# Patient Record
Sex: MALE | Race: BLACK OR AFRICAN AMERICAN | Employment: OTHER | URBAN - METROPOLITAN AREA
[De-identification: names, ages, dates, MRNs, and addresses within clinical notes are randomized per-mention and may not be internally consistent; named-entity substitution may affect disease eponyms.]

---

## 2018-07-06 ENCOUNTER — HOSPITAL ENCOUNTER (OUTPATIENT)
Dept: GENERAL RADIOLOGY | Age: 83
Discharge: HOME | End: 2018-07-06
Payer: MEDICARE

## 2018-07-06 ENCOUNTER — HOSPITAL ENCOUNTER (OUTPATIENT)
Dept: PREADMISSION TESTING | Age: 83
Discharge: HOME | End: 2018-07-06
Payer: MEDICARE

## 2018-07-06 VITALS
HEART RATE: 62 BPM | SYSTOLIC BLOOD PRESSURE: 147 MMHG | TEMPERATURE: 97.9 F | HEIGHT: 67 IN | WEIGHT: 223 LBS | DIASTOLIC BLOOD PRESSURE: 89 MMHG | BODY MASS INDEX: 35 KG/M2

## 2018-07-06 LAB
ABO + RH BLD: NORMAL
ALBUMIN SERPL-MCNC: 3.3 G/DL (ref 3.5–5)
ALBUMIN/GLOB SERPL: 0.8 {RATIO} (ref 1.1–2.2)
ALP SERPL-CCNC: 82 U/L (ref 45–117)
ALT SERPL-CCNC: 34 U/L (ref 12–78)
ANION GAP SERPL CALC-SCNC: 11 MMOL/L (ref 5–15)
APTT PPP: 26.5 SEC (ref 22.1–32)
AST SERPL-CCNC: 28 U/L (ref 15–37)
BASOPHILS # BLD: 0 K/UL (ref 0–0.1)
BASOPHILS NFR BLD: 0 % (ref 0–1)
BILIRUB SERPL-MCNC: 0.5 MG/DL (ref 0.2–1)
BLOOD GROUP ANTIBODIES SERPL: NORMAL
BUN SERPL-MCNC: 22 MG/DL (ref 6–20)
BUN/CREAT SERPL: 13 (ref 12–20)
CALCIUM SERPL-MCNC: 8.9 MG/DL (ref 8.5–10.1)
CHLORIDE SERPL-SCNC: 104 MMOL/L (ref 97–108)
CO2 SERPL-SCNC: 25 MMOL/L (ref 21–32)
CREAT SERPL-MCNC: 1.63 MG/DL (ref 0.7–1.3)
DIFFERENTIAL METHOD BLD: NORMAL
EOSINOPHIL # BLD: 0 K/UL (ref 0–0.4)
EOSINOPHIL NFR BLD: 1 % (ref 0–7)
ERYTHROCYTE [DISTWIDTH] IN BLOOD BY AUTOMATED COUNT: 12.5 % (ref 11.5–14.5)
GLOBULIN SER CALC-MCNC: 4.1 G/DL (ref 2–4)
GLUCOSE SERPL-MCNC: 180 MG/DL (ref 65–100)
HCT VFR BLD AUTO: 41.1 % (ref 36.6–50.3)
HGB BLD-MCNC: 13.3 G/DL (ref 12.1–17)
IMM GRANULOCYTES # BLD: 0 K/UL (ref 0–0.04)
IMM GRANULOCYTES NFR BLD AUTO: 0 % (ref 0–0.5)
INR PPP: 1 (ref 0.9–1.1)
LYMPHOCYTES # BLD: 0.9 K/UL (ref 0.8–3.5)
LYMPHOCYTES NFR BLD: 18 % (ref 12–49)
MCH RBC QN AUTO: 30.2 PG (ref 26–34)
MCHC RBC AUTO-ENTMCNC: 32.4 G/DL (ref 30–36.5)
MCV RBC AUTO: 93.4 FL (ref 80–99)
MONOCYTES # BLD: 0.4 K/UL (ref 0–1)
MONOCYTES NFR BLD: 7 % (ref 5–13)
NEUTS SEG # BLD: 3.9 K/UL (ref 1.8–8)
NEUTS SEG NFR BLD: 74 % (ref 32–75)
NRBC # BLD: 0 K/UL (ref 0–0.01)
NRBC BLD-RTO: 0 PER 100 WBC
PLATELET # BLD AUTO: 205 K/UL (ref 150–400)
PMV BLD AUTO: 11.6 FL (ref 8.9–12.9)
POTASSIUM SERPL-SCNC: 4.2 MMOL/L (ref 3.5–5.1)
PROT SERPL-MCNC: 7.4 G/DL (ref 6.4–8.2)
PROTHROMBIN TIME: 10.4 SEC (ref 9–11.1)
RBC # BLD AUTO: 4.4 M/UL (ref 4.1–5.7)
SODIUM SERPL-SCNC: 140 MMOL/L (ref 136–145)
SPECIMEN EXP DATE BLD: NORMAL
THERAPEUTIC RANGE,PTTT: NORMAL SECS (ref 58–77)
WBC # BLD AUTO: 5.3 K/UL (ref 4.1–11.1)

## 2018-07-06 PROCEDURE — 85730 THROMBOPLASTIN TIME PARTIAL: CPT

## 2018-07-06 PROCEDURE — 93005 ELECTROCARDIOGRAM TRACING: CPT

## 2018-07-06 PROCEDURE — 80053 COMPREHEN METABOLIC PANEL: CPT

## 2018-07-06 PROCEDURE — 86900 BLOOD TYPING SEROLOGIC ABO: CPT

## 2018-07-06 PROCEDURE — 85025 COMPLETE CBC W/AUTO DIFF WBC: CPT

## 2018-07-06 PROCEDURE — 71046 X-RAY EXAM CHEST 2 VIEWS: CPT

## 2018-07-06 PROCEDURE — 85610 PROTHROMBIN TIME: CPT

## 2018-07-06 RX ORDER — ASPIRIN 81 MG/1
81 TABLET ORAL DAILY
COMMUNITY
End: 2021-12-27 | Stop reason: ALTCHOICE

## 2018-07-06 RX ORDER — METOPROLOL SUCCINATE 25 MG/1
25 TABLET, EXTENDED RELEASE ORAL DAILY
COMMUNITY
Start: 2018-07-19 | End: 2021-06-06

## 2018-07-06 RX ORDER — OXYCODONE AND ACETAMINOPHEN 5; 325 MG/1; MG/1
TABLET ORAL
COMMUNITY
End: 2018-09-27

## 2018-07-06 RX ORDER — HYDROCHLOROTHIAZIDE 25 MG/1
25 TABLET ORAL DAILY
COMMUNITY
Start: 2018-07-19 | End: 2021-06-06

## 2018-07-06 RX ORDER — PRAVASTATIN SODIUM 40 MG/1
80 TABLET ORAL DAILY
COMMUNITY
End: 2020-07-21 | Stop reason: DRUGHIGH

## 2018-07-06 RX ORDER — OMEPRAZOLE 40 MG/1
40 CAPSULE, DELAYED RELEASE ORAL DAILY
COMMUNITY
End: 2021-01-29

## 2018-07-06 RX ORDER — AMOXICILLIN 250 MG
1 CAPSULE ORAL
Status: ON HOLD | COMMUNITY
End: 2022-07-06 | Stop reason: SDUPTHER

## 2018-07-06 RX ORDER — GABAPENTIN 300 MG/1
300 CAPSULE ORAL
COMMUNITY
Start: 2018-07-19 | End: 2020-07-13

## 2018-07-06 RX ORDER — LANOLIN ALCOHOL/MO/W.PET/CERES
400 CREAM (GRAM) TOPICAL DAILY
COMMUNITY
End: 2020-09-02

## 2018-07-06 RX ORDER — LISINOPRIL 40 MG/1
40 TABLET ORAL DAILY
COMMUNITY
End: 2020-07-13

## 2018-07-06 RX ORDER — AMLODIPINE BESYLATE 10 MG/1
10 TABLET ORAL DAILY
COMMUNITY
Start: 2018-07-19 | End: 2020-07-21 | Stop reason: DRUGHIGH

## 2018-07-06 NOTE — PERIOP NOTES
PREOPERATIVE INSTRUCTIONS REVIEWED WITH PATIENT. PATIENT GIVEN TWO-- SIX PACKS OF CHG WIPES. INSTRUCTIONS REVIEWED ON USE OF CHG WIPES. PATIENT GIVEN SSI INFECTIONS SHEET. PATIENT WAS GIVEN THE OPPORTUNITY TO ASK QUESTIONS ON THE INFORMATION PROVIDED. PATIENT GIVEN INSTRUCTIONS/DIRECTIONS FOR CXR TO BE DONE AT [address] [place] [address]; ORDER ENTERED.

## 2018-07-08 LAB
ATRIAL RATE: 64 BPM
CALCULATED R AXIS, ECG10: -44 DEGREES
CALCULATED T AXIS, ECG11: 147 DEGREES
DIAGNOSIS, 93000: NORMAL
Q-T INTERVAL, ECG07: 486 MS
QRS DURATION, ECG06: 196 MS
QTC CALCULATION (BEZET), ECG08: 486 MS
VENTRICULAR RATE, ECG03: 60 BPM

## 2018-07-09 NOTE — PERIOP NOTES
ABN EKG SENT TO [facility] FOR REVIEW. DR Rosa Khan. REVIEWED EKG AND NOTES; REQUESTED CARDIAC CLEARANCE. NOTIFIED [place]--DR Titus Cobb.

## 2018-07-10 ENCOUNTER — ANESTHESIA EVENT (OUTPATIENT)
Dept: SURGERY | Age: 83
DRG: 254 | End: 2018-07-10
Payer: MEDICARE

## 2018-07-10 RX ORDER — LATANOPROST 50 UG/ML
1 SOLUTION/ DROPS OPHTHALMIC
Status: ON HOLD | COMMUNITY
End: 2022-07-06 | Stop reason: SDUPTHER

## 2018-07-10 RX ORDER — BRIMONIDINE TARTRATE 2 MG/ML
1 SOLUTION/ DROPS OPHTHALMIC EVERY 8 HOURS
COMMUNITY
End: 2021-12-27 | Stop reason: ALTCHOICE

## 2018-07-11 NOTE — PERIOP NOTES
SPOKE TO BREANNE IN DR TRIANA'S OFFICE RE: CARDIAC CLEARANCE. SHE STATED DR TRIANA SPOKE WITH [facility] AND CARDIAC CLEARANCE IS NOT LONGER NECESSARY AS LONG AS CARDIAC NOTES WERE SENT TO [facility]. BREANNE STATED SHE FAXED CARDIAC NOTES TO [facility] AS REQUESTED.

## 2018-07-12 ENCOUNTER — HOSPITAL ENCOUNTER (INPATIENT)
Age: 83
LOS: 6 days | Discharge: HOME HEALTH | DRG: 254 | End: 2018-07-18
Payer: MEDICARE

## 2018-07-12 ENCOUNTER — ANESTHESIA (OUTPATIENT)
Dept: SURGERY | Age: 83
DRG: 254 | End: 2018-07-12
Payer: MEDICARE

## 2018-07-12 DIAGNOSIS — I70.235 ATHEROSCLEROSIS OF NATIVE ARTERY OF RIGHT LOWER EXTREMITY WITH ULCERATION OF OTHER PART OF FOOT (HCC): Primary | ICD-10-CM

## 2018-07-12 PROBLEM — I70.209 ATHEROSCLEROTIC PVD WITH ULCERATION (HCC): Status: ACTIVE | Noted: 2018-07-12

## 2018-07-12 PROBLEM — L98.499 ATHEROSCLEROTIC PVD WITH ULCERATION (HCC): Status: ACTIVE | Noted: 2018-07-12

## 2018-07-12 LAB
GLUCOSE BLD STRIP.AUTO-MCNC: 148 MG/DL (ref 65–100)
GLUCOSE BLD STRIP.AUTO-MCNC: 150 MG/DL (ref 65–100)
GLUCOSE BLD STRIP.AUTO-MCNC: 235 MG/DL (ref 65–100)
GLUCOSE BLD STRIP.AUTO-MCNC: 290 MG/DL (ref 65–100)
SERVICE CMNT-IMP: ABNORMAL

## 2018-07-12 PROCEDURE — 77030020256 HC SOL INJ NACL 0.9%  500ML

## 2018-07-12 PROCEDURE — 76210000063 HC OR PH I REC FIRST 0.5 HR

## 2018-07-12 PROCEDURE — 77030026438 HC STYL ET INTUB CARD -A: Performed by: ANESTHESIOLOGY

## 2018-07-12 PROCEDURE — 77030005401 HC CATH RAD ARRO -A

## 2018-07-12 PROCEDURE — 74011250637 HC RX REV CODE- 250/637

## 2018-07-12 PROCEDURE — 77030008684 HC TU ET CUF COVD -B: Performed by: ANESTHESIOLOGY

## 2018-07-12 PROCEDURE — 74011000258 HC RX REV CODE- 258

## 2018-07-12 PROCEDURE — 77030002987 HC SUT PROL J&J -B

## 2018-07-12 PROCEDURE — 77030011640 HC PAD GRND REM COVD -A

## 2018-07-12 PROCEDURE — 77030002986 HC SUT PROL J&J -A

## 2018-07-12 PROCEDURE — 76060000036 HC ANESTHESIA 2.5 TO 3 HR

## 2018-07-12 PROCEDURE — 77030018846 HC SOL IRR STRL H20 ICUM -A

## 2018-07-12 PROCEDURE — 77030031139 HC SUT VCRL2 J&J -A

## 2018-07-12 PROCEDURE — 041M09P BYPASS RIGHT POPLITEAL ARTERY TO FOOT ARTERY WITH AUTOLOGOUS VENOUS TISSUE, OPEN APPROACH: ICD-10-PCS

## 2018-07-12 PROCEDURE — 77030005402 HC CATH RAD ART LN KT TELE -B

## 2018-07-12 PROCEDURE — 77030008467 HC STPLR SKN COVD -B

## 2018-07-12 PROCEDURE — 76010000171 HC OR TIME 2 TO 2.5 HR INTENSV-TIER 1

## 2018-07-12 PROCEDURE — 77030005401 HC CATH RAD ARRO -A: Performed by: ANESTHESIOLOGY

## 2018-07-12 PROCEDURE — 74011636637 HC RX REV CODE- 636/637

## 2018-07-12 PROCEDURE — P9045 ALBUMIN (HUMAN), 5%, 250 ML: HCPCS

## 2018-07-12 PROCEDURE — 77030020782 HC GWN BAIR PAWS FLX 3M -B

## 2018-07-12 PROCEDURE — 74011250636 HC RX REV CODE- 250/636

## 2018-07-12 PROCEDURE — 77030019908 HC STETH ESOPH SIMS -A: Performed by: ANESTHESIOLOGY

## 2018-07-12 PROCEDURE — 03HY32Z INSERTION OF MONITORING DEVICE INTO UPPER ARTERY, PERCUTANEOUS APPROACH: ICD-10-PCS | Performed by: ANESTHESIOLOGY

## 2018-07-12 PROCEDURE — 82962 GLUCOSE BLOOD TEST: CPT

## 2018-07-12 PROCEDURE — 74011000250 HC RX REV CODE- 250

## 2018-07-12 PROCEDURE — 77030005518 HC CATH URETH FOL 2W BARD -B

## 2018-07-12 PROCEDURE — 74011000258 HC RX REV CODE- 258: Performed by: ANESTHESIOLOGY

## 2018-07-12 PROCEDURE — 65660000000 HC RM CCU STEPDOWN

## 2018-07-12 RX ORDER — ONDANSETRON 2 MG/ML
4 INJECTION INTRAMUSCULAR; INTRAVENOUS AS NEEDED
Status: DISCONTINUED | OUTPATIENT
Start: 2018-07-12 | End: 2018-07-12 | Stop reason: HOSPADM

## 2018-07-12 RX ORDER — INSULIN LISPRO 100 [IU]/ML
INJECTION, SOLUTION INTRAVENOUS; SUBCUTANEOUS
Status: DISCONTINUED | OUTPATIENT
Start: 2018-07-12 | End: 2018-07-18 | Stop reason: HOSPADM

## 2018-07-12 RX ORDER — SODIUM CHLORIDE 9 MG/ML
75 INJECTION, SOLUTION INTRAVENOUS CONTINUOUS
Status: DISCONTINUED | OUTPATIENT
Start: 2018-07-12 | End: 2018-07-13

## 2018-07-12 RX ORDER — ACETAMINOPHEN 325 MG/1
650 TABLET ORAL
Status: DISCONTINUED | OUTPATIENT
Start: 2018-07-12 | End: 2018-07-18 | Stop reason: HOSPADM

## 2018-07-12 RX ORDER — HYDROCHLOROTHIAZIDE 25 MG/1
12.5 TABLET ORAL DAILY
Status: DISCONTINUED | OUTPATIENT
Start: 2018-07-13 | End: 2018-07-18 | Stop reason: HOSPADM

## 2018-07-12 RX ORDER — FENTANYL CITRATE 50 UG/ML
INJECTION, SOLUTION INTRAMUSCULAR; INTRAVENOUS AS NEEDED
Status: DISCONTINUED | OUTPATIENT
Start: 2018-07-12 | End: 2018-07-12 | Stop reason: HOSPADM

## 2018-07-12 RX ORDER — LISINOPRIL 20 MG/1
40 TABLET ORAL DAILY
Status: DISCONTINUED | OUTPATIENT
Start: 2018-07-13 | End: 2018-07-18 | Stop reason: HOSPADM

## 2018-07-12 RX ORDER — AMLODIPINE BESYLATE 5 MG/1
10 TABLET ORAL
Status: DISCONTINUED | OUTPATIENT
Start: 2018-07-12 | End: 2018-07-18 | Stop reason: HOSPADM

## 2018-07-12 RX ORDER — ROPIVACAINE HYDROCHLORIDE 5 MG/ML
30 INJECTION, SOLUTION EPIDURAL; INFILTRATION; PERINEURAL AS NEEDED
Status: DISCONTINUED | OUTPATIENT
Start: 2018-07-12 | End: 2018-07-12 | Stop reason: HOSPADM

## 2018-07-12 RX ORDER — SODIUM CHLORIDE 0.9 % (FLUSH) 0.9 %
5-10 SYRINGE (ML) INJECTION AS NEEDED
Status: DISCONTINUED | OUTPATIENT
Start: 2018-07-12 | End: 2018-07-12 | Stop reason: HOSPADM

## 2018-07-12 RX ORDER — SODIUM CHLORIDE 0.9 % (FLUSH) 0.9 %
5-10 SYRINGE (ML) INJECTION EVERY 8 HOURS
Status: DISCONTINUED | OUTPATIENT
Start: 2018-07-12 | End: 2018-07-12 | Stop reason: HOSPADM

## 2018-07-12 RX ORDER — PROPOFOL 10 MG/ML
INJECTION, EMULSION INTRAVENOUS AS NEEDED
Status: DISCONTINUED | OUTPATIENT
Start: 2018-07-12 | End: 2018-07-12 | Stop reason: HOSPADM

## 2018-07-12 RX ORDER — DEXTROSE MONOHYDRATE AND SODIUM CHLORIDE 5; .45 G/100ML; G/100ML
75 INJECTION, SOLUTION INTRAVENOUS CONTINUOUS
Status: DISCONTINUED | OUTPATIENT
Start: 2018-07-12 | End: 2018-07-12

## 2018-07-12 RX ORDER — MORPHINE SULFATE 10 MG/ML
5 INJECTION, SOLUTION INTRAMUSCULAR; INTRAVENOUS
Status: DISCONTINUED | OUTPATIENT
Start: 2018-07-12 | End: 2018-07-18 | Stop reason: HOSPADM

## 2018-07-12 RX ORDER — PHENYLEPHRINE HCL IN 0.9% NACL 0.4MG/10ML
SYRINGE (ML) INTRAVENOUS AS NEEDED
Status: DISCONTINUED | OUTPATIENT
Start: 2018-07-12 | End: 2018-07-12 | Stop reason: HOSPADM

## 2018-07-12 RX ORDER — ONDANSETRON 2 MG/ML
4 INJECTION INTRAMUSCULAR; INTRAVENOUS
Status: DISCONTINUED | OUTPATIENT
Start: 2018-07-12 | End: 2018-07-18 | Stop reason: HOSPADM

## 2018-07-12 RX ORDER — GLYCOPYRROLATE 0.2 MG/ML
INJECTION INTRAMUSCULAR; INTRAVENOUS AS NEEDED
Status: DISCONTINUED | OUTPATIENT
Start: 2018-07-12 | End: 2018-07-12 | Stop reason: HOSPADM

## 2018-07-12 RX ORDER — GUAIFENESIN 100 MG/5ML
81 LIQUID (ML) ORAL DAILY
Status: DISCONTINUED | OUTPATIENT
Start: 2018-07-13 | End: 2018-07-18 | Stop reason: HOSPADM

## 2018-07-12 RX ORDER — ALBUMIN HUMAN 50 G/1000ML
SOLUTION INTRAVENOUS AS NEEDED
Status: DISCONTINUED | OUTPATIENT
Start: 2018-07-12 | End: 2018-07-12 | Stop reason: HOSPADM

## 2018-07-12 RX ORDER — LANOLIN ALCOHOL/MO/W.PET/CERES
400 CREAM (GRAM) TOPICAL DAILY
Status: DISCONTINUED | OUTPATIENT
Start: 2018-07-13 | End: 2018-07-18 | Stop reason: HOSPADM

## 2018-07-12 RX ORDER — LATANOPROST 50 UG/ML
1 SOLUTION/ DROPS OPHTHALMIC
Status: DISCONTINUED | OUTPATIENT
Start: 2018-07-12 | End: 2018-07-18 | Stop reason: HOSPADM

## 2018-07-12 RX ORDER — SODIUM CHLORIDE 0.9 % (FLUSH) 0.9 %
5-10 SYRINGE (ML) INJECTION AS NEEDED
Status: DISCONTINUED | OUTPATIENT
Start: 2018-07-12 | End: 2018-07-18 | Stop reason: HOSPADM

## 2018-07-12 RX ORDER — HEPARIN SODIUM 1000 [USP'U]/ML
INJECTION, SOLUTION INTRAVENOUS; SUBCUTANEOUS AS NEEDED
Status: DISCONTINUED | OUTPATIENT
Start: 2018-07-12 | End: 2018-07-12 | Stop reason: HOSPADM

## 2018-07-12 RX ORDER — DIPHENHYDRAMINE HYDROCHLORIDE 50 MG/ML
12.5 INJECTION, SOLUTION INTRAMUSCULAR; INTRAVENOUS AS NEEDED
Status: DISCONTINUED | OUTPATIENT
Start: 2018-07-12 | End: 2018-07-12 | Stop reason: HOSPADM

## 2018-07-12 RX ORDER — SODIUM CHLORIDE, SODIUM LACTATE, POTASSIUM CHLORIDE, CALCIUM CHLORIDE 600; 310; 30; 20 MG/100ML; MG/100ML; MG/100ML; MG/100ML
25 INJECTION, SOLUTION INTRAVENOUS CONTINUOUS
Status: DISCONTINUED | OUTPATIENT
Start: 2018-07-12 | End: 2018-07-12 | Stop reason: HOSPADM

## 2018-07-12 RX ORDER — FENTANYL CITRATE 50 UG/ML
25 INJECTION, SOLUTION INTRAMUSCULAR; INTRAVENOUS
Status: DISCONTINUED | OUTPATIENT
Start: 2018-07-12 | End: 2018-07-12 | Stop reason: HOSPADM

## 2018-07-12 RX ORDER — MORPHINE SULFATE 10 MG/ML
2 INJECTION, SOLUTION INTRAMUSCULAR; INTRAVENOUS
Status: DISCONTINUED | OUTPATIENT
Start: 2018-07-12 | End: 2018-07-12 | Stop reason: HOSPADM

## 2018-07-12 RX ORDER — PRAVASTATIN SODIUM 40 MG/1
80 TABLET ORAL
Status: DISCONTINUED | OUTPATIENT
Start: 2018-07-12 | End: 2018-07-18 | Stop reason: HOSPADM

## 2018-07-12 RX ORDER — METOPROLOL SUCCINATE 25 MG/1
25 TABLET, EXTENDED RELEASE ORAL DAILY
Status: DISCONTINUED | OUTPATIENT
Start: 2018-07-13 | End: 2018-07-18 | Stop reason: HOSPADM

## 2018-07-12 RX ORDER — OXYCODONE AND ACETAMINOPHEN 5; 325 MG/1; MG/1
2 TABLET ORAL
Status: DISCONTINUED | OUTPATIENT
Start: 2018-07-12 | End: 2018-07-18 | Stop reason: HOSPADM

## 2018-07-12 RX ORDER — MIDAZOLAM HYDROCHLORIDE 1 MG/ML
1 INJECTION, SOLUTION INTRAMUSCULAR; INTRAVENOUS AS NEEDED
Status: DISCONTINUED | OUTPATIENT
Start: 2018-07-12 | End: 2018-07-12 | Stop reason: HOSPADM

## 2018-07-12 RX ORDER — DEXTROSE 50 % IN WATER (D50W) INTRAVENOUS SYRINGE
12.5-25 AS NEEDED
Status: DISCONTINUED | OUTPATIENT
Start: 2018-07-12 | End: 2018-07-18 | Stop reason: HOSPADM

## 2018-07-12 RX ORDER — MIDAZOLAM HYDROCHLORIDE 1 MG/ML
0.5 INJECTION, SOLUTION INTRAMUSCULAR; INTRAVENOUS
Status: DISCONTINUED | OUTPATIENT
Start: 2018-07-12 | End: 2018-07-12 | Stop reason: HOSPADM

## 2018-07-12 RX ORDER — AMOXICILLIN 250 MG
2 CAPSULE ORAL 2 TIMES DAILY
Status: DISCONTINUED | OUTPATIENT
Start: 2018-07-12 | End: 2018-07-18 | Stop reason: HOSPADM

## 2018-07-12 RX ORDER — PANTOPRAZOLE SODIUM 40 MG/1
40 TABLET, DELAYED RELEASE ORAL DAILY
Status: DISCONTINUED | OUTPATIENT
Start: 2018-07-13 | End: 2018-07-18 | Stop reason: HOSPADM

## 2018-07-12 RX ORDER — ONDANSETRON 2 MG/ML
INJECTION INTRAMUSCULAR; INTRAVENOUS AS NEEDED
Status: DISCONTINUED | OUTPATIENT
Start: 2018-07-12 | End: 2018-07-12 | Stop reason: HOSPADM

## 2018-07-12 RX ORDER — DEXAMETHASONE SODIUM PHOSPHATE 4 MG/ML
INJECTION, SOLUTION INTRA-ARTICULAR; INTRALESIONAL; INTRAMUSCULAR; INTRAVENOUS; SOFT TISSUE AS NEEDED
Status: DISCONTINUED | OUTPATIENT
Start: 2018-07-12 | End: 2018-07-12 | Stop reason: HOSPADM

## 2018-07-12 RX ORDER — FENTANYL CITRATE 50 UG/ML
50 INJECTION, SOLUTION INTRAMUSCULAR; INTRAVENOUS AS NEEDED
Status: DISCONTINUED | OUTPATIENT
Start: 2018-07-12 | End: 2018-07-12 | Stop reason: HOSPADM

## 2018-07-12 RX ORDER — LIDOCAINE HYDROCHLORIDE 20 MG/ML
INJECTION, SOLUTION EPIDURAL; INFILTRATION; INTRACAUDAL; PERINEURAL AS NEEDED
Status: DISCONTINUED | OUTPATIENT
Start: 2018-07-12 | End: 2018-07-12 | Stop reason: HOSPADM

## 2018-07-12 RX ORDER — MAGNESIUM SULFATE 100 %
4 CRYSTALS MISCELLANEOUS AS NEEDED
Status: DISCONTINUED | OUTPATIENT
Start: 2018-07-12 | End: 2018-07-18 | Stop reason: HOSPADM

## 2018-07-12 RX ORDER — ROCURONIUM BROMIDE 10 MG/ML
INJECTION, SOLUTION INTRAVENOUS AS NEEDED
Status: DISCONTINUED | OUTPATIENT
Start: 2018-07-12 | End: 2018-07-12 | Stop reason: HOSPADM

## 2018-07-12 RX ORDER — CEFAZOLIN SODIUM/WATER 2 G/20 ML
2 SYRINGE (ML) INTRAVENOUS
Status: COMPLETED | OUTPATIENT
Start: 2018-07-12 | End: 2018-07-12

## 2018-07-12 RX ORDER — ENOXAPARIN SODIUM 100 MG/ML
40 INJECTION SUBCUTANEOUS EVERY 24 HOURS
Status: DISCONTINUED | OUTPATIENT
Start: 2018-07-12 | End: 2018-07-18 | Stop reason: HOSPADM

## 2018-07-12 RX ORDER — SODIUM CHLORIDE, SODIUM LACTATE, POTASSIUM CHLORIDE, CALCIUM CHLORIDE 600; 310; 30; 20 MG/100ML; MG/100ML; MG/100ML; MG/100ML
100 INJECTION, SOLUTION INTRAVENOUS CONTINUOUS
Status: DISCONTINUED | OUTPATIENT
Start: 2018-07-12 | End: 2018-07-12 | Stop reason: HOSPADM

## 2018-07-12 RX ORDER — SODIUM CHLORIDE 0.9 % (FLUSH) 0.9 %
5-10 SYRINGE (ML) INJECTION EVERY 8 HOURS
Status: DISCONTINUED | OUTPATIENT
Start: 2018-07-12 | End: 2018-07-18 | Stop reason: HOSPADM

## 2018-07-12 RX ORDER — OXYCODONE HYDROCHLORIDE 5 MG/1
5 TABLET ORAL AS NEEDED
Status: DISCONTINUED | OUTPATIENT
Start: 2018-07-12 | End: 2018-07-12 | Stop reason: HOSPADM

## 2018-07-12 RX ORDER — LIDOCAINE HYDROCHLORIDE 10 MG/ML
0.1 INJECTION, SOLUTION EPIDURAL; INFILTRATION; INTRACAUDAL; PERINEURAL AS NEEDED
Status: DISCONTINUED | OUTPATIENT
Start: 2018-07-12 | End: 2018-07-12 | Stop reason: HOSPADM

## 2018-07-12 RX ORDER — NEOSTIGMINE METHYLSULFATE 1 MG/ML
INJECTION INTRAVENOUS AS NEEDED
Status: DISCONTINUED | OUTPATIENT
Start: 2018-07-12 | End: 2018-07-12 | Stop reason: HOSPADM

## 2018-07-12 RX ORDER — BRIMONIDINE TARTRATE 2 MG/ML
1 SOLUTION/ DROPS OPHTHALMIC 3 TIMES DAILY
Status: DISCONTINUED | OUTPATIENT
Start: 2018-07-12 | End: 2018-07-18 | Stop reason: HOSPADM

## 2018-07-12 RX ORDER — SODIUM CHLORIDE 9 MG/ML
25 INJECTION, SOLUTION INTRAVENOUS CONTINUOUS
Status: DISCONTINUED | OUTPATIENT
Start: 2018-07-12 | End: 2018-07-12 | Stop reason: HOSPADM

## 2018-07-12 RX ADMIN — Medication 10 ML: at 22:41

## 2018-07-12 RX ADMIN — ROCURONIUM BROMIDE 40 MG: 10 INJECTION, SOLUTION INTRAVENOUS at 09:10

## 2018-07-12 RX ADMIN — DEXAMETHASONE SODIUM PHOSPHATE 4 MG: 4 INJECTION, SOLUTION INTRA-ARTICULAR; INTRALESIONAL; INTRAMUSCULAR; INTRAVENOUS; SOFT TISSUE at 09:41

## 2018-07-12 RX ADMIN — HEPARIN SODIUM 5000 UNITS: 1000 INJECTION, SOLUTION INTRAVENOUS; SUBCUTANEOUS at 10:13

## 2018-07-12 RX ADMIN — Medication 120 MCG: at 09:10

## 2018-07-12 RX ADMIN — DEXTROSE MONOHYDRATE AND SODIUM CHLORIDE 75 ML/HR: 5; .45 INJECTION, SOLUTION INTRAVENOUS at 12:11

## 2018-07-12 RX ADMIN — LACTULOSE 20 G: 20 SOLUTION ORAL at 17:10

## 2018-07-12 RX ADMIN — AMLODIPINE BESYLATE 10 MG: 5 TABLET ORAL at 22:41

## 2018-07-12 RX ADMIN — NEOSTIGMINE METHYLSULFATE 2 MG: 1 INJECTION INTRAVENOUS at 10:58

## 2018-07-12 RX ADMIN — ONDANSETRON 4 MG: 2 INJECTION INTRAMUSCULAR; INTRAVENOUS at 10:50

## 2018-07-12 RX ADMIN — Medication 2 G: at 09:19

## 2018-07-12 RX ADMIN — BRIMONIDINE TARTRATE 1 DROP: 2 SOLUTION OPHTHALMIC at 17:06

## 2018-07-12 RX ADMIN — LIDOCAINE HYDROCHLORIDE 80 MG: 20 INJECTION, SOLUTION EPIDURAL; INFILTRATION; INTRACAUDAL; PERINEURAL at 09:10

## 2018-07-12 RX ADMIN — GLYCOPYRROLATE 0.2 MG: 0.2 INJECTION INTRAMUSCULAR; INTRAVENOUS at 10:58

## 2018-07-12 RX ADMIN — FENTANYL CITRATE 50 MCG: 50 INJECTION, SOLUTION INTRAMUSCULAR; INTRAVENOUS at 09:10

## 2018-07-12 RX ADMIN — LATANOPROST 1 DROP: 50 SOLUTION OPHTHALMIC at 22:44

## 2018-07-12 RX ADMIN — PRAVASTATIN SODIUM 80 MG: 40 TABLET ORAL at 22:41

## 2018-07-12 RX ADMIN — ALBUMIN HUMAN 250 ML: 50 SOLUTION INTRAVENOUS at 09:38

## 2018-07-12 RX ADMIN — SODIUM CHLORIDE: 900 INJECTION, SOLUTION INTRAVENOUS at 09:04

## 2018-07-12 RX ADMIN — PROPOFOL 20 MG: 10 INJECTION, EMULSION INTRAVENOUS at 10:11

## 2018-07-12 RX ADMIN — SENNOSIDES AND DOCUSATE SODIUM 2 TABLET: 8.6; 5 TABLET ORAL at 17:10

## 2018-07-12 RX ADMIN — PROPOFOL 80 MG: 10 INJECTION, EMULSION INTRAVENOUS at 09:10

## 2018-07-12 RX ADMIN — ENOXAPARIN SODIUM 40 MG: 100 INJECTION SUBCUTANEOUS at 17:10

## 2018-07-12 RX ADMIN — BRIMONIDINE TARTRATE 1 DROP: 2 SOLUTION OPHTHALMIC at 22:44

## 2018-07-12 RX ADMIN — FENTANYL CITRATE 50 MCG: 50 INJECTION, SOLUTION INTRAMUSCULAR; INTRAVENOUS at 09:28

## 2018-07-12 RX ADMIN — INSULIN LISPRO 2 UNITS: 100 INJECTION, SOLUTION INTRAVENOUS; SUBCUTANEOUS at 22:40

## 2018-07-12 NOTE — PROGRESS NOTES
TRANSFER - IN REPORT:    Verbal report received from [facility] on Purvi Daub  being received from [facility] for routine post - op      Report consisted of patients Situation, Background, Assessment and   Recommendations(SBAR). Information from the following report(s) SBAR was reviewed with the receiving nurse. Opportunity for questions and clarification was provided. Assessment completed upon patients arrival to unit and care assumed.

## 2018-07-12 NOTE — PROGRESS NOTES
Primary Nurse Jose Ray RN and Herminia Perez RN performed a dual skin assessment on this patient Impairment noted- see wound doc flow sheet  Al score is 18    Right leg surgical incisin, right great toe black non-blanchable areas.

## 2018-07-12 NOTE — IP AVS SNAPSHOT
[address] [facility] [address] 
[phone] Patient: Emmanuel Figueroa MRN: [id number] :1935 About your hospitalization You were admitted on:  2018 You last received care in the:  [place] [address] You were discharged on:  2018 Why you were hospitalized Your primary diagnosis was:  Not on File Your diagnoses also included:  Atherosclerotic Pvd With Ulceration (Hcc) Follow-up Information Follow up With Details Comments Contact Info Trina Calabrese MD   [address] [place] [address] [address] 
[phone] [facility] In 1 day Home Health Physical Therapy. [address] 
1st Floor [place] [id number] 
[phone] Discharge Orders None A check sammie indicates which time of day the medication should be taken. My Medications CHANGE how you take these medications Instructions Each Dose to Equal  
 Morning Noon Evening Bedtime * oxyCODONE-acetaminophen 5-325 mg per tablet Commonly known as:  PERCOCET What changed:  Another medication with the same name was added. Make sure you understand how and when to take each. Your last dose was: Your next dose is: Take  by mouth every four (4) hours as needed for Pain. * oxyCODONE-acetaminophen 5-325 mg per tablet Commonly known as:  PERCOCET What changed: You were already taking a medication with the same name, and this prescription was added. Make sure you understand how and when to take each. Your last dose was: Your next dose is: Take 1 Tab by mouth every four (4) hours as needed. Max Daily Amount: 6 Tabs. 1 Tab * Notice: This list has 2 medication(s) that are the same as other medications prescribed for you.  Read the directions carefully, and ask your doctor or other care provider to review them with you. CONTINUE taking these medications Instructions Each Dose to Equal  
 Morning Noon Evening Bedtime  
 amLODIPine 10 mg tablet Commonly known as:  Dorcas Holbrook Your last dose was: Your next dose is: Take  by mouth nightly. aspirin 81 mg chewable tablet Your last dose was: Your next dose is: Take 81 mg by mouth daily. 81 mg  
    
   
   
   
  
 brimonidine 0.2 % ophthalmic solution Commonly known as:  Tyrell Perry Your last dose was: Your next dose is:    
   
   
 Administer 1 Drop to both eyes three (3) times daily. 1 Drop  
    
   
   
   
  
 gabapentin 300 mg capsule Commonly known as:  NEURONTIN Your last dose was: Your next dose is: Take 300 mg by mouth daily as needed. 300 mg  
    
   
   
   
  
 hydroCHLOROthiazide 12.5 mg capsule Commonly known as:  Galmackenzie Pierce Your last dose was: Your next dose is: Take 25 mg by mouth daily. 25 mg  
    
   
   
   
  
 LACTULOSE PO Your last dose was: Your next dose is: Take 30 mL by mouth two (2) times a day. 30 mL  
    
   
   
   
  
 latanoprost 0.005 % ophthalmic solution Commonly known as:  Juana Brito Your last dose was: Your next dose is:    
   
   
 Administer 1 Drop to both eyes nightly. 1 Drop  
    
   
   
   
  
 lisinopril 40 mg tablet Commonly known as:  Karen Necedah Your last dose was: Your next dose is: Take 40 mg by mouth daily. 40 mg  
    
   
   
   
  
 magnesium oxide 400 mg tablet Commonly known as:  MAG-OX Your last dose was: Your next dose is: Take 400 mg by mouth daily. 400 mg  
    
   
   
   
  
 metoprolol succinate 25 mg XL tablet Commonly known as:  TOPROL-XL Your last dose was: Your next dose is: Take  by mouth daily. omeprazole 40 mg capsule Commonly known as:  PRILOSEC Your last dose was: Your next dose is: Take 40 mg by mouth daily. 40 mg  
    
   
   
   
  
 pravastatin 40 mg tablet Commonly known as:  PRAVACHOL Your last dose was: Your next dose is: Take 80 mg by mouth nightly. 80 mg STOOL SOFTENER-LAXATIVE 8.6-50 mg per tablet Generic drug:  senna-docusate Your last dose was: Your next dose is: Take 2 Tabs by mouth two (2) times a day. 2 Tab  
    
   
   
   
  
 vit B Cmplx 3-FA-Vit C-Biotin 1- mg-mg-mcg tablet Commonly known as:  NEPHRO ALVAREZ RX Your last dose was: Your next dose is: Take 1 Tab by mouth daily. 1 Tab Where to Get Your Medications Information on where to get these meds will be given to you by the nurse or doctor. ! Ask your nurse or doctor about these medications  
  oxyCODONE-acetaminophen 5-325 mg per tablet Opioid Education Prescription Opioids: What You Need to Know: 
 
 
After general anesthesia or intravenous sedation, for 24 hours or while taking prescription Narcotics: · Limit your activities · Do not drive and operate hazardous machinery · Do not make important personal or business decisions · Do  not drink alcoholic beverages · If you have not urinated within 8 hours after discharge, please contact your surgeon on call. Report the following to your surgeon: · Excessive pain, swelling, redness or odor of or around the surgical area · Temperature over 100.5 · Nausea and vomiting lasting longer than 4 hours or if unable to take medications · Any signs of decreased circulation or nerve impairment to extremity: change in color, persistent  numbness, tingling, coldness or increase pain · Any questions What to do at Home: *  Please give a list of your current medications to your Primary Care Provider. *  Please update this list whenever your medications are discontinued, doses are 
    changed, or new medications (including over-the-counter products) are added. *  Please carry medication information at all times in case of emergency situations. These are general instructions for a healthy lifestyle: No smoking/ No tobacco products/ Avoid exposure to second hand smoke Surgeon General's Warning:  Quitting smoking now greatly reduces serious risk to your health. Obesity, smoking, and sedentary lifestyle greatly increases your risk for illness A healthy diet, regular physical exercise & weight monitoring are important for maintaining a healthy lifestyle You may be retaining fluid if you have a history of heart failure or if you experience any of the following symptoms:  Weight gain of 3 pounds or more overnight or 5 pounds in a week, increased swelling in our hands or feet or shortness of breath while lying flat in bed. Please call your doctor as soon as you notice any of these symptoms; do not wait until your next office visit. Recognize signs and symptoms of STROKE: 
 
F-face looks uneven A-arms unable to move or move unevenly S-speech slurred or non-existent T-time-call 911 as soon as signs and symptoms begin-DO NOT go Back to bed or wait to see if you get better-TIME IS BRAIN. Warning Signs of HEART ATTACK Call 911 if you have these symptoms: 
? Chest discomfort.  Most heart attacks involve discomfort in the center of the chest that lasts more than a few minutes, or that goes away and comes back. It can feel like uncomfortable pressure, squeezing, fullness, or pain. ? Discomfort in other areas of the upper body. Symptoms can include pain or discomfort in one or both arms, the back, neck, jaw, or stomach. ? Shortness of breath with or without chest discomfort. ? Other signs may include breaking out in a cold sweat, nausea, or lightheadedness. Don't wait more than five minutes to call [address]! Fast action can save your life. Calling 911 is almost always the fastest way to get lifesaving treatment. Emergency Medical Services staff can begin treatment when they arrive  up to an hour sooner than if someone gets to the hospital by car. The discharge information has been reviewed with the patient. The patient verbalized understanding. Discharge medications reviewed with the patient and appropriate educational materials and side effects teaching were provided. Channel M Activation Thank you for requesting access to Channel M. Please follow the instructions below to securely access and download your online medical record. Channel M allows you to send messages to your doctor, view your test results, renew your prescriptions, schedule appointments, and more. How Do I Sign Up? 1. In your internet browser, go to [email or web address] 
2. Click on the First Time User? Click Here link in the Sign In box. You will be redirect to the New Member Sign Up page. 3. Enter your Channel M Access Code exactly as it appears below. You will not need to use this code after youve completed the sign-up process. If you do not sign up before the expiration date, you must request a new code. Channel M Access Code: [email or web address] Expires: 10/9/2018 11:37 AM (This is the date your Channel M access code will ) 4.  Enter the last four digits of your Social Security Number (xxxx) and Date of Birth (mm/dd/yyyy) as indicated and click Submit. You will be taken to the next sign-up page. 5. Create a [employer] ID. This will be your [employer] login ID and cannot be changed, so think of one that is secure and easy to remember. 6. Create a [employer] password. You can change your password at any time. 7. Enter your Password Reset Question and Answer. This can be used at a later time if you forget your password. 8. Enter your e-mail address. You will receive e-mail notification when new information is available in [address] 9. Click Sign Up. You can now view and download portions of your medical record. 10. Click the Download Summary menu link to download a portable copy of your medical information. Additional Information If you have questions, please visit the Frequently Asked Questions section of the [employer] website at [email or web address] Remember, [employer] is NOT to be used for urgent needs. For medical emergencies, dial 911. 
___________________________________________________________________________________________________________________________________ Patient Discharge Instructions Annia Santillan / [id number] : 1935 Admitted 2018 Discharged: 2018 Take Home Medications · It is important that you take the medication exactly as they are prescribed. · Keep your medication in the bottles provided by the pharmacist and keep a list of the medication names, dosages, and times to be taken in your wallet. · Do not take other medications without consulting your doctor. What to do at [facility] Recommended diet: Cardiac Diet, Recommended activity: Activity as tolerated, Additional Instructions: elevate foot for swelling, OK to shower Follow-up with Dr Jitendra Byrd in 2 weeks, call [phone] for appt Information obtained by : 
I understand that if any problems occur once I am at home I am to contact my physician. I understand and acknowledge receipt of the instructions indicated above. Physician's or R.N.'s Signature                                                                  Date/Time Patient or Representative Signature                                                          Date/Time Introducing [facility]! [employer] introduces [employer] patient portal. Now you can access parts of your medical record, email your doctor's office, and request medication refills online. 1. In your internet browser, go to [email or web address] 2. Click on the First Time User? Click Here link in the Sign In box. You will see the New Member Sign Up page. 3. Enter your [employer] Access Code exactly as it appears below. You will not need to use this code after youve completed the sign-up process. If you do not sign up before the expiration date, you must request a new code. · [employer] Access Code: [email or web address] Expires: 10/9/2018 11:37 AM 
 
4. Enter the last four digits of your Social Security Number (xxxx) and Date of Birth (mm/dd/yyyy) as indicated and click Submit. You will be taken to the next sign-up page. 5. Create a [employer] ID. This will be your [employer] login ID and cannot be changed, so think of one that is secure and easy to remember. 6. Create a [employer] password. You can change your password at any time. 7. Enter your Password Reset Question and Answer. This can be used at a later time if you forget your password. 8. Enter your e-mail address. You will receive e-mail notification when new information is available in [address] 9. Click Sign Up. You can now view and download portions of your medical record. 10. Click the Download Summary menu link to download a portable copy of your medical information. If you have questions, please visit the Frequently Asked Questions section of the [employer] website. Remember, lucierna is NOT to be used for urgent needs. For medical emergencies, dial 911. Now available from your iPhone and Android! Introducing Meño Vides As a [employer] patient, I wanted to make you aware of our electronic visit tool called Meño Vides. [employer] 24/7 allows you to connect within minutes with a medical provider 24 hours a day, seven days a week via a mobile device or tablet or logging into a secure website from your computer. You can access Meño Vides from anywhere in the [place]. A virtual visit might be right for you when you have a simple condition and feel like you just dont want to get out of bed, or cant get away from work for an appointment, when your regular [employer] provider is not available (evenings, weekends or holidays), or when youre out of town and need minor care. Electronic visits cost only $49 and if the [employer] 24/7 provider determines a prescription is needed to treat your condition, one can be electronically transmitted to a nearby pharmacy*. Please take a moment to enroll today if you have not already done so. The enrollment process is free and takes just a few minutes. To enroll, please download the [employer] 24/7 milady to your tablet or phone, or visit [email or web address] to enroll on your computer. And, as an [address] [place] [address] patient with a [employer] account, the results of your visits will be scanned into your electronic medical record and your primary care provider will be able to view the scanned results. We urge you to continue to see your regular [employer] provider for your ongoing medical care.   And while your primary care provider may not be the one available when you seek a Meño Vides virtual visit, the peace of mind you get from getting a real diagnosis real time can be priceless. For more information on Meño Vides, view our Frequently Asked Questions (FAQs) at [email or web address]. Sincerely, 
 
Beryl Mccarthy MD 
Chief Medical Officer [employer] *:  certain medications cannot be prescribed via Meño Vides Providers Seen During Your Hospitalization Provider Specialty Primary office phone Nam Hawley MD Vascular Surgery [phone] Your Primary Care Physician (PCP) Primary Care Physician Office Phone Office Fax Giancarlopadma Christine [phone] You are allergic to the following No active allergies Recent Documentation Weight BMI Smoking Status 109.4 kg 37.77 kg/m2 Never Smoker Emergency Contacts Name Discharge Info Relation Home Work Mobile MarciMario DISCHARGE CAREGIVER [3] Child [2] [phone] Patient Belongings The following personal items are in your possession at time of discharge: 
  Dental Appliances: None  Visual Aid: None             Clothing:  (clothes bag to pacu) Please provide this summary of care documentation to your next provider. Signatures-by signing, you are acknowledging that this After Visit Summary has been reviewed with you and you have received a copy. Patient Signature:  ____________________________________________________________ Date:  ____________________________________________________________  
  
Saqib Whyte Provider Signature:  ____________________________________________________________ Date:  ____________________________________________________________

## 2018-07-12 NOTE — PERIOP NOTES
Patient: Raymond Petty MRN: [id number]  SSN: [id number]   YOB: 1935  Age: [de-identified] y.o. Sex: male     Patient is status post Procedure(s):  RIGHT POPLITEAL-DORSALIS PEDIS W/VEIN BYPASS.     Surgeon(s) and Role:     * Pamela Kiran MD - Primary    Local/Dose/Irrigation:  SEE EMAR                  Peripheral IV 07/12/18 Right External jugular (Active)      Arterial Line 07/12/18 Left Radial artery (Active)          Airway - Endotracheal Tube 07/12/18 Oral (Active)                   Dressing/Packing:  Wound Leg Right-DRESSING TYPE: 4 x 4;Other (Comment) (Medipore tape) (07/12/18 1058)  Splint/Cast:  ]    Other:

## 2018-07-12 NOTE — PROGRESS NOTES
Problem: LE Bypass: Day of Surgery/Post-Op (Initiate SCIP Measures for Post-Op Care)  Goal: Nutrition/Diet  Outcome: Progressing Towards Goal  Cardiac diet    Goal: Medications  Outcome: Progressing Towards Goal  IVF, ancef, pain medication prn    Goal: Treatments/Interventions/Procedures  Outcome: Progressing Towards Goal  Lovenox, gibbons catheter, tele monitor, AC &HS

## 2018-07-12 NOTE — BRIEF OP NOTE
BRIEF OPERATIVE NOTE    Date of Procedure: 7/12/2018   Preoperative Diagnosis: ARTHROSCLEROSIS WITH RESTING PAIN RIGHT LEG  Postoperative Diagnosis: ARTHROSCLEROSIS WITH RESTING PAIN RIGHT LEG    Procedure(s):  RIGHT POPLITEAL-DORSALIS PEDIS W/VEIN BYPASS  Surgeon(s) and Role:     * Rachel Marcano MD - Primary         Surgical Assistant: Rashawn Sánchez    Surgical Staff:  Circ-1: Evelin Wei RN  Circ-Relief: Susi Aleman RN; [place]  Circ-Intern: Delphine Saez  Registered Nurse Assistant: Linda Whitehead RN  Scrub Tech-1: Algis Field  Surg Asst-1: [facility]  Event Time In   Incision Start 9372   Incision Close      Anesthesia: General   Estimated Blood Loss: 50 ml  Specimens: * No specimens in log *   Findings: good vein   Complications: none  Implants: * No implants in log *

## 2018-07-12 NOTE — PERIOP NOTES
TRANSFER - OUT REPORT:    Verbal report given to Adrienne ERNST(name) on Cal Davila  being transferred to 444(unit) for routine post - op       Report consisted of patients Situation, Background, Assessment and   Recommendations(SBAR). Time Pre op antibiotic CQUVW:4334  Anesthesia Stop time: 5808  Dodson Present on Transfer to floor:yes  Order for Dodson on Chart:yes  Discharge Prescriptions with Chart:no    Information from the following report(s) SBAR, OR Summary, Procedure Summary, Intake/Output, MAR, Recent Results, Med Rec Status and Cardiac Rhythm NSR was reviewed with the receiving nurse. Opportunity for questions and clarification was provided. Is the patient on 02? YES       L/Min 2       Other nc    Is the patient on a monitor? YES    Is the nurse transporting with the patient? YES    Surgical Waiting Area notified of patient's transfer from PACU? yes      The following personal items collected during your admission accompanied patient upon transfer:   Dental Appliance: Dental Appliances: None  Vision: Visual Aid: None  Hearing Aid:    Jewelry:    Clothing: Clothing:  (clothes bag to pacu)  Other Valuables:    Valuables sent to safe:

## 2018-07-12 NOTE — IP AVS SNAPSHOT
[address] [place] [address] 
[phone] Patient: Sue Xavier MRN: [id number] :1935 A check sammie indicates which time of day the medication should be taken. My Medications CHANGE how you take these medications Instructions Each Dose to Equal  
 Morning Noon Evening Bedtime * oxyCODONE-acetaminophen 5-325 mg per tablet Commonly known as:  PERCOCET What changed:  Another medication with the same name was added. Make sure you understand how and when to take each. Your last dose was: Your next dose is: Take  by mouth every four (4) hours as needed for Pain. * oxyCODONE-acetaminophen 5-325 mg per tablet Commonly known as:  PERCOCET What changed: You were already taking a medication with the same name, and this prescription was added. Make sure you understand how and when to take each. Your last dose was: Your next dose is: Take 1 Tab by mouth every four (4) hours as needed. Max Daily Amount: 6 Tabs. 1 Tab * Notice: This list has 2 medication(s) that are the same as other medications prescribed for you. Read the directions carefully, and ask your doctor or other care provider to review them with you. CONTINUE taking these medications Instructions Each Dose to Equal  
 Morning Noon Evening Bedtime  
 amLODIPine 10 mg tablet Commonly known as:  Aureliano Blank Your last dose was: Your next dose is: Take  by mouth nightly. aspirin 81 mg chewable tablet Your last dose was: Your next dose is: Take 81 mg by mouth daily. 81 mg  
    
   
   
   
  
 brimonidine 0.2 % ophthalmic solution Commonly known as:  Jamaica Paige Your last dose was: Your next dose is: Administer 1 Drop to both eyes three (3) times daily. 1 Drop  
    
   
   
   
  
 gabapentin 300 mg capsule Commonly known as:  NEURONTIN Your last dose was: Your next dose is: Take 300 mg by mouth daily as needed. 300 mg  
    
   
   
   
  
 hydroCHLOROthiazide 12.5 mg capsule Commonly known as:  Quintella Antes Your last dose was: Your next dose is: Take 25 mg by mouth daily. 25 mg  
    
   
   
   
  
 LACTULOSE PO Your last dose was: Your next dose is: Take 30 mL by mouth two (2) times a day. 30 mL  
    
   
   
   
  
 latanoprost 0.005 % ophthalmic solution Commonly known as:  Selvin Quails Your last dose was: Your next dose is:    
   
   
 Administer 1 Drop to both eyes nightly. 1 Drop  
    
   
   
   
  
 lisinopril 40 mg tablet Commonly known as:  Mony Bella Your last dose was: Your next dose is: Take 40 mg by mouth daily. 40 mg  
    
   
   
   
  
 magnesium oxide 400 mg tablet Commonly known as:  MAG-OX Your last dose was: Your next dose is: Take 400 mg by mouth daily. 400 mg  
    
   
   
   
  
 metoprolol succinate 25 mg XL tablet Commonly known as:  TOPROL-XL Your last dose was: Your next dose is: Take  by mouth daily. omeprazole 40 mg capsule Commonly known as:  PRILOSEC Your last dose was: Your next dose is: Take 40 mg by mouth daily. 40 mg  
    
   
   
   
  
 pravastatin 40 mg tablet Commonly known as:  PRAVACHOL Your last dose was: Your next dose is: Take 80 mg by mouth nightly. 80 mg STOOL SOFTENER-LAXATIVE 8.6-50 mg per tablet Generic drug:  senna-docusate Your last dose was: Your next dose is: Take 2 Tabs by mouth two (2) times a day. 2 Tab  
    
   
   
   
  
 vit B Cmplx 3-FA-Vit C-Biotin 1- mg-mg-mcg tablet Commonly known as:  NEPHRO ALVAREZ RX Your last dose was: Your next dose is: Take 1 Tab by mouth daily. 1 Tab Where to Get Your Medications Information on where to get these meds will be given to you by the nurse or doctor. ! Ask your nurse or doctor about these medications  
  oxyCODONE-acetaminophen 5-325 mg per tablet

## 2018-07-12 NOTE — OP NOTES
[address] [place] [address] REPORT    Name:Tavia ALVA  MR#: [id number]  : 1935  ACCOUNT #: [de-identified]   DATE OF SERVICE: 2018    PREOPERATIVE DIAGNOSES:  Peripheral vascular disease with ulceration right great toe and ischemic rest pain. POSTOPERATIVE DIAGNOSES:  Peripheral vascular disease with ulceration right great toe and ischemic rest pain. PROCEDURE PERFORMED:  Right below knee popliteal to dorsalis pedis bypass using nonreversed greater saphenous vein. SURGEON:  Omar Zamarripa MD    ANESTHESIA:  General.    ESTIMATED BLOOD LOSS:  50 mL    SPECIMENS REMOVED:  None. COMPLICATIONS:  None. IMPLANTS:  None. ASSISTANT:  Tony    INDICATIONS:  The patient is an 79-year-old male with significant peripheral vascular disease. He has a painful poorly healing ulceration on the right great toe. Angiography showed an occluded segment of the superficial femoral artery. This was stented. He also has significant tibial occlusive disease. He will undergo bypass from his popliteal artery to the anterior tibial artery at the level of the ankle. DESCRIPTION OF PROCEDURE:  The patient's right leg was prepped and draped. Three interrupted longitudinal incisions were made in the medial thigh beginning at the inguinal crease to identify and harvest the greater saphenous vein. Side branches were ligated with silk ties. The vein was of good size and quality. A longitudinal incision was made in the proximal medial lower leg. The popliteal artery was identified, dissected free and had a good pulse at this level. The dorsalis pedis artery was then identified at the level of the junction of the ankle and foot. A counter incision was made in the distal medial lower leg for tunneling of the graft. The patient was systemically heparinized. The vein graft was sewn end-to-side to the popliteal artery using running 5-0 Prolene.   The vein was oriented in a nonreversed fashion. The valves within the vein graft were then cut using a valvulotome establishing good flow through the graft. The graft was tunneled subfascially from the proximal medial lower leg to the counterincision in the distal medial lower leg. The graft was then tunneled subcutaneously from the medial leg to the incision on the anterior ankle and foot. The graft was then sewn end-to-side to the dorsalis pedis artery using running 6-0 Prolene. At the completion of the anastomosis, there was a good pulse in the graft and good flow by Doppler. Hemostasis was confirmed and the incisions were closed with Vicryl subcutaneous suture and skin staples. Dressings were applied and the patient was returned to the recovery room in stable condition.       MD DONNA Alonzo /   D: 07/12/2018 11:25     T: 07/12/2018 14:17  JOB #: [id number]

## 2018-07-12 NOTE — ANESTHESIA PREPROCEDURE EVALUATION
Anesthetic History   No history of anesthetic complications            Review of Systems / Medical History  Patient summary reviewed, nursing notes reviewed and pertinent labs reviewed    Pulmonary    COPD: mild               Neuro/Psych       CVA  Psychiatric history     Cardiovascular    Hypertension  Valvular problems/murmurs: aortic stenosis      Dysrhythmias : atrial fibrillation  Pacemaker and PAD    Exercise tolerance: <4 METS  Comments: EF 65%   GI/Hepatic/Renal     GERD: well controlled    Renal disease: CRI  PUD     Endo/Other    Diabetes: type 2         Other Findings            Physical Exam    Airway  Mallampati: II  TM Distance: 4 - 6 cm  Neck ROM: normal range of motion   Mouth opening: Normal     Cardiovascular          Murmur, Aortic area     Dental    Dentition: Edentulous     Pulmonary  Breath sounds clear to auscultation               Abdominal  GI exam deferred       Other Findings            Anesthetic Plan    ASA: 4  Anesthesia type: general    Monitoring Plan: Arterial line      Induction: Intravenous  Anesthetic plan and risks discussed with: Patient and Son / Daughter

## 2018-07-12 NOTE — ANESTHESIA PROCEDURE NOTES
Arterial Line Placement    Start time: 7/12/2018 8:22 AM  End time: 7/12/2018 8:28 AM  Performed by: Martine Delarosa  Authorized by: Hyun RIZZO     Pre-Procedure  Indications:  Arterial pressure monitoring  Preanesthetic Checklist: patient identified, risks and benefits discussed, site marked, patient being monitored, timeout performed and patient being monitored      Procedure:   Prep:  Chlorhexidine  Seldinger Technique?: Yes    Orientation:  Left  Location:  Radial artery  Catheter size:  20 G  Number of attempts:  1  Cont Cardiac Output Sensor: No      Assessment:   Post-procedure:  Line secured and sterile dressing applied  Patient Tolerance:  Patient tolerated the procedure well with no immediate complications

## 2018-07-13 LAB
ANION GAP SERPL CALC-SCNC: 11 MMOL/L (ref 5–15)
BASOPHILS # BLD: 0 K/UL (ref 0–0.1)
BASOPHILS NFR BLD: 0 % (ref 0–1)
BUN SERPL-MCNC: 24 MG/DL (ref 6–20)
BUN/CREAT SERPL: 15 (ref 12–20)
CALCIUM SERPL-MCNC: 8.1 MG/DL (ref 8.5–10.1)
CHLORIDE SERPL-SCNC: 103 MMOL/L (ref 97–108)
CO2 SERPL-SCNC: 23 MMOL/L (ref 21–32)
CREAT SERPL-MCNC: 1.6 MG/DL (ref 0.7–1.3)
DIFFERENTIAL METHOD BLD: ABNORMAL
EOSINOPHIL # BLD: 0 K/UL (ref 0–0.4)
EOSINOPHIL NFR BLD: 0 % (ref 0–7)
ERYTHROCYTE [DISTWIDTH] IN BLOOD BY AUTOMATED COUNT: 12.3 % (ref 11.5–14.5)
GLUCOSE BLD STRIP.AUTO-MCNC: 123 MG/DL (ref 65–100)
GLUCOSE BLD STRIP.AUTO-MCNC: 134 MG/DL (ref 65–100)
GLUCOSE BLD STRIP.AUTO-MCNC: 168 MG/DL (ref 65–100)
GLUCOSE BLD STRIP.AUTO-MCNC: 221 MG/DL (ref 65–100)
GLUCOSE SERPL-MCNC: 208 MG/DL (ref 65–100)
HCT VFR BLD AUTO: 33 % (ref 36.6–50.3)
HGB BLD-MCNC: 10.6 G/DL (ref 12.1–17)
IMM GRANULOCYTES # BLD: 0 K/UL (ref 0–0.04)
IMM GRANULOCYTES NFR BLD AUTO: 0 % (ref 0–0.5)
LYMPHOCYTES # BLD: 1.1 K/UL (ref 0.8–3.5)
LYMPHOCYTES NFR BLD: 15 % (ref 12–49)
MCH RBC QN AUTO: 30.1 PG (ref 26–34)
MCHC RBC AUTO-ENTMCNC: 32.1 G/DL (ref 30–36.5)
MCV RBC AUTO: 93.8 FL (ref 80–99)
MONOCYTES # BLD: 0.6 K/UL (ref 0–1)
MONOCYTES NFR BLD: 9 % (ref 5–13)
NEUTS SEG # BLD: 5.5 K/UL (ref 1.8–8)
NEUTS SEG NFR BLD: 75 % (ref 32–75)
NRBC # BLD: 0 K/UL (ref 0–0.01)
NRBC BLD-RTO: 0 PER 100 WBC
PLATELET # BLD AUTO: 208 K/UL (ref 150–400)
PMV BLD AUTO: 11 FL (ref 8.9–12.9)
POTASSIUM SERPL-SCNC: 4.1 MMOL/L (ref 3.5–5.1)
RBC # BLD AUTO: 3.52 M/UL (ref 4.1–5.7)
SERVICE CMNT-IMP: ABNORMAL
SODIUM SERPL-SCNC: 137 MMOL/L (ref 136–145)
WBC # BLD AUTO: 7.3 K/UL (ref 4.1–11.1)

## 2018-07-13 PROCEDURE — 65270000032 HC RM SEMIPRIVATE

## 2018-07-13 PROCEDURE — 97161 PT EVAL LOW COMPLEX 20 MIN: CPT

## 2018-07-13 PROCEDURE — 80048 BASIC METABOLIC PNL TOTAL CA: CPT

## 2018-07-13 PROCEDURE — 36415 COLL VENOUS BLD VENIPUNCTURE: CPT

## 2018-07-13 PROCEDURE — 74011250636 HC RX REV CODE- 250/636

## 2018-07-13 PROCEDURE — 82962 GLUCOSE BLOOD TEST: CPT

## 2018-07-13 PROCEDURE — 74011636637 HC RX REV CODE- 636/637

## 2018-07-13 PROCEDURE — G8978 MOBILITY CURRENT STATUS: HCPCS

## 2018-07-13 PROCEDURE — 85025 COMPLETE CBC W/AUTO DIFF WBC: CPT

## 2018-07-13 PROCEDURE — 74011250637 HC RX REV CODE- 250/637

## 2018-07-13 PROCEDURE — 97530 THERAPEUTIC ACTIVITIES: CPT

## 2018-07-13 PROCEDURE — G8979 MOBILITY GOAL STATUS: HCPCS

## 2018-07-13 RX ORDER — FACIAL-BODY WIPES
10 EACH TOPICAL DAILY PRN
Status: DISCONTINUED | OUTPATIENT
Start: 2018-07-13 | End: 2018-07-18 | Stop reason: HOSPADM

## 2018-07-13 RX ADMIN — SENNOSIDES AND DOCUSATE SODIUM 2 TABLET: 8.6; 5 TABLET ORAL at 17:05

## 2018-07-13 RX ADMIN — LACTULOSE 20 G: 20 SOLUTION ORAL at 08:56

## 2018-07-13 RX ADMIN — ASPIRIN 81 MG 81 MG: 81 TABLET ORAL at 08:54

## 2018-07-13 RX ADMIN — BRIMONIDINE TARTRATE 1 DROP: 2 SOLUTION OPHTHALMIC at 17:06

## 2018-07-13 RX ADMIN — ONDANSETRON 4 MG: 2 INJECTION, SOLUTION INTRAMUSCULAR; INTRAVENOUS at 11:55

## 2018-07-13 RX ADMIN — INSULIN LISPRO 3 UNITS: 100 INJECTION, SOLUTION INTRAVENOUS; SUBCUTANEOUS at 07:05

## 2018-07-13 RX ADMIN — SENNOSIDES AND DOCUSATE SODIUM 2 TABLET: 8.6; 5 TABLET ORAL at 08:55

## 2018-07-13 RX ADMIN — INSULIN LISPRO 2 UNITS: 100 INJECTION, SOLUTION INTRAVENOUS; SUBCUTANEOUS at 11:55

## 2018-07-13 RX ADMIN — Medication 10 ML: at 21:16

## 2018-07-13 RX ADMIN — Medication 5 ML: at 12:02

## 2018-07-13 RX ADMIN — LISINOPRIL 40 MG: 20 TABLET ORAL at 08:54

## 2018-07-13 RX ADMIN — PANTOPRAZOLE SODIUM 40 MG: 40 TABLET, DELAYED RELEASE ORAL at 08:56

## 2018-07-13 RX ADMIN — BRIMONIDINE TARTRATE 1 DROP: 2 SOLUTION OPHTHALMIC at 08:58

## 2018-07-13 RX ADMIN — AMLODIPINE BESYLATE 10 MG: 5 TABLET ORAL at 21:16

## 2018-07-13 RX ADMIN — PRAVASTATIN SODIUM 80 MG: 40 TABLET ORAL at 21:16

## 2018-07-13 RX ADMIN — LATANOPROST 1 DROP: 50 SOLUTION OPHTHALMIC at 21:22

## 2018-07-13 RX ADMIN — OXYCODONE AND ACETAMINOPHEN 1 TABLET: 5; 325 TABLET ORAL at 09:05

## 2018-07-13 RX ADMIN — HYDROCHLOROTHIAZIDE 12.5 MG: 25 TABLET ORAL at 08:55

## 2018-07-13 RX ADMIN — LACTULOSE 20 G: 20 SOLUTION ORAL at 17:05

## 2018-07-13 RX ADMIN — METOPROLOL SUCCINATE 25 MG: 25 TABLET, EXTENDED RELEASE ORAL at 08:55

## 2018-07-13 RX ADMIN — ENOXAPARIN SODIUM 40 MG: 100 INJECTION SUBCUTANEOUS at 17:05

## 2018-07-13 RX ADMIN — BRIMONIDINE TARTRATE 1 DROP: 2 SOLUTION OPHTHALMIC at 21:22

## 2018-07-13 RX ADMIN — Medication 10 ML: at 06:59

## 2018-07-13 RX ADMIN — Medication 400 MG: at 08:55

## 2018-07-13 NOTE — PROGRESS NOTES
Vascular:    Doing well POD #1 after right leg bypass    Foot pain is improved    Leg dressed, palpable graft pulse    Labs OK, he has CKD 3 based on GFR    To floor, start PT, YARA Dodson
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
## 2018-07-13 NOTE — DIABETES MGMT
Progress Note     Chart reviewed for elevated blood glucose ( > 180 mg/dL x 2 in the past 24 hours). Recommendations/ Comments: If appropriate, please consider the followin. Adding Lantus 11 units (0.1 units/kg/day) to aid in managing glucose while inpatient  2. Adding a Hemoglobin A1C with next lab draw in order to assess long term glucose management    Morning glucose: 221 mg/dL (per am POCT Glucose). Required 7 units of correction in the last 24 hours. Inpatient medications for glucose management:  1. Correction Scale: Lispro (Humalog) Normal Sensitivity scale to cover for glucose > 139 mg/dL before meals and for glucose >199 at bedtime     POC Glucose last 24hrs:   Lab Results   Component Value Date/Time     (H) 2018 03:59 AM    GLUCPOC 168 (H) 2018 11:51 AM    GLUCPOC 221 (H) 2018 07:03 AM    GLUCPOC 235 (H) 2018 10:15 PM        Estimated Creatinine Clearance: 43.4 mL/min (based on Cr of 1.6). Diet order:   Active Orders   Diet    DIET DIABETIC CONSISTENT CARB Regular        PO intake: Patient Vitals for the past 72 hrs:   % Diet Eaten   18 1203 75 %       History of Diabetes:   Lucho Gonzales is a [de-identified] y.o. male with no past medical history significant for DM per  Edy Clark MD's H&P dated 2018. Prior to admission medications for glucose management: per past medical records  -none for DM    A1C:   No results found for: HBA1C, HGBE8, EYD8DSLI    Reference range*:  Increased risk for diabetes: 5.7 - 6.4%  Diabetes: >6.4%  Glycemic control for adults with diabetes: <7.0 %    *LUC HOPKINS (2014). Diagnosis and classification of diabetes mellitus. Diabetes care, 37, S81. Thank you. Taisha Medellin. Gerry MPH, RN, BSN, [address]   [phone]    -A target glucose range of 140-180 mg/dL (7.8-10.0 mmol/L) is recommended for the majority of critically ill patients and noncritically ill patients.      -More stringent goals, such as 110-140 mg/dL (6.1-7.8 mmol/L), may be appropriate for selected patients, if this can be achieved without significant hypoglycemia. *    *American Diabetes Association. 14. Diabetes care in the hospital:Standards of Medical Care in [id number]. Diabetes Care 2018;41(Suppl.  1):R087-Z749

## 2018-07-13 NOTE — PROGRESS NOTES
1230: Pt resting comfortably. 1200: Pt had an episode of nausea with small amount of emesis after eating lunch. PRN zofran administered. Will continue to monitor. 0920: Pt noted with scabbed, painful area on R great toe. Per MD note, pt has R great toe ulceration. Wound care consult placed.

## 2018-07-13 NOTE — CDMP QUERY
Hello Dr. Charmayne Brazier,     Please clarify if this patient is (was) being treated/managed for:     => Chronic kidney disease, stage 3 in the setting of GFR of 51 requiring monitoring  => Other explanation of clinical findings  => Clinically Undetermined (no explanation for clinical findings)    The medical record reflects the following clinical findings, treatment, and risk factors. Risk Factors:  Htn, DMT2  Clinical Indicators:  GFR 51  Treatment: monitoring    Please clarify and document your clinical opinion in the progress notes and discharge summary including the definitive and/or presumptive diagnosis, (suspected or probable), related to the above clinical findings. Please include clinical findings supporting your diagnosis.     Thank you,  Christian Jacobs  [facility]  [phone]

## 2018-07-13 NOTE — PROGRESS NOTES
Problem: Falls - Risk of  Goal: *Absence of Falls  Document Ela Fall Risk and appropriate interventions in the flowsheet.    Outcome: Progressing Towards Goal  Fall Risk Interventions:       Mentation Interventions: Adequate sleep, hydration, pain control, Door open when patient unattended, Evaluate medications/consider consulting pharmacy, Eyeglasses and hearing aids, Gait belt with transfers/ambulation, HELP ([address]) if available, Increase mobility, More frequent rounding, Reorient patient, Room close to nurse's station, Self-releasing belt, Update white board    Medication Interventions: Patient to call before getting OOB, Evaluate medications/consider consulting pharmacy, Teach patient to arise slowly    Elimination Interventions: Call light in reach, Patient to call for help with toileting needs, Toileting schedule/hourly rounds

## 2018-07-13 NOTE — PROGRESS NOTES
TRANSFER - IN REPORT:    Verbal report received from [place], RN(name) on Emmanuel Figueroa  being received from [facility](unit) for routine progression of care      Report consisted of patients Situation, Background, Assessment and   Recommendations(SBAR). Information from the following report(s) SBAR, Kardex and MAR was reviewed with the receiving nurse. Opportunity for questions and clarification was provided. Assessment completed upon patients arrival to unit and care assumed.

## 2018-07-13 NOTE — WOUND CARE
WOCN Note:     New consult placed by RN for right great toe. Chart shows:  Admitted for PVD with popliteal to dorsalis pedis bypass    Assessment:   Patient is communicative but ask repetitive questions and repeats what I say. Moves freely in bed but reports pain in surgical leg. Bilateral heel skin intact and without erythema. Heels offloaded on pillow. 1. POA, right great toe with dry necrotic tissue on lateral side. No fluctuance or exudate. Left open to air. Right leg surgical dressing dry and intact. No current wound care needs - followed by Dr. Narendra Hewitt. Will sign off.      ALE CastilloN, RN, [facility]  Certified Wound, Ostomy, Continence Nurse  office [phone]  pager [phone] or call  to page

## 2018-07-13 NOTE — ANESTHESIA POSTPROCEDURE EVALUATION
Post-Anesthesia Evaluation and Assessment    Patient: Sue Xavier MRN: [id number]  SSN: [id number]    YOB: 1935  Age: [de-identified] y.o. Sex: male       Cardiovascular Function/Vital Signs  Visit Vitals    /87 (BP 1 Location: Left arm, BP Patient Position: At rest)    Pulse 60    Temp 37.2 °C (98.9 °F)    Resp 17    Wt 109.4 kg (241 lb 2.9 oz)    SpO2 100%    BMI 37.77 kg/m2       Patient is status post general anesthesia for Procedure(s):  RIGHT POPLITEAL-DORSALIS PEDIS W/VEIN BYPASS. Nausea/Vomiting: None    Postoperative hydration reviewed and adequate. Pain:  Pain Scale 1: Numeric (0 - 10) (07/13/18 0352)  Pain Intensity 1: 0 (07/13/18 0352)   Managed    Neurological Status:   Neuro (WDL): Exceptions to WDL (07/12/18 1248)  Neuro  Neurologic State: Alert; Appropriate for age (07/12/18 2030)  Orientation Level: Oriented X4 (07/12/18 2030)  Cognition: Follows commands (07/12/18 2030)  Speech: Clear (07/12/18 2030)  LUE Motor Response: Purposeful (07/12/18 2030)  LLE Motor Response: Purposeful;Weak (07/12/18 2030)  RUE Motor Response: Purposeful (07/12/18 2030)  RLE Motor Response: Purposeful;Weak (07/12/18 2030)   At baseline    Mental Status and Level of Consciousness: Arousable    Pulmonary Status:   O2 Device: Room air (07/13/18 0352)   Adequate oxygenation and airway patent    Complications related to anesthesia: None    Post-anesthesia assessment completed.  No concerns    Signed By: Priscilla Altamirano MD     July 13, 2018

## 2018-07-14 LAB
GLUCOSE BLD STRIP.AUTO-MCNC: 136 MG/DL (ref 65–100)
GLUCOSE BLD STRIP.AUTO-MCNC: 139 MG/DL (ref 65–100)
GLUCOSE BLD STRIP.AUTO-MCNC: 149 MG/DL (ref 65–100)
GLUCOSE BLD STRIP.AUTO-MCNC: 230 MG/DL (ref 65–100)
SERVICE CMNT-IMP: ABNORMAL

## 2018-07-14 PROCEDURE — 74011636637 HC RX REV CODE- 636/637

## 2018-07-14 PROCEDURE — 82962 GLUCOSE BLOOD TEST: CPT

## 2018-07-14 PROCEDURE — 74011250636 HC RX REV CODE- 250/636

## 2018-07-14 PROCEDURE — 65270000032 HC RM SEMIPRIVATE

## 2018-07-14 PROCEDURE — 74011250637 HC RX REV CODE- 250/637

## 2018-07-14 RX ADMIN — METOPROLOL SUCCINATE 25 MG: 25 TABLET, EXTENDED RELEASE ORAL at 08:39

## 2018-07-14 RX ADMIN — BISACODYL 10 MG: 10 SUPPOSITORY RECTAL at 08:39

## 2018-07-14 RX ADMIN — LACTULOSE 20 G: 20 SOLUTION ORAL at 08:40

## 2018-07-14 RX ADMIN — LACTULOSE 20 G: 20 SOLUTION ORAL at 18:00

## 2018-07-14 RX ADMIN — HYDROCHLOROTHIAZIDE 12.5 MG: 25 TABLET ORAL at 08:34

## 2018-07-14 RX ADMIN — LATANOPROST 1 DROP: 50 SOLUTION OPHTHALMIC at 22:31

## 2018-07-14 RX ADMIN — OXYCODONE AND ACETAMINOPHEN 1 TABLET: 5; 325 TABLET ORAL at 18:08

## 2018-07-14 RX ADMIN — ENOXAPARIN SODIUM 40 MG: 100 INJECTION SUBCUTANEOUS at 18:09

## 2018-07-14 RX ADMIN — SENNOSIDES AND DOCUSATE SODIUM 2 TABLET: 8.6; 5 TABLET ORAL at 08:39

## 2018-07-14 RX ADMIN — SENNOSIDES AND DOCUSATE SODIUM 2 TABLET: 8.6; 5 TABLET ORAL at 18:07

## 2018-07-14 RX ADMIN — BRIMONIDINE TARTRATE 1 DROP: 2 SOLUTION OPHTHALMIC at 08:40

## 2018-07-14 RX ADMIN — ASPIRIN 81 MG 81 MG: 81 TABLET ORAL at 08:39

## 2018-07-14 RX ADMIN — Medication 400 MG: at 08:39

## 2018-07-14 RX ADMIN — AMLODIPINE BESYLATE 10 MG: 5 TABLET ORAL at 22:31

## 2018-07-14 RX ADMIN — LISINOPRIL 40 MG: 20 TABLET ORAL at 08:38

## 2018-07-14 RX ADMIN — PANTOPRAZOLE SODIUM 40 MG: 40 TABLET, DELAYED RELEASE ORAL at 08:38

## 2018-07-14 RX ADMIN — Medication 5 ML: at 06:00

## 2018-07-14 RX ADMIN — INSULIN LISPRO 2 UNITS: 100 INJECTION, SOLUTION INTRAVENOUS; SUBCUTANEOUS at 07:30

## 2018-07-14 RX ADMIN — BRIMONIDINE TARTRATE 1 DROP: 2 SOLUTION OPHTHALMIC at 15:14

## 2018-07-14 RX ADMIN — Medication 10 ML: at 15:15

## 2018-07-14 RX ADMIN — INSULIN LISPRO 3 UNITS: 100 INJECTION, SOLUTION INTRAVENOUS; SUBCUTANEOUS at 12:07

## 2018-07-14 RX ADMIN — PRAVASTATIN SODIUM 80 MG: 40 TABLET ORAL at 22:31

## 2018-07-14 RX ADMIN — BRIMONIDINE TARTRATE 1 DROP: 2 SOLUTION OPHTHALMIC at 22:32

## 2018-07-14 RX ADMIN — Medication 10 ML: at 22:32

## 2018-07-14 NOTE — PROGRESS NOTES
Vascular:    Doing well POD #2 post right leg bypass    Continue current care - home when ambulatory

## 2018-07-14 NOTE — PROGRESS NOTES
Bedside shift change report given to [facility] MARIN (oncoming nurse) by Latosha Webb (offgoing nurse). Report included the following information SBAR.

## 2018-07-15 LAB
GLUCOSE BLD STRIP.AUTO-MCNC: 133 MG/DL (ref 65–100)
GLUCOSE BLD STRIP.AUTO-MCNC: 134 MG/DL (ref 65–100)
GLUCOSE BLD STRIP.AUTO-MCNC: 156 MG/DL (ref 65–100)
GLUCOSE BLD STRIP.AUTO-MCNC: 186 MG/DL (ref 65–100)
SERVICE CMNT-IMP: ABNORMAL

## 2018-07-15 PROCEDURE — 82962 GLUCOSE BLOOD TEST: CPT

## 2018-07-15 PROCEDURE — 74011250636 HC RX REV CODE- 250/636

## 2018-07-15 PROCEDURE — 65270000032 HC RM SEMIPRIVATE

## 2018-07-15 PROCEDURE — 74011250637 HC RX REV CODE- 250/637

## 2018-07-15 PROCEDURE — 74011636637 HC RX REV CODE- 636/637

## 2018-07-15 RX ADMIN — METOPROLOL SUCCINATE 25 MG: 25 TABLET, EXTENDED RELEASE ORAL at 09:21

## 2018-07-15 RX ADMIN — BRIMONIDINE TARTRATE 1 DROP: 2 SOLUTION OPHTHALMIC at 16:29

## 2018-07-15 RX ADMIN — Medication 400 MG: at 09:22

## 2018-07-15 RX ADMIN — SENNOSIDES AND DOCUSATE SODIUM 2 TABLET: 8.6; 5 TABLET ORAL at 19:00

## 2018-07-15 RX ADMIN — LATANOPROST 1 DROP: 50 SOLUTION OPHTHALMIC at 21:35

## 2018-07-15 RX ADMIN — LACTULOSE 20 G: 20 SOLUTION ORAL at 09:21

## 2018-07-15 RX ADMIN — Medication 10 ML: at 13:52

## 2018-07-15 RX ADMIN — LISINOPRIL 40 MG: 20 TABLET ORAL at 09:21

## 2018-07-15 RX ADMIN — PANTOPRAZOLE SODIUM 40 MG: 40 TABLET, DELAYED RELEASE ORAL at 09:22

## 2018-07-15 RX ADMIN — PRAVASTATIN SODIUM 80 MG: 40 TABLET ORAL at 21:34

## 2018-07-15 RX ADMIN — LACTULOSE 20 G: 20 SOLUTION ORAL at 19:06

## 2018-07-15 RX ADMIN — Medication 10 ML: at 21:34

## 2018-07-15 RX ADMIN — OXYCODONE AND ACETAMINOPHEN 2 TABLET: 5; 325 TABLET ORAL at 21:34

## 2018-07-15 RX ADMIN — ENOXAPARIN SODIUM 40 MG: 100 INJECTION SUBCUTANEOUS at 19:03

## 2018-07-15 RX ADMIN — INSULIN LISPRO 2 UNITS: 100 INJECTION, SOLUTION INTRAVENOUS; SUBCUTANEOUS at 07:07

## 2018-07-15 RX ADMIN — BRIMONIDINE TARTRATE 1 DROP: 2 SOLUTION OPHTHALMIC at 21:35

## 2018-07-15 RX ADMIN — AMLODIPINE BESYLATE 10 MG: 5 TABLET ORAL at 21:34

## 2018-07-15 RX ADMIN — BRIMONIDINE TARTRATE 1 DROP: 2 SOLUTION OPHTHALMIC at 09:24

## 2018-07-15 RX ADMIN — SENNOSIDES AND DOCUSATE SODIUM 2 TABLET: 8.6; 5 TABLET ORAL at 09:22

## 2018-07-15 RX ADMIN — ASPIRIN 81 MG 81 MG: 81 TABLET ORAL at 09:22

## 2018-07-15 RX ADMIN — INSULIN LISPRO 2 UNITS: 100 INJECTION, SOLUTION INTRAVENOUS; SUBCUTANEOUS at 12:54

## 2018-07-15 RX ADMIN — Medication 5 ML: at 06:00

## 2018-07-15 RX ADMIN — HYDROCHLOROTHIAZIDE 12.5 MG: 25 TABLET ORAL at 09:22

## 2018-07-15 NOTE — PROGRESS NOTES
Vascular:    No new problems, incisions OK and graft open, toe wound dry and stable    Not getting around enough to DC.  Will see hoe he does tomorrow with PT to determine DC plans

## 2018-07-15 NOTE — PROGRESS NOTES
Bedside shift change report given to Waqar Zhang (oncoming nurse) by [facility] (offgoing nurse). Report included the following information SBAR.

## 2018-07-15 NOTE — PROGRESS NOTES
Bedside shift change report given to [facility] MARIN (oncoming nurse) by Marco Andrade (offgoing nurse). Report included the following information SBAR.     0710- Patient needs encouragement to ambulate, expressed fear of placing wt on s/p surgerical right foot. Informed of benefits to increase mobility and emphasized Dr. Micheal Cruz also encourages ambulation.  Patient agreed to ambulate after breakfast.

## 2018-07-16 LAB
GLUCOSE BLD STRIP.AUTO-MCNC: 148 MG/DL (ref 65–100)
GLUCOSE BLD STRIP.AUTO-MCNC: 149 MG/DL (ref 65–100)
GLUCOSE BLD STRIP.AUTO-MCNC: 157 MG/DL (ref 65–100)
GLUCOSE BLD STRIP.AUTO-MCNC: 162 MG/DL (ref 65–100)
SERVICE CMNT-IMP: ABNORMAL

## 2018-07-16 PROCEDURE — 97530 THERAPEUTIC ACTIVITIES: CPT

## 2018-07-16 PROCEDURE — 74011250636 HC RX REV CODE- 250/636

## 2018-07-16 PROCEDURE — 97116 GAIT TRAINING THERAPY: CPT

## 2018-07-16 PROCEDURE — 74011250637 HC RX REV CODE- 250/637

## 2018-07-16 PROCEDURE — 74011636637 HC RX REV CODE- 636/637

## 2018-07-16 PROCEDURE — 65270000032 HC RM SEMIPRIVATE

## 2018-07-16 PROCEDURE — 82962 GLUCOSE BLOOD TEST: CPT

## 2018-07-16 RX ADMIN — BRIMONIDINE TARTRATE 1 DROP: 2 SOLUTION OPHTHALMIC at 16:11

## 2018-07-16 RX ADMIN — Medication 5 ML: at 06:00

## 2018-07-16 RX ADMIN — LACTULOSE 20 G: 20 SOLUTION ORAL at 09:32

## 2018-07-16 RX ADMIN — SENNOSIDES AND DOCUSATE SODIUM 2 TABLET: 8.6; 5 TABLET ORAL at 17:42

## 2018-07-16 RX ADMIN — PRAVASTATIN SODIUM 80 MG: 40 TABLET ORAL at 21:00

## 2018-07-16 RX ADMIN — SENNOSIDES AND DOCUSATE SODIUM 2 TABLET: 8.6; 5 TABLET ORAL at 09:32

## 2018-07-16 RX ADMIN — Medication 400 MG: at 09:32

## 2018-07-16 RX ADMIN — METOPROLOL SUCCINATE 25 MG: 25 TABLET, EXTENDED RELEASE ORAL at 09:32

## 2018-07-16 RX ADMIN — Medication 10 ML: at 14:52

## 2018-07-16 RX ADMIN — HYDROCHLOROTHIAZIDE 12.5 MG: 25 TABLET ORAL at 09:32

## 2018-07-16 RX ADMIN — PANTOPRAZOLE SODIUM 40 MG: 40 TABLET, DELAYED RELEASE ORAL at 09:32

## 2018-07-16 RX ADMIN — Medication 10 ML: at 21:00

## 2018-07-16 RX ADMIN — INSULIN LISPRO 2 UNITS: 100 INJECTION, SOLUTION INTRAVENOUS; SUBCUTANEOUS at 12:11

## 2018-07-16 RX ADMIN — BRIMONIDINE TARTRATE 1 DROP: 2 SOLUTION OPHTHALMIC at 21:00

## 2018-07-16 RX ADMIN — ASPIRIN 81 MG 81 MG: 81 TABLET ORAL at 09:32

## 2018-07-16 RX ADMIN — INSULIN LISPRO 2 UNITS: 100 INJECTION, SOLUTION INTRAVENOUS; SUBCUTANEOUS at 16:59

## 2018-07-16 RX ADMIN — BRIMONIDINE TARTRATE 1 DROP: 2 SOLUTION OPHTHALMIC at 09:42

## 2018-07-16 RX ADMIN — LACTULOSE 20 G: 20 SOLUTION ORAL at 17:42

## 2018-07-16 RX ADMIN — LISINOPRIL 40 MG: 20 TABLET ORAL at 09:40

## 2018-07-16 RX ADMIN — LATANOPROST 1 DROP: 50 SOLUTION OPHTHALMIC at 21:00

## 2018-07-16 RX ADMIN — INSULIN LISPRO 2 UNITS: 100 INJECTION, SOLUTION INTRAVENOUS; SUBCUTANEOUS at 07:18

## 2018-07-16 RX ADMIN — ENOXAPARIN SODIUM 40 MG: 100 INJECTION SUBCUTANEOUS at 17:43

## 2018-07-16 NOTE — PROGRESS NOTES
Problem: Mobility Impaired (Adult and Pediatric)  Goal: *Acute Goals and Plan of Care (Insert Text)  Physical Therapy Goals  Initiated 7/13/2018  1. Patient will move from supine to sit and sit to supine  in bed with modified independence within 7 day(s). 2.  Patient will transfer from bed to chair and chair to bed with modified independence using the least restrictive device within 7 day(s). 3.  Patient will perform sit to stand with modified independence within 7 day(s). 4.  Patient will ambulate with supervision/set-up for 150 feet with the least restrictive device within 7 day(s). 5.  Patient will ascend/descend 4 stairs with use of handrail(s) with supervision/set-up within 7 day(s). physical Therapy TREATMENT  Patient: Luis Alberto Enamorado (14 y.o. male)  Date: 7/16/2018  Diagnosis: ARTERIAL SCLEROSIS WITH RESTING PAIN  Atherosclerotic PVD with ulceration (HCC) <principal problem not specified>  Procedure(s) (LRB):  RIGHT POPLITEAL-DORSALIS PEDIS W/VEIN BYPASS (Right) 4 Days Post-Op  Precautions: Fall, WBAT  Chart, physical therapy assessment, plan of care and goals were reviewed. ASSESSMENT:  Pt received sitting upright in chair, no voiced complaints, and agreeable to therapy. Performed sit to stand with RW min-mod assist, VC for proper hand and foot placement to ensure safety. Pt initially demonstrated impaired standing balance due to decreased WB on RLE and required cueing to decrease forward flexion. Pt ambulated approximately 36' with RW and CGA to include a bathroom stop as per pt request. Pt required occasional assistance for safe management of AD. When performing sit to stand from toilet, pt required less assistance and cueing than previously. Pt returned to sitting upright in chair and positioned to comfort, all needs within reach. Following today's session, pt reports that he feels rehab would be most appropriate option for discharge given his inability to ambulate longer distances. Progression toward goals:  []    Improving appropriately and progressing toward goals  [x]    Improving slowly and progressing toward goals  []    Not making progress toward goals and plan of care will be adjusted     PLAN:  Patient continues to benefit from skilled intervention to address the above impairments. Continue treatment per established plan of care. Discharge Recommendations:  rehab  Further Equipment Recommendations for Discharge:  Pt owns cane and RW     SUBJECTIVE:   Patient stated Pierce Quill out for my right foot.     OBJECTIVE DATA SUMMARY:   Critical Behavior:  Neurologic State: Alert  Orientation Level: Oriented X4  Cognition: Appropriate for age attention/concentration  Safety/Judgement: Fall prevention, Awareness of environment  Functional Mobility Training:  Bed Mobility:     Supine to Sit:  (pt recieved sitting in chair)  Sit to Supine:  (pt returned to chair following ambulation)           Transfers:  Sit to Stand: Minimum assistance (Min-mod assistance)  Stand to Sit: Contact guard assistance                             Balance:  Sitting: Intact  Standing: Impaired; With support  Ambulation/Gait Training:  Distance (ft): 40 Feet (ft)  Assistive Device: Gait belt;Walker, rolling  Ambulation - Level of Assistance: Contact guard assistance        Gait Abnormalities: Antalgic;Decreased step clearance           Stance: Right decreased  Speed/Valeria: Pace decreased (<100 feet/min)  Step Length: Left shortened;Right shortened                             Pain:   (unable to quantify)                 Activity Tolerance:   Fair tolerance to activity (limited by pain)  Please refer to the flowsheet for vital signs taken during this treatment.   After treatment:   [x]    Patient left in no apparent distress sitting up in chair  []    Patient left in no apparent distress in bed  [x]    Call bell left within reach  [x]    Nursing notified  []    Caregiver present  []    Bed alarm activated    COMMUNICATION/COLLABORATION:   The patients plan of care was discussed with: Registered Nurse    BEN Cerda  Start: 11:17  End: 11:40       Regarding student involvement in patient care:  A student participated in this treatment session. Per CMS Medicare statements and APTA guidelines I certify that the following was true:  1. I was present and directly observed the entire session. 2. I made all skilled judgments and clinical decisions regarding care. 3. I am the practitioner responsible for assessment, treatment, and documentation.

## 2018-07-16 NOTE — PROGRESS NOTES
Reason for Admission: Atherosclerotic PVD with ulceration                   RRAT Score:   10                  Plan for utilizing home health: This  will need a home health PT/OT order from the MD if agreeable. Likelihood of Readmission:  Low                          Transition of Care Plan:   Home with home health if MD is agreeable. Reviewed medical chart; met with the patient at the bedside. Patient is being seen for atherosclerotic PVD with ulceration. Patient lives with his wife near [address]. He owns a rolling walker. Patient has a PCP - Dr. Kyle Gurrola and gets his prescriptions at [facility] in [address] or via mail order. Patient does not drive and his wife only drives short distances. Their daughter, Tristian Dumont, is a nurse at [address] [place] [address] and lives in [facility]. She will drive the patient home at discharge. Patient states that he was already open to home health prior to admission. This  will need a home health PT/OT order from the MD if agreeable. Care Management will continue to follow his disposition. SALLIE Maldonado       Care Management Interventions  PCP Verified by CM:  Yes  Palliative Care Criteria Met (RRAT>21 & CHF Dx)?: No  Mode of Transport at Discharge:  (Patient's daughter will drive him home at discharge.  )  Transition of Care Consult (CM Consult): [address]: No  Reason Outside [facility]: Out of service area  [address] [place]: No  Discharge Durable Medical Equipment: No (Patient already owns a walker.  )  Physical Therapy Consult: Yes  Occupational Therapy Consult: Yes  Current Support Network: Lives with Spouse  Confirm Follow Up Transport:  (Patient's daughter will drive him home at discharge.  )  Plan discussed with Pt/Family/Caregiver: Yes  Freedom of Choice Offered: Yes  Discharge Location  Discharge Placement: Home with home health

## 2018-07-16 NOTE — PROGRESS NOTES
PT worked with the patient and feel that he is appropriate for inpatient rehabilitation. Spoke with the patient at the bedside along with the PT, but he deferred to his daughter, Tristian TAYLOR#[phone] for decision making in regards to discharge planning. Elicia Bowen expressed frustration stating \"well no one has tried to get him up and walking since he has been there! \"  This  noted that PT worked with him on Friday and today. Patient's daughter would like a referral sent to [address] (formerly [facility]). If they cannot accept the patient into care, she would like a referral sent to [facility] ([facility]). Referral to [facility] placed via [employer] and called to speak with their liaison, JARRELL Cruz#[phone], who plans to review this referral today. Care Management will continue to follow his disposition.    SALLIE Maldonado

## 2018-07-16 NOTE — PROGRESS NOTES
Vascular:    No complaints, has not moved around much yet    Leg OK, palpable graft pulse and warm foot    Disposition plan pending PT eval today

## 2018-07-16 NOTE — PROGRESS NOTES
Spiritual Care Partner Volunteer visited patient in [address] on 7/16/2018.   Documented by:  Chaplain Hector MDiv, MS, [address] ([id number])

## 2018-07-16 NOTE — PROGRESS NOTES
Bedside shift change report given to [facility] MARIN (oncoming nurse) by Esequiel Valerio (offgoing nurse). Report included the following information SBAR.

## 2018-07-17 LAB
GLUCOSE BLD STRIP.AUTO-MCNC: 142 MG/DL (ref 65–100)
GLUCOSE BLD STRIP.AUTO-MCNC: 172 MG/DL (ref 65–100)
GLUCOSE BLD STRIP.AUTO-MCNC: 172 MG/DL (ref 65–100)
GLUCOSE BLD STRIP.AUTO-MCNC: 177 MG/DL (ref 65–100)
SERVICE CMNT-IMP: ABNORMAL

## 2018-07-17 PROCEDURE — 74011250637 HC RX REV CODE- 250/637

## 2018-07-17 PROCEDURE — 82962 GLUCOSE BLOOD TEST: CPT

## 2018-07-17 PROCEDURE — 74011250636 HC RX REV CODE- 250/636

## 2018-07-17 PROCEDURE — 97116 GAIT TRAINING THERAPY: CPT

## 2018-07-17 PROCEDURE — 97110 THERAPEUTIC EXERCISES: CPT

## 2018-07-17 PROCEDURE — 97165 OT EVAL LOW COMPLEX 30 MIN: CPT

## 2018-07-17 PROCEDURE — 74011636637 HC RX REV CODE- 636/637

## 2018-07-17 PROCEDURE — 97535 SELF CARE MNGMENT TRAINING: CPT

## 2018-07-17 PROCEDURE — 65270000032 HC RM SEMIPRIVATE

## 2018-07-17 RX ORDER — OXYCODONE AND ACETAMINOPHEN 5; 325 MG/1; MG/1
1 TABLET ORAL
Qty: 25 TAB | Refills: 0 | Status: SHIPPED | OUTPATIENT
Start: 2018-07-17 | End: 2018-09-27

## 2018-07-17 RX ADMIN — INSULIN LISPRO 2 UNITS: 100 INJECTION, SOLUTION INTRAVENOUS; SUBCUTANEOUS at 18:34

## 2018-07-17 RX ADMIN — SENNOSIDES AND DOCUSATE SODIUM 2 TABLET: 8.6; 5 TABLET ORAL at 08:49

## 2018-07-17 RX ADMIN — ENOXAPARIN SODIUM 40 MG: 100 INJECTION SUBCUTANEOUS at 18:35

## 2018-07-17 RX ADMIN — OXYCODONE AND ACETAMINOPHEN 2 TABLET: 5; 325 TABLET ORAL at 06:37

## 2018-07-17 RX ADMIN — INSULIN LISPRO 2 UNITS: 100 INJECTION, SOLUTION INTRAVENOUS; SUBCUTANEOUS at 12:44

## 2018-07-17 RX ADMIN — BRIMONIDINE TARTRATE 1 DROP: 2 SOLUTION OPHTHALMIC at 08:52

## 2018-07-17 RX ADMIN — LACTULOSE 20 G: 20 SOLUTION ORAL at 08:50

## 2018-07-17 RX ADMIN — LATANOPROST 1 DROP: 50 SOLUTION OPHTHALMIC at 23:14

## 2018-07-17 RX ADMIN — METOPROLOL SUCCINATE 25 MG: 25 TABLET, EXTENDED RELEASE ORAL at 08:49

## 2018-07-17 RX ADMIN — BRIMONIDINE TARTRATE 1 DROP: 2 SOLUTION OPHTHALMIC at 16:25

## 2018-07-17 RX ADMIN — PANTOPRAZOLE SODIUM 40 MG: 40 TABLET, DELAYED RELEASE ORAL at 08:49

## 2018-07-17 RX ADMIN — Medication 400 MG: at 08:50

## 2018-07-17 RX ADMIN — PRAVASTATIN SODIUM 80 MG: 40 TABLET ORAL at 23:10

## 2018-07-17 RX ADMIN — ASPIRIN 81 MG 81 MG: 81 TABLET ORAL at 08:49

## 2018-07-17 RX ADMIN — BRIMONIDINE TARTRATE 1 DROP: 2 SOLUTION OPHTHALMIC at 23:14

## 2018-07-17 RX ADMIN — LACTULOSE 20 G: 20 SOLUTION ORAL at 18:34

## 2018-07-17 RX ADMIN — HYDROCHLOROTHIAZIDE 12.5 MG: 25 TABLET ORAL at 08:50

## 2018-07-17 RX ADMIN — SENNOSIDES AND DOCUSATE SODIUM 2 TABLET: 8.6; 5 TABLET ORAL at 18:34

## 2018-07-17 RX ADMIN — INSULIN LISPRO 2 UNITS: 100 INJECTION, SOLUTION INTRAVENOUS; SUBCUTANEOUS at 07:13

## 2018-07-17 RX ADMIN — LISINOPRIL 40 MG: 20 TABLET ORAL at 08:49

## 2018-07-17 RX ADMIN — AMLODIPINE BESYLATE 10 MG: 5 TABLET ORAL at 23:09

## 2018-07-17 RX ADMIN — Medication 10 ML: at 06:37
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
## 2018-07-17 NOTE — ROUTINE PROCESS
Bedside shift change report given to [address] (oncoming nurse) by Tasha Davies (offgoing nurse). Report included the following information SBAR, Kardex, Intake/Output, MAR and Recent Results.

## 2018-07-17 NOTE — PROGRESS NOTES
Problem: Mobility Impaired (Adult and Pediatric)  Goal: *Acute Goals and Plan of Care (Insert Text)  Physical Therapy Goals  Initiated 7/13/2018  1. Patient will move from supine to sit and sit to supine  in bed with modified independence within 7 day(s). 2.  Patient will transfer from bed to chair and chair to bed with modified independence using the least restrictive device within 7 day(s). 3.  Patient will perform sit to stand with modified independence within 7 day(s). 4.  Patient will ambulate with supervision/set-up for 150 feet with the least restrictive device within 7 day(s). 5.  Patient will ascend/descend 4 stairs with use of handrail(s) with supervision/set-up within 7 day(s). physical Therapy TREATMENT  Patient: Cal Davila (22 y.o. male)  Date: 7/17/2018  Diagnosis: ARTERIAL SCLEROSIS WITH RESTING PAIN  Atherosclerotic PVD with ulceration (HCC) <principal problem not specified>  Procedure(s) (LRB):  RIGHT POPLITEAL-DORSALIS PEDIS W/VEIN BYPASS (Right) 5 Days Post-Op  Precautions: Fall, WBAT  Chart, physical therapy assessment, plan of care and goals were reviewed. ASSESSMENT:  Pt cleared by RN and received supine in bed with RLE elevated. R foot swollen and sock carefully donned to avoid compression on incision site. Pt instructed in bed exercises to reduce swelling and maintain ROM in knee/hip/ankle. Pt demo'd fair recall of exercises. Overall, Min A for bed mobility and sit<>stand transfer. Ambulated into hallway with RW with CGA. Pt displaying antalgic gait, decreased step clearance and limited ankle ROW with gait. Required extensive time to complete short gait trial. Pt utilized multiple rest breaks during 18 minutes of gait training. Left up in chair with R foot elevated on stool with pillows to combat continued swelling. Pt is not yet safe for independent mobility and would greatly benefit from IP rehab.    Progression toward goals:  [x]    Improving appropriately and progressing toward goals  []    Improving slowly and progressing toward goals  []    Not making progress toward goals and plan of care will be adjusted     PLAN:  Patient continues to benefit from skilled intervention to address the above impairments. Continue treatment per established plan of care. Discharge Recommendations:  Inpatient Rehab  Further Equipment Recommendations for Discharge:  None anticiapted     SUBJECTIVE:   Patient stated Bebo Ware just put me back to bed because they said my leg was swollen.     OBJECTIVE DATA SUMMARY:   Critical Behavior:  Neurologic State: Alert  Orientation Level: Oriented X4  Cognition: Appropriate decision making  Safety/Judgement: Fall prevention, Awareness of environment  Functional Mobility Training:  Bed Mobility:     Supine to Sit: Minimum assistance  Sit to Supine: Minimum assistance           Transfers:  Sit to Stand: Minimum assistance;Assist x1  Stand to Sit: Contact guard assistance        Bed to Chair: Minimum assistance;Assist x1                    Balance:  Sitting: Intact  Standing: Intact; With support  Ambulation/Gait Training:  Distance (ft): 50 Feet (ft) (50 ft x 3)  Assistive Device: Gait belt;Walker, rolling  Ambulation - Level of Assistance: Contact guard assistance        Gait Abnormalities: Antalgic;Decreased step clearance (minimal knee/ankle ROM)        Base of Support: Widened;Shift to left  Stance: Right decreased  Speed/Valeria: Pace decreased (<100 feet/min); Slow  Step Length: Right shortened;Left shortened  Swing Pattern: Right asymmetrical                 Stairs:              Neuro Re-Education:    Therapeutic Exercises:    Ankle pumps, quad sets, glute sets, heel slide, hip flexion x 20 reps  Pain:  Pain Scale 1: Numeric (0 - 10)  Pain Intensity 1: 7  Pain Location 1: Leg  Pain Orientation 1: Right  Pain Description 1: Aching  Pain Intervention(s) 1: Medication (see MAR)  Activity Tolerance:   Good  Please refer to the flowsheet for vital signs taken during this treatment.   After treatment:   [x]    Patient left in no apparent distress sitting up in chair  []    Patient left in no apparent distress in bed  [x]    Call bell left within reach  [x]    Nursing notified  []    Caregiver present  []    Bed alarm activated    COMMUNICATION/COLLABORATION:   The patients plan of care was discussed with: Registered Nurse    Miladis Walker, PT   Time Calculation: 35 mins

## 2018-07-17 NOTE — PROGRESS NOTES
Vascular:    He continues to do well post op.  Incisions OK, bypass patent, foot pain relieved and foot well perfused    He does not feel ready to be independent at home - plan DC to rehab

## 2018-07-17 NOTE — PROGRESS NOTES
Reviewed medical chart; called and left a voicemail for [employer], Nicole Ty, S#[phone]. Later heard back and learned that [facility] can accept the patient into care medically pending an insurance authorization. Once insurance authorization is obtained, this  will make arrangements for transfer to [facility]. Spoke with the patient's daughter, Charlotte Perez. She would like to drive the patient to the facility if her work schedule permits. Care Management will continue to follow his disposition.    SALLIE Sargent

## 2018-07-17 NOTE — DISCHARGE INSTRUCTIONS
DISCHARGE SUMMARY from Nurse    PATIENT INSTRUCTIONS:    After general anesthesia or intravenous sedation, for 24 hours or while taking prescription Narcotics:  · Limit your activities  · Do not drive and operate hazardous machinery  · Do not make important personal or business decisions  · Do  not drink alcoholic beverages  · If you have not urinated within 8 hours after discharge, please contact your surgeon on call. Report the following to your surgeon:  · Excessive pain, swelling, redness or odor of or around the surgical area  · Temperature over 100.5  · Nausea and vomiting lasting longer than 4 hours or if unable to take medications  · Any signs of decreased circulation or nerve impairment to extremity: change in color, persistent  numbness, tingling, coldness or increase pain  · Any questions    What to do at Home:    *  Please give a list of your current medications to your Primary Care Provider. *  Please update this list whenever your medications are discontinued, doses are      changed, or new medications (including over-the-counter products) are added. *  Please carry medication information at all times in case of emergency situations. These are general instructions for a healthy lifestyle:    No smoking/ No tobacco products/ Avoid exposure to second hand smoke  Surgeon General's Warning:  Quitting smoking now greatly reduces serious risk to your health. Obesity, smoking, and sedentary lifestyle greatly increases your risk for illness    A healthy diet, regular physical exercise & weight monitoring are important for maintaining a healthy lifestyle    You may be retaining fluid if you have a history of heart failure or if you experience any of the following symptoms:  Weight gain of 3 pounds or more overnight or 5 pounds in a week, increased swelling in our hands or feet or shortness of breath while lying flat in bed.   Please call your doctor as soon as you notice any of these symptoms; do not wait until your next office visit. Recognize signs and symptoms of STROKE:    F-face looks uneven    A-arms unable to move or move unevenly    S-speech slurred or non-existent    T-time-call 911 as soon as signs and symptoms begin-DO NOT go       Back to bed or wait to see if you get better-TIME IS BRAIN. Warning Signs of HEART ATTACK     Call 911 if you have these symptoms:   Chest discomfort. Most heart attacks involve discomfort in the center of the chest that lasts more than a few minutes, or that goes away and comes back. It can feel like uncomfortable pressure, squeezing, fullness, or pain.  Discomfort in other areas of the upper body. Symptoms can include pain or discomfort in one or both arms, the back, neck, jaw, or stomach.  Shortness of breath with or without chest discomfort.  Other signs may include breaking out in a cold sweat, nausea, or lightheadedness. Don't wait more than five minutes to call 911 - MINUTES MATTER! Fast action can save your life. Calling 911 is almost always the fastest way to get lifesaving treatment. Emergency Medical Services staff can begin treatment when they arrive -- up to an hour sooner than if someone gets to the hospital by car. The discharge information has been reviewed with the patient. The patient verbalized understanding. Discharge medications reviewed with the patient and appropriate educational materials and side effects teaching were provided. Tripl Activation    Thank you for requesting access to [employer]. Please follow the instructions below to securely access and download your online medical record. Tripl allows you to send messages to your doctor, view your test results, renew your prescriptions, schedule appointments, and more. How Do I Sign Up? 1. In your internet browser, go to [email or web address]  2. Click on the First Time User? Click Here link in the Sign In box.  You will be redirect to the New Member Sign Up page.  3. Enter your [employer] Access Code exactly as it appears below. You will not need to use this code after youve completed the sign-up process. If you do not sign up before the expiration date, you must request a new code. [employer] Access Code: [email or web address]  Expires: 10/9/2018 11:37 AM (This is the date your [employer] access code will )    4. Enter the last four digits of your Social Security Number (xxxx) and Date of Birth (mm/dd/yyyy) as indicated and click Submit. You will be taken to the next sign-up page. 5. Create a UAV Navigationt ID. This will be your [employer] login ID and cannot be changed, so think of one that is secure and easy to remember. 6. Create a [employer] password. You can change your password at any time. 7. Enter your Password Reset Question and Answer. This can be used at a later time if you forget your password. 8. Enter your e-mail address. You will receive e-mail notification when new information is available in [address] 9. Click Sign Up. You can now view and download portions of your medical record. 10. Click the Download Summary menu link to download a portable copy of your medical information. Additional Information    If you have questions, please visit the Frequently Asked Questions section of the [employer] website at [email or web address] Remember, [employer] is NOT to be used for urgent needs. For medical emergencies, dial 911.  ___________________________________________________________________________________________________________________________________  Patient Discharge Instructions    Ana Luisa Thomas / [id number] : 1935    Admitted 2018 Discharged: 2018     Take Home Medications            · It is important that you take the medication exactly as they are prescribed. · Keep your medication in the bottles provided by the pharmacist and keep a list of the medication names, dosages, and times to be taken in your wallet.    · Do not take other medications without consulting your doctor. What to do at Home    Recommended diet: Cardiac Diet,     Recommended activity: Activity as tolerated,     Additional Instructions: elevate foot for swelling, OK to shower    Follow-up with Dr Ralf Marley in 2 weeks, call [phone] for appt        Information obtained by :  I understand that if any problems occur once I am at home I am to contact my physician. I understand and acknowledge receipt of the instructions indicated above.                                                                                                                                            Physician's or R.N.'s Signature                                                                  Date/Time                                                                                                                                              Patient or Representative Signature                                                          Date/Time

## 2018-07-17 NOTE — PROGRESS NOTES
Spiritual Care Partner Volunteer visited patient in [address] [facility] on 7/17/2018.   Documented by:  Chaplain Fonseca MDiv, MS, [address] ([id number])

## 2018-07-18 ENCOUNTER — HOME HEALTH ADMISSION (OUTPATIENT)
Dept: HOME HEALTH SERVICES | Facility: HOME HEALTH | Age: 83
End: 2018-07-18

## 2018-07-18 ENCOUNTER — HOME HEALTH ADMISSION (OUTPATIENT)
Dept: HOME HEALTH SERVICES | Facility: HOME HEALTH | Age: 83
End: 2018-07-18
Payer: MEDICARE

## 2018-07-18 VITALS
BODY MASS INDEX: 37.77 KG/M2 | HEART RATE: 60 BPM | DIASTOLIC BLOOD PRESSURE: 73 MMHG | TEMPERATURE: 97.6 F | WEIGHT: 241.18 LBS | OXYGEN SATURATION: 99 % | SYSTOLIC BLOOD PRESSURE: 120 MMHG | RESPIRATION RATE: 18 BRPM

## 2018-07-18 LAB
GLUCOSE BLD STRIP.AUTO-MCNC: 156 MG/DL (ref 65–100)
GLUCOSE BLD STRIP.AUTO-MCNC: 179 MG/DL (ref 65–100)
SERVICE CMNT-IMP: ABNORMAL
SERVICE CMNT-IMP: ABNORMAL

## 2018-07-18 PROCEDURE — 97110 THERAPEUTIC EXERCISES: CPT

## 2018-07-18 PROCEDURE — 74011250637 HC RX REV CODE- 250/637

## 2018-07-18 PROCEDURE — 97116 GAIT TRAINING THERAPY: CPT

## 2018-07-18 PROCEDURE — 82962 GLUCOSE BLOOD TEST: CPT

## 2018-07-18 PROCEDURE — 97535 SELF CARE MNGMENT TRAINING: CPT

## 2018-07-18 PROCEDURE — 74011636637 HC RX REV CODE- 636/637

## 2018-07-18 RX ADMIN — SENNOSIDES AND DOCUSATE SODIUM 2 TABLET: 8.6; 5 TABLET ORAL at 07:01

## 2018-07-18 RX ADMIN — PANTOPRAZOLE SODIUM 40 MG: 40 TABLET, DELAYED RELEASE ORAL at 09:03

## 2018-07-18 RX ADMIN — HYDROCHLOROTHIAZIDE 12.5 MG: 25 TABLET ORAL at 09:03

## 2018-07-18 RX ADMIN — ASPIRIN 81 MG 81 MG: 81 TABLET ORAL at 09:03

## 2018-07-18 RX ADMIN — LISINOPRIL 40 MG: 20 TABLET ORAL at 09:03

## 2018-07-18 RX ADMIN — LACTULOSE 20 G: 20 SOLUTION ORAL at 07:01

## 2018-07-18 RX ADMIN — OXYCODONE AND ACETAMINOPHEN 2 TABLET: 5; 325 TABLET ORAL at 11:42

## 2018-07-18 RX ADMIN — INSULIN LISPRO 2 UNITS: 100 INJECTION, SOLUTION INTRAVENOUS; SUBCUTANEOUS at 14:23

## 2018-07-18 RX ADMIN — BRIMONIDINE TARTRATE 1 DROP: 2 SOLUTION OPHTHALMIC at 09:06

## 2018-07-18 RX ADMIN — METOPROLOL SUCCINATE 25 MG: 25 TABLET, EXTENDED RELEASE ORAL at 09:03

## 2018-07-18 RX ADMIN — INSULIN LISPRO 2 UNITS: 100 INJECTION, SOLUTION INTRAVENOUS; SUBCUTANEOUS at 07:01

## 2018-07-18 RX ADMIN — Medication 400 MG: at 09:03

## 2018-07-18 NOTE — PROGRESS NOTES
Problem: Mobility Impaired (Adult and Pediatric)  Goal: *Acute Goals and Plan of Care (Insert Text)  Physical Therapy Goals  Initiated 7/13/2018  1. Patient will move from supine to sit and sit to supine  in bed with modified independence within 7 day(s). 2.  Patient will transfer from bed to chair and chair to bed with modified independence using the least restrictive device within 7 day(s). 3.  Patient will perform sit to stand with modified independence within 7 day(s). 4.  Patient will ambulate with supervision/set-up for 150 feet with the least restrictive device within 7 day(s). 5.  Patient will ascend/descend 4 stairs with use of handrail(s) with supervision/set-up within 7 day(s). physical Therapy TREATMENT  Patient: Kyara Fraser (80 y.o. male)  Date: 7/18/2018  Diagnosis: ARTERIAL SCLEROSIS WITH RESTING PAIN  Atherosclerotic PVD with ulceration (HCC) <principal problem not specified>  Procedure(s) (LRB):  RIGHT POPLITEAL-DORSALIS PEDIS W/VEIN BYPASS (Right) 6 Days Post-Op  Precautions: Fall, WBAT  Chart, physical therapy assessment, plan of care and goals were reviewed. ASSESSMENT:  Pt received in chair, finishing breakfast, no voiced complaints and agreeable to therapy (following administration of eye drops). Pt reports that pain is 6/10 when bearing weight on RLE. Sit to stand, CGA, VC to maintain upright posture once standing. Ambulated approximately 100' with RW, CGA, cueing for management of RW around obstacles. Pt continues to demonstrate antalgic gait and decreased step clearance which he contributes to limited WB due to pain. Upon return to room, pt performed standing therex VC for sequencing. Pt demonstrated limited tolerance for FREIDA exercises due to pain with WB and decreased endurance following ambulation. Pt could benefit from inpatient rehab due to decrease strength, activity tolerance and balance limiting functional mobility and independence.  Pt assist with care for wife and is unable in his current condition. Pt escorted to bathroom, as per request, and advised to call for nursing staff to ensure safe return to bed/chair. Progression toward goals:  []    Improving appropriately and progressing toward goals  [x]    Improving slowly and progressing toward goals  []    Not making progress toward goals and plan of care will be adjusted     PLAN:  Patient continues to benefit from skilled intervention to address the above impairments. Continue treatment per established plan of care. Discharge Recommendations:  Inpatient Rehab  Further Equipment Recommendations for Discharge:  Pt owns RW     SUBJECTIVE:   Patient stated it feels sore when I put weight on it.     OBJECTIVE DATA SUMMARY:   Critical Behavior:  Neurologic State: Alert, Appropriate for age  Orientation Level: Oriented X4  Cognition: Appropriate decision making, Appropriate for age attention/concentration, Appropriate safety awareness  Safety/Judgement: Fall prevention, Awareness of environment  Functional Mobility Training:  Bed Mobility:                 Transfers:  Sit to Stand: Contact guard assistance; Adaptive equipment; Additional time  Stand to Sit: Contact guard assistance                          Balance:  Sitting: Intact  Standing: Intact; With support  Ambulation/Gait Training:  Distance (ft): 100 Feet (ft)  Assistive Device: Gait belt;Walker, rolling  Ambulation - Level of Assistance: Contact guard assistance        Gait Abnormalities: Antalgic;Decreased step clearance        Base of Support: Shift to left; Widened  Stance: Right decreased  Speed/Valeria: Pace decreased (<100 feet/min)  Step Length: Left shortened                                 Therapeutic Exercises:   Standing with RW (x10 ea): marching FREIDA, hamstring curls (R only), hip abduction (R only)  Seated (x10): ankle pumps FREIDA    Pain:  Pain Scale 1: Numeric (0 - 10)  Pain Intensity 1: 6              Activity Tolerance:   Limited by pain and endurance  Please refer to the flowsheet for vital signs taken during this treatment. After treatment:   [x]    Patient left in no apparent distress sitting on bathroom toilet  []    Patient left in no apparent distress in bed  [x]    Call bell within reach  [x]    Nursing notified  []    Caregiver present  []    Bed alarm activated    COMMUNICATION/COLLABORATION:   The patients plan of care was discussed with: Registered Nurse    BEN Luciano  Start: 09:50  End: 10:16     Regarding student involvement in patient care:  A student participated in this treatment session. Per CMS Medicare statements and APTA guidelines I certify that the following was true:  1. I was present and directly observed the entire session. 2. I made all skilled judgments and clinical decisions regarding care. 3. I am the practitioner responsible for assessment, treatment, and documentation.

## 2018-07-18 NOTE — PROGRESS NOTES
7/18/18, 1:24PM - [facility] responded that authorization has been denied by [facility] [employer]. P2P can be done within 5 business days by calling [phone] ext [phone], however, the patient's daughter Lien Medrano opted to have the patient return home with her with home health services. Shweta Coronado lives at [address] [place] [address], [address] [facility]. Referral placed via [facility] to [address] they were not able to accept the patient into care for OT until July 29th. Referral was then placed to [facility]; spoke with Lawanda Rubinstein to confirm that they can start PT/OT within 48 hours. Patient's daughter plans to pick him up by 2:30PM today. Care Management will continue to follow his disposition. SALLIE Roy     7/18/18, 2:54PM - Patient's daughter later called and insisted that occupational therapy be cancelled, stating \"he does NOT need occupational therapy. I want him to go with [address] for just physical therapy. He does NOT need helping with bathing or dressing. \"  Explained the recommendations from the MD and OT that the patient does need that extra assistance for standing balance and endurance. Encouraged her to allow her father to try one session of OT at home. Patient's daughter became angry and adamantly declined home OT stating \"I'm a nurse and he does NOT need that. \"  Called and spoke with Pipo Ignacio at [address] who will proceed with [address] [place] [address] [facility] PT services. Called and spoke with Lawanda Rubinstein at [address] to cancel services with their agency. Care Management will continue to follow his disposition.    SALLIE Roy

## 2018-07-18 NOTE — PROGRESS NOTES
NUTRITION- DIETETIC tECHnICIAN    Pt seen for:       [x]                  Rescreen  []                  Food preferences/tolerances  []                  Food Allergies  []                  PO intake check  []                  Supplements  []                  Diet order clarification  []                  Education  []                  Other     Rescreen:    [x]                  Not at Nutrition Risk, rescreen per screening protocol  []                  At Nutrition Risk- RD referral         SUBJECTIVE/OBJECTIVE:     Information obtained from:  patient      Diet:  Regular Consistent Carbohydrate 1800 Kcal + hs snack    Intake: Very good    Patient Vitals for the past 100 hrs:   % Diet Eaten   07/17/18 1834 100 %   07/17/18 1000 100 %   07/16/18 1742 100 %   07/16/18 1211 50 %   07/16/18 1000 50 %   07/15/18 1903 50 %   07/15/18 1254 30 %   07/15/18 0830 25 %   07/15/18 0804 50 %   07/14/18 1600 100 %   07/14/18 1354 80 %   07/14/18 1036 90 %       Weight Changes: Wt Readings from Last 5 Encounters:   07/13/18 109.4 kg (241 lb 2.9 oz)   07/06/18 101.2 kg (223 lb)       Problems Identified:      [x]                  Edentulous but tolerates regular diet/texture without difficulty. Usually eats well.     []                  Specified food preferences   []                  Dislikes supplements              []                  Allergies:   []                  Difficulty chewing      []                  Dentition    []                  Nausea/Vomiting   []                  Constipation   []                  Diarrhea    PLAN:     [x]                   Continue current diet and encourage intake  []                   Obtained/adjusted food preferences/tolerances and/or snacks options   []                   Dislikes supplements will try a substitution  []                   Modify diet for food allergies  []                   Adjust texture due to difficulty chewing   []                   Educated patient  [] RD Referral  [x]                   Rescreen per screening protocol          Manasa Back, GIULIANO

## 2018-07-18 NOTE — PROGRESS NOTES
Problem: Self Care Deficits Care Plan (Adult)  Goal: *Therapy Goal (Edit Goal, Insert Text)  Occupational Therapy Goals  Initiated 7/17/2018  1. Patient will perform lower body dressing with supervision/set-up within 7 day(s) using AE prn.  2.  Patient will perform bathing with supervision/set-up within 7 day(s). 3.  Patient will perform grooming with modified independence within 7 day(s), standing at sink. 4.  Patient will perform toilet transfers with modified independence within 7 day(s). 5.  Patient will perform all aspects of toileting with modified independence within 7 day(s). 6.  Patient will utilize energy conservation techniques during functional activities with verbal cues within 7 day(s). Occupational Therapy TREATMENT  Patient: Titus Calzada (2 y.o. male)  Date: 7/18/2018  Diagnosis: ARTERIAL SCLEROSIS WITH RESTING PAIN  Atherosclerotic PVD with ulceration (HCC) <principal problem not specified>  Procedure(s) (LRB):  RIGHT POPLITEAL-DORSALIS PEDIS W/VEIN BYPASS (Right) 6 Days Post-Op  Precautions: Fall, WBAT  Chart, occupational therapy assessment, plan of care, and goals were reviewed. ASSESSMENT:  Patient is slowly progressing towards OT goals but remains limited by decreased standing activity tolerance, R LE pain and edema, impaired balance and decline in functional independence. Pt required min A for bed mobility, CGA with additional time and cues for hand placement for sit to stand. Min A for toilet transfers with max verbal cues for proper RW use in bathroom and cues for use of L grab bar. Pt tolerated standing ~10 minutes at sink to shave but with increased time, pt's LEs began to shake. Pt c/o fatigue and need to sit to finish remainder of ADL task. Pt is motivated to regain his prior level of function and is an appropriate IP rehab candidate.      Progression toward goals:  []       Improving appropriately and progressing toward goals  [x]       Improving slowly and progressing toward goals  []       Not making progress toward goals and plan of care will be adjusted     PLAN:  Patient continues to benefit from skilled intervention to address the above impairments. Continue treatment per established plan of care. Discharge Recommendations:  Inpatient Rehab  Further Equipment Recommendations for Discharge:  TBD     SUBJECTIVE:   Patient stated I need to sit.     OBJECTIVE DATA SUMMARY:   Cognitive/Behavioral Status:  Neurologic State: Alert; Appropriate for age  Orientation Level: Oriented X4                Functional Mobility and Transfers for ADLs:  Bed Mobility:  Sit to Supine: Minimum assistance;Assist x1;Additional time    Transfers:  Sit to Stand: Contact guard assistance; Additional time; Adaptive equipment;Assist x1 (cues for brown dplacement)  Functional Transfers  Toilet Transfer : Minimum assistance;Assist x1;Adaptive equipment; Additional time (L grab bar)  Bed to Chair: Contact guard assistance;Assist x1;Additional time; Adaptive equipment    Balance:  Sitting: Intact  Standing: Intact; With support (RW)    ADL Intervention:       Grooming  Shaving: Minimum assistance (standing ~10 min before fatigue, seated rest break)                   Lower Body Dressing Assistance  Socks: Maximum assistance    Toileting  Bowel Hygiene: Independent (seated on toilet)           Pain:  Pain Scale 1: Numeric (0 - 10)  Pain Intensity 1: 0              Activity Tolerance:   vss  Please refer to the flowsheet for vital signs taken during this treatment.   After treatment:   [x] Patient left in no apparent distress sitting up in chair  [] Patient left in no apparent distress in bed  [x] Call bell left within reach  [x] Nursing notified  [] Caregiver present  [] Bed alarm activated    COMMUNICATION/COLLABORATION:   The patients plan of care was discussed with: Physical Therapy Assistant and Registered Nurse    Lien Coronado OT  Time Calculation: 28 mins

## 2018-07-18 NOTE — PROGRESS NOTES
Bedside shift change report given to SUJATA Walker (oncoming nurse) by Eleonora Garibay (offgoing nurse). Report included the following information SBAR, Kardex, Intake/Output and MAR.

## 2018-07-19 ENCOUNTER — HOME CARE VISIT (OUTPATIENT)
Dept: SCHEDULING | Facility: HOME HEALTH | Age: 83
End: 2018-07-19
Payer: MEDICARE

## 2018-07-19 PROCEDURE — 400013 HH SOC

## 2018-07-19 PROCEDURE — G0151 HHCP-SERV OF PT,EA 15 MIN: HCPCS

## 2018-07-19 PROCEDURE — 3331090001 HH PPS REVENUE CREDIT

## 2018-07-19 PROCEDURE — 3331090002 HH PPS REVENUE DEBIT

## 2018-07-20 PROCEDURE — 3331090001 HH PPS REVENUE CREDIT

## 2018-07-20 PROCEDURE — 3331090002 HH PPS REVENUE DEBIT

## 2018-07-20 NOTE — DISCHARGE SUMMARY
[facility]. SUMMARY    Name:Adria ALVA  MR#: [id number]  : 1935  ACCOUNT #: [de-identified]   ADMIT DATE: 2018  DISCHARGE DATE: 2018    FINAL DIAGNOSIS:  Peripheral vascular disease, with ulceration and ischemic rest pain, right foot. PROCEDURE:  Right popliteal, dorsalis pedis bypass. HISTORY OF PRESENT ILLNESS:  The patient is an 29-year-old male with ischemic rest pain in the right foot and a nonhealing right great toe ulcer. He underwent outpatient arteriography and stenting of a superficial femoral artery occlusion. He has persistent tibial occlusive disease, and will undergo popliteal, dorsalis pedis bypass for revascularization. HOSPITAL COURSE:  The patient was admitted and taken to the operating room, where he underwent the above-described procedure. His postoperative course was uncomplicated. He was slow to ambulate due to pain from his leg incisions. He had no significant postoperative problems. His bypass functioned well with return of improved blood flow to the right foot and relief of his pain. He was ultimately discharged home on 2018. At that time, he remained clinically stable with a functioning bypass and well healing incisions. He is discharged to his daughter's home. He is discharged on a cardiac diet, activity as tolerated, his usual medications with the addition of Norco for pain and followup in my office in 2 weeks. DISPOSITION:  Home.       MD DONNA Hebert/BREANNA  D: 2018 13:43     T: 2018 16:51  JOB #: [id number]

## 2018-07-21 VITALS
OXYGEN SATURATION: 97 % | TEMPERATURE: 98.8 F | HEART RATE: 61 BPM | DIASTOLIC BLOOD PRESSURE: 62 MMHG | RESPIRATION RATE: 18 BRPM | SYSTOLIC BLOOD PRESSURE: 90 MMHG

## 2018-07-21 PROCEDURE — 3331090002 HH PPS REVENUE DEBIT

## 2018-07-21 PROCEDURE — 3331090001 HH PPS REVENUE CREDIT

## 2018-07-22 PROCEDURE — 3331090001 HH PPS REVENUE CREDIT

## 2018-07-22 PROCEDURE — 3331090002 HH PPS REVENUE DEBIT

## 2018-07-23 ENCOUNTER — HOME CARE VISIT (OUTPATIENT)
Dept: HOME HEALTH SERVICES | Facility: HOME HEALTH | Age: 83
End: 2018-07-23
Payer: MEDICARE

## 2018-07-23 PROCEDURE — 3331090001 HH PPS REVENUE CREDIT

## 2018-07-23 PROCEDURE — 3331090002 HH PPS REVENUE DEBIT

## 2018-07-24 ENCOUNTER — HOME CARE VISIT (OUTPATIENT)
Dept: HOME HEALTH SERVICES | Facility: HOME HEALTH | Age: 83
End: 2018-07-24
Payer: MEDICARE

## 2018-07-24 ENCOUNTER — HOME CARE VISIT (OUTPATIENT)
Dept: SCHEDULING | Facility: HOME HEALTH | Age: 83
End: 2018-07-24
Payer: MEDICARE

## 2018-07-24 VITALS
SYSTOLIC BLOOD PRESSURE: 90 MMHG | RESPIRATION RATE: 18 BRPM | OXYGEN SATURATION: 99 % | HEART RATE: 61 BPM | TEMPERATURE: 98.3 F | DIASTOLIC BLOOD PRESSURE: 62 MMHG

## 2018-07-24 PROCEDURE — 3331090002 HH PPS REVENUE DEBIT

## 2018-07-24 PROCEDURE — G0151 HHCP-SERV OF PT,EA 15 MIN: HCPCS

## 2018-07-24 PROCEDURE — 3331090001 HH PPS REVENUE CREDIT

## 2018-07-25 ENCOUNTER — HOME CARE VISIT (OUTPATIENT)
Dept: HOME HEALTH SERVICES | Facility: HOME HEALTH | Age: 83
End: 2018-07-25
Payer: MEDICARE

## 2018-07-25 PROCEDURE — 3331090002 HH PPS REVENUE DEBIT

## 2018-07-25 PROCEDURE — 3331090001 HH PPS REVENUE CREDIT

## 2018-07-26 ENCOUNTER — HOME CARE VISIT (OUTPATIENT)
Dept: HOME HEALTH SERVICES | Facility: HOME HEALTH | Age: 83
End: 2018-07-26
Payer: MEDICARE

## 2018-07-26 PROCEDURE — 3331090001 HH PPS REVENUE CREDIT

## 2018-07-26 PROCEDURE — 3331090002 HH PPS REVENUE DEBIT

## 2018-07-27 ENCOUNTER — HOME CARE VISIT (OUTPATIENT)
Dept: SCHEDULING | Facility: HOME HEALTH | Age: 83
End: 2018-07-27
Payer: MEDICARE

## 2018-07-27 PROCEDURE — 3331090002 HH PPS REVENUE DEBIT

## 2018-07-27 PROCEDURE — G0151 HHCP-SERV OF PT,EA 15 MIN: HCPCS

## 2018-07-27 PROCEDURE — 3331090001 HH PPS REVENUE CREDIT

## 2018-07-28 VITALS
RESPIRATION RATE: 18 BRPM | DIASTOLIC BLOOD PRESSURE: 80 MMHG | HEART RATE: 61 BPM | TEMPERATURE: 98.7 F | OXYGEN SATURATION: 99 % | SYSTOLIC BLOOD PRESSURE: 122 MMHG

## 2018-07-28 PROCEDURE — 3331090002 HH PPS REVENUE DEBIT

## 2018-07-28 PROCEDURE — 3331090001 HH PPS REVENUE CREDIT

## 2018-07-29 PROCEDURE — 3331090001 HH PPS REVENUE CREDIT

## 2018-07-29 PROCEDURE — 3331090002 HH PPS REVENUE DEBIT

## 2018-07-30 PROCEDURE — 3331090001 HH PPS REVENUE CREDIT

## 2018-07-30 PROCEDURE — 3331090002 HH PPS REVENUE DEBIT

## 2018-07-31 ENCOUNTER — HOME CARE VISIT (OUTPATIENT)
Dept: SCHEDULING | Facility: HOME HEALTH | Age: 83
End: 2018-07-31
Payer: MEDICARE

## 2018-07-31 PROCEDURE — 3331090003 HH PPS REVENUE ADJ

## 2018-07-31 PROCEDURE — 3331090002 HH PPS REVENUE DEBIT

## 2018-07-31 PROCEDURE — 3331090001 HH PPS REVENUE CREDIT

## 2018-09-26 ENCOUNTER — ANESTHESIA EVENT (OUTPATIENT)
Dept: SURGERY | Age: 83
End: 2018-09-26
Payer: MEDICARE

## 2018-09-27 ENCOUNTER — HOSPITAL ENCOUNTER (OUTPATIENT)
Age: 83
Setting detail: OUTPATIENT SURGERY
Discharge: HOME | End: 2018-09-27
Payer: MEDICARE

## 2018-09-27 ENCOUNTER — ANESTHESIA (OUTPATIENT)
Dept: SURGERY | Age: 83
End: 2018-09-27
Payer: MEDICARE

## 2018-09-27 VITALS
SYSTOLIC BLOOD PRESSURE: 144 MMHG | WEIGHT: 220 LBS | OXYGEN SATURATION: 100 % | RESPIRATION RATE: 14 BRPM | DIASTOLIC BLOOD PRESSURE: 76 MMHG | TEMPERATURE: 97.4 F | HEIGHT: 67 IN | BODY MASS INDEX: 34.53 KG/M2 | HEART RATE: 60 BPM

## 2018-09-27 DIAGNOSIS — I70.435: Primary | ICD-10-CM

## 2018-09-27 LAB
GLUCOSE BLD STRIP.AUTO-MCNC: 117 MG/DL (ref 65–100)
GLUCOSE BLD STRIP.AUTO-MCNC: 127 MG/DL (ref 65–100)
SERVICE CMNT-IMP: ABNORMAL
SERVICE CMNT-IMP: ABNORMAL

## 2018-09-27 PROCEDURE — 74011250636 HC RX REV CODE- 250/636: Performed by: ANESTHESIOLOGY

## 2018-09-27 PROCEDURE — 76210000000 HC OR PH I REC 2 TO 2.5 HR

## 2018-09-27 PROCEDURE — 74011250636 HC RX REV CODE- 250/636

## 2018-09-27 PROCEDURE — 82962 GLUCOSE BLOOD TEST: CPT

## 2018-09-27 PROCEDURE — 77030011640 HC PAD GRND REM COVD -A

## 2018-09-27 PROCEDURE — 88305 TISSUE EXAM BY PATHOLOGIST: CPT

## 2018-09-27 PROCEDURE — 77030013079 HC BLNKT BAIR HGGR 3M -A: Performed by: ANESTHESIOLOGY

## 2018-09-27 PROCEDURE — 76210000021 HC REC RM PH II 0.5 TO 1 HR

## 2018-09-27 PROCEDURE — 77030002916 HC SUT ETHLN J&J -A

## 2018-09-27 PROCEDURE — 76060000032 HC ANESTHESIA 0.5 TO 1 HR

## 2018-09-27 PROCEDURE — 74011000250 HC RX REV CODE- 250

## 2018-09-27 PROCEDURE — 77030018836 HC SOL IRR NACL ICUM -A

## 2018-09-27 PROCEDURE — 76010000138 HC OR TIME 0.5 TO 1 HR

## 2018-09-27 PROCEDURE — 74011250637 HC RX REV CODE- 250/637: Performed by: ANESTHESIOLOGY

## 2018-09-27 PROCEDURE — 88311 DECALCIFY TISSUE: CPT

## 2018-09-27 PROCEDURE — 77030020782 HC GWN BAIR PAWS FLX 3M -B

## 2018-09-27 RX ORDER — SODIUM CHLORIDE, SODIUM LACTATE, POTASSIUM CHLORIDE, CALCIUM CHLORIDE 600; 310; 30; 20 MG/100ML; MG/100ML; MG/100ML; MG/100ML
INJECTION, SOLUTION INTRAVENOUS
Status: DISCONTINUED | OUTPATIENT
Start: 2018-09-27 | End: 2018-09-27 | Stop reason: HOSPADM

## 2018-09-27 RX ORDER — SODIUM CHLORIDE, SODIUM LACTATE, POTASSIUM CHLORIDE, CALCIUM CHLORIDE 600; 310; 30; 20 MG/100ML; MG/100ML; MG/100ML; MG/100ML
125 INJECTION, SOLUTION INTRAVENOUS CONTINUOUS
Status: DISCONTINUED | OUTPATIENT
Start: 2018-09-27 | End: 2018-09-27 | Stop reason: HOSPADM

## 2018-09-27 RX ORDER — FENTANYL CITRATE 50 UG/ML
50 INJECTION, SOLUTION INTRAMUSCULAR; INTRAVENOUS AS NEEDED
Status: DISCONTINUED | OUTPATIENT
Start: 2018-09-27 | End: 2018-09-27 | Stop reason: HOSPADM

## 2018-09-27 RX ORDER — LIDOCAINE HYDROCHLORIDE 20 MG/ML
INJECTION, SOLUTION EPIDURAL; INFILTRATION; INTRACAUDAL; PERINEURAL AS NEEDED
Status: DISCONTINUED | OUTPATIENT
Start: 2018-09-27 | End: 2018-09-27 | Stop reason: HOSPADM

## 2018-09-27 RX ORDER — OXYCODONE AND ACETAMINOPHEN 5; 325 MG/1; MG/1
1 TABLET ORAL
Qty: 35 TAB | Refills: 0 | Status: SHIPPED | OUTPATIENT
Start: 2018-09-27 | End: 2020-07-13

## 2018-09-27 RX ORDER — DIPHENHYDRAMINE HYDROCHLORIDE 50 MG/ML
12.5 INJECTION, SOLUTION INTRAMUSCULAR; INTRAVENOUS AS NEEDED
Status: DISCONTINUED | OUTPATIENT
Start: 2018-09-27 | End: 2018-09-27 | Stop reason: HOSPADM

## 2018-09-27 RX ORDER — FENTANYL CITRATE 50 UG/ML
INJECTION, SOLUTION INTRAMUSCULAR; INTRAVENOUS AS NEEDED
Status: DISCONTINUED | OUTPATIENT
Start: 2018-09-27 | End: 2018-09-27 | Stop reason: HOSPADM

## 2018-09-27 RX ORDER — CEFAZOLIN SODIUM/WATER 2 G/20 ML
2 SYRINGE (ML) INTRAVENOUS
Status: COMPLETED | OUTPATIENT
Start: 2018-09-27 | End: 2018-09-27

## 2018-09-27 RX ORDER — MIDAZOLAM HYDROCHLORIDE 1 MG/ML
1 INJECTION, SOLUTION INTRAMUSCULAR; INTRAVENOUS AS NEEDED
Status: DISCONTINUED | OUTPATIENT
Start: 2018-09-27 | End: 2018-09-27 | Stop reason: HOSPADM

## 2018-09-27 RX ORDER — SODIUM CHLORIDE 9 MG/ML
25 INJECTION, SOLUTION INTRAVENOUS CONTINUOUS
Status: DISCONTINUED | OUTPATIENT
Start: 2018-09-27 | End: 2018-09-27 | Stop reason: HOSPADM

## 2018-09-27 RX ORDER — SODIUM CHLORIDE 0.9 % (FLUSH) 0.9 %
5-10 SYRINGE (ML) INJECTION AS NEEDED
Status: DISCONTINUED | OUTPATIENT
Start: 2018-09-27 | End: 2018-09-27 | Stop reason: HOSPADM

## 2018-09-27 RX ORDER — AMOXICILLIN AND CLAVULANATE POTASSIUM 875; 125 MG/1; MG/1
1 TABLET, FILM COATED ORAL 2 TIMES DAILY
Qty: 14 TAB | Refills: 0 | Status: SHIPPED | OUTPATIENT
Start: 2018-09-27 | End: 2020-07-13 | Stop reason: ALTCHOICE

## 2018-09-27 RX ORDER — DEXMEDETOMIDINE HYDROCHLORIDE 4 UG/ML
INJECTION, SOLUTION INTRAVENOUS AS NEEDED
Status: DISCONTINUED | OUTPATIENT
Start: 2018-09-27 | End: 2018-09-27 | Stop reason: HOSPADM

## 2018-09-27 RX ORDER — PROPOFOL 10 MG/ML
INJECTION, EMULSION INTRAVENOUS
Status: DISCONTINUED | OUTPATIENT
Start: 2018-09-27 | End: 2018-09-27 | Stop reason: HOSPADM

## 2018-09-27 RX ORDER — ONDANSETRON 2 MG/ML
INJECTION INTRAMUSCULAR; INTRAVENOUS AS NEEDED
Status: DISCONTINUED | OUTPATIENT
Start: 2018-09-27 | End: 2018-09-27 | Stop reason: HOSPADM

## 2018-09-27 RX ORDER — SODIUM CHLORIDE 0.9 % (FLUSH) 0.9 %
5-10 SYRINGE (ML) INJECTION EVERY 8 HOURS
Status: DISCONTINUED | OUTPATIENT
Start: 2018-09-27 | End: 2018-09-27 | Stop reason: HOSPADM

## 2018-09-27 RX ORDER — OXYCODONE AND ACETAMINOPHEN 5; 325 MG/1; MG/1
1 TABLET ORAL AS NEEDED
Status: DISCONTINUED | OUTPATIENT
Start: 2018-09-27 | End: 2018-09-27 | Stop reason: HOSPADM

## 2018-09-27 RX ORDER — MORPHINE SULFATE 10 MG/ML
2 INJECTION, SOLUTION INTRAMUSCULAR; INTRAVENOUS
Status: DISCONTINUED | OUTPATIENT
Start: 2018-09-27 | End: 2018-09-27 | Stop reason: HOSPADM

## 2018-09-27 RX ORDER — LIDOCAINE HYDROCHLORIDE 10 MG/ML
0.1 INJECTION, SOLUTION EPIDURAL; INFILTRATION; INTRACAUDAL; PERINEURAL AS NEEDED
Status: DISCONTINUED | OUTPATIENT
Start: 2018-09-27 | End: 2018-09-27 | Stop reason: HOSPADM

## 2018-09-27 RX ORDER — PROPOFOL 10 MG/ML
INJECTION, EMULSION INTRAVENOUS AS NEEDED
Status: DISCONTINUED | OUTPATIENT
Start: 2018-09-27 | End: 2018-09-27 | Stop reason: HOSPADM

## 2018-09-27 RX ORDER — FENTANYL CITRATE 50 UG/ML
25 INJECTION, SOLUTION INTRAMUSCULAR; INTRAVENOUS
Status: COMPLETED | OUTPATIENT
Start: 2018-09-27 | End: 2018-09-27

## 2018-09-27 RX ORDER — ONDANSETRON 2 MG/ML
4 INJECTION INTRAMUSCULAR; INTRAVENOUS AS NEEDED
Status: DISCONTINUED | OUTPATIENT
Start: 2018-09-27 | End: 2018-09-27 | Stop reason: HOSPADM

## 2018-09-27 RX ORDER — LIDOCAINE HYDROCHLORIDE 10 MG/ML
INJECTION INFILTRATION; PERINEURAL AS NEEDED
Status: DISCONTINUED | OUTPATIENT
Start: 2018-09-27 | End: 2018-09-27 | Stop reason: HOSPADM

## 2018-09-27 RX ORDER — MIDAZOLAM HYDROCHLORIDE 1 MG/ML
0.5 INJECTION, SOLUTION INTRAMUSCULAR; INTRAVENOUS
Status: DISCONTINUED | OUTPATIENT
Start: 2018-09-27 | End: 2018-09-27 | Stop reason: HOSPADM

## 2018-09-27 RX ADMIN — ONDANSETRON HYDROCHLORIDE 4 MG: 2 INJECTION, SOLUTION INTRAVENOUS at 12:00

## 2018-09-27 RX ADMIN — Medication 2 G: at 11:31

## 2018-09-27 RX ADMIN — PROPOFOL 100 MCG/KG/MIN: 10 INJECTION, EMULSION INTRAVENOUS at 11:27

## 2018-09-27 RX ADMIN — FENTANYL CITRATE 25 MCG: 50 INJECTION, SOLUTION INTRAMUSCULAR; INTRAVENOUS at 11:30

## 2018-09-27 RX ADMIN — FENTANYL CITRATE 25 MCG: 50 INJECTION, SOLUTION INTRAMUSCULAR; INTRAVENOUS at 13:25

## 2018-09-27 RX ADMIN — DEXMEDETOMIDINE HYDROCHLORIDE 4 MCG: 4 INJECTION, SOLUTION INTRAVENOUS at 11:29

## 2018-09-27 RX ADMIN — FENTANYL CITRATE 25 MCG: 50 INJECTION, SOLUTION INTRAMUSCULAR; INTRAVENOUS at 13:35

## 2018-09-27 RX ADMIN — SODIUM CHLORIDE, SODIUM LACTATE, POTASSIUM CHLORIDE, AND CALCIUM CHLORIDE 125 ML/HR: 600; 310; 30; 20 INJECTION, SOLUTION INTRAVENOUS at 10:43

## 2018-09-27 RX ADMIN — DEXMEDETOMIDINE HYDROCHLORIDE 4 MCG: 4 INJECTION, SOLUTION INTRAVENOUS at 11:27

## 2018-09-27 RX ADMIN — FENTANYL CITRATE 25 MCG: 50 INJECTION, SOLUTION INTRAMUSCULAR; INTRAVENOUS at 13:50

## 2018-09-27 RX ADMIN — LIDOCAINE HYDROCHLORIDE 40 MG: 20 INJECTION, SOLUTION EPIDURAL; INFILTRATION; INTRACAUDAL; PERINEURAL at 11:27

## 2018-09-27 RX ADMIN — FENTANYL CITRATE 50 MCG: 50 INJECTION, SOLUTION INTRAMUSCULAR; INTRAVENOUS at 11:27

## 2018-09-27 RX ADMIN — FENTANYL CITRATE 25 MCG: 50 INJECTION, SOLUTION INTRAMUSCULAR; INTRAVENOUS at 13:20

## 2018-09-27 RX ADMIN — PROPOFOL 50 MG: 10 INJECTION, EMULSION INTRAVENOUS at 11:27

## 2018-09-27 RX ADMIN — SODIUM CHLORIDE, SODIUM LACTATE, POTASSIUM CHLORIDE, CALCIUM CHLORIDE: 600; 310; 30; 20 INJECTION, SOLUTION INTRAVENOUS at 11:23

## 2018-09-27 RX ADMIN — FENTANYL CITRATE 25 MCG: 50 INJECTION, SOLUTION INTRAMUSCULAR; INTRAVENOUS at 11:38

## 2018-09-27 RX ADMIN — ONDANSETRON 4 MG: 2 INJECTION INTRAMUSCULAR; INTRAVENOUS at 11:42

## 2018-09-27 RX ADMIN — OXYCODONE AND ACETAMINOPHEN 1 TABLET: 5; 325 TABLET ORAL at 13:55

## 2018-09-27 NOTE — ANESTHESIA POSTPROCEDURE EVALUATION
Post-Anesthesia Evaluation and Assessment Patient: Smith Graf MRN: [id number]  SSN: [id number] YOB: 1935  Age: [de-identified] y.o. Sex: male Cardiovascular Function/Vital Signs Visit Vitals  /66 (BP 1 Location: Left arm, BP Patient Position: At rest)  Pulse 60  Temp 36.3 °C (97.4 °F)  Resp 21  
 Ht 5' 7\" (1.702 m)  Wt 99.8 kg (220 lb)  SpO2 100%  BMI 34.46 kg/m2 Patient is status post MAC anesthesia for Procedure(s): 
PARTIAL RIGHT GREAT TOE AMPUTATION. Nausea/Vomiting: None Postoperative hydration reviewed and adequate. Pain: 
Pain Scale 1: FLACC (09/27/18 1200) Pain Intensity 1: 0 (09/27/18 1200) Managed Neurological Status:  
Neuro (WDL): Within Defined Limits (09/27/18 1200) At baseline Mental Status and Level of Consciousness: Arousable Pulmonary Status:  
O2 Device: Nasal cannula;CO2 nasal cannula (09/27/18 1200) Adequate oxygenation and airway patent Complications related to anesthesia: None Post-anesthesia assessment completed. No concerns Signed By: Colette Ramirez MD   
 September 27, 2018

## 2018-09-27 NOTE — IP AVS SNAPSHOT
[address] [facility] [address] 
[phone] Patient: Yana Waggoner MRN: [id number] :1935 About your hospitalization You were admitted on:  2018 You last received care in the:  [facility] PACU You were discharged on:  2018 Why you were hospitalized Your primary diagnosis was:  Not on File Follow-up Information Follow up With Details Comments Contact Info Darinel Reyes MD   [address] [place] [id number] [phone] Virgil Jones MD Follow up in 3 week(s)  [place]  [address] 
[phone] Discharge Orders None A check sammie indicates which time of day the medication should be taken. My Medications START taking these medications Instructions Each Dose to Equal  
 Morning Noon Evening Bedtime  
 amoxicillin-clavulanate 875-125 mg per tablet Commonly known as:  AUGMENTIN Your last dose was: Your next dose is: Take 1 Tab by mouth two (2) times a day. 1 Tab CHANGE how you take these medications Instructions Each Dose to Equal  
 Morning Noon Evening Bedtime  
 oxyCODONE-acetaminophen 5-325 mg per tablet Commonly known as:  PERCOCET What changed:   
- how much to take - Another medication with the same name was removed. Continue taking this medication, and follow the directions you see here. Your last dose was: Your next dose is: Take 1 Tab by mouth every four (4) hours as needed for Pain. Max Daily Amount: 6 Tabs. 1 Tab CONTINUE taking these medications Instructions Each Dose to Equal  
 Morning Noon Evening Bedtime  
 amLODIPine 10 mg tablet Commonly known as:  Quintin Stevens Your last dose was: Your next dose is: Take 10 mg by mouth nightly.   
 10 mg  
    
   
   
   
  
 aspirin 81 mg chewable tablet Your last dose was: Your next dose is: Take 81 mg by mouth daily. 81 mg  
    
   
   
   
  
 brimonidine 0.2 % ophthalmic solution Commonly known as:  Sis Baez Your last dose was: Your next dose is:    
   
   
 Administer 1 Drop to both eyes three (3) times daily. 1 Drop  
    
   
   
   
  
 gabapentin 300 mg capsule Commonly known as:  NEURONTIN Your last dose was: Your next dose is: Take 300 mg by mouth daily as needed (pain). 300 mg  
    
   
   
   
  
 hydroCHLOROthiazide 12.5 mg capsule Commonly known as:  Marshal Shape Your last dose was: Your next dose is: Take 12.5 mg by mouth daily. 12.5 mg  
    
   
   
   
  
 lactulose 10 gram/15 mL solution Commonly known as:  Orlando Barrio Your last dose was: Your next dose is: Take 30 g by mouth two (2) times a day. 30 g  
    
   
   
   
  
 latanoprost 0.005 % ophthalmic solution Commonly known as:  Richardine Solum Your last dose was: Your next dose is:    
   
   
 Administer 1 Drop to both eyes nightly. 1 Drop  
    
   
   
   
  
 lisinopril 40 mg tablet Commonly known as:  Floresita Green Your last dose was: Your next dose is: Take 40 mg by mouth daily. 40 mg  
    
   
   
   
  
 magnesium oxide 400 mg (241.3 mg magnesium) tablet Commonly known as:  MAG-OX Your last dose was: Your next dose is: Take 400 mg by mouth daily. 400 mg  
    
   
   
   
  
 metoprolol succinate 25 mg XL tablet Commonly known as:  TOPROL-XL Your last dose was: Your next dose is: Take 25 mg by mouth daily. 25 mg  
    
   
   
   
  
 omeprazole 40 mg capsule Commonly known as:  PRILOSEC Your last dose was: Your next dose is: Take 40 mg by mouth daily.   
 40 mg  
    
   
 pravastatin 40 mg tablet Commonly known as:  PRAVACHOL Your last dose was: Your next dose is: Take 80 mg by mouth nightly. 80 mg STOOL SOFTENER-LAXATIVE 8.6-50 mg per tablet Generic drug:  senna-docusate Your last dose was: Your next dose is: Take 2 Tabs by mouth two (2) times a day. 2 Tab Where to Get Your Medications Information on where to get these meds will be given to you by the nurse or doctor. ! Ask your nurse or doctor about these medications  
  amoxicillin-clavulanate 875-125 mg per tablet  
 oxyCODONE-acetaminophen 5-325 mg per tablet Opioid Education Prescription Opioids: What You Need to Know: 
 
Prescription opioids can be used to help relieve moderate-to-severe pain and are often prescribed following a surgery or injury, or for certain health conditions. These medications can be an important part of treatment but also come with serious risks. Opioids are strong pain medicines. Examples include hydrocodone, oxycodone, fentanyl, and morphine. Heroin is an example of an illegal opioid. It is important to work with your health care provider to make sure you are getting the safest, most effective care. WHAT ARE THE RISKS AND SIDE EFFECTS OF OPIOID USE? Prescription opioids carry serious risks of addiction and overdose, especially with prolonged use. An opioid overdose, often marked by slow breathing, can cause sudden death. The use of prescription opioids can have a number of side effects as well, even when taken as directed. · Tolerance-meaning you might need to take more of a medication for the same pain relief · Physical dependence-meaning you have symptoms of withdrawal when the medication is stopped. Withdrawal symptoms can include nausea, sweating, chills, diarrhea, stomach cramps, and muscle aches.   Withdrawal can last up to several weeks, depending on which drug you took and how long you took it. · Increased sensitivity to pain · Constipation · Nausea, vomiting, and dry mouth · Sleepiness and dizziness · Confusion · Depression · Low levels of testosterone that can result in lower sex drive, energy, and strength · Itching and sweating RISKS ARE GREATER WITH:      
· History of drug misuse, substance use disorder, or overdose · Mental health conditions (such as depression or anxiety) · Sleep apnea · Older age (72 years or older) · Pregnancy Avoid alcohol while taking prescription opioids. Also, unless specifically advised by your health care provider, medications to avoid include: · Benzodiazepines (such as Xanax or Valium) · Muscle relaxants (such as Soma or Flexeril) · Hypnotics (such as Ambien or Lunesta) · Other prescription opioids KNOW YOUR OPTIONS Talk to your health care provider about ways to manage your pain that don't involve prescription opioids. Some of these options may actually work better and have fewer risks and side effects. Consult your physician before adding or stopping any medications, treatments, or physical activity. Options may include: 
· Pain relievers such as acetaminophen, ibuprofen, and naproxen · Some medications that are also used for depression or seizures · Physical therapy and exercise · Counseling to help patients learn how to cope better with triggers of pain and stress. · Application of heat or cold compress · Massage therapy · Relaxation techniques Be Informed Make sure you know the name of your medication, how much and how often to take it, and its potential risks & side effects. IF YOU ARE PRESCRIBED OPIOIDS FOR PAIN: 
· Never take opioids in greater amounts or more often than prescribed. Remember the goal is not to be pain-free but to manage your pain at a tolerable level. · Follow up with your primary care provider to: · Work together to create a plan on how to manage your pain. · Talk about ways to help manage your pain that don't involve prescription opioids. · Talk about any and all concerns and side effects. · Help prevent misuse and abuse. · Never sell or share prescription opioids · Help prevent misuse and abuse. · Store prescription opioids in a secure place and out of reach of others (this may include visitors, children, friends, and family). · Safely dispose of unused/unwanted prescription opioids: Find your community drug take-back program or your pharmacy mail-back program, or flush them down the toilet, following guidance from the Food and Drug Administration (www.fda.gov/Drugs/ResourcesForYou). · Visit www.cdc.gov/drugoverdose to learn about the risks of opioid abuse and overdose. · If you believe you may be struggling with addiction, tell your health care provider and ask for guidance or call [employer] at 2-113-498-KGRA. Discharge Instructions Patient Discharge Instructions Elroy Clark / [id number] : 1935 Admitted 2018 Discharged: 2018 Take Home Medications · It is important that you take the medication exactly as they are prescribed. · Keep your medication in the bottles provided by the pharmacist and keep a list of the medication names, dosages, and times to be taken in your wallet. · Do not take other medications without consulting your doctor. 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  For most people, pain improves within a week after surgery. You may have stitches or sutures. The doctor will probably take these out about 10 days after the surgery. You may need to wear a cast or a special type of shoe for about 2 to 4 weeks. You may think you have feeling or pain where your toe had been. This is called phantom pain. It is common, and it may come and go for a year or longer. If you have this kind of pain, your doctor may prescribe medicine to treat it. This care sheet gives you a general idea about how long it will take for you to recover. But each person recovers at a different pace. Follow the steps below to get better as quickly as possible. How can you care for yourself at home? Activity 
  · Rest when you feel tired. Getting enough sleep will help you recover.  
  · You may notice some changes in your balance when you walk. Your balance will improve over time.  
  · Try to walk each day if you are able. Start by walking a little more than you did the day before. Bit by bit, increase the amount you walk. Walking boosts blood flow and helps prevent blood clots.  
  · Prop up your foot and leg on a pillow when you ice it or anytime you sit or lie down during the next 3 days. Try to keep it above the level of your heart. This will help reduce swelling.  
  · Ask your doctor when you can drive again.  
  · You may shower, unless your doctor tells you not to. Keep the bandage dry. If the bandage has been removed, you can wash the area with warm water and soap. Pat the area dry.  
  · You will probably need to take about 4 weeks off from work or your normal routine. How much time you need to take off depends on the type of work you do and your overall health. Diet 
  · You can eat your normal diet.  If your stomach is upset, try bland, low-fat foods like plain rice, broiled chicken, toast, and yogurt.  
  · You may notice that your bowel movements are not regular right after your surgery. This is common. Try to avoid constipation and straining with bowel movements. You may want to take a fiber supplement every day. If you have not had a bowel movement after a couple of days, ask your doctor about taking a mild laxative. Medicines 
  · Your doctor will tell you if and when you can restart your medicines. He or she will also give you instructions about taking any new medicines.  
  · If you take blood thinners, such as warfarin (Coumadin), clopidogrel (Plavix), or aspirin, be sure to talk to your doctor. He or she will tell you if and when to start taking those medicines again. Make sure that you understand exactly what your doctor wants you to do.  
  · Take pain medicines exactly as directed. ¨ If the doctor gave you a prescription medicine for pain, take it as prescribed. ¨ If you are not taking a prescription pain medicine, ask your doctor if you can take an over-the-counter medicine.  
  · If your doctor prescribed antibiotics, take them as directed. Do not stop taking them just because you feel better. You need to take the full course of antibiotics.  
  · If you think your pain medicine is making you sick to your stomach: 
¨ Take your medicine after meals (unless your doctor has told you not to). ¨ Ask your doctor for a different pain medicine. Incision care 
  · Your doctor will probably remove the bandages after several days. Or your doctor may have you remove your bandages at home. Do not touch the surgery area. Keep it dry.  
  · Do not soak your foot until your doctor says it is okay. Follow-up care is a key part of your treatment and safety. Be sure to make and go to all appointments, and call your doctor if you are having problems. It's also a good idea to know your test results and keep a list of the medicines you take. When should you call for help? Call 911 anytime you think you may need emergency care. For example, call if:   · You passed out (lost consciousness).  
  · You have sudden chest pain and shortness of breath, or you cough up blood.  
  · You have severe trouble breathing.  
 Call your doctor now or seek immediate medical care if: 
  · Your foot is cool or pale or changes color.  
  · You have numbness, tingling, or less feeling in your foot or your toes.  
  · You have pain that does not get better after you take pain medicine.  
  · You have loose stitches, or your incision comes open.  
  · Bright red blood has soaked through the bandage over your incision.  
  · You have signs of infection, such as: 
¨ Increased pain, swelling, warmth, or redness. ¨ Red streaks leading from the incision. ¨ Pus draining from the incision. ¨ A fever.  
 Watch closely for any changes in your health, and be sure to contact your doctor if: 
  · You do not have a bowel movement after taking a laxative. Where can you learn more? Go to http://francisca-davis.info/. Enter S589 in the search box to learn more about \"Toe Amputation: What to Expect at Home. \" Current as of: November 29, 2017 Content Version: 11.7 © 3560-6745 [employer]. Care instructions adapted under license by [employer] (which disclaims liability or warranty for this information). If you have questions about a medical condition or this instruction, always ask your healthcare professional. [address] any warranty or liability for your use of this information. ______________________________________________________________________ Anesthesia Discharge Instructions After general anesthesia or intervenous sedation, for 24 hours or while taking prescription Narcotics: · Limit your activities · Do not drive or operate hazardous machinery · If you have not urinated within 8 hours after discharge, please contact your surgeon on call. · Do not make important personal or business decisions · Do not drink alcoholic beverages Report the following to your surgeon: 
· Excessive pain, swelling, redness or odor of or around the surgical area · Temperature over 100.5 degrees · Nausea and vomiting lasting longer than 4 hours or if unable to take medication · Any signs of decreased circulation or nerve impairment to extremity:  Change in color, persistent numbness, tingling, coldness or increased pain. · Any questions Information obtained by : 
I understand that if any problems occur once I am at home I am to contact my physician. I understand and acknowledge receipt of the instructions indicated above. Physician's or R.N.'s Signature                                                                  Date/Time Patient or Representative Signature                                                          Date/Time Introducing [facility]! Lissa Meyers introduces Yhat patient portal. Now you can access parts of your medical record, email your doctor's office, and request medication refills online. 1. In your internet browser, go to [email or web address] 2. Click on the First Time User? Click Here link in the Sign In box. You will see the New Member Sign Up page. 3. Enter your Yhat Access Code exactly as it appears below. You will not need to use this code after youve completed the sign-up process. If you do not sign up before the expiration date, you must request a new code. · Yhat Access Code: [email or web address] Expires: 10/9/2018 11:37 AM 
 
4.  Enter the last four digits of your Social Security Number (xxxx) and Date of Birth (mm/dd/yyyy) as indicated and click Submit. You will be taken to the next sign-up page. 5. Create a [employer] ID. This will be your [employer] login ID and cannot be changed, so think of one that is secure and easy to remember. 6. Create a [employer] password. You can change your password at any time. 7. Enter your Password Reset Question and Answer. This can be used at a later time if you forget your password. 8. Enter your e-mail address. You will receive e-mail notification when new information is available in [address] 9. Click Sign Up. You can now view and download portions of your medical record. 10. Click the Download Summary menu link to download a portable copy of your medical information. If you have questions, please visit the Frequently Asked Questions section of the [employer] website. Remember, [employer] is NOT to be used for urgent needs. For medical emergencies, dial 911. Now available from your iPhone and Android! Introducing Meño Vides As a [employer] patient, I wanted to make you aware of our electronic visit tool called Meño Vides. [employer] 24/7 allows you to connect within minutes with a medical provider 24 hours a day, seven days a week via a mobile device or tablet or logging into a secure website from your computer. You can access Meño Vides from anywhere in the [place]. A virtual visit might be right for you when you have a simple condition and feel like you just dont want to get out of bed, or cant get away from work for an appointment, when your regular [employer] provider is not available (evenings, weekends or holidays), or when youre out of town and need minor care. Electronic visits cost only $49 and if the [employer] 24/7 provider determines a prescription is needed to treat your condition, one can be electronically transmitted to a nearby pharmacy*. Please take a moment to enroll today if you have not already done so. The enrollment process is free and takes just a few minutes. To enroll, please download the [employer] 24/7 milady to your tablet or phone, or visit [email or web address] to enroll on your computer. And, as an [address] [place] [address] patient with a [employer] account, the results of your visits will be scanned into your electronic medical record and your primary care provider will be able to view the scanned results. We urge you to continue to see your regular [employer] provider for your ongoing medical care. And while your primary care provider may not be the one available when you seek a [email or web address] virtual visit, the peace of mind you get from getting a real diagnosis real time can be priceless. For more information on [email or web address], view our Frequently Asked Questions (FAQs) at [email or web address]. Sincerely, 
 
Mervat Cr MD 
Chief Medical Officer [address] [place] [address] *:  certain medications cannot be prescribed via [email or web address] Providers Seen During Your Hospitalization Provider Specialty Primary office phone Elier Holcomb MD Vascular Surgery [phone] Your Primary Care Physician (PCP) Primary Care Physician Office Phone Office Fax Arnaldo Fuenz [phone] You are allergic to the following No active allergies Recent Documentation Height Weight BMI Smoking Status 1.702 m 99.8 kg 34.46 kg/m2 Never Smoker Emergency Contacts Name Discharge Info Relation Home Work Mobile Mario Covarrubias DISCHARGE CAREGIVER [3] Child [2] [phone] Patient Belongings The following personal items are in your possession at time of discharge: 
  Dental Appliances: None Discharge Instructions Attachments/References MEFS - OXYCODONE/ACETAMINOPHEN (PERCOCET, ROXICET) - (BY MOUTH) (ENGLISH) MEFS - AMOXICILLIN/CLAVULANATE POTASSIUM (AUGMENTIN, AUGMENTIN ES-600, AUGMENTIN XR) - (BY MOUTH) (ENGLISH) Patient Handouts Oxycodone/Acetaminophen (Percocet, Roxicet) - (By mouth) Why this medicine is used:  
Treats pain. This medicine contains a narcotic pain reliever. Contact a nurse or doctor right away if you have: 
· Extreme weakness, shallow breathing, slow heartbeat · Sweating or cold, clammy skin · Skin blisters, rash, or peeling Common side effects: 
· Constipation · Nausea, vomiting · Tiredness © 2017 [address] Information is for End User's use only and may not be sold, redistributed or otherwise used for commercial purposes. Amoxicillin/Clavulanate Potassium (Augmentin, Augmentin ES-600, Augmentin XR) - (By mouth) Why this medicine is used:  
Treats infections. This medicine is a penicillin antibiotic. Contact a nurse or doctor right away if you have: · Blistering, peeling, red skin rash · Dark urine or pale stools, nausea, vomiting, loss of appetite, stomach pain, yellow eyes or skin · Severe diarrhea, especially if bloody or ongoing Common side effects: 
· Diarrhea, nausea, vomiting · Diaper rash · Tooth discoloration (in children) © 2017 [address] Information is for End User's use only and may not be sold, redistributed or otherwise used for commercial purposes. Please provide this summary of care documentation to your next provider. Signatures-by signing, you are acknowledging that this After Visit Summary has been reviewed with you and you have received a copy. Patient Signature:  ____________________________________________________________ Date:  ____________________________________________________________  
  
Verenice Marika  Provider Signature: ____________________________________________________________ Date:  ____________________________________________________________

## 2018-09-27 NOTE — ANESTHESIA PREPROCEDURE EVALUATION
Anesthetic History No history of anesthetic complications Review of Systems / Medical History Patient summary reviewed, nursing notes reviewed and pertinent labs reviewed Pulmonary COPD: mild Neuro/Psych CVA Cardiovascular Hypertension Dysrhythmias : atrial fibrillation Pacemaker and PAD Exercise tolerance: <4 METS 
  
GI/Hepatic/Renal 
  
GERD Renal disease: CRI Endo/Other Diabetes Other Findings Physical Exam 
 
Airway Mallampati: II 
TM Distance: > 6 cm Neck ROM: normal range of motion Mouth opening: Normal 
 
 Cardiovascular Murmur: Grade 2, Mitral area Dental 
 
Dentition: Edentulous Pulmonary Breath sounds clear to auscultation Abdominal 
GI exam deferred Other Findings Anesthetic Plan ASA: 3 Anesthesia type: MAC Induction: Intravenous Anesthetic plan and risks discussed with: Patient

## 2018-09-27 NOTE — DISCHARGE INSTRUCTIONS
Patient Discharge Instructions    Liberty Marvin / [id number] : 1935    Admitted 2018 Discharged: 2018     Take Home Medications     · It is important that you take the medication exactly as they are prescribed. · Keep your medication in the bottles provided by the pharmacist and keep a list of the medication names, dosages, and times to be taken in your wallet. · Do not take other medications without consulting your doctor. What to do at Home    Recommended diet: Diabetic Diet,     Recommended activity: Activity as tolerated, stay off right foot as much as possible    Additional Instructions: wound care to continue with home health    Follow-up with Dr Alexus Whalen in 3 weeks, call [phone] for appt     Toe Amputation: What to Expect at [address] had amputation surgery to remove one or more of your toes. For most people, pain improves within a week after surgery. You may have stitches or sutures. The doctor will probably take these out about 10 days after the surgery. You may need to wear a cast or a special type of shoe for about 2 to 4 weeks. You may think you have feeling or pain where your toe had been. This is called phantom pain. It is common, and it may come and go for a year or longer. If you have this kind of pain, your doctor may prescribe medicine to treat it. This care sheet gives you a general idea about how long it will take for you to recover. But each person recovers at a different pace. Follow the steps below to get better as quickly as possible. How can you care for yourself at home? Activity    · Rest when you feel tired. Getting enough sleep will help you recover.     · You may notice some changes in your balance when you walk. Your balance will improve over time.     · Try to walk each day if you are able. Start by walking a little more than you did the day before. Bit by bit, increase the amount you walk.  Walking boosts blood flow and helps prevent blood clots.     · Prop up your foot and leg on a pillow when you ice it or anytime you sit or lie down during the next 3 days. Try to keep it above the level of your heart. This will help reduce swelling.     · Ask your doctor when you can drive again.     · You may shower, unless your doctor tells you not to. Keep the bandage dry. If the bandage has been removed, you can wash the area with warm water and soap. Pat the area dry.     · You will probably need to take about 4 weeks off from work or your normal routine. How much time you need to take off depends on the type of work you do and your overall health. Diet    · You can eat your normal diet. If your stomach is upset, try bland, low-fat foods like plain rice, broiled chicken, toast, and yogurt.     · You may notice that your bowel movements are not regular right after your surgery. This is common. Try to avoid constipation and straining with bowel movements. You may want to take a fiber supplement every day. If you have not had a bowel movement after a couple of days, ask your doctor about taking a mild laxative. Medicines    · Your doctor will tell you if and when you can restart your medicines. He or she will also give you instructions about taking any new medicines.     · If you take blood thinners, such as warfarin (Coumadin), clopidogrel (Plavix), or aspirin, be sure to talk to your doctor. He or she will tell you if and when to start taking those medicines again. Make sure that you understand exactly what your doctor wants you to do.     · Take pain medicines exactly as directed. ¨ If the doctor gave you a prescription medicine for pain, take it as prescribed. ¨ If you are not taking a prescription pain medicine, ask your doctor if you can take an over-the-counter medicine.     · If your doctor prescribed antibiotics, take them as directed. Do not stop taking them just because you feel better. You need to take the full course of antibiotics.      · If you think your pain medicine is making you sick to your stomach:  ¨ Take your medicine after meals (unless your doctor has told you not to). ¨ Ask your doctor for a different pain medicine. Incision care    · Your doctor will probably remove the bandages after several days. Or your doctor may have you remove your bandages at home. Do not touch the surgery area. Keep it dry.     · Do not soak your foot until your doctor says it is okay. Follow-up care is a key part of your treatment and safety. Be sure to make and go to all appointments, and call your doctor if you are having problems. It's also a good idea to know your test results and keep a list of the medicines you take. When should you call for help? Call 911 anytime you think you may need emergency care. For example, call if:    · You passed out (lost consciousness).     · You have sudden chest pain and shortness of breath, or you cough up blood.     · You have severe trouble breathing.    Call your doctor now or seek immediate medical care if:    · Your foot is cool or pale or changes color.     · You have numbness, tingling, or less feeling in your foot or your toes.     · You have pain that does not get better after you take pain medicine.     · You have loose stitches, or your incision comes open.     · Bright red blood has soaked through the bandage over your incision.     · You have signs of infection, such as:  ¨ Increased pain, swelling, warmth, or redness. ¨ Red streaks leading from the incision. ¨ Pus draining from the incision. ¨ A fever.    Watch closely for any changes in your health, and be sure to contact your doctor if:    · You do not have a bowel movement after taking a laxative. Where can you learn more? Go to http://francisca-davis.info/. Enter V379 in the search box to learn more about \"Toe Amputation: What to Expect at Home. \"  Current as of: November 29, 2017  Content Version: 11.7  © 8441-7708 [employer], [employer]. Care instructions adapted under license by [employer] [facility] (which disclaims liability or warranty for this information). If you have questions about a medical condition or this instruction, always ask your healthcare professional. Sahara Holder any warranty or liability for your use of this information. ______________________________________________________________________    Anesthesia Discharge Instructions    After general anesthesia or intervenous sedation, for 24 hours or while taking prescription Narcotics:  · Limit your activities  · Do not drive or operate hazardous machinery  · If you have not urinated within 8 hours after discharge, please contact your surgeon on call. · Do not make important personal or business decisions  · Do not drink alcoholic beverages    Report the following to your surgeon:  · Excessive pain, swelling, redness or odor of or around the surgical area  · Temperature over 100.5 degrees  · Nausea and vomiting lasting longer than 4 hours or if unable to take medication  · Any signs of decreased circulation or nerve impairment to extremity:  Change in color, persistent numbness, tingling, coldness or increased pain. · Any questions    Information obtained by :  I understand that if any problems occur once I am at home I am to contact my physician. I understand and acknowledge receipt of the instructions indicated above.                                                                                                                                            Physician's or R.N.'s Signature                                                                  Date/Time                                                                                                                                              Patient or Representative Signature                                                          Date/Time

## 2018-09-27 NOTE — BRIEF OP NOTE
BRIEF OPERATIVE NOTE    Date of Procedure: 9/27/2018   Preoperative Diagnosis: ATHEROSCLEROSIS WITH ULCERATION, Right GREAT TOE  Postoperative Diagnosis: same    Procedure(s):  PARTIAL RIGHT GREAT TOE AMPUTATION  Surgeon(s) and Role:     * Radha Silva MD - Primary         Surgical Assistant: none    Surgical Staff:  Circ-1: Mateo Gonzalez RN  Circ-Relief: Selvin Manriquez RN  Scrub RN-1: Pancho Packer RN  Event Time In   Incision Start 1135   Incision Close 1153     Anesthesia: MAC   Estimated Blood Loss: minimal  Specimens:   ID Type Source Tests Collected by Time Destination   1 : partial right great toe amputation Fresh Tissue  Radha Silva MD 9/27/2018 1126 Pathology      Findings: none   Complications: none  Implants: * No implants in log *

## 2018-09-27 NOTE — OP NOTES
[address] [place] [address] REPORT    Name:Ellis ALVA  MR#: [id number]  : 1935  ACCOUNT #: [de-identified]   DATE OF SERVICE: 2018    PREOPERATIVE DIAGNOSIS:  Open right distal great toe wound with exposed bone. POSTOPERATIVE DIAGNOSIS:  Open right distal great toe wound with exposed bone. PROCEDURE:  Distal amputation, right great toe. SURGEON:  Tammy Elmore MD    ANESTHESIA:  Local with sedation. ESTIMATED BLOOD LOSS:  Minimal.    SPECIMENS REMOVED:  Bone and soft tissue from right great toe. COMPLICATIONS:  None. IMPLANTS:  None. ASSISTANTS:  None. INDICATIONS:  The patient is an 79-year-old diabetic male who originally presented several months ago with a nonhealing black eschar at the tip of his right great toe. He subsequently underwent revascularization with a popliteal to dorsalis pedis bypass. His foot is well revascularized. The wound has progressed with separation of the eschar with exposure of bone involving the distal toe. He will undergo removal of the distal toe. PROCEDURE:  The patient's right foot was prepped and draped. Lidocaine 1% was used for local anesthesia. The nonviable tissue was removed exposing the distal phalanx. The distal phalanx was resected. The soft tissues were debrided and trimmed to allow soft tissue closure with interrupted 3-0 nylon sutures. Dressings were applied and the patient was returned to the recovery room in stable condition.       Emilia Damon MD       GLL / SN  D: 2018 12:01     T: 2018 12:22  JOB #: [id number]

## 2018-09-27 NOTE — ROUTINE PROCESS
Skin assessment in OR  unchanged from Holding area assessment. Patient: Erna Guerin MRN: [id number]  SSN: [id number]   YOB: 1935  Age: [de-identified] y.o. Sex: male     Patient is status post Procedure(s):  PARTIAL RIGHT GREAT TOE AMPUTATION. Surgeon(s) and Role:     * Monica Taylor MD - Primary                    Peripheral IV 09/27/18 Right Hand (Active)   Site Assessment Clean, dry, & intact 9/27/2018 10:40 AM   Phlebitis Assessment 0 9/27/2018 10:40 AM   Infiltration Assessment 0 9/27/2018 10:40 AM   Dressing Status Occlusive 9/27/2018 10:40 AM   Dressing Type Transparent 9/27/2018 10:40 AM   Hub Color/Line Status Blue;Flushed 9/27/2018 10:40 AM                           Dressing/Packing:  Wound Toe Right-DRESSING TYPE: 4 x 4;Elastic bandage;Gauze wrap (jie); Non-adherent (09/27/18 1150)  Splint/Cast:  ]    Other:

## 2019-08-02 LAB — HBA1C MFR BLD HPLC: 6.2 %

## 2019-08-11 ENCOUNTER — OP HISTORICAL/CONVERTED ENCOUNTER (OUTPATIENT)
Dept: OTHER | Age: 84
End: 2019-08-11

## 2020-07-13 VITALS
HEART RATE: 62 BPM | BODY MASS INDEX: 30.35 KG/M2 | RESPIRATION RATE: 20 BRPM | HEIGHT: 70 IN | WEIGHT: 212 LBS | OXYGEN SATURATION: 98 % | DIASTOLIC BLOOD PRESSURE: 76 MMHG | TEMPERATURE: 97.8 F | SYSTOLIC BLOOD PRESSURE: 143 MMHG

## 2020-07-13 PROBLEM — E87.6 HYPOKALEMIA: Status: ACTIVE | Noted: 2020-07-13

## 2020-07-13 PROBLEM — E11.9 TYPE 2 DIABETES MELLITUS WITHOUT COMPLICATIONS (HCC): Status: ACTIVE | Noted: 2020-07-13

## 2020-07-13 PROBLEM — E78.5 DYSLIPIDEMIA: Status: ACTIVE | Noted: 2017-04-26

## 2020-07-13 PROBLEM — R35.1 NOCTURIA: Status: ACTIVE | Noted: 2017-04-26

## 2020-07-13 PROBLEM — K62.5 RECTAL HEMORRHAGE: Status: ACTIVE | Noted: 2017-04-26

## 2020-07-13 PROBLEM — I87.8 VENOUS STASIS: Status: ACTIVE | Noted: 2017-04-26

## 2020-07-13 PROBLEM — D64.9 ANEMIA: Status: ACTIVE | Noted: 2017-04-26

## 2020-07-13 PROBLEM — E83.42 HYPOMAGNESEMIA: Status: ACTIVE | Noted: 2020-07-13

## 2020-07-13 PROBLEM — N40.0 BENIGN PROSTATIC HYPERPLASIA: Status: ACTIVE | Noted: 2017-04-26

## 2020-07-13 RX ORDER — LANCETS
EACH MISCELLANEOUS
COMMUNITY
End: 2020-10-05

## 2020-07-13 RX ORDER — OLOPATADINE HYDROCHLORIDE 7 MG/ML
SOLUTION OPHTHALMIC
COMMUNITY
End: 2020-07-21

## 2020-07-13 RX ORDER — GLIPIZIDE 5 MG/1
5 TABLET ORAL DAILY
COMMUNITY
End: 2020-09-02

## 2020-07-13 RX ORDER — DOCUSATE SODIUM 100 MG/1
100 CAPSULE, LIQUID FILLED ORAL DAILY
COMMUNITY
End: 2021-08-24 | Stop reason: ALTCHOICE

## 2020-07-13 RX ORDER — BLOOD-GLUCOSE METER
EACH MISCELLANEOUS
COMMUNITY
End: 2021-04-09 | Stop reason: ALTCHOICE

## 2020-07-13 RX ORDER — ISOPROPYL ALCOHOL 0.75 G/1
SWAB TOPICAL
COMMUNITY
End: 2020-10-05

## 2020-07-13 RX ORDER — ACETAMINOPHEN 325 MG/1
650 TABLET ORAL
COMMUNITY

## 2020-07-13 RX ORDER — MUPIROCIN 20 MG/G
OINTMENT TOPICAL 3 TIMES DAILY
COMMUNITY
End: 2020-07-21

## 2020-07-13 RX ORDER — BLOOD SUGAR DIAGNOSTIC
STRIP MISCELLANEOUS
COMMUNITY
End: 2021-07-09

## 2020-07-13 RX ORDER — OLOPATADINE HYDROCHLORIDE 1 MG/ML
2 SOLUTION/ DROPS OPHTHALMIC 2 TIMES DAILY
COMMUNITY
End: 2020-07-21 | Stop reason: CLARIF

## 2020-07-21 ENCOUNTER — OFFICE VISIT (OUTPATIENT)
Dept: PRIMARY CARE CLINIC | Age: 85
End: 2020-07-21
Payer: MEDICARE

## 2020-07-21 VITALS
SYSTOLIC BLOOD PRESSURE: 146 MMHG | HEART RATE: 62 BPM | BODY MASS INDEX: 31.07 KG/M2 | WEIGHT: 217 LBS | DIASTOLIC BLOOD PRESSURE: 73 MMHG | RESPIRATION RATE: 18 BRPM | HEIGHT: 70 IN | TEMPERATURE: 97.6 F | OXYGEN SATURATION: 97 %

## 2020-07-21 DIAGNOSIS — E11.9 TYPE 2 DIABETES MELLITUS WITHOUT COMPLICATION, WITHOUT LONG-TERM CURRENT USE OF INSULIN (HCC): Primary | ICD-10-CM

## 2020-07-21 DIAGNOSIS — I10 ESSENTIAL HYPERTENSION: ICD-10-CM

## 2020-07-21 DIAGNOSIS — N18.30 STAGE 3 CHRONIC KIDNEY DISEASE (HCC): ICD-10-CM

## 2020-07-21 DIAGNOSIS — E83.42 HYPOMAGNESEMIA: ICD-10-CM

## 2020-07-21 DIAGNOSIS — H40.9 GLAUCOMA OF BOTH EYES, UNSPECIFIED GLAUCOMA TYPE: ICD-10-CM

## 2020-07-21 DIAGNOSIS — I73.9 PERIPHERAL VASCULAR DISEASE (HCC): ICD-10-CM

## 2020-07-21 DIAGNOSIS — Z86.73 HISTORY OF CVA (CEREBROVASCULAR ACCIDENT): ICD-10-CM

## 2020-07-21 DIAGNOSIS — I48.20 CHRONIC ATRIAL FIBRILLATION (HCC): ICD-10-CM

## 2020-07-21 DIAGNOSIS — Z95.0 PACEMAKER: ICD-10-CM

## 2020-07-21 DIAGNOSIS — E78.2 MIXED HYPERLIPIDEMIA: ICD-10-CM

## 2020-07-21 DIAGNOSIS — H25.9 SENILE CATARACT, UNSPECIFIED AGE-RELATED CATARACT TYPE, UNSPECIFIED LATERALITY: ICD-10-CM

## 2020-07-21 DIAGNOSIS — Z01.818 PREOP EXAMINATION: ICD-10-CM

## 2020-07-21 DIAGNOSIS — K59.09 CHRONIC CONSTIPATION: ICD-10-CM

## 2020-07-21 DIAGNOSIS — N40.0 BENIGN PROSTATIC HYPERPLASIA WITHOUT LOWER URINARY TRACT SYMPTOMS: ICD-10-CM

## 2020-07-21 DIAGNOSIS — K21.9 GASTROESOPHAGEAL REFLUX DISEASE, ESOPHAGITIS PRESENCE NOT SPECIFIED: ICD-10-CM

## 2020-07-21 PROBLEM — I48.91 ATRIAL FIBRILLATION (HCC): Status: ACTIVE | Noted: 2020-07-21

## 2020-07-21 PROBLEM — H26.9 CATARACT: Status: ACTIVE | Noted: 2020-07-21

## 2020-07-21 PROCEDURE — 99214 OFFICE O/P EST MOD 30 MIN: CPT | Performed by: NURSE PRACTITIONER

## 2020-07-21 RX ORDER — PRAVASTATIN SODIUM 80 MG/1
80 TABLET ORAL
COMMUNITY
End: 2020-07-21 | Stop reason: SDUPTHER

## 2020-07-21 RX ORDER — PRAVASTATIN SODIUM 80 MG/1
80 TABLET ORAL
Qty: 90 TAB | Refills: 1 | Status: SHIPPED | OUTPATIENT
Start: 2020-07-21 | End: 2021-01-28

## 2020-07-21 RX ORDER — AMLODIPINE BESYLATE 5 MG/1
5 TABLET ORAL DAILY
COMMUNITY
End: 2020-09-02 | Stop reason: DRUGHIGH

## 2020-07-21 RX ORDER — DORZOLAMIDE HYDROCHLORIDE AND TIMOLOL MALEATE 20; 5 MG/ML; MG/ML
1 SOLUTION/ DROPS OPHTHALMIC 2 TIMES DAILY
COMMUNITY
Start: 2020-07-09 | End: 2021-12-27 | Stop reason: ALTCHOICE
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
## 2020-07-22 LAB
ALBUMIN SERPL-MCNC: 4.2 G/DL (ref 3.6–4.6)
ALBUMIN/CREAT UR: 120 MG/G CREAT (ref 0–29)
ALBUMIN/GLOB SERPL: 1.4 {RATIO} (ref 1.2–2.2)
ALP SERPL-CCNC: 84 IU/L (ref 39–117)
ALT SERPL-CCNC: 35 IU/L (ref 0–44)
APPEARANCE UR: CLEAR
AST SERPL-CCNC: 39 IU/L (ref 0–40)
BACTERIA #/AREA URNS HPF: ABNORMAL /[HPF]
BASOPHILS # BLD AUTO: 0 X10E3/UL (ref 0–0.2)
BASOPHILS NFR BLD AUTO: 0 %
BILIRUB SERPL-MCNC: 0.3 MG/DL (ref 0–1.2)
BILIRUB UR QL STRIP: NEGATIVE
BUN SERPL-MCNC: 23 MG/DL (ref 8–27)
BUN/CREAT SERPL: 14 (ref 10–24)
CALCIUM SERPL-MCNC: 9.6 MG/DL (ref 8.6–10.2)
CASTS URNS MICRO: ABNORMAL
CASTS URNS QL MICRO: PRESENT /LPF
CHLORIDE SERPL-SCNC: 101 MMOL/L (ref 96–106)
CO2 SERPL-SCNC: 22 MMOL/L (ref 20–29)
COLOR UR: YELLOW
CREAT SERPL-MCNC: 1.6 MG/DL (ref 0.76–1.27)
CREAT UR-MCNC: 220 MG/DL
EOSINOPHIL # BLD AUTO: 0.1 X10E3/UL (ref 0–0.4)
EOSINOPHIL NFR BLD AUTO: 2 %
EPI CELLS #/AREA URNS HPF: ABNORMAL /HPF (ref 0–10)
ERYTHROCYTE [DISTWIDTH] IN BLOOD BY AUTOMATED COUNT: 12.7 % (ref 11.6–15.4)
EST. AVERAGE GLUCOSE BLD GHB EST-MCNC: 146 MG/DL
GLOBULIN SER CALC-MCNC: 3 G/DL (ref 1.5–4.5)
GLUCOSE SERPL-MCNC: 133 MG/DL (ref 65–99)
GLUCOSE UR QL: NEGATIVE
HBA1C MFR BLD: 6.7 % (ref 4.8–5.6)
HCT VFR BLD AUTO: 41.3 % (ref 37.5–51)
HGB BLD-MCNC: 13.4 G/DL (ref 13–17.7)
HGB UR QL STRIP: NEGATIVE
IMM GRANULOCYTES # BLD AUTO: 0 X10E3/UL (ref 0–0.1)
IMM GRANULOCYTES NFR BLD AUTO: 0 %
KETONES UR QL STRIP: NEGATIVE
LEUKOCYTE ESTERASE UR QL STRIP: NEGATIVE
LYMPHOCYTES # BLD AUTO: 1.4 X10E3/UL (ref 0.7–3.1)
LYMPHOCYTES NFR BLD AUTO: 26 %
MAGNESIUM SERPL-MCNC: 2 MG/DL (ref 1.6–2.3)
MCH RBC QN AUTO: 29.1 PG (ref 26.6–33)
MCHC RBC AUTO-ENTMCNC: 32.4 G/DL (ref 31.5–35.7)
MCV RBC AUTO: 90 FL (ref 79–97)
MICRO URNS: ABNORMAL
MICROALBUMIN UR-MCNC: 263.5 UG/ML
MONOCYTES # BLD AUTO: 0.5 X10E3/UL (ref 0.1–0.9)
MONOCYTES NFR BLD AUTO: 10 %
MUCOUS THREADS URNS QL MICRO: PRESENT
NEUTROPHILS # BLD AUTO: 3.3 X10E3/UL (ref 1.4–7)
NEUTROPHILS NFR BLD AUTO: 62 %
NITRITE UR QL STRIP: NEGATIVE
PH UR STRIP: 5 [PH] (ref 5–7.5)
PLATELET # BLD AUTO: 254 X10E3/UL (ref 150–450)
POTASSIUM SERPL-SCNC: 4.2 MMOL/L (ref 3.5–5.2)
PROT SERPL-MCNC: 7.2 G/DL (ref 6–8.5)
PROT UR QL STRIP: ABNORMAL
PSA SERPL-MCNC: 3.8 NG/ML (ref 0–4)
RBC # BLD AUTO: 4.6 X10E6/UL (ref 4.14–5.8)
RBC #/AREA URNS HPF: ABNORMAL /HPF (ref 0–2)
SODIUM SERPL-SCNC: 141 MMOL/L (ref 134–144)
SP GR UR: 1.02 (ref 1–1.03)
UROBILINOGEN UR STRIP-MCNC: 1 MG/DL (ref 0.2–1)
WBC # BLD AUTO: 5.3 X10E3/UL (ref 3.4–10.8)
WBC #/AREA URNS HPF: ABNORMAL /HPF (ref 0–5)

## 2020-07-23 DIAGNOSIS — R97.20 ELEVATED PSA: Primary | ICD-10-CM

## 2020-07-23 NOTE — PROGRESS NOTES
Please let patient know that blood counts are normal.  Kidney functions are abnormal but stable when compared to prior and will continue to monitor  Urine does show protein but otherwise normal.  A1c is 6.7 and shows good control of diabetes  Magnesium is normal  PSA is 3.8 and is elevated. Do not have a comparison available. Recommend that he see a urologist to have this evaluated.   Going to place referral.   Follow up in 3 months

## 2020-07-24 NOTE — PROGRESS NOTES
Please call the patient regarding his abnormal result.   Called pt and let him knw oneed to mail himt he referral to make an appt he did not really understand what I was trying to tell him

## 2020-07-28 ENCOUNTER — DOCUMENTATION ONLY (OUTPATIENT)
Dept: PRIMARY CARE CLINIC | Age: 85
End: 2020-07-28

## 2020-07-30 ENCOUNTER — TELEPHONE (OUTPATIENT)
Dept: PRIMARY CARE CLINIC | Age: 85
End: 2020-07-30

## 2020-08-20 PROBLEM — Z01.818 PREOP EXAMINATION: Status: RESOLVED | Noted: 2020-07-21 | Resolved: 2020-08-20

## 2020-09-02 ENCOUNTER — TELEPHONE (OUTPATIENT)
Dept: PRIMARY CARE CLINIC | Age: 85
End: 2020-09-02

## 2020-09-02 RX ORDER — LANOLIN ALCOHOL/MO/W.PET/CERES
CREAM (GRAM) TOPICAL
Qty: 90 TAB | Refills: 0 | Status: SHIPPED | OUTPATIENT
Start: 2020-09-02 | End: 2020-11-02

## 2020-09-02 RX ORDER — GLIPIZIDE 5 MG/1
TABLET ORAL
Qty: 90 TAB | Refills: 0 | Status: SHIPPED | OUTPATIENT
Start: 2020-09-02 | End: 2021-01-29

## 2020-09-02 RX ORDER — AMLODIPINE BESYLATE 10 MG/1
TABLET ORAL
Qty: 90 TAB | Refills: 0 | Status: SHIPPED | OUTPATIENT
Start: 2020-09-02 | End: 2021-01-29

## 2020-09-02 RX ORDER — LACTULOSE 10 G/15ML
SOLUTION ORAL; RECTAL
Qty: 5400 ML | Refills: 0 | Status: SHIPPED | OUTPATIENT
Start: 2020-09-02 | End: 2021-01-28

## 2020-10-05 RX ORDER — ISOPROPYL ALCOHOL 0.75 G/1
SWAB TOPICAL
Qty: 180 PAD | Refills: 1 | Status: SHIPPED | OUTPATIENT
Start: 2020-10-05

## 2020-10-05 RX ORDER — LANCETS
EACH MISCELLANEOUS
Qty: 200 EACH | Refills: 1 | Status: SHIPPED | OUTPATIENT
Start: 2020-10-05 | End: 2021-08-16

## 2020-10-26 ENCOUNTER — OFFICE VISIT (OUTPATIENT)
Dept: PRIMARY CARE CLINIC | Age: 85
End: 2020-10-26
Payer: MEDICARE

## 2020-10-26 DIAGNOSIS — Z23 ENCOUNTER FOR IMMUNIZATION: Primary | ICD-10-CM

## 2020-10-26 PROCEDURE — G0008 ADMIN INFLUENZA VIRUS VAC: HCPCS | Performed by: FAMILY MEDICINE

## 2020-10-26 PROCEDURE — 90756 CCIIV4 VACC ABX FREE IM: CPT | Performed by: FAMILY MEDICINE

## 2020-11-02 RX ORDER — LANOLIN ALCOHOL/MO/W.PET/CERES
CREAM (GRAM) TOPICAL
Qty: 90 TAB | Refills: 0 | Status: SHIPPED | OUTPATIENT
Start: 2020-11-02 | End: 2021-01-29

## 2020-11-10 ENCOUNTER — OFFICE VISIT (OUTPATIENT)
Dept: PRIMARY CARE CLINIC | Age: 85
End: 2020-11-10
Payer: MEDICARE

## 2020-11-10 VITALS
HEART RATE: 64 BPM | HEIGHT: 70 IN | OXYGEN SATURATION: 96 % | DIASTOLIC BLOOD PRESSURE: 81 MMHG | WEIGHT: 218 LBS | RESPIRATION RATE: 20 BRPM | SYSTOLIC BLOOD PRESSURE: 151 MMHG | BODY MASS INDEX: 31.21 KG/M2 | TEMPERATURE: 98.1 F

## 2020-11-10 DIAGNOSIS — I48.0 PAROXYSMAL ATRIAL FIBRILLATION (HCC): Primary | ICD-10-CM

## 2020-11-10 DIAGNOSIS — Z23 ENCOUNTER FOR IMMUNIZATION: ICD-10-CM

## 2020-11-10 DIAGNOSIS — E78.2 MIXED HYPERLIPIDEMIA: ICD-10-CM

## 2020-11-10 DIAGNOSIS — I10 ESSENTIAL HYPERTENSION: ICD-10-CM

## 2020-11-10 DIAGNOSIS — N18.31 STAGE 3A CHRONIC KIDNEY DISEASE (HCC): ICD-10-CM

## 2020-11-10 DIAGNOSIS — E11.9 TYPE 2 DIABETES MELLITUS WITHOUT COMPLICATION, WITHOUT LONG-TERM CURRENT USE OF INSULIN (HCC): ICD-10-CM

## 2020-11-10 DIAGNOSIS — K59.09 CHRONIC CONSTIPATION: ICD-10-CM

## 2020-11-10 DIAGNOSIS — I73.9 PERIPHERAL VASCULAR DISEASE (HCC): ICD-10-CM

## 2020-11-10 DIAGNOSIS — L03.032 CELLULITIS OF TOE OF LEFT FOOT: ICD-10-CM

## 2020-11-10 DIAGNOSIS — K21.9 GASTROESOPHAGEAL REFLUX DISEASE WITHOUT ESOPHAGITIS: ICD-10-CM

## 2020-11-10 PROCEDURE — G0009 ADMIN PNEUMOCOCCAL VACCINE: HCPCS | Performed by: FAMILY MEDICINE

## 2020-11-10 PROCEDURE — 99214 OFFICE O/P EST MOD 30 MIN: CPT | Performed by: FAMILY MEDICINE

## 2020-11-10 PROCEDURE — 90732 PPSV23 VACC 2 YRS+ SUBQ/IM: CPT | Performed by: FAMILY MEDICINE

## 2020-11-10 RX ORDER — OFLOXACIN 3 MG/ML
SOLUTION/ DROPS OPHTHALMIC
COMMUNITY
Start: 2020-08-06 | End: 2020-12-30

## 2020-11-10 RX ORDER — DUREZOL 0.5 MG/ML
1 EMULSION OPHTHALMIC 2 TIMES DAILY
COMMUNITY
Start: 2020-08-06 | End: 2022-03-16

## 2020-11-10 RX ORDER — BLOOD GLUCOSE CONTROL HIGH,LOW
EACH MISCELLANEOUS
COMMUNITY
Start: 2020-08-04 | End: 2021-07-09

## 2020-11-10 NOTE — PROGRESS NOTES
Ron Fisher is a 82 y.o. male who presents today for the following:  Chief Complaint   Patient presents with    Wound Infection     States has sore with little pus in it left great toe for about 2 weeks.  Immunization/Injection     Wants Pneumonia injection. ,     ICD-10-CM ICD-9-CM    1. Paroxysmal atrial fibrillation (HCC)  I48.0 427.31    2. Essential hypertension  I10 401.9 CBC WITH AUTOMATED DIFF      METABOLIC PANEL, COMPREHENSIVE      LIPID PANEL   3. Peripheral vascular disease (HCC)  I73.9 443.9    4. Gastroesophageal reflux disease without esophagitis  K21.9 530.81    5. Chronic constipation  K59.09 564.00    6. Stage 3a chronic kidney disease  N18.31 585.3    7. Type 2 diabetes mellitus without complication, without long-term current use of insulin (HCC)  E11.9 250.00 HEMOGLOBIN A1C WITH EAG      MICROALBUMIN, UR, RAND W/ MICROALB/CREAT RATIO      URINALYSIS W/ RFLX MICROSCOPIC   8. Mixed hyperlipidemia  E78.2 272.2    9. Cellulitis of toe of left foot  L03.032 681.10    10. Encounter for immunization  Z23 V03.89 PNEUMOCOCCAL POLYSACCHARIDE VACCINE, 23-VALENT, ADULT OR IMMUNOSUPPRESSED PT DOSE,    . This patient comes in for follow up of his hypertension, PVD, hyperlipidemia, CKD, DM, and constipation. He has some pus coming from his left big toe. He does see a vascular surgeon in [facility] who is checking him on a regular basis. He does have decreased circulation and has a history of having his right big toe removed because of gangrene. No Known Allergies    Current Outpatient Medications   Medication Sig    magnesium oxide (MAG-OX) 400 mg tablet TAKE 1 TABLET EVERY DAY    lancets (Accu-Chek Softclix Lancets) misc TEST BLOOD SUGAR TWICE A DAY    BD Single Use Swabs Regular padm TEST BLOOD SUGAR TWICE DAILY    amLODIPine (NORVASC) 10 mg tablet TAKE 1 TABLET EVERY DAY (Patient taking differently: Take 10 mg by mouth daily.  Pt. States he is taking 1/2 (5mg.) every day.)    glipiZIDE (GLUCOTROL) 5 mg tablet TAKE 1 TABLET EVERY DAY *PATIENT NEEDS APPOINTMENT BEFORE ANY FURTHER REFILLS*    lactulose (CHRONULAC) 10 gram/15 mL solution TAKE 30ML TWICE A DAY AS NEEDED     dorzolamide-timoloL (COSOPT) 22.3-6.8 mg/mL ophthalmic solution Administer 1 Drop to both eyes two (2) times a day.  pravastatin (PRAVACHOL) 80 mg tablet Take 1 Tab by mouth nightly. Indications: excessive fat in the blood    Accu-Chek Ratna Plus Meter misc USE TO CHECK BLOOD SUGAR TWICE A DAY    Accu-Chek Ratna Plus test strp strip USE TO CHECK BLOOD SUGAR TWICE A DAY    acetaminophen (TYLENOL) 325 mg tablet Take 650 mg by mouth every six (6) hours as needed for Pain.  docusate sodium (DOK) 100 mg capsule Take 100 mg by mouth daily.  latanoprost (XALATAN) 0.005 % ophthalmic solution Administer 1 Drop to both eyes nightly.  hydroCHLOROthiazide (HYDRODIURIL) 25 mg tablet Take 25 mg by mouth daily.  omeprazole (PRILOSEC) 40 mg capsule Take 40 mg by mouth daily.  senna-docusate (STOOL SOFTENER-LAXATIVE) 8.6-50 mg per tablet Take 1 Tab by mouth two (2) times a day.  metoprolol succinate (TOPROL-XL) 25 mg XL tablet Take 25 mg by mouth daily.  aspirin delayed-release 81 mg tablet Take 81 mg by mouth daily.  Accu-Chek Ratna Control Soln soln     DurezoL 0.05 % ophthalmic emulsion INSTILL 1 DROP INTO LEFT EYE THREE TIMES DAILY STARTING THE DAY AFTER SURGERY    ofloxacin (FLOXIN) 0.3 % ophthalmic solution INSTILL 1 DROP INTO LEFT EYE THREE TIMES DAILY STARTING THE DAY AFTER SURGERY    brimonidine (ALPHAGAN) 0.2 % ophthalmic solution Administer 1 Drop to both eyes every eight (8) hours. No current facility-administered medications for this visit.         Past Medical History:   Diagnosis Date    Anemia 4/26/2017    Anxiety     Arrhythmia     A FIB    Atherosclerosis of artery of extremity with rest pain (HCC)     [facility] 7/21/2020    Benign prostatic hyperplasia 4/26/2017    Cancer ([facility]     GASTRIC    Chronic kidney disease     Chronic obstructive pulmonary disease (HCC)     Depression     Diabetes (HCC)     BORDERLINE, NO MEDS.  Diverticulosis of colon     Dyslipidemia 4/26/2017    GERD (gastroesophageal reflux disease)     Glaucoma     History of CVA (cerebrovascular accident) 7/21/2020    Hypercholesteremia     Hypertension     Hypomagnesemia 7/13/2020    Nocturia 4/26/2017    PUD (peptic ulcer disease)     GI BLEEDING    [facility]VD [facility]peripheral vascular disease) [facility]     Rectal hemorrhage 4/26/2017    Strabismus     Stroke ([facility] 2000 APPROX.     SOME VISUAL DEFICIT    Type 2 diabetes mellitus without complications ([facility] 2/89/9817    Venous stasis 4/26/2017       Past Surgical History:   Procedure Laterality Date    HX AMPUTATION TOE Right     HX GI  1998    PARTIAL GASTRECTOMY ( DR ALVIN ALBA)    HX HEART CATHETERIZATION      HX ORTHOPAEDIC Left 1980    KNEE CARTILAGE    HX OTHER SURGICAL      LEFT HAND SKIN GRAFT (BURN) DONOR RIGHT THIGH    HX PACEMAKER  [id number]    DR Damon Dobson; WATCHMAN PROCEDURE       Family History   Problem Relation Age of Onset    Stroke Mother     Stroke Father     Cancer Brother         PROSTATE    Anesth Problems Neg Hx        Social History     Socioeconomic History    Marital status:      Spouse name: Not on file    Number of children: Not on file    Years of education: Not on file    Highest education level: Not on file   Occupational History    Not on file   Social Needs    Financial resource strain: Not on file    Food insecurity     Worry: Not on file     Inability: Not on file    Transportation needs     Medical: Not on file     Non-medical: Not on file   Tobacco Use    Smoking status: Former Smoker    Smokeless tobacco: Never Used   Substance and Sexual Activity    Alcohol use: No    Drug use: No    Sexual activity: Not on file   Lifestyle    Physical activity     Days per week: Not on file     Minutes per session: Not on file    Stress: Not on file   Relationships    Social connections     Talks on phone: Not on file     Gets together: Not on file     Attends Jain service: Not on file     Active member of club or organization: Not on file     Attends meetings of clubs or organizations: Not on file     Relationship status: Not on file    Intimate partner violence     Fear of current or ex partner: Not on file     Emotionally abused: Not on file     Physically abused: Not on file     Forced sexual activity: Not on file   Other Topics Concern    Not on file   Social History Narrative    Not on file         Review of Systems   Constitutional: Negative. Respiratory: Negative. Cardiovascular: Negative. Sees cardiologist   Gastrointestinal: Negative. Genitourinary: Negative. Musculoskeletal: Negative. Neurological: Positive for tingling. Endo/Heme/Allergies:        He does check his sugars. 120 +/-   Psychiatric/Behavioral: Negative. All other systems reviewed and are negative. Visit Vitals  BP (!) 151/81 (BP 1 Location: Right arm, BP Patient Position: Sitting)   Pulse 64   Temp 98.1 °F (36.7 °C) (Skin)   Resp 20   Ht 5' 10\" (1.778 m)   Wt 218 lb (98.9 kg)   SpO2 96%   BMI 31.28 kg/m²       Physical Exam  Vitals signs and nursing note reviewed. Constitutional:       Appearance: Normal appearance. He is normal weight. HENT:      Head: Normocephalic and atraumatic. Neck:      Musculoskeletal: Normal range of motion and neck supple. Cardiovascular:      Rate and Rhythm: Normal rate and regular rhythm. Pulses: Normal pulses. Heart sounds: Normal heart sounds. Pulmonary:      Effort: Pulmonary effort is normal.      Breath sounds: Normal breath sounds. Abdominal:      General: Abdomen is flat. Bowel sounds are normal.      Palpations: Abdomen is soft. Musculoskeletal: Normal range of motion.          General: Swelling (Left big toe) and tenderness (Dorsal aspect of left big toe) present. Feet:    Skin:     General: Skin is warm and dry. Neurological:      General: No focal deficit present. Mental Status: He is alert. Psychiatric:         Mood and Affect: Mood normal.         Behavior: Behavior normal.         Thought Content: Thought content normal.         Judgment: Judgment normal.            ICD-10-CM ICD-9-CM    1. Paroxysmal atrial fibrillation (HCC)  I48.0 427.31    2. Essential hypertension  I10 401.9 CBC WITH AUTOMATED DIFF      METABOLIC PANEL, COMPREHENSIVE      LIPID PANEL   3. Peripheral vascular disease (HCC)  I73.9 443.9    4. Gastroesophageal reflux disease without esophagitis  K21.9 530.81    5. Chronic constipation  K59.09 564.00    6. Stage 3a chronic kidney disease  N18.31 585.3    7. Type 2 diabetes mellitus without complication, without long-term current use of insulin ([facility])  E11.9 250.00 HEMOGLOBIN A1C WITH EAG      MICROALBUMIN, UR, RAND W/ MICROALB/CREAT RATIO      URINALYSIS W/ RFLX MICROSCOPIC   8. Mixed hyperlipidemia  E78.2 272.2    9. Cellulitis of toe of left foot  L03.032 681.10    10. Encounter for immunization  Z23 V03.89 PNEUMOCOCCAL POLYSACCHARIDE VACCINE, 23-VALENT, ADULT OR IMMUNOSUPPRESSED PT DOSE,        1. Paroxysmal atrial fibrillation (HCC)  This has been stable and currently he seems to be in sinus rhythm. He does follow with a cardiologist.    2. Essential hypertension  Blood pressure slightly up today but otherwise is controlled. He is taking his medication without difficulty  - CBC WITH AUTOMATED DIFF  - METABOLIC PANEL, COMPREHENSIVE  - LIPID PANEL    3. Peripheral vascular disease [facility])  He sees a vascular surgeon and [place] and his daughter will make an appointment for him to address the left foot and to make sure he is not developing worsening problems. 4. Gastroesophageal reflux disease without esophagitis      5. Chronic constipation  This has resolved on medication    6.  Stage 3a chronic kidney disease  We will check labs and see how he is doing with this    7. Type 2 diabetes mellitus without complication, without long-term current use of insulin (HCC)  We will check labs to see how he is doing. He does check his sugars and apparently is doing okay  - HEMOGLOBIN A1C WITH EAG  - MICROALBUMIN, UR, RAND W/ MICROALB/CREAT RATIO  - URINALYSIS W/ RFLX MICROSCOPIC    8. Mixed hyperlipidemia      9. Cellulitis of toe of left foot  No evidence of gangrene at this time. Hopefully this is just a ingrown toenail infection. We will start him on cephalexin and they will continue to soak in Epson salt. His daughter will take him to his vascular surgeon in [address] for a checkup.     10. Encounter for immunization    - PNEUMOCOCCAL POLYSACCHARIDE VACCINE, 23-VALENT, ADULT OR IMMUNOSUPPRESSED PT DOSE,

## 2020-11-11 LAB
ALBUMIN SERPL-MCNC: 4.1 G/DL (ref 3.6–4.6)
ALBUMIN/CREAT UR: 127 MG/G CREAT (ref 0–29)
ALBUMIN/GLOB SERPL: 1.2 {RATIO} (ref 1.2–2.2)
ALP SERPL-CCNC: 86 IU/L (ref 39–117)
ALT SERPL-CCNC: 27 IU/L (ref 0–44)
APPEARANCE UR: CLEAR
AST SERPL-CCNC: 31 IU/L (ref 0–40)
BACTERIA #/AREA URNS HPF: NORMAL /[HPF]
BASOPHILS # BLD AUTO: 0 X10E3/UL (ref 0–0.2)
BASOPHILS NFR BLD AUTO: 0 %
BILIRUB SERPL-MCNC: 0.3 MG/DL (ref 0–1.2)
BILIRUB UR QL STRIP: NEGATIVE
BUN SERPL-MCNC: 19 MG/DL (ref 8–27)
BUN/CREAT SERPL: 13 (ref 10–24)
CALCIUM SERPL-MCNC: 9.2 MG/DL (ref 8.6–10.2)
CASTS URNS QL MICRO: NORMAL /LPF
CHLORIDE SERPL-SCNC: 101 MMOL/L (ref 96–106)
CHOLEST SERPL-MCNC: 124 MG/DL (ref 100–199)
CO2 SERPL-SCNC: 23 MMOL/L (ref 20–29)
COLOR UR: YELLOW
CREAT SERPL-MCNC: 1.52 MG/DL (ref 0.76–1.27)
CREAT UR-MCNC: 193.1 MG/DL
EOSINOPHIL # BLD AUTO: 0.1 X10E3/UL (ref 0–0.4)
EOSINOPHIL NFR BLD AUTO: 2 %
EPI CELLS #/AREA URNS HPF: NORMAL /HPF (ref 0–10)
ERYTHROCYTE [DISTWIDTH] IN BLOOD BY AUTOMATED COUNT: 12.7 % (ref 11.6–15.4)
EST. AVERAGE GLUCOSE BLD GHB EST-MCNC: 134 MG/DL
GLOBULIN SER CALC-MCNC: 3.3 G/DL (ref 1.5–4.5)
GLUCOSE SERPL-MCNC: 84 MG/DL (ref 65–99)
GLUCOSE UR QL: NEGATIVE
HBA1C MFR BLD: 6.3 % (ref 4.8–5.6)
HCT VFR BLD AUTO: 42.1 % (ref 37.5–51)
HDLC SERPL-MCNC: 34 MG/DL
HGB BLD-MCNC: 13.4 G/DL (ref 13–17.7)
HGB UR QL STRIP: NEGATIVE
IMM GRANULOCYTES # BLD AUTO: 0 X10E3/UL (ref 0–0.1)
IMM GRANULOCYTES NFR BLD AUTO: 0 %
KETONES UR QL STRIP: NEGATIVE
LDLC SERPL CALC-MCNC: 68 MG/DL (ref 0–99)
LEUKOCYTE ESTERASE UR QL STRIP: NEGATIVE
LYMPHOCYTES # BLD AUTO: 1.5 X10E3/UL (ref 0.7–3.1)
LYMPHOCYTES NFR BLD AUTO: 29 %
MCH RBC QN AUTO: 29.6 PG (ref 26.6–33)
MCHC RBC AUTO-ENTMCNC: 31.8 G/DL (ref 31.5–35.7)
MCV RBC AUTO: 93 FL (ref 79–97)
MICRO URNS: ABNORMAL
MICROALBUMIN UR-MCNC: 245.7 UG/ML
MONOCYTES # BLD AUTO: 0.6 X10E3/UL (ref 0.1–0.9)
MONOCYTES NFR BLD AUTO: 11 %
MUCOUS THREADS URNS QL MICRO: PRESENT
NEUTROPHILS # BLD AUTO: 2.9 X10E3/UL (ref 1.4–7)
NEUTROPHILS NFR BLD AUTO: 58 %
NITRITE UR QL STRIP: NEGATIVE
PH UR STRIP: 5.5 [PH] (ref 5–7.5)
PLATELET # BLD AUTO: 251 X10E3/UL (ref 150–450)
POTASSIUM SERPL-SCNC: 4.2 MMOL/L (ref 3.5–5.2)
PROT SERPL-MCNC: 7.4 G/DL (ref 6–8.5)
PROT UR QL STRIP: ABNORMAL
RBC # BLD AUTO: 4.53 X10E6/UL (ref 4.14–5.8)
RBC #/AREA URNS HPF: NORMAL /HPF (ref 0–2)
SODIUM SERPL-SCNC: 139 MMOL/L (ref 134–144)
SP GR UR: 1.02 (ref 1–1.03)
TRIGL SERPL-MCNC: 119 MG/DL (ref 0–149)
UROBILINOGEN UR STRIP-MCNC: 1 MG/DL (ref 0.2–1)
VLDLC SERPL CALC-MCNC: 22 MG/DL (ref 5–40)
WBC # BLD AUTO: 5.1 X10E3/UL (ref 3.4–10.8)
WBC #/AREA URNS HPF: NORMAL /HPF (ref 0–5)

## 2020-11-11 RX ORDER — CEPHALEXIN 500 MG/1
500 CAPSULE ORAL 4 TIMES DAILY
Qty: 40 CAP | Refills: 0 | Status: SHIPPED | OUTPATIENT
Start: 2020-11-11 | End: 2020-11-21

## 2020-11-12 ENCOUNTER — TELEPHONE (OUTPATIENT)
Dept: PRIMARY CARE CLINIC | Age: 85
End: 2020-11-12

## 2020-11-12 NOTE — TELEPHONE ENCOUNTER
Attempted to contact patient's daughter re: lab results, lvm for Donta Mittal to please return call.

## 2020-11-16 ENCOUNTER — TELEPHONE (OUTPATIENT)
Dept: PRIMARY CARE CLINIC | Age: 85
End: 2020-11-16

## 2020-11-16 NOTE — TELEPHONE ENCOUNTER
----- Message from Eunice Connelly MD sent at 11/11/2020  4:48 PM EST -----  Inform the patient that his lab results were normal except for the following:  Inform  his daughter that his labs looked okay. His sugar is controlled and his cholesterol numbers look okay. His kidney function is slightly decreased from the labs we have from last year. Keeping his blood pressure under good control and his sugar under good control would help decrease the chance that the kidney function may change. I would recommend a fasting office visit in 3 months to follow-up on the kidneys. We can send a copy of this lab to his vascular doctor in [place].

## 2020-11-19 ENCOUNTER — TELEPHONE (OUTPATIENT)
Dept: PRIMARY CARE CLINIC | Age: 85
End: 2020-11-19

## 2020-11-19 NOTE — TELEPHONE ENCOUNTER
----- Message from Rosana Bernheim, MD sent at 11/11/2020  4:48 PM EST -----  Inform the patient that his lab results were normal except for the following:  Inform  his daughter that his labs looked okay. His sugar is controlled and his cholesterol numbers look okay. His kidney function is slightly decreased from the labs we have from last year. Keeping his blood pressure under good control and his sugar under good control would help decrease the chance that the kidney function may change. I would recommend a fasting office visit in 3 months to follow-up on the kidneys. We can send a copy of this lab to his vascular doctor in [place].

## 2020-12-08 NOTE — PERIOP NOTES
PATIENT'S Luann Sifuentes, CONTACTED ME TO SET UP COVID TEST APPOINTMENT FOR 12/31/20. UNSURE OF HOLIDAY HOURS FOR COVID TESTING AND INFORMED HER WE WOULD CALL HER BACK WITH THAT INFORMATION. HER NUMBER IS: [phone].

## 2020-12-17 ENCOUNTER — TRANSCRIBE ORDER (OUTPATIENT)
Dept: REGISTRATION | Age: 85
End: 2020-12-17

## 2020-12-17 DIAGNOSIS — Z01.812 PRE-PROCEDURE LAB EXAM: Primary | ICD-10-CM

## 2020-12-30 ENCOUNTER — HOSPITAL ENCOUNTER (OUTPATIENT)
Dept: PREADMISSION TESTING | Age: 85
Discharge: HOME | End: 2020-12-30
Payer: MEDICARE

## 2020-12-30 ENCOUNTER — HOSPITAL ENCOUNTER (OUTPATIENT)
Dept: GENERAL RADIOLOGY | Age: 85
Discharge: HOME | End: 2020-12-30
Payer: MEDICARE

## 2020-12-30 VITALS
WEIGHT: 220 LBS | SYSTOLIC BLOOD PRESSURE: 159 MMHG | TEMPERATURE: 97.8 F | HEART RATE: 69 BPM | OXYGEN SATURATION: 96 % | DIASTOLIC BLOOD PRESSURE: 80 MMHG | HEIGHT: 68 IN | BODY MASS INDEX: 33.34 KG/M2

## 2020-12-30 DIAGNOSIS — Z01.812 PRE-PROCEDURE LAB EXAM: ICD-10-CM

## 2020-12-30 LAB
ALBUMIN SERPL-MCNC: 2.6 G/DL (ref 3.5–5)
ALBUMIN/GLOB SERPL: 0.5 {RATIO} (ref 1.1–2.2)
ALP SERPL-CCNC: 72 U/L (ref 45–117)
ALT SERPL-CCNC: 12 U/L (ref 12–78)
ANION GAP SERPL CALC-SCNC: 7 MMOL/L (ref 5–15)
APTT PPP: 30.3 SEC (ref 22.1–31)
AST SERPL-CCNC: 22 U/L (ref 15–37)
ATRIAL RATE: 60 BPM
BASOPHILS # BLD: 0 K/UL (ref 0–0.1)
BASOPHILS NFR BLD: 0 % (ref 0–1)
BILIRUB SERPL-MCNC: 0.5 MG/DL (ref 0.2–1)
BUN SERPL-MCNC: 30 MG/DL (ref 6–20)
BUN/CREAT SERPL: 19 (ref 12–20)
CALCIUM SERPL-MCNC: 9.3 MG/DL (ref 8.5–10.1)
CALCULATED R AXIS, ECG10: 64 DEGREES
CALCULATED T AXIS, ECG11: -75 DEGREES
CHLORIDE SERPL-SCNC: 103 MMOL/L (ref 97–108)
CO2 SERPL-SCNC: 29 MMOL/L (ref 21–32)
CREAT SERPL-MCNC: 1.58 MG/DL (ref 0.7–1.3)
DIAGNOSIS, 93000: NORMAL
DIFFERENTIAL METHOD BLD: ABNORMAL
EOSINOPHIL # BLD: 0 K/UL (ref 0–0.4)
EOSINOPHIL NFR BLD: 0 % (ref 0–7)
ERYTHROCYTE [DISTWIDTH] IN BLOOD BY AUTOMATED COUNT: 13.2 % (ref 11.5–14.5)
GLOBULIN SER CALC-MCNC: 5.4 G/DL (ref 2–4)
GLUCOSE SERPL-MCNC: 155 MG/DL (ref 65–100)
HCT VFR BLD AUTO: 39.8 % (ref 36.6–50.3)
HGB BLD-MCNC: 12.3 G/DL (ref 12.1–17)
IMM GRANULOCYTES # BLD AUTO: 0.1 K/UL (ref 0–0.04)
IMM GRANULOCYTES NFR BLD AUTO: 1 % (ref 0–0.5)
INR PPP: 1.1 (ref 0.9–1.1)
LYMPHOCYTES # BLD: 0.6 K/UL (ref 0.8–3.5)
LYMPHOCYTES NFR BLD: 8 % (ref 12–49)
MCH RBC QN AUTO: 28.4 PG (ref 26–34)
MCHC RBC AUTO-ENTMCNC: 30.9 G/DL (ref 30–36.5)
MCV RBC AUTO: 91.9 FL (ref 80–99)
MONOCYTES # BLD: 0.5 K/UL (ref 0–1)
MONOCYTES NFR BLD: 7 % (ref 5–13)
NEUTS SEG # BLD: 6.5 K/UL (ref 1.8–8)
NEUTS SEG NFR BLD: 84 % (ref 32–75)
NRBC # BLD: 0 K/UL (ref 0–0.01)
NRBC BLD-RTO: 0 PER 100 WBC
PLATELET # BLD AUTO: 357 K/UL (ref 150–400)
PMV BLD AUTO: 10.2 FL (ref 8.9–12.9)
POTASSIUM SERPL-SCNC: 3.7 MMOL/L (ref 3.5–5.1)
PROT SERPL-MCNC: 8 G/DL (ref 6.4–8.2)
PROTHROMBIN TIME: 11.8 SEC (ref 9–11.1)
Q-T INTERVAL, ECG07: 498 MS
QRS DURATION, ECG06: 130 MS
QTC CALCULATION (BEZET), ECG08: 509 MS
RBC # BLD AUTO: 4.33 M/UL (ref 4.1–5.7)
RBC MORPH BLD: ABNORMAL
SODIUM SERPL-SCNC: 139 MMOL/L (ref 136–145)
THERAPEUTIC RANGE,PTTT: NORMAL SECS (ref 58–77)
VENTRICULAR RATE, ECG03: 63 BPM
WBC # BLD AUTO: 7.7 K/UL (ref 4.1–11.1)

## 2020-12-30 PROCEDURE — 85730 THROMBOPLASTIN TIME PARTIAL: CPT

## 2020-12-30 PROCEDURE — 36415 COLL VENOUS BLD VENIPUNCTURE: CPT

## 2020-12-30 PROCEDURE — 71046 X-RAY EXAM CHEST 2 VIEWS: CPT

## 2020-12-30 PROCEDURE — 87635 SARS-COV-2 COVID-19 AMP PRB: CPT

## 2020-12-30 PROCEDURE — 80053 COMPREHEN METABOLIC PANEL: CPT

## 2020-12-30 PROCEDURE — 93005 ELECTROCARDIOGRAM TRACING: CPT

## 2020-12-30 PROCEDURE — 85025 COMPLETE CBC W/AUTO DIFF WBC: CPT

## 2020-12-30 PROCEDURE — 85610 PROTHROMBIN TIME: CPT

## 2020-12-30 NOTE — PERIOP NOTES
PAT instructions reviewed with patient and family; and given the opportunity to ask questions. Patient given surgical site information FAQs handout and reviewed. Patient given two bottles CHG soap and instruction sheet, instructions for use reviewed with patient. Patient made aware of COVID 19 testing to be done 96  hours prior to surgery. Patient instructed to self quarantine between testing and arrival time day of surgery. Patient instructed re: check-in procedure for day of surgery.

## 2021-01-01 LAB — SARS-COV-2, COV2NT: NOT DETECTED

## 2021-01-02 ENCOUNTER — ANESTHESIA EVENT (OUTPATIENT)
Dept: SURGERY | Age: 86
DRG: 240 | End: 2021-01-02
Payer: MEDICARE

## 2021-01-04 ENCOUNTER — ANESTHESIA (OUTPATIENT)
Dept: SURGERY | Age: 86
DRG: 240 | End: 2021-01-04
Payer: MEDICARE

## 2021-01-04 ENCOUNTER — HOSPITAL ENCOUNTER (INPATIENT)
Age: 86
LOS: 9 days | Discharge: NURSING, REHAB OR LONG-TERM CARE | DRG: 240 | End: 2021-01-13
Payer: MEDICARE

## 2021-01-04 DIAGNOSIS — I70.245 ATHEROSCLEROSIS OF NATIVE ARTERY OF LEFT LOWER EXTREMITY WITH ULCERATION OF OTHER PART OF FOOT (HCC): Primary | ICD-10-CM

## 2021-01-04 PROBLEM — I70.209 ATHEROSCLEROTIC PVD WITH ULCERATION (HCC): Status: ACTIVE | Noted: 2021-01-04

## 2021-01-04 PROBLEM — L98.499 ATHEROSCLEROTIC PVD WITH ULCERATION (HCC): Status: ACTIVE | Noted: 2021-01-04

## 2021-01-04 LAB
GLUCOSE BLD STRIP.AUTO-MCNC: 103 MG/DL (ref 65–100)
GLUCOSE BLD STRIP.AUTO-MCNC: 144 MG/DL (ref 65–100)
GLUCOSE BLD STRIP.AUTO-MCNC: 85 MG/DL (ref 65–100)
GLUCOSE BLD STRIP.AUTO-MCNC: 88 MG/DL (ref 65–100)
SERVICE CMNT-IMP: ABNORMAL
SERVICE CMNT-IMP: ABNORMAL
SERVICE CMNT-IMP: NORMAL
SERVICE CMNT-IMP: NORMAL

## 2021-01-04 PROCEDURE — 77030040922 HC BLNKT HYPOTHRM STRY -A

## 2021-01-04 PROCEDURE — 74011250636 HC RX REV CODE- 250/636: Performed by: NURSE ANESTHETIST, CERTIFIED REGISTERED

## 2021-01-04 PROCEDURE — 74011000250 HC RX REV CODE- 250: Performed by: NURSE ANESTHETIST, CERTIFIED REGISTERED

## 2021-01-04 PROCEDURE — 74011250636 HC RX REV CODE- 250/636

## 2021-01-04 PROCEDURE — 74011250637 HC RX REV CODE- 250/637: Performed by: SURGERY

## 2021-01-04 PROCEDURE — 76060000032 HC ANESTHESIA 0.5 TO 1 HR

## 2021-01-04 PROCEDURE — 51798 US URINE CAPACITY MEASURE: CPT

## 2021-01-04 PROCEDURE — 0Y6N0Z9 DETACHMENT AT LEFT FOOT, PARTIAL 1ST RAY, OPEN APPROACH: ICD-10-PCS

## 2021-01-04 PROCEDURE — 74011000250 HC RX REV CODE- 250

## 2021-01-04 PROCEDURE — 77030018836 HC SOL IRR NACL ICUM -A

## 2021-01-04 PROCEDURE — 77030042556 HC PNCL CAUT -B

## 2021-01-04 PROCEDURE — 87077 CULTURE AEROBIC IDENTIFY: CPT

## 2021-01-04 PROCEDURE — 74011250637 HC RX REV CODE- 250/637

## 2021-01-04 PROCEDURE — 65270000032 HC RM SEMIPRIVATE

## 2021-01-04 PROCEDURE — 82962 GLUCOSE BLOOD TEST: CPT

## 2021-01-04 PROCEDURE — 87075 CULTR BACTERIA EXCEPT BLOOD: CPT

## 2021-01-04 PROCEDURE — 77030010509 HC AIRWY LMA MSK TELE -A: Performed by: ANESTHESIOLOGY

## 2021-01-04 PROCEDURE — 2709999900 HC NON-CHARGEABLE SUPPLY

## 2021-01-04 PROCEDURE — 88307 TISSUE EXAM BY PATHOLOGIST: CPT

## 2021-01-04 PROCEDURE — 76210000016 HC OR PH I REC 1 TO 1.5 HR

## 2021-01-04 PROCEDURE — 74011250636 HC RX REV CODE- 250/636: Performed by: ANESTHESIOLOGY

## 2021-01-04 PROCEDURE — 0Y6N0ZC DETACHMENT AT LEFT FOOT, PARTIAL 3RD RAY, OPEN APPROACH: ICD-10-PCS

## 2021-01-04 PROCEDURE — 76010000138 HC OR TIME 0.5 TO 1 HR

## 2021-01-04 PROCEDURE — 87186 SC STD MICRODIL/AGAR DIL: CPT

## 2021-01-04 PROCEDURE — 74011250637 HC RX REV CODE- 250/637: Performed by: ANESTHESIOLOGY

## 2021-01-04 PROCEDURE — 0Y6N0ZB DETACHMENT AT LEFT FOOT, PARTIAL 2ND RAY, OPEN APPROACH: ICD-10-PCS

## 2021-01-04 PROCEDURE — 87205 SMEAR GRAM STAIN: CPT

## 2021-01-04 PROCEDURE — 74011000258 HC RX REV CODE- 258

## 2021-01-04 PROCEDURE — 88311 DECALCIFY TISSUE: CPT

## 2021-01-04 RX ORDER — METOPROLOL SUCCINATE 25 MG/1
25 TABLET, EXTENDED RELEASE ORAL DAILY
Status: DISCONTINUED | OUTPATIENT
Start: 2021-01-05 | End: 2021-01-13 | Stop reason: HOSPADM

## 2021-01-04 RX ORDER — SODIUM CHLORIDE 0.9 % (FLUSH) 0.9 %
5-40 SYRINGE (ML) INJECTION EVERY 8 HOURS
Status: DISCONTINUED | OUTPATIENT
Start: 2021-01-04 | End: 2021-01-13 | Stop reason: HOSPADM

## 2021-01-04 RX ORDER — AMOXICILLIN 250 MG
1 CAPSULE ORAL 2 TIMES DAILY
Status: DISCONTINUED | OUTPATIENT
Start: 2021-01-04 | End: 2021-01-13 | Stop reason: HOSPADM

## 2021-01-04 RX ORDER — SODIUM CHLORIDE, SODIUM LACTATE, POTASSIUM CHLORIDE, CALCIUM CHLORIDE 600; 310; 30; 20 MG/100ML; MG/100ML; MG/100ML; MG/100ML
50 INJECTION, SOLUTION INTRAVENOUS CONTINUOUS
Status: DISCONTINUED | OUTPATIENT
Start: 2021-01-04 | End: 2021-01-04 | Stop reason: HOSPADM

## 2021-01-04 RX ORDER — HYDROCHLOROTHIAZIDE 25 MG/1
25 TABLET ORAL
Status: COMPLETED | OUTPATIENT
Start: 2021-01-04 | End: 2021-01-04

## 2021-01-04 RX ORDER — FENTANYL CITRATE 50 UG/ML
50 INJECTION, SOLUTION INTRAMUSCULAR; INTRAVENOUS AS NEEDED
Status: DISCONTINUED | OUTPATIENT
Start: 2021-01-04 | End: 2021-01-04 | Stop reason: HOSPADM

## 2021-01-04 RX ORDER — VANCOMYCIN 2 GRAM/500 ML IN 0.9 % SODIUM CHLORIDE INTRAVENOUS
2000 ONCE
Status: COMPLETED | OUTPATIENT
Start: 2021-01-04 | End: 2021-01-04

## 2021-01-04 RX ORDER — PROPOFOL 10 MG/ML
INJECTION, EMULSION INTRAVENOUS AS NEEDED
Status: DISCONTINUED | OUTPATIENT
Start: 2021-01-04 | End: 2021-01-04 | Stop reason: HOSPADM

## 2021-01-04 RX ORDER — HYDROCHLOROTHIAZIDE 25 MG/1
25 TABLET ORAL DAILY
Status: DISCONTINUED | OUTPATIENT
Start: 2021-01-05 | End: 2021-01-13 | Stop reason: HOSPADM

## 2021-01-04 RX ORDER — AMLODIPINE BESYLATE 5 MG/1
5 TABLET ORAL
Status: COMPLETED | OUTPATIENT
Start: 2021-01-04 | End: 2021-01-04

## 2021-01-04 RX ORDER — KETAMINE HYDROCHLORIDE 10 MG/ML
INJECTION, SOLUTION INTRAMUSCULAR; INTRAVENOUS AS NEEDED
Status: DISCONTINUED | OUTPATIENT
Start: 2021-01-04 | End: 2021-01-04 | Stop reason: HOSPADM

## 2021-01-04 RX ORDER — PANTOPRAZOLE SODIUM 40 MG/1
40 TABLET, DELAYED RELEASE ORAL
Status: DISCONTINUED | OUTPATIENT
Start: 2021-01-05 | End: 2021-01-13 | Stop reason: HOSPADM

## 2021-01-04 RX ORDER — TIMOLOL MALEATE 5 MG/ML
1 SOLUTION/ DROPS OPHTHALMIC 2 TIMES DAILY
Status: DISCONTINUED | OUTPATIENT
Start: 2021-01-04 | End: 2021-01-13 | Stop reason: HOSPADM

## 2021-01-04 RX ORDER — DOCUSATE SODIUM 100 MG/1
100 CAPSULE, LIQUID FILLED ORAL DAILY
Status: DISCONTINUED | OUTPATIENT
Start: 2021-01-05 | End: 2021-01-13 | Stop reason: HOSPADM

## 2021-01-04 RX ORDER — HYDROMORPHONE HYDROCHLORIDE 1 MG/ML
0.2 INJECTION, SOLUTION INTRAMUSCULAR; INTRAVENOUS; SUBCUTANEOUS
Status: DISCONTINUED | OUTPATIENT
Start: 2021-01-04 | End: 2021-01-04 | Stop reason: HOSPADM

## 2021-01-04 RX ORDER — PRAVASTATIN SODIUM 40 MG/1
80 TABLET ORAL
Status: DISCONTINUED | OUTPATIENT
Start: 2021-01-04 | End: 2021-01-13 | Stop reason: HOSPADM

## 2021-01-04 RX ORDER — SODIUM CHLORIDE 9 MG/ML
75 INJECTION, SOLUTION INTRAVENOUS CONTINUOUS
Status: DISPENSED | OUTPATIENT
Start: 2021-01-04 | End: 2021-01-05

## 2021-01-04 RX ORDER — ONDANSETRON 2 MG/ML
4 INJECTION INTRAMUSCULAR; INTRAVENOUS
Status: DISCONTINUED | OUTPATIENT
Start: 2021-01-04 | End: 2021-01-13 | Stop reason: HOSPADM

## 2021-01-04 RX ORDER — FENTANYL CITRATE 50 UG/ML
25 INJECTION, SOLUTION INTRAMUSCULAR; INTRAVENOUS
Status: COMPLETED | OUTPATIENT
Start: 2021-01-04 | End: 2021-01-04

## 2021-01-04 RX ORDER — LIDOCAINE HYDROCHLORIDE 20 MG/ML
INJECTION, SOLUTION EPIDURAL; INFILTRATION; INTRACAUDAL; PERINEURAL AS NEEDED
Status: DISCONTINUED | OUTPATIENT
Start: 2021-01-04 | End: 2021-01-04 | Stop reason: HOSPADM

## 2021-01-04 RX ORDER — MORPHINE SULFATE 10 MG/ML
5 INJECTION, SOLUTION INTRAMUSCULAR; INTRAVENOUS
Status: DISCONTINUED | OUTPATIENT
Start: 2021-01-04 | End: 2021-01-13 | Stop reason: HOSPADM

## 2021-01-04 RX ORDER — SODIUM CHLORIDE 0.9 % (FLUSH) 0.9 %
5-40 SYRINGE (ML) INJECTION EVERY 8 HOURS
Status: DISCONTINUED | OUTPATIENT
Start: 2021-01-04 | End: 2021-01-04 | Stop reason: HOSPADM

## 2021-01-04 RX ORDER — ACETAMINOPHEN 325 MG/1
650 TABLET ORAL ONCE
Status: DISCONTINUED | OUTPATIENT
Start: 2021-01-04 | End: 2021-01-04 | Stop reason: HOSPADM

## 2021-01-04 RX ORDER — HYDROCODONE BITARTRATE AND ACETAMINOPHEN 5; 325 MG/1; MG/1
1 TABLET ORAL
COMMUNITY
End: 2021-04-09 | Stop reason: ALTCHOICE

## 2021-01-04 RX ORDER — AMLODIPINE BESYLATE 5 MG/1
5 TABLET ORAL DAILY
Status: DISCONTINUED | OUTPATIENT
Start: 2021-01-05 | End: 2021-01-13 | Stop reason: HOSPADM

## 2021-01-04 RX ORDER — DORZOLAMIDE HCL 20 MG/ML
1 SOLUTION/ DROPS OPHTHALMIC 2 TIMES DAILY
Status: DISCONTINUED | OUTPATIENT
Start: 2021-01-04 | End: 2021-01-13 | Stop reason: HOSPADM

## 2021-01-04 RX ORDER — DEXTROSE 50 % IN WATER (D50W) INTRAVENOUS SYRINGE
12.5-25 AS NEEDED
Status: DISCONTINUED | OUTPATIENT
Start: 2021-01-04 | End: 2021-01-13 | Stop reason: HOSPADM

## 2021-01-04 RX ORDER — SODIUM CHLORIDE 0.9 % (FLUSH) 0.9 %
5-40 SYRINGE (ML) INJECTION AS NEEDED
Status: DISCONTINUED | OUTPATIENT
Start: 2021-01-04 | End: 2021-01-04 | Stop reason: HOSPADM

## 2021-01-04 RX ORDER — ACETAMINOPHEN 325 MG/1
650 TABLET ORAL
Status: DISCONTINUED | OUTPATIENT
Start: 2021-01-04 | End: 2021-01-13 | Stop reason: HOSPADM

## 2021-01-04 RX ORDER — MORPHINE SULFATE 2 MG/ML
INJECTION, SOLUTION INTRAMUSCULAR; INTRAVENOUS AS NEEDED
Status: DISCONTINUED | OUTPATIENT
Start: 2021-01-04 | End: 2021-01-04 | Stop reason: HOSPADM

## 2021-01-04 RX ORDER — PHENYLEPHRINE HCL IN 0.9% NACL 0.4MG/10ML
SYRINGE (ML) INTRAVENOUS AS NEEDED
Status: DISCONTINUED | OUTPATIENT
Start: 2021-01-04 | End: 2021-01-04 | Stop reason: HOSPADM

## 2021-01-04 RX ORDER — BRIMONIDINE TARTRATE 2 MG/ML
1 SOLUTION/ DROPS OPHTHALMIC EVERY 8 HOURS
Status: DISCONTINUED | OUTPATIENT
Start: 2021-01-04 | End: 2021-01-13 | Stop reason: HOSPADM

## 2021-01-04 RX ORDER — HYDROCODONE BITARTRATE AND ACETAMINOPHEN 7.5; 325 MG/1; MG/1
1 TABLET ORAL
Status: DISCONTINUED | OUTPATIENT
Start: 2021-01-04 | End: 2021-01-13 | Stop reason: HOSPADM

## 2021-01-04 RX ORDER — FENTANYL CITRATE 50 UG/ML
INJECTION, SOLUTION INTRAMUSCULAR; INTRAVENOUS AS NEEDED
Status: DISCONTINUED | OUTPATIENT
Start: 2021-01-04 | End: 2021-01-04 | Stop reason: HOSPADM

## 2021-01-04 RX ORDER — SODIUM CHLORIDE 0.9 % (FLUSH) 0.9 %
5-40 SYRINGE (ML) INJECTION AS NEEDED
Status: DISCONTINUED | OUTPATIENT
Start: 2021-01-04 | End: 2021-01-13 | Stop reason: HOSPADM

## 2021-01-04 RX ORDER — VANCOMYCIN HYDROCHLORIDE
1250 EVERY 24 HOURS
Status: DISCONTINUED | OUTPATIENT
Start: 2021-01-05 | End: 2021-01-06

## 2021-01-04 RX ORDER — HYDROCHLOROTHIAZIDE 25 MG/1
25 TABLET ORAL DAILY
Status: DISCONTINUED | OUTPATIENT
Start: 2021-01-05 | End: 2021-01-04

## 2021-01-04 RX ORDER — SODIUM CHLORIDE, SODIUM LACTATE, POTASSIUM CHLORIDE, CALCIUM CHLORIDE 600; 310; 30; 20 MG/100ML; MG/100ML; MG/100ML; MG/100ML
INJECTION, SOLUTION INTRAVENOUS
Status: DISCONTINUED | OUTPATIENT
Start: 2021-01-04 | End: 2021-01-04 | Stop reason: HOSPADM

## 2021-01-04 RX ORDER — GLIPIZIDE 5 MG/1
5 TABLET ORAL
Status: DISCONTINUED | OUTPATIENT
Start: 2021-01-05 | End: 2021-01-13 | Stop reason: HOSPADM

## 2021-01-04 RX ORDER — ASPIRIN 81 MG/1
81 TABLET ORAL DAILY
Status: DISCONTINUED | OUTPATIENT
Start: 2021-01-05 | End: 2021-01-13 | Stop reason: HOSPADM

## 2021-01-04 RX ORDER — INSULIN LISPRO 100 [IU]/ML
INJECTION, SOLUTION INTRAVENOUS; SUBCUTANEOUS
Status: DISCONTINUED | OUTPATIENT
Start: 2021-01-04 | End: 2021-01-13 | Stop reason: HOSPADM

## 2021-01-04 RX ORDER — LIDOCAINE HYDROCHLORIDE 10 MG/ML
0.1 INJECTION, SOLUTION EPIDURAL; INFILTRATION; INTRACAUDAL; PERINEURAL AS NEEDED
Status: DISCONTINUED | OUTPATIENT
Start: 2021-01-04 | End: 2021-01-04 | Stop reason: HOSPADM

## 2021-01-04 RX ORDER — MAGNESIUM SULFATE 100 %
4 CRYSTALS MISCELLANEOUS AS NEEDED
Status: DISCONTINUED | OUTPATIENT
Start: 2021-01-04 | End: 2021-01-13 | Stop reason: HOSPADM

## 2021-01-04 RX ORDER — AMLODIPINE BESYLATE 5 MG/1
10 TABLET ORAL DAILY
Status: DISCONTINUED | OUTPATIENT
Start: 2021-01-05 | End: 2021-01-04

## 2021-01-04 RX ORDER — MIDAZOLAM HYDROCHLORIDE 1 MG/ML
1 INJECTION, SOLUTION INTRAMUSCULAR; INTRAVENOUS AS NEEDED
Status: DISCONTINUED | OUTPATIENT
Start: 2021-01-04 | End: 2021-01-04 | Stop reason: HOSPADM

## 2021-01-04 RX ORDER — LATANOPROST 50 UG/ML
1 SOLUTION/ DROPS OPHTHALMIC
Status: DISCONTINUED | OUTPATIENT
Start: 2021-01-04 | End: 2021-01-13 | Stop reason: HOSPADM

## 2021-01-04 RX ORDER — ONDANSETRON 2 MG/ML
4 INJECTION INTRAMUSCULAR; INTRAVENOUS AS NEEDED
Status: DISCONTINUED | OUTPATIENT
Start: 2021-01-04 | End: 2021-01-04 | Stop reason: HOSPADM

## 2021-01-04 RX ADMIN — PRAVASTATIN SODIUM 80 MG: 40 TABLET ORAL at 23:10

## 2021-01-04 RX ADMIN — BRIMONIDINE TARTRATE 1 DROP: 2 SOLUTION OPHTHALMIC at 16:53

## 2021-01-04 RX ADMIN — KETAMINE HYDROCHLORIDE 30 MG: 10 INJECTION, SOLUTION INTRAMUSCULAR; INTRAVENOUS at 07:55

## 2021-01-04 RX ADMIN — FENTANYL CITRATE 25 MCG: 50 INJECTION, SOLUTION INTRAMUSCULAR; INTRAVENOUS at 14:09

## 2021-01-04 RX ADMIN — SENNOSIDES AND DOCUSATE SODIUM 1 TABLET: 8.6; 5 TABLET ORAL at 18:18

## 2021-01-04 RX ADMIN — MORPHINE SULFATE 2 MG: 2 INJECTION, SOLUTION INTRAMUSCULAR; INTRAVENOUS at 07:55

## 2021-01-04 RX ADMIN — Medication 200 MCG: at 08:08

## 2021-01-04 RX ADMIN — LIDOCAINE HYDROCHLORIDE 100 MG: 20 INJECTION, SOLUTION EPIDURAL; INFILTRATION; INTRACAUDAL; PERINEURAL at 07:43

## 2021-01-04 RX ADMIN — SODIUM CHLORIDE, POTASSIUM CHLORIDE, SODIUM LACTATE AND CALCIUM CHLORIDE: 600; 310; 30; 20 INJECTION, SOLUTION INTRAVENOUS at 07:41

## 2021-01-04 RX ADMIN — Medication 10 ML: at 16:55

## 2021-01-04 RX ADMIN — LATANOPROST 1 DROP: 50 SOLUTION OPHTHALMIC at 23:10

## 2021-01-04 RX ADMIN — WATER 2 G: 1 INJECTION INTRAMUSCULAR; INTRAVENOUS; SUBCUTANEOUS at 07:45

## 2021-01-04 RX ADMIN — FENTANYL CITRATE 50 MCG: 50 INJECTION, SOLUTION INTRAMUSCULAR; INTRAVENOUS at 07:55

## 2021-01-04 RX ADMIN — Medication 10 ML: at 23:13

## 2021-01-04 RX ADMIN — PROPOFOL 100 MG: 10 INJECTION, EMULSION INTRAVENOUS at 07:43

## 2021-01-04 RX ADMIN — DORZOLAMIDE HYDROCHLORIDE 1 DROP: 20 SOLUTION/ DROPS OPHTHALMIC at 18:19

## 2021-01-04 RX ADMIN — FENTANYL CITRATE 50 MCG: 50 INJECTION, SOLUTION INTRAMUSCULAR; INTRAVENOUS at 07:47

## 2021-01-04 RX ADMIN — PIPERACILLIN AND TAZOBACTAM 3.38 G: 3; .375 INJECTION, POWDER, LYOPHILIZED, FOR SOLUTION INTRAVENOUS at 10:02

## 2021-01-04 RX ADMIN — VANCOMYCIN HYDROCHLORIDE 2000 MG: 10 INJECTION, POWDER, LYOPHILIZED, FOR SOLUTION INTRAVENOUS at 13:37

## 2021-01-04 RX ADMIN — HYDROCHLOROTHIAZIDE 25 MG: 25 TABLET ORAL at 20:38

## 2021-01-04 RX ADMIN — FENTANYL CITRATE 25 MCG: 50 INJECTION, SOLUTION INTRAMUSCULAR; INTRAVENOUS at 12:25

## 2021-01-04 RX ADMIN — BRIMONIDINE TARTRATE 1 DROP: 2 SOLUTION OPHTHALMIC at 23:10

## 2021-01-04 RX ADMIN — TIMOLOL MALEATE 1 DROP: 5 SOLUTION/ DROPS OPHTHALMIC at 18:19

## 2021-01-04 RX ADMIN — FENTANYL CITRATE 25 MCG: 50 INJECTION, SOLUTION INTRAMUSCULAR; INTRAVENOUS at 11:01

## 2021-01-04 RX ADMIN — AMLODIPINE BESYLATE 5 MG: 5 TABLET ORAL at 20:39

## 2021-01-04 RX ADMIN — PROPOFOL 100 MG: 10 INJECTION, EMULSION INTRAVENOUS at 07:44

## 2021-01-04 RX ADMIN — FENTANYL CITRATE 25 MCG: 50 INJECTION, SOLUTION INTRAMUSCULAR; INTRAVENOUS at 13:38

## 2021-01-04 RX ADMIN — PIPERACILLIN AND TAZOBACTAM 3.38 G: 3; .375 INJECTION, POWDER, LYOPHILIZED, FOR SOLUTION INTRAVENOUS at 18:19

## 2021-01-04 RX ADMIN — SODIUM CHLORIDE 75 ML/HR: 9 INJECTION, SOLUTION INTRAVENOUS at 13:21

## 2021-01-04 NOTE — PROGRESS NOTES
Patient's blood pressure elevated, tried to page Dr. Marion Josue office, was on hold for >10 min. Patient takes medications at home for blood pressure but they are not scheduled until tomorrow. I will try to page again soon. 1905-Paged for physician on call. 1915-Dr. Chiquita Valentine returned the call and ordered a one time dose of 5mg amlodipine now and 25mg hctz now. Will give patient meds and continue to monitor bp.

## 2021-01-04 NOTE — ANESTHESIA POSTPROCEDURE EVALUATION
Post-Anesthesia Evaluation and Assessment    Patient: Jesu Tatum MRN: [id number]  SSN: [id number]    YOB: 1935  Age: 82 y.o. Sex: male      I have evaluated the patient and they are stable and ready for discharge from the PACU. Cardiovascular Function/Vital Signs  Visit Vitals  BP (!) 141/76   Pulse 60   Temp 36.7 °C (98.1 °F)   Resp 20   Wt 99.8 kg (220 lb)   SpO2 95%   BMI 33.45 kg/m²       Patient is status post MAC anesthesia for Procedure(s):  LEFT FOOT. Nausea/Vomiting: None    Postoperative hydration reviewed and adequate. Pain:  Pain Scale 1: Numeric (0 - 10) (01/04/21 0843)  Pain Intensity 1: 0 (01/04/21 0843)   Managed    Neurological Status:   Neuro (WDL): Within Defined Limits (01/04/21 0828)  Neuro  Neurologic State: Drowsy (01/04/21 0899)   At baseline    Mental Status, Level of Consciousness: Alert and  oriented to person, place, and time    Pulmonary Status:   O2 Device: Nasal cannula (01/04/21 0830)   Adequate oxygenation and airway patent    Complications related to anesthesia: None    Post-anesthesia assessment completed.  No concerns    Signed By: Ramesh Perdomo MD     January 4, 2021

## 2021-01-04 NOTE — OP NOTES
[address] [facility]  OPERATIVE REPORT    Name:  Fanta Orellana  MR#:  [id number]  :  1935  ACCOUNT #:  [de-identified]  DATE OF SERVICE:  2021    PREOPERATIVE DIAGNOSIS:  Peripheral vascular disease with gangrene, left great toe. POSTOPERATIVE DIAGNOSIS:  Peripheral vascular disease with gangrene, left great toe. PROCEDURE PERFORMED:  Open amputation of left first, second and third toes. SURGEON:  Geovanni Rodriguez MD    ASSISTANT:  None. ANESTHESIA:  General.    COMPLICATIONS:  None. SPECIMENS REMOVED:  Culture from toe wound, first, second and third toes. IMPLANTS:  None. ESTIMATED BLOOD LOSS:  Minimal.    INDICATIONS:  The patient is an 79-year-old diabetic male with peripheral vascular disease. He has had a previous right popliteal dorsalis bypass and great toe amputation. He presented with a wound beneath the left great toenail 4-5 weeks ago. He had no palpable left foot pulses. An arteriogram was done that showed patent femoral, popliteal and peroneal arteries. Toe amputation was recommended. His arterial anatomy was felt to be reasonable for a toe amputation. For various reasons, the toe amputation was delayed. When he represented today, he was found to have wet gangrene involving the entire great toe and extending into the forefoot. He will undergo foot debridement and toe amputation. PROCEDURE:  The patient's left foot was prepped and draped. A circumferential incision was made at the base of the great toe and included the base of the second toe. The first two toes were amputated through the metatarsophalangeal joints. There was significant infected, necrotic tissue primarily on the plantar aspect of the great toe extending over to the base of the second toe. It was clear after further debridement of the soft tissues that the process extended beyond the second toe over to the third toe.   Further incision was made to include the base of the third toe and more skin on the plantar aspect of the foot. The third toe was amputated through the metatarsophalangeal joint. The first, second and third metatarsal heads were resected. The soft tissues were further debrided. There was poor bleeding from the soft tissues. The nonviable tissue was all removed. Cultures were taken. The wound was packed open with saline gauze. The patient was returned to the recovery room in stable condition.       Stefany Coombs MD      [id number]  D:  01/04/2021 8:41  T:  01/04/2021 9:04  JOB #:  [id number]

## 2021-01-04 NOTE — ANESTHESIA PREPROCEDURE EVALUATION
Anesthetic History   No history of anesthetic complications            Review of Systems / Medical History  Patient summary reviewed, nursing notes reviewed and pertinent labs reviewed    Pulmonary    COPD: mild               Neuro/Psych       CVA       Cardiovascular    Hypertension        Dysrhythmias : atrial fibrillation  Pacemaker and PAD    Exercise tolerance: <4 METS     GI/Hepatic/Renal     GERD    Renal disease: CRI       Endo/Other    Diabetes         Other Findings              Physical Exam    Airway  Mallampati: II  TM Distance: 4 - 6 cm  Neck ROM: normal range of motion   Mouth opening: Normal     Cardiovascular          Murmur: Grade 2, Mitral area     Dental    Dentition: Edentulous     Pulmonary  Breath sounds clear to auscultation               Abdominal  GI exam deferred       Other Findings            Anesthetic Plan    ASA: 3  Anesthesia type: MAC          Induction: Intravenous  Anesthetic plan and risks discussed with: Patient

## 2021-01-04 NOTE — PROGRESS NOTES
Vascular:    Patient has extensive left foot soft tissue infection. Nonviable tissue removed with today's procedure. Bleeding at site marginal for healing. Plan admission for IV antibiotics and left leg bypass.

## 2021-01-04 NOTE — BRIEF OP NOTE
Brief Postoperative Note    Patient: Jimenez Jewell  YOB: 1935  MRN: [id number]    Date of Procedure: 1/4/2021     Pre-Op Diagnosis: ATHEROSCLEROSIS OF LEFT LEG, INFECTION OF TOE OF LEFT LEG    Post-Op Diagnosis: Same as preoperative diagnosis.       Procedure(s):  Open amputation left first, second and third toes    Surgeon(s):  Say Chou MD    Surgical Assistant: None    Anesthesia: MAC     Estimated Blood Loss (mL): Minimal    Complications: None    Specimens:   ID Type Source Tests Collected by Time Destination   1 : LEFT FOOT, GREAT, 2ND AND THIRD TOE Fresh Foot, left  Say Chou MD 1/4/2021 1823 Pathology   1 : left foot wound Wound  CULTURE, AEROBIC AND ANAEROBIC Say Chou MD 1/4/2021 3724 Microbiology        Implants: * No implants in log *    Drains: * No LDAs found *    Findings: marginal blood supply to foot, extensive soft tissue infection    Electronically Signed by Iven Brunner, MD on 1/4/2021 at 8:26 AM

## 2021-01-04 NOTE — PROGRESS NOTES
Pharmacist Note - Vancomycin Dosing    Consult provided for this 82 y.o. male for indication of diabetic foot infection. Antibiotic regimen(s): vancomycin, Zosyn  Patient on vancomycin PTA? NO     No results for input(s): WBC, CREA, BUN in the last 72 hours. Frequency of BMP: once  Height: 172.7 cm  Weight: 99.8 kg  Est CrCl: ~41 ml/min; unsing BMP from 20  Temp (24hrs), Av.1 °F (36.7 °C), Min:98 °F (36.7 °C), Max:98.1 °F (36.7 °C)    Cultures:  / wound - pending  /4 anaerobic - pending    Goal trough = 10 - 15 mcg/mL    Therapy will be initiated with a loading dose of 2000 mg IV x 1 to be followed by a maintenance dose of 1250 mg IV every 24 hours. Pharmacy to follow patient daily and order levels / make dose adjustments as appropriate.

## 2021-01-04 NOTE — ROUTINE PROCESS
Patient: Cruz Bundy MRN: [id number]  SSN: [id number] YOB: 1935  Age: 82 y.o. Sex: male Patient is status post Procedure(s): LEFT FOOT. Surgeon(s) and Role: 
   Amie Barnhart MD - Primary Local/Dose/Irrigation:  
 
             
Peripheral IV 01/04/21 Right External jugular (Active) Dressing/Packing:  Incision 01/04/21 Foot Left-Dressing/Treatment: ABD pad; Ace wrap (01/04/21 5135) Splint/Cast:  ] Other:

## 2021-01-05 ENCOUNTER — ANESTHESIA (OUTPATIENT)
Dept: SURGERY | Age: 86
DRG: 240 | End: 2021-01-05
Payer: MEDICARE

## 2021-01-05 ENCOUNTER — APPOINTMENT (OUTPATIENT)
Dept: GENERAL RADIOLOGY | Age: 86
DRG: 240 | End: 2021-01-05
Payer: MEDICARE

## 2021-01-05 ENCOUNTER — ANESTHESIA EVENT (OUTPATIENT)
Dept: SURGERY | Age: 86
DRG: 240 | End: 2021-01-05
Payer: MEDICARE

## 2021-01-05 LAB
ANION GAP SERPL CALC-SCNC: 8 MMOL/L (ref 5–15)
BUN SERPL-MCNC: 28 MG/DL (ref 6–20)
BUN/CREAT SERPL: 22 (ref 12–20)
CALCIUM SERPL-MCNC: 9.2 MG/DL (ref 8.5–10.1)
CHLORIDE SERPL-SCNC: 105 MMOL/L (ref 97–108)
CO2 SERPL-SCNC: 25 MMOL/L (ref 21–32)
CREAT SERPL-MCNC: 1.28 MG/DL (ref 0.7–1.3)
GLUCOSE BLD STRIP.AUTO-MCNC: 105 MG/DL (ref 65–100)
GLUCOSE BLD STRIP.AUTO-MCNC: 111 MG/DL (ref 65–100)
GLUCOSE BLD STRIP.AUTO-MCNC: 133 MG/DL (ref 65–100)
GLUCOSE BLD STRIP.AUTO-MCNC: 140 MG/DL (ref 65–100)
GLUCOSE SERPL-MCNC: 102 MG/DL (ref 65–100)
POTASSIUM SERPL-SCNC: 3.4 MMOL/L (ref 3.5–5.1)
SERVICE CMNT-IMP: ABNORMAL
SODIUM SERPL-SCNC: 138 MMOL/L (ref 136–145)

## 2021-01-05 PROCEDURE — 77030040922 HC BLNKT HYPOTHRM STRY -A

## 2021-01-05 PROCEDURE — 74011250636 HC RX REV CODE- 250/636: Performed by: NURSE ANESTHETIST, CERTIFIED REGISTERED

## 2021-01-05 PROCEDURE — 77030042556 HC PNCL CAUT -B

## 2021-01-05 PROCEDURE — 76010000171 HC OR TIME 2 TO 2.5 HR INTENSV-TIER 1

## 2021-01-05 PROCEDURE — 77030008684 HC TU ET CUF COVD -B: Performed by: NURSE ANESTHETIST, CERTIFIED REGISTERED

## 2021-01-05 PROCEDURE — 74011000636 HC RX REV CODE- 636

## 2021-01-05 PROCEDURE — 77030031139 HC SUT VCRL2 J&J -A

## 2021-01-05 PROCEDURE — 041N09P BYPASS LEFT POPLITEAL ARTERY TO FOOT ARTERY WITH AUTOLOGOUS VENOUS TISSUE, OPEN APPROACH: ICD-10-PCS

## 2021-01-05 PROCEDURE — 74011250637 HC RX REV CODE- 250/637

## 2021-01-05 PROCEDURE — 77030002987 HC SUT PROL J&J -B

## 2021-01-05 PROCEDURE — 76210000006 HC OR PH I REC 0.5 TO 1 HR

## 2021-01-05 PROCEDURE — 77030003704 HC NDL VASC ACC ARMD -A

## 2021-01-05 PROCEDURE — 77030040830 HC CATH URETH FOL MDII -A

## 2021-01-05 PROCEDURE — 36415 COLL VENOUS BLD VENIPUNCTURE: CPT

## 2021-01-05 PROCEDURE — 77030002986 HC SUT PROL J&J -A

## 2021-01-05 PROCEDURE — 74011000258 HC RX REV CODE- 258

## 2021-01-05 PROCEDURE — 74011636637 HC RX REV CODE- 636/637

## 2021-01-05 PROCEDURE — 74011000250 HC RX REV CODE- 250: Performed by: NURSE ANESTHETIST, CERTIFIED REGISTERED

## 2021-01-05 PROCEDURE — 2709999900 HC NON-CHARGEABLE SUPPLY

## 2021-01-05 PROCEDURE — 77030002916 HC SUT ETHLN J&J -A

## 2021-01-05 PROCEDURE — 65270000032 HC RM SEMIPRIVATE

## 2021-01-05 PROCEDURE — 82962 GLUCOSE BLOOD TEST: CPT

## 2021-01-05 PROCEDURE — 74011250636 HC RX REV CODE- 250/636

## 2021-01-05 PROCEDURE — 77030013079 HC BLNKT BAIR HGGR 3M -A: Performed by: ANESTHESIOLOGY

## 2021-01-05 PROCEDURE — 80048 BASIC METABOLIC PNL TOTAL CA: CPT

## 2021-01-05 PROCEDURE — 77030026438 HC STYL ET INTUB CARD -A: Performed by: NURSE ANESTHETIST, CERTIFIED REGISTERED

## 2021-01-05 PROCEDURE — 77030008462 HC STPLR SKN PROX J&J -A

## 2021-01-05 PROCEDURE — 76060000035 HC ANESTHESIA 2 TO 2.5 HR

## 2021-01-05 RX ORDER — MIDAZOLAM HYDROCHLORIDE 1 MG/ML
INJECTION, SOLUTION INTRAMUSCULAR; INTRAVENOUS AS NEEDED
Status: DISCONTINUED | OUTPATIENT
Start: 2021-01-05 | End: 2021-01-05 | Stop reason: HOSPADM

## 2021-01-05 RX ORDER — SODIUM CHLORIDE, SODIUM LACTATE, POTASSIUM CHLORIDE, CALCIUM CHLORIDE 600; 310; 30; 20 MG/100ML; MG/100ML; MG/100ML; MG/100ML
125 INJECTION, SOLUTION INTRAVENOUS CONTINUOUS
Status: DISCONTINUED | OUTPATIENT
Start: 2021-01-05 | End: 2021-01-05 | Stop reason: HOSPADM

## 2021-01-05 RX ORDER — SUCCINYLCHOLINE CHLORIDE 20 MG/ML
INJECTION INTRAMUSCULAR; INTRAVENOUS AS NEEDED
Status: DISCONTINUED | OUTPATIENT
Start: 2021-01-05 | End: 2021-01-05 | Stop reason: HOSPADM

## 2021-01-05 RX ORDER — ACETAMINOPHEN 325 MG/1
650 TABLET ORAL ONCE
Status: DISCONTINUED | OUTPATIENT
Start: 2021-01-05 | End: 2021-01-05 | Stop reason: HOSPADM

## 2021-01-05 RX ORDER — DEXAMETHASONE SODIUM PHOSPHATE 4 MG/ML
INJECTION, SOLUTION INTRA-ARTICULAR; INTRALESIONAL; INTRAMUSCULAR; INTRAVENOUS; SOFT TISSUE AS NEEDED
Status: DISCONTINUED | OUTPATIENT
Start: 2021-01-05 | End: 2021-01-05 | Stop reason: HOSPADM

## 2021-01-05 RX ORDER — KETAMINE HYDROCHLORIDE 10 MG/ML
INJECTION, SOLUTION INTRAMUSCULAR; INTRAVENOUS AS NEEDED
Status: DISCONTINUED | OUTPATIENT
Start: 2021-01-05 | End: 2021-01-05 | Stop reason: HOSPADM

## 2021-01-05 RX ORDER — SODIUM CHLORIDE 0.9 % (FLUSH) 0.9 %
5-40 SYRINGE (ML) INJECTION AS NEEDED
Status: DISCONTINUED | OUTPATIENT
Start: 2021-01-05 | End: 2021-01-05 | Stop reason: HOSPADM

## 2021-01-05 RX ORDER — CEFAZOLIN SODIUM 1 G/3ML
INJECTION, POWDER, FOR SOLUTION INTRAMUSCULAR; INTRAVENOUS AS NEEDED
Status: DISCONTINUED | OUTPATIENT
Start: 2021-01-05 | End: 2021-01-05 | Stop reason: HOSPADM

## 2021-01-05 RX ORDER — ONDANSETRON 2 MG/ML
INJECTION INTRAMUSCULAR; INTRAVENOUS AS NEEDED
Status: DISCONTINUED | OUTPATIENT
Start: 2021-01-05 | End: 2021-01-05 | Stop reason: HOSPADM

## 2021-01-05 RX ORDER — PROPOFOL 10 MG/ML
INJECTION, EMULSION INTRAVENOUS AS NEEDED
Status: DISCONTINUED | OUTPATIENT
Start: 2021-01-05 | End: 2021-01-05 | Stop reason: HOSPADM

## 2021-01-05 RX ORDER — FENTANYL CITRATE 50 UG/ML
INJECTION, SOLUTION INTRAMUSCULAR; INTRAVENOUS AS NEEDED
Status: DISCONTINUED | OUTPATIENT
Start: 2021-01-05 | End: 2021-01-05 | Stop reason: HOSPADM

## 2021-01-05 RX ORDER — FENTANYL CITRATE 50 UG/ML
50 INJECTION, SOLUTION INTRAMUSCULAR; INTRAVENOUS AS NEEDED
Status: DISCONTINUED | OUTPATIENT
Start: 2021-01-05 | End: 2021-01-05 | Stop reason: HOSPADM

## 2021-01-05 RX ORDER — ONDANSETRON 2 MG/ML
4 INJECTION INTRAMUSCULAR; INTRAVENOUS AS NEEDED
Status: DISCONTINUED | OUTPATIENT
Start: 2021-01-05 | End: 2021-01-05 | Stop reason: HOSPADM

## 2021-01-05 RX ORDER — SODIUM CHLORIDE 0.9 % (FLUSH) 0.9 %
5-40 SYRINGE (ML) INJECTION EVERY 8 HOURS
Status: DISCONTINUED | OUTPATIENT
Start: 2021-01-05 | End: 2021-01-05 | Stop reason: HOSPADM

## 2021-01-05 RX ORDER — OXYCODONE HYDROCHLORIDE 5 MG/1
5 TABLET ORAL AS NEEDED
Status: DISCONTINUED | OUTPATIENT
Start: 2021-01-05 | End: 2021-01-05 | Stop reason: HOSPADM

## 2021-01-05 RX ORDER — MIDAZOLAM HYDROCHLORIDE 1 MG/ML
1 INJECTION, SOLUTION INTRAMUSCULAR; INTRAVENOUS AS NEEDED
Status: DISCONTINUED | OUTPATIENT
Start: 2021-01-05 | End: 2021-01-05 | Stop reason: HOSPADM

## 2021-01-05 RX ORDER — HEPARIN SODIUM 1000 [USP'U]/ML
INJECTION, SOLUTION INTRAVENOUS; SUBCUTANEOUS AS NEEDED
Status: DISCONTINUED | OUTPATIENT
Start: 2021-01-05 | End: 2021-01-05 | Stop reason: HOSPADM

## 2021-01-05 RX ORDER — ROCURONIUM BROMIDE 10 MG/ML
INJECTION, SOLUTION INTRAVENOUS AS NEEDED
Status: DISCONTINUED | OUTPATIENT
Start: 2021-01-05 | End: 2021-01-05 | Stop reason: HOSPADM

## 2021-01-05 RX ORDER — HYDROMORPHONE HYDROCHLORIDE 1 MG/ML
0.2 INJECTION, SOLUTION INTRAMUSCULAR; INTRAVENOUS; SUBCUTANEOUS
Status: DISCONTINUED | OUTPATIENT
Start: 2021-01-05 | End: 2021-01-05 | Stop reason: HOSPADM

## 2021-01-05 RX ORDER — SODIUM CHLORIDE, SODIUM LACTATE, POTASSIUM CHLORIDE, CALCIUM CHLORIDE 600; 310; 30; 20 MG/100ML; MG/100ML; MG/100ML; MG/100ML
25 INJECTION, SOLUTION INTRAVENOUS CONTINUOUS
Status: DISCONTINUED | OUTPATIENT
Start: 2021-01-05 | End: 2021-01-05 | Stop reason: HOSPADM

## 2021-01-05 RX ORDER — MIDAZOLAM HYDROCHLORIDE 1 MG/ML
0.5 INJECTION, SOLUTION INTRAMUSCULAR; INTRAVENOUS
Status: DISCONTINUED | OUTPATIENT
Start: 2021-01-05 | End: 2021-01-05 | Stop reason: HOSPADM

## 2021-01-05 RX ORDER — DIPHENHYDRAMINE HYDROCHLORIDE 50 MG/ML
12.5 INJECTION, SOLUTION INTRAMUSCULAR; INTRAVENOUS AS NEEDED
Status: DISCONTINUED | OUTPATIENT
Start: 2021-01-05 | End: 2021-01-05 | Stop reason: HOSPADM

## 2021-01-05 RX ORDER — MORPHINE SULFATE 10 MG/ML
2 INJECTION, SOLUTION INTRAMUSCULAR; INTRAVENOUS
Status: DISCONTINUED | OUTPATIENT
Start: 2021-01-05 | End: 2021-01-05 | Stop reason: HOSPADM

## 2021-01-05 RX ORDER — FENTANYL CITRATE 50 UG/ML
25 INJECTION, SOLUTION INTRAMUSCULAR; INTRAVENOUS
Status: DISCONTINUED | OUTPATIENT
Start: 2021-01-05 | End: 2021-01-05 | Stop reason: HOSPADM

## 2021-01-05 RX ORDER — LIDOCAINE HYDROCHLORIDE 10 MG/ML
0.1 INJECTION, SOLUTION EPIDURAL; INFILTRATION; INTRACAUDAL; PERINEURAL AS NEEDED
Status: DISCONTINUED | OUTPATIENT
Start: 2021-01-05 | End: 2021-01-05 | Stop reason: HOSPADM

## 2021-01-05 RX ORDER — PHENYLEPHRINE HCL IN 0.9% NACL 0.4MG/10ML
SYRINGE (ML) INTRAVENOUS AS NEEDED
Status: DISCONTINUED | OUTPATIENT
Start: 2021-01-05 | End: 2021-01-05 | Stop reason: HOSPADM

## 2021-01-05 RX ORDER — SODIUM CHLORIDE 9 MG/ML
25 INJECTION, SOLUTION INTRAVENOUS CONTINUOUS
Status: DISCONTINUED | OUTPATIENT
Start: 2021-01-05 | End: 2021-01-05 | Stop reason: HOSPADM

## 2021-01-05 RX ORDER — HYDROMORPHONE HYDROCHLORIDE 2 MG/ML
INJECTION, SOLUTION INTRAMUSCULAR; INTRAVENOUS; SUBCUTANEOUS AS NEEDED
Status: DISCONTINUED | OUTPATIENT
Start: 2021-01-05 | End: 2021-01-05 | Stop reason: HOSPADM

## 2021-01-05 RX ORDER — LIDOCAINE HYDROCHLORIDE 20 MG/ML
INJECTION, SOLUTION EPIDURAL; INFILTRATION; INTRACAUDAL; PERINEURAL AS NEEDED
Status: DISCONTINUED | OUTPATIENT
Start: 2021-01-05 | End: 2021-01-05 | Stop reason: HOSPADM

## 2021-01-05 RX ORDER — SODIUM CHLORIDE, SODIUM LACTATE, POTASSIUM CHLORIDE, CALCIUM CHLORIDE 600; 310; 30; 20 MG/100ML; MG/100ML; MG/100ML; MG/100ML
INJECTION, SOLUTION INTRAVENOUS
Status: DISCONTINUED | OUTPATIENT
Start: 2021-01-05 | End: 2021-01-05 | Stop reason: HOSPADM

## 2021-01-05 RX ADMIN — VANCOMYCIN HYDROCHLORIDE 1250 MG: 10 INJECTION, POWDER, LYOPHILIZED, FOR SOLUTION INTRAVENOUS at 16:16

## 2021-01-05 RX ADMIN — DEXAMETHASONE SODIUM PHOSPHATE 4 MG: 4 INJECTION, SOLUTION INTRAMUSCULAR; INTRAVENOUS at 10:46

## 2021-01-05 RX ADMIN — PIPERACILLIN AND TAZOBACTAM 3.38 G: 3; .375 INJECTION, POWDER, LYOPHILIZED, FOR SOLUTION INTRAVENOUS at 02:06

## 2021-01-05 RX ADMIN — PROPOFOL 100 MG: 10 INJECTION, EMULSION INTRAVENOUS at 10:34

## 2021-01-05 RX ADMIN — MIDAZOLAM 1 MG: 1 INJECTION INTRAMUSCULAR; INTRAVENOUS at 10:25

## 2021-01-05 RX ADMIN — LIDOCAINE HYDROCHLORIDE 60 MG: 20 INJECTION, SOLUTION EPIDURAL; INFILTRATION; INTRACAUDAL; PERINEURAL at 10:34

## 2021-01-05 RX ADMIN — LATANOPROST 1 DROP: 50 SOLUTION OPHTHALMIC at 22:31

## 2021-01-05 RX ADMIN — FENTANYL CITRATE 50 MCG: 50 INJECTION, SOLUTION INTRAMUSCULAR; INTRAVENOUS at 11:05

## 2021-01-05 RX ADMIN — Medication 10 ML: at 22:32

## 2021-01-05 RX ADMIN — METOPROLOL SUCCINATE 25 MG: 25 TABLET, EXTENDED RELEASE ORAL at 15:45

## 2021-01-05 RX ADMIN — PRAVASTATIN SODIUM 80 MG: 40 TABLET ORAL at 22:31

## 2021-01-05 RX ADMIN — ROCURONIUM BROMIDE 5 MG: 10 SOLUTION INTRAVENOUS at 10:34

## 2021-01-05 RX ADMIN — INSULIN LISPRO 2 UNITS: 100 INJECTION, SOLUTION INTRAVENOUS; SUBCUTANEOUS at 18:39

## 2021-01-05 RX ADMIN — AMLODIPINE BESYLATE 5 MG: 5 TABLET ORAL at 15:45

## 2021-01-05 RX ADMIN — HYDROMORPHONE HYDROCHLORIDE 0.5 MG: 2 INJECTION, SOLUTION INTRAMUSCULAR; INTRAVENOUS; SUBCUTANEOUS at 12:14

## 2021-01-05 RX ADMIN — FENTANYL CITRATE 50 MCG: 50 INJECTION, SOLUTION INTRAMUSCULAR; INTRAVENOUS at 10:48

## 2021-01-05 RX ADMIN — HYDROMORPHONE HYDROCHLORIDE 0.5 MG: 2 INJECTION, SOLUTION INTRAMUSCULAR; INTRAVENOUS; SUBCUTANEOUS at 12:29

## 2021-01-05 RX ADMIN — Medication 10 ML: at 16:16

## 2021-01-05 RX ADMIN — PIPERACILLIN AND TAZOBACTAM 3.38 G: 3; .375 INJECTION, POWDER, LYOPHILIZED, FOR SOLUTION INTRAVENOUS at 18:36

## 2021-01-05 RX ADMIN — ONDANSETRON HYDROCHLORIDE 4 MG: 2 INJECTION, SOLUTION INTRAMUSCULAR; INTRAVENOUS at 10:46

## 2021-01-05 RX ADMIN — Medication 80 MCG: at 10:34

## 2021-01-05 RX ADMIN — SODIUM CHLORIDE, POTASSIUM CHLORIDE, SODIUM LACTATE AND CALCIUM CHLORIDE: 600; 310; 30; 20 INJECTION, SOLUTION INTRAVENOUS at 10:17

## 2021-01-05 RX ADMIN — FENTANYL CITRATE 50 MCG: 50 INJECTION, SOLUTION INTRAMUSCULAR; INTRAVENOUS at 10:34

## 2021-01-05 RX ADMIN — KETAMINE HYDROCHLORIDE 20 MG: 10 INJECTION, SOLUTION INTRAMUSCULAR; INTRAVENOUS at 10:50

## 2021-01-05 RX ADMIN — PHENYLEPHRINE HYDROCHLORIDE 40 MCG/MIN: 10 INJECTION INTRAVENOUS at 10:44

## 2021-01-05 RX ADMIN — SUCCINYLCHOLINE CHLORIDE 100 MG: 20 INJECTION, SOLUTION INTRAMUSCULAR; INTRAVENOUS at 10:34

## 2021-01-05 RX ADMIN — HYDROCHLOROTHIAZIDE 25 MG: 25 TABLET ORAL at 15:45

## 2021-01-05 RX ADMIN — TIMOLOL MALEATE 1 DROP: 5 SOLUTION/ DROPS OPHTHALMIC at 18:38

## 2021-01-05 RX ADMIN — DORZOLAMIDE HYDROCHLORIDE 1 DROP: 20 SOLUTION/ DROPS OPHTHALMIC at 18:38

## 2021-01-05 RX ADMIN — HEPARIN SODIUM 5000 UNITS: 1000 INJECTION INTRAVENOUS; SUBCUTANEOUS at 11:32

## 2021-01-05 RX ADMIN — HYDROMORPHONE HYDROCHLORIDE 0.5 MG: 2 INJECTION, SOLUTION INTRAMUSCULAR; INTRAVENOUS; SUBCUTANEOUS at 12:18

## 2021-01-05 RX ADMIN — ROCURONIUM BROMIDE 20 MG: 10 SOLUTION INTRAVENOUS at 10:45

## 2021-01-05 RX ADMIN — MIDAZOLAM 1 MG: 1 INJECTION INTRAMUSCULAR; INTRAVENOUS at 10:32

## 2021-01-05 RX ADMIN — BRIMONIDINE TARTRATE 1 DROP: 2 SOLUTION OPHTHALMIC at 22:30

## 2021-01-05 RX ADMIN — CEFAZOLIN 2 G: 330 INJECTION, POWDER, FOR SOLUTION INTRAMUSCULAR; INTRAVENOUS at 10:46

## 2021-01-05 RX ADMIN — SENNOSIDES AND DOCUSATE SODIUM 1 TABLET: 8.6; 5 TABLET ORAL at 18:36

## 2021-01-05 NOTE — ROUTINE PROCESS
Patient: Rosendo Malik MRN: [id number]  SSN: [id number] YOB: 1935  Age: 82 y.o. Sex: male Patient is status post Procedure(s): LEFT POPLITEAL TO DORSALIS PEDIS BYPASS WITH VEIN GRAFT, CLOSURE OF LEFT FOOT WOUND. Surgeon(s) and Role: 
   Bulmaro Templeton MD - Primary Local/Dose/Irrigation:   
 
             
Peripheral IV 01/04/21 Right External jugular (Active) Site Assessment Clean, dry, & intact 01/04/21 2310 Phlebitis Assessment 0 01/04/21 2310 Infiltration Assessment 0 01/04/21 2310 Dressing Status Clean, dry, & intact 01/04/21 2310 Dressing Type Tape;Transparent 01/04/21 2310 Hub Color/Line Status Infusing 01/04/21 2310 Airway - Endotracheal Tube 01/05/21 Oral (Active) Dressing/Packing:  Incision 01/05/21 Leg Left-Dressing/Treatment: Gauze dressing/dressing sponge;Tape/Soft cloth adhesive tape (01/05/21 1100) Incision 01/05/21 Foot Left-Dressing/Treatment: ABD pad; Ace wrap;Gauze dressing/dressing sponge;Roll gauze (01/05/21 1100) Incision 01/04/21 Foot Left-Dressing/Treatment: (abd pad, Ace wrap gz dressing/dressing sponge, roll gz.) (01/04/21 2310) Splint/Cast:  ] Other:

## 2021-01-05 NOTE — BRIEF OP NOTE
Brief Postoperative Note    Patient: Sandi Albarran  YOB: 1935  MRN: [id number]    Date of Procedure: 1/5/2021     Pre-Op Diagnosis: PVD WITH GANRENE    Post-Op Diagnosis: Same as preoperative diagnosis.       Procedure(s):  LEFT POPLITEAL TO DORSALIS PEDIS BYPASS WITH VEIN GRAFT, CLOSURE OF LEFT FOOT WOUND    Surgeon(s):  Eleanor Sharp MD    Surgical Assistant: Surg Asst-1: Abimael Piña    Anesthesia: General     Estimated Blood Loss (mL): less than 943     Complications: None    Specimens: * No specimens in log *     Implants: * No implants in log *    Drains: * No LDAs found *    Findings: none    Electronically Signed by Markie Simpson MD on 1/5/2021 at 12:39 PM

## 2021-01-05 NOTE — ANESTHESIA POSTPROCEDURE EVALUATION
Post-Anesthesia Evaluation and Assessment    Patient: Leol Giles MRN: [id number]  SSN: [id number]    YOB: 1935  Age: 82 y.o. Sex: male      I have evaluated the patient and they are stable and ready for discharge from the PACU. Cardiovascular Function/Vital Signs  Visit Vitals  BP (!) 148/71 (BP 1 Location: Right arm, BP Patient Position: At rest)   Pulse 60   Temp 36.6 °C (97.8 °F)   Resp 14   Ht 5' 8\" (1.727 m)   Wt 99.8 kg (220 lb)   SpO2 93%   BMI 33.45 kg/m²       Patient is status post General anesthesia for Procedure(s):  LEFT POPLITEAL TO DORSALIS PEDIS BYPASS WITH VEIN GRAFT, CLOSURE OF LEFT FOOT WOUND. Nausea/Vomiting: None    Postoperative hydration reviewed and adequate. Pain:  Pain Scale 1: Numeric (0 - 10) (01/05/21 1330)  Pain Intensity 1: 0 (01/05/21 1330)   Managed    Neurological Status:   Neuro (WDL): Within Defined Limits (01/05/21 1330)  Neuro  Neurologic State: Drowsy (01/05/21 1330)  LUE Motor Response: Weak (01/05/21 1330)  LLE Motor Response: Weak (01/05/21 1330)  RUE Motor Response: Weak (01/05/21 1330)  RLE Motor Response: Weak (01/05/21 1330)   At baseline    Mental Status, Level of Consciousness: Alert and  oriented to person, place, and time    Pulmonary Status:   O2 Device: Room air (01/05/21 1330)   Adequate oxygenation and airway patent    Complications related to anesthesia: None    Post-anesthesia assessment completed. No concerns    Signed By: Yazmin Enrique MD     January 5, 2021              Procedure(s):  LEFT POPLITEAL TO DORSALIS PEDIS BYPASS WITH VEIN GRAFT, CLOSURE OF LEFT FOOT WOUND.    general    <[email or web address]>    INITIAL Post-op Vital signs:   Vitals Value Taken Time   /69 01/05/21 1330   Temp 36.6 °C (97.9 °F) 01/05/21 1330   Pulse 61 01/05/21 1342   Resp 14 01/05/21 1342   SpO2 96 % 01/05/21 1342   Vitals shown include unvalidated device data.

## 2021-01-05 NOTE — PERIOP NOTES
TRANSFER - OUT REPORT:    Verbal report given to [employer] RN(name) on Radha Tuttle  being transferred to [facility](unit) for routine post - op       Report consisted of patients Situation, Background, Assessment and   Recommendations(SBAR). Information from the following report(s) SBAR, Kardex, OR Summary, Procedure Summary, Intake/Output, MAR and Cardiac Rhythm Paced was reviewed with the receiving nurse. Lines:   Peripheral IV 01/04/21 Right External jugular (Active)   Site Assessment Clean, dry, & intact 01/05/21 1250   Phlebitis Assessment 0 01/05/21 1250   Infiltration Assessment 0 01/05/21 1250   Dressing Status Clean, dry, & intact 01/05/21 1250   Dressing Type Transparent;Tape 01/05/21 1250   Hub Color/Line Status Green; Infusing 01/05/21 1250   Action Taken Open ports on tubing capped 01/05/21 1250   Alcohol Cap Used Yes 01/05/21 1250        Opportunity for questions and clarification was provided.       Patient transported with:   [employer]

## 2021-01-05 NOTE — ROUTINE PROCESS
TRANSFER - IN REPORT: 
 
Verbal report received from Alessandra on Nadege Whiteside  being received from [facility](unit) for ordered procedure Report consisted of patients Situation, Background, Assessment and  
Recommendations(SBAR). Information from the following report(s) SBAR was reviewed with the receiving nurse. Opportunity for questions and clarification was provided. Assessment completed upon patients arrival to unit and care assumed.

## 2021-01-05 NOTE — PROGRESS NOTES
8:47 AM: Report received from Susana Seip, RN and made aware patient is to go to surgery. Stated consent was signed, and gave report to OR nurse. OR transport arrived at this time to take patient down to surgery.

## 2021-01-05 NOTE — OP NOTES
[address] [facility]  OPERATIVE REPORT    Name:  Alondra Mclaughlin  MR#:  [id number]  :  1935  ACCOUNT #:  [de-identified]  DATE OF SERVICE:  2021    PREOPERATIVE DIAGNOSIS:  Peripheral vascular disease with gangrene, left great toe. POSTOPERATIVE DIAGNOSIS:  Peripheral vascular disease with gangrene, left great toe. PROCEDURE PERFORMED:  1. Left popliteal to dorsalis pedis bypass using nonreversed saphenous vein. 2.  Closure of left foot wound. SURGEON:  Gregg Brennan MD    ASSISTANT:  [place]    ANESTHESIA:  General.    COMPLICATIONS:  None. SPECIMENS REMOVED:  None. IMPLANTS:  None. ESTIMATED BLOOD LOSS:  100 mL. INDICATIONS:  The patient is an 82-year-old diabetic male with peripheral vascular disease. He has had a previous right popliteal dorsalis pedis bypass. He underwent open amputation of the first 3 toes of the left foot for infected gangrenous toe. His previous angiogram showed patent femoral and popliteal arteries with patent peroneal arteries filling the foot. Based on the toe amputation yesterday, it was not felt that this represented adequate blood flow to the foot for healing. His dorsalis pedis artery reconstitutes at the ankle level. The bypass was planned to try and help heal his foot wound. PROCEDURE:  The patient's left leg and foot were prepped and draped. A longitudinal incision was made at the left anterior ankle onto the foot. The distal anterior tibial dorsalis pedis artery was dissected free. A 21 butterfly needle was inserted and an arteriogram was done to confirm patency of the vessel at this level. A longitudinal incision was then made in the proximal medial lower leg. The saphenous vein was identified and dissected free. The vein was of good size and quality. The vein was further dissected free and side branches ligated with silk ties.   The vein was further dissected through 2 additional longitudinal interrupted incisions in the mid and distal medial lower leg. The vein was dissected down to the ankle where it was ligated and divided. The proximal medial lower leg incision was deepened into the popliteal space and the popliteal artery was dissected free. The patient was heparinized. The vein graft was sewn end-to-side to the popliteal artery using running 5-0 Prolene. The vein was oriented in a nonreversed fashion. The valves within the vein graft were then cut using a valvulotome establishing good flow through the bypass. The graft was then tunneled subfascially in the medial lower leg to the previously made incisions and then subcutaneously from the medial ankle incision to the anterior ankle-foot incision. The vein graft was then sewn end-to-side to the dorsalis pedis artery using running 6-0 Prolene. At the completion of the anastomosis, there was good flow with a palpable pulse and good flow by Doppler. The incisions were closed with Vicryl subcutaneous and fascial sutures and skin staples. Attention was then turned to the foot wound that had been made yesterday with amputation of the first 3 toes. The wound was clean. It was irrigated and closed with interrupted 0 Prolene sutures. Dressings were applied and the patient was returned to the recovery room in stable condition.       Vinicio Arzola MD      [id number]  D:  01/05/2021 12:46  T:  01/05/2021 13:30  JOB #:  [id number]

## 2021-01-05 NOTE — PROGRESS NOTES
TRANSFER - IN REPORT:    Verbal report received from Karen Irizarry RN (name) on Sandi Albarran  being received from [employer]) for routine post - op          Report consisted of patients Situation, Background, Assessment and   Recommendations(SBAR). Information from the following report(s) SBAR, Kardex, OR Summary, Procedure Summary, Intake/Output, MAR and Recent Results was reviewed with the receiving nurse. Opportunity for questions and clarification was provided. Assessment completed upon patients arrival to unit and care assumed.

## 2021-01-05 NOTE — PROGRESS NOTES
3:00 PM: Patient back on floor at this time from surgery. Resting quietly in bed. Awaiting arrival on [facility] from pharmacy at this time.

## 2021-01-05 NOTE — ANESTHESIA PREPROCEDURE EVALUATION
Relevant Problems   CARDIOVASCULAR   (+) Atrial fibrillation (HCC)   (+) Hypertension   (+) Pacemaker      GASTROINTESTINAL   (+) GERD (gastroesophageal reflux disease)      RENAL FAILURE   (+) Stage 3 chronic kidney disease      ENDOCRINE   (+) Type 2 diabetes mellitus without complications (HCC)       Anesthetic History   No history of anesthetic complications            Review of Systems / Medical History  Patient summary reviewed, nursing notes reviewed and pertinent labs reviewed    Pulmonary  Within defined limits  COPD               Neuro/Psych   Within defined limits    CVA       Cardiovascular  Within defined limits  Hypertension        Dysrhythmias            GI/Hepatic/Renal  Within defined limits   GERD      PUD     Endo/Other  Within defined limits  Diabetes    Arthritis     Other Findings              Physical Exam    Airway  Mallampati: II  TM Distance: > 6 cm  Neck ROM: normal range of motion   Mouth opening: Normal     Cardiovascular  Regular rate and rhythm,  S1 and S2 normal,  no murmur, click, rub, or gallop             Dental  No notable dental hx       Pulmonary  Breath sounds clear to auscultation               Abdominal  GI exam deferred       Other Findings            Anesthetic Plan    ASA: 3  Anesthesia type: general          Induction: Intravenous  Anesthetic plan and risks discussed with: Patient

## 2021-01-06 LAB
ANION GAP SERPL CALC-SCNC: 7 MMOL/L (ref 5–15)
BACTERIA SPEC CULT: NORMAL
BASOPHILS # BLD: 0 K/UL (ref 0–0.1)
BASOPHILS NFR BLD: 0 % (ref 0–1)
BUN SERPL-MCNC: 31 MG/DL (ref 6–20)
BUN/CREAT SERPL: 22 (ref 12–20)
CALCIUM SERPL-MCNC: 8.6 MG/DL (ref 8.5–10.1)
CHLORIDE SERPL-SCNC: 104 MMOL/L (ref 97–108)
CO2 SERPL-SCNC: 26 MMOL/L (ref 21–32)
CREAT SERPL-MCNC: 1.41 MG/DL (ref 0.7–1.3)
DIFFERENTIAL METHOD BLD: ABNORMAL
EOSINOPHIL # BLD: 0 K/UL (ref 0–0.4)
EOSINOPHIL NFR BLD: 0 % (ref 0–7)
ERYTHROCYTE [DISTWIDTH] IN BLOOD BY AUTOMATED COUNT: 12.9 % (ref 11.5–14.5)
GLUCOSE BLD STRIP.AUTO-MCNC: 103 MG/DL (ref 65–100)
GLUCOSE BLD STRIP.AUTO-MCNC: 124 MG/DL (ref 65–100)
GLUCOSE BLD STRIP.AUTO-MCNC: 90 MG/DL (ref 65–100)
GLUCOSE BLD STRIP.AUTO-MCNC: 99 MG/DL (ref 65–100)
GLUCOSE SERPL-MCNC: 116 MG/DL (ref 65–100)
HCT VFR BLD AUTO: 34 % (ref 36.6–50.3)
HGB BLD-MCNC: 10.6 G/DL (ref 12.1–17)
IMM GRANULOCYTES # BLD AUTO: 0 K/UL (ref 0–0.04)
IMM GRANULOCYTES NFR BLD AUTO: 1 % (ref 0–0.5)
LYMPHOCYTES # BLD: 1 K/UL (ref 0.8–3.5)
LYMPHOCYTES NFR BLD: 13 % (ref 12–49)
MCH RBC QN AUTO: 28.9 PG (ref 26–34)
MCHC RBC AUTO-ENTMCNC: 31.2 G/DL (ref 30–36.5)
MCV RBC AUTO: 92.6 FL (ref 80–99)
MONOCYTES # BLD: 0.5 K/UL (ref 0–1)
MONOCYTES NFR BLD: 7 % (ref 5–13)
NEUTS SEG # BLD: 6 K/UL (ref 1.8–8)
NEUTS SEG NFR BLD: 79 % (ref 32–75)
NRBC # BLD: 0 K/UL (ref 0–0.01)
NRBC BLD-RTO: 0 PER 100 WBC
PLATELET # BLD AUTO: 345 K/UL (ref 150–400)
PMV BLD AUTO: 10.4 FL (ref 8.9–12.9)
POTASSIUM SERPL-SCNC: 4 MMOL/L (ref 3.5–5.1)
RBC # BLD AUTO: 3.67 M/UL (ref 4.1–5.7)
SERVICE CMNT-IMP: ABNORMAL
SERVICE CMNT-IMP: ABNORMAL
SERVICE CMNT-IMP: NORMAL
SODIUM SERPL-SCNC: 137 MMOL/L (ref 136–145)
WBC # BLD AUTO: 7.5 K/UL (ref 4.1–11.1)

## 2021-01-06 PROCEDURE — 97161 PT EVAL LOW COMPLEX 20 MIN: CPT | Performed by: PHYSICAL THERAPIST

## 2021-01-06 PROCEDURE — 80048 BASIC METABOLIC PNL TOTAL CA: CPT

## 2021-01-06 PROCEDURE — 85025 COMPLETE CBC W/AUTO DIFF WBC: CPT

## 2021-01-06 PROCEDURE — 74011250636 HC RX REV CODE- 250/636

## 2021-01-06 PROCEDURE — 65270000032 HC RM SEMIPRIVATE

## 2021-01-06 PROCEDURE — 97530 THERAPEUTIC ACTIVITIES: CPT | Performed by: PHYSICAL THERAPIST

## 2021-01-06 PROCEDURE — 82962 GLUCOSE BLOOD TEST: CPT

## 2021-01-06 PROCEDURE — 74011250637 HC RX REV CODE- 250/637

## 2021-01-06 PROCEDURE — 74011000258 HC RX REV CODE- 258

## 2021-01-06 PROCEDURE — 36415 COLL VENOUS BLD VENIPUNCTURE: CPT

## 2021-01-06 RX ORDER — ENOXAPARIN SODIUM 100 MG/ML
40 INJECTION SUBCUTANEOUS EVERY 24 HOURS
Status: DISCONTINUED | OUTPATIENT
Start: 2021-01-06 | End: 2021-01-13 | Stop reason: HOSPADM

## 2021-01-06 RX ADMIN — TIMOLOL MALEATE 1 DROP: 5 SOLUTION/ DROPS OPHTHALMIC at 08:47

## 2021-01-06 RX ADMIN — TIMOLOL MALEATE 1 DROP: 5 SOLUTION/ DROPS OPHTHALMIC at 19:57

## 2021-01-06 RX ADMIN — HYDROCHLOROTHIAZIDE 25 MG: 25 TABLET ORAL at 08:39

## 2021-01-06 RX ADMIN — PIPERACILLIN AND TAZOBACTAM 3.38 G: 3; .375 INJECTION, POWDER, LYOPHILIZED, FOR SOLUTION INTRAVENOUS at 10:44

## 2021-01-06 RX ADMIN — BRIMONIDINE TARTRATE 1 DROP: 2 SOLUTION OPHTHALMIC at 15:11

## 2021-01-06 RX ADMIN — SENNOSIDES AND DOCUSATE SODIUM 1 TABLET: 8.6; 5 TABLET ORAL at 17:24

## 2021-01-06 RX ADMIN — DOCUSATE SODIUM 100 MG: 100 CAPSULE, LIQUID FILLED ORAL at 08:39

## 2021-01-06 RX ADMIN — PIPERACILLIN AND TAZOBACTAM 3.38 G: 3; .375 INJECTION, POWDER, LYOPHILIZED, FOR SOLUTION INTRAVENOUS at 02:57

## 2021-01-06 RX ADMIN — ENOXAPARIN SODIUM 40 MG: 40 INJECTION SUBCUTANEOUS at 10:44

## 2021-01-06 RX ADMIN — BRIMONIDINE TARTRATE 1 DROP: 2 SOLUTION OPHTHALMIC at 07:33

## 2021-01-06 RX ADMIN — DORZOLAMIDE HYDROCHLORIDE 1 DROP: 20 SOLUTION/ DROPS OPHTHALMIC at 08:46

## 2021-01-06 RX ADMIN — ACETAMINOPHEN 650 MG: 325 TABLET ORAL at 19:51

## 2021-01-06 RX ADMIN — Medication 10 ML: at 06:00

## 2021-01-06 RX ADMIN — DORZOLAMIDE HYDROCHLORIDE 1 DROP: 20 SOLUTION/ DROPS OPHTHALMIC at 19:57

## 2021-01-06 RX ADMIN — PANTOPRAZOLE SODIUM 40 MG: 40 TABLET, DELAYED RELEASE ORAL at 07:34

## 2021-01-06 RX ADMIN — LATANOPROST 1 DROP: 50 SOLUTION OPHTHALMIC at 22:25

## 2021-01-06 RX ADMIN — ACETAMINOPHEN 650 MG: 325 TABLET ORAL at 23:37

## 2021-01-06 RX ADMIN — Medication 81 MG: at 08:39

## 2021-01-06 RX ADMIN — SENNOSIDES AND DOCUSATE SODIUM 1 TABLET: 8.6; 5 TABLET ORAL at 08:39

## 2021-01-06 RX ADMIN — METOPROLOL SUCCINATE 25 MG: 25 TABLET, EXTENDED RELEASE ORAL at 08:39

## 2021-01-06 RX ADMIN — PRAVASTATIN SODIUM 80 MG: 40 TABLET ORAL at 22:25

## 2021-01-06 RX ADMIN — PIPERACILLIN AND TAZOBACTAM 3.38 G: 3; .375 INJECTION, POWDER, LYOPHILIZED, FOR SOLUTION INTRAVENOUS at 17:24

## 2021-01-06 RX ADMIN — BRIMONIDINE TARTRATE 1 DROP: 2 SOLUTION OPHTHALMIC at 22:26

## 2021-01-06 RX ADMIN — AMLODIPINE BESYLATE 5 MG: 5 TABLET ORAL at 08:39

## 2021-01-06 RX ADMIN — GLIPIZIDE 5 MG: 5 TABLET ORAL at 07:34

## 2021-01-06 RX ADMIN — VANCOMYCIN HYDROCHLORIDE 1000 MG: 1 INJECTION, POWDER, LYOPHILIZED, FOR SOLUTION INTRAVENOUS at 17:23

## 2021-01-06 RX ADMIN — ACETAMINOPHEN 650 MG: 325 TABLET ORAL at 13:33

## 2021-01-06 NOTE — PROGRESS NOTES
Bedside and Verbal shift change report given to Aleksandar Aparicio (oncoming nurse) by [employer], RN (offgoing nurse). Report included the following information SBAR, Kardex, OR Summary, Intake/Output, MAR and Recent Results.

## 2021-01-06 NOTE — PROGRESS NOTES
Vascular:    Seen earlier today    Stable post left leg bypass    Leg dressed, palpable graft pulse    ContinueIV antibiotics, PT consult

## 2021-01-06 NOTE — PROGRESS NOTES
Day #3 of Vancomycin  Indication:  Lt DM foot infection  - s/p Left popliteal to dorsalis pedis bypass, closure of left foot wound ()    Current regimen:  1250 mg IV Q 24hrs   Abx regimen:  Vancomycin and Zosyn  ID Following ?: NO  Concomitant nephrotoxic drugs (requires more frequent monitoring): Contrast agents ([email or web address])  Frequency of BMP?: Daily    Recent Labs     21  0338 21  0220   WBC 7.5  --    CREA 1.41* 1.28   BUN 31* 28*   Est CrCl: 45.5 ml/min; UO: 0.4 ml/kg/hr  Temp (24hrs), Av °F (36.7 °C), Min:97.5 °F (36.4 °C), Max:99.1 °F (37.3 °C)    Cultures:    Wound, Lt foot - light GNR - pending   Wound, Lt foot (anaerobic) - NGTD - pending    Goal trough = 10 - 15 mcg/mL    Recent trough history (date/time/level/dose/action taken):  None thus far    Plan: SCr bumped overnight; will empirically adjust dose. Change to 1000 mg IV Q 24hrs . Pharmacy will continue to monitor this patient daily for changes in clinical status and renal function.     Kvng Liu, PharmD  Clinical Pharmacist, Orthopedics and Med/Surg ()  [id number] ()
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
## 2021-01-07 LAB
ANION GAP SERPL CALC-SCNC: 7 MMOL/L (ref 5–15)
BUN SERPL-MCNC: 28 MG/DL (ref 6–20)
BUN/CREAT SERPL: 20 (ref 12–20)
CALCIUM SERPL-MCNC: 9 MG/DL (ref 8.5–10.1)
CHLORIDE SERPL-SCNC: 104 MMOL/L (ref 97–108)
CO2 SERPL-SCNC: 27 MMOL/L (ref 21–32)
COMMENT, HOLDF: NORMAL
CREAT SERPL-MCNC: 1.37 MG/DL (ref 0.7–1.3)
GLUCOSE BLD STRIP.AUTO-MCNC: 102 MG/DL (ref 65–100)
GLUCOSE BLD STRIP.AUTO-MCNC: 73 MG/DL (ref 65–100)
GLUCOSE BLD STRIP.AUTO-MCNC: 80 MG/DL (ref 65–100)
GLUCOSE BLD STRIP.AUTO-MCNC: 89 MG/DL (ref 65–100)
GLUCOSE SERPL-MCNC: 92 MG/DL (ref 65–100)
POTASSIUM SERPL-SCNC: 3.2 MMOL/L (ref 3.5–5.1)
SAMPLES BEING HELD,HOLD: NORMAL
SERVICE CMNT-IMP: ABNORMAL
SERVICE CMNT-IMP: NORMAL
SODIUM SERPL-SCNC: 138 MMOL/L (ref 136–145)

## 2021-01-07 PROCEDURE — 74011250637 HC RX REV CODE- 250/637

## 2021-01-07 PROCEDURE — 82962 GLUCOSE BLOOD TEST: CPT

## 2021-01-07 PROCEDURE — 80048 BASIC METABOLIC PNL TOTAL CA: CPT

## 2021-01-07 PROCEDURE — 74011250636 HC RX REV CODE- 250/636

## 2021-01-07 PROCEDURE — 36415 COLL VENOUS BLD VENIPUNCTURE: CPT

## 2021-01-07 PROCEDURE — 65270000032 HC RM SEMIPRIVATE

## 2021-01-07 PROCEDURE — 74011000258 HC RX REV CODE- 258

## 2021-01-07 RX ADMIN — TIMOLOL MALEATE 1 DROP: 5 SOLUTION/ DROPS OPHTHALMIC at 19:06

## 2021-01-07 RX ADMIN — Medication 81 MG: at 09:32

## 2021-01-07 RX ADMIN — HYDROCHLOROTHIAZIDE 25 MG: 25 TABLET ORAL at 09:32

## 2021-01-07 RX ADMIN — SENNOSIDES AND DOCUSATE SODIUM 1 TABLET: 8.6; 5 TABLET ORAL at 09:32

## 2021-01-07 RX ADMIN — DORZOLAMIDE HYDROCHLORIDE 1 DROP: 20 SOLUTION/ DROPS OPHTHALMIC at 15:27

## 2021-01-07 RX ADMIN — DORZOLAMIDE HYDROCHLORIDE 1 DROP: 20 SOLUTION/ DROPS OPHTHALMIC at 19:06

## 2021-01-07 RX ADMIN — PRAVASTATIN SODIUM 80 MG: 40 TABLET ORAL at 21:54

## 2021-01-07 RX ADMIN — AMLODIPINE BESYLATE 5 MG: 5 TABLET ORAL at 09:31

## 2021-01-07 RX ADMIN — BRIMONIDINE TARTRATE 1 DROP: 2 SOLUTION OPHTHALMIC at 19:04

## 2021-01-07 RX ADMIN — Medication 10 ML: at 21:54

## 2021-01-07 RX ADMIN — LATANOPROST 1 DROP: 50 SOLUTION OPHTHALMIC at 21:55

## 2021-01-07 RX ADMIN — PIPERACILLIN AND TAZOBACTAM 3.38 G: 3; .375 INJECTION, POWDER, LYOPHILIZED, FOR SOLUTION INTRAVENOUS at 02:39

## 2021-01-07 RX ADMIN — GLIPIZIDE 5 MG: 5 TABLET ORAL at 07:34

## 2021-01-07 RX ADMIN — ACETAMINOPHEN 650 MG: 325 TABLET ORAL at 15:30

## 2021-01-07 RX ADMIN — PIPERACILLIN AND TAZOBACTAM 3.38 G: 3; .375 INJECTION, POWDER, LYOPHILIZED, FOR SOLUTION INTRAVENOUS at 09:35

## 2021-01-07 RX ADMIN — ACETAMINOPHEN 650 MG: 325 TABLET ORAL at 09:32

## 2021-01-07 RX ADMIN — ENOXAPARIN SODIUM 40 MG: 40 INJECTION SUBCUTANEOUS at 09:35

## 2021-01-07 RX ADMIN — BRIMONIDINE TARTRATE 1 DROP: 2 SOLUTION OPHTHALMIC at 21:55

## 2021-01-07 RX ADMIN — BRIMONIDINE TARTRATE 1 DROP: 2 SOLUTION OPHTHALMIC at 07:34

## 2021-01-07 RX ADMIN — SENNOSIDES AND DOCUSATE SODIUM 1 TABLET: 8.6; 5 TABLET ORAL at 20:31

## 2021-01-07 RX ADMIN — TIMOLOL MALEATE 1 DROP: 5 SOLUTION/ DROPS OPHTHALMIC at 15:27

## 2021-01-07 RX ADMIN — PIPERACILLIN AND TAZOBACTAM 3.38 G: 3; .375 INJECTION, POWDER, LYOPHILIZED, FOR SOLUTION INTRAVENOUS at 23:21

## 2021-01-07 RX ADMIN — PANTOPRAZOLE SODIUM 40 MG: 40 TABLET, DELAYED RELEASE ORAL at 07:34

## 2021-01-07 RX ADMIN — METOPROLOL SUCCINATE 25 MG: 25 TABLET, EXTENDED RELEASE ORAL at 09:32

## 2021-01-07 NOTE — PROGRESS NOTES
Bedside shift change report given to Shar Powell RN (oncoming nurse) by SUJATA Michaels (offgoing nurse). Report included the following information SBAR, Kardex, Intake/Output, MAR and Recent Results.

## 2021-01-07 NOTE — PROGRESS NOTES
Vascular:    Somewhat confused but aware of situation and place, some pain but not severe    Leg incisions OK, palpable graft pulse, foot incision OK    Cultures growing gram negatives , will stop vanco and continue Zosyn    Do not plan IV antibiotics post DC, home or SNF in a few days

## 2021-01-07 NOTE — PROGRESS NOTES
Bedside shift change report given to [address] [place] [address] (oncoming nurse) by Mio Gordon (offgoing nurse). Report included the following information SBAR, Kardex, Intake/Output, MAR and Recent Results.

## 2021-01-07 NOTE — PROGRESS NOTES
Chart reviewed. Attempted to see patient for OT evaluation. Patient declined OT due to 9/10 pain in LLE. Reported patient complaint to nursing.  Will follow up for evaluation as joanna Cano OTR/JONEL

## 2021-01-07 NOTE — PROGRESS NOTES
Transition of Care Plan  RUR 14%     Disposition   TBD-   Transportation TBD  BLS   Medical follow up  PCP and specialist     Contact  Daughter Yrn Curz  [phone]/ cell [phone]    Reason for Admission: Atherosclerosis  Of left leg, infection of the toe on the left leg                   RUR Score:       14%              Plan for utilizing home health:     TBD   Snf being recommended     PCP: First and Last name:  Genna Hammans    [facility] Practice[facility]   Are you a current patient: Yes/No: yes   Approximate date of last visit: 11/2020   Can you participate in a virtual visit with your PCP: no                    Current Advanced Directive/Advance Care Plan: No AMD                         Transition of Care Plan:       TBD  Pending medical and therapy progress. Therapy recommending Snf to progress to more independent level of function     CM attempted to meet with patient 2x today, but he was sleeping both times.      Cm called daughter Yrn Cruz 2x and left messages asking her to call CM regarding discharge planning

## 2021-01-08 LAB
BACTERIA SPEC CULT: ABNORMAL
GLUCOSE BLD STRIP.AUTO-MCNC: 104 MG/DL (ref 65–100)
GLUCOSE BLD STRIP.AUTO-MCNC: 128 MG/DL (ref 65–100)
GLUCOSE BLD STRIP.AUTO-MCNC: 135 MG/DL (ref 65–100)
GLUCOSE BLD STRIP.AUTO-MCNC: 136 MG/DL (ref 65–100)
GRAM STN SPEC: ABNORMAL
GRAM STN SPEC: ABNORMAL
SERVICE CMNT-IMP: ABNORMAL

## 2021-01-08 PROCEDURE — 74011250637 HC RX REV CODE- 250/637

## 2021-01-08 PROCEDURE — 82962 GLUCOSE BLOOD TEST: CPT

## 2021-01-08 PROCEDURE — 65270000032 HC RM SEMIPRIVATE

## 2021-01-08 PROCEDURE — 97535 SELF CARE MNGMENT TRAINING: CPT

## 2021-01-08 PROCEDURE — 74011000258 HC RX REV CODE- 258

## 2021-01-08 PROCEDURE — 74011250636 HC RX REV CODE- 250/636

## 2021-01-08 PROCEDURE — 97166 OT EVAL MOD COMPLEX 45 MIN: CPT

## 2021-01-08 PROCEDURE — 97530 THERAPEUTIC ACTIVITIES: CPT

## 2021-01-08 RX ADMIN — PIPERACILLIN AND TAZOBACTAM 3.38 G: 3; .375 INJECTION, POWDER, LYOPHILIZED, FOR SOLUTION INTRAVENOUS at 22:23

## 2021-01-08 RX ADMIN — ACETAMINOPHEN 650 MG: 325 TABLET ORAL at 17:17

## 2021-01-08 RX ADMIN — SENNOSIDES AND DOCUSATE SODIUM 1 TABLET: 8.6; 5 TABLET ORAL at 17:17

## 2021-01-08 RX ADMIN — PANTOPRAZOLE SODIUM 40 MG: 40 TABLET, DELAYED RELEASE ORAL at 06:59

## 2021-01-08 RX ADMIN — GLIPIZIDE 5 MG: 5 TABLET ORAL at 06:59

## 2021-01-08 RX ADMIN — Medication 81 MG: at 09:54

## 2021-01-08 RX ADMIN — METOPROLOL SUCCINATE 25 MG: 25 TABLET, EXTENDED RELEASE ORAL at 09:54

## 2021-01-08 RX ADMIN — SENNOSIDES AND DOCUSATE SODIUM 1 TABLET: 8.6; 5 TABLET ORAL at 09:54

## 2021-01-08 RX ADMIN — BRIMONIDINE TARTRATE 1 DROP: 2 SOLUTION OPHTHALMIC at 22:23

## 2021-01-08 RX ADMIN — LATANOPROST 1 DROP: 50 SOLUTION OPHTHALMIC at 22:23

## 2021-01-08 RX ADMIN — AMLODIPINE BESYLATE 5 MG: 5 TABLET ORAL at 09:54

## 2021-01-08 RX ADMIN — DORZOLAMIDE HYDROCHLORIDE 1 DROP: 20 SOLUTION/ DROPS OPHTHALMIC at 10:00

## 2021-01-08 RX ADMIN — TIMOLOL MALEATE 1 DROP: 5 SOLUTION/ DROPS OPHTHALMIC at 10:00

## 2021-01-08 RX ADMIN — PIPERACILLIN AND TAZOBACTAM 3.38 G: 3; .375 INJECTION, POWDER, LYOPHILIZED, FOR SOLUTION INTRAVENOUS at 14:51

## 2021-01-08 RX ADMIN — Medication 10 ML: at 07:01

## 2021-01-08 RX ADMIN — HYDROCHLOROTHIAZIDE 25 MG: 25 TABLET ORAL at 09:54

## 2021-01-08 RX ADMIN — DOCUSATE SODIUM 100 MG: 100 CAPSULE, LIQUID FILLED ORAL at 09:54

## 2021-01-08 RX ADMIN — ENOXAPARIN SODIUM 40 MG: 40 INJECTION SUBCUTANEOUS at 09:54

## 2021-01-08 RX ADMIN — BRIMONIDINE TARTRATE 1 DROP: 2 SOLUTION OPHTHALMIC at 14:53

## 2021-01-08 RX ADMIN — BRIMONIDINE TARTRATE 1 DROP: 2 SOLUTION OPHTHALMIC at 07:42

## 2021-01-08 RX ADMIN — PIPERACILLIN AND TAZOBACTAM 3.38 G: 3; .375 INJECTION, POWDER, LYOPHILIZED, FOR SOLUTION INTRAVENOUS at 07:00

## 2021-01-08 RX ADMIN — ACETAMINOPHEN 650 MG: 325 TABLET ORAL at 12:02

## 2021-01-08 RX ADMIN — PRAVASTATIN SODIUM 80 MG: 40 TABLET ORAL at 22:23

## 2021-01-08 NOTE — ADT AUTH CERT NOTES
Recommendation for IP by Lilly Perez RN 
 
  
Review Status Review Entered In Primary 2021 14:54  
  
Criteria Review I agree with INPATIENT admission status. Name: Bhavik Tate : 1935 MRN: [id number] [facility]# [id number] Insurance: [employer] Date of admission: 2021 Clinical summary Open amputation left first, second and third toes Vitals Stable Labs and Imaging Positive wound culture Inpatient status appropriate for this high-risk patient with poor postop recovery, persistent pain, altered mental state, requiring continued IV antibiotics, further evaluation, close monitoring, and ongoing hospital-based care. The case meets [facility] Inpatient care criteria (Ambulatory surgery exception criteria) The hospitalization status decision depends on the attending physician's judgment. Patient's chart was reviewed at 2:51 PM 2021 Ann-Marie Alatorre MD, MAURA, [facility], [facility] Physician Advisor [facility]  
   
  
Foot: Transmetatarsal Amputation - Care Day 5 (2021) by Lilly Perez RN 
 
  
Review Status Review Entered Completed 2021 14:21  
  
Criteria Review Care Day: 5 Care Date: 2021 Level of Care: Inpatient Floor Guideline Day 4 Level Of Care (X) Floor to discharge Clinical Status   
(X) * Hemodynamic stability 2021 14:20:31 EST by Jeremy Stevenson   
  T 98.5  P 64  /74  R 16  97% RA   
( ) * Adequate supported ambulation   
(X) * Wound intact 2021 14:20:01 EST by Jeremy Stevenson   
  No new issues. Leg incisions OK, graft open   
(X) * No evidence of postoperative or surgical site infection 2021 14:20:01 EST by Jeremy Stevenson   
  No new issues. Leg incisions OK, graft open   
(X) * Pain absent or managed 2021 14:20:01 EST by Jeremy Stevenson   
  po tylenol, prn norco not used ( ) * Discharge plans and education understood 2021 14:21:12 EST by Jeremy Stevenson   cm note 
 This crm received a call from [address] [facility] in [place].  Per [address] in [address] Patient's insurance is out of network. Patient could incur a hefty co-pay. Kenzie Watkins is following up with patient's daughter today. Emily Rock   
1/8/2021 14:20:01 EST by Andie Lafleur   
  Vascular: 
  
Likely to SNF first of week Activity   
(X) * Ambulatory with crutches or walker 1/8/2021 14:20:01 EST by Margareth Silva Pt performed sit<>stand with RW with min-mod A x 2 and verbal cues for proper hand placement. Pt tolerated short gait from bed to chair with RW with min-mod A x 2. Pt will continue to benefit from SNF placement upon discharge to continue therapy effo Routes   
(X) * Oral hydration, medications, and diet 1/8/2021 14:20:01 EST by Andie Lafleur   
  diabetic diet, po meds * Milestone Additional Notes Vascular:  
   
No new issues. Leg incisions OK, graft open  
   
Continue PT, IV antibiotics, DC planning  
   
Likely to SNF first of week Zosyn 3.375 g IV q8h  
  
Foot: Transmetatarsal Amputation - Care Day 4 (1/7/2021) by Lior Cline RN 
 
  
Review Status Review Entered Completed 1/8/2021 14:16  
  
Criteria Review Care Day: 4 Care Date: 1/7/2021 Level of Care: Inpatient Floor Guideline Day 4 Level Of Care (X) Floor to discharge 1/8/2021 14:16:21 EST by Andie Lafleur   
  surgical   
Clinical Status   
(X) * Hemodynamic stability 1/8/2021 14:16:21 EST by Andie Lafleur   
  T 97.9  P 60  /71  R 16 100% RA   
( ) * Adequate supported ambulation   
(X) * Wound intact 1/8/2021 14:16:21 EST by Andie Lafleur   
  some pain but not severe 
  
Leg incisions OK, palpable graft pulse, foot incision OK   
(X) * No evidence of postoperative or surgical site infection   
(X) * Pain absent or managed 1/8/2021 14:16:21 EST by Andie Lafleur   
  some pain but not severe ( ) * Discharge plans and education understood Activity   
(X) * Ambulatory with crutches or walker 1/8/2021 14:16:21 EST by Ayush Segundo   
  PT/OT up ad floyd Routes   
(X) * Oral hydration, medications, and diet 1/8/2021 14:16:21 EST by Ayush Segundo   
  Diabetic diet Interventions (X) Wound management 1/8/2021 14:16:21 EST by Ayush Segundo   
  IV abx, post op shoe * Milestone Additional Notes DATE OF SERVICE:  01/05/2021  
   
PREOPERATIVE DIAGNOSIS:  Peripheral vascular disease with gangrene, left great toe.  
   
POSTOPERATIVE DIAGNOSIS:  Peripheral vascular disease with gangrene, left great toe.  
   
PROCEDURE PERFORMED: 1.  Left popliteal to dorsalis pedis bypass using nonreversed saphenous vein. 2.  Closure of left foot wound.  
---------------------------------------  
  
DATE OF SERVICE:  01/04/2021  
   
PREOPERATIVE DIAGNOSIS:  Peripheral vascular disease with gangrene, left great toe.  
   
POSTOPERATIVE DIAGNOSIS:  Peripheral vascular disease with gangrene, left great toe.  
   
PROCEDURE PERFORMED:  Open amputation of left first, second and third toes LEFT FOOT WOUND   
GRAM STAIN    Final OCCASIONAL WBCS SEEN   
GRAM STAIN    Final OCCASIONAL GRAM NEGATIVE RODS Culture result: Abnormal    Final LIGHT Morganella morganii ssp morganii Culture result: Abnormal    Final LIGHT PROVIDENCIA RETTGERI Culture result: Abnormal    Final FEW ENTEROCOCCUS FAECALIS Culture result: Abnormal    Final SCANT ENTEROCOCCUS AVIUM Culture result: Abnormal    Final SCANT STAPHYLOCOCCUS SPECIES, COAGULASE NEGATIVE   
  
  
1/7 Vascular:  
   
Somewhat confused but aware of situation and place, some pain but not severe  
   
Leg incisions OK, palpable graft pulse, foot incision OK  
   
Cultures growing gram negatives , will stop vanco and continue Zosyn  
   
Do not plan IV antibiotics post DC, home or SNF in a few days 1/6 Vascular:  
   
Seen earlier today  
   
Stable post left leg bypass  
   
 Leg dressed, palpable graft pulse  
   
ContinueIV antibiotics, PT consult Zosyn 3.375 g IV 1/4, then q8h continues Vancomycin IV 2000 mg 1/4, 1250 mg 1/5, 1000 mg 1/6, discontinued

## 2021-01-08 NOTE — PROGRESS NOTES
FRANCOIS;   This crm [facility] a [facility] [address] [facility] [facility] [address] [facility] in [place]. Per Kristan in Adm. Patient's insurance is out of network. Patient could incur a hefty co-pay. She is following up with patient's daughter today. Tentative discharge is scheduled for early next week.

## 2021-01-08 NOTE — PROGRESS NOTES
Vascular:    No new issues.  Leg incisions OK, graft open    Continue PT, IV antibiotics, DC planning    Likely to SNF first of week
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
## 2021-01-08 NOTE — PROGRESS NOTES
Problem: Mobility Impaired (Adult and Pediatric)  Goal: *Acute Goals and Plan of Care (Insert Text)  Description: FUNCTIONAL STATUS PRIOR TO ADMISSION: Patient is a poor historian and no family present. Patient states he was living with wife and he would \"hop\" to and from the bathroom with assist of son in law and wife and needed help to stand up. Unsure how accurate this is. HOME SUPPORT PRIOR TO ADMISSION: Per patient he lived with wife and wife is also sick and unable to assist him much physically. Sounds like there is other family in the area. Physical Therapy Goals  Initiated 1/6/2021  1. Patient will move from supine to sit and sit to supine  in bed with modified independence within 7 day(s). 2.  Patient will transfer from bed to chair and chair to bed with minimal assistance the least restrictive device within 7 day(s). 3.  Patient will perform sit to stand with minimal assistance within 7 day(s). 4.  Patient will ambulate with minimal assistance for 10 feet with the least restrictive device within 7 day(s). Outcome: Progressing Towards Goal   PHYSICAL THERAPY TREATMENT  Patient: Little De La Cruz (47 y.o. male)  Date: 1/8/2021  Diagnosis: Atherosclerotic PVD with ulceration ([facility] [I70.209, L98.499] <principal problem not specified>  Procedure(s) (LRB):  LEFT POPLITEAL TO DORSALIS PEDIS BYPASS WITH VEIN GRAFT, CLOSURE OF LEFT FOOT WOUND (Left) 3 Days Post-Op  Precautions: Fall, WBAT  Chart, physical therapy assessment, plan of care and goals were reviewed. ASSESSMENT  Patient continues with skilled PT services and is progressing towards goals. Pt received supine in bed and agreeable to therapy with encouragement. Pt tolerated session fairly well, but continues to be limited by generalized weakness, decreased tolerance to activity, increased LLE pain, impaired balance and gait. Pt required additional time and effort for all mobility tasks.  Pt required total assist to don post-op shoe. Pt performed sit<>stand with RW with min-mod A x 2 and verbal cues for proper hand placement. Pt tolerated short gait from bed to chair with RW with min-mod A x 2. Pt will continue to benefit from SNF placement upon discharge to continue therapy efforts and reach highest level of independence. .     Current Level of Function Impacting Discharge (mobility/balance): min-mod A x 2 sit<>stand with RW, transfers bed to chair with min-mod A x 2    Other factors to consider for discharge: 2 person assist         PLAN :  Patient continues to benefit from skilled intervention to address the above impairments. Continue treatment per established plan of care. to address goals. Recommendation for discharge: (in order for the patient to meet his/her long term goals)  Therapy up to 5 days/week in SNF setting    This discharge recommendation:  Has been made in collaboration with the attending provider and/or case management    IF patient discharges home will need the following DME: pt would need 2 person assistance for all transfers       SUBJECTIVE:   Patient stated It's too early to get up and eat .     OBJECTIVE DATA SUMMARY:   Critical Behavior:  Neurologic State: Alert  Orientation Level: Oriented X4  Cognition: Follows commands, Impulsive, Memory loss, Poor safety awareness     Functional Mobility Training:  Bed Mobility:     Supine to Sit: Minimum assistance; Additional time     Scooting: Stand-by assistance        Transfers:  Sit to Stand: Minimum assistance; Moderate assistance;Assist x2  Stand to Sit: Minimum assistance; Moderate assistance;Assist x2                             Balance:  Sitting: Intact  Standing: Impaired; With support  Standing - Static: Constant support; Fair  Standing - Dynamic : Constant support; Fair  Ambulation/Gait Training:  Distance (ft): 2 Feet (ft)  Assistive Device: Gait belt;Walker, rolling  Ambulation - Level of Assistance:  Moderate assistance;Assist x2        Gait Abnormalities: Antalgic;Decreased step clearance; Step to gait        Base of Support: Widened  Stance: Left decreased  Speed/Valeria: Pace decreased (<100 feet/min)  Step Length: Right shortened;Left shortened             Therapeutic Exercises: Ankle pumps, LAQ, seated marching with verbal and visual cues  Pain Rating:  Pt c/o minimal LLE pain when held in dependent position    Activity Tolerance:   Good    After treatment patient left in no apparent distress:   Sitting in chair, Call bell within reach, Bed / chair alarm activated, and Side rails x 3    COMMUNICATION/COLLABORATION:   The patients plan of care was discussed with: Occupational therapist and Registered nurse.      Thomas Florentino, PT, DPT   Time Calculation: 23 mins

## 2021-01-08 NOTE — PROGRESS NOTES
Transition of Care Plan  RUR 14%    COVID 19 Negative 1/1/21  May require a new test-    Disposition   SNF  [facility] and [address] [place] [address] E / fax [phone]  [address]   Admissions  Authorization will be needed--[employer]  Facility will secure authorization     Transportation   [facility] phone [phone]    Contact:  Daughter  Fernanda Gil [phone]/ cell [phone]/ wk [phone]      CM received message from patient's daughter informing CM that she would like a referral sent to [facility] and [facility] in [facility] for    Patient [phone] / fax [phone]. CM talked with Cheri at the facility and medicals were faxed including COVID 19 Negative test 12/20/20. She will let CM know if another test is required. OT evaluation will be faxed when completed    Cheri said the facility will secure authorization. . She said patient may have co-pay as the facility is out of network, but [employer] may authorize if no other facility is available in the area. She will call CM to let her know after [address] responds. Daughter informed CM that patient and wife live in one level home in [place]. Patient's wife is in [place] with daughter at this time. The plan is for the patient and wife to return to their home in [place]. Daughter, told CM that  will be working 12 hours today 1/8/21, and can be reached at work  [phone] if CM needs her. CM will follow and assist with transition of care plan.

## 2021-01-08 NOTE — PROGRESS NOTES
Problem: Pressure Injury - Risk of  Goal: *Prevention of pressure injury  Description: Document Al Scale and appropriate interventions in the flowsheet.   Outcome: Progressing Towards Goal  Note: Pressure Injury Interventions:  Sensory Interventions: Assess changes in LOC         Activity Interventions: Assess need for specialty bed    Mobility Interventions: PT/OT evaluation    Nutrition Interventions: Document food/fluid/supplement intake    Friction and Shear Interventions: HOB 30 degrees or less, Minimize layers

## 2021-01-08 NOTE — PROGRESS NOTES
Bedside shift change report given to Kristen Curry (oncoming nurse) by Rachel Monterroso (offgoing nurse). Report included the following information SBAR, Kardex, Intake/Output, MAR and Recent Results.

## 2021-01-08 NOTE — PROGRESS NOTES
FRANCOIS;  This  attempted to call patient's daughter Axel Hawthorne at  Her job at [employer] without success. I left messages on daughter's cell phone to call unit and ask for . I was able to reach daughter at 3:30pm and she was going to reach out to [place] at [facility]. Per Kristan there would be an out of pocket expense and their accounting office was unsure if there would be other charges that would be incurred during patient's stay. I gave daughter the phone for this facility and she stated she would follow up. Currently daughter is working a 12 hour shift at [employer] in [place]. Daughter is now interested in reaching out to [facility] in [place], [place] and I have copied the OT evaluation which can be sent to [facility]. I will ask that the  working this unit over the weekend follow up and send information via fax or allscripts to the above facility per request of daughter. She is also aware that the tentative discharge plan is for her dad to be discharged next week.

## 2021-01-09 LAB
GLUCOSE BLD STRIP.AUTO-MCNC: 105 MG/DL (ref 65–100)
GLUCOSE BLD STRIP.AUTO-MCNC: 112 MG/DL (ref 65–100)
GLUCOSE BLD STRIP.AUTO-MCNC: 120 MG/DL (ref 65–100)
GLUCOSE BLD STRIP.AUTO-MCNC: 98 MG/DL (ref 65–100)
SERVICE CMNT-IMP: ABNORMAL
SERVICE CMNT-IMP: NORMAL

## 2021-01-09 PROCEDURE — 82962 GLUCOSE BLOOD TEST: CPT

## 2021-01-09 PROCEDURE — 74011250636 HC RX REV CODE- 250/636

## 2021-01-09 PROCEDURE — 65270000032 HC RM SEMIPRIVATE

## 2021-01-09 PROCEDURE — 74011250637 HC RX REV CODE- 250/637

## 2021-01-09 PROCEDURE — 74011000258 HC RX REV CODE- 258

## 2021-01-09 RX ADMIN — GLIPIZIDE 5 MG: 5 TABLET ORAL at 06:59

## 2021-01-09 RX ADMIN — SENNOSIDES AND DOCUSATE SODIUM 1 TABLET: 8.6; 5 TABLET ORAL at 18:48

## 2021-01-09 RX ADMIN — ACETAMINOPHEN 650 MG: 325 TABLET ORAL at 04:38

## 2021-01-09 RX ADMIN — PIPERACILLIN AND TAZOBACTAM 3.38 G: 3; .375 INJECTION, POWDER, LYOPHILIZED, FOR SOLUTION INTRAVENOUS at 06:57

## 2021-01-09 RX ADMIN — ACETAMINOPHEN 650 MG: 325 TABLET ORAL at 18:55

## 2021-01-09 RX ADMIN — DORZOLAMIDE HYDROCHLORIDE 1 DROP: 20 SOLUTION/ DROPS OPHTHALMIC at 18:48

## 2021-01-09 RX ADMIN — BRIMONIDINE TARTRATE 1 DROP: 2 SOLUTION OPHTHALMIC at 15:25

## 2021-01-09 RX ADMIN — SENNOSIDES AND DOCUSATE SODIUM 1 TABLET: 8.6; 5 TABLET ORAL at 10:14

## 2021-01-09 RX ADMIN — ENOXAPARIN SODIUM 40 MG: 40 INJECTION SUBCUTANEOUS at 10:14

## 2021-01-09 RX ADMIN — METOPROLOL SUCCINATE 25 MG: 25 TABLET, EXTENDED RELEASE ORAL at 10:12

## 2021-01-09 RX ADMIN — PIPERACILLIN AND TAZOBACTAM 3.38 G: 3; .375 INJECTION, POWDER, LYOPHILIZED, FOR SOLUTION INTRAVENOUS at 15:25

## 2021-01-09 RX ADMIN — PIPERACILLIN AND TAZOBACTAM 3.38 G: 3; .375 INJECTION, POWDER, LYOPHILIZED, FOR SOLUTION INTRAVENOUS at 22:20

## 2021-01-09 RX ADMIN — DORZOLAMIDE HYDROCHLORIDE 1 DROP: 20 SOLUTION/ DROPS OPHTHALMIC at 10:21

## 2021-01-09 RX ADMIN — AMLODIPINE BESYLATE 5 MG: 5 TABLET ORAL at 10:13

## 2021-01-09 RX ADMIN — BRIMONIDINE TARTRATE 1 DROP: 2 SOLUTION OPHTHALMIC at 21:15

## 2021-01-09 RX ADMIN — BRIMONIDINE TARTRATE 1 DROP: 2 SOLUTION OPHTHALMIC at 06:55

## 2021-01-09 RX ADMIN — TIMOLOL MALEATE 1 DROP: 5 SOLUTION/ DROPS OPHTHALMIC at 10:21

## 2021-01-09 RX ADMIN — LATANOPROST 1 DROP: 50 SOLUTION OPHTHALMIC at 21:15

## 2021-01-09 RX ADMIN — PANTOPRAZOLE SODIUM 40 MG: 40 TABLET, DELAYED RELEASE ORAL at 06:58

## 2021-01-09 RX ADMIN — TIMOLOL MALEATE 1 DROP: 5 SOLUTION/ DROPS OPHTHALMIC at 18:49

## 2021-01-09 RX ADMIN — PRAVASTATIN SODIUM 80 MG: 40 TABLET ORAL at 21:15

## 2021-01-09 RX ADMIN — DOCUSATE SODIUM 100 MG: 100 CAPSULE, LIQUID FILLED ORAL at 10:12

## 2021-01-09 RX ADMIN — HYDROCHLOROTHIAZIDE 25 MG: 25 TABLET ORAL at 10:13

## 2021-01-09 RX ADMIN — Medication 81 MG: at 10:13

## 2021-01-09 RX ADMIN — ACETAMINOPHEN 650 MG: 325 TABLET ORAL at 10:14

## 2021-01-09 NOTE — PROGRESS NOTES
Bedside shift change report given to Lesley Mcnamara RN (oncoming nurse) by Leslie Kohli RN (offgoing nurse). Report included the following information SBAR, Kardex, Intake/Output, MAR and Recent Results.

## 2021-01-09 NOTE — PROGRESS NOTES
Vascular Surgery  --concerned about his blood sugars (although have been ok)  Patient Vitals for the past 12 hrs:   Temp Pulse Resp BP SpO2   01/09/21 0733 97.4 °F (36.3 °C) 60 16 (!) 155/79 99 %     Strong graft pulse; foot dressed    Plan:  --Pt/OT/d/c planning

## 2021-01-10 LAB
GLUCOSE BLD STRIP.AUTO-MCNC: 105 MG/DL (ref 65–100)
GLUCOSE BLD STRIP.AUTO-MCNC: 110 MG/DL (ref 65–100)
GLUCOSE BLD STRIP.AUTO-MCNC: 128 MG/DL (ref 65–100)
GLUCOSE BLD STRIP.AUTO-MCNC: 96 MG/DL (ref 65–100)
SERVICE CMNT-IMP: ABNORMAL
SERVICE CMNT-IMP: NORMAL

## 2021-01-10 PROCEDURE — 74011250636 HC RX REV CODE- 250/636

## 2021-01-10 PROCEDURE — 65270000032 HC RM SEMIPRIVATE

## 2021-01-10 PROCEDURE — 82962 GLUCOSE BLOOD TEST: CPT

## 2021-01-10 PROCEDURE — 74011250637 HC RX REV CODE- 250/637

## 2021-01-10 PROCEDURE — 74011000258 HC RX REV CODE- 258

## 2021-01-10 RX ADMIN — BRIMONIDINE TARTRATE 1 DROP: 2 SOLUTION OPHTHALMIC at 16:33

## 2021-01-10 RX ADMIN — PRAVASTATIN SODIUM 80 MG: 40 TABLET ORAL at 22:10

## 2021-01-10 RX ADMIN — ACETAMINOPHEN 650 MG: 325 TABLET ORAL at 09:36

## 2021-01-10 RX ADMIN — PIPERACILLIN AND TAZOBACTAM 3.38 G: 3; .375 INJECTION, POWDER, LYOPHILIZED, FOR SOLUTION INTRAVENOUS at 16:30

## 2021-01-10 RX ADMIN — SENNOSIDES AND DOCUSATE SODIUM 1 TABLET: 8.6; 5 TABLET ORAL at 09:35

## 2021-01-10 RX ADMIN — BRIMONIDINE TARTRATE 1 DROP: 2 SOLUTION OPHTHALMIC at 08:33

## 2021-01-10 RX ADMIN — PIPERACILLIN AND TAZOBACTAM 3.38 G: 3; .375 INJECTION, POWDER, LYOPHILIZED, FOR SOLUTION INTRAVENOUS at 22:10

## 2021-01-10 RX ADMIN — DORZOLAMIDE HYDROCHLORIDE 1 DROP: 20 SOLUTION/ DROPS OPHTHALMIC at 09:37

## 2021-01-10 RX ADMIN — ENOXAPARIN SODIUM 40 MG: 40 INJECTION SUBCUTANEOUS at 09:36

## 2021-01-10 RX ADMIN — METOPROLOL SUCCINATE 25 MG: 25 TABLET, EXTENDED RELEASE ORAL at 09:35

## 2021-01-10 RX ADMIN — PANTOPRAZOLE SODIUM 40 MG: 40 TABLET, DELAYED RELEASE ORAL at 08:33

## 2021-01-10 RX ADMIN — Medication 81 MG: at 09:35

## 2021-01-10 RX ADMIN — DORZOLAMIDE HYDROCHLORIDE 1 DROP: 20 SOLUTION/ DROPS OPHTHALMIC at 17:50

## 2021-01-10 RX ADMIN — ACETAMINOPHEN 650 MG: 325 TABLET ORAL at 22:10

## 2021-01-10 RX ADMIN — LATANOPROST 1 DROP: 50 SOLUTION OPHTHALMIC at 22:13

## 2021-01-10 RX ADMIN — DOCUSATE SODIUM 100 MG: 100 CAPSULE, LIQUID FILLED ORAL at 09:35

## 2021-01-10 RX ADMIN — AMLODIPINE BESYLATE 5 MG: 5 TABLET ORAL at 09:35

## 2021-01-10 RX ADMIN — TIMOLOL MALEATE 1 DROP: 5 SOLUTION/ DROPS OPHTHALMIC at 17:50

## 2021-01-10 RX ADMIN — HYDROCHLOROTHIAZIDE 25 MG: 25 TABLET ORAL at 09:35

## 2021-01-10 RX ADMIN — ACETAMINOPHEN 650 MG: 325 TABLET ORAL at 16:30

## 2021-01-10 RX ADMIN — PIPERACILLIN AND TAZOBACTAM 3.38 G: 3; .375 INJECTION, POWDER, LYOPHILIZED, FOR SOLUTION INTRAVENOUS at 06:16

## 2021-01-10 RX ADMIN — BRIMONIDINE TARTRATE 1 DROP: 2 SOLUTION OPHTHALMIC at 22:13

## 2021-01-10 RX ADMIN — TIMOLOL MALEATE 1 DROP: 5 SOLUTION/ DROPS OPHTHALMIC at 09:37

## 2021-01-10 RX ADMIN — GLIPIZIDE 5 MG: 5 TABLET ORAL at 08:33

## 2021-01-10 NOTE — PROGRESS NOTES
Transitions of Care plan: SNF placement    -RUR: 11%  -CM noted previous care manager indicating daughter would like patient to go to [facility]. No referral was sent. CM sent a referral to them via [employer].  -Daughter contacted Ricky Lazo, [phone], w: [phone]) and she confirmed she would like that facility as her first choice and her second choice is ? [facility].     Yaz HERNÁNDEZ, ACM-SW

## 2021-01-10 NOTE — ROUTINE PROCESS
Bedside shift change report given to Love Varela RN (oncoming nurse) by Hailey Simon RN (offgoing nurse). Report included the following information SBAR, Kardex, Intake/Output, MAR and Recent Results.

## 2021-01-10 NOTE — PROGRESS NOTES
Patient up to bedside commode for bowel movement and sat in chair for dinner. Pain controlled on tylenol today. Bedside shift change report given to [address] [place] [address] (oncoming nurse) by Lupe Gaming RN (offgoing nurse). Report included the following information SBAR, Kardex, MAR and Recent Results.

## 2021-01-10 NOTE — PROGRESS NOTES
Vascular Surgery  --no complaints other than incisional pain  --foot dressed  --strong graft pulse  --probable d/c early this week

## 2021-01-11 LAB
GLUCOSE BLD STRIP.AUTO-MCNC: 105 MG/DL (ref 65–100)
GLUCOSE BLD STRIP.AUTO-MCNC: 135 MG/DL (ref 65–100)
GLUCOSE BLD STRIP.AUTO-MCNC: 137 MG/DL (ref 65–100)
GLUCOSE BLD STRIP.AUTO-MCNC: 289 MG/DL (ref 65–100)
GLUCOSE BLD STRIP.AUTO-MCNC: 90 MG/DL (ref 65–100)
SERVICE CMNT-IMP: ABNORMAL
SERVICE CMNT-IMP: NORMAL

## 2021-01-11 PROCEDURE — 74011250637 HC RX REV CODE- 250/637

## 2021-01-11 PROCEDURE — 74011000258 HC RX REV CODE- 258

## 2021-01-11 PROCEDURE — 82962 GLUCOSE BLOOD TEST: CPT

## 2021-01-11 PROCEDURE — 74011250636 HC RX REV CODE- 250/636

## 2021-01-11 PROCEDURE — 87635 SARS-COV-2 COVID-19 AMP PRB: CPT

## 2021-01-11 PROCEDURE — 65270000032 HC RM SEMIPRIVATE

## 2021-01-11 PROCEDURE — 97535 SELF CARE MNGMENT TRAINING: CPT

## 2021-01-11 RX ADMIN — Medication 81 MG: at 09:00

## 2021-01-11 RX ADMIN — BRIMONIDINE TARTRATE 1 DROP: 2 SOLUTION OPHTHALMIC at 06:39

## 2021-01-11 RX ADMIN — BRIMONIDINE TARTRATE 1 DROP: 2 SOLUTION OPHTHALMIC at 13:58

## 2021-01-11 RX ADMIN — TIMOLOL MALEATE 1 DROP: 5 SOLUTION/ DROPS OPHTHALMIC at 09:06

## 2021-01-11 RX ADMIN — DORZOLAMIDE HYDROCHLORIDE 1 DROP: 20 SOLUTION/ DROPS OPHTHALMIC at 09:06

## 2021-01-11 RX ADMIN — ACETAMINOPHEN 650 MG: 325 TABLET ORAL at 13:58

## 2021-01-11 RX ADMIN — AMLODIPINE BESYLATE 5 MG: 5 TABLET ORAL at 09:00

## 2021-01-11 RX ADMIN — PIPERACILLIN AND TAZOBACTAM 3.38 G: 3; .375 INJECTION, POWDER, LYOPHILIZED, FOR SOLUTION INTRAVENOUS at 14:03

## 2021-01-11 RX ADMIN — GLIPIZIDE 5 MG: 5 TABLET ORAL at 06:39

## 2021-01-11 RX ADMIN — PANTOPRAZOLE SODIUM 40 MG: 40 TABLET, DELAYED RELEASE ORAL at 06:39

## 2021-01-11 RX ADMIN — LATANOPROST 1 DROP: 50 SOLUTION OPHTHALMIC at 22:35

## 2021-01-11 RX ADMIN — PIPERACILLIN AND TAZOBACTAM 3.38 G: 3; .375 INJECTION, POWDER, LYOPHILIZED, FOR SOLUTION INTRAVENOUS at 06:39

## 2021-01-11 RX ADMIN — ENOXAPARIN SODIUM 40 MG: 40 INJECTION SUBCUTANEOUS at 09:01

## 2021-01-11 RX ADMIN — BRIMONIDINE TARTRATE 1 DROP: 2 SOLUTION OPHTHALMIC at 22:35

## 2021-01-11 RX ADMIN — PRAVASTATIN SODIUM 80 MG: 40 TABLET ORAL at 22:31

## 2021-01-11 RX ADMIN — METOPROLOL SUCCINATE 25 MG: 25 TABLET, EXTENDED RELEASE ORAL at 09:00

## 2021-01-11 RX ADMIN — TIMOLOL MALEATE 1 DROP: 5 SOLUTION/ DROPS OPHTHALMIC at 17:51

## 2021-01-11 RX ADMIN — SENNOSIDES AND DOCUSATE SODIUM 1 TABLET: 8.6; 5 TABLET ORAL at 09:00

## 2021-01-11 RX ADMIN — DORZOLAMIDE HYDROCHLORIDE 1 DROP: 20 SOLUTION/ DROPS OPHTHALMIC at 17:51

## 2021-01-11 RX ADMIN — HYDROCHLOROTHIAZIDE 25 MG: 25 TABLET ORAL at 09:00

## 2021-01-11 RX ADMIN — PIPERACILLIN AND TAZOBACTAM 3.38 G: 3; .375 INJECTION, POWDER, LYOPHILIZED, FOR SOLUTION INTRAVENOUS at 22:33

## 2021-01-11 RX ADMIN — DOCUSATE SODIUM 100 MG: 100 CAPSULE, LIQUID FILLED ORAL at 09:00

## 2021-01-11 RX ADMIN — ACETAMINOPHEN 650 MG: 325 TABLET ORAL at 19:22

## 2021-01-11 RX ADMIN — ACETAMINOPHEN 650 MG: 325 TABLET ORAL at 09:00

## 2021-01-11 NOTE — PROGRESS NOTES
Problem: Self Care Deficits Care Plan (Adult)  Goal: *Therapy Goal (Edit Goal, Insert Text)  Description:   FUNCTIONAL STATUS PRIOR TO ADMISSION: Patient was modified independent using assistive devices    HOME SUPPORT: The patient lived with spouse. Occupational Therapy Goals  Initiated 1/8/2021  1. Patient will perform grooming with supervision/set-up in stand within 7 day(s). 2.  Patient will perform bathing with supervision/set-up within 7 day(s). 3.  Patient will perform lower body dressing with supervision/set-up within 7 day(s). 4.  Patient will perform toilet transfers with supervision/set-up within 7 day(s). 5.  Patient will perform all aspects of toileting with supervision/set-up within 7 day(s). 6.  Patient will participate in upper extremity therapeutic exercise/activities with supervision/set-up for 10 minutes within 7 day(s). 7.  Patient will utilize energy conservation techniques during functional activities with verbal cues within 7 day(s). Outcome: Progressing Towards Goal   OCCUPATIONAL THERAPY TREATMENT  Patient: Amanda Jackson (81 y.o. male)  Date: 1/11/2021  Diagnosis: Atherosclerotic PVD with ulceration ([facility] [I70.209, L98.499] <principal problem not specified>  Procedure(s) (LRB):  LEFT POPLITEAL TO DORSALIS PEDIS BYPASS WITH VEIN GRAFT, CLOSURE OF LEFT FOOT WOUND (Left) 6 Days Post-Op  Precautions: Fall, WBAT  Chart, occupational therapy assessment, plan of care, and goals were reviewed. ASSESSMENT  Patient continues with skilled OT services and is progressing towards goals. Patient completed sit to stand transfers for ADL tasks, toilet transfer with Min A. Additional time required for mobility. Standing tolerance improving- duration > 2 min during toileting tasks today. CGA in stand for fall prevention. Pain is much better controlled, anticipate continued gains especially with LE ADL tasks.  Continued therapy services recommended at this time to maximize patient ADL/mobility performance prior to return home      Current Level of Function Impacting Discharge (ADLs): overall Min (UB ADL, support in stand) to Mod A (LB ADL)    Other factors to consider for discharge: Mod I was PLOF          PLAN :  Patient continues to benefit from skilled intervention to address the above impairments. Continue treatment per established plan of care. to address goals. Recommend with staff: OOB activity, ADL participation    Recommend next OT session: continue POC     Recommendation for discharge: (in order for the patient to meet his/her long term goals)  Therapy up to 5 days/week in SNF setting    This discharge recommendation:  Has not yet been discussed the attending provider and/or case management    IF patient discharges home will need the following DME: walker: rolling       SUBJECTIVE:   Patient stated I think I could use the bathroom.     OBJECTIVE DATA SUMMARY:   Cognitive/Behavioral Status:  Neurologic State: Alert  Orientation Level: Oriented X4  Cognition: Follows commands             Functional Mobility and Transfers for ADLs:  Bed Mobility:       Transfers:  Sit to Stand: Minimum assistance  Functional Transfers  Toilet Transfer : Minimum assistance;Assist x1       Balance:   Impaired- support in stand with walker required    ADL Intervention:       Toileting  Toileting Assistance: Minimum assistance  Bowel Hygiene: Contact guard assistance  Clothing Management: Minimum assistance  Adaptive Equipment: Walker       Pain:  5/10 LLE    Activity Tolerance:   requires rest breaks    After treatment patient left in no apparent distress:   Sitting in chair, Call bell within reach, and Bed / chair alarm activated    COMMUNICATION/COLLABORATION:   The patients plan of care was discussed with: Registered nurse.      Analy Albarran OT  Time Calculation: 20 mins

## 2021-01-11 NOTE — PROGRESS NOTES
Transitions of Care plan: SNF placement    -RUR: 11%  -Call received from patient's daughter, stating she had recevied a call from a physician and felt like her father was being pushed out of the hospital. DANG informed her that we were not pushing him out, but he is medically ready once placement has been secured. DANG informed her I had not received a response from Kalen at [facility], as the referral was sent on a Sunday when the office is not open. -Message left for admissions at [facility] ([phone]) to return my call. 12:50pm: No call back yet,  called again. They do not have access to allscripts so they asked that CM fax the referral. However, they will not have a bed until the end of the week and that could not be confirmed as a definite. CM called the second choice for placement ([facility], [phone]) and they asked that I fax them the referral to: [phone].    3:00pm: Call received from [place] [phone] stating they are not in network with [facility] but their sister facility in [facility] is in network. They will forward the paperwork to the [facility] facility. Daughter ([phone]) called and informed of the above. She asked that I send a referral to [facility] as well.      Meryle Ores Helton BSW, ACM-SW

## 2021-01-11 NOTE — PROGRESS NOTES
Vascular:    No new issues. Incisions OK, graft open. Plan DC on PO antibiotics when it can be arranged - ?  Tuesday

## 2021-01-11 NOTE — PROGRESS NOTES
Patient's family requesting orders for specific DME equipment, note placed on hard chart with requests. Patient up in chair for dinner and pain controlled on tylenol. Bedside shift change report given to [address] [place] [address] Salvador (oncoming nurse) by Mikayla Cole RN (offgoing nurse). Report included the following information SBAR, Kardex, MAR and Recent Results.

## 2021-01-11 NOTE — PROGRESS NOTES
Bedside shift change report given to Shireen Lafleur RN (oncoming nurse) by SUJATA Valentin (offgoing nurse). Report included the following information SBAR, Kardex, Intake/Output, MAR and Recent Results.

## 2021-01-11 NOTE — PROGRESS NOTES
RN mentioned family note concerning durable medical equipment to Dr. Charly Diaz during his rounds. Dr. Charly Diaz stated that note should be passed on to [address] [place] [address] W[facility] for discharge planning.

## 2021-01-12 LAB
GLUCOSE BLD STRIP.AUTO-MCNC: 101 MG/DL (ref 65–100)
GLUCOSE BLD STRIP.AUTO-MCNC: 104 MG/DL (ref 65–100)
GLUCOSE BLD STRIP.AUTO-MCNC: 113 MG/DL (ref 65–100)
GLUCOSE BLD STRIP.AUTO-MCNC: 90 MG/DL (ref 65–100)
SERVICE CMNT-IMP: ABNORMAL
SERVICE CMNT-IMP: NORMAL

## 2021-01-12 PROCEDURE — 97116 GAIT TRAINING THERAPY: CPT

## 2021-01-12 PROCEDURE — 74011250636 HC RX REV CODE- 250/636

## 2021-01-12 PROCEDURE — 74011000258 HC RX REV CODE- 258

## 2021-01-12 PROCEDURE — 65270000032 HC RM SEMIPRIVATE

## 2021-01-12 PROCEDURE — 97530 THERAPEUTIC ACTIVITIES: CPT

## 2021-01-12 PROCEDURE — 82962 GLUCOSE BLOOD TEST: CPT

## 2021-01-12 PROCEDURE — 74011250637 HC RX REV CODE- 250/637

## 2021-01-12 RX ORDER — HYDROCODONE BITARTRATE AND ACETAMINOPHEN 7.5; 325 MG/1; MG/1
1 TABLET ORAL
Qty: 25 TAB | Refills: 0 | Status: SHIPPED | OUTPATIENT
Start: 2021-01-12 | End: 2021-01-19

## 2021-01-12 RX ORDER — LEVOFLOXACIN 250 MG/1
500 TABLET ORAL DAILY
Qty: 14 TAB | Refills: 0 | Status: SHIPPED | OUTPATIENT
Start: 2021-01-12 | End: 2021-01-19

## 2021-01-12 RX ADMIN — LATANOPROST 1 DROP: 50 SOLUTION OPHTHALMIC at 22:52

## 2021-01-12 RX ADMIN — BRIMONIDINE TARTRATE 1 DROP: 2 SOLUTION OPHTHALMIC at 22:52

## 2021-01-12 RX ADMIN — BRIMONIDINE TARTRATE 1 DROP: 2 SOLUTION OPHTHALMIC at 07:52

## 2021-01-12 RX ADMIN — PANTOPRAZOLE SODIUM 40 MG: 40 TABLET, DELAYED RELEASE ORAL at 07:49

## 2021-01-12 RX ADMIN — TIMOLOL MALEATE 1 DROP: 5 SOLUTION/ DROPS OPHTHALMIC at 18:38

## 2021-01-12 RX ADMIN — DOCUSATE SODIUM 100 MG: 100 CAPSULE, LIQUID FILLED ORAL at 09:48

## 2021-01-12 RX ADMIN — ENOXAPARIN SODIUM 40 MG: 40 INJECTION SUBCUTANEOUS at 09:51

## 2021-01-12 RX ADMIN — GLIPIZIDE 5 MG: 5 TABLET ORAL at 07:49

## 2021-01-12 RX ADMIN — PIPERACILLIN AND TAZOBACTAM 3.38 G: 3; .375 INJECTION, POWDER, LYOPHILIZED, FOR SOLUTION INTRAVENOUS at 22:57

## 2021-01-12 RX ADMIN — Medication 81 MG: at 09:49

## 2021-01-12 RX ADMIN — METOPROLOL SUCCINATE 25 MG: 25 TABLET, EXTENDED RELEASE ORAL at 09:48

## 2021-01-12 RX ADMIN — SENNOSIDES AND DOCUSATE SODIUM 1 TABLET: 8.6; 5 TABLET ORAL at 09:49

## 2021-01-12 RX ADMIN — HYDROCODONE BITARTRATE AND ACETAMINOPHEN 1 TABLET: 7.5; 325 TABLET ORAL at 09:46

## 2021-01-12 RX ADMIN — HYDROCODONE BITARTRATE AND ACETAMINOPHEN 1 TABLET: 7.5; 325 TABLET ORAL at 18:38

## 2021-01-12 RX ADMIN — TIMOLOL MALEATE 1 DROP: 5 SOLUTION/ DROPS OPHTHALMIC at 09:57

## 2021-01-12 RX ADMIN — DORZOLAMIDE HYDROCHLORIDE 1 DROP: 20 SOLUTION/ DROPS OPHTHALMIC at 13:01

## 2021-01-12 RX ADMIN — HYDROCHLOROTHIAZIDE 25 MG: 25 TABLET ORAL at 09:49

## 2021-01-12 RX ADMIN — PIPERACILLIN AND TAZOBACTAM 3.38 G: 3; .375 INJECTION, POWDER, LYOPHILIZED, FOR SOLUTION INTRAVENOUS at 07:49

## 2021-01-12 RX ADMIN — PRAVASTATIN SODIUM 80 MG: 40 TABLET ORAL at 22:47

## 2021-01-12 RX ADMIN — AMLODIPINE BESYLATE 5 MG: 5 TABLET ORAL at 09:49

## 2021-01-12 RX ADMIN — PIPERACILLIN AND TAZOBACTAM 3.38 G: 3; .375 INJECTION, POWDER, LYOPHILIZED, FOR SOLUTION INTRAVENOUS at 14:15

## 2021-01-12 RX ADMIN — BRIMONIDINE TARTRATE 1 DROP: 2 SOLUTION OPHTHALMIC at 14:17

## 2021-01-12 RX ADMIN — DORZOLAMIDE HYDROCHLORIDE 1 DROP: 20 SOLUTION/ DROPS OPHTHALMIC at 18:38

## 2021-01-12 RX ADMIN — Medication 5 ML: at 06:00

## 2021-01-12 RX ADMIN — Medication 10 ML: at 22:00

## 2021-01-12 NOTE — PROGRESS NOTES
Problem: Mobility Impaired (Adult and Pediatric)  Goal: *Acute Goals and Plan of Care (Insert Text)  Description: FUNCTIONAL STATUS PRIOR TO ADMISSION: Patient is a poor historian and no family present. Patient states he was living with wife and he would \"hop\" to and from the bathroom with assist of son in law and wife and needed help to stand up. Unsure how accurate this is. HOME SUPPORT PRIOR TO ADMISSION: Per patient he lived with wife and wife is also sick and unable to assist him much physically. Sounds like there is other family in the area. Physical Therapy Goals  Initiated 1/6/2021  1. Patient will move from supine to sit and sit to supine  in bed with modified independence within 7 day(s). 2.  Patient will transfer from bed to chair and chair to bed with minimal assistance the least restrictive device within 7 day(s). 3.  Patient will perform sit to stand with minimal assistance within 7 day(s). 4.  Patient will ambulate with minimal assistance for 10 feet with the least restrictive device within 7 day(s). Outcome: Progressing Towards Goal   PHYSICAL THERAPY TREATMENT  Patient: Zane Vyas (65 y.o. male)  Date: 1/12/2021  Diagnosis: Atherosclerotic PVD with ulceration ([facility] [I70.209, L98.499] <principal problem not specified>  Procedure(s) (LRB):  LEFT POPLITEAL TO DORSALIS PEDIS BYPASS WITH VEIN GRAFT, CLOSURE OF LEFT FOOT WOUND (Left) 7 Days Post-Op  Precautions: Fall, WBAT  Chart, physical therapy assessment, plan of care and goals were reviewed. ASSESSMENT  Patient continues with skilled PT services and is progressing towards goals. Patient progressing to all goals - continues with left foot pain (pt unable to rate pain), decreased activity tolerance, and decline in functional mobility. Pt needs inpatient rehab to increase strength and endurance to decrease fall risk and safety for returning home to live with wife.       Current Level of Function Impacting Discharge (mobility/balance): CGA to min assist for transfers and amb with RW - limited to short distances    Other factors to consider for discharge: Lives with elderly wife - who is unable to physically assist patient         PLAN :  Patient continues to benefit from skilled intervention to address the above impairments. Continue treatment per established plan of care. to address goals. Recommendation for discharge: (in order for the patient to meet his/her long term goals)  Therapy up to 5 days/week in SNF setting    This discharge recommendation:  Has been made in collaboration with the attending provider and/or case management    IF patient discharges home will need the following DME: patient owns DME required for discharge       SUBJECTIVE:   Patient stated my left foot hurts - so I'm not putting much weight on it.     OBJECTIVE DATA SUMMARY:   Critical Behavior:  Neurologic State: Alert  Orientation Level: Oriented X4  Cognition: Follows commands     Functional Mobility Training:  Bed Mobility:     Supine to Sit: Minimum assistance; Additional time     Scooting: Stand-by assistance        Transfers:  Sit to Stand: Stand-by assistance(from elevated bed height)  Stand to Sit: Minimum assistance(for safety and verbal cues to reach back to chair)                             Balance:  Sitting: Intact  Standing: Impaired; With support  Standing - Static: Constant support;Good  Standing - Dynamic : Constant support; Fair  Ambulation/Gait Training:  Distance (ft): 40 Feet (ft)  Assistive Device: Walker, rolling;Gait belt  Ambulation - Level of Assistance: Contact guard assistance;Assist x1;Additional time        Gait Abnormalities: Decreased step clearance; Step to gait        Base of Support: Center of gravity altered;Shift to right  Stance: Left decreased  Speed/Valeria: Pace decreased (<100 feet/min)  Step Length: Right shortened;Left shortened        Pain Rating:  Pt reported pain at rest which increased with standing - pt unable to rate using a 10 point scale. Activity Tolerance:   Fair and requires rest breaks    After treatment patient left in no apparent distress:   Sitting in chair, Call bell within reach, Bed / chair alarm activated, and left foot elevated    COMMUNICATION/COLLABORATION:   The patients plan of care was discussed with: Occupational therapist, Registered nurse, and Case management.      Wayne Forrester, PT   Time Calculation: 25 mins

## 2021-01-12 NOTE — PROGRESS NOTES
Pt sat in the chair, and ambulated to the bed side commode with 1x assistance. He had 3 bowel movements today and wanted Docusate to be held.

## 2021-01-12 NOTE — PROGRESS NOTES
Transitions of Care plan: SNF placement    -RUR: 11%  -CM noted discharge orders for today. CM continuing to work on placement for him.  -[facility] not accept [place], [address] denied. -CM contacted daughter Caden Roman ([phone]) to update; informed her that [facility] in [facility] ([phone]) can accept patient. CM provided daughter with the contact information for this facility and she will be calling me back with a decision.   -Daughter inquired about qualifications for [employer] and having a caregiver for patient at home. CM informed her we could have the patient screened here to see if he even qualifies, but even if he is eligible, it is not something that would be arranged upon discharge. Will wait for return call. 11:00am: Daughter Caden Roman called back and stated she was ok with patient going to [address] [place] [address] in [facility]. CM faxed updated PT and OT notes to them   f: [phone]    2:00pm: Call received from [facility] with Terra Mcbride stating patient had been approved for placement at [facility] in [facility]. Authorization # [id number], with a start date of 1/13/2021 and next review date is 1/15/2021.     2:40pm: CM called [facility] in [facility] ([phone]) to notify of authorization starting tomorrow. Daughter called and notified as well.  She will be picking up patient tomorrow morning between 10-11 am.     LUC HERNÁNDEZ, ACM-SW

## 2021-01-12 NOTE — DISCHARGE INSTRUCTIONS
Patient Discharge Instructions    Michele Cordero / [id number] : 1935    Admitted 2021 Discharged: 2021     Take Home Medications     . · It is important that you take the medication exactly as they are prescribed. · Keep your medication in the bottles provided by the pharmacist and keep a list of the medication names, dosages, and times to be taken in your wallet. · Do not take other medications without consulting your doctor. What to do at Home    Recommended diet: Diabetic Diet,     Recommended activity: Activity as tolerated, stay off let foot as much as posible    Additional Instructions: Dr Frederic Capone with Dr Villafana Doctor in 1 week, call [phone] for appt        Information obtained by :  I understand that if any problems occur once I am at home I am to contact my physician. I understand and acknowledge receipt of the instructions indicated above.                                                                                                                                            Physician's or R.N.'s Signature                                                                  Date/Time                                                                                                                                              Patient or Representative Signature                                                          Date/Time

## 2021-01-12 NOTE — PROGRESS NOTES
Bedside shift change report given to Jamir Romero (oncoming nurse) by Rich Ocampo (offgoing nurse). Report included the following information SBAR, Kardex and MAR.

## 2021-01-13 VITALS
HEART RATE: 60 BPM | RESPIRATION RATE: 16 BRPM | DIASTOLIC BLOOD PRESSURE: 75 MMHG | SYSTOLIC BLOOD PRESSURE: 131 MMHG | TEMPERATURE: 97.7 F | HEIGHT: 68 IN | WEIGHT: 220 LBS | BODY MASS INDEX: 33.34 KG/M2 | OXYGEN SATURATION: 100 %

## 2021-01-13 LAB
COVID-19 RAPID TEST, COVR: NOT DETECTED
GLUCOSE BLD STRIP.AUTO-MCNC: 107 MG/DL (ref 65–100)
GLUCOSE BLD STRIP.AUTO-MCNC: 96 MG/DL (ref 65–100)
SERVICE CMNT-IMP: ABNORMAL
SERVICE CMNT-IMP: NORMAL
SOURCE, COVRS: NORMAL
SPECIMEN SOURCE, FCOV2M: NORMAL
SPECIMEN TYPE, XMCV1T: NORMAL

## 2021-01-13 PROCEDURE — 82962 GLUCOSE BLOOD TEST: CPT

## 2021-01-13 PROCEDURE — 74011250637 HC RX REV CODE- 250/637

## 2021-01-13 PROCEDURE — 74011250636 HC RX REV CODE- 250/636

## 2021-01-13 PROCEDURE — 74011000258 HC RX REV CODE- 258

## 2021-01-13 RX ADMIN — HYDROCHLOROTHIAZIDE 25 MG: 25 TABLET ORAL at 08:13

## 2021-01-13 RX ADMIN — ACETAMINOPHEN 650 MG: 325 TABLET ORAL at 12:15

## 2021-01-13 RX ADMIN — PIPERACILLIN AND TAZOBACTAM 3.38 G: 3; .375 INJECTION, POWDER, LYOPHILIZED, FOR SOLUTION INTRAVENOUS at 07:00

## 2021-01-13 RX ADMIN — Medication 81 MG: at 08:13

## 2021-01-13 RX ADMIN — DORZOLAMIDE HYDROCHLORIDE 1 DROP: 20 SOLUTION/ DROPS OPHTHALMIC at 08:19

## 2021-01-13 RX ADMIN — SENNOSIDES AND DOCUSATE SODIUM 1 TABLET: 8.6; 5 TABLET ORAL at 08:12

## 2021-01-13 RX ADMIN — AMLODIPINE BESYLATE 5 MG: 5 TABLET ORAL at 08:13

## 2021-01-13 RX ADMIN — ENOXAPARIN SODIUM 40 MG: 40 INJECTION SUBCUTANEOUS at 08:24

## 2021-01-13 RX ADMIN — PANTOPRAZOLE SODIUM 40 MG: 40 TABLET, DELAYED RELEASE ORAL at 07:18

## 2021-01-13 RX ADMIN — TIMOLOL MALEATE 1 DROP: 5 SOLUTION/ DROPS OPHTHALMIC at 08:20

## 2021-01-13 RX ADMIN — Medication 10 ML: at 07:00

## 2021-01-13 RX ADMIN — ACETAMINOPHEN 650 MG: 325 TABLET ORAL at 03:48

## 2021-01-13 RX ADMIN — METOPROLOL SUCCINATE 25 MG: 25 TABLET, EXTENDED RELEASE ORAL at 08:12

## 2021-01-13 RX ADMIN — GLIPIZIDE 5 MG: 5 TABLET ORAL at 07:18

## 2021-01-13 RX ADMIN — DOCUSATE SODIUM 100 MG: 100 CAPSULE, LIQUID FILLED ORAL at 08:12

## 2021-01-13 RX ADMIN — BRIMONIDINE TARTRATE 1 DROP: 2 SOLUTION OPHTHALMIC at 07:01

## 2021-01-13 RX ADMIN — ACETAMINOPHEN 650 MG: 325 TABLET ORAL at 08:16

## 2021-01-13 NOTE — PROGRESS NOTES
Bedside shift change report given to Rachelle Cano (oncoming nurse) by Rachel Gottlieb (offgoing nurse). Report included the following information SBAR, Kardex, Intake/Output, MAR and Recent Results.

## 2021-01-13 NOTE — PROGRESS NOTES
Transitions of Care plan: SNF placement today    -RUR: 12%  -Patient is ready for discharge today, will be going to [facility]. -CM spoke with daughter, Aixa, and she will provide transportation there today around 11 am.  -Report information is on the folder, discharge instructions and covid test have been faxed. Transition of Care Plan to SNF/Rehab    SNF/Rehab Transition:  Patient has been accepted to [facility] and meets criteria for admission. Patient will transported by car and expected to leave at 11 am    Communication to Patient/Family:  Met with patient and daughter and they are agreeable to the transition plan. Communication to SNF/Rehab:  Bedside RN, Juan Antonio Villa, has been notified to update the transition plan to the facility and call report (phone number [phone]). Discharge information has been updated on the AVS.     Discharge instructions to be fax'd to facility at [facility] # [phone]). Nursing Please include all hard scripts for controlled substances, med rec and dc summary, and AVS in packet. Reviewed and confirmed with facility, [facility], can manage the patient care needs for the following:     SNF/Rehab Transition:  Patient to follow-up with Home Health:  [address], other ,none)  PCP/Specialist:        Ladarius with (X) only those applicable:    Medication:  [x]  Medications will be available at the facility  []  IV Antibiotics   []  Controlled Substance - hard copy to be sent with patient   [x]  Weekly Labs   Documents:  [] Hard RX  [x] MAR  [x] Kardex  [x] AVS  []Transfer Summary  [x]Discharge   Equipment:  []  CPAP/BiPAP  []  Wound Vacuum  []  Dodson or Urinary Device  []  PICC/Central Line  []  Nebulizer  []  Ventilator   Treatment:  []Isolation (for MRSA, VRE, etc.)  []Surgical Drain Management  []Tracheostomy Care  []Dressing Changes  []Dialysis with transportation and chair time   []PEG Care  []Oxygen  []Daily Weights for Heart Failure Dietary:  []Any diet limitations  []Tube Feedings   []Total Parenteral Management (TPN)   Eligible for Medicaid Long Term Services and Supports  Yes:  [] Eligible for medical assistance or will become eligible within 180 days and UAI completed. [] Provider/Patient and/or support system has requested screening. [] UAI copy provided to patient or responsible party,  [] UAI unavailable at discharge will send once processed to SNF provider. [] UAI unavailable at discharged mailed to patient  No:   [] Private pay and is not financially eligible for Medicaid within the next 180 days. [] Reside out-of-state.   [] A residents of a state owned/operated facility that is licensed  by [address] [facility] or [facility]  [] Enrollment in [facility] hospice services  [] [address] [place] [address] [facility]  [] Patient /Family declines to have screening completed or provide financial information for screening     Financial Resources:  Medicaid    [] Initiated and application pending   [] Full coverage     Advanced Care Plan:  []Surrogate Decision Maker of Care  []POA  [x]Communicated Code Status (DDNR\", \"Full\")    Other

## 2021-01-13 NOTE — PROGRESS NOTES
Vascular:    No new issues - DC to rehab arranged for today. Covid test still pending. Left foot incision OK.

## 2021-01-13 NOTE — PROGRESS NOTES
Problem: Pressure Injury - Risk of  Goal: *Prevention of pressure injury  Description: Document Al Scale and appropriate interventions in the flowsheet. Outcome: Progressing Towards Goal  Note: Pressure Injury Interventions:  Sensory Interventions: Assess need for specialty bed, Keep linens dry and wrinkle-free, Minimize linen layers    Moisture Interventions: Absorbent underpads, Apply protective barrier, creams and emollients, Assess need for specialty bed, Minimize layers    Activity Interventions: Assess need for specialty bed, PT/OT evaluation, Pressure redistribution bed/mattress(bed type)    Mobility Interventions: Assess need for specialty bed, HOB 30 degrees or less, Pressure redistribution bed/mattress (bed type), PT/OT evaluation    Nutrition Interventions: Document food/fluid/supplement intake, Offer support with meals,snacks and hydration    Friction and Shear Interventions: Apply protective barrier, creams and emollients, HOB 30 degrees or less, Lift sheet                Problem: Patient Education: Go to Patient Education Activity  Goal: Patient/Family Education  Outcome: Progressing Towards Goal     Problem: Falls - Risk of  Goal: *Absence of Falls  Description: Document Ela Fall Risk and appropriate interventions in the flowsheet.   Outcome: Progressing Towards Goal  Note: Fall Risk Interventions:  Mobility Interventions: Bed/chair exit alarm, PT Consult for mobility concerns, Utilize walker, cane, or other assistive device    Mentation Interventions: Adequate sleep, hydration, pain control, Bed/chair exit alarm, Door open when patient unattended, Room close to nurse's station    Medication Interventions: Bed/chair exit alarm, Patient to call before getting OOB, Teach patient to arise slowly    Elimination Interventions: Bed/chair exit alarm, Call light in reach, Patient to call for help with toileting needs, Stay With Me (per policy), Toilet paper/wipes in reach, Toileting schedule/hourly rounds              Problem: Patient Education: Go to Patient Education Activity  Goal: Patient/Family Education  Outcome: Progressing Towards Goal     Problem: Patient Education: Go to Patient Education Activity  Goal: Patient/Family Education  Outcome: Progressing Towards Goal     Problem: Patient Education: Go to Patient Education Activity  Goal: Patient/Family Education  Outcome: Progressing Towards Goal

## 2021-01-13 NOTE — PROGRESS NOTES
[facility] MAR, Kardex and discharge instructions sheet in packet.

## 2021-01-13 NOTE — PROGRESS NOTES
Bedside and Verbal shift change report given to Shea Vega RN (oncoming nurse) by Monet Mehta RN/SUJATA Neely (offgoing nurse). Report included the following information SBAR, Kardex, Intake/Output, MAR, Accordion, Recent Results and Med Rec Status.

## 2021-01-15 NOTE — DISCHARGE SUMMARY
Andrew Canchola [address] SUMMARY    Name:  Barbara Parker  MR#:  [id number]  :  1935  ACCOUNT #:  [de-identified]  ADMIT DATE:  2021  DISCHARGE DATE:  2021      FINAL DIAGNOSES:  1. Peripheral vascular disease with gangrene, left great toe. 2.  Diabetes. PROCEDURES:  1. Amputation first, second, and third toes. 2.  Left popliteal dorsalis pedis bypass. HISTORY:  The patient is an 82-year-old male with diabetes who had ischemic changes in the left great toe with associated pain. He was scheduled to undergo outpatient great toe amputation. However, at the time of the procedure, which had been delayed several weeks, he was noted to have a significant infection involving the great toe which had progressed from previous evaluations and involved the second toe. He was admitted following open amputation of the first, second, and third toes. He was admitted for IV antibiotics and wound care as well as revascularization. HOSPITAL COURSE:  Following admission, the patient was taken to the operating room second time on the following day and underwent popliteal dorsalis pedis bypass and closure of his foot wound. He was maintained on IV antibiotics postoperatively. His bypass functioned well. He was gradually able to return to an ambulatory status with physical therapy. It was not felt suitable to return home immediately where he had limited help. He was ultimately discharged to a skilled nursing facility on 2021. He will continue p.o. antibiotics and dressing changes at the facility. At the time of discharge, he is clinically stable with well-healing leg incisions and a patent bypass. His toe amputation incisions are healing well. He is discharged on his usual medications with the addition of Levaquin 500 mg daily for 7 days and hydrocodone for pain.   He is discharged on a diabetic diet, activity as tolerated, and follow up in my office in 1 week.    DISPOSITION:  Skilled nursing facility.         Jena Baumann MD      [id number]  D:  01/14/2021 13:38  T:  01/14/2021 22:53  JOB #:  [id number]

## 2021-01-28 RX ORDER — LACTULOSE 10 G/15ML
SOLUTION ORAL; RECTAL
Qty: 5400 ML | Refills: 0 | Status: SHIPPED | OUTPATIENT
Start: 2021-01-28 | End: 2021-08-24 | Stop reason: ALTCHOICE

## 2021-01-28 RX ORDER — PRAVASTATIN SODIUM 80 MG/1
TABLET ORAL
Qty: 90 TAB | Refills: 1 | Status: SHIPPED | OUTPATIENT
Start: 2021-01-28 | End: 2021-08-04

## 2021-01-29 RX ORDER — LANOLIN ALCOHOL/MO/W.PET/CERES
CREAM (GRAM) TOPICAL
Qty: 90 TAB | Refills: 0 | Status: SHIPPED | OUTPATIENT
Start: 2021-01-29 | End: 2021-07-09

## 2021-01-29 RX ORDER — OMEPRAZOLE 40 MG/1
CAPSULE, DELAYED RELEASE ORAL
Qty: 90 CAP | Refills: 0 | Status: SHIPPED | OUTPATIENT
Start: 2021-01-29 | End: 2021-07-09

## 2021-01-29 RX ORDER — AMLODIPINE BESYLATE 10 MG/1
TABLET ORAL
Qty: 90 TAB | Refills: 0 | Status: SHIPPED | OUTPATIENT
Start: 2021-01-29 | End: 2021-07-09

## 2021-01-29 RX ORDER — GLIPIZIDE 5 MG/1
TABLET ORAL
Qty: 90 TAB | Refills: 0 | Status: SHIPPED | OUTPATIENT
Start: 2021-01-29 | End: 2021-06-06

## 2021-03-01 ENCOUNTER — TELEPHONE (OUTPATIENT)
Dept: PRIMARY CARE CLINIC | Age: 86
End: 2021-03-01

## 2021-03-01 DIAGNOSIS — I10 ESSENTIAL HYPERTENSION: Primary | ICD-10-CM

## 2021-03-01 DIAGNOSIS — E78.2 MIXED HYPERLIPIDEMIA: ICD-10-CM

## 2021-03-01 NOTE — TELEPHONE ENCOUNTER
Cindy Carmen would like for nurse to give her a call. Needs clarification on meds. She may be reached at [phone].

## 2021-03-03 NOTE — TELEPHONE ENCOUNTER
Attempted to contact Chelle with [facility], [facility] for Chelle to please return call.

## 2021-03-05 NOTE — TELEPHONE ENCOUNTER
Spoke with Chelle ([facility]). She stated she checked patient's meds and could not find these 2. He was last seen in the office 11/10/2020 with labs. . Was inpt  With labs 01/2021. No F/U milady't scheduled.

## 2021-03-06 NOTE — TELEPHONE ENCOUNTER
Looks like he was sent to a skilled nursing facility after his hospitalization. Not sure why they would have discontinued these medications unless they put him on something else. Needs OV to sort this out. He would need to bring in all the medications he is taking and we will need to do labs to check his cholesterol.  See if we can find out which rehab center he was in and get the discharge summary

## 2021-03-08 NOTE — TELEPHONE ENCOUNTER
Informed [facility]) that patient needed a F/U fasting OV in the very near future and he is to bring all of the medications that he is currently taking with him to visit. I also informed her that we will try to get the D/C summary from the facility that he was in after his hospital stay. She stated she would call his daughter and have her set up appointment and tell her about lab work and medications. She would also find out which facility he was in, she thinks he ws in one in [place].

## 2021-03-12 RX ORDER — AMLODIPINE BESYLATE 10 MG/1
TABLET ORAL
Qty: 90 TAB | Refills: 0 | Status: CANCELLED | OUTPATIENT
Start: 2021-03-12

## 2021-03-12 RX ORDER — PRAVASTATIN SODIUM 80 MG/1
TABLET ORAL
Qty: 90 TAB | Refills: 0 | Status: CANCELLED | OUTPATIENT
Start: 2021-03-12

## 2021-04-09 ENCOUNTER — HOSPITAL ENCOUNTER (EMERGENCY)
Age: 86
Discharge: STILL ADMITTED OR ADMINISTRATIVE | End: 2021-04-09

## 2021-04-09 ENCOUNTER — OFFICE VISIT (OUTPATIENT)
Dept: PRIMARY CARE CLINIC | Age: 86
End: 2021-04-09
Payer: MEDICARE

## 2021-04-09 ENCOUNTER — APPOINTMENT (OUTPATIENT)
Dept: GENERAL RADIOLOGY | Age: 86
End: 2021-04-09
Attending: NURSE PRACTITIONER
Payer: MEDICARE

## 2021-04-09 VITALS
RESPIRATION RATE: 20 BRPM | OXYGEN SATURATION: 98 % | DIASTOLIC BLOOD PRESSURE: 61 MMHG | SYSTOLIC BLOOD PRESSURE: 120 MMHG | TEMPERATURE: 97.3 F | HEART RATE: 67 BPM | BODY MASS INDEX: 33.45 KG/M2 | WEIGHT: 220 LBS

## 2021-04-09 DIAGNOSIS — M54.6 CHRONIC MIDLINE THORACIC BACK PAIN: ICD-10-CM

## 2021-04-09 DIAGNOSIS — G89.29 CHRONIC MIDLINE THORACIC BACK PAIN: ICD-10-CM
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
## 2021-04-09 DIAGNOSIS — G89.29 CHRONIC MIDLINE THORACIC BACK PAIN: Primary | ICD-10-CM

## 2021-04-09 DIAGNOSIS — M54.6 CHRONIC MIDLINE THORACIC BACK PAIN: Primary | ICD-10-CM

## 2021-04-09 PROCEDURE — G8752 SYS BP LESS 140: HCPCS | Performed by: NURSE PRACTITIONER

## 2021-04-09 PROCEDURE — G8432 DEP SCR NOT DOC, RNG: HCPCS | Performed by: NURSE PRACTITIONER

## 2021-04-09 PROCEDURE — G8536 NO DOC ELDER MAL SCRN: HCPCS | Performed by: NURSE PRACTITIONER

## 2021-04-09 PROCEDURE — G8427 DOCREV CUR MEDS BY ELIG CLIN: HCPCS | Performed by: NURSE PRACTITIONER

## 2021-04-09 PROCEDURE — G8754 DIAS BP LESS 90: HCPCS | Performed by: NURSE PRACTITIONER

## 2021-04-09 PROCEDURE — 72072 X-RAY EXAM THORAC SPINE 3VWS: CPT

## 2021-04-09 PROCEDURE — G8417 CALC BMI ABV UP PARAM F/U: HCPCS | Performed by: NURSE PRACTITIONER

## 2021-04-09 PROCEDURE — 99213 OFFICE O/P EST LOW 20 MIN: CPT | Performed by: NURSE PRACTITIONER

## 2021-04-09 PROCEDURE — 1101F PT FALLS ASSESS-DOCD LE1/YR: CPT | Performed by: NURSE PRACTITIONER

## 2021-04-09 RX ORDER — PREDNISONE 20 MG/1
20 TABLET ORAL
Qty: 5 TAB | Refills: 0 | Status: SHIPPED | OUTPATIENT
Start: 2021-04-09 | End: 2021-04-09 | Stop reason: SDUPTHER

## 2021-04-09 RX ORDER — PREDNISONE 20 MG/1
20 TABLET ORAL
Qty: 5 TAB | Refills: 0 | Status: SHIPPED | OUTPATIENT
Start: 2021-04-09 | End: 2021-07-08

## 2021-04-09 RX ORDER — CYCLOBENZAPRINE HCL 5 MG
5 TABLET ORAL
Qty: 15 TAB | Refills: 0 | Status: SHIPPED | OUTPATIENT
Start: 2021-04-09 | End: 2021-07-08

## 2021-04-09 RX ORDER — CYCLOBENZAPRINE HCL 5 MG
5 TABLET ORAL
Qty: 15 TAB | Refills: 0 | Status: SHIPPED | OUTPATIENT
Start: 2021-04-09 | End: 2021-04-09 | Stop reason: SDUPTHER

## 2021-04-09 NOTE — PROGRESS NOTES
Please let patient know that x-ray of thoracic spine shows arthritis but no fracture or compression deformity.

## 2021-04-09 NOTE — PATIENT INSTRUCTIONS
Back Stretches: Exercises Introduction Here are some examples of exercises for stretching your back. Start each exercise slowly. Ease off the exercise if you start to have pain. Your doctor or physical therapist will tell you when you can start these exercises and which ones will work best for you. How to do the exercises Overhead stretch 1. Stand comfortably with your feet shoulder-width apart. 2. Looking straight ahead, raise both arms over your head and reach toward the ceiling. Do not allow your head to tilt back. 3. Hold for 15 to 30 seconds, then lower your arms to your sides. 4. Repeat 2 to 4 times. Side stretch 1. Stand comfortably with your feet shoulder-width apart. 2. Raise one arm over your head, and then lean to the other side. 3. Slide your hand down your leg as you let the weight of your arm gently stretch your side muscles. Hold for 15 to 30 seconds. 4. Repeat 2 to 4 times on each side. Press-up 1. Lie on your stomach, supporting your body with your forearms. 2. Press your elbows down into the floor to raise your upper back. As you do this, relax your stomach muscles and allow your back to arch without using your back muscles. As your press up, do not let your hips or pelvis come off the floor. 3. Hold for 15 to 30 seconds, then relax. 4. Repeat 2 to 4 times. Relax and rest  
1. Lie on your back with a rolled towel under your neck and a pillow under your knees. Extend your arms comfortably to your sides. 2. Relax and breathe normally. 3. Remain in this position for about 10 minutes. 4. If you can, do this 2 or 3 times each day. Follow-up care is a key part of your treatment and safety. Be sure to make and go to all appointments, and call your doctor if you are having problems. It's also a good idea to know your test results and keep a list of the medicines you take. Where can you learn more? Go to http://www.gray.com/ Enter C560 in the search box to learn more about \"Back Stretches: Exercises. \" Current as of: November 16, 2020               Content Version: 12.8 © 2006-2021 [employer]. Care instructions adapted under license by [employer] (which disclaims liability or warranty for this information). If you have questions about a medical condition or this instruction, always ask your healthcare professional. [address] any warranty or liability for your use of this information. Back Stretches: Exercises Introduction Here are some examples of exercises for stretching your back. Start each exercise slowly. Ease off the exercise if you start to have pain. Your doctor or physical therapist will tell you when you can start these exercises and which ones will work best for you. How to do the exercises Overhead stretch 5. Stand comfortably with your feet shoulder-width apart. 6. Looking straight ahead, raise both arms over your head and reach toward the ceiling. Do not allow your head to tilt back. 7. Hold for 15 to 30 seconds, then lower your arms to your sides. 8. Repeat 2 to 4 times. Side stretch 5. Stand comfortably with your feet shoulder-width apart. 6. Raise one arm over your head, and then lean to the other side. 7. Slide your hand down your leg as you let the weight of your arm gently stretch your side muscles. Hold for 15 to 30 seconds. 8. Repeat 2 to 4 times on each side. Press-up 5. Lie on your stomach, supporting your body with your forearms. 6. Press your elbows down into the floor to raise your upper back. As you do this, relax your stomach muscles and allow your back to arch without using your back muscles. As your press up, do not let your hips or pelvis come off the floor. 7. Hold for 15 to 30 seconds, then relax. 8. Repeat 2 to 4 times. Relax and rest  
5. Lie on your back with a rolled towel under your neck and a pillow under your knees. Extend your arms comfortably to your sides. 6. Relax and breathe normally. 7. Remain in this position for about 10 minutes. 8. If you can, do this 2 or 3 times each day. Follow-up care is a key part of your treatment and safety. Be sure to make and go to all appointments, and call your doctor if you are having problems. It's also a good idea to know your test results and keep a list of the medicines you take. Where can you learn more? Go to http://www.gray.com/ Enter F407 in the search box to learn more about \"Back Stretches: Exercises. \" Current as of: November 16, 2020               Content Version: 12.8 © 2006-2021 [employer]. Care instructions adapted under license by [employer] (which disclaims liability or warranty for this information). If you have questions about a medical condition or this instruction, always ask your healthcare professional. [address] any warranty or liability for your use of this information.
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
## 2021-04-09 NOTE — PROGRESS NOTES
1. Have you been to the ER, urgent care clinic since your last visit? Hospitalized since your last visit? No    2. Have you seen or consulted any other health care providers outside of the [address] [place] [address] since your last visit? Include any pap smears or colon screening.  No

## 2021-04-12 ENCOUNTER — TELEPHONE (OUTPATIENT)
Dept: PRIMARY CARE CLINIC | Age: 86
End: 2021-04-12

## 2021-04-12 NOTE — TELEPHONE ENCOUNTER
Pt called back and was given the results         ----- Message from Greg Nolasco LPN sent at 9/42/4639  9:22 AM EDT -----  Called pt  daughter no answer had to LVM to call the office      ----- Message -----  From: Josh Palomo NP  Sent: 4/9/2021   4:24 PM EDT  To: Greg Nolasco LPN    Please let patient know that x-ray of thoracic spine shows arthritis but no fracture or compression deformity.

## 2021-06-06 RX ORDER — GLIPIZIDE 5 MG/1
TABLET ORAL
Qty: 90 TABLET | Refills: 0 | Status: SHIPPED | OUTPATIENT
Start: 2021-06-06 | End: 2021-08-06

## 2021-06-06 RX ORDER — METOPROLOL SUCCINATE 25 MG/1
TABLET, EXTENDED RELEASE ORAL
Qty: 90 TABLET | Refills: 0 | Status: SHIPPED | OUTPATIENT
Start: 2021-06-06 | End: 2021-08-06

## 2021-06-06 RX ORDER — HYDROCHLOROTHIAZIDE 25 MG/1
TABLET ORAL
Qty: 90 TABLET | Refills: 0 | Status: SHIPPED | OUTPATIENT
Start: 2021-06-06 | End: 2021-08-06

## 2021-06-09 ENCOUNTER — TELEPHONE (OUTPATIENT)
Dept: PHARMACY | Age: 86
End: 2021-06-09

## 2021-06-09 NOTE — LETTER
[address] [place] [id number] [facility] Rogelio Estrada [address] Leta Mckeon [address] [facility] [address] [place] [address]  
 
 
 
 
06/11/21 Dear Leta Mckeon, We tried to reach you recently regarding your glipizide 5mg daily and pravastatin 80mg nightly, but were unable to reach you on the telephone. If you are no longer taking or taking differently, please call us at the number below so that we can discuss this and update your medication profile. It appears that these medications have not been refilled at proper times. We are worried you might be missing doses or not taking it as directed. It is important that you take your medications regularly and try not to miss a single dose. It appears your are also due for an appointment - please contact your primary care provider office ([phone]) to schedule. Some ways to help you remember to take and refill your medications are to: · Use a pill box, set an alarm, and/or keep your medication near something that you do every day · Ask your pharmacy if they participate in [facility]", a program where you can  all of your medications on the same day · Ask your pharmacy if you can be set up with automatic refill, where they will automatically refill your prescription when it is due and let you know it's ready to  Sincerely, Audelia Miller, PharmD, [facility] [address] Direct: [phone] Department, toll free: [phone], option 7

## 2021-06-09 NOTE — TELEPHONE ENCOUNTER
CLINICAL PHARMACY: ADHERENCE REVIEW  Identified care gap per [facility] and Statin adherence    Last Visit: 11/10/20 (4/9/21 for acute)    Patient not found in Outcomes MTM    Ana Rosa Garza [address]    Per Insurance Records through 6/6/21 (YTD [facility] Anneliese = 69%; Potential Fail Date: 7/4/21):   Glipizide 5mg last filled on 1/29/21 for 90 day supply. Next refill due: 4/28/21    Per chart, glipizide 5mg daily reordered 6/6/21 for #90, 0 refills and note: \"(NEED APPOINTMENT BEFORE ANY FURTHER REFILLS)\"    Lab Results   Component Value Date/Time    Hemoglobin A1c 6.3 (H) 11/10/2020 03:23 PM    Hemoglobin A1c 6.7 (H) 07/21/2020 12:36 PM    Hemoglobin A1c, External 6.2 08/02/2019 12:00 AM     STATIN ADHERENCE    Per Insurance Records through 6/6/21 (YTD [facility] = 69%; Potential Fail Date: 7/4/21):   Pravastatin 80mg last filled on 1/29/21 for 90 day supply. Next refill due: 4/28/21    Per chart, pravastatin 80mg nightly last rx'd 1/28/21 for #90, 1 refill    Lab Results   Component Value Date/Time    Cholesterol, total 124 11/10/2020 03:23 PM    HDL Cholesterol 34 (L) 11/10/2020 03:23 PM    LDL, calculated 68 11/10/2020 03:23 PM    VLDL, calculated 22 11/10/2020 03:23 PM    Triglyceride 119 11/10/2020 03:23 PM     ALT (SGPT)   Date Value Ref Range Status   12/30/2020 12 12 - 78 U/L Final     AST (SGOT)   Date Value Ref Range Status   12/30/2020 22 15 - 37 U/L Final     The ASCVD Risk score (Baldev Ramires., et al., 2013) failed to calculate for the following reasons: The 2013 ASCVD risk score is only valid for ages 36 to 78     PLAN  The following are interventions that have been identified:  - Patient overdue refilling pravastatin and glipizide (refills were due @4/28) and active on home medication list   · Pravastatin appears to have refill remaining?   · Glipizide was reordered 6/6  - Needs to schedule PCP appt    Attempting to reach patient to review.  Left message asking for return call.    No future appointments.     Pieter Lorenzana, PharmD, [address]  Direct: [phone]  Department, toll free: [phone], option 7

## 2021-06-11 NOTE — TELEPHONE ENCOUNTER
Attempting again to reach patient.  Left another message and will send letter.     =========================================================   For Pharmacy Admin Tracking Only     Gap Closed?: No   Time Spent (min): 5

## 2021-06-16 NOTE — TELEPHONE ENCOUNTER
Daughter, Meaghan Calzada, returning call. States patient is still taking pravastatin and glipizide daily - that they had ordered them and then found more bottles/supply he'd had on hand. Reviewed that he is due for appt - daughter states they will schedule, but that they have some \"other things\" happening currently too.

## 2021-07-08 DIAGNOSIS — G89.29 CHRONIC MIDLINE THORACIC BACK PAIN: ICD-10-CM

## 2021-07-08 DIAGNOSIS — M54.6 CHRONIC MIDLINE THORACIC BACK PAIN: ICD-10-CM

## 2021-07-08 RX ORDER — PREDNISONE 20 MG/1
TABLET ORAL
Qty: 5 TABLET | Refills: 0 | Status: SHIPPED | OUTPATIENT
Start: 2021-07-08 | End: 2021-08-09 | Stop reason: ALTCHOICE

## 2021-07-08 RX ORDER — CYCLOBENZAPRINE HCL 5 MG
TABLET ORAL
Qty: 15 TABLET | Refills: 0 | Status: SHIPPED | OUTPATIENT
Start: 2021-07-08 | End: 2021-08-09 | Stop reason: SDUPTHER

## 2021-08-04 RX ORDER — PRAVASTATIN SODIUM 80 MG/1
TABLET ORAL
Qty: 90 TABLET | Refills: 1 | Status: ON HOLD | OUTPATIENT
Start: 2021-08-04 | End: 2022-03-30 | Stop reason: CLARIF

## 2021-08-06 RX ORDER — METOPROLOL SUCCINATE 25 MG/1
TABLET, EXTENDED RELEASE ORAL
Qty: 90 TABLET | Refills: 0 | Status: SHIPPED | OUTPATIENT
Start: 2021-08-06 | End: 2021-10-09

## 2021-08-06 RX ORDER — HYDROCHLOROTHIAZIDE 25 MG/1
TABLET ORAL
Qty: 90 TABLET | Refills: 0 | Status: SHIPPED | OUTPATIENT
Start: 2021-08-06 | End: 2021-10-09

## 2021-08-06 RX ORDER — GLIPIZIDE 5 MG/1
TABLET ORAL
Qty: 90 TABLET | Refills: 0 | Status: SHIPPED | OUTPATIENT
Start: 2021-08-06 | End: 2021-10-09

## 2021-08-09 ENCOUNTER — OFFICE VISIT (OUTPATIENT)
Dept: PRIMARY CARE CLINIC | Age: 86
End: 2021-08-09
Payer: MEDICARE

## 2021-08-09 ENCOUNTER — HOSPITAL ENCOUNTER (OUTPATIENT)
Dept: GENERAL RADIOLOGY | Age: 86
Discharge: HOME | End: 2021-08-09
Payer: MEDICARE

## 2021-08-09 VITALS
SYSTOLIC BLOOD PRESSURE: 106 MMHG | HEART RATE: 61 BPM | OXYGEN SATURATION: 98 % | TEMPERATURE: 98.5 F | DIASTOLIC BLOOD PRESSURE: 54 MMHG | BODY MASS INDEX: 33.45 KG/M2 | RESPIRATION RATE: 18 BRPM | WEIGHT: 220 LBS

## 2021-08-09 DIAGNOSIS — S49.92XA INJURY OF LEFT SHOULDER, INITIAL ENCOUNTER: ICD-10-CM

## 2021-08-09 DIAGNOSIS — S19.9XXA INJURY OF NECK, INITIAL ENCOUNTER: ICD-10-CM
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
## 2021-08-09 DIAGNOSIS — S19.9XXA INJURY OF NECK, INITIAL ENCOUNTER: Primary | ICD-10-CM

## 2021-08-09 PROCEDURE — G8432 DEP SCR NOT DOC, RNG: HCPCS | Performed by: NURSE PRACTITIONER

## 2021-08-09 PROCEDURE — G8427 DOCREV CUR MEDS BY ELIG CLIN: HCPCS | Performed by: NURSE PRACTITIONER

## 2021-08-09 PROCEDURE — 73030 X-RAY EXAM OF SHOULDER: CPT

## 2021-08-09 PROCEDURE — G8417 CALC BMI ABV UP PARAM F/U: HCPCS | Performed by: NURSE PRACTITIONER

## 2021-08-09 PROCEDURE — 72050 X-RAY EXAM NECK SPINE 4/5VWS: CPT

## 2021-08-09 PROCEDURE — G8536 NO DOC ELDER MAL SCRN: HCPCS | Performed by: NURSE PRACTITIONER

## 2021-08-09 PROCEDURE — 1101F PT FALLS ASSESS-DOCD LE1/YR: CPT | Performed by: NURSE PRACTITIONER

## 2021-08-09 PROCEDURE — 99214 OFFICE O/P EST MOD 30 MIN: CPT | Performed by: NURSE PRACTITIONER

## 2021-08-09 PROCEDURE — G8754 DIAS BP LESS 90: HCPCS | Performed by: NURSE PRACTITIONER

## 2021-08-09 PROCEDURE — G8752 SYS BP LESS 140: HCPCS | Performed by: NURSE PRACTITIONER

## 2021-08-09 RX ORDER — CYCLOBENZAPRINE HCL 5 MG
TABLET ORAL
Qty: 15 TABLET | Refills: 0 | Status: SHIPPED | OUTPATIENT
Start: 2021-08-09 | End: 2021-08-16

## 2021-08-09 NOTE — PROGRESS NOTES
Chief Complaint   Patient presents with    Fall    Neck Pain    Back Pain      pt stated he fell about 2 to 3 weeks ago

## 2021-08-09 NOTE — PROGRESS NOTES
Lin Hodges is a 82 y.o. 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  Diverticulosis of colon     Dyslipidemia 2017    GERD (gastroesophageal reflux disease)     Glaucoma     History of CVA (cerebrovascular accident) 2020    Hypercholesteremia     Hypertension     Hypomagnesemia 2020    Nocturia 2017    PUD (peptic ulcer disease)     GI BLEEDING    PVD (peripheral vascular disease) ([address]     Rectal hemorrhage 2017    Strabismus     Stroke ([address]  APPROX.     SOME VISUAL DEFICIT    Type 2 diabetes mellitus without complications ([address]     Venous stasis 2017       Past Surgical History:   Procedure Laterality Date    HX AMPUTATION TOE Right     HX GI      PARTIAL GASTRECTOMY ( DR ALVIN ALBA)    HX HEART CATHETERIZATION      HX ORTHOPAEDIC Left     KNEE CARTILAGE    HX ORTHOPAEDIC Right     AMPUTATION RIGHT GREAT TOE    HX OTHER SURGICAL      LEFT HAND SKIN GRAFT (BURN) DONOR RIGHT THIGH    HX PACEMAKER  [phone]    DR Clay Roth; WATCHMAN PROCEDURE        Family History   Problem Relation Age of Onset    Stroke Mother     Stroke Father     Cancer Brother         PROSTATE    Anesth Problems Neg Hx         Social History     Tobacco Use    Smoking status: Former Smoker     Packs/day: 1.00     Years: 60.00     Pack years: 60.00     Quit date: 2000     Years since quittin.6    Smokeless tobacco: Never Used Substance Use Topics    Alcohol use: No    Drug use: No        HPI  Patient here today with complaint of left neck and shoulder pain. States that he rolled off bed and hit shoulder against an end table about 1 week ago. Did not hit head or have any LOC. Reports no numbness or weakness in left arm. Not taking medication for this. Is able to lift arm but hurts to turn head towards the left. Current Outpatient Medications on File Prior to Visit   Medication Sig    hydroCHLOROthiazide (HYDRODIURIL) 25 mg tablet TAKE 1 TABLET EVERY DAY    metoprolol succinate (TOPROL-XL) 25 mg XL tablet TAKE 1 TABLET EVERY DAY    glipiZIDE (GLUCOTROL) 5 mg tablet TAKE 1 TABLET EVERY DAY (NEED APPOINTMENT BEFORE ANY FURTHER REFILLS)    pravastatin (PRAVACHOL) 80 mg tablet TAKE 1 TABLET NIGHTLY FOR EXCESSIVE FAT IN THE BLOOD    Accu-Chek Ratna Control Soln soln USE AS DIRECTED.  magnesium oxide (MAG-OX) 400 mg tablet TAKE 1 TABLET EVERY DAY    omeprazole (PRILOSEC) 40 mg capsule TAKE 1 CAPSULE EVERY DAY    amLODIPine (NORVASC) 10 mg tablet TAKE 1 TABLET EVERY DAY    lactulose (CHRONULAC) 10 gram/15 mL solution TAKE 30MLS TWICE A DAY AS NEEDED     DurezoL 0.05 % ophthalmic emulsion INSTILL 1 DROP INTO LEFT EYE THREE TIMES DAILY STARTING THE DAY AFTER SURGERY    BD Single Use Swabs Regular padm TEST BLOOD SUGAR TWICE DAILY    dorzolamide-timoloL (COSOPT) 22.3-6.8 mg/mL ophthalmic solution Administer 1 Drop to both eyes two (2) times a day.  acetaminophen (TYLENOL) 325 mg tablet Take 650 mg by mouth every six (6) hours as needed for Pain.  docusate sodium (DOK) 100 mg capsule Take 100 mg by mouth daily.  brimonidine (ALPHAGAN) 0.2 % ophthalmic solution Administer 1 Drop to both eyes every eight (8) hours.  latanoprost (XALATAN) 0.005 % ophthalmic solution Administer 1 Drop to both eyes nightly.  senna-docusate (STOOL SOFTENER-LAXATIVE) 8.6-50 mg per tablet Take 1 Tab by mouth two (2) times a day.     aspirin delayed-release 81 mg tablet Take 81 mg by mouth daily. No current facility-administered medications on file prior to visit. Medications Ordered Today   Medications    DISCONTD: cyclobenzaprine (FLEXERIL) 5 mg tablet     Sig: TAKE 1 TABLET EVERY NIGHT     Dispense:  15 Tablet     Refill:  0        Review of Systems   Constitutional: Negative. HENT: Negative. Eyes: Negative. Respiratory: Negative. Cardiovascular: Negative. Gastrointestinal: Negative. Genitourinary: Negative. Musculoskeletal: Positive for falls, joint pain, myalgias and neck pain. Neurological: Negative for dizziness, tingling, focal weakness, seizures, loss of consciousness and headaches. Visit Vitals  BP (!) 106/54 (BP 1 Location: Left upper arm, BP Patient Position: Sitting, BP Cuff Size: Adult)   Pulse 61   Temp 98.5 °F (36.9 °C) (Oral)   Resp 18   Wt 220 lb (99.8 kg)   SpO2 98%   BMI 33.45 kg/m²       Physical Exam  Vitals and nursing note reviewed. Constitutional:       Appearance: Normal appearance. HENT:      Head: Normocephalic and atraumatic. Eyes:      Pupils: Pupils are equal, round, and reactive to light. Cardiovascular:      Rate and Rhythm: Normal rate. Pulses: Normal pulses. Pulmonary:      Effort: Pulmonary effort is normal.      Breath sounds: Normal breath sounds. Abdominal:      General: Bowel sounds are normal.      Palpations: Abdomen is soft. Tenderness: There is no abdominal tenderness. Musculoskeletal:      Right shoulder: Normal.      Left shoulder: Tenderness present. No swelling, deformity or crepitus. Normal range of motion. Normal pulse. Cervical back: Pain with movement, spinous process tenderness and muscular tenderness (left) present. Neurological:      Mental Status: He is alert. Mental status is at baseline. 1. Injury of neck, initial encounter    - XR SPINE CERV 4 OR 5 V; Future    2.  Injury of left shoulder, initial encounter    - XR SHOULDER LT AP/LAT MIN 2 V; Future    3. Fall, initial encounter      Will check x-ray of c-spine and left shoulder  Limited in medications due to other co-morbidities but advised to use tylenol extra strength prn  Also will prescribe low dose of muscle relaxant to use prn (advised may cause drowsiness and do not drive/operate machinery when taking). Encourage heat prn  Discuss further recommendations pending results from x-rays      Patient verbalizes understanding of plan of care as discussed above    Follow-up and Dispositions    · Return in about 2 weeks (around 8/23/2021) for or sooner for worsening symptoms.

## 2021-08-12 ENCOUNTER — TELEPHONE (OUTPATIENT)
Dept: PRIMARY CARE CLINIC | Age: 86
End: 2021-08-12

## 2021-08-12 DIAGNOSIS — I10 ESSENTIAL HYPERTENSION: Primary | ICD-10-CM

## 2021-08-12 DIAGNOSIS — N18.31 STAGE 3A CHRONIC KIDNEY DISEASE (HCC): ICD-10-CM

## 2021-08-12 DIAGNOSIS — D50.9 IRON DEFICIENCY ANEMIA, UNSPECIFIED IRON DEFICIENCY ANEMIA TYPE: ICD-10-CM
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
## 2021-08-16 RX ORDER — BLOOD SUGAR DIAGNOSTIC
STRIP MISCELLANEOUS
Qty: 200 STRIP | Refills: 0 | Status: SHIPPED | OUTPATIENT
Start: 2021-08-16 | End: 2021-11-19

## 2021-08-16 RX ORDER — LANCETS
EACH MISCELLANEOUS
Qty: 200 EACH | Refills: 1 | Status: SHIPPED | OUTPATIENT
Start: 2021-08-16

## 2021-08-16 RX ORDER — CYCLOBENZAPRINE HCL 5 MG
TABLET ORAL
Qty: 15 TABLET | Refills: 0 | Status: SHIPPED | OUTPATIENT
Start: 2021-08-16 | End: 2021-10-21 | Stop reason: SDUPTHER

## 2021-08-16 NOTE — PROGRESS NOTES
Please let patient know that x-ray of c-spine shows significant c-spine degenerative changes but no obvious fracture. Given the fall, will order CT of neck as recommended given persistent pain. If any questions, please let me know. He has follow up appointment on 8/24/21 but may return sooner if any worsening symptoms. Hospital will be contacting regarding CT which I would like him to do prior to next appointment.

## 2021-08-16 NOTE — PROGRESS NOTES
Please let patient know that xray of left shoulder shows mild degerative change of joint but no acute findings. If not improving with medication and conservative treatment, would like to refer to orthopedic to evaluate. Let me know what he says about referral and if any questions.

## 2021-08-24 ENCOUNTER — OFFICE VISIT (OUTPATIENT)
Dept: PRIMARY CARE CLINIC | Age: 86
End: 2021-08-24
Payer: MEDICARE

## 2021-08-24 VITALS
OXYGEN SATURATION: 95 % | HEART RATE: 68 BPM | SYSTOLIC BLOOD PRESSURE: 119 MMHG | BODY MASS INDEX: 31.32 KG/M2 | DIASTOLIC BLOOD PRESSURE: 68 MMHG | WEIGHT: 206 LBS | RESPIRATION RATE: 18 BRPM | TEMPERATURE: 98 F

## 2021-08-24 DIAGNOSIS — E83.42 HYPOMAGNESEMIA: ICD-10-CM

## 2021-08-24 DIAGNOSIS — H40.9 GLAUCOMA OF BOTH EYES, UNSPECIFIED GLAUCOMA TYPE: ICD-10-CM

## 2021-08-24 DIAGNOSIS — Z71.89 ACP (ADVANCE CARE PLANNING): ICD-10-CM

## 2021-08-24 DIAGNOSIS — R41.3 MEMORY CHANGES: ICD-10-CM

## 2021-08-24 DIAGNOSIS — I48.0 PAROXYSMAL ATRIAL FIBRILLATION (HCC): ICD-10-CM

## 2021-08-24 DIAGNOSIS — Z00.00 MEDICARE ANNUAL WELLNESS VISIT, SUBSEQUENT: ICD-10-CM

## 2021-08-24 DIAGNOSIS — M19.90 ARTHRITIS: ICD-10-CM

## 2021-08-24 DIAGNOSIS — E11.9 TYPE 2 DIABETES MELLITUS WITHOUT COMPLICATION, WITHOUT LONG-TERM CURRENT USE OF INSULIN (HCC): Primary | ICD-10-CM

## 2021-08-24 DIAGNOSIS — I73.9 PERIPHERAL VASCULAR DISEASE (HCC): ICD-10-CM

## 2021-08-24 DIAGNOSIS — D64.9 ANEMIA, UNSPECIFIED TYPE: ICD-10-CM

## 2021-08-24 DIAGNOSIS — K59.09 CHRONIC CONSTIPATION: ICD-10-CM

## 2021-08-24 DIAGNOSIS — N18.31 STAGE 3A CHRONIC KIDNEY DISEASE (HCC): ICD-10-CM

## 2021-08-24 DIAGNOSIS — Z91.81 AT HIGH RISK FOR FALLS: ICD-10-CM

## 2021-08-24 DIAGNOSIS — N40.0 BENIGN PROSTATIC HYPERPLASIA WITHOUT LOWER URINARY TRACT SYMPTOMS: ICD-10-CM

## 2021-08-24 DIAGNOSIS — K21.9 GASTROESOPHAGEAL REFLUX DISEASE WITHOUT ESOPHAGITIS: ICD-10-CM

## 2021-08-24 DIAGNOSIS — E78.2 MIXED HYPERLIPIDEMIA: ICD-10-CM

## 2021-08-24 DIAGNOSIS — Z86.73 HISTORY OF CVA (CEREBROVASCULAR ACCIDENT): ICD-10-CM

## 2021-08-24 LAB — HBA1C MFR BLD HPLC: 5.8 %

## 2021-08-24 PROCEDURE — 83036 HEMOGLOBIN GLYCOSYLATED A1C: CPT | Performed by: NURSE PRACTITIONER

## 2021-08-24 PROCEDURE — 99214 OFFICE O/P EST MOD 30 MIN: CPT | Performed by: NURSE PRACTITIONER

## 2021-08-24 PROCEDURE — 1101F PT FALLS ASSESS-DOCD LE1/YR: CPT | Performed by: NURSE PRACTITIONER

## 2021-08-24 PROCEDURE — G0439 PPPS, SUBSEQ VISIT: HCPCS | Performed by: NURSE PRACTITIONER

## 2021-08-24 PROCEDURE — G8754 DIAS BP LESS 90: HCPCS | Performed by: NURSE PRACTITIONER

## 2021-08-24 PROCEDURE — G8427 DOCREV CUR MEDS BY ELIG CLIN: HCPCS | Performed by: NURSE PRACTITIONER

## 2021-08-24 PROCEDURE — G8536 NO DOC ELDER MAL SCRN: HCPCS | Performed by: NURSE PRACTITIONER

## 2021-08-24 PROCEDURE — G8432 DEP SCR NOT DOC, RNG: HCPCS | Performed by: NURSE PRACTITIONER

## 2021-08-24 PROCEDURE — G8417 CALC BMI ABV UP PARAM F/U: HCPCS | Performed by: NURSE PRACTITIONER

## 2021-08-24 PROCEDURE — G8752 SYS BP LESS 140: HCPCS | Performed by: NURSE PRACTITIONER

## 2021-08-24 NOTE — PROGRESS NOTES
Sharla Hermosillo is a 82 y.o. male who presents to the office today for the following:    Chief Complaint   Patient presents with    Annual Wellness Visit    Diabetes    Hypertension       Past Medical History:   Diagnosis Date    Anemia 2017    Anxiety     Arrhythmia     A FIB    Arthritis     Atherosclerosis of artery of extremity with rest pain ([address]     Atrial fibrillation ([address] 2020    Benign prostatic hyperplasia 2017    Cancer ([address] 2010    GASTRIC    Chronic kidney disease     Chronic obstructive pulmonary disease ([address]     Depression     Diabetes ([address]     BORDERLINE, NO MEDS.  Diverticulosis of colon     Dyslipidemia 2017    GERD (gastroesophageal reflux disease)     Glaucoma     History of CVA (cerebrovascular accident) 2020    Hypercholesteremia     Hypertension     Hypomagnesemia 2020    Nocturia 2017    PUD (peptic ulcer disease)     GI BLEEDING    PVD (peripheral vascular disease) ([address]     Rectal hemorrhage 2017    Strabismus     Stroke ([address]  APPROX.     SOME VISUAL DEFICIT    Type 2 diabetes mellitus without complications ([address]     Venous stasis 2017       Past Surgical History:   Procedure Laterality Date    HX AMPUTATION TOE Right     HX GI      PARTIAL GASTRECTOMY ( DR ALVIN ALBA)    HX HEART CATHETERIZATION      HX ORTHOPAEDIC Left     KNEE CARTILAGE    HX ORTHOPAEDIC Right     AMPUTATION RIGHT GREAT TOE    HX OTHER SURGICAL      LEFT HAND SKIN GRAFT (BURN) DONOR RIGHT THIGH    HX PACEMAKER  [phone]    DR Manjinder Smith; WATCHMAN PROCEDURE        Family History   Problem Relation Age of Onset    Stroke Mother     Stroke Father     Cancer Brother         PROSTATE    Anesth Problems Neg Hx         Social History     Tobacco Use    Smoking status: Former Smoker     Packs/day: 1.00     Years: 60.00     Pack years: 60.00     Quit date: 2000     Years since quittin.6    Smokeless tobacco: Never Used   Substance Use Topics    Alcohol use: No    Drug use: No        HPI  Patient here today for follow up with [facility] of hypertension, hyperlipdimia, paroxysmal afib w/ watchman implant, GI bleed, BPH, cva, arthritis, anemia, ckd stage 3, type 2 diabetes, glaucoma, GERD, hypomagnesium, constipation and PVD. Reports he follows with cardiology, opthalmology and vascular. Does report joint pains but otherwise has been doing \"ok. \" Needs medications refilled today. Current Outpatient Medications on File Prior to Visit   Medication Sig    lancets (Accu-Chek Softclix Lancets) misc TEST BLOOD SUGAR TWICE A DAY    Accu-Chek Ratna Plus test strp strip TEST BLOOD SUGAR TWICE A DAY    cyclobenzaprine (FLEXERIL) 5 mg tablet TAKE 1 TABLET EVERY NIGHT    hydroCHLOROthiazide (HYDRODIURIL) 25 mg tablet TAKE 1 TABLET EVERY DAY    metoprolol succinate (TOPROL-XL) 25 mg XL tablet TAKE 1 TABLET EVERY DAY    glipiZIDE (GLUCOTROL) 5 mg tablet TAKE 1 TABLET EVERY DAY (NEED APPOINTMENT BEFORE ANY FURTHER REFILLS)    pravastatin (PRAVACHOL) 80 mg tablet TAKE 1 TABLET NIGHTLY FOR EXCESSIVE FAT IN THE BLOOD    Accu-Chek Ratna Control Soln soln USE AS DIRECTED.  magnesium oxide (MAG-OX) 400 mg tablet TAKE 1 TABLET EVERY DAY    omeprazole (PRILOSEC) 40 mg capsule TAKE 1 CAPSULE EVERY DAY    amLODIPine (NORVASC) 10 mg tablet TAKE 1 TABLET EVERY DAY    DurezoL 0.05 % ophthalmic emulsion INSTILL 1 DROP INTO LEFT EYE THREE TIMES DAILY STARTING THE DAY AFTER SURGERY    BD Single Use Swabs Regular padm TEST BLOOD SUGAR TWICE DAILY    dorzolamide-timoloL (COSOPT) 22.3-6.8 mg/mL ophthalmic solution Administer 1 Drop to both eyes two (2) times a day.  acetaminophen (TYLENOL) 325 mg tablet Take 650 mg by mouth every six (6) hours as needed for Pain.  brimonidine (ALPHAGAN) 0.2 % ophthalmic solution Administer 1 Drop to both eyes every eight (8) hours.     latanoprost (XALATAN) 0.005 % ophthalmic solution Administer 1 Drop to both eyes nightly.  senna-docusate (STOOL SOFTENER-LAXATIVE) 8.6-50 mg per tablet Take 1 Tab by mouth two (2) times a day.  aspirin delayed-release 81 mg tablet Take 81 mg by mouth daily. No current facility-administered medications on file prior to visit. No orders of the defined types were placed in this encounter. Review of Systems   Constitutional: Negative. HENT: Negative for congestion, ear discharge, ear pain, nosebleeds, sinus pain, sore throat and tinnitus. Eyes: Positive for blurred vision. Respiratory: Negative. Negative for stridor. Cardiovascular: Positive for leg swelling. Negative for chest pain, palpitations, orthopnea and claudication. Gastrointestinal: Negative. Genitourinary: Negative. Musculoskeletal: Positive for falls, joint pain and myalgias. Skin: Negative. Neurological: Negative. Psychiatric/Behavioral: Positive for memory loss. Negative for depression, hallucinations, substance abuse and suicidal ideas. The patient does not have insomnia. Visit Vitals  /68 (BP 1 Location: Left upper arm, BP Patient Position: Sitting, BP Cuff Size: Large adult)   Pulse 68   Temp 98 °F (36.7 °C) (Temporal)   Resp 18   Wt 206 lb (93.4 kg)   SpO2 95%   BMI 31.32 kg/m²       Physical Exam  Vitals and nursing note reviewed. Eyes:      Pupils: Pupils are equal, round, and reactive to light. Neck:      Vascular: No carotid bruit. Cardiovascular:      Rate and Rhythm: Normal rate. Pulses: Normal pulses. Heart sounds: Murmur heard. Pulmonary:      Effort: Pulmonary effort is normal.      Breath sounds: Normal breath sounds. Abdominal:      General: Bowel sounds are normal.      Palpations: Abdomen is soft. Tenderness: There is no abdominal tenderness. There is no guarding or rebound. Hernia: No hernia is present. Musculoskeletal:         General: Normal range of motion.    Lymphadenopathy: Cervical: No cervical adenopathy. Skin:     General: Skin is warm and dry. Neurological:      Gait: Gait abnormal.   Psychiatric:         Mood and Affect: Mood normal.         Behavior: Behavior normal.            1. Type 2 diabetes mellitus without complication, without long-term current use of insulin ([facility])  Lab Results   Component Value Date/Time    Hemoglobin A1c 6.3 (H) 11/10/2020 03:23 PM    Hemoglobin A1c (POC) 5.8 08/24/2021 09:21 AM    Hemoglobin A1c, External 6.2 08/02/2019 12:00 AM   This has been well controlled  Continue glipizide 5mg daily  Check fasting sugars daily and notify provider if > 200 or <70  Encourage following diabetic diet and staying active  Continue yearly eye and foot exams  Checking labs today  - AMB POC HEMOGLOBIN A1C    2. Benign prostatic hyperplasia without lower urinary tract symptoms  Stable and will check PSA today  - PSA W/ REFLX FREE PSA    3. Paroxysmal atrial fibrillation (HCC)  Stable on bb therapy  Hx of watchman  Implant as unable to take blood thinners   Follows with cardiology     4. Gastroesophageal reflux disease without esophagitis  Stable on omeprazole as directed    5. Chronic constipation  stable    6. Stage 3a chronic kidney disease (HCC)  Stable and checking kidney functions with labs    7. Mixed hyperlipidemia  Stable and on pravastatin 80mg daily  Checking lipid panel with labs    8. History of CVA (cerebrovascular accident)  Stable     9. Glaucoma of both eyes, unspecified glaucoma type  Stable and continues eye drops as directed  Following with ophthalmology     10. Peripheral vascular disease ([facility]  Stable and follows with vascular     11. Anemia, unspecified type  Stable and will check blood counts with labs    12. Arthritis  Essentially stable and uses tylenol prn     13. Hypomagnesium  Continues magnesium supplement  Check mag with labs    14.  Memory changes  Essentially stable  Family is helping with appointments, transportation and medications    Patient verbalizes understanding of plan of care as discussed above    Follow-up and Dispositions    · Return in about 3 months (around 11/24/2021) for or sooner for worsening symptoms. This is the Subsequent Medicare Annual Wellness Exam, performed 12 months or more after the Initial AWV or the last Subsequent AWV    I have reviewed the patient's medical history in detail and updated the computerized patient record. Assessment/Plan   Education and counseling provided:  Are appropriate based on today's review and evaluation  End-of-Life planning (with patient's consent)  Pneumococcal Vaccine  Influenza Vaccine  Hepatitis B Vaccine  Prostate cancer screening tests (PSA, covered annually)  Colorectal cancer screening tests  Cardiovascular screening blood test  Screening for glaucoma  Medical nutrition therapy for individuals with diabetes or renal disease    1. Type 2 diabetes mellitus without complication, without long-term current use of insulin (HCC)  -     AMB POC HEMOGLOBIN A1C  2. Benign prostatic hyperplasia without lower urinary tract symptoms  -     PSA W/ REFLX FREE PSA  3. Paroxysmal atrial fibrillation (HCC)  4. Gastroesophageal reflux disease without esophagitis  5. Chronic constipation  6. Stage 3a chronic kidney disease ([address]  7. Mixed hyperlipidemia  8. History of CVA (cerebrovascular accident)  9. Glaucoma of both eyes, unspecified glaucoma type  10. Peripheral vascular disease ([address]  11. Anemia, unspecified type  12. Arthritis  13. Hypomagnesemia  14. Memory changes  15. Medicare annual wellness visit, subsequent  16. ACP (advance care planning)  17.  At high risk for falls       Depression Risk Factor Screening     3 most recent PHQ Screens 8/24/2021   Little interest or pleasure in doing things Not at all   Feeling down, depressed, irritable, or hopeless Not at all   Total Score PHQ 2 0       Alcohol Risk Screen    Do you average more than 1 drink per night or more than 7 drinks a week: No    In the past three months have you have had more than 4 drinks containing alcohol on one occasion: No        Functional Ability and Level of Safety    Hearing: Hearing is good. Activities of Daily Living: The home contains: handrails  Patient needs help with:  transportation and managing medications      Ambulation: with difficulty, uses a cane     Fall Risk:  Fall Risk Assessment, last 12 mths 8/24/2021   Able to walk? Yes   Fall in past 12 months? 1   Do you feel unsteady? 1   Are you worried about falling 1   Is TUG test greater than 12 seconds? 1   Is the gait abnormal? 1   Number of falls in past 12 months 1   Fall with injury? 1      Abuse Screen:  Patient is not abused       Cognitive Screening    Has your family/caregiver stated any concerns about your memory: yes - .      Cognitive Screening: Abnormal - Clock Drawing Test    Health Maintenance Due     Health Maintenance Due   Topic Date Due    Foot Exam Q1  Never done    Eye Exam Retinal or Dilated  Never done    DTaP/Tdap/Td series (1 - Tdap) Never done    Shingrix Vaccine Age 50> (1 of 2) Never done       Patient Care Team   Patient Care Team:  Vibha Canchola MD as PCP - General (Family Medicine)  Vibha Canchola MD as PCP [facility] Provider    History     Patient Active Problem List   Diagnosis Code    Benign prostatic hyperplasia N40.0    Type 2 diabetes mellitus without complications ([address] H83.1    Hypertension I10    Mixed hyperlipidemia E78.2    Peripheral vascular disease ([address] I73.9    Glaucoma H40.9    GERD (gastroesophageal reflux disease) K21.9    Chronic constipation K59.09    Pacemaker Z95.0    Cataract H26.9    Hypomagnesemia E83.42    History of CVA (cerebrovascular accident) Z80.78    Stage 3 chronic kidney disease ([address] N18.30    Atherosclerotic PVD with ulceration ([address] I70.209, L98.499    At high risk for falls Z91.81     Past Medical History:   Diagnosis Date    Anemia 4/26/2017    Anxiety     Arrhythmia     A FIB    Arthritis     Atherosclerosis of artery of extremity with rest pain (HCC)     Atrial fibrillation ([facility] 7/21/2020    Benign prostatic hyperplasia 4/26/2017    Cancer ([facility] 2010    GASTRIC    Chronic kidney disease     Chronic obstructive pulmonary disease (HCC)     Depression     Diabetes ([facility]     BORDERLINE, NO MEDS.  Diverticulosis of colon     Dyslipidemia 4/26/2017    GERD (gastroesophageal reflux disease)     Glaucoma     History of CVA (cerebrovascular accident) 7/21/2020    Hypercholesteremia     Hypertension     Hypomagnesemia 7/13/2020    Nocturia 4/26/2017    PUD (peptic ulcer disease)     GI BLEEDING    PVD (peripheral vascular disease) ([facility]     Rectal hemorrhage 4/26/2017    Strabismus     Stroke ([facility] 2000 APPROX.     SOME VISUAL DEFICIT    Type 2 diabetes mellitus without complications ([facility] 1/97/6735    Venous stasis 4/26/2017      Past Surgical History:   Procedure Laterality Date    HX AMPUTATION TOE Right     HX GI  1998    PARTIAL GASTRECTOMY ( DR ALVIN ALBA)    HX HEART CATHETERIZATION      HX ORTHOPAEDIC Left 1980    KNEE CARTILAGE    HX ORTHOPAEDIC Right 2019    AMPUTATION RIGHT GREAT TOE    HX OTHER SURGICAL      LEFT HAND SKIN GRAFT (BURN) DONOR RIGHT THIGH    HX PACEMAKER  [id number]    DR Milan Torres; WATCHMAN PROCEDURE     Current Outpatient Medications   Medication Sig Dispense Refill    lancets (Accu-Chek Softclix Lancets) misc TEST BLOOD SUGAR TWICE A  Each 1    Accu-Chek Ratna Plus test strp strip TEST BLOOD SUGAR TWICE A  Strip 0    cyclobenzaprine (FLEXERIL) 5 mg tablet TAKE 1 TABLET EVERY NIGHT 15 Tablet 0    hydroCHLOROthiazide (HYDRODIURIL) 25 mg tablet TAKE 1 TABLET EVERY DAY 90 Tablet 0    metoprolol succinate (TOPROL-XL) 25 mg XL tablet TAKE 1 TABLET EVERY DAY 90 Tablet 0    glipiZIDE (GLUCOTROL) 5 mg tablet TAKE 1 TABLET EVERY DAY (NEED APPOINTMENT BEFORE ANY FURTHER REFILLS) 90 Tablet 0    pravastatin (PRAVACHOL) 80 mg tablet TAKE 1 TABLET NIGHTLY FOR EXCESSIVE FAT IN THE BLOOD 90 Tablet 1    Accu-Chek Ratna Control Soln soln USE AS DIRECTED. 3 Bottle 0    magnesium oxide (MAG-OX) 400 mg tablet TAKE 1 TABLET EVERY DAY 90 Tablet 0    omeprazole (PRILOSEC) 40 mg capsule TAKE 1 CAPSULE EVERY DAY 90 Capsule 0    amLODIPine (NORVASC) 10 mg tablet TAKE 1 TABLET EVERY DAY 90 Tablet 0    DurezoL 0.05 % ophthalmic emulsion INSTILL 1 DROP INTO LEFT EYE THREE TIMES DAILY STARTING THE DAY AFTER SURGERY      BD Single Use Swabs Regular padm TEST BLOOD SUGAR TWICE DAILY 180 Pad 1    dorzolamide-timoloL (COSOPT) 22.3-6.8 mg/mL ophthalmic solution Administer 1 Drop to both eyes two (2) times a day.  acetaminophen (TYLENOL) 325 mg tablet Take 650 mg by mouth every six (6) hours as needed for Pain.  brimonidine (ALPHAGAN) 0.2 % ophthalmic solution Administer 1 Drop to both eyes every eight (8) hours.  latanoprost (XALATAN) 0.005 % ophthalmic solution Administer 1 Drop to both eyes nightly.  senna-docusate (STOOL SOFTENER-LAXATIVE) 8.6-50 mg per tablet Take 1 Tab by mouth two (2) times a day.  aspirin delayed-release 81 mg tablet Take 81 mg by mouth daily.        No Known Allergies    Family History   Problem Relation Age of Onset    Stroke Mother     Stroke Father     Cancer Brother         PROSTATE    Anesth Problems Neg Hx      Social History     Tobacco Use    Smoking status: Former Smoker     Packs/day: 1.00     Years: 60.00     Pack years: 60.00     Quit date: 2000     Years since quittin.6    Smokeless tobacco: Never Used   Substance Use Topics    Alcohol use: No         Suyapa Whitman NP

## 2021-08-25 LAB
ALBUMIN SERPL-MCNC: 4.1 G/DL (ref 3.6–4.6)
ALBUMIN/GLOB SERPL: 1.2 {RATIO} (ref 1.2–2.2)
ALP SERPL-CCNC: 77 IU/L (ref 48–121)
ALT SERPL-CCNC: 11 IU/L (ref 0–44)
APPEARANCE UR: CLEAR
AST SERPL-CCNC: 19 IU/L (ref 0–40)
BACTERIA #/AREA URNS HPF: NORMAL /[HPF]
BASOPHILS # BLD AUTO: 0 X10E3/UL (ref 0–0.2)
BASOPHILS NFR BLD AUTO: 0 %
BILIRUB SERPL-MCNC: 0.3 MG/DL (ref 0–1.2)
BILIRUB UR QL STRIP: NEGATIVE
BUN SERPL-MCNC: 24 MG/DL (ref 8–27)
BUN/CREAT SERPL: 13 (ref 10–24)
CALCIUM SERPL-MCNC: 9.8 MG/DL (ref 8.6–10.2)
CASTS URNS QL MICRO: NORMAL /LPF
CHLORIDE SERPL-SCNC: 106 MMOL/L (ref 96–106)
CHOLEST SERPL-MCNC: 129 MG/DL (ref 100–199)
CO2 SERPL-SCNC: 23 MMOL/L (ref 20–29)
COLOR UR: YELLOW
CREAT SERPL-MCNC: 1.9 MG/DL (ref 0.76–1.27)
EOSINOPHIL # BLD AUTO: 0.2 X10E3/UL (ref 0–0.4)
EOSINOPHIL NFR BLD AUTO: 4 %
EPI CELLS #/AREA URNS HPF: NORMAL /HPF (ref 0–10)
ERYTHROCYTE [DISTWIDTH] IN BLOOD BY AUTOMATED COUNT: 17 % (ref 11.6–15.4)
GLOBULIN SER CALC-MCNC: 3.5 G/DL (ref 1.5–4.5)
GLUCOSE SERPL-MCNC: 97 MG/DL (ref 65–99)
GLUCOSE UR QL: NEGATIVE
HCT VFR BLD AUTO: 30.6 % (ref 37.5–51)
HDLC SERPL-MCNC: 36 MG/DL
HGB BLD-MCNC: 8.3 G/DL (ref 13–17.7)
HGB UR QL STRIP: NEGATIVE
IMM GRANULOCYTES # BLD AUTO: 0 X10E3/UL (ref 0–0.1)
IMM GRANULOCYTES NFR BLD AUTO: 0 %
KETONES UR QL STRIP: NEGATIVE
LDLC SERPL CALC-MCNC: 74 MG/DL (ref 0–99)
LEUKOCYTE ESTERASE UR QL STRIP: NEGATIVE
LYMPHOCYTES # BLD AUTO: 1.4 X10E3/UL (ref 0.7–3.1)
LYMPHOCYTES NFR BLD AUTO: 26 %
MCH RBC QN AUTO: 20.4 PG (ref 26.6–33)
MCHC RBC AUTO-ENTMCNC: 27.1 G/DL (ref 31.5–35.7)
MCV RBC AUTO: 75 FL (ref 79–97)
MICRO URNS: ABNORMAL
MONOCYTES # BLD AUTO: 0.5 X10E3/UL (ref 0.1–0.9)
MONOCYTES NFR BLD AUTO: 10 %
NEUTROPHILS # BLD AUTO: 3.3 X10E3/UL (ref 1.4–7)
NEUTROPHILS NFR BLD AUTO: 60 %
NITRITE UR QL STRIP: NEGATIVE
PH UR STRIP: 7.5 [PH] (ref 5–7.5)
PLATELET # BLD AUTO: 339 X10E3/UL (ref 150–450)
POTASSIUM SERPL-SCNC: 4.8 MMOL/L (ref 3.5–5.2)
PROT SERPL-MCNC: 7.6 G/DL (ref 6–8.5)
PROT UR QL STRIP: ABNORMAL
PSA FREE MFR SERPL: 32.4 %
PSA FREE SERPL-MCNC: 1.33 NG/ML
PSA SERPL-MCNC: 4.1 NG/ML (ref 0–4)
RBC # BLD AUTO: 4.07 X10E6/UL (ref 4.14–5.8)
RBC #/AREA URNS HPF: NORMAL /HPF (ref 0–2)
REFLEX CRITERIA: ABNORMAL
SODIUM SERPL-SCNC: 141 MMOL/L (ref 134–144)
SP GR UR: 1.02 (ref 1–1.03)
TRIGL SERPL-MCNC: 102 MG/DL (ref 0–149)
TSH SERPL DL<=0.005 MIU/L-ACNC: 1.66 UIU/ML (ref 0.45–4.5)
UROBILINOGEN UR STRIP-MCNC: 1 MG/DL (ref 0.2–1)
VLDLC SERPL CALC-MCNC: 19 MG/DL (ref 5–40)
WBC # BLD AUTO: 5.4 X10E3/UL (ref 3.4–10.8)
WBC #/AREA URNS HPF: NORMAL /HPF (ref 0–5)

## 2021-08-31 PROBLEM — Z91.81 AT HIGH RISK FOR FALLS: Status: ACTIVE | Noted: 2021-08-31

## 2021-08-31 PROBLEM — I48.91 ATRIAL FIBRILLATION (HCC): Status: RESOLVED | Noted: 2020-07-21 | Resolved: 2021-08-31

## 2021-08-31 PROBLEM — K62.5 RECTAL HEMORRHAGE: Status: RESOLVED | Noted: 2017-04-26 | Resolved: 2021-08-31

## 2021-08-31 NOTE — ACP (ADVANCE CARE PLANNING)
Advance Care Planning     General Advance Care Planning (ACP) Conversation      Date of Conversation: 8/24/2021  Conducted with: Patient with Decision Making Capacity    Healthcare Decision Maker:     Primary Decision Maker: Gilma Williamson - Spouse - [phone]    Secondary Decision Maker: Mario Covarrubias - Child - [phone]  Click here to complete [address] including selection of the Healthcare Decision Maker Relationship (ie \"Primary\")      Today we documented Decision Maker(s) consistent with Legal Next of Kin hierarchy.     Content/Action Overview:   Has NO ACP documents/care preferences - information provided, considering goals and options  Reviewed DNR/DNI and patient elects Full Code (Attempt Resuscitation)  Topics discussed: end of life care preferences (vegetative state/imminent death)       Length of Voluntary ACP Conversation in minutes:  <16 minutes (Non-Billable)    Lavinia Milan NP

## 2021-08-31 NOTE — PATIENT INSTRUCTIONS

## 2021-09-01 NOTE — PROGRESS NOTES
Please let patient know that H&H is 8.3 and 30.6 which has gone down since prior labs. It appears to be possible blood loss anemia and want to check hemoccults x 3 (needs to come and ). His CR has gone up and want to ensure getting enough fluids. Recommend repeating this in 4 weeks along with cbc. PSA is elevated at 4.1. I know he has h/o bph but is he still seing a urologist? If not then I will refer. Discuss further recommendations pending results from hemoccult.

## 2021-10-18 RX ORDER — OMEPRAZOLE 40 MG/1
CAPSULE, DELAYED RELEASE ORAL
Qty: 90 CAPSULE | Refills: 0 | Status: SHIPPED | OUTPATIENT
Start: 2021-10-18 | End: 2022-01-11 | Stop reason: SDUPTHER

## 2021-10-18 RX ORDER — LANOLIN ALCOHOL/MO/W.PET/CERES
CREAM (GRAM) TOPICAL
Qty: 90 TABLET | Refills: 0 | Status: SHIPPED | OUTPATIENT
Start: 2021-10-18 | End: 2022-01-12 | Stop reason: SDUPTHER

## 2021-10-18 RX ORDER — AMLODIPINE BESYLATE 10 MG/1
TABLET ORAL
Qty: 90 TABLET | Refills: 0 | Status: SHIPPED | OUTPATIENT
Start: 2021-10-18 | End: 2022-04-06

## 2021-10-20 ENCOUNTER — OFFICE VISIT (OUTPATIENT)
Dept: PRIMARY CARE CLINIC | Age: 86
End: 2021-10-20
Payer: MEDICARE

## 2021-10-20 VITALS
WEIGHT: 206 LBS | BODY MASS INDEX: 30.51 KG/M2 | HEIGHT: 69 IN | SYSTOLIC BLOOD PRESSURE: 110 MMHG | TEMPERATURE: 97.6 F | HEART RATE: 64 BPM | OXYGEN SATURATION: 99 % | DIASTOLIC BLOOD PRESSURE: 51 MMHG | RESPIRATION RATE: 18 BRPM

## 2021-10-20 DIAGNOSIS — R41.3 MEMORY CHANGES: ICD-10-CM

## 2021-10-20 DIAGNOSIS — E83.42 HYPOMAGNESEMIA: ICD-10-CM

## 2021-10-20 DIAGNOSIS — H40.9 GLAUCOMA OF BOTH EYES, UNSPECIFIED GLAUCOMA TYPE: ICD-10-CM

## 2021-10-20 DIAGNOSIS — D64.9 ANEMIA, UNSPECIFIED TYPE: ICD-10-CM

## 2021-10-20 DIAGNOSIS — H25.9 SENILE CATARACT, UNSPECIFIED AGE-RELATED CATARACT TYPE, UNSPECIFIED LATERALITY: ICD-10-CM

## 2021-10-20 DIAGNOSIS — Z01.818 PREOP EXAMINATION: ICD-10-CM

## 2021-10-20 DIAGNOSIS — I73.9 PERIPHERAL VASCULAR DISEASE (HCC): ICD-10-CM

## 2021-10-20 DIAGNOSIS — M19.90 ARTHRITIS: ICD-10-CM

## 2021-10-20 DIAGNOSIS — Z86.73 HISTORY OF CVA (CEREBROVASCULAR ACCIDENT): ICD-10-CM

## 2021-10-20 DIAGNOSIS — Z23 NEEDS FLU SHOT: Primary | ICD-10-CM

## 2021-10-20 DIAGNOSIS — K59.09 CHRONIC CONSTIPATION: ICD-10-CM

## 2021-10-20 DIAGNOSIS — N40.0 BENIGN PROSTATIC HYPERPLASIA WITHOUT LOWER URINARY TRACT SYMPTOMS: ICD-10-CM

## 2021-10-20 DIAGNOSIS — N18.31 STAGE 3A CHRONIC KIDNEY DISEASE (HCC): ICD-10-CM

## 2021-10-20 DIAGNOSIS — K21.9 GASTROESOPHAGEAL REFLUX DISEASE WITHOUT ESOPHAGITIS: ICD-10-CM

## 2021-10-20 DIAGNOSIS — I48.0 PAROXYSMAL ATRIAL FIBRILLATION (HCC): ICD-10-CM

## 2021-10-20 DIAGNOSIS — E78.2 MIXED HYPERLIPIDEMIA: ICD-10-CM

## 2021-10-20 DIAGNOSIS — E11.9 TYPE 2 DIABETES MELLITUS WITHOUT COMPLICATION, WITHOUT LONG-TERM CURRENT USE OF INSULIN (HCC): ICD-10-CM

## 2021-10-20 PROCEDURE — 1101F PT FALLS ASSESS-DOCD LE1/YR: CPT | Performed by: NURSE PRACTITIONER

## 2021-10-20 PROCEDURE — G8417 CALC BMI ABV UP PARAM F/U: HCPCS | Performed by: NURSE PRACTITIONER

## 2021-10-20 PROCEDURE — G0008 ADMIN INFLUENZA VIRUS VAC: HCPCS | Performed by: NURSE PRACTITIONER

## 2021-10-20 PROCEDURE — G8536 NO DOC ELDER MAL SCRN: HCPCS | Performed by: NURSE PRACTITIONER

## 2021-10-20 PROCEDURE — G8752 SYS BP LESS 140: HCPCS | Performed by: NURSE PRACTITIONER

## 2021-10-20 PROCEDURE — 90694 VACC AIIV4 NO PRSRV 0.5ML IM: CPT | Performed by: NURSE PRACTITIONER

## 2021-10-20 PROCEDURE — G8754 DIAS BP LESS 90: HCPCS | Performed by: NURSE PRACTITIONER

## 2021-10-20 PROCEDURE — G8432 DEP SCR NOT DOC, RNG: HCPCS | Performed by: NURSE PRACTITIONER

## 2021-10-20 PROCEDURE — G8427 DOCREV CUR MEDS BY ELIG CLIN: HCPCS | Performed by: NURSE PRACTITIONER

## 2021-10-20 PROCEDURE — 99214 OFFICE O/P EST MOD 30 MIN: CPT | Performed by: NURSE PRACTITIONER

## 2021-10-20 RX ORDER — METHOCARBAMOL 750 MG/1
TABLET, FILM COATED ORAL
COMMUNITY
Start: 2021-09-09 | End: 2021-12-27 | Stop reason: ALTCHOICE

## 2021-10-20 NOTE — PROGRESS NOTES
Michele Cordero is a 82 y.o. 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  Diverticulosis of colon     Dyslipidemia 2017    GERD (gastroesophageal reflux disease)     Glaucoma     History of CVA (cerebrovascular accident) 2020    Hypercholesteremia     Hypertension     Hypomagnesemia 2020    Nocturia 2017    PUD (peptic ulcer disease)     GI BLEEDING    PVD (peripheral vascular disease) ([address]     Rectal hemorrhage 2017    Strabismus     Stroke ([address]  APPROX.     SOME VISUAL DEFICIT    Type 2 diabetes mellitus without complications ([address]     Venous stasis 2017       Past Surgical History:   Procedure Laterality Date    HX AMPUTATION TOE Right     HX GI      PARTIAL GASTRECTOMY ( DR ALVIN ALBA)    HX HEART CATHETERIZATION      HX ORTHOPAEDIC Left     KNEE CARTILAGE    HX ORTHOPAEDIC Right     AMPUTATION RIGHT GREAT TOE    HX OTHER SURGICAL      LEFT HAND SKIN GRAFT (BURN) DONOR RIGHT THIGH    HX PACEMAKER  [id number]    DR Danielle Given; WATCHMAN PROCEDURE        Family History   Problem Relation Age of Onset    Stroke Mother     Stroke Father     Cancer Brother         PROSTATE    Anesth Problems Neg Hx         Social History     Tobacco Use    Smoking status: Former Smoker     Packs/day: 1.00     Years: 60.00     Pack years: 60.00     Quit date: 2000     Years since quittin.8    Smokeless tobacco: Never Used   Substance Use Topics    Alcohol use: No    Drug use: No      HPI  Patient here today for follow up and pre-op exam for right eye cataract removal  with PMH of hypertension, hyperlipdimia, paroxysmal afib w/ watchman implant, GI bleed, BPH, cva, arthritis, anemia, ckd stage 3, type 2 diabetes, glaucoma, GERD, hypomagnesium, constipation and PVD. Reports he follows with cardiology, opthalmology and vascular. Reports he is doing \"ok\" today. Family who is present reports that he is taking medications as directed. Current Outpatient Medications on File Prior to Visit   Medication Sig    methocarbamoL (ROBAXIN) 750 mg tablet     omeprazole (PRILOSEC) 40 mg capsule TAKE 1 CAPSULE EVERY DAY    magnesium oxide (MAG-OX) 400 mg tablet TAKE 1 TABLET EVERY DAY    amLODIPine (NORVASC) 10 mg tablet TAKE 1 TABLET EVERY DAY    glipiZIDE (GLUCOTROL) 5 mg tablet Take 1 Tablet by mouth daily.  metoprolol succinate (TOPROL-XL) 25 mg XL tablet TAKE 1 TABLET EVERY DAY    hydroCHLOROthiazide (HYDRODIURIL) 25 mg tablet TAKE 1 TABLET EVERY DAY    lancets (Accu-Chek Softclix Lancets) misc TEST BLOOD SUGAR TWICE A DAY    Accu-Chek Ratna Plus test strp strip TEST BLOOD SUGAR TWICE A DAY    cyclobenzaprine (FLEXERIL) 5 mg tablet TAKE 1 TABLET EVERY NIGHT    pravastatin (PRAVACHOL) 80 mg tablet TAKE 1 TABLET NIGHTLY FOR EXCESSIVE FAT IN THE BLOOD    Accu-Chek Ratna Control Soln soln USE AS DIRECTED.  DurezoL 0.05 % ophthalmic emulsion INSTILL 1 DROP INTO LEFT EYE THREE TIMES DAILY STARTING THE DAY AFTER SURGERY    BD Single Use Swabs Regular padm TEST BLOOD SUGAR TWICE DAILY    dorzolamide-timoloL (COSOPT) 22.3-6.8 mg/mL ophthalmic solution Administer 1 Drop to both eyes two (2) times a day.  acetaminophen (TYLENOL) 325 mg tablet Take 650 mg by mouth every six (6) hours as needed for Pain.  brimonidine (ALPHAGAN) 0.2 % ophthalmic solution Administer 1 Drop to both eyes every eight (8) hours.  latanoprost (XALATAN) 0.005 % ophthalmic solution Administer 1 Drop to both eyes nightly.  senna-docusate (STOOL SOFTENER-LAXATIVE) 8.6-50 mg per tablet Take 1 Tab by mouth two (2) times a day.  aspirin delayed-release 81 mg tablet Take 81 mg by mouth daily. No current facility-administered medications on file prior to visit. No orders of the defined types were placed in this encounter. Review of Systems   Constitutional: Negative. HENT: Negative for congestion, ear discharge, ear pain, nosebleeds, sinus pain, sore throat and tinnitus. Eyes: Positive for blurred vision. Respiratory: Negative. Negative for stridor. Cardiovascular: Positive for leg swelling. Negative for chest pain, palpitations, orthopnea and claudication. Gastrointestinal: Negative. Genitourinary: Negative. Musculoskeletal: Positive for falls, joint pain and myalgias. Skin: Negative. Neurological: Negative. Psychiatric/Behavioral: Positive for memory loss. Negative for depression, hallucinations, substance abuse and suicidal ideas. The patient does not have insomnia. Visit Vitals  BP (!) 110/51 (BP 1 Location: Left upper arm, BP Patient Position: Sitting, BP Cuff Size: Large adult)   Pulse 64   Temp 97.6 °F (36.4 °C) (Temporal)   Resp 18   Ht 5' 9\" (1.753 m)   Wt 206 lb (93.4 kg)   SpO2 99%   BMI 30.42 kg/m²       Physical Exam  Vitals and nursing note reviewed. Constitutional:       Appearance: Normal appearance. He is obese. HENT:      Head: Normocephalic and atraumatic. Right Ear: Tympanic membrane normal.      Left Ear: Tympanic membrane normal.      Mouth/Throat:      Mouth: Mucous membranes are moist.      Pharynx: Oropharynx is clear. Eyes:      Pupils: Pupils are equal, round, and reactive to light. Neck:      Vascular: No carotid bruit. Cardiovascular:      Rate and Rhythm: Normal rate. Pulses: Normal pulses. Heart sounds: Murmur heard.      Pulmonary: Effort: Pulmonary effort is normal.      Breath sounds: Normal breath sounds. Abdominal:      General: Bowel sounds are normal.      Palpations: Abdomen is soft. Tenderness: There is no abdominal tenderness. There is no guarding or rebound. Hernia: No hernia is present. Musculoskeletal:         General: Normal range of motion. Right lower leg: Edema (trace) present. Left lower leg: Edema (trace) present. Lymphadenopathy:      Cervical: No cervical adenopathy. Skin:     General: Skin is warm and dry. Neurological:      Mental Status: He is alert. Mental status is at baseline. Gait: Gait abnormal.      Comments: Using cane   Psychiatric:         Mood and Affect: Mood normal.         Behavior: Behavior normal.            1. Type 2 diabetes mellitus without complication, without long-term current use of insulin (HCC)  Lab Results   Component Value Date/Time    Hemoglobin A1c 6.3 (H) 11/10/2020 03:23 PM    Hemoglobin A1c (POC) 5.8 08/24/2021 09:21 AM    Hemoglobin A1c, External 6.2 08/02/2019 12:00 AM   This has been well controlled  Continue glipizide 5mg daily  Check fasting sugars daily and notify provider if > 200 or <70  Encourage following diabetic diet and staying active  Continue yearly eye and foot exams    2. Benign prostatic hyperplasia without lower urinary tract symptoms  Stable     3. Paroxysmal atrial fibrillation (HCC)  Stable on bb therapy  Hx of watchman  Implant as unable to take blood thinners   Follows with cardiology     4. Gastroesophageal reflux disease without esophagitis  Stable on omeprazole as directed    5. Chronic constipation  stable    6. Stage 3a chronic kidney disease ([facility]  Lab Results   Component Value Date/Time    Creatinine 1.59 (H) 10/20/2021 02:11 PM     Stable    7. Mixed hyperlipidemia  Stable and on pravastatin 80mg daily    8. History of CVA (cerebrovascular accident)  Stable     9.  Glaucoma of both eyes, unspecified glaucoma type  Stable and continues eye drops as directed  Following with ophthalmology     10. Peripheral vascular disease ([address]  Stable and follows with vascular     11. Anemia, unspecified type  Lab Results   Component Value Date/Time    WBC 5.3 10/20/2021 02:11 PM    HGB 9.0 (L) 10/20/2021 02:11 PM    HCT 31.7 (L) 10/20/2021 02:11 PM    PLATELET 970 06/82/6706 02:11 PM    MCV 76 (L) 10/20/2021 02:11 PM     Essentially stable as this has been chronic problem  He has seen Dr. Iraj Laguna multiple times in past and have recommended that he schedule a follow up  Sent new script to continue ferrous sulfate daily as directed  Hemoccults were ordered and sent home via family     15. Arthritis  Essentially stable and uses tylenol prn     13. Hypomagnesium  Continues magnesium supplement     14. Memory changes  Essentially stable  Family is helping with appointments, transportation and medications    15. Needs flu shot  Update annual flu shot  - FLU (FLUAD QUAD INFLUENZA VACCINE,QUAD,ADJUVANTED)    16. Senile cataract  Scheduled for removal on right eye 11/10/21    17. Preop examination  Chronic conditions are essentially stable and feel he is ok to proceed with right eye cataract removal under local anesthesia. Did recommend clearance from his cardiologist.     Patient/family  verbalizes understanding of plan of care as discussed above    Follow-up and Dispositions    · Return in about 3 months (around 1/20/2022) for or sooner for worsening symptoms.

## 2021-10-21 ENCOUNTER — TELEPHONE (OUTPATIENT)
Dept: PRIMARY CARE CLINIC | Age: 86
End: 2021-10-21
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
## 2021-10-21 LAB
BASOPHILS # BLD AUTO: 0 X10E3/UL (ref 0–0.2)
BASOPHILS NFR BLD AUTO: 0 %
BUN SERPL-MCNC: 24 MG/DL (ref 8–27)
BUN/CREAT SERPL: 15 (ref 10–24)
CALCIUM SERPL-MCNC: 9.2 MG/DL (ref 8.6–10.2)
CHLORIDE SERPL-SCNC: 105 MMOL/L (ref 96–106)
CO2 SERPL-SCNC: 23 MMOL/L (ref 20–29)
CREAT SERPL-MCNC: 1.59 MG/DL (ref 0.76–1.27)
EOSINOPHIL # BLD AUTO: 0.1 X10E3/UL (ref 0–0.4)
EOSINOPHIL NFR BLD AUTO: 3 %
ERYTHROCYTE [DISTWIDTH] IN BLOOD BY AUTOMATED COUNT: 17.7 % (ref 11.6–15.4)
GLUCOSE SERPL-MCNC: 79 MG/DL (ref 65–99)
HCT VFR BLD AUTO: 31.7 % (ref 37.5–51)
HGB BLD-MCNC: 9 G/DL (ref 13–17.7)
IMM GRANULOCYTES # BLD AUTO: 0 X10E3/UL (ref 0–0.1)
IMM GRANULOCYTES NFR BLD AUTO: 0 %
LYMPHOCYTES # BLD AUTO: 1.4 X10E3/UL (ref 0.7–3.1)
LYMPHOCYTES NFR BLD AUTO: 27 %
MCH RBC QN AUTO: 21.5 PG (ref 26.6–33)
MCHC RBC AUTO-ENTMCNC: 28.4 G/DL (ref 31.5–35.7)
MCV RBC AUTO: 76 FL (ref 79–97)
MONOCYTES # BLD AUTO: 0.7 X10E3/UL (ref 0.1–0.9)
MONOCYTES NFR BLD AUTO: 12 %
NEUTROPHILS # BLD AUTO: 3.1 X10E3/UL (ref 1.4–7)
NEUTROPHILS NFR BLD AUTO: 58 %
PLATELET # BLD AUTO: 350 X10E3/UL (ref 150–450)
POTASSIUM SERPL-SCNC: 4.4 MMOL/L (ref 3.5–5.2)
PSA SERPL-MCNC: 3.7 NG/ML (ref 0–4)
RBC # BLD AUTO: 4.18 X10E6/UL (ref 4.14–5.8)
REFLEX CRITERIA: NORMAL
SODIUM SERPL-SCNC: 140 MMOL/L (ref 134–144)
WBC # BLD AUTO: 5.3 X10E3/UL (ref 3.4–10.8)

## 2021-10-24 RX ORDER — CYCLOBENZAPRINE HCL 5 MG
5 TABLET ORAL
Qty: 15 TABLET | Refills: 0 | Status: SHIPPED | OUTPATIENT
Start: 2021-10-24 | End: 2021-11-01

## 2021-10-25 ENCOUNTER — CLINICAL SUPPORT (OUTPATIENT)
Dept: PRIMARY CARE CLINIC | Age: 86
End: 2021-10-25
Payer: MEDICARE

## 2021-10-25 DIAGNOSIS — D64.9 ANEMIA, UNSPECIFIED TYPE: Primary | ICD-10-CM

## 2021-10-25 LAB
HEMOCCULT STL QL: NEGATIVE
VALID INTERNAL CONTROL?: YES

## 2021-10-25 PROCEDURE — 82270 OCCULT BLOOD FECES: CPT | Performed by: NURSE PRACTITIONER

## 2021-10-25 RX ORDER — LANOLIN ALCOHOL/MO/W.PET/CERES
325 CREAM (GRAM) TOPICAL
Qty: 90 TABLET | Refills: 0 | Status: SHIPPED | OUTPATIENT
Start: 2021-10-25 | End: 2022-01-03

## 2021-10-25 NOTE — PROGRESS NOTES
His anemia and kidney functions are stable. Also repeat hemoccults negative. Continue ferrous sulfate daily. Plan to repeat the cbc in 1 month. Had recommended a follow up with Dr. Peng Mi also.

## 2021-10-25 NOTE — PROGRESS NOTES
PSA is similar to baseline 1 year ago. Had discussed patient following with urologist but family reports he was told he did not need to come back unless symptoms develop. Will have patient/family check with the urology office regarding his last recommendation for follow ups.

## 2021-10-25 NOTE — PROGRESS NOTES
His anemia and kidney functions are stable. Also repeat hemoccults negative. Continue ferrous sulfate daily. Plan to repeat the cbc in 1 month. Had recommended a follow up with Dr. Jayy Holt also.

## 2021-10-28 ENCOUNTER — TELEPHONE (OUTPATIENT)
Dept: PRIMARY CARE CLINIC | Age: 86
End: 2021-10-28

## 2021-10-28 DIAGNOSIS — R97.20 ELEVATED PSA: Primary | ICD-10-CM

## 2021-10-28 NOTE — TELEPHONE ENCOUNTER
Please read message below, ty        ----- Message from Rafael Declary sent at 10/28/2021 11:19 AM EDT -----  Subject: Message to Provider    QUESTIONS  Information for Provider? Pt is wanting to get a referral To Urologist   [address] [facility] in [place]   ---------------------------------------------------------------------------  --------------  [address]  What is the best way for the office to contact you? OK to leave message on   voicemail  Preferred Call Back Phone Number? [phone]  ---------------------------------------------------------------------------  --------------  SCRIPT ANSWERS  Relationship to Patient? Other  Representative Name? Valerie Fraser  Is the Representative on the appropriate HIPAA document in Epic?  Yes

## 2021-10-28 NOTE — TELEPHONE ENCOUNTER
please read not below and schedule, ty        ----- Message from Jace Bender sent at 10/28/2021 11:25 AM EDT -----  Subject: Message to Provider    QUESTIONS  Information for Provider? Pt is needing bloodwork done on 12/09/2021   @12:30  ---------------------------------------------------------------------------  --------------  CALL BACK INFO  What is the best way for the office to contact you? OK to leave message on   voicemail  Preferred Call Back Phone Number? [phone]  ---------------------------------------------------------------------------  --------------  SCRIPT ANSWERS  Relationship to Patient? Other  Representative Name? Quyen Garza  Is the Representative on the appropriate HIPAA document in Epic?  Yes

## 2021-11-01 ENCOUNTER — TELEPHONE (OUTPATIENT)
Dept: UROLOGY | Age: 86
End: 2021-11-01

## 2021-11-01 ENCOUNTER — TELEPHONE (OUTPATIENT)
Dept: PRIMARY CARE CLINIC | Age: 86
End: 2021-11-01
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
## 2021-11-01 RX ORDER — CYCLOBENZAPRINE HCL 5 MG
TABLET ORAL
Qty: 15 TABLET | Refills: 0 | Status: SHIPPED | OUTPATIENT
Start: 2021-11-01 | End: 2021-11-18

## 2021-11-01 NOTE — TELEPHONE ENCOUNTER
Per pcps note on psa \"PSA is similar to baseline 1 year ago. \" he can have next available.  Or see dr. Sp Zayas next available

## 2021-11-01 NOTE — TELEPHONE ENCOUNTER
Lvm for pt to call office to schedule appt from previous notes  below pt can have next available when he calls

## 2021-11-01 NOTE — TELEPHONE ENCOUNTER
Patients wife called about a letter they received in the mail. She was not making sense on the phone and not reading the letter. Once she hung up we saw that it was regarding lab work. Tried to call patient back but they picked up and hung up on me. Did not know if you wanted to call them back.

## 2021-11-01 NOTE — TELEPHONE ENCOUNTER
Patient called about letter that was sent about labs. Once we figured out out what they were talking about I tried to call them but they picked up and hung up on me. Do not know if you want to call them.

## 2021-11-01 NOTE — TELEPHONE ENCOUNTER
Ximena Worthy from [facility] called  to  Make pt an appt for elevated PSA she said as of 8/24 it was 4.1 but it may have went up since then he was referred by his PCP Yanet Bowen

## 2021-11-02 ENCOUNTER — TELEPHONE (OUTPATIENT)
Dept: BEHAVIORAL/MENTAL HEALTH CLINIC | Age: 86
End: 2021-11-02
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
## 2021-11-18 RX ORDER — CYCLOBENZAPRINE HCL 5 MG
TABLET ORAL
Qty: 15 TABLET | Refills: 0 | Status: SHIPPED | OUTPATIENT
Start: 2021-11-18 | End: 2021-12-20

## 2021-12-10 LAB
BASOPHILS # BLD AUTO: 0 X10E3/UL (ref 0–0.2)
BASOPHILS NFR BLD AUTO: 0 %
EOSINOPHIL # BLD AUTO: 0.2 X10E3/UL (ref 0–0.4)
EOSINOPHIL NFR BLD AUTO: 3 %
ERYTHROCYTE [DISTWIDTH] IN BLOOD BY AUTOMATED COUNT: 22.3 % (ref 11.6–15.4)
FERRITIN SERPL-MCNC: 91 NG/ML (ref 30–400)
HCT VFR BLD AUTO: 39.5 % (ref 37.5–51)
HGB BLD-MCNC: 12.3 G/DL (ref 13–17.7)
IMM GRANULOCYTES # BLD AUTO: 0 X10E3/UL (ref 0–0.1)
IMM GRANULOCYTES NFR BLD AUTO: 0 %
LYMPHOCYTES # BLD AUTO: 1.2 X10E3/UL (ref 0.7–3.1)
LYMPHOCYTES NFR BLD AUTO: 24 %
MCH RBC QN AUTO: 27.3 PG (ref 26.6–33)
MCHC RBC AUTO-ENTMCNC: 31.1 G/DL (ref 31.5–35.7)
MCV RBC AUTO: 88 FL (ref 79–97)
MONOCYTES # BLD AUTO: 0.5 X10E3/UL (ref 0.1–0.9)
MONOCYTES NFR BLD AUTO: 11 %
MORPHOLOGY BLD-IMP: ABNORMAL
NEUTROPHILS # BLD AUTO: 3 X10E3/UL (ref 1.4–7)
NEUTROPHILS NFR BLD AUTO: 62 %
PLATELET # BLD AUTO: 247 X10E3/UL (ref 150–450)
RBC # BLD AUTO: 4.5 X10E6/UL (ref 4.14–5.8)
WBC # BLD AUTO: 4.9 X10E3/UL (ref 3.4–10.8)

## 2021-12-10 NOTE — PROGRESS NOTES
Please let patient know that blood counts are stable. If any questions let me know. Repeat in 3 months.

## 2021-12-13 ENCOUNTER — TRANSCRIBE ORDER (OUTPATIENT)
Dept: SCHEDULING | Age: 86
End: 2021-12-13

## 2021-12-13 DIAGNOSIS — R06.02 SOB (SHORTNESS OF BREATH): Primary | ICD-10-CM

## 2021-12-13 DIAGNOSIS — I35.0 AORTIC STENOSIS: ICD-10-CM
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
## 2021-12-20 PROBLEM — R97.20 ELEVATED PSA: Status: ACTIVE | Noted: 2021-12-20

## 2021-12-20 RX ORDER — CYCLOBENZAPRINE HCL 5 MG
TABLET ORAL
Qty: 15 TABLET | Refills: 0 | Status: SHIPPED | OUTPATIENT
Start: 2021-12-20 | End: 2021-12-29

## 2021-12-20 NOTE — PROGRESS NOTES
HISTORY OF PRESENT ILLNESS  Kvng Sin is a 82 y.o. male. PMH of hypertension, hyperlipdimia, paroxysmal afib w/ watchman implant, GI bleed, BPH, cva, arthritis, anemia, ckd stage 3, type 2 diabetes, glaucoma, GERD, hypomagnesium, constipation and PVD. is here for elevated PSA IPSS score 5. Patient denies fevers, chills, nausea, vomiting weight loss bone pain dysuria or hematuria           HPI  Chronic Conditions Addressed Today     1. Elevated PSA     Overview      7/21/20=3.8  8/24/21=4.1  10/20/21=3.7             Patient denies the symptoms of COVID-19 per routine screening guidelines. Review of Systems   Constitutional: Negative. HENT: Negative. Eyes: Negative. Respiratory: Negative. COPD   Cardiovascular: Positive for leg swelling. Gastrointestinal: Negative. Genitourinary: Negative. Musculoskeletal: Negative. Skin: Negative. Neurological: Negative. Endo/Heme/Allergies: Negative. Psychiatric/Behavioral: Negative. 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 [facility]), Atrial fibrillation ([address] (7/21/2020), Benign prostatic hyperplasia (4/26/2017), Cancer ([address] (2010), Chronic kidney disease, Chronic obstructive pulmonary disease ([address] Depression, Diabetes ([address] Diverticulosis of colon, Dyslipidemia (4/26/2017), GERD (gastroesophageal reflux disease), Glaucoma, History of CVA (cerebrovascular accident) (7/21/2020), Hypercholesteremia, Hypertension, Hypomagnesemia (7/13/2020), Nocturia (4/26/2017), PUD (peptic ulcer disease), PVD (peripheral vascular disease) ([address] Rectal hemorrhage (4/26/2017), Strabismus, Stroke ([address] (2000 APPROX.), Type 2 diabetes mellitus without complications ([address] (0/43/9258), and Venous stasis (4/26/2017).    PSurgHx:  has a past surgical history that includes hx heart catheterization; hx amputation toe (Right); hx pacemaker (7641,3997); hx gi (1998); hx other surgical; hx orthopaedic (Left, 1980); and hx orthopaedic (Right, 2019). PSocHx:  reports that he quit smoking about 20 years ago. He has a 60.00 pack-year smoking history. He has never used smokeless tobacco. He reports that he does not drink alcohol and does not use drugs. Home Medications    Medication Sig Start Date End Date Taking? Authorizing Provider   Accu-Chek Ratna Plus test strp strip TEST BLOOD SUGAR TWICE A DAY 11/19/21   Steve Bird MD   cyclobenzaprine (FLEXERIL) 5 mg tablet TAKE 1 TABLET EVERY NIGHT 11/18/21   Elsa Solorzano NP   ferrous sulfate 325 mg (65 mg iron) tablet Take 1 Tablet by mouth Daily (before breakfast). 10/25/21   Elsa Solorzano NP   methocarbamoL (ROBAXIN) 750 mg tablet  9/9/21   Provider, Historical   omeprazole (PRILOSEC) 40 mg capsule TAKE 1 CAPSULE EVERY DAY 10/18/21   Steve Bird MD   magnesium oxide (MAG-OX) 400 mg tablet TAKE 1 TABLET EVERY DAY 10/18/21   Steve Bird MD   amLODIPine (NORVASC) 10 mg tablet TAKE 1 TABLET EVERY DAY 10/18/21   Steve Bird MD   glipiZIDE (GLUCOTROL) 5 mg tablet Take 1 Tablet by mouth daily.  10/9/21   Steve Bird MD   metoprolol succinate (TOPROL-XL) 25 mg XL tablet TAKE 1 TABLET EVERY DAY 10/9/21   Steve Bird MD   hydroCHLOROthiazide (HYDRODIURIL) 25 mg tablet TAKE 1 TABLET EVERY DAY 10/9/21   Steve Bird MD   lancets (Accu-Chek Softclix Lancets) misc TEST BLOOD SUGAR TWICE A DAY 8/16/21   Steve Bird MD   pravastatin (PRAVACHOL) 80 mg tablet TAKE 1 TABLET NIGHTLY FOR EXCESSIVE FAT IN THE BLOOD 8/4/21   Elsa Solorzano NP   Accu-Chek Ratna Control Soln soln USE AS DIRECTED. 7/9/21   Steve Bird MD   DurezoL 0.05 % ophthalmic emulsion INSTILL 1 DROP INTO LEFT EYE THREE TIMES DAILY STARTING THE DAY AFTER SURGERY 8/6/20   Provider, Historical   BD Single Use Swabs Regular padm TEST BLOOD SUGAR TWICE DAILY 10/5/20   Sharmila Velez MD   dorzolamide-timoloL (COSOPT) 22.3-6.8 mg/mL ophthalmic solution Administer 1 Drop to both eyes two (2) times a day. 7/9/20   Provider, Historical   acetaminophen (TYLENOL) 325 mg tablet Take 650 mg by mouth every six (6) hours as needed for Pain. Provider, Historical   brimonidine (ALPHAGAN) 0.2 % ophthalmic solution Administer 1 Drop to both eyes every eight (8) hours. Provider, Historical   latanoprost (XALATAN) 0.005 % ophthalmic solution Administer 1 Drop to both eyes nightly. Provider, Historical   senna-docusate (STOOL SOFTENER-LAXATIVE) 8.6-50 mg per tablet Take 1 Tab by mouth two (2) times a day. Provider, Historical   aspirin delayed-release 81 mg tablet Take 81 mg by mouth daily. Provider, Historical      Physical Exam  Vitals and nursing note reviewed. Constitutional:       Appearance: Normal appearance. HENT:      Head: Normocephalic. Nose: Nose normal.      Mouth/Throat:      Mouth: Mucous membranes are moist.   Eyes:      Pupils: Pupils are equal, round, and reactive to light. Cardiovascular:      Rate and Rhythm: Normal rate and regular rhythm. Pulmonary:      Effort: Pulmonary effort is normal.   Abdominal:      General: Abdomen is flat. Palpations: Abdomen is soft. Genitourinary:     Penis: Normal.       Testes: Normal.   Musculoskeletal:         General: Normal range of motion. Cervical back: Normal range of motion. Skin:     General: Skin is warm. Neurological:      General: No focal deficit present. Mental Status: He is alert and oriented to person, place, and time. Psychiatric:         Mood and Affect: Mood normal.         Behavior: Behavior normal.         Thought Content: Thought content normal.         Judgment: Judgment normal.         ASSESSMENT and PLAN  Diagnoses and all orders for this visit:    1. Elevated PSA       82year-old male with a PSA of 3.7 which is not changed in the year.   In fact it was lower than it was in August 2021.   We will see him back in a year PSA of 3.7 is normal for his age

## 2021-12-27 ENCOUNTER — OFFICE VISIT (OUTPATIENT)
Dept: UROLOGY | Age: 86
End: 2021-12-27
Payer: MEDICARE

## 2021-12-27 VITALS
RESPIRATION RATE: 16 BRPM | OXYGEN SATURATION: 98 % | HEART RATE: 60 BPM | HEIGHT: 69 IN | SYSTOLIC BLOOD PRESSURE: 139 MMHG | DIASTOLIC BLOOD PRESSURE: 63 MMHG | WEIGHT: 214 LBS | BODY MASS INDEX: 31.7 KG/M2 | TEMPERATURE: 97.9 F

## 2021-12-27 DIAGNOSIS — R97.20 ELEVATED PSA: ICD-10-CM

## 2021-12-27 LAB
BILIRUB UR QL STRIP: NEGATIVE
GLUCOSE UR-MCNC: NEGATIVE MG/DL
KETONES P FAST UR STRIP-MCNC: NEGATIVE MG/DL
PH UR STRIP: 5 [PH] (ref 4.6–8)
PROT UR QL STRIP: NORMAL
SP GR UR STRIP: 1.03 (ref 1–1.03)
UA UROBILINOGEN AMB POC: NORMAL (ref 0.2–1)
URINALYSIS CLARITY POC: CLEAR
URINALYSIS COLOR POC: YELLOW
URINE BLOOD POC: NORMAL
URINE LEUKOCYTES POC: NEGATIVE
URINE NITRITES POC: NEGATIVE

## 2021-12-27 PROCEDURE — G8536 NO DOC ELDER MAL SCRN: HCPCS | Performed by: UROLOGY

## 2021-12-27 PROCEDURE — G8427 DOCREV CUR MEDS BY ELIG CLIN: HCPCS | Performed by: UROLOGY

## 2021-12-27 PROCEDURE — G8752 SYS BP LESS 140: HCPCS | Performed by: UROLOGY

## 2021-12-27 PROCEDURE — G8417 CALC BMI ABV UP PARAM F/U: HCPCS | Performed by: UROLOGY

## 2021-12-27 PROCEDURE — 81003 URINALYSIS AUTO W/O SCOPE: CPT | Performed by: UROLOGY

## 2021-12-27 PROCEDURE — 99203 OFFICE O/P NEW LOW 30 MIN: CPT | Performed by: UROLOGY

## 2021-12-27 PROCEDURE — G8754 DIAS BP LESS 90: HCPCS | Performed by: UROLOGY

## 2021-12-27 PROCEDURE — G8432 DEP SCR NOT DOC, RNG: HCPCS | Performed by: UROLOGY

## 2021-12-27 RX ORDER — OFLOXACIN 3 MG/ML
SOLUTION/ DROPS OPHTHALMIC
COMMUNITY
Start: 2021-11-01 | End: 2022-01-17

## 2021-12-27 NOTE — PROGRESS NOTES
Chief Complaint   Patient presents with    New Patient     ROS, IPSS Forms    Elevated PSA     Ref Puja Brown - notes in chart 10/25/21         1. Have you been to the ER, urgent care clinic since your last visit? Hospitalized since your last visit? No    2. Have you seen or consulted any other health care providers outside of the [address] [place] [address] Drive since your last visit? Include any pap smears or colon screening.  Yes When: 10/25/21 Where: Dr Patricia Bo Reason for visit: Checkup      Visit Vitals  /63 (BP 1 Location: Left upper arm, BP Patient Position: Sitting, BP Cuff Size: Adult)   Pulse 60   Temp 97.9 °F (36.6 °C) (Temporal)   Resp 16   Ht 5' 9\" (1.753 m)   Wt 214 lb (97.1 kg)   SpO2 98%   BMI 31.60 kg/m²
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
## 2021-12-29 RX ORDER — CYCLOBENZAPRINE HCL 5 MG
TABLET ORAL
Qty: 15 TABLET | Refills: 0 | Status: SHIPPED | OUTPATIENT
Start: 2021-12-29 | End: 2022-04-06

## 2022-01-03 RX ORDER — LANOLIN ALCOHOL/MO/W.PET/CERES
CREAM (GRAM) TOPICAL
Qty: 90 TABLET | Refills: 0 | OUTPATIENT
Start: 2022-01-03

## 2022-01-03 RX ORDER — FERROUS SULFATE TAB 325 MG (65 MG ELEMENTAL FE) 325 (65 FE) MG
TAB ORAL
Qty: 90 TABLET | Refills: 0 | Status: ON HOLD | OUTPATIENT
Start: 2022-01-03 | End: 2022-07-06 | Stop reason: SDUPTHER

## 2022-01-09 RX ORDER — LACTULOSE 10 G/15ML
SOLUTION ORAL; RECTAL
Qty: 5400 ML | Refills: 0 | Status: SHIPPED | OUTPATIENT
Start: 2022-01-09 | End: 2022-03-16 | Stop reason: SDUPTHER

## 2022-01-11 RX ORDER — OMEPRAZOLE 40 MG/1
40 CAPSULE, DELAYED RELEASE ORAL DAILY
Qty: 90 CAPSULE | Refills: 0 | Status: ON HOLD | OUTPATIENT
Start: 2022-01-11 | End: 2022-07-06 | Stop reason: SDUPTHER

## 2022-01-12 RX ORDER — LANOLIN ALCOHOL/MO/W.PET/CERES
400 CREAM (GRAM) TOPICAL DAILY
Qty: 90 TABLET | Refills: 0 | Status: SHIPPED | OUTPATIENT
Start: 2022-01-12 | End: 2022-07-07

## 2022-01-14 ENCOUNTER — TRANSCRIBE ORDER (OUTPATIENT)
Dept: REGISTRATION | Age: 87
End: 2022-01-14

## 2022-01-14 DIAGNOSIS — Z01.812 PRE-PROCEDURE LAB EXAM: Primary | ICD-10-CM

## 2022-01-17 ENCOUNTER — HOSPITAL ENCOUNTER (OUTPATIENT)
Dept: PREADMISSION TESTING | Age: 87
Discharge: HOME | End: 2022-01-17
Payer: MEDICARE

## 2022-01-17 ENCOUNTER — HOSPITAL ENCOUNTER (OUTPATIENT)
Dept: GENERAL RADIOLOGY | Age: 87
Discharge: HOME | End: 2022-01-17
Payer: MEDICARE

## 2022-01-17 VITALS
BODY MASS INDEX: 32.31 KG/M2 | WEIGHT: 213.19 LBS | HEART RATE: 63 BPM | HEIGHT: 68 IN | DIASTOLIC BLOOD PRESSURE: 72 MMHG | TEMPERATURE: 97.8 F | SYSTOLIC BLOOD PRESSURE: 128 MMHG

## 2022-01-17 DIAGNOSIS — Z01.812 PRE-PROCEDURE LAB EXAM: ICD-10-CM

## 2022-01-17 LAB
ABO + RH BLD: NORMAL
ALBUMIN SERPL-MCNC: 3.1 G/DL (ref 3.5–5)
ALBUMIN/GLOB SERPL: 0.8 {RATIO} (ref 1.1–2.2)
ALP SERPL-CCNC: 75 U/L (ref 45–117)
ALT SERPL-CCNC: 24 U/L (ref 12–78)
ANION GAP SERPL CALC-SCNC: 6 MMOL/L (ref 5–15)
APTT PPP: 31.7 SEC (ref 22.1–31)
AST SERPL-CCNC: 24 U/L (ref 15–37)
BASOPHILS # BLD: 0 K/UL (ref 0–0.1)
BASOPHILS NFR BLD: 0 % (ref 0–1)
BILIRUB SERPL-MCNC: 0.3 MG/DL (ref 0.2–1)
BLOOD GROUP ANTIBODIES SERPL: NORMAL
BUN SERPL-MCNC: 30 MG/DL (ref 6–20)
BUN/CREAT SERPL: 18 (ref 12–20)
CALCIUM SERPL-MCNC: 8.9 MG/DL (ref 8.5–10.1)
CHLORIDE SERPL-SCNC: 107 MMOL/L (ref 97–108)
CO2 SERPL-SCNC: 26 MMOL/L (ref 21–32)
CREAT SERPL-MCNC: 1.66 MG/DL (ref 0.7–1.3)
DIFFERENTIAL METHOD BLD: ABNORMAL
EOSINOPHIL # BLD: 0.1 K/UL (ref 0–0.4)
EOSINOPHIL NFR BLD: 4 % (ref 0–7)
ERYTHROCYTE [DISTWIDTH] IN BLOOD BY AUTOMATED COUNT: 17.2 % (ref 11.5–14.5)
EST. AVERAGE GLUCOSE BLD GHB EST-MCNC: 123 MG/DL
GLOBULIN SER CALC-MCNC: 4.1 G/DL (ref 2–4)
GLUCOSE SERPL-MCNC: 147 MG/DL (ref 65–100)
HBA1C MFR BLD: 5.9 % (ref 4–5.6)
HCT VFR BLD AUTO: 41.1 % (ref 36.6–50.3)
HGB BLD-MCNC: 12.5 G/DL (ref 12.1–17)
IMM GRANULOCYTES # BLD AUTO: 0 K/UL (ref 0–0.04)
IMM GRANULOCYTES NFR BLD AUTO: 0 % (ref 0–0.5)
INR PPP: 1 (ref 0.9–1.1)
LYMPHOCYTES # BLD: 1 K/UL (ref 0.8–3.5)
LYMPHOCYTES NFR BLD: 28 % (ref 12–49)
MCH RBC QN AUTO: 28.6 PG (ref 26–34)
MCHC RBC AUTO-ENTMCNC: 30.4 G/DL (ref 30–36.5)
MCV RBC AUTO: 94.1 FL (ref 80–99)
MONOCYTES # BLD: 0.3 K/UL (ref 0–1)
MONOCYTES NFR BLD: 9 % (ref 5–13)
NEUTS SEG # BLD: 2.1 K/UL (ref 1.8–8)
NEUTS SEG NFR BLD: 59 % (ref 32–75)
NRBC # BLD: 0 K/UL (ref 0–0.01)
NRBC BLD-RTO: 0 PER 100 WBC
PLATELET # BLD AUTO: 202 K/UL (ref 150–400)
PMV BLD AUTO: 11.4 FL (ref 8.9–12.9)
POTASSIUM SERPL-SCNC: 3.4 MMOL/L (ref 3.5–5.1)
PROT SERPL-MCNC: 7.2 G/DL (ref 6.4–8.2)
PROTHROMBIN TIME: 10.8 SEC (ref 9–11.1)
RBC # BLD AUTO: 4.37 M/UL (ref 4.1–5.7)
SODIUM SERPL-SCNC: 139 MMOL/L (ref 136–145)
SPECIMEN EXP DATE BLD: NORMAL
THERAPEUTIC RANGE,PTTT: ABNORMAL SECS (ref 58–77)
WBC # BLD AUTO: 3.6 K/UL (ref 4.1–11.1)

## 2022-01-17 PROCEDURE — 83036 HEMOGLOBIN GLYCOSYLATED A1C: CPT

## 2022-01-17 PROCEDURE — 85610 PROTHROMBIN TIME: CPT

## 2022-01-17 PROCEDURE — U0005 INFEC AGEN DETEC AMPLI PROBE: HCPCS

## 2022-01-17 PROCEDURE — 85025 COMPLETE CBC W/AUTO DIFF WBC: CPT

## 2022-01-17 PROCEDURE — 86900 BLOOD TYPING SEROLOGIC ABO: CPT

## 2022-01-17 PROCEDURE — 85730 THROMBOPLASTIN TIME PARTIAL: CPT

## 2022-01-17 PROCEDURE — 71046 X-RAY EXAM CHEST 2 VIEWS: CPT

## 2022-01-17 PROCEDURE — 36415 COLL VENOUS BLD VENIPUNCTURE: CPT

## 2022-01-17 PROCEDURE — 80053 COMPREHEN METABOLIC PANEL: CPT

## 2022-01-17 RX ORDER — ASPIRIN 81 MG/1
81 TABLET ORAL
COMMUNITY
End: 2022-07-07

## 2022-01-17 NOTE — PERIOP NOTES
[address] INSTRUCTIONS    Surgery Date:   1/20/22    [facility] staff will call you between 4 PM- 8 PM the day before surgery with your arrival time. If your surgery is on a Monday, we will call you the preceding Friday. Please call [phone] after 8 PM if you did not receive your arrival time. 1. Please report to [facility] Patient Access/Admitting on the 1st floor. Bring your insurance card, photo identification, and any copayment ( if applicable). 2. If you are going home the same day of your surgery, you must have a responsible adult to drive you home. You need to have a responsible adult to stay with you the first 24 hours after surgery and you should not drive a car for 24 hours following your surgery. 3. Nothing to eat or drink after midnight the night before surgery. This includes no water, gum, mints, coffee, juice, etc.  Please note special instructions, if applicable, below for medications. 4. Do NOT drink alcohol or smoke 24 hours before surgery. STOP smoking for 14 days prior as it helps with breathing and healing after surgery. 5. If you are being admitted to the hospital, please leave personal belongings/luggage in your car until you have an assigned hospital room number. 6. Please wear comfortable clothes. Wear your glasses instead of contacts. We ask that all money, jewelry and valuables be left at home. Wear no make up, particularly mascara, the day of surgery. 7.  All body piercings, rings, and jewelry need to be removed and left at home. Please remove any nail polish or artifical nails from your fingernails. Please wear your hair loose or down. Please no pony-tails, buns, or any metal hair accessories. If you shower the morning of surgery, please do not apply any lotions or powders afterwards. You may wear deodorant, unless having breast surgery. Do not shave any body area within 24 hours of your surgery.   8. Please follow all instructions to avoid any potential surgical cancellation. 9. Should your physical condition change, (i.e. fever, cold, flu, etc.) please notify your surgeon as soon as possible. 10. It is important to be on time. If a situation occurs where you may be delayed, please call:  [phone] / [phone] on the day of surgery. 11. The Preadmission Testing staff can be reached at [phone]. 12. Special instructions: NA      Current Outpatient Medications   Medication Sig    aspirin delayed-release 81 mg tablet Take 81 mg by mouth daily.  magnesium oxide (MAG-OX) 400 mg tablet Take 1 Tablet by mouth daily.  omeprazole (PRILOSEC) 40 mg capsule Take 1 Capsule by mouth daily.  lactulose (CHRONULAC) 10 gram/15 mL solution TAKE 30 ML TWICE A DAY AS NEEDED    FeroSuL 325 mg (65 mg iron) tablet TAKE 1 TABLET BY MOUTH DAILY (BEFORE BREAKFAST).  cyclobenzaprine (FLEXERIL) 5 mg tablet TAKE 1 TABLET EVERY NIGHT (Patient taking differently: Take 5 mg by mouth as needed.)    Accu-Chek Ratna Plus test strp strip TEST BLOOD SUGAR TWICE A DAY    amLODIPine (NORVASC) 10 mg tablet TAKE 1 TABLET EVERY DAY (Patient taking differently: Take 10 mg by mouth nightly.)    glipiZIDE (GLUCOTROL) 5 mg tablet Take 1 Tablet by mouth daily.  metoprolol succinate (TOPROL-XL) 25 mg XL tablet TAKE 1 TABLET EVERY DAY    hydroCHLOROthiazide (HYDRODIURIL) 25 mg tablet TAKE 1 TABLET EVERY DAY    lancets (Accu-Chek Softclix Lancets) misc TEST BLOOD SUGAR TWICE A DAY    pravastatin (PRAVACHOL) 80 mg tablet TAKE 1 TABLET NIGHTLY FOR EXCESSIVE FAT IN THE BLOOD    Accu-Chek Ratna Control Soln soln USE AS DIRECTED.  DurezoL 0.05 % ophthalmic emulsion Administer 1 Drop to both eyes two (2) times a day.  BD Single Use Swabs Regular padm TEST BLOOD SUGAR TWICE DAILY    acetaminophen (TYLENOL) 325 mg tablet Take 650 mg by mouth every six (6) hours as needed for Pain.     latanoprost (XALATAN) 0.005 % ophthalmic solution Administer 1 Drop to both eyes nightly.  senna-docusate (STOOL SOFTENER-LAXATIVE) 8.6-50 mg per tablet Take 1 Tablet by mouth daily. No current facility-administered medications for this encounter. 1. YOU MUST ONLY TAKE THESE MEDICATIONS THE MORNING OF SURGERY WITH A SIP OF WATER: [place]  2. MEDICATIONS TO TAKE THE MORNING OF SURGERY ONLY IF NEEDED: NA  3. HOLD these medications BEFORE Surgery: [place]  4. Ask your surgeon/prescribing physician about when/if to STOP taking these medications: NA  5. Stop all vitamins, herbal medicines and Aspirin containing products 7 days prior to surgery. Stop any non-steroidal anti-inflammatory drugs (i.e. Ibuprofen, Naproxen, Advil, Aleve) 3 days before surgery. You may take Tylenol. 6. If you are currently taking Plavix, Coumadin,or any other blood-thinning/anticoagulant medication contact your prescribing physician for instructions. Preventing Infections Before and After - Your Surgery    IMPORTANT INSTRUCTIONS    Please read and follow these instructions carefully. If you are unable to comply with the below instructions your procedure will be cancelled. You play an important role in your health and preparation for surgery. To reduce the germs on your skin you will need to shower with CHG soap (Chorhexidine gluconate 4%) two times before surgery. CHG soap (Hibiclens, Hex-A-Clens or store brand)   CHG soap will be provided at your Preadmission Testing (PAT) appointment.  If you do not have a PAT appointment before surgery, you may arrange to  CHG soap from our office or purchase CHG soap at a pharmacy, grocery or department store.  You need to purchase TWO 4 ounce bottles to use for your 2 showers. Steps to follow:  1. Wash your hair with your normal shampoo and your body with regular soap and rinse well to remove shampoo and soap from your skin. 2. Wet a clean washcloth and turn off the shower.   3. Put CHG soap on washcloth and apply to your entire body from the neck down. Do not use on your head, face or private parts(genitals). Do not use CHG soap on open sores, wounds or areas of skin irritation. 4. Wash you body gently for 5 minutes. Do not wash your skin too hard. This soap does not create lather. Pay special attention to your underarms and from your belly button to your feet. 5. Turn the shower back on and rinse well to get CHG soap off your body. 6. Pat your skin dry with a clean, dry towel. Do not apply lotions or moisturizer. 7. Put on clean clothes and sleep on fresh bed sheets and do not allow pets to sleep with you. Shower with CHG soap 2 times before your surgery   The evening before your surgery   The morning of your surgery      Tips to help prevent infections after your surgery:  1. Protect your surgical wound from germs:  ? Hand washing is the most important thing you and your caregivers can do to prevent infections. ? Keep your bandage clean and dry! ? Do not touch your surgical wound. 2. Use clean, freshly washed towels and washcloths every time you shower; do not share bath linens with others. 3. Until your surgical wound is healed, wear clothing and sleep on bed linens each day that are clean and freshly washed. 4. Do not allow pets to sleep in your bed with you or touch your surgical wound. 5. Do not smoke - smoking delays wound healing. This may be a good time to stop smoking. 6. If you have diabetes, it is important for you to manage your blood sugar levels properly before your surgery as well as after your surgery. Poorly managed blood sugar levels slow down wound healing and prevent you from healing completely. [facility]   Instructions for Pre-Surgery COVID-19 Testing     Across our ministry we have established standard guidelines to ensure the health and safety of our patients, residents and associates as we resume elective services for patients.  All patients presenting for surgery are required to have a COVID-19 test result within 96 hours of their scheduled surgery. [facility] is providing this test free of charge to the patient. Instructions for COVID-19 Testing:     Patients will receive a call from Pre-Admission Testing 4-5 days prior to surgery to schedule a date and time to come to the [address] [place] [address] Drive for their COVID-19 test   Patients are advised to self-quarantine after testing until their scheduled surgery   Once on site, patients will be registered and receive COVID test in their vehicle   If a patient is scheduled for normal Pre Admission Testing 96 hours from date of surgery, the patient will still have their COVID test done at the [address] [place] [address] located at [address] [place] [address] Positive results will be shared with the surgeon and anesthesiologist and may result in cancellation of the elective procedure    Testing Hours and Location:   Address:  0 [facility] Up Pre Admission 11 [facility] in the Discharge Lot on Critical access hospital (Map Attached)  [address] [place] [address], [address]   Hours: Monday- Friday 7a-3p    PAT Phone Number: [phone]            Patient Information Regarding COVID Restrictions    Patients are advised to self-quarantine after COVID testing up to the day of the scheduled procedure. Day of Procedure     Please park in the parking deck or any designated visitor parking lot.  Enter the facility through the Main Entrance of the hospital.   A temperature check and appropriate symptom/exposure screening will be done prior to entry to the facility.  On the day of surgery, please provide the cell phone number of the person who will be waiting for you to the Patient Access representative at the time of registration.  Please wear a mask on the day of your procedure.  We are now allowing one designated visitor per stay. Pediatric patients may have 2 designated visitors.  This one person may come in with you on the day of your procedure.  No visitors under the age of 13.  The designated visitor must also wear a mask.  Once your procedure and the immediate recovery period is completed, a nurse in the recovery area will contact your designated visitor to inform them of your room number or to otherwise review other pertinent information regarding your care.  Social distancing practices are to be adhered to in waiting areas and the cafeteria. The patient was contacted  in person. He verbalized understanding of all instructions does not  need reinforcement.

## 2022-01-17 NOTE — PERIOP NOTES
PREOP INSTRUCTIONS REVIEWED WITH PT. PT IS SCHEDULED FOR COVID TESTING TODAY. PT DID NOT HAVE HIS PACEMAKER CARD WITH HIM. PATIENT GIVEN WRITTEN INSTRUCTIONS TO GO TO THE IMAGING CENTER/CANCER CENTER [facility] [address] TO HAVE CHEST XRAY COMPLETED.

## 2022-01-18 LAB
SARS-COV-2, XPLCVT: DETECTED
SOURCE, COVRS: ABNORMAL

## 2022-01-18 NOTE — PERIOP NOTES
FAXED PRE-ADMISSION TESTING REPORTS (AND FAX CONFIRMATION RECEIVED FROM DR. TRIANA'S OFFICE, DR. Ranjith Dennis AWARE OF ABNORMALS     POTASSIUM 3.4 (L)    PTT 31.7 (H)    RDW 17.2 (H)    HGAIC 5.9 (H)

## 2022-01-24 ENCOUNTER — TRANSCRIBE ORDER (OUTPATIENT)
Dept: REGISTRATION | Age: 87
End: 2022-01-24

## 2022-01-24 DIAGNOSIS — Z01.812 PRE-PROCEDURE LAB EXAM: Primary | ICD-10-CM

## 2022-01-25 ENCOUNTER — TELEPHONE (OUTPATIENT)
Dept: PRIMARY CARE CLINIC | Age: 87
End: 2022-01-25

## 2022-01-25 NOTE — TELEPHONE ENCOUNTER
----- Message from THE [facility] sent at 1/25/2022  9:36 AM EST -----  Subject: Referral Request    QUESTIONS   Reason for referral request? reschedule lab appt from 2/10/22 to 2/17/22   Has the physician seen you for this condition before? No   Preferred Specialist (if applicable)? Do you already have an appointment scheduled? No  Additional Information for Provider? reschedule appt between 11 and 11:30   as daughter will provide transportation  ---------------------------------------------------------------------------  --------------  CALL BACK INFO  What is the best way for the office to contact you? OK to leave message on   voicemail  Preferred Call Back Phone Number?  [phone]

## 2022-01-25 NOTE — TELEPHONE ENCOUNTER
Need clarification daughter states she is only making appt to have repeat labs done in feb. ( which there isnt any in his chart), but on the lab results you only state \"repeat  in 3 months\". So is the pt only doing labs or is this suppose to be an office visit?

## 2022-02-04 NOTE — PERIOP NOTES
PT COVID POSITIVE (IN [facility]) ON 1/17/22. PT WAS ASYMPTOMATIC. NO NEED TO RETEST FOR COVID PER GUIDELINES RECEIVED FROM SURGICAL DIRECTOR, ELFEGO CRUZ RN.

## 2022-02-17 ENCOUNTER — OFFICE VISIT (OUTPATIENT)
Dept: PRIMARY CARE CLINIC | Age: 87
End: 2022-02-17
Payer: MEDICARE

## 2022-02-17 VITALS
WEIGHT: 210.8 LBS | RESPIRATION RATE: 18 BRPM | HEART RATE: 72 BPM | SYSTOLIC BLOOD PRESSURE: 139 MMHG | OXYGEN SATURATION: 98 % | BODY MASS INDEX: 31.95 KG/M2 | HEIGHT: 68 IN | DIASTOLIC BLOOD PRESSURE: 68 MMHG | TEMPERATURE: 97.2 F

## 2022-02-17 DIAGNOSIS — K59.09 CHRONIC CONSTIPATION: ICD-10-CM

## 2022-02-17 DIAGNOSIS — E11.22 TYPE 2 DIABETES MELLITUS WITH STAGE 3 CHRONIC KIDNEY DISEASE, WITHOUT LONG-TERM CURRENT USE OF INSULIN, UNSPECIFIED WHETHER STAGE 3A OR 3B CKD (HCC): Primary | ICD-10-CM

## 2022-02-17 DIAGNOSIS — N18.30 TYPE 2 DIABETES MELLITUS WITH STAGE 3 CHRONIC KIDNEY DISEASE, WITHOUT LONG-TERM CURRENT USE OF INSULIN, UNSPECIFIED WHETHER STAGE 3A OR 3B CKD (HCC): Primary | ICD-10-CM

## 2022-02-17 DIAGNOSIS — I48.0 PAROXYSMAL ATRIAL FIBRILLATION (HCC): ICD-10-CM

## 2022-02-17 DIAGNOSIS — K21.9 GASTROESOPHAGEAL REFLUX DISEASE WITHOUT ESOPHAGITIS: ICD-10-CM

## 2022-02-17 DIAGNOSIS — I70.25 ATHEROSCLEROSIS OF NATIVE ARTERY OF LOWER EXTREMITY WITH ULCERATION, UNSPECIFIED LATERALITY, UNSPECIFIED ULCERATION SITE (HCC): ICD-10-CM

## 2022-02-17 DIAGNOSIS — N18.31 STAGE 3A CHRONIC KIDNEY DISEASE (HCC): ICD-10-CM

## 2022-02-17 DIAGNOSIS — Z86.73 HISTORY OF CVA (CEREBROVASCULAR ACCIDENT): ICD-10-CM

## 2022-02-17 DIAGNOSIS — N40.0 BENIGN PROSTATIC HYPERPLASIA WITHOUT LOWER URINARY TRACT SYMPTOMS: ICD-10-CM

## 2022-02-17 DIAGNOSIS — E78.2 MIXED HYPERLIPIDEMIA: ICD-10-CM

## 2022-02-17 DIAGNOSIS — R41.3 MEMORY CHANGES: ICD-10-CM

## 2022-02-17 DIAGNOSIS — D64.9 ANEMIA, UNSPECIFIED TYPE: ICD-10-CM

## 2022-02-17 DIAGNOSIS — M19.90 ARTHRITIS: ICD-10-CM

## 2022-02-17 DIAGNOSIS — E83.42 HYPOMAGNESEMIA: ICD-10-CM

## 2022-02-17 DIAGNOSIS — H40.9 GLAUCOMA OF BOTH EYES, UNSPECIFIED GLAUCOMA TYPE: ICD-10-CM

## 2022-02-17 PROCEDURE — G8427 DOCREV CUR MEDS BY ELIG CLIN: HCPCS | Performed by: NURSE PRACTITIONER

## 2022-02-17 PROCEDURE — G8536 NO DOC ELDER MAL SCRN: HCPCS | Performed by: NURSE PRACTITIONER

## 2022-02-17 PROCEDURE — G8754 DIAS BP LESS 90: HCPCS | Performed by: NURSE PRACTITIONER

## 2022-02-17 PROCEDURE — 1101F PT FALLS ASSESS-DOCD LE1/YR: CPT | Performed by: NURSE PRACTITIONER

## 2022-02-17 PROCEDURE — G8752 SYS BP LESS 140: HCPCS | Performed by: NURSE PRACTITIONER

## 2022-02-17 PROCEDURE — G8432 DEP SCR NOT DOC, RNG: HCPCS | Performed by: NURSE PRACTITIONER

## 2022-02-17 PROCEDURE — 99214 OFFICE O/P EST MOD 30 MIN: CPT | Performed by: NURSE PRACTITIONER

## 2022-02-17 PROCEDURE — G8417 CALC BMI ABV UP PARAM F/U: HCPCS | Performed by: NURSE PRACTITIONER

## 2022-02-17 RX ORDER — BRIMONIDINE TARTRATE 2 MG/ML
1 SOLUTION/ DROPS OPHTHALMIC 2 TIMES DAILY
COMMUNITY
Start: 2022-01-03 | End: 2022-03-16
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
## 2022-02-17 NOTE — PROGRESS NOTES
Chief Complaint   Patient presents with    Diabetes     Pt having surgery tomorrow. Daughter thought they were here to see Nikia Cooley and get labs done, but didn't really know why they were here. Labs from a month ago are in the chart. 1. Have you been to the ER, urgent care clinic since your last visit? Hospitalized since your last visit? No    2. Have you seen or consulted any other health care providers outside of the [address] [place] [address] since your last visit? Include any pap smears or colon screening.  No

## 2022-02-18 ENCOUNTER — ANESTHESIA (OUTPATIENT)
Dept: SURGERY | Age: 87
End: 2022-02-18
Payer: MEDICARE

## 2022-02-18 ENCOUNTER — HOSPITAL ENCOUNTER (OUTPATIENT)
Age: 87
Setting detail: OUTPATIENT SURGERY
Discharge: HOME | End: 2022-02-18
Payer: MEDICARE

## 2022-02-18 ENCOUNTER — ANESTHESIA EVENT (OUTPATIENT)
Dept: SURGERY | Age: 87
End: 2022-02-18
Payer: MEDICARE

## 2022-02-18 VITALS
OXYGEN SATURATION: 95 % | WEIGHT: 210 LBS | HEART RATE: 60 BPM | SYSTOLIC BLOOD PRESSURE: 135 MMHG | HEIGHT: 68 IN | RESPIRATION RATE: 20 BRPM | TEMPERATURE: 97.7 F | BODY MASS INDEX: 31.83 KG/M2 | DIASTOLIC BLOOD PRESSURE: 67 MMHG

## 2022-02-18 DIAGNOSIS — I73.9 PERIPHERAL VASCULAR DISEASE (HCC): Primary | ICD-10-CM

## 2022-02-18 LAB
ABO + RH BLD: NORMAL
BLOOD GROUP ANTIBODIES SERPL: NORMAL
GLUCOSE BLD STRIP.AUTO-MCNC: 117 MG/DL (ref 65–117)
GLUCOSE BLD STRIP.AUTO-MCNC: 99 MG/DL (ref 65–117)
SERVICE CMNT-IMP: NORMAL
SERVICE CMNT-IMP: NORMAL
SPECIMEN EXP DATE BLD: NORMAL

## 2022-02-18 PROCEDURE — 74011250636 HC RX REV CODE- 250/636

## 2022-02-18 PROCEDURE — 74011000250 HC RX REV CODE- 250

## 2022-02-18 PROCEDURE — 77030036687 HC SHOE PSTOP S2SG -A

## 2022-02-18 PROCEDURE — 86900 BLOOD TYPING SEROLOGIC ABO: CPT

## 2022-02-18 PROCEDURE — 88311 DECALCIFY TISSUE: CPT

## 2022-02-18 PROCEDURE — 82962 GLUCOSE BLOOD TEST: CPT

## 2022-02-18 PROCEDURE — 76060000032 HC ANESTHESIA 0.5 TO 1 HR

## 2022-02-18 PROCEDURE — 74011000250 HC RX REV CODE- 250: Performed by: ANESTHESIOLOGY

## 2022-02-18 PROCEDURE — 76010000138 HC OR TIME 0.5 TO 1 HR

## 2022-02-18 PROCEDURE — 76210000016 HC OR PH I REC 1 TO 1.5 HR

## 2022-02-18 PROCEDURE — 74011250636 HC RX REV CODE- 250/636: Performed by: ANESTHESIOLOGY

## 2022-02-18 PROCEDURE — 88305 TISSUE EXAM BY PATHOLOGIST: CPT

## 2022-02-18 PROCEDURE — 36415 COLL VENOUS BLD VENIPUNCTURE: CPT

## 2022-02-18 PROCEDURE — 77030040922 HC BLNKT HYPOTHRM STRY -A

## 2022-02-18 PROCEDURE — 77030002916 HC SUT ETHLN J&J -A

## 2022-02-18 PROCEDURE — 2709999900 HC NON-CHARGEABLE SUPPLY

## 2022-02-18 PROCEDURE — 74011250636 HC RX REV CODE- 250/636: Performed by: NURSE ANESTHETIST, CERTIFIED REGISTERED

## 2022-02-18 RX ORDER — SODIUM CHLORIDE, SODIUM LACTATE, POTASSIUM CHLORIDE, CALCIUM CHLORIDE 600; 310; 30; 20 MG/100ML; MG/100ML; MG/100ML; MG/100ML
INJECTION, SOLUTION INTRAVENOUS
Status: DISCONTINUED | OUTPATIENT
Start: 2022-02-18 | End: 2022-02-18 | Stop reason: HOSPADM

## 2022-02-18 RX ORDER — SODIUM CHLORIDE 9 MG/ML
50 INJECTION, SOLUTION INTRAVENOUS CONTINUOUS
Status: DISCONTINUED | OUTPATIENT
Start: 2022-02-18 | End: 2022-02-18 | Stop reason: HOSPADM

## 2022-02-18 RX ORDER — LIDOCAINE HYDROCHLORIDE 10 MG/ML
0.1 INJECTION, SOLUTION EPIDURAL; INFILTRATION; INTRACAUDAL; PERINEURAL AS NEEDED
Status: DISCONTINUED | OUTPATIENT
Start: 2022-02-18 | End: 2022-02-18 | Stop reason: HOSPADM

## 2022-02-18 RX ORDER — SODIUM CHLORIDE 0.9 % (FLUSH) 0.9 %
5-40 SYRINGE (ML) INJECTION AS NEEDED
Status: DISCONTINUED | OUTPATIENT
Start: 2022-02-18 | End: 2022-02-18 | Stop reason: HOSPADM

## 2022-02-18 RX ORDER — BUPIVACAINE HYDROCHLORIDE 5 MG/ML
INJECTION, SOLUTION EPIDURAL; INTRACAUDAL
Status: COMPLETED | OUTPATIENT
Start: 2022-02-18 | End: 2022-02-18

## 2022-02-18 RX ORDER — HYDROCODONE BITARTRATE AND ACETAMINOPHEN 7.5; 325 MG/1; MG/1
1 TABLET ORAL
Qty: 20 TABLET | Refills: 0 | Status: SHIPPED | OUTPATIENT
Start: 2022-02-18 | End: 2022-02-23

## 2022-02-18 RX ORDER — MIDAZOLAM HYDROCHLORIDE 1 MG/ML
1 INJECTION, SOLUTION INTRAMUSCULAR; INTRAVENOUS AS NEEDED
Status: DISCONTINUED | OUTPATIENT
Start: 2022-02-18 | End: 2022-02-18 | Stop reason: HOSPADM

## 2022-02-18 RX ORDER — FENTANYL CITRATE 50 UG/ML
50 INJECTION, SOLUTION INTRAMUSCULAR; INTRAVENOUS AS NEEDED
Status: DISCONTINUED | OUTPATIENT
Start: 2022-02-18 | End: 2022-02-18 | Stop reason: HOSPADM

## 2022-02-18 RX ORDER — FENTANYL CITRATE 50 UG/ML
25 INJECTION, SOLUTION INTRAMUSCULAR; INTRAVENOUS
Status: DISCONTINUED | OUTPATIENT
Start: 2022-02-18 | End: 2022-02-18 | Stop reason: HOSPADM

## 2022-02-18 RX ORDER — LIDOCAINE HYDROCHLORIDE 20 MG/ML
INJECTION, SOLUTION EPIDURAL; INFILTRATION; INTRACAUDAL; PERINEURAL
Status: COMPLETED | OUTPATIENT
Start: 2022-02-18 | End: 2022-02-18

## 2022-02-18 RX ORDER — SODIUM CHLORIDE 0.9 % (FLUSH) 0.9 %
5-40 SYRINGE (ML) INJECTION EVERY 8 HOURS
Status: DISCONTINUED | OUTPATIENT
Start: 2022-02-18 | End: 2022-02-18 | Stop reason: HOSPADM

## 2022-02-18 RX ORDER — MORPHINE SULFATE 2 MG/ML
2 INJECTION, SOLUTION INTRAMUSCULAR; INTRAVENOUS
Status: DISCONTINUED | OUTPATIENT
Start: 2022-02-18 | End: 2022-02-18 | Stop reason: HOSPADM

## 2022-02-18 RX ORDER — HYDROMORPHONE HYDROCHLORIDE 1 MG/ML
0.2 INJECTION, SOLUTION INTRAMUSCULAR; INTRAVENOUS; SUBCUTANEOUS
Status: DISCONTINUED | OUTPATIENT
Start: 2022-02-18 | End: 2022-02-18 | Stop reason: HOSPADM

## 2022-02-18 RX ORDER — PROPOFOL 10 MG/ML
INJECTION, EMULSION INTRAVENOUS AS NEEDED
Status: DISCONTINUED | OUTPATIENT
Start: 2022-02-18 | End: 2022-02-18 | Stop reason: HOSPADM

## 2022-02-18 RX ORDER — SODIUM CHLORIDE, SODIUM LACTATE, POTASSIUM CHLORIDE, CALCIUM CHLORIDE 600; 310; 30; 20 MG/100ML; MG/100ML; MG/100ML; MG/100ML
75 INJECTION, SOLUTION INTRAVENOUS CONTINUOUS
Status: DISCONTINUED | OUTPATIENT
Start: 2022-02-18 | End: 2022-02-18 | Stop reason: HOSPADM

## 2022-02-18 RX ORDER — LIDOCAINE HYDROCHLORIDE 10 MG/ML
INJECTION INFILTRATION; PERINEURAL AS NEEDED
Status: DISCONTINUED | OUTPATIENT
Start: 2022-02-18 | End: 2022-02-18 | Stop reason: HOSPADM

## 2022-02-18 RX ORDER — ONDANSETRON 2 MG/ML
4 INJECTION INTRAMUSCULAR; INTRAVENOUS AS NEEDED
Status: DISCONTINUED | OUTPATIENT
Start: 2022-02-18 | End: 2022-02-18 | Stop reason: HOSPADM

## 2022-02-18 RX ORDER — OXYCODONE AND ACETAMINOPHEN 5; 325 MG/1; MG/1
1 TABLET ORAL AS NEEDED
Status: DISCONTINUED | OUTPATIENT
Start: 2022-02-18 | End: 2022-02-18 | Stop reason: HOSPADM

## 2022-02-18 RX ORDER — MIDAZOLAM HYDROCHLORIDE 1 MG/ML
0.5 INJECTION, SOLUTION INTRAMUSCULAR; INTRAVENOUS
Status: DISCONTINUED | OUTPATIENT
Start: 2022-02-18 | End: 2022-02-18 | Stop reason: HOSPADM

## 2022-02-18 RX ORDER — DIPHENHYDRAMINE HYDROCHLORIDE 50 MG/ML
12.5 INJECTION, SOLUTION INTRAMUSCULAR; INTRAVENOUS AS NEEDED
Status: DISCONTINUED | OUTPATIENT
Start: 2022-02-18 | End: 2022-02-18 | Stop reason: HOSPADM

## 2022-02-18 RX ORDER — MIDAZOLAM HYDROCHLORIDE 1 MG/ML
INJECTION, SOLUTION INTRAMUSCULAR; INTRAVENOUS AS NEEDED
Status: DISCONTINUED | OUTPATIENT
Start: 2022-02-18 | End: 2022-02-18 | Stop reason: HOSPADM

## 2022-02-18 RX ORDER — SODIUM CHLORIDE 9 MG/ML
INJECTION, SOLUTION INTRAVENOUS
Status: DISCONTINUED | OUTPATIENT
Start: 2022-02-18 | End: 2022-02-18

## 2022-02-18 RX ADMIN — PROPOFOL 10 MG: 10 INJECTION, EMULSION INTRAVENOUS at 12:44

## 2022-02-18 RX ADMIN — SODIUM CHLORIDE, POTASSIUM CHLORIDE, SODIUM LACTATE AND CALCIUM CHLORIDE: 600; 310; 30; 20 INJECTION, SOLUTION INTRAVENOUS at 12:40

## 2022-02-18 RX ADMIN — MIDAZOLAM HYDROCHLORIDE 1 MG: 1 INJECTION, SOLUTION INTRAMUSCULAR; INTRAVENOUS at 12:52

## 2022-02-18 RX ADMIN — MIDAZOLAM HYDROCHLORIDE 1 MG: 1 INJECTION, SOLUTION INTRAMUSCULAR; INTRAVENOUS at 12:47

## 2022-02-18 RX ADMIN — MIDAZOLAM HYDROCHLORIDE 1 MG: 1 INJECTION, SOLUTION INTRAMUSCULAR; INTRAVENOUS at 12:40

## 2022-02-18 RX ADMIN — SODIUM CHLORIDE, POTASSIUM CHLORIDE, SODIUM LACTATE AND CALCIUM CHLORIDE 75 ML/HR: 600; 310; 30; 20 INJECTION, SOLUTION INTRAVENOUS at 11:20

## 2022-02-18 RX ADMIN — WATER 2 G: 1 INJECTION INTRAMUSCULAR; INTRAVENOUS; SUBCUTANEOUS at 12:47

## 2022-02-18 RX ADMIN — PROPOFOL 10 MG: 10 INJECTION, EMULSION INTRAVENOUS at 12:48

## 2022-02-18 RX ADMIN — MIDAZOLAM HYDROCHLORIDE 1 MG: 1 INJECTION, SOLUTION INTRAMUSCULAR; INTRAVENOUS at 12:57

## 2022-02-18 RX ADMIN — FENTANYL CITRATE 50 MCG: 50 INJECTION, SOLUTION INTRAMUSCULAR; INTRAVENOUS at 11:30

## 2022-02-18 RX ADMIN — BUPIVACAINE HYDROCHLORIDE 10 ML: 5 INJECTION, SOLUTION EPIDURAL; INTRACAUDAL; PERINEURAL at 11:46

## 2022-02-18 RX ADMIN — PROPOFOL 10 MG: 10 INJECTION, EMULSION INTRAVENOUS at 12:46

## 2022-02-18 RX ADMIN — LIDOCAINE HYDROCHLORIDE 10 ML: 20 INJECTION, SOLUTION EPIDURAL; INFILTRATION; INTRACAUDAL; PERINEURAL at 11:46

## 2022-02-18 RX ADMIN — MIDAZOLAM 1 MG: 1 INJECTION INTRAMUSCULAR; INTRAVENOUS at 11:30

## 2022-02-18 RX ADMIN — MIDAZOLAM HYDROCHLORIDE 1 MG: 1 INJECTION, SOLUTION INTRAMUSCULAR; INTRAVENOUS at 12:42

## 2022-02-18 RX ADMIN — PROPOFOL 10 MG: 10 INJECTION, EMULSION INTRAVENOUS at 12:52

## 2022-02-18 NOTE — ANESTHESIA PROCEDURE NOTES
Peripheral Block    Performed by: Jose Husain DO  Authorized by: Jose Husain DO       Pre-procedure: Indications: at surgeon's request    Preanesthetic Checklist: patient identified, risks and benefits discussed, site marked, timeout performed, anesthesia consent given and patient being monitored      Block Type:   Block Type:   Ankle  Laterality:  Left  Monitoring:  Standard ASA monitoring, responsive to questions, continuous pulse ox, oxygen, frequent vital sign checks and heart rate  Injection Technique:  Single shot  Procedures: other (comment)    Procedures comment:  Landmarks  Patient Position: supine  Prep: chlorhexidine    Location:  Foot  Needle Type:  Pencil-tip  Needle Gauge:  20 G  Needle Localization:  Anatomical landmarks and infiltration  Medication Injected:  Lidocaine (XYLOCAINE) Preserv-Free 2% injection, 10 mL  bupivacaine (PF) (MARCAINE) 0.5% injection, 10 mL    Assessment:  Number of attempts:  1  Injection Assessment:  Incremental injection every 5 mL, negative aspiration for blood, no paresthesia and no intravascular symptoms

## 2022-02-18 NOTE — ANESTHESIA POSTPROCEDURE EVALUATION
Procedure(s):  AMPUTATION LEFT 4TH AND 5TH TOES.    regional    Anesthesia Post Evaluation      Multimodal analgesia: multimodal analgesia used between 6 hours prior to anesthesia start to PACU discharge  Patient location during evaluation: PACU  Patient participation: complete - patient participated  Level of consciousness: awake and alert  Pain management: adequate  Airway patency: patent  Anesthetic complications: no  Cardiovascular status: acceptable  Respiratory status: acceptable  Hydration status: acceptable  Comments: Seen, no complaints   Post anesthesia nausea and vomiting:  none  Final Post Anesthesia Temperature Assessment:  Normothermia (36.0-37.5 degrees C)      INITIAL Post-op Vital signs:   Vitals Value Taken Time   /82 02/18/22 1345   Temp 36.7 °C (98 °F) 02/18/22 1319   Pulse 60 02/18/22 1352   Resp 19 02/18/22 1352   SpO2 97 % 02/18/22 1352   Vitals shown include unvalidated device data.

## 2022-02-18 NOTE — OP NOTES
[address]  OPERATIVE REPORT    Name:  Peter Tapia  MR#:  [id number]  :  1935  ACCOUNT #:  [de-identified]  DATE OF SERVICE:  2022      PREOPERATIVE DIAGNOSIS:  Peripheral vascular disease with ulceration, left fourth toe. POSTOPERATIVE DIAGNOSIS:  Peripheral vascular disease with ulceration, left fourth toe. PROCEDURE PERFORMED:  Amputation of left fourth and fifth toes. SURGEON:  Lenny Darden MD    ASSISTANT:  None. ANESTHESIA:  Regional block. COMPLICATIONS:  None. SPECIMENS REMOVED:  Fourth and fifth toes. IMPLANTS:  None. ESTIMATED BLOOD LOSS:  Minimal.    INDICATIONS:  The patient is an 27-year-old male with diabetes and peripheral vascular disease, who has had a previous left popliteal dorsalis pedis bypass as well as previous amputation of the first, second and third toes. He now has an ulceration on the fourth toe with significant left foot pain. He will undergo amputation of the two remaining toes. PROCEDURE:  The patient's left foot was prepped and draped. 1% lidocaine was used for local anesthesia. A circumferential incision was made from medial to lateral at the base of the fourth and fifth toes. Dissection was continued down to the metatarsophalangeal joints and the toes were amputated through the metatarsophalangeal joints and removed. The metatarsal heads were resected. There was reasonably good bleeding from the soft tissues. The soft tissues were debrided and tailored to allow soft tissue closure. The incision was closed with interrupted nylon sutures. Dressings were applied and the patient was returned to the recovery room in stable condition.       Oh Zee MD      [id number]  D:  2022 13:21  T:  2022 14:10  JOB #:  [id number]

## 2022-02-18 NOTE — BRIEF OP NOTE
Brief Postoperative Note    Patient: Maura Aguilar  YOB: 1935  MRN: [id number]    Date of Procedure: 2/18/2022     Pre-Op Diagnosis: Peripheral vascular disease ([facility] [address] [I73.9]    Post-Op Diagnosis: Same as preoperative diagnosis.       Procedure(s):  AMPUTATION LEFT 4TH AND 5TH TOES    Surgeon(s):  Dayton Shaw MD    Surgical Assistant: None    Anesthesia: Regional     Estimated Blood Loss (mL): Minimal    Complications: None    Specimens:   ID Type Source Tests Collected by Time Destination   1 : LEFT FOURTH AND FIFTH TOES Fresh Foot, left  Dayton Shaw MD 2/18/2022 1250 Pathology        Implants: * No implants in log *    Drains: * No LDAs found *    Findings: none    Electronically Signed by Pamela Allison MD on 2/18/2022 at 1:15 PM

## 2022-02-18 NOTE — ANESTHESIA PREPROCEDURE EVALUATION
Relevant Problems   CARDIOVASCULAR   (+) Hypertension   (+) Pacemaker      GASTROINTESTINAL   (+) GERD (gastroesophageal reflux disease)      RENAL FAILURE   (+) Stage 3 chronic kidney disease (HCC)      ENDOCRINE   (+) Type 2 diabetes mellitus without complications (HCC)       Anesthetic History   No history of anesthetic complications            Review of Systems / Medical History  Patient summary reviewed, nursing notes reviewed and pertinent labs reviewed    Pulmonary    COPD               Neuro/Psych       CVA  Psychiatric history    Comments: Strabismus (H50.9) Cardiovascular    Hypertension        Dysrhythmias : atrial fibrillation  Pacemaker, PAD and hyperlipidemia    Exercise tolerance: >4 METS     GI/Hepatic/Renal     GERD    Renal disease: CRI  PUD     Endo/Other    Diabetes    Arthritis and cancer     Other Findings              Physical Exam    Airway  Mallampati: II  TM Distance: 4 - 6 cm  Neck ROM: normal range of motion   Mouth opening: Normal     Cardiovascular  Regular rate and rhythm,  S1 and S2 normal,  no murmur, click, rub, or gallop  Rhythm: regular  Rate: normal         Dental    Dentition: Edentulous     Pulmonary  Breath sounds clear to auscultation               Abdominal  GI exam deferred       Other Findings            Anesthetic Plan    ASA: 3  Anesthesia type: regional and general - backup - ankle block          Induction: Intravenous  Anesthetic plan and risks discussed with: Patient

## 2022-02-18 NOTE — DISCHARGE INSTRUCTIONS
Patient Discharge Instructions    Uzma Cabrales / [id number] : 1935    Admitted 2022 Discharged: 2022     Take Home Medications     . · It is important that you take the medication exactly as they are prescribed. · Keep your medication in the bottles provided by the pharmacist and keep a list of the medication names, dosages, and times to be taken in your wallet. · Do not take other medications without consulting your doctor. What to do at Home    Recommended diet: Diabetic Diet,     Recommended activity: Activity as tolerated, stay off left foot as much as possible, use post op shoe when weight bearing    Additional Instructions: remove dressing on Monday and redress with dry dressing    Follow-up with Dr Lita Aranda in 3 weeks, call [phone] for appt        Information obtained by :    ______________________________________________________________________    Anesthesia Discharge Instructions    After general anesthesia or intervenous sedation, for 24 hours or while taking prescription Narcotics:  · Limit your activities  · Do not drive or operate hazardous machinery  · If you have not urinated within 8 hours after discharge, please contact your surgeon on call. · Do not make important personal or business decisions  · Do not drink alcoholic beverages    Report the following to your surgeon:  · Excessive pain, swelling, redness or odor of or around the surgical area  · Temperature over 100.5 degrees  · Nausea and vomiting lasting longer than 4 hours or if unable to take medication  · Any signs of decreased circulation or nerve impairment to extremity:  Change in color, persistent numbness, tingling, coldness or increased pain. · Any questions        I understand that if any problems occur once I am at home I am to contact my physician. I understand and acknowledge receipt of the instructions indicated above. Physician's or R.N.'s Signature                                                                  Date/Time                                                                                                                                              Patient or Representative Signature                                                          Date/Time

## 2022-03-16 ENCOUNTER — HOSPITAL ENCOUNTER (OUTPATIENT)
Dept: PREADMISSION TESTING | Age: 87
Discharge: HOME | End: 2022-03-16
Payer: MEDICARE

## 2022-03-16 ENCOUNTER — TRANSCRIBE ORDER (OUTPATIENT)
Dept: REGISTRATION | Age: 87
End: 2022-03-16

## 2022-03-16 VITALS
WEIGHT: 213 LBS | SYSTOLIC BLOOD PRESSURE: 145 MMHG | HEART RATE: 56 BPM | DIASTOLIC BLOOD PRESSURE: 75 MMHG | BODY MASS INDEX: 32.28 KG/M2 | TEMPERATURE: 98.1 F | HEIGHT: 68 IN

## 2022-03-16 DIAGNOSIS — U07.1 COVID-19: Primary | ICD-10-CM

## 2022-03-16 DIAGNOSIS — U07.1 COVID-19: ICD-10-CM

## 2022-03-16 DIAGNOSIS — K59.09 CHRONIC CONSTIPATION: Primary | ICD-10-CM

## 2022-03-16 LAB
ALBUMIN SERPL-MCNC: 2.6 G/DL (ref 3.5–5)
ALBUMIN/GLOB SERPL: 0.5 {RATIO} (ref 1.1–2.2)
ALP SERPL-CCNC: 88 U/L (ref 45–117)
ALT SERPL-CCNC: 19 U/L (ref 12–78)
ANION GAP SERPL CALC-SCNC: 8 MMOL/L (ref 5–15)
APTT PPP: 31.4 SEC (ref 22.1–31)
AST SERPL-CCNC: 19 U/L (ref 15–37)
BASOPHILS # BLD: 0 K/UL (ref 0–0.1)
BASOPHILS NFR BLD: 0 % (ref 0–1)
BILIRUB SERPL-MCNC: 0.2 MG/DL (ref 0.2–1)
BUN SERPL-MCNC: 37 MG/DL (ref 6–20)
BUN/CREAT SERPL: 21 (ref 12–20)
CALCIUM SERPL-MCNC: 9 MG/DL (ref 8.5–10.1)
CHLORIDE SERPL-SCNC: 106 MMOL/L (ref 97–108)
CO2 SERPL-SCNC: 24 MMOL/L (ref 21–32)
CREAT SERPL-MCNC: 1.78 MG/DL (ref 0.7–1.3)
DIFFERENTIAL METHOD BLD: ABNORMAL
EOSINOPHIL # BLD: 0.1 K/UL (ref 0–0.4)
EOSINOPHIL NFR BLD: 1 % (ref 0–7)
ERYTHROCYTE [DISTWIDTH] IN BLOOD BY AUTOMATED COUNT: 14.3 % (ref 11.5–14.5)
GLOBULIN SER CALC-MCNC: 5.1 G/DL (ref 2–4)
GLUCOSE SERPL-MCNC: 224 MG/DL (ref 65–100)
HCT VFR BLD AUTO: 28.9 % (ref 36.6–50.3)
HGB BLD-MCNC: 8.7 G/DL (ref 12.1–17)
IMM GRANULOCYTES # BLD AUTO: 0.1 K/UL (ref 0–0.04)
IMM GRANULOCYTES NFR BLD AUTO: 1 % (ref 0–0.5)
INR PPP: 1.1 (ref 0.9–1.1)
LYMPHOCYTES # BLD: 1.2 K/UL (ref 0.8–3.5)
LYMPHOCYTES NFR BLD: 10 % (ref 12–49)
MCH RBC QN AUTO: 28.4 PG (ref 26–34)
MCHC RBC AUTO-ENTMCNC: 30.1 G/DL (ref 30–36.5)
MCV RBC AUTO: 94.4 FL (ref 80–99)
MONOCYTES # BLD: 0.7 K/UL (ref 0–1)
MONOCYTES NFR BLD: 6 % (ref 5–13)
NEUTS SEG # BLD: 9.9 K/UL (ref 1.8–8)
NEUTS SEG NFR BLD: 82 % (ref 32–75)
NRBC # BLD: 0.02 K/UL (ref 0–0.01)
NRBC BLD-RTO: 0.2 PER 100 WBC
PLATELET # BLD AUTO: 571 K/UL (ref 150–400)
PMV BLD AUTO: 10.3 FL (ref 8.9–12.9)
POTASSIUM SERPL-SCNC: 3.7 MMOL/L (ref 3.5–5.1)
PROT SERPL-MCNC: 7.7 G/DL (ref 6.4–8.2)
PROTHROMBIN TIME: 11.3 SEC (ref 9–11.1)
RBC # BLD AUTO: 3.06 M/UL (ref 4.1–5.7)
SODIUM SERPL-SCNC: 138 MMOL/L (ref 136–145)
THERAPEUTIC RANGE,PTTT: ABNORMAL SECS (ref 58–77)
WBC # BLD AUTO: 12 K/UL (ref 4.1–11.1)

## 2022-03-16 PROCEDURE — 85610 PROTHROMBIN TIME: CPT

## 2022-03-16 PROCEDURE — 36415 COLL VENOUS BLD VENIPUNCTURE: CPT

## 2022-03-16 PROCEDURE — 80053 COMPREHEN METABOLIC PANEL: CPT

## 2022-03-16 PROCEDURE — 86900 BLOOD TYPING SEROLOGIC ABO: CPT

## 2022-03-16 PROCEDURE — 85025 COMPLETE CBC W/AUTO DIFF WBC: CPT

## 2022-03-16 PROCEDURE — 85730 THROMBOPLASTIN TIME PARTIAL: CPT

## 2022-03-16 PROCEDURE — U0005 INFEC AGEN DETEC AMPLI PROBE: HCPCS

## 2022-03-16 RX ORDER — LACTULOSE 10 G/15ML
SOLUTION ORAL; RECTAL
Qty: 5400 ML | Refills: 0 | Status: ON HOLD | OUTPATIENT
Start: 2022-03-16 | End: 2022-07-06 | Stop reason: SDUPTHER

## 2022-03-16 NOTE — TELEPHONE ENCOUNTER
----- Message from Meaghan Burris sent at 3/16/2022 10:57 AM EDT -----  Subject: Refill Request    QUESTIONS  Name of Medication? lactulose (CHRONULAC) 10 gram/15 mL solution  Patient-reported dosage and instructions? 10GRAM/15 ML SOLUTION EVERY   OTHER DAY  How many days do you have left? 0  Preferred Pharmacy? [facility] phone number (if available)? [phone]  Additional Information for Provider? PT DOES NOT HAVE WITH HIM AND IS   VISITING WITH DAUGHTER FOR AN UNKNOWN AMOUNT OF TIME.   ---------------------------------------------------------------------------  --------------  CALL BACK INFO  What is the best way for the office to contact you? OK to leave message on   voicemail  Preferred Call Back Phone Number?  [phone]

## 2022-03-16 NOTE — PERIOP NOTES
[address] INSTRUCTIONS    Surgery Date:   03/21/2022    [facility] staff will call you between 4 PM- 8 PM the day before surgery with your arrival time. If your surgery is on a Monday, we will call you the preceding Friday. Please call [phone] after 8 PM if you did not receive your arrival time. 1. Please report to [facility] Patient Access/Admitting on the 1st floor. Bring your insurance card, photo identification, and any copayment ( if applicable). 2. If you are going home the same day of your surgery, you must have a responsible adult to drive you home. You need to have a responsible adult to stay with you the first 24 hours after surgery and you should not drive a car for 24 hours following your surgery. 3. Nothing to eat or drink after midnight the night before surgery. This includes no water, gum, mints, coffee, juice, etc.  Please note special instructions, if applicable, below for medications. 4. Do NOT drink alcohol or smoke 24 hours before surgery. STOP smoking for 14 days prior as it helps with breathing and healing after surgery. 5. If you are being admitted to the hospital, please leave personal belongings/luggage in your car until you have an assigned hospital room number. 6. Please wear comfortable clothes. Wear your glasses instead of contacts. We ask that all money, jewelry and valuables be left at home. Wear no make up, particularly mascara, the day of surgery. 7.  All body piercings, rings, and jewelry need to be removed and left at home. Please remove any nail polish or artificial nails from your fingernails. Please wear your hair loose or down. Please no pony-tails, buns, or any metal hair accessories. If you shower the morning of surgery, please do not apply any lotions or powders afterwards. You may wear deodorant, unless having breast surgery. Do not shave any body area within 24 hours of your surgery.   8. Please follow all instructions to avoid any potential surgical cancellation. 9. Should your physical condition change, (i.e. fever, cold, flu, etc.) please notify your surgeon as soon as possible. 10. It is important to be on time. If a situation occurs where you may be delayed, please call:  [phone] / [phone] on the day of surgery. 11. The Preadmission Testing staff can be reached at [phone]. 12. Special instructions: N/A      Current Outpatient Medications   Medication Sig    lactulose (CHRONULAC) 10 gram/15 mL solution TAKE 30 ML TWICE A DAY AS NEEDED (Patient taking differently: as needed. TAKE 30 ML TWICE A DAY AS NEEDED)    dorzolamide HCl (DORZOLAMIDE OP) Apply 1 Drop to eye two (2) times a day.  brimonidine (ALPHAGAN) 0.2 % ophthalmic solution Administer 1 Drop to both eyes two (2) times a day.  aspirin delayed-release 81 mg tablet Take 81 mg by mouth every morning.  magnesium oxide (MAG-OX) 400 mg tablet Take 1 Tablet by mouth daily. (Patient taking differently: Take 400 mg by mouth every morning.)    omeprazole (PRILOSEC) 40 mg capsule Take 1 Capsule by mouth daily. (Patient taking differently: Take 40 mg by mouth every morning.)    FeroSuL 325 mg (65 mg iron) tablet TAKE 1 TABLET BY MOUTH DAILY (BEFORE BREAKFAST). (Patient taking differently: Take 65 mg by mouth Daily (before breakfast). )    cyclobenzaprine (FLEXERIL) 5 mg tablet TAKE 1 TABLET EVERY NIGHT (Patient taking differently: Take 5 mg by mouth as needed.)    Accu-Chek Ratna Plus test strp strip TEST BLOOD SUGAR TWICE A DAY    amLODIPine (NORVASC) 10 mg tablet TAKE 1 TABLET EVERY DAY (Patient taking differently: Take 10 mg by mouth nightly.)    glipiZIDE (GLUCOTROL) 5 mg tablet Take 1 Tablet by mouth daily.  (Patient taking differently: Take 5 mg by mouth every morning.)    metoprolol succinate (TOPROL-XL) 25 mg XL tablet TAKE 1 TABLET EVERY DAY (Patient taking differently: Take 25 mg by mouth every morning.)    hydroCHLOROthiazide (HYDRODIURIL) 25 mg tablet TAKE 1 TABLET EVERY DAY (Patient taking differently: Take 25 mg by mouth every morning.)    lancets (Accu-Chek Softclix Lancets) misc TEST BLOOD SUGAR TWICE A DAY    pravastatin (PRAVACHOL) 80 mg tablet TAKE 1 TABLET NIGHTLY FOR EXCESSIVE FAT IN THE BLOOD (Patient taking differently: Take 80 mg by mouth nightly.)    Accu-Chek Ratna Control Soln soln USE AS DIRECTED.  BD Single Use Swabs Regular padm TEST BLOOD SUGAR TWICE DAILY    acetaminophen (TYLENOL) 325 mg tablet Take 650 mg by mouth every six (6) hours as needed for Pain.  latanoprost (XALATAN) 0.005 % ophthalmic solution Administer 1 Drop to both eyes nightly.  senna-docusate (STOOL SOFTENER-LAXATIVE) 8.6-50 mg per tablet Take 1 Tablet by mouth every morning. No current facility-administered medications for this encounter. 1. YOU MUST ONLY TAKE THESE MEDICATIONS THE MORNING OF SURGERY WITH A SIP OF WATER: METOPROLOL, OMEPRAZOLE  2. MEDICATIONS TO TAKE THE MORNING OF SURGERY ONLY IF NEEDED: CYCLOBENZAPRINE (4 HOURS PRIOR TO ARRIVAL TO HOSPITAL)  3. HOLD these prescription medications BEFORE Surgery: IRON, LACTULOSE, STOOL SOFTENER, MAGNESIUM, HYDROCHOLOTHIAZIDE  4. Ask your surgeon/prescribing physician about when/if to STOP taking these medications: GLIPIZIDE  5. Stop all vitamins, herbal medicines and Aspirin containing products 7 days prior to surgery. Stop any non-steroidal anti-inflammatory drugs (i.e. Ibuprofen, Naproxen, Advil, Aleve) 3 days before surgery. You may take Tylenol. 6. If you are currently taking Plavix, Coumadin, or any other blood-thinning/anticoagulant medication contact your prescribing physician for instructions. Preventing Infections Before and After  Your Surgery    IMPORTANT INSTRUCTIONS      You play an important role in your health and preparation for surgery. To reduce the germs on your skin you will need to shower with CHG soap (Chorhexidine gluconate 4%) two times before surgery.     CHG soap (Hibiclens, Hex-A-Clens or store brand)   CHG soap will be provided at your Preadmission Testing (PAT) appointment.  If you do not have a PAT appointment before surgery, you may arrange to  CHG soap from our office or purchase CHG soap at a pharmacy, grocery or department store.  You need to purchase TWO 4 ounce bottles to use for your 2 showers. Steps to follow:  1. Wash your hair with your normal shampoo and your body with regular soap and rinse well to remove shampoo and soap from your skin. 2. Wet a clean washcloth and turn off the shower. 3. Put CHG soap on washcloth and apply to your entire body from the neck down. Do not use on your head, face or private parts(genitals). Do not use CHG soap on open sores, wounds or areas of skin irritation. 4. Wash you body gently for 5 minutes. Do not wash your skin too hard. This soap does not create lather. Pay special attention to your underarms and from your belly button to your feet. 5. Turn the shower back on and rinse well to get CHG soap off your body. 6. Pat your skin dry with a clean, dry towel. Do not apply lotions or moisturizer. 7. Put on clean clothes and sleep on fresh bed sheets and do not allow pets to sleep with you. Shower with CHG soap 2 times before your surgery   The evening before your surgery   The morning of your surgery      Tips to help prevent infections after your surgery:  1. Protect your surgical wound from germs:  ? Hand washing is the most important thing you and your caregivers can do to prevent infections. ? Keep your bandage clean and dry! ? Do not touch your surgical wound. 2. Use clean, freshly washed towels and washcloths every time you shower; do not share bath linens with others. 3. Until your surgical wound is healed, wear clothing and sleep on bed linens each day that are clean and freshly washed. 4. Do not allow pets to sleep in your bed with you or touch your surgical wound.   5. Do not smoke  smoking delays wound healing. This may be a good time to stop smoking. 6. If you have diabetes, it is important for you to manage your blood sugar levels properly before your surgery as well as after your surgery. Poorly managed blood sugar levels slow down wound healing and prevent you from healing completely. [facility]   Instructions for Pre-Surgery COVID-19 Testing     Across our ministry we have established standard guidelines to ensure the health and safety of our patients, residents and associates as we resume elective services for patients. All patients presenting for surgery are required to have a COVID-19 test result within 96 hours of their scheduled surgery. [address] [place] [address] is providing this test free of charge to the patient.    Instructions for COVID-19 Testing:     Patients will receive a call from Pre-Admission Testing 4-5 days prior to surgery to schedule a date and time to come to the [address] [place] [address] Drive for their COVID-19 test   Patients are advised to self-quarantine after testing until their scheduled surgery   Once on site, patients will be registered and receive COVID test in their vehicle   If a patient is scheduled for normal Pre Admission Testing 96 hours from date of surgery, the patient will still have their COVID test done at the [address] [place] [address] located at [address] [place] [address] Positive results will be shared with the surgeon and anesthesiologist and may result in cancellation of the elective procedure    Testing Hours and Location:   Address:  [address] [place] [address] Up Pre Admission [facility] in the Discharge Lot on [facility] (Map Attached)  [address] [facility], [address]   Hours: Monday- Friday 7a-3p    PAT Phone Number: [phone]            Patient Information Regarding COVID Restrictions    Patients are advised to self-quarantine after COVID testing up to the day of the scheduled procedure. Day of Procedure     Please park in the parking deck or any designated visitor parking lot.  Enter the facility through the Main Entrance of the hospital.   A temperature check and appropriate symptom/exposure screening will be done prior to entry to the facility.  On the day of surgery, please provide the cell phone number of the person who will be waiting for you to the Patient Access representative at the time of registration.  Please wear a mask on the day of your procedure.  We are now allowing one designated visitor per stay. Pediatric patients may have 2 designated visitors. This one person may come in with you on the day of your procedure.  No visitors under the age of 13.  The designated visitor must also wear a mask.  Once your procedure and the immediate recovery period is completed, a nurse in the recovery area will contact your designated visitor to inform them of your room number or to otherwise review other pertinent information regarding your care.  Social distancing practices are to be adhered to in waiting areas and the cafeteria. The patient and daughter was contacted  in person. They verbalized understanding of all instructions does not  need reinforcement.

## 2022-03-16 NOTE — PERIOP NOTES
PT HAS WATCHMAN PACEMAKER. PT'S DAUGHTER STATES THAT SOMEONE DIDN'T GIVE CARD BACK. UNABLE TO MAKE COPY OF PACEMAKER CARD. 3/16/2022 @ 9916 - CALL TO SURG. OFFICE. SPOKE WITH ALEKSANDAR, NURSE FOR DR. TRIANA IN REGARDS TO PAT ORDERS. 3/16/2022 @ 1500 - SPOKE WITH MARIANELA @ [address] [place] [address]. REQUESTED MOST RECENT OFFICE NOTES, EKG & TEST TO BE FAXED TO PAT. SURGICAL CONSENT OBTAINED IN PAT.

## 2022-03-17 LAB
SARS-COV-2, XPLCVT: NOT DETECTED
SOURCE, COVRS: NORMAL

## 2022-03-18 ENCOUNTER — ANESTHESIA EVENT (OUTPATIENT)
Dept: SURGERY | Age: 87
DRG: 241 | End: 2022-03-18
Payer: MEDICARE

## 2022-03-18 PROBLEM — R97.20 ELEVATED PSA: Status: ACTIVE | Noted: 2021-12-20

## 2022-03-18 PROBLEM — H26.9 CATARACT: Status: ACTIVE | Noted: 2020-07-21

## 2022-03-18 PROBLEM — E11.9 TYPE 2 DIABETES MELLITUS WITHOUT COMPLICATIONS (HCC): Status: ACTIVE | Noted: 2020-07-13

## 2022-03-19 PROBLEM — I73.9 PERIPHERAL VASCULAR DISEASE (HCC): Status: ACTIVE | Noted: 2020-07-21

## 2022-03-19 PROBLEM — E83.42 HYPOMAGNESEMIA: Status: ACTIVE | Noted: 2020-07-21

## 2022-03-19 PROBLEM — E11.22 TYPE 2 DIABETES MELLITUS WITH CHRONIC KIDNEY DISEASE (HCC): Status: ACTIVE | Noted: 2022-02-17

## 2022-03-19 PROBLEM — Z95.0 PACEMAKER: Status: ACTIVE | Noted: 2020-07-21

## 2022-03-19 PROBLEM — K59.09 CHRONIC CONSTIPATION: Status: ACTIVE | Noted: 2020-07-21

## 2022-03-19 PROBLEM — Z86.73 HISTORY OF CVA (CEREBROVASCULAR ACCIDENT): Status: ACTIVE | Noted: 2020-07-21

## 2022-03-19 PROBLEM — E78.2 MIXED HYPERLIPIDEMIA: Status: ACTIVE | Noted: 2020-07-21

## 2022-03-19 PROBLEM — K21.9 GERD (GASTROESOPHAGEAL REFLUX DISEASE): Status: ACTIVE | Noted: 2020-07-21

## 2022-03-19 PROBLEM — N18.30 STAGE 3 CHRONIC KIDNEY DISEASE (HCC): Status: ACTIVE | Noted: 2020-07-21

## 2022-03-19 PROBLEM — Z91.81 AT HIGH RISK FOR FALLS: Status: ACTIVE | Noted: 2021-08-31

## 2022-03-19 PROBLEM — N40.0 BENIGN PROSTATIC HYPERPLASIA: Status: ACTIVE | Noted: 2017-04-26

## 2022-03-20 PROBLEM — I70.209 ATHEROSCLEROTIC PVD WITH ULCERATION (HCC): Status: ACTIVE | Noted: 2021-01-04

## 2022-03-20 PROBLEM — L98.499 ATHEROSCLEROTIC PVD WITH ULCERATION (HCC): Status: ACTIVE | Noted: 2021-01-04

## 2022-03-20 PROBLEM — H40.9 GLAUCOMA: Status: ACTIVE | Noted: 2020-07-21

## 2022-03-20 PROBLEM — I10 HYPERTENSION: Status: ACTIVE | Noted: 2020-07-21

## 2022-03-21 ENCOUNTER — HOSPITAL ENCOUNTER (INPATIENT)
Age: 87
LOS: 4 days | Discharge: NURSING, REHAB OR LONG-TERM CARE | DRG: 241 | End: 2022-03-25
Payer: MEDICARE

## 2022-03-21 ENCOUNTER — ANESTHESIA (OUTPATIENT)
Dept: SURGERY | Age: 87
DRG: 241 | End: 2022-03-21
Payer: MEDICARE

## 2022-03-21 DIAGNOSIS — I73.9 PERIPHERAL VASCULAR DISEASE (HCC): Primary | ICD-10-CM

## 2022-03-21 LAB
GLUCOSE BLD STRIP.AUTO-MCNC: 115 MG/DL (ref 65–117)
GLUCOSE BLD STRIP.AUTO-MCNC: 120 MG/DL (ref 65–117)
GLUCOSE BLD STRIP.AUTO-MCNC: 131 MG/DL (ref 65–117)
GLUCOSE BLD STRIP.AUTO-MCNC: 131 MG/DL (ref 65–117)
GLUCOSE BLD STRIP.AUTO-MCNC: 136 MG/DL (ref 65–117)
HGB BLD-MCNC: 10.5 G/DL (ref 12.1–17)
SERVICE CMNT-IMP: ABNORMAL
SERVICE CMNT-IMP: NORMAL

## 2022-03-21 PROCEDURE — 74011250637 HC RX REV CODE- 250/637: Performed by: ANESTHESIOLOGY

## 2022-03-21 PROCEDURE — 77030031139 HC SUT VCRL2 J&J -A

## 2022-03-21 PROCEDURE — 77030008462 HC STPLR SKN PROX J&J -A

## 2022-03-21 PROCEDURE — 74011250636 HC RX REV CODE- 250/636

## 2022-03-21 PROCEDURE — 0Y6D0Z3 DETACHMENT AT LEFT UPPER LEG, LOW, OPEN APPROACH: ICD-10-PCS

## 2022-03-21 PROCEDURE — 94760 N-INVAS EAR/PLS OXIMETRY 1: CPT

## 2022-03-21 PROCEDURE — 77030025655 HC BLD SAW GIGLI BIOM -B

## 2022-03-21 PROCEDURE — 74011000250 HC RX REV CODE- 250

## 2022-03-21 PROCEDURE — 77010033678 HC OXYGEN DAILY

## 2022-03-21 PROCEDURE — 74011250636 HC RX REV CODE- 250/636: Performed by: ANESTHESIOLOGY

## 2022-03-21 PROCEDURE — 85018 HEMOGLOBIN: CPT

## 2022-03-21 PROCEDURE — 74011250637 HC RX REV CODE- 250/637

## 2022-03-21 PROCEDURE — 65270000032 HC RM SEMIPRIVATE

## 2022-03-21 PROCEDURE — 82962 GLUCOSE BLOOD TEST: CPT

## 2022-03-21 PROCEDURE — 77030008684 HC TU ET CUF COVD -B: Performed by: ANESTHESIOLOGY

## 2022-03-21 PROCEDURE — 76210000006 HC OR PH I REC 0.5 TO 1 HR

## 2022-03-21 PROCEDURE — 2709999900 HC NON-CHARGEABLE SUPPLY

## 2022-03-21 PROCEDURE — 76060000033 HC ANESTHESIA 1 TO 1.5 HR

## 2022-03-21 PROCEDURE — 88311 DECALCIFY TISSUE: CPT

## 2022-03-21 PROCEDURE — 74011000250 HC RX REV CODE- 250: Performed by: ANESTHESIOLOGY

## 2022-03-21 PROCEDURE — 88307 TISSUE EXAM BY PATHOLOGIST: CPT

## 2022-03-21 PROCEDURE — 77030026438 HC STYL ET INTUB CARD -A: Performed by: ANESTHESIOLOGY

## 2022-03-21 PROCEDURE — 77030042556 HC PNCL CAUT -B

## 2022-03-21 PROCEDURE — 76010000138 HC OR TIME 0.5 TO 1 HR

## 2022-03-21 PROCEDURE — 77030027138 HC INCENT SPIROMETER -A

## 2022-03-21 RX ORDER — INSULIN LISPRO 100 [IU]/ML
INJECTION, SOLUTION INTRAVENOUS; SUBCUTANEOUS
Status: DISCONTINUED | OUTPATIENT
Start: 2022-03-21 | End: 2022-03-25 | Stop reason: HOSPADM

## 2022-03-21 RX ORDER — DORZOLAMIDE HYDROCHLORIDE AND TIMOLOL MALEATE PRESERVATIVE FREE 20; 5 MG/ML; MG/ML
1 SOLUTION/ DROPS OPHTHALMIC 2 TIMES DAILY
Status: ON HOLD | COMMUNITY
End: 2022-07-06 | Stop reason: SDUPTHER

## 2022-03-21 RX ORDER — SODIUM CHLORIDE, SODIUM LACTATE, POTASSIUM CHLORIDE, CALCIUM CHLORIDE 600; 310; 30; 20 MG/100ML; MG/100ML; MG/100ML; MG/100ML
125 INJECTION, SOLUTION INTRAVENOUS CONTINUOUS
Status: DISCONTINUED | OUTPATIENT
Start: 2022-03-21 | End: 2022-03-21 | Stop reason: HOSPADM

## 2022-03-21 RX ORDER — LANOLIN ALCOHOL/MO/W.PET/CERES
325 CREAM (GRAM) TOPICAL
Status: DISCONTINUED | OUTPATIENT
Start: 2022-03-22 | End: 2022-03-25 | Stop reason: HOSPADM

## 2022-03-21 RX ORDER — MIDAZOLAM HYDROCHLORIDE 1 MG/ML
1 INJECTION, SOLUTION INTRAMUSCULAR; INTRAVENOUS AS NEEDED
Status: DISCONTINUED | OUTPATIENT
Start: 2022-03-21 | End: 2022-03-21 | Stop reason: HOSPADM

## 2022-03-21 RX ORDER — ENOXAPARIN SODIUM 100 MG/ML
40 INJECTION SUBCUTANEOUS EVERY 24 HOURS
Status: DISCONTINUED | OUTPATIENT
Start: 2022-03-21 | End: 2022-03-25 | Stop reason: HOSPADM

## 2022-03-21 RX ORDER — LIDOCAINE HYDROCHLORIDE 10 MG/ML
0.1 INJECTION, SOLUTION EPIDURAL; INFILTRATION; INTRACAUDAL; PERINEURAL AS NEEDED
Status: DISCONTINUED | OUTPATIENT
Start: 2022-03-21 | End: 2022-03-21 | Stop reason: HOSPADM

## 2022-03-21 RX ORDER — HYDROCODONE BITARTRATE AND ACETAMINOPHEN 7.5; 325 MG/1; MG/1
1 TABLET ORAL
Status: DISCONTINUED | OUTPATIENT
Start: 2022-03-21 | End: 2022-03-25 | Stop reason: HOSPADM

## 2022-03-21 RX ORDER — MORPHINE SULFATE 2 MG/ML
2 INJECTION, SOLUTION INTRAMUSCULAR; INTRAVENOUS
Status: DISCONTINUED | OUTPATIENT
Start: 2022-03-21 | End: 2022-03-21 | Stop reason: HOSPADM

## 2022-03-21 RX ORDER — MORPHINE SULFATE 2 MG/ML
4 INJECTION, SOLUTION INTRAMUSCULAR; INTRAVENOUS
Status: DISCONTINUED | OUTPATIENT
Start: 2022-03-21 | End: 2022-03-25 | Stop reason: HOSPADM

## 2022-03-21 RX ORDER — SODIUM CHLORIDE 0.9 % (FLUSH) 0.9 %
5-40 SYRINGE (ML) INJECTION EVERY 8 HOURS
Status: DISCONTINUED | OUTPATIENT
Start: 2022-03-21 | End: 2022-03-21 | Stop reason: HOSPADM

## 2022-03-21 RX ORDER — TIMOLOL MALEATE 5 MG/ML
1 SOLUTION/ DROPS OPHTHALMIC 2 TIMES DAILY
Status: DISCONTINUED | OUTPATIENT
Start: 2022-03-21 | End: 2022-03-25 | Stop reason: HOSPADM

## 2022-03-21 RX ORDER — PHENYLEPHRINE HCL IN 0.9% NACL 0.4MG/10ML
SYRINGE (ML) INTRAVENOUS AS NEEDED
Status: DISCONTINUED | OUTPATIENT
Start: 2022-03-21 | End: 2022-03-21 | Stop reason: HOSPADM

## 2022-03-21 RX ORDER — LANOLIN ALCOHOL/MO/W.PET/CERES
400 CREAM (GRAM) TOPICAL DAILY
Status: DISCONTINUED | OUTPATIENT
Start: 2022-03-21 | End: 2022-03-25 | Stop reason: HOSPADM

## 2022-03-21 RX ORDER — FENTANYL CITRATE 50 UG/ML
INJECTION, SOLUTION INTRAMUSCULAR; INTRAVENOUS AS NEEDED
Status: DISCONTINUED | OUTPATIENT
Start: 2022-03-21 | End: 2022-03-21 | Stop reason: HOSPADM

## 2022-03-21 RX ORDER — ASPIRIN 81 MG/1
81 TABLET ORAL
Status: DISCONTINUED | OUTPATIENT
Start: 2022-03-21 | End: 2022-03-25 | Stop reason: HOSPADM

## 2022-03-21 RX ORDER — SODIUM CHLORIDE 9 MG/ML
75 INJECTION, SOLUTION INTRAVENOUS CONTINUOUS
Status: DISCONTINUED | OUTPATIENT
Start: 2022-03-21 | End: 2022-03-22

## 2022-03-21 RX ORDER — AMOXICILLIN 250 MG
1 CAPSULE ORAL
Status: DISCONTINUED | OUTPATIENT
Start: 2022-03-21 | End: 2022-03-25 | Stop reason: HOSPADM

## 2022-03-21 RX ORDER — OXYCODONE AND ACETAMINOPHEN 5; 325 MG/1; MG/1
1 TABLET ORAL AS NEEDED
Status: DISCONTINUED | OUTPATIENT
Start: 2022-03-21 | End: 2022-03-21 | Stop reason: HOSPADM

## 2022-03-21 RX ORDER — EPHEDRINE SULFATE/0.9% NACL/PF 50 MG/5 ML
SYRINGE (ML) INTRAVENOUS AS NEEDED
Status: DISCONTINUED | OUTPATIENT
Start: 2022-03-21 | End: 2022-03-21 | Stop reason: HOSPADM

## 2022-03-21 RX ORDER — SODIUM CHLORIDE 0.9 % (FLUSH) 0.9 %
5-40 SYRINGE (ML) INJECTION AS NEEDED
Status: DISCONTINUED | OUTPATIENT
Start: 2022-03-21 | End: 2022-03-25 | Stop reason: HOSPADM

## 2022-03-21 RX ORDER — PRAVASTATIN SODIUM 40 MG/1
80 TABLET ORAL
Status: DISCONTINUED | OUTPATIENT
Start: 2022-03-21 | End: 2022-03-25 | Stop reason: HOSPADM

## 2022-03-21 RX ORDER — SODIUM CHLORIDE 0.9 % (FLUSH) 0.9 %
5-40 SYRINGE (ML) INJECTION AS NEEDED
Status: DISCONTINUED | OUTPATIENT
Start: 2022-03-21 | End: 2022-03-21 | Stop reason: HOSPADM

## 2022-03-21 RX ORDER — DIPHENHYDRAMINE HYDROCHLORIDE 50 MG/ML
12.5 INJECTION, SOLUTION INTRAMUSCULAR; INTRAVENOUS AS NEEDED
Status: DISCONTINUED | OUTPATIENT
Start: 2022-03-21 | End: 2022-03-21 | Stop reason: HOSPADM

## 2022-03-21 RX ORDER — HYDROCHLOROTHIAZIDE 25 MG/1
25 TABLET ORAL DAILY
Status: DISCONTINUED | OUTPATIENT
Start: 2022-03-21 | End: 2022-03-25 | Stop reason: HOSPADM

## 2022-03-21 RX ORDER — AMLODIPINE BESYLATE 5 MG/1
10 TABLET ORAL
Status: DISCONTINUED | OUTPATIENT
Start: 2022-03-21 | End: 2022-03-25 | Stop reason: HOSPADM

## 2022-03-21 RX ORDER — MAGNESIUM SULFATE 100 %
4 CRYSTALS MISCELLANEOUS AS NEEDED
Status: DISCONTINUED | OUTPATIENT
Start: 2022-03-21 | End: 2022-03-25 | Stop reason: HOSPADM

## 2022-03-21 RX ORDER — FENTANYL CITRATE 50 UG/ML
25 INJECTION, SOLUTION INTRAMUSCULAR; INTRAVENOUS
Status: DISCONTINUED | OUTPATIENT
Start: 2022-03-21 | End: 2022-03-21 | Stop reason: HOSPADM

## 2022-03-21 RX ORDER — ONDANSETRON 2 MG/ML
4 INJECTION INTRAMUSCULAR; INTRAVENOUS
Status: DISCONTINUED | OUTPATIENT
Start: 2022-03-21 | End: 2022-03-25 | Stop reason: HOSPADM

## 2022-03-21 RX ORDER — ROCURONIUM BROMIDE 10 MG/ML
INJECTION, SOLUTION INTRAVENOUS AS NEEDED
Status: DISCONTINUED | OUTPATIENT
Start: 2022-03-21 | End: 2022-03-21 | Stop reason: HOSPADM

## 2022-03-21 RX ORDER — METOPROLOL SUCCINATE 25 MG/1
25 TABLET, EXTENDED RELEASE ORAL DAILY
Status: DISCONTINUED | OUTPATIENT
Start: 2022-03-21 | End: 2022-03-25 | Stop reason: HOSPADM

## 2022-03-21 RX ORDER — MIDAZOLAM HYDROCHLORIDE 1 MG/ML
0.5 INJECTION, SOLUTION INTRAMUSCULAR; INTRAVENOUS
Status: DISCONTINUED | OUTPATIENT
Start: 2022-03-21 | End: 2022-03-21 | Stop reason: HOSPADM

## 2022-03-21 RX ORDER — LACTULOSE 10 G/15ML
30 SOLUTION ORAL; RECTAL
Status: DISCONTINUED | OUTPATIENT
Start: 2022-03-21 | End: 2022-03-25 | Stop reason: HOSPADM

## 2022-03-21 RX ORDER — HYDROMORPHONE HYDROCHLORIDE 1 MG/ML
0.2 INJECTION, SOLUTION INTRAMUSCULAR; INTRAVENOUS; SUBCUTANEOUS
Status: DISCONTINUED | OUTPATIENT
Start: 2022-03-21 | End: 2022-03-21 | Stop reason: HOSPADM

## 2022-03-21 RX ORDER — SODIUM CHLORIDE 0.9 % (FLUSH) 0.9 %
5-40 SYRINGE (ML) INJECTION EVERY 8 HOURS
Status: DISCONTINUED | OUTPATIENT
Start: 2022-03-21 | End: 2022-03-25 | Stop reason: HOSPADM

## 2022-03-21 RX ORDER — ACETAMINOPHEN 325 MG/1
650 TABLET ORAL
Status: DISCONTINUED | OUTPATIENT
Start: 2022-03-21 | End: 2022-03-25 | Stop reason: HOSPADM

## 2022-03-21 RX ORDER — GLIPIZIDE 5 MG/1
5 TABLET ORAL
Status: DISCONTINUED | OUTPATIENT
Start: 2022-03-22 | End: 2022-03-25 | Stop reason: HOSPADM

## 2022-03-21 RX ORDER — ONDANSETRON 2 MG/ML
4 INJECTION INTRAMUSCULAR; INTRAVENOUS AS NEEDED
Status: DISCONTINUED | OUTPATIENT
Start: 2022-03-21 | End: 2022-03-21 | Stop reason: HOSPADM

## 2022-03-21 RX ORDER — PROPOFOL 10 MG/ML
INJECTION, EMULSION INTRAVENOUS AS NEEDED
Status: DISCONTINUED | OUTPATIENT
Start: 2022-03-21 | End: 2022-03-21 | Stop reason: HOSPADM

## 2022-03-21 RX ORDER — FENTANYL CITRATE 50 UG/ML
50 INJECTION, SOLUTION INTRAMUSCULAR; INTRAVENOUS AS NEEDED
Status: DISCONTINUED | OUTPATIENT
Start: 2022-03-21 | End: 2022-03-21 | Stop reason: HOSPADM

## 2022-03-21 RX ORDER — SUCCINYLCHOLINE CHLORIDE 20 MG/ML
INJECTION INTRAMUSCULAR; INTRAVENOUS AS NEEDED
Status: DISCONTINUED | OUTPATIENT
Start: 2022-03-21 | End: 2022-03-21 | Stop reason: HOSPADM

## 2022-03-21 RX ORDER — ONDANSETRON 2 MG/ML
INJECTION INTRAMUSCULAR; INTRAVENOUS AS NEEDED
Status: DISCONTINUED | OUTPATIENT
Start: 2022-03-21 | End: 2022-03-21 | Stop reason: HOSPADM

## 2022-03-21 RX ORDER — SODIUM CHLORIDE 9 MG/ML
25 INJECTION, SOLUTION INTRAVENOUS CONTINUOUS
Status: DISCONTINUED | OUTPATIENT
Start: 2022-03-21 | End: 2022-03-21 | Stop reason: HOSPADM

## 2022-03-21 RX ORDER — ACETAMINOPHEN 325 MG/1
650 TABLET ORAL ONCE
Status: COMPLETED | OUTPATIENT
Start: 2022-03-21 | End: 2022-03-21

## 2022-03-21 RX ORDER — PANTOPRAZOLE SODIUM 40 MG/1
40 TABLET, DELAYED RELEASE ORAL
Status: DISCONTINUED | OUTPATIENT
Start: 2022-03-22 | End: 2022-03-25 | Stop reason: HOSPADM

## 2022-03-21 RX ORDER — LATANOPROST 50 UG/ML
1 SOLUTION/ DROPS OPHTHALMIC
Status: DISCONTINUED | OUTPATIENT
Start: 2022-03-21 | End: 2022-03-25 | Stop reason: HOSPADM

## 2022-03-21 RX ORDER — DORZOLAMIDE HCL 20 MG/ML
1 SOLUTION/ DROPS OPHTHALMIC 3 TIMES DAILY
Status: DISCONTINUED | OUTPATIENT
Start: 2022-03-21 | End: 2022-03-25 | Stop reason: HOSPADM

## 2022-03-21 RX ADMIN — FENTANYL CITRATE 25 MCG: 50 INJECTION, SOLUTION INTRAMUSCULAR; INTRAVENOUS at 09:30

## 2022-03-21 RX ADMIN — FENTANYL CITRATE 12.5 MCG: 50 INJECTION, SOLUTION INTRAMUSCULAR; INTRAVENOUS at 07:49

## 2022-03-21 RX ADMIN — FENTANYL CITRATE 25 MCG: 50 INJECTION, SOLUTION INTRAMUSCULAR; INTRAVENOUS at 09:38

## 2022-03-21 RX ADMIN — AMLODIPINE BESYLATE 10 MG: 5 TABLET ORAL at 21:34

## 2022-03-21 RX ADMIN — ACETAMINOPHEN 650 MG: 325 TABLET ORAL at 06:37

## 2022-03-21 RX ADMIN — ONDANSETRON HYDROCHLORIDE 4 MG: 2 INJECTION, SOLUTION INTRAMUSCULAR; INTRAVENOUS at 08:08

## 2022-03-21 RX ADMIN — ENOXAPARIN SODIUM 40 MG: 100 INJECTION SUBCUTANEOUS at 17:43

## 2022-03-21 RX ADMIN — HYDROCODONE BITARTRATE AND ACETAMINOPHEN 1 TABLET: 7.5; 325 TABLET ORAL at 21:34

## 2022-03-21 RX ADMIN — Medication 80 MCG: at 08:04

## 2022-03-21 RX ADMIN — ROCURONIUM BROMIDE 5 MG: 10 SOLUTION INTRAVENOUS at 07:36

## 2022-03-21 RX ADMIN — PRAVASTATIN SODIUM 80 MG: 40 TABLET ORAL at 21:34

## 2022-03-21 RX ADMIN — WATER 2 G: 1 INJECTION INTRAMUSCULAR; INTRAVENOUS; SUBCUTANEOUS at 07:45

## 2022-03-21 RX ADMIN — SENNOSIDES AND DOCUSATE SODIUM 1 TABLET: 50; 8.6 TABLET ORAL at 11:44

## 2022-03-21 RX ADMIN — LATANOPROST 1 DROP: 50 SOLUTION/ DROPS OPHTHALMIC at 21:40

## 2022-03-21 RX ADMIN — SODIUM CHLORIDE, POTASSIUM CHLORIDE, SODIUM LACTATE AND CALCIUM CHLORIDE: 600; 310; 30; 20 INJECTION, SOLUTION INTRAVENOUS at 07:30

## 2022-03-21 RX ADMIN — Medication 5 MG: at 08:10

## 2022-03-21 RX ADMIN — HYDROCODONE BITARTRATE AND ACETAMINOPHEN 1 TABLET: 7.5; 325 TABLET ORAL at 17:42

## 2022-03-21 RX ADMIN — Medication 80 MCG: at 07:56

## 2022-03-21 RX ADMIN — Medication 80 MCG: at 07:59

## 2022-03-21 RX ADMIN — PROPOFOL 50 MG: 10 INJECTION, EMULSION INTRAVENOUS at 07:36

## 2022-03-21 RX ADMIN — ASPIRIN 81 MG: 81 TABLET, COATED ORAL at 11:46

## 2022-03-21 RX ADMIN — Medication 80 MCG: at 07:36

## 2022-03-21 RX ADMIN — FENTANYL CITRATE 12.5 MCG: 50 INJECTION, SOLUTION INTRAMUSCULAR; INTRAVENOUS at 07:56

## 2022-03-21 RX ADMIN — TIMOLOL MALEATE 1 DROP: 5 SOLUTION/ DROPS OPHTHALMIC at 19:30

## 2022-03-21 RX ADMIN — DORZOLAMIDE HYDROCHLORIDE 1 DROP: 20 SOLUTION/ DROPS OPHTHALMIC at 19:27

## 2022-03-21 RX ADMIN — HYDROCHLOROTHIAZIDE 25 MG: 25 TABLET ORAL at 15:49

## 2022-03-21 RX ADMIN — Medication 120 MCG: at 08:10

## 2022-03-21 RX ADMIN — FENTANYL CITRATE 12.5 MCG: 50 INJECTION, SOLUTION INTRAMUSCULAR; INTRAVENOUS at 07:36

## 2022-03-21 RX ADMIN — DORZOLAMIDE HYDROCHLORIDE 1 DROP: 20 SOLUTION/ DROPS OPHTHALMIC at 21:40

## 2022-03-21 RX ADMIN — SODIUM CHLORIDE 75 ML/HR: 9 INJECTION, SOLUTION INTRAVENOUS at 09:06

## 2022-03-21 RX ADMIN — Medication 400 MG: at 11:47

## 2022-03-21 RX ADMIN — SODIUM CHLORIDE, PRESERVATIVE FREE 10 ML: 5 INJECTION INTRAVENOUS at 21:35

## 2022-03-21 RX ADMIN — PHENYLEPHRINE HYDROCHLORIDE 30 MCG/MIN: 10 INJECTION INTRAVENOUS at 07:36

## 2022-03-21 RX ADMIN — SUCCINYLCHOLINE CHLORIDE 180 MG: 20 INJECTION, SOLUTION INTRAMUSCULAR; INTRAVENOUS at 07:37

## 2022-03-21 RX ADMIN — HYDROCODONE BITARTRATE AND ACETAMINOPHEN 1 TABLET: 7.5; 325 TABLET ORAL at 11:44

## 2022-03-21 NOTE — OP NOTES
[address] [facility]  OPERATIVE REPORT    Name:  Amadou Keys  MR#:  [id number]  :  1935  ACCOUNT #:  [de-identified]  DATE OF SERVICE:  2022      PREOPERATIVE DIAGNOSIS:  Gangrene, left foot. POSTOPERATIVE DIAGNOSIS:  Gangrene, left foot. PROCEDURE PERFORMED:  Left above-knee amputation. SURGEON:  MD Sarah Matias    ANESTHESIA:  General.    COMPLICATIONS:  None. SPECIMENS REMOVED:  Left lower leg and foot. IMPLANTS:  None. ESTIMATED BLOOD LOSS:  15 mL. INDICATIONS:  The patient is an 82-year-old male with known peripheral vascular disease, who has had a previous right leg bypass. He had a left popliteal to dorsalis pedis bypass and amputation of the first three toes in 2021. He more recently developed ischemic changes in the fourth and fifth toes and underwent further amputation of the remaining two toes. This incision failed to heal and he has ongoing left foot pain. He will undergo a left below-knee amputation. PROCEDURE:  The patient's left leg was prepped and draped. A circumferential incision was made in the mid left lower leg. This included a posterior skin and muscle flap. The soft tissues were divided anteriorly, medially and laterally. The tibia was divided with a giggly saw and the fibula with a bone cutter. The posterior soft tissues were then divided and the lower leg and foot removed. Bleeding was clearly less than normal but appeared to be adequate. The incision was then closed with interrupted Vicryl subcutaneous and fascial sutures and skin staples. Dressings were applied and the patient was returned to the recovery room in stable condition.         Marianne Borjas MD      [id number]  D:  2022 8:35  T:  2022 9:44  JOB #:  [id number]

## 2022-03-21 NOTE — ROUTINE PROCESS
Patient: Flako Moy MRN: [id number]  SSN: [id number]   YOB: 1935  Age: 82 y.o. Sex: male     Patient is status post Procedure(s):  LEFT BELOW KNEE AMPUTATION. Surgeon(s) and Role:     Kathleen Claude, MD - Primary    Local/Dose/Irrigation:                            Airway - Endotracheal Tube 03/21/22 Oral (Active)                   Dressing/Packing:  Incision 03/21/22 Leg Left; Lower-Dressing/Treatment: Other (Comment);ABD pad;Gauze dressing/dressing sponge; Ace wrap (adaptic) (03/21/22 7487)    Splint/Cast:  ]    Other:

## 2022-03-21 NOTE — ANESTHESIA PREPROCEDURE EVALUATION
Relevant Problems   CARDIOVASCULAR   (+) Hypertension   (+) Pacemaker      GASTROINTESTINAL   (+) GERD (gastroesophageal reflux disease)      RENAL FAILURE   (+) Stage 3 chronic kidney disease (HCC)      ENDOCRINE   (+) Type 2 diabetes mellitus with chronic kidney disease (HCC)   (+) Type 2 diabetes mellitus without complications (HCC)       Anesthetic History   No history of anesthetic complications            Review of Systems / Medical History  Patient summary reviewed, nursing notes reviewed and pertinent labs reviewed    Pulmonary  Within defined limits  COPD               Neuro/Psych   Within defined limits    CVA       Cardiovascular  Within defined limits  Hypertension  Valvular problems/murmurs: aortic stenosis      Dysrhythmias : atrial fibrillation  Pacemaker, CAD and PAD    Exercise tolerance: <4 METS     GI/Hepatic/Renal  Within defined limits   GERD    Renal disease: CRI       Endo/Other  Within defined limits  Diabetes: type 2    Arthritis, cancer and anemia     Other Findings              Physical Exam    Airway  Mallampati: II  TM Distance: > 6 cm  Neck ROM: normal range of motion   Mouth opening: Normal     Cardiovascular          Murmur: Grade 2, Mitral area     Dental    Dentition: Edentulous     Pulmonary  Breath sounds clear to auscultation               Abdominal  GI exam deferred       Other Findings            Anesthetic Plan    ASA: 3  Anesthesia type: general          Induction: Intravenous  Anesthetic plan and risks discussed with: Patient and Spouse

## 2022-03-21 NOTE — PERIOP NOTES
TRANSFER - OUT REPORT:    Verbal report given to Ansley(name) on Melanie Stark  being transferred to [facility](unit) for routine post - op       Report consisted of patients Situation, Background, Assessment and   Recommendations(SBAR). Time Pre op antibiotic given:0745  Anesthesia Stop time: 7257  Dodson Present on Transfer to floor:n  Order for Dodson on Chart:n  Discharge Prescriptions with Chart:n    Information from the following report(s) SBAR, OR Summary, Intake/Output, MAR, Accordion and Recent Results was reviewed with the receiving nurse. Opportunity for questions and clarification was provided. Is the patient on 02? YES       L/Min 2       Other     Is the patient on a monitor? NO    Is the nurse transporting with the patient? NO    Surgical Waiting Area notified of patient's transfer from PACU? YES      The following personal items collected during your admission accompanied patient upon transfer:   Dental Appliance: Dental Appliances: None  Vision:    Hearing Aid:    Jewelry: Jewelry: None  Clothing: Clothing: Footwear,Pants,Shirt,Socks (patient's daughter took clothes home)  Other Valuables:  Other Valuables: None  Valuables sent to safe:

## 2022-03-21 NOTE — ANESTHESIA POSTPROCEDURE EVALUATION
Post-Anesthesia Evaluation and Assessment    Patient: Erica Chen MRN: [id number]  SSN: [id number]    YOB: 1935  Age: 82 y.o. Sex: male      I have evaluated the patient and they are stable and ready for discharge from the PACU. Cardiovascular Function/Vital Signs  Visit Vitals  BP (!) 112/53   Pulse 60   Temp 37.8 °C (100 °F)   Resp 16   Ht 5' 8\" (1.727 m)   Wt 96.6 kg (213 lb)   SpO2 100%   BMI 32.39 kg/m²       Patient is status post General anesthesia for Procedure(s):  LEFT BELOW KNEE AMPUTATION. Nausea/Vomiting: None    Postoperative hydration reviewed and adequate. Pain:  Pain Scale 1: Numeric (0 - 10) (03/21/22 0828)  Pain Intensity 1: 0 (03/21/22 0828)   Managed    Neurological Status:   Neuro  Neurologic State: Drowsy (03/21/22 5117)  Orientation Level: Oriented to person (03/21/22 9272)   At baseline    Mental Status, Level of Consciousness: Alert and  oriented to person, place, and time    Pulmonary Status:   O2 Device: Nasal cannula (03/21/22 0829)   Adequate oxygenation and airway patent    Complications related to anesthesia: None    Post-anesthesia assessment completed. No concerns    Signed By: Mishel Geiger MD     March 21, 2022              Procedure(s):  LEFT BELOW KNEE AMPUTATION. general    <BSHSIANPOST>    INITIAL Post-op Vital signs:   Vitals Value Taken Time   /59 03/21/22 0845   Temp 37.8 °C (100 °F) 03/21/22 0829   Pulse 57 03/21/22 0850   Resp 17 03/21/22 0850   SpO2 99 % 03/21/22 0850   Vitals shown include unvalidated device data.

## 2022-03-22 LAB
ABO + RH BLD: NORMAL
BASOPHILS # BLD: 0 K/UL (ref 0–0.1)
BASOPHILS NFR BLD: 0 % (ref 0–1)
BLOOD GROUP ANTIBODIES SERPL: NORMAL
COMMENT, HOLDF: NORMAL
DIFFERENTIAL METHOD BLD: ABNORMAL
EOSINOPHIL # BLD: 0.2 K/UL (ref 0–0.4)
EOSINOPHIL NFR BLD: 3 % (ref 0–7)
ERYTHROCYTE [DISTWIDTH] IN BLOOD BY AUTOMATED COUNT: 14.9 % (ref 11.5–14.5)
GLUCOSE BLD STRIP.AUTO-MCNC: 105 MG/DL (ref 65–117)
GLUCOSE BLD STRIP.AUTO-MCNC: 109 MG/DL (ref 65–117)
GLUCOSE BLD STRIP.AUTO-MCNC: 70 MG/DL (ref 65–117)
HCT VFR BLD AUTO: 20.5 % (ref 36.6–50.3)
HCT VFR BLD AUTO: 29.6 % (ref 36.6–50.3)
HGB BLD-MCNC: 5.8 G/DL (ref 13–16)
HGB BLD-MCNC: 8.7 G/DL (ref 12.1–17)
HISTORY CHECKED?,CKHIST: NORMAL
IMM GRANULOCYTES # BLD AUTO: 0.1 K/UL (ref 0–0.04)
IMM GRANULOCYTES NFR BLD AUTO: 1 % (ref 0–0.5)
LYMPHOCYTES # BLD: 0.8 K/UL (ref 0.8–3.5)
LYMPHOCYTES NFR BLD: 13 % (ref 12–49)
MCH RBC QN AUTO: 27.9 PG (ref 26–34)
MCHC RBC AUTO-ENTMCNC: 28.3 G/DL (ref 30–36.5)
MCV RBC AUTO: 98.6 FL (ref 80–99)
MONOCYTES # BLD: 0.5 K/UL (ref 0–1)
MONOCYTES NFR BLD: 8 % (ref 5–13)
NEUTS SEG # BLD: 4.9 K/UL (ref 1.8–8)
NEUTS SEG NFR BLD: 75 % (ref 32–75)
NRBC # BLD: 0 K/UL (ref 0–0.01)
NRBC BLD-RTO: 0 PER 100 WBC
PLATELET # BLD AUTO: 336 K/UL (ref 150–400)
PMV BLD AUTO: 9.4 FL (ref 8.9–12.9)
RBC # BLD AUTO: 2.08 M/UL (ref 4.1–5.7)
RBC MORPH BLD: ABNORMAL
SAMPLES BEING HELD,HOLD: NORMAL
SERVICE CMNT-IMP: NORMAL
SPECIMEN EXP DATE BLD: NORMAL
WBC # BLD AUTO: 6.5 K/UL (ref 4.1–11.1)

## 2022-03-22 PROCEDURE — 85025 COMPLETE CBC W/AUTO DIFF WBC: CPT

## 2022-03-22 PROCEDURE — 82962 GLUCOSE BLOOD TEST: CPT

## 2022-03-22 PROCEDURE — 36415 COLL VENOUS BLD VENIPUNCTURE: CPT

## 2022-03-22 PROCEDURE — 77010033678 HC OXYGEN DAILY

## 2022-03-22 PROCEDURE — 65270000032 HC RM SEMIPRIVATE

## 2022-03-22 PROCEDURE — 74011250636 HC RX REV CODE- 250/636

## 2022-03-22 PROCEDURE — 85018 HEMOGLOBIN: CPT

## 2022-03-22 PROCEDURE — 74011250637 HC RX REV CODE- 250/637

## 2022-03-22 RX ORDER — SODIUM CHLORIDE 9 MG/ML
250 INJECTION, SOLUTION INTRAVENOUS AS NEEDED
Status: DISCONTINUED | OUTPATIENT
Start: 2022-03-22 | End: 2022-03-22

## 2022-03-22 RX ADMIN — DORZOLAMIDE HYDROCHLORIDE 1 DROP: 20 SOLUTION/ DROPS OPHTHALMIC at 19:24

## 2022-03-22 RX ADMIN — HYDROCODONE BITARTRATE AND ACETAMINOPHEN 1 TABLET: 7.5; 325 TABLET ORAL at 22:15

## 2022-03-22 RX ADMIN — ASPIRIN 81 MG: 81 TABLET, COATED ORAL at 07:41

## 2022-03-22 RX ADMIN — DORZOLAMIDE HYDROCHLORIDE 1 DROP: 20 SOLUTION/ DROPS OPHTHALMIC at 22:00

## 2022-03-22 RX ADMIN — PANTOPRAZOLE SODIUM 40 MG: 40 TABLET, DELAYED RELEASE ORAL at 07:41

## 2022-03-22 RX ADMIN — HYDROCHLOROTHIAZIDE 25 MG: 25 TABLET ORAL at 11:11

## 2022-03-22 RX ADMIN — TIMOLOL MALEATE 1 DROP: 5 SOLUTION/ DROPS OPHTHALMIC at 19:24

## 2022-03-22 RX ADMIN — FERROUS SULFATE TAB 325 MG (65 MG ELEMENTAL FE) 325 MG: 325 (65 FE) TAB at 07:30

## 2022-03-22 RX ADMIN — TIMOLOL MALEATE 1 DROP: 5 SOLUTION/ DROPS OPHTHALMIC at 11:11

## 2022-03-22 RX ADMIN — Medication 400 MG: at 11:11

## 2022-03-22 RX ADMIN — GLIPIZIDE 5 MG: 5 TABLET ORAL at 07:41

## 2022-03-22 RX ADMIN — ENOXAPARIN SODIUM 40 MG: 100 INJECTION SUBCUTANEOUS at 19:24

## 2022-03-22 RX ADMIN — PRAVASTATIN SODIUM 80 MG: 40 TABLET ORAL at 22:14

## 2022-03-22 RX ADMIN — HYDROCODONE BITARTRATE AND ACETAMINOPHEN 1 TABLET: 7.5; 325 TABLET ORAL at 15:57

## 2022-03-22 RX ADMIN — DORZOLAMIDE HYDROCHLORIDE 1 DROP: 20 SOLUTION/ DROPS OPHTHALMIC at 11:11

## 2022-03-22 RX ADMIN — SENNOSIDES AND DOCUSATE SODIUM 1 TABLET: 50; 8.6 TABLET ORAL at 07:41

## 2022-03-22 RX ADMIN — AMLODIPINE BESYLATE 10 MG: 5 TABLET ORAL at 22:15

## 2022-03-22 RX ADMIN — LATANOPROST 1 DROP: 50 SOLUTION/ DROPS OPHTHALMIC at 22:00

## 2022-03-22 NOTE — PROGRESS NOTES
Vascular:    POD #1 after left below knee amputation    Awake and responsive, leg dressed, pain controlled    Hgb 5.8 - will re[peat but if correct will transfuse for acute blood loss anemia    Initiate PT/OT/dc planning    ADD - repeat Hgb 8.7 - no transfusion needed

## 2022-03-22 NOTE — PROGRESS NOTES
Dr Collazo  office was notified this morning,labcalled 0630 am ,regarding Pt s Hemoglobin results. HGB was 5.8 .yesterday it was 10. 5,At 630 am Dr Ana Luisa Hamilton MD was here at the hospital in the nurses station,he said He would like a redraw,and type and cross,stated,2 units could be given based on the second redraw,,pt HGB came back after 0800am,results were 8.7,Dr Ana Luisa Hamilton had already reviewed the results  And order to transfuse  Blood was discontinued,report endorsed to am nurse Isabella Salinas RN ,Pt remains very cooperative and pleasant,Als,pt is blind in his left eye,supportive care given throughoit the night,Pt remains stable at this time.

## 2022-03-22 NOTE — PROGRESS NOTES
RUR: 12% Low    FRANCOIS: Anticipated SNF discharge. Referrals sent to [facility]; awaiting responses. Patient has [employer] and will require insurance authorization. BLS transport needed once medically stable. Follow-up with PCP/specialist.    Primary Contact: Daughter, Jerson Crockett, [phone]    * Will need 2nd IM letter prior to discharge. Care Management Interventions  PCP Verified by CM: Yes Ruthy Gorman NP - last seen Feb. 2022)  Mode of Transport at Discharge: BLS  Transition of Care Consult (CM Consult): Discharge Planning  Discharge Durable Medical Equipment: No  Physical Therapy Consult: Yes  Occupational Therapy Consult: Yes  Speech Therapy Consult: No  Support Systems: Spouse/Significant Other,Child(violetta)  Confirm Follow Up Transport: Family  The Plan for Transition of Care is Related to the Following Treatment Goals : SNF  Discharge Location  Patient Expects to be Discharged to[de-identified] Skilled nursing facility    . Reason for Admission: Atherosclerotic PVD w/ ulceration                    RUR Score:    12% Low                  Plan for utilizing home health:   SNF        PCP: First and Last name:  Kevon Ambriz NP     Name of Practice:    Are you a current patient: Yes/No: Yes   Approximate date of last visit: Feb. 2022   Can you participate in a virtual visit with your PCP: Yes                    Current Advanced Directive/Advance Care Plan: Full Code    Healthcare Decision Maker:   Click here to complete [address] including selection of the Healthcare Decision Maker Relationship (ie \"Primary\")             Primary Decision Maker: Gilma Williamson - Spouse - [phone]    Secondary Decision Maker: Mario Covarrubias - Child - [phone]                  Transition of Care Plan:     SNF     CM attempted to meet with patient; however patient requested that CM speak with patient's daughter.  CM obtained prior level of functioning and verified demographics with patient's daughter. Prior to hospitalization, patient was living with his wife in a 1 story with 4 steps to enter. Patient ambulated mainly with the use of a FWW and a wheelchair for community use. Patient's wife provided assistance with ADL's and responsible for all IADL's. Patient's daughter lives in [place] and visits as often as possible. CM verified patient's PCP, demographics and insurance. Patient uses [facility] in order for long term prescriptions and the [facility] in [place] [address] for immediate prescriptions needs. CM spoke with patient's daughter, Ramandeep Wolfe who expressed per her conversation with Dr. James Stratton, recommended discharge disposition is SNF. CM obtained SNF preferences in order of first choice: [facility] (faxed manually; not in [facility]), [facility] (via [facility]) and  [facility] [facility] (via [facility]). PT/OT notes needed for SNF to review and insurance authorization. Patient will require insurance authorization approval for SNF admission. Transition of Care Plan:     The Plan for Transition of Care is related to the following treatment goals: SNF    The patient's daughterKaila  was provided with a choice of provider and agrees  with the discharge plan. Yes [x] No []    A Freedom of choice list was provided with basic dialogue that supports the patient's individualized plan of care/goals and shares the quality data associated with the providers. Yes [x] No []      Discharge pending medical progress. CM to continue to follow as needed.     Gracy Vazquez, MSW   [phone]

## 2022-03-22 NOTE — PROGRESS NOTES
Occupational therapy  03/22/22     OT eval order received and acknowledged. OT eval attempted at 9:13 AM however discussed with PT who just left patient's room and patient is declining participation until he can speak to his MD Miller Robbins and his daughter. Will continue to follow patient and attempt OT eval at a later time.      Thank you,  Kevin Felix, OTR/L

## 2022-03-22 NOTE — PROGRESS NOTES
Physical Therapy Note    PT orders received and acknowledged. Chart reviewed. Patient refuses mobility until he has spoken to MD Lin and his daughter. Patient is reassured that the PT is sent by MD Lin in order to work on early mobility and assist in discharge planning. PT evaluation aborted.

## 2022-03-23 LAB
GLUCOSE BLD STRIP.AUTO-MCNC: 119 MG/DL (ref 65–117)
GLUCOSE BLD STRIP.AUTO-MCNC: 166 MG/DL (ref 65–117)
GLUCOSE BLD STRIP.AUTO-MCNC: 91 MG/DL (ref 65–117)
GLUCOSE BLD STRIP.AUTO-MCNC: 94 MG/DL (ref 65–117)
SERVICE CMNT-IMP: ABNORMAL
SERVICE CMNT-IMP: ABNORMAL
SERVICE CMNT-IMP: NORMAL
SERVICE CMNT-IMP: NORMAL

## 2022-03-23 PROCEDURE — 97165 OT EVAL LOW COMPLEX 30 MIN: CPT

## 2022-03-23 PROCEDURE — 74011636637 HC RX REV CODE- 636/637

## 2022-03-23 PROCEDURE — 74011250637 HC RX REV CODE- 250/637

## 2022-03-23 PROCEDURE — 74011000250 HC RX REV CODE- 250

## 2022-03-23 PROCEDURE — 74011250636 HC RX REV CODE- 250/636

## 2022-03-23 PROCEDURE — 82962 GLUCOSE BLOOD TEST: CPT

## 2022-03-23 PROCEDURE — 97530 THERAPEUTIC ACTIVITIES: CPT

## 2022-03-23 PROCEDURE — 97162 PT EVAL MOD COMPLEX 30 MIN: CPT

## 2022-03-23 PROCEDURE — 65270000032 HC RM SEMIPRIVATE

## 2022-03-23 RX ADMIN — HYDROCHLOROTHIAZIDE 25 MG: 25 TABLET ORAL at 09:05

## 2022-03-23 RX ADMIN — AMLODIPINE BESYLATE 10 MG: 5 TABLET ORAL at 22:30

## 2022-03-23 RX ADMIN — GLIPIZIDE 5 MG: 5 TABLET ORAL at 07:47

## 2022-03-23 RX ADMIN — ENOXAPARIN SODIUM 40 MG: 100 INJECTION SUBCUTANEOUS at 17:00

## 2022-03-23 RX ADMIN — PRAVASTATIN SODIUM 80 MG: 40 TABLET ORAL at 22:30

## 2022-03-23 RX ADMIN — SENNOSIDES AND DOCUSATE SODIUM 1 TABLET: 50; 8.6 TABLET ORAL at 07:48

## 2022-03-23 RX ADMIN — SODIUM CHLORIDE, PRESERVATIVE FREE 10 ML: 5 INJECTION INTRAVENOUS at 22:00

## 2022-03-23 RX ADMIN — DORZOLAMIDE HYDROCHLORIDE 1 DROP: 20 SOLUTION/ DROPS OPHTHALMIC at 15:57

## 2022-03-23 RX ADMIN — PANTOPRAZOLE SODIUM 40 MG: 40 TABLET, DELAYED RELEASE ORAL at 07:48

## 2022-03-23 RX ADMIN — HYDROCODONE BITARTRATE AND ACETAMINOPHEN 1 TABLET: 7.5; 325 TABLET ORAL at 09:05

## 2022-03-23 RX ADMIN — METOPROLOL SUCCINATE 25 MG: 25 TABLET, EXTENDED RELEASE ORAL at 09:05

## 2022-03-23 RX ADMIN — Medication 400 MG: at 09:05

## 2022-03-23 RX ADMIN — DORZOLAMIDE HYDROCHLORIDE 1 DROP: 20 SOLUTION/ DROPS OPHTHALMIC at 09:05

## 2022-03-23 RX ADMIN — Medication 2 UNITS: at 17:00

## 2022-03-23 RX ADMIN — TIMOLOL MALEATE 1 DROP: 5 SOLUTION/ DROPS OPHTHALMIC at 18:00

## 2022-03-23 RX ADMIN — ASPIRIN 81 MG: 81 TABLET, COATED ORAL at 07:47

## 2022-03-23 RX ADMIN — TIMOLOL MALEATE 1 DROP: 5 SOLUTION/ DROPS OPHTHALMIC at 09:06

## 2022-03-23 NOTE — PROGRESS NOTES
Vascular:    Awake, alert, mobility limited by pain    Leg incision OK    Continue current care - currently not agreeable to work with PT/OT

## 2022-03-23 NOTE — PROGRESS NOTES
Bedside shift change report given to [address] [place] [address]  (oncoming nurse) by Donna Newell RN  (offgoing nurse). Report included the following information SBAR, Kardex, OR Summary, Procedure Summary, Intake/Output, MAR, Recent Results and Med Rec Status.
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
## 2022-03-24 LAB
GLUCOSE BLD STRIP.AUTO-MCNC: 107 MG/DL (ref 65–117)
GLUCOSE BLD STRIP.AUTO-MCNC: 73 MG/DL (ref 65–117)
GLUCOSE BLD STRIP.AUTO-MCNC: 74 MG/DL (ref 65–117)
GLUCOSE BLD STRIP.AUTO-MCNC: 85 MG/DL (ref 65–117)
GLUCOSE BLD STRIP.AUTO-MCNC: 87 MG/DL (ref 65–117)
SERVICE CMNT-IMP: NORMAL

## 2022-03-24 PROCEDURE — 87635 SARS-COV-2 COVID-19 AMP PRB: CPT

## 2022-03-24 PROCEDURE — 74011000250 HC RX REV CODE- 250

## 2022-03-24 PROCEDURE — 65270000032 HC RM SEMIPRIVATE

## 2022-03-24 PROCEDURE — 82962 GLUCOSE BLOOD TEST: CPT

## 2022-03-24 PROCEDURE — 74011250636 HC RX REV CODE- 250/636

## 2022-03-24 PROCEDURE — 74011250637 HC RX REV CODE- 250/637

## 2022-03-24 PROCEDURE — 97530 THERAPEUTIC ACTIVITIES: CPT

## 2022-03-24 RX ORDER — FACIAL-BODY WIPES
10 EACH TOPICAL DAILY PRN
Status: DISCONTINUED | OUTPATIENT
Start: 2022-03-24 | End: 2022-03-25 | Stop reason: HOSPADM

## 2022-03-24 RX ORDER — ADHESIVE BANDAGE
30 BANDAGE TOPICAL DAILY
Status: DISCONTINUED | OUTPATIENT
Start: 2022-03-24 | End: 2022-03-25 | Stop reason: HOSPADM

## 2022-03-24 RX ADMIN — AMLODIPINE BESYLATE 10 MG: 5 TABLET ORAL at 22:33

## 2022-03-24 RX ADMIN — SENNOSIDES AND DOCUSATE SODIUM 1 TABLET: 50; 8.6 TABLET ORAL at 06:39

## 2022-03-24 RX ADMIN — GLIPIZIDE 5 MG: 5 TABLET ORAL at 06:39

## 2022-03-24 RX ADMIN — ASPIRIN 81 MG: 81 TABLET, COATED ORAL at 06:39

## 2022-03-24 RX ADMIN — SODIUM CHLORIDE, PRESERVATIVE FREE 10 ML: 5 INJECTION INTRAVENOUS at 18:02

## 2022-03-24 RX ADMIN — Medication 400 MG: at 10:08

## 2022-03-24 RX ADMIN — TIMOLOL MALEATE 1 DROP: 5 SOLUTION/ DROPS OPHTHALMIC at 18:01

## 2022-03-24 RX ADMIN — DORZOLAMIDE HYDROCHLORIDE 1 DROP: 20 SOLUTION/ DROPS OPHTHALMIC at 18:01

## 2022-03-24 RX ADMIN — MAGNESIUM HYDROXIDE 30 ML: 400 SUSPENSION ORAL at 10:09

## 2022-03-24 RX ADMIN — ENOXAPARIN SODIUM 40 MG: 100 INJECTION SUBCUTANEOUS at 18:01

## 2022-03-24 RX ADMIN — TIMOLOL MALEATE 1 DROP: 5 SOLUTION/ DROPS OPHTHALMIC at 10:09

## 2022-03-24 RX ADMIN — PANTOPRAZOLE SODIUM 40 MG: 40 TABLET, DELAYED RELEASE ORAL at 06:39

## 2022-03-24 RX ADMIN — DORZOLAMIDE HYDROCHLORIDE 1 DROP: 20 SOLUTION/ DROPS OPHTHALMIC at 10:09

## 2022-03-24 RX ADMIN — DORZOLAMIDE HYDROCHLORIDE 1 DROP: 20 SOLUTION/ DROPS OPHTHALMIC at 22:35

## 2022-03-24 RX ADMIN — FERROUS SULFATE TAB 325 MG (65 MG ELEMENTAL FE) 325 MG: 325 (65 FE) TAB at 06:39

## 2022-03-24 RX ADMIN — SODIUM CHLORIDE, PRESERVATIVE FREE 10 ML: 5 INJECTION INTRAVENOUS at 06:00

## 2022-03-24 RX ADMIN — HYDROCHLOROTHIAZIDE 25 MG: 25 TABLET ORAL at 10:09

## 2022-03-24 RX ADMIN — LATANOPROST 1 DROP: 50 SOLUTION/ DROPS OPHTHALMIC at 22:35

## 2022-03-24 RX ADMIN — PRAVASTATIN SODIUM 80 MG: 40 TABLET ORAL at 22:33

## 2022-03-24 RX ADMIN — SODIUM CHLORIDE, PRESERVATIVE FREE 10 ML: 5 INJECTION INTRAVENOUS at 22:00

## 2022-03-24 NOTE — PROGRESS NOTES
3/24/2022 -   TRANSITIONS OF CARE PLAN:   1. RUR: 12%; LOW  2. DESTINATION: [facility] possibly on 3/25  3. TRANSPORT: BLS - [facility] phone [phone] on Will Call  4. NEEDS FOR DISCHARGE: Rapid COVID Test  5. ANTICIPATED FOLLOW UPS: PCP, Vascular  6. ONGOING INPATIENT NEEDS: PT/OT, Bowel Regimen, Limb Guard    Will need 2nd IM Letter prior to discharge    Anticipated Discharge is: 24-48 Hours    09:45 -   CM notes that patient is anticipated to discharge 3/25. CM submitted updated referrals to [facility] and [facility] via [employer]. CM contacted [facility] Darrell Mackey: [phone]) who identified that the facility is out of network with the patient's ins. CM contacted [facility] x2 Olivia Neely: [phone]) and left a message requesting a return call. CRM: Anna Shipley MPH, [facility]; Z: [phone]    11:30 -  CM contacted [employer] at [facility]: [phone]) who is reviewing patient at this time and will return call to CM. CRM: Anna Shipley MPH, CHES; Z: [phone]    14:40 -   CM received call from [facility] Olivia Neely) indicating that the facility has accepted the patient under his Medicaid benefits. Per Antonio Jenkins, the facility will need a copy of the patient's UAI and a Rapid COVID Test.    CM notes that patient had an UAI completed with the below information:  Assessment Tracking Number: [id number]       Status: Successfully Processed    Assessment Date: 01/21/2021    Robles Carlisle Information:  Screener's Name: Ana Walker  NPI: [id number]       Provider Name: Itzel Andrews OF [address] Information:  Screener's Name:  NPI:       Provider Name:    Robles Carlisle Information:  Screener's Name:  NPI:       Provider Name:          [address] Vaccination Team was able to confirm patient's vaccination status, with Tiffany Fail vaccines dated 3/30/21, 4/27/21, and 11/17/21.   CM printed and fax attached patient's UAI to patient's [facility] SNF referral along with vaccination verification. Nursing is aware of need for a Rapid COVID Test for discharge. CM contacted patient's daughter Lety Shaneoma: [phone]) and left a HIPAA compliant VM requesting a return call. 15:30 -   CM received a return call from patient's daughter. The daughter is in agreement to disposition for discharge to [facility] for 3/25. CM provided daughter contact information for the facility. CM placed [facility] phone [phone] on will call for 3/25 transport. Transition of Care Plan:     The Plan for Transition of Care is related to the following treatment goals: SNF at [facility]    The Patient and/or patient representative daughterLauren was provided with a choice of provider and agrees  with the discharge plan. Yes [x] No []    A Freedom of choice list was provided with basic dialogue that supports the patient's individualized plan of care/goals and shares the quality data associated with the providers. Yes [x] No []    CRM: Beauty Pile, MPH, [facility]; Z: [phone]    16:50 -   CM offered screening for Medicaid Long-Term Services & Supports. Family Consented to screening. Medicaid LTSS Screening submitted for processing.      Form ID # :[id number]  CRM: Cosme Aden, MPH, [address] [place] [address]; Z: [phone]

## 2022-03-24 NOTE — PROGRESS NOTES
Bedside shift change report given to [facility] (oncoming nurse) by Emanuel Caal RN (offgoing nurse). Report included the following information SBAR, Kardex, Intake/Output, MAR and Recent Results.

## 2022-03-24 NOTE — PROGRESS NOTES
Problem: Self Care Deficits Care Plan (Adult)  Goal: *Acute Goals and Plan of Care (Insert Text)  Description: FUNCTIONAL STATUS PRIOR TO ADMISSION: Patient was modified independent using a rolling walker for functional mobility short distance. He sponge bathed. Denies falls but recently unable to get off of toilet. Hx of B glaucoma with limited vision. HOME SUPPORT: The patient lived with wife and required assistance with IADL tasks. Occupational Therapy Goals  Initiated 3/23/2022  1. Patient will perform UB bathing at EOB with supervision/set-up within 7 day(s). 2.  Patient will perform upper body dressing with supervision/set-up within 7 day(s). 3.  Patient will perform supine <> sit to participate with ADL tasks with minimal assistance/contact guard assist within 7 day(s). 4.  Patient will perform toilet transfers to drop arm [facility] with moderate assistance within 7 day(s). 5.  Patient will perform all aspects of toileting with moderate assistance  within 7 day(s). 6.  Patient will participate in upper extremity therapeutic exercise/activities with supervision/set-up for 8 minutes within 7 day(s). 7.  Patient will utilize energy conservation techniques during functional activities with verbal cues within 7 day(s). Outcome: Progressing Towards Goal    OCCUPATIONAL THERAPY TREATMENT  Patient: Faiza Bassett (39 y.o. male)  Date: 3/24/2022  Diagnosis: Atherosclerotic PVD with ulceration ([facility] [I70.209, L98.499] <principal problem not specified>  Procedure(s) (LRB):  LEFT BELOW KNEE AMPUTATION (Left) 3 Days Post-Op  Precautions: Fall (L BKA)  Chart, occupational therapy assessment, plan of care, and goals were reviewed. ASSESSMENT  Patient continues with skilled OT services and is progressing towards goals.   Pt noted with good engagement with bed mobility and EOB sitting challenges this date, benefiting from Max simple, directive verbal/tactile cues for engagement, with pt noted to use single UE to maintain fair seated balance. Pt benefit from Max sequencing/safety cues; however, agreeable to all presented therapeutic challenges. Pt continues to benefit from skilled OT to address functional deficits during acute hospitalization, with reporting therapist believing pt would benefit from SNF rehab upon discharge. Current Level of Function Impacting Discharge (ADLs): Total A LE ADLs    Other factors to consider for discharge: Total A LE ADLs         PLAN :  Patient continues to benefit from skilled intervention to address the above impairments. Continue treatment per established plan of care to address goals. Recommend with staff: Frequent positional changes, bed level ADL engagement    Recommend next OT session: POC progression    Recommendation for discharge: (in order for the patient to meet his/her long term goals)  Therapy up to 5 days/week in SNF setting    This discharge recommendation:  Has been made in collaboration with the attending provider and/or case management    IF patient discharges home will need the following DME: TBD       SUBJECTIVE:   Patient stated [place]'m from [place]    OBJECTIVE DATA SUMMARY:   Cognitive/Behavioral Status:  Neurologic State: Alert  Orientation Level: Oriented to person;Disoriented to place; Disoriented to situation;Disoriented to time  Cognition: Follows commands;Poor safety awareness  Perception: Cues to maintain midline in sitting  Perseveration: No perseveration noted  Safety/Judgement: Decreased awareness of environment;Decreased awareness of need for assistance;Decreased awareness of need for safety;Decreased insight into deficits    Functional Mobility and Transfers for ADLs:  Bed Mobility:  Rolling: Maximum assistance; Total assistance;Assist x2  Supine to Sit: Maximum assistance; Total assistance;Assist x2  Sit to Supine: Maximum assistance; Total assistance;Assist x2  Scooting: Maximum assistance;Assist x2    Balance:  Sitting: Impaired  Sitting - Static: Good (unsupported)  Sitting - Dynamic: Fair (occasional)  Standing:  (NT)    ADL Intervention:  Cognitive Retraining  Safety/Judgement: Decreased awareness of environment;Decreased awareness of need for assistance;Decreased awareness of need for safety;Decreased insight into deficits    Pain:  C/o LLE pain, did not quantify; RN aware and following    Activity Tolerance:   Fair and requires rest breaks    After treatment patient left in no apparent distress:   Supine in bed, Call bell within reach, and Side rails x 3    COMMUNICATION/COLLABORATION:   The patients plan of care was discussed with: Physical therapist and Registered nurse.      Maggie Briones OT  Time Calculation: 23 mins

## 2022-03-24 NOTE — PROGRESS NOTES
Bedside and Verbal shift change report given to Cameron Bell0 (oncoming nurse) by Tarsha Choe (offgoing nurse). Report included the following information SBAR, Kardex, Intake/Output, MAR and Recent Results.

## 2022-03-24 NOTE — PROGRESS NOTES
Vascular:    Awake, oriented, pain better, complains of constipation    Leg dressed - to get limb guard today    Will try to get bowels working today - possible DC to SNF tomorrow

## 2022-03-24 NOTE — PROGRESS NOTES
Problem: Mobility Impaired (Adult and Pediatric)  Goal: *Acute Goals and Plan of Care (Insert Text)  Description: FUNCTIONAL STATUS PRIOR TO ADMISSION: Patient was modified independent using a rolling walker and wheelchair for functional mobility. HOME SUPPORT PRIOR TO ADMISSION: The patient lived with spouse and daughter to assist as needed    Physical Therapy Goals  Initiated 3/23/2022  1. Patient will move from supine to sit and sit to supine  in bed with moderate assistance  within 7 day(s). 2.  Patient will transfer from bed to chair and chair to bed with moderate assistance  using the least restrictive device within 7 day(s). 3.  Patient will perform sit to stand with maximal assistance within 7 day(s). Outcome: Progressing Towards Goal   PHYSICAL THERAPY TREATMENT  Patient: Barbara Rosado (47 y.o. male)  Date: 3/24/2022  Diagnosis: Atherosclerotic PVD with ulceration ([facility] [I70.209, L98.499] <principal problem not specified>  Procedure(s) (LRB):  LEFT BELOW KNEE AMPUTATION (Left) 3 Days Post-Op  Precautions: Fall (L BKA)  Chart, physical therapy assessment, plan of care and goals were reviewed. ASSESSMENT  Patient continues with skilled PT services and is progressing towards goals. Pt received supine in bed and agreeable to therapy. Pt with limb guard in place LLE. Pt tolerated session well, but continues to be limited by generalized weakness, decreased functional mobility, impaired seated balance, decreased tolerance to activity, confusion. Pt required max-total A x 2 supine to sit EOB, increased time and effort to assume sitting EOB. Pt with fair seated balance requiring single UE support on the foot board for balance. Pt required max A x 2 rolling L and R in the bed and hand over hand assist to initiate reaching across midline. Pt returned to supine position with all needs met.  Pt will benefit from SNF placement upon discharge to continue therapy efforts and reach highest level of independence . Current Level of Function Impacting Discharge (mobility/balance): max-total A x 2 supine<>sit, rolling    Other factors to consider for discharge:          PLAN :  Patient continues to benefit from skilled intervention to address the above impairments. Continue treatment per established plan of care. to address goals. Recommendation for discharge: (in order for the patient to meet his/her long term goals)  Therapy up to 5 days/week in SNF setting    This discharge recommendation:  Has been made in collaboration with the attending provider and/or case management    IF patient discharges home will need the following DME: to be determined (TBD)       SUBJECTIVE:   Patient stated I am from [place], [place].    OBJECTIVE DATA SUMMARY:   Critical Behavior:  Neurologic State: Alert  Orientation Level: Oriented X4        Functional Mobility Training:  Bed Mobility:  Rolling: Maximum assistance; Total assistance;Assist x2  Supine to Sit: Maximum assistance; Total assistance;Assist x2  Sit to Supine: Maximum assistance; Total assistance;Assist x2  Scooting: Maximum assistance;Assist x2        Transfers:     NT        Balance:  Sitting: Impaired  Sitting - Static: Good (unsupported)  Sitting - Dynamic: Fair (occasional)  Standing:  (NT)  Ambulation/Gait Training:    Pain Rating:  Pt reported minimal pain this date    Activity Tolerance:   Fair    After treatment patient left in no apparent distress:   Supine in bed, Call bell within reach, Bed / chair alarm activated, and Side rails x 3    COMMUNICATION/COLLABORATION:   The patients plan of care was discussed with: Occupational therapist and Registered nurse.      Lyndsay Thurman, PT, DPT   Time Calculation: 23 mins

## 2022-03-25 VITALS
SYSTOLIC BLOOD PRESSURE: 135 MMHG | RESPIRATION RATE: 16 BRPM | WEIGHT: 213 LBS | TEMPERATURE: 98.7 F | HEIGHT: 68 IN | BODY MASS INDEX: 32.28 KG/M2 | DIASTOLIC BLOOD PRESSURE: 69 MMHG | OXYGEN SATURATION: 96 % | HEART RATE: 78 BPM

## 2022-03-25 LAB
COVID-19 RAPID TEST, COVR: NOT DETECTED
COVID-19 RAPID TEST, COVR: NOT DETECTED
GLUCOSE BLD STRIP.AUTO-MCNC: 101 MG/DL (ref 65–117)
GLUCOSE BLD STRIP.AUTO-MCNC: 78 MG/DL (ref 65–117)
SERVICE CMNT-IMP: NORMAL
SERVICE CMNT-IMP: NORMAL
SOURCE, COVRS: NORMAL
SOURCE, COVRS: NORMAL

## 2022-03-25 PROCEDURE — 82962 GLUCOSE BLOOD TEST: CPT

## 2022-03-25 PROCEDURE — 74011250637 HC RX REV CODE- 250/637

## 2022-03-25 PROCEDURE — 87635 SARS-COV-2 COVID-19 AMP PRB: CPT

## 2022-03-25 PROCEDURE — 74011000250 HC RX REV CODE- 250

## 2022-03-25 RX ORDER — HYDROCODONE BITARTRATE AND ACETAMINOPHEN 7.5; 325 MG/1; MG/1
1 TABLET ORAL
Qty: 20 TABLET | Refills: 0 | Status: SHIPPED | OUTPATIENT
Start: 2022-03-25 | End: 2022-04-06

## 2022-03-25 RX ADMIN — MAGNESIUM HYDROXIDE 30 ML: 400 SUSPENSION ORAL at 08:22

## 2022-03-25 RX ADMIN — ASPIRIN 81 MG: 81 TABLET, COATED ORAL at 07:03

## 2022-03-25 RX ADMIN — PANTOPRAZOLE SODIUM 40 MG: 40 TABLET, DELAYED RELEASE ORAL at 07:03

## 2022-03-25 RX ADMIN — FERROUS SULFATE TAB 325 MG (65 MG ELEMENTAL FE) 325 MG: 325 (65 FE) TAB at 07:02

## 2022-03-25 RX ADMIN — METOPROLOL SUCCINATE 25 MG: 25 TABLET, EXTENDED RELEASE ORAL at 08:22

## 2022-03-25 RX ADMIN — DORZOLAMIDE HYDROCHLORIDE 1 DROP: 20 SOLUTION/ DROPS OPHTHALMIC at 08:24

## 2022-03-25 RX ADMIN — Medication 400 MG: at 08:22

## 2022-03-25 RX ADMIN — TIMOLOL MALEATE 1 DROP: 5 SOLUTION/ DROPS OPHTHALMIC at 08:24

## 2022-03-25 RX ADMIN — GLIPIZIDE 5 MG: 5 TABLET ORAL at 07:03

## 2022-03-25 RX ADMIN — SENNOSIDES AND DOCUSATE SODIUM 1 TABLET: 50; 8.6 TABLET ORAL at 07:03

## 2022-03-25 RX ADMIN — HYDROCHLOROTHIAZIDE 25 MG: 25 TABLET ORAL at 08:22

## 2022-03-25 RX ADMIN — SODIUM CHLORIDE, PRESERVATIVE FREE 10 ML: 5 INJECTION INTRAVENOUS at 06:00

## 2022-03-25 NOTE — PROGRESS NOTES
Vascular:    Awake and responsive, pain controlled    Limb guard in place    To rehab when bed available

## 2022-03-25 NOTE — PROGRESS NOTES
Bedside shift change report given to Hola Lange RN (oncoming nurse) by Wesley Hilliard RN (offgoing nurse). Report included the following information SBAR, Kardex, Intake/Output, MAR and Recent Results.

## 2022-03-25 NOTE — PROGRESS NOTES
TRANSFER - OUT REPORT:    Verbal report given to Talita ERNST on Tisha Copeland  being transferred to [facility] for routine progression of care       Report consisted of patients Situation, Background, Assessment and   Recommendations(SBAR). Information from the following report(s) SBAR, Kardex, Intake/Output, MAR and Recent Results was reviewed with the receiving nurse. Opportunity for questions and clarification was provided. Patient transported with:  belongings.

## 2022-03-25 NOTE — DISCHARGE INSTRUCTIONS
Patient Discharge Instructions    Derek Self / [id number] : 1935    Admitted 3/21/2022 Discharged: 3/25/2022     Take Home Medications     . · It is important that you take the medication exactly as they are prescribed. · Keep your medication in the bottles provided by the pharmacist and keep a list of the medication names, dosages, and times to be taken in your wallet. · Do not take other medications without consulting your doctor. What to do at Home    Recommended diet: Diabetic Diet,     Recommended activity: Activity as tolerated,     Additional Instructions: limb guard left leg amputation, dry dressing to incision - change every other day    Follow-up with Dr Pramod Garcia in 3 weeks, call [phone] for appt        Information obtained by :  I understand that if any problems occur once I am at home I am to contact my physician. I understand and acknowledge receipt of the instructions indicated above.                                                                                                                                            Physician's or R.N.'s Signature                                                                  Date/Time                                                                                                                                              Patient or Representative Signature                                                          Date/Time

## 2022-03-28 ENCOUNTER — PATIENT OUTREACH (OUTPATIENT)
Dept: CASE MANAGEMENT | Age: 87
End: 2022-03-28

## 2022-03-28 NOTE — PROGRESS NOTES
Per EMR patient was discharged to [facility]. Transition of care outreach postponed for 14 days due to patient's discharge to SNF. Will continue to monitor patient progress.

## 2022-03-28 NOTE — DISCHARGE SUMMARY
[facility] [address] SUMMARY    Name:  Carmencita Bradford  MR#:  [id number]  :  1935  ACCOUNT #:  [de-identified]  ADMIT DATE:  2022  DISCHARGE DATE:  2022      FINAL DIAGNOSIS:  Peripheral vascular disease with gangrene, left foot. PROCEDURE:  Left below-knee amputation. HISTORY:  The patient is an 82-year-old male who has undergone previous revascularization on the left with amputation of all of his toes. He now has progressive tissue loss involving the left foot with no further options for revascularization. He is admitted for below-knee amputation. HOSPITAL COURSE:  The patient was admitted and taken to the operating room where he underwent a left below-knee amputation. His postoperative course was uncomplicated. He was discharged to a skilled nursing facility on 2022. At that time, he is clinically stable with a well-healing below-knee amputation incision. He is discharged on a diabetic diet, activity as tolerated, his usual medications with the addition of Norco for pain and follow up in my office in 2 weeks. DISPOSITION:  Skilled nursing facility.       Lyndon Mahoney MD      [id number]  D:  2022 12:38  T:  2022 15:50  JOB #:  [id number]

## 2022-03-30 ENCOUNTER — APPOINTMENT (OUTPATIENT)
Dept: CT IMAGING | Age: 87
DRG: 565 | End: 2022-03-30
Attending: EMERGENCY MEDICINE
Payer: MEDICARE

## 2022-03-30 ENCOUNTER — HOSPITAL ENCOUNTER (INPATIENT)
Age: 87
LOS: 1 days | Discharge: ANOTHER HOSPITAL | DRG: 565 | End: 2022-03-31
Attending: EMERGENCY MEDICINE | Admitting: HOSPITALIST
Payer: MEDICARE

## 2022-03-30 DIAGNOSIS — T87.44 INFECTION OF AMPUTATION STUMP, LEFT LOWER EXTREMITY (HCC): Primary | ICD-10-CM

## 2022-03-30 PROBLEM — L03.90 CELLULITIS: Status: ACTIVE | Noted: 2022-03-30

## 2022-03-30 LAB
ALBUMIN SERPL-MCNC: 1.9 G/DL (ref 3.5–5)
ALBUMIN/GLOB SERPL: 0.3 {RATIO} (ref 1.1–2.2)
ALP SERPL-CCNC: 95 U/L (ref 45–117)
ALT SERPL-CCNC: 29 U/L (ref 12–78)
ANION GAP SERPL CALC-SCNC: 11 MMOL/L (ref 5–15)
AST SERPL W P-5'-P-CCNC: 86 U/L (ref 15–37)
BASOPHILS # BLD: 0 K/UL (ref 0–0.2)
BASOPHILS NFR BLD: 0 % (ref 0–2.5)
BILIRUB SERPL-MCNC: 0.5 MG/DL (ref 0.2–1)
BUN SERPL-MCNC: 45 MG/DL (ref 6–20)
BUN/CREAT SERPL: 25 (ref 12–20)
CA-I BLD-MCNC: 9.3 MG/DL (ref 8.5–10.1)
CHLORIDE SERPL-SCNC: 100 MMOL/L (ref 97–108)
CO2 SERPL-SCNC: 24 MMOL/L (ref 21–32)
CREAT SERPL-MCNC: 1.83 MG/DL (ref 0.7–1.3)
EOSINOPHIL # BLD: 0 K/UL (ref 0–0.7)
EOSINOPHIL NFR BLD: 0 % (ref 0.9–2.9)
ERYTHROCYTE [DISTWIDTH] IN BLOOD BY AUTOMATED COUNT: 16.1 % (ref 11.5–14.5)
GLOBULIN SER CALC-MCNC: 6.8 G/DL (ref 2–4)
GLUCOSE BLD STRIP.AUTO-MCNC: 46 MG/DL (ref 65–117)
GLUCOSE BLD STRIP.AUTO-MCNC: 57 MG/DL (ref 65–117)
GLUCOSE BLD STRIP.AUTO-MCNC: 73 MG/DL (ref 65–117)
GLUCOSE BLD STRIP.AUTO-MCNC: 86 MG/DL (ref 65–117)
GLUCOSE SERPL-MCNC: 76 MG/DL (ref 65–100)
HCT VFR BLD AUTO: 27.6 % (ref 41–53)
HGB BLD-MCNC: 8.3 G/DL (ref 13.5–17.5)
INR PPP: 1.3 (ref 0.9–1.1)
LACTATE SERPL-SCNC: 2 MMOL/L (ref 0.4–2)
LYMPHOCYTES # BLD: 1.2 K/UL (ref 1–4.8)
LYMPHOCYTES NFR BLD: 9 % (ref 20.5–51.1)
MCH RBC QN AUTO: 26.5 PG (ref 31–34)
MCHC RBC AUTO-ENTMCNC: 30.3 G/DL (ref 31–36)
MCV RBC AUTO: 87.3 FL (ref 80–100)
MONOCYTES # BLD: 1.2 K/UL (ref 0.2–2.4)
MONOCYTES NFR BLD: 9 % (ref 1.7–9.3)
NEUTS SEG # BLD: 10.5 K/UL (ref 1.8–7.7)
NEUTS SEG NFR BLD: 82 % (ref 42–75)
NRBC # BLD: 0.01 K/UL
NRBC BLD-RTO: 0.1 PER 100 WBC
PERFORMED BY, TECHID: ABNORMAL
PERFORMED BY, TECHID: ABNORMAL
PERFORMED BY, TECHID: NORMAL
PERFORMED BY, TECHID: NORMAL
PLATELET # BLD AUTO: 501 K/UL (ref 150–400)
PMV BLD AUTO: 8.1 FL (ref 6.5–11.5)
POTASSIUM SERPL-SCNC: 3.9 MMOL/L (ref 3.5–5.1)
PROT SERPL-MCNC: 8.7 G/DL (ref 6.4–8.2)
PROTHROMBIN TIME: 15.7 SEC (ref 11.9–14.6)
RBC # BLD AUTO: 3.15 M/UL (ref 4.5–5.9)
SODIUM SERPL-SCNC: 135 MMOL/L (ref 136–145)
WBC # BLD AUTO: 12.9 K/UL (ref 4.4–11.3)

## 2022-03-30 PROCEDURE — 87040 BLOOD CULTURE FOR BACTERIA: CPT

## 2022-03-30 PROCEDURE — 99285 EMERGENCY DEPT VISIT HI MDM: CPT

## 2022-03-30 PROCEDURE — 74011250637 HC RX REV CODE- 250/637: Performed by: HOSPITALIST

## 2022-03-30 PROCEDURE — 84145 PROCALCITONIN (PCT): CPT

## 2022-03-30 PROCEDURE — 65270000029 HC RM PRIVATE

## 2022-03-30 PROCEDURE — 36415 COLL VENOUS BLD VENIPUNCTURE: CPT

## 2022-03-30 PROCEDURE — 80053 COMPREHEN METABOLIC PANEL: CPT

## 2022-03-30 PROCEDURE — 74011000250 HC RX REV CODE- 250: Performed by: HOSPITALIST

## 2022-03-30 PROCEDURE — 74011000258 HC RX REV CODE- 258: Performed by: HOSPITALIST

## 2022-03-30 PROCEDURE — 82962 GLUCOSE BLOOD TEST: CPT

## 2022-03-30 PROCEDURE — 74011000636 HC RX REV CODE- 636: Performed by: EMERGENCY MEDICINE

## 2022-03-30 PROCEDURE — 85025 COMPLETE CBC W/AUTO DIFF WBC: CPT

## 2022-03-30 PROCEDURE — 74011250636 HC RX REV CODE- 250/636: Performed by: HOSPITALIST

## 2022-03-30 PROCEDURE — 85610 PROTHROMBIN TIME: CPT

## 2022-03-30 PROCEDURE — 74011250636 HC RX REV CODE- 250/636: Performed by: EMERGENCY MEDICINE

## 2022-03-30 PROCEDURE — 73701 CT LOWER EXTREMITY W/DYE: CPT

## 2022-03-30 PROCEDURE — 83605 ASSAY OF LACTIC ACID: CPT

## 2022-03-30 RX ORDER — FAMOTIDINE 20 MG/1
20 TABLET, FILM COATED ORAL 2 TIMES DAILY
Status: DISCONTINUED | OUTPATIENT
Start: 2022-03-30 | End: 2022-03-31 | Stop reason: HOSPADM

## 2022-03-30 RX ORDER — SODIUM CHLORIDE 0.9 % (FLUSH) 0.9 %
5-40 SYRINGE (ML) INJECTION EVERY 8 HOURS
Status: DISCONTINUED | OUTPATIENT
Start: 2022-03-30 | End: 2022-03-31 | Stop reason: HOSPADM

## 2022-03-30 RX ORDER — SODIUM CHLORIDE AND POTASSIUM CHLORIDE .9; .15 G/100ML; G/100ML
SOLUTION INTRAVENOUS CONTINUOUS
Status: DISCONTINUED | OUTPATIENT
Start: 2022-03-30 | End: 2022-03-31 | Stop reason: HOSPADM

## 2022-03-30 RX ORDER — ATORVASTATIN CALCIUM 80 MG/1
20 TABLET, FILM COATED ORAL DAILY
Status: ON HOLD | COMMUNITY
End: 2022-07-06 | Stop reason: SDUPTHER

## 2022-03-30 RX ORDER — SODIUM CHLORIDE 0.9 % (FLUSH) 0.9 %
5-40 SYRINGE (ML) INJECTION AS NEEDED
Status: DISCONTINUED | OUTPATIENT
Start: 2022-03-30 | End: 2022-03-31 | Stop reason: HOSPADM

## 2022-03-30 RX ORDER — ACETAMINOPHEN 325 MG/1
650 TABLET ORAL
Status: DISCONTINUED | OUTPATIENT
Start: 2022-03-30 | End: 2022-03-31 | Stop reason: HOSPADM

## 2022-03-30 RX ORDER — ONDANSETRON 2 MG/ML
4 INJECTION INTRAMUSCULAR; INTRAVENOUS
Status: DISCONTINUED | OUTPATIENT
Start: 2022-03-30 | End: 2022-03-31 | Stop reason: HOSPADM

## 2022-03-30 RX ORDER — POLYETHYLENE GLYCOL 3350 17 G/17G
17 POWDER, FOR SOLUTION ORAL DAILY PRN
Status: DISCONTINUED | OUTPATIENT
Start: 2022-03-30 | End: 2022-03-31 | Stop reason: HOSPADM

## 2022-03-30 RX ORDER — ACETAMINOPHEN 650 MG/1
650 SUPPOSITORY RECTAL
Status: DISCONTINUED | OUTPATIENT
Start: 2022-03-30 | End: 2022-03-31 | Stop reason: HOSPADM

## 2022-03-30 RX ORDER — ENOXAPARIN SODIUM 100 MG/ML
40 INJECTION SUBCUTANEOUS DAILY
Status: DISCONTINUED | OUTPATIENT
Start: 2022-03-31 | End: 2022-03-31

## 2022-03-30 RX ORDER — HYDROMORPHONE HYDROCHLORIDE 1 MG/ML
1 INJECTION, SOLUTION INTRAMUSCULAR; INTRAVENOUS; SUBCUTANEOUS
Status: DISCONTINUED | OUTPATIENT
Start: 2022-03-30 | End: 2022-03-30

## 2022-03-30 RX ADMIN — FAMOTIDINE 20 MG: 20 TABLET, FILM COATED ORAL at 21:10

## 2022-03-30 RX ADMIN — SODIUM CHLORIDE, PRESERVATIVE FREE 10 ML: 5 INJECTION INTRAVENOUS at 21:28

## 2022-03-30 RX ADMIN — PIPERACILLIN AND TAZOBACTAM 3.38 G: 3; .375 INJECTION, POWDER, LYOPHILIZED, FOR SOLUTION INTRAVENOUS at 21:28

## 2022-03-30 RX ADMIN — ACETAMINOPHEN 650 MG: 325 TABLET ORAL at 21:10

## 2022-03-30 RX ADMIN — POTASSIUM CHLORIDE AND SODIUM CHLORIDE: 900; 150 INJECTION, SOLUTION INTRAVENOUS at 15:30

## 2022-03-30 RX ADMIN — PIPERACILLIN AND TAZOBACTAM 3.38 G: 3; .375 INJECTION, POWDER, LYOPHILIZED, FOR SOLUTION INTRAVENOUS at 15:31

## 2022-03-30 RX ADMIN — SODIUM CHLORIDE, PRESERVATIVE FREE 10 ML: 5 INJECTION INTRAVENOUS at 15:31

## 2022-03-30 RX ADMIN — SODIUM CHLORIDE 1000 ML: 9 INJECTION, SOLUTION INTRAVENOUS at 11:28

## 2022-03-30 RX ADMIN — IOPAMIDOL 100 ML: 755 INJECTION, SOLUTION INTRAVENOUS at 12:04
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
## 2022-03-30 NOTE — H&P
History and Physical      Chief Complaints:     Chief Complaint   Patient presents with    Leg Pain         Subjective:     Reuben Bumpers is a 82 y.o. male followed by Emma Matson NP and  has a past medical history of Anemia (4/26/2017), Anxiety, Arrhythmia, Arthritis, Atherosclerosis of artery of extremity with rest pain [facility]), Atrial fibrillation ([address] (7/21/2020), Benign prostatic hyperplasia (4/26/2017), CAD (coronary artery disease), Cancer ([address] (2010), Chronic kidney disease, Chronic obstructive pulmonary disease ([address] Depression, Diabetes ([address] Diverticulosis of colon, Dyslipidemia (4/26/2017), GERD (gastroesophageal reflux disease), Glaucoma, History of CVA (cerebrovascular accident) (7/21/2020), Hypercholesteremia, Hypertension, Hypomagnesemia (7/13/2020), Nocturia (4/26/2017), PUD (peptic ulcer disease), PVD (peripheral vascular disease) ([address] Rectal hemorrhage (4/26/2017), Strabismus, Stroke ([address] (2000 APPROX.), Type 2 diabetes mellitus without complications ([address] (5/97/3200), and Venous stasis (4/26/2017). Who presents from [address] [place] [address] where patient has been since his most recent surgery of left BKA secondary to continued poor peripheral circulation after failed attempts at revascularization. It was noted that surgical site has staples in place but foul smelling discharge with redness at suture site. Dressing changed in the ED and was very painful for the patient and didn't allow me to open it up again. He does give some hx but not the best historian. He was also noted on arrival to medical floor of having low sugars and was given juice and will continue to monitor. He denies any fever, chills. apepitite has been good.        Past Medical History:   Diagnosis Date    Anemia 4/26/2017    Anxiety     Arrhythmia     A FIB    Arthritis     Atherosclerosis of artery of extremity with rest pain (HCC)     Atrial fibrillation ([address] 7/21/2020    Benign prostatic hyperplasia 4/26/2017    CAD (coronary artery disease)     pacemaker    Cancer [facility]) 2010    GASTRIC    Chronic kidney disease     Chronic obstructive pulmonary disease (HCC)     Depression     Diabetes (HCC)     BORDERLINE, NO MEDS.  Diverticulosis of colon     Dyslipidemia 4/26/2017    GERD (gastroesophageal reflux disease)     Glaucoma     History of CVA (cerebrovascular accident) 7/21/2020    Hypercholesteremia     Hypertension     Hypomagnesemia 7/13/2020    Nocturia 4/26/2017    PUD (peptic ulcer disease)     GI BLEEDING    PVD (peripheral vascular disease) ([facility]     Rectal hemorrhage 4/26/2017    Strabismus     Stroke ([facility] 2000 APPROX. SOME VISUAL DEFICIT    Type 2 diabetes mellitus without complications ([facility] 0/53/4599    Venous stasis 4/26/2017      Past Surgical History:   Procedure Laterality Date    HX AMPUTATION TOE Right     HX COLONOSCOPY      HX GI  1998    PARTIAL GASTRECTOMY ( DR ALVIN ALBA)    HX HEART CATHETERIZATION      HX ORTHOPAEDIC Left 1980    KNEE CARTILAGE    HX ORTHOPAEDIC Right 2019    AMPUTATION RIGHT GREAT TOE    HX ORTHOPAEDIC Left 2021    AMPUTATION 3 TOES     HX ORTHOPAEDIC Left 02/2022    AMPUTATION 4TH & 5TH TOES    HX OTHER SURGICAL      LEFT HAND SKIN GRAFT (BURN) DONOR RIGHT THIGH    HX PACEMAKER  [id number]    DR Teddy Rios; WATCHMAN PROCEDURE     Family History   Problem Relation Age of Onset    Stroke Mother     Stroke Father     Cancer Brother         PROSTATE    Anesth Problems Neg Hx       Social History     Tobacco Use    Smoking status: Never Smoker    Smokeless tobacco: Never Used   Substance Use Topics    Alcohol use: Not Currently     Comment: QUIT 2000       Prior to Admission medications    Medication Sig Start Date End Date Taking? Authorizing Provider   HYDROcodone-acetaminophen (NORCO) 7.5-325 mg per tablet Take 1 Tablet by mouth every six (6) hours as needed for Pain for up to 5 days. Max Daily Amount: 4 Tablets.  3/25/22 3/30/22 Jj Muniz MD   dorzolamide-timolol, PF, (COSOPT) 2-0.5 % ophthalmic solution Administer 1 Drop to both eyes two (2) times a day. Provider, Historical   lactulose (CHRONULAC) 10 gram/15 mL solution TAKE 30 ML TWICE A DAY AS NEEDED  Patient taking differently: as needed. TAKE 30 ML TWICE A DAY AS NEEDED 3/16/22   Chino Marcelo NP   aspirin delayed-release 81 mg tablet Take 81 mg by mouth every morning. Provider, Historical   magnesium oxide (MAG-OX) 400 mg tablet Take 1 Tablet by mouth daily. Patient taking differently: Take 400 mg by mouth every morning. 1/12/22   Chino Marcelo NP   omeprazole (PRILOSEC) 40 mg capsule Take 1 Capsule by mouth daily. Patient taking differently: Take 40 mg by mouth every morning. 1/11/22   Chino Marcelo NP   FeroSuL 325 mg (65 mg iron) tablet TAKE 1 TABLET BY MOUTH DAILY (BEFORE BREAKFAST). Patient taking differently: Take 65 mg by mouth Daily (before breakfast). 1/3/22   Chino Marcelo NP   cyclobenzaprine (FLEXERIL) 5 mg tablet TAKE 1 TABLET EVERY NIGHT  Patient taking differently: Take 5 mg by mouth as needed. 12/29/21   Chino Marcelo NP   Accu-Chek Ratna Plus test strp strip TEST BLOOD SUGAR TWICE A DAY 11/19/21   Dom Lino MD   amLODIPine (NORVASC) 10 mg tablet TAKE 1 TABLET EVERY DAY  Patient taking differently: Take 10 mg by mouth daily. 10/18/21   Dom Lino MD   glipiZIDE (GLUCOTROL) 5 mg tablet Take 1 Tablet by mouth daily. Patient taking differently: Take 5 mg by mouth every morning. 10/9/21   Dom Lino MD   metoprolol succinate (TOPROL-XL) 25 mg XL tablet TAKE 1 TABLET EVERY DAY  Patient taking differently: Take 25 mg by mouth every morning. 10/9/21   Dom Lino MD   hydroCHLOROthiazide (HYDRODIURIL) 25 mg tablet TAKE 1 TABLET EVERY DAY  Patient taking differently: Take 25 mg by mouth every morning.  10/9/21   Dom Lino MD   lancets (Accu-Chek Softclix Lancets) misc TEST BLOOD SUGAR TWICE A DAY 8/16/21 Kelle Patel MD   Accu-Chek Ratna Control Soln soln USE AS DIRECTED. 7/9/21   Kelle Patel MD   BD Single Use Swabs Regular padm TEST BLOOD SUGAR TWICE DAILY 10/5/20   Kelle Patel MD   acetaminophen (TYLENOL) 325 mg tablet Take 650 mg by mouth every six (6) hours as needed for Pain. Provider, Historical   latanoprost (XALATAN) 0.005 % ophthalmic solution Administer 1 Drop to both eyes nightly. Provider, Historical   senna-docusate (STOOL SOFTENER-LAXATIVE) 8.6-50 mg per tablet Take 1 Tablet by mouth every morning. Provider, Historical     No Known Allergies     Review of Systems:  Review of Systems   Constitutional: Negative for chills, diaphoresis, fatigue and fever. HENT: Negative for congestion, ear pain, postnasal drip, sinus pain and sore throat. Eyes: Negative for pain, discharge and redness. Respiratory: Negative for cough, shortness of breath and wheezing. Cardiovascular: Negative for chest pain and palpitations. Gastrointestinal: Negative for abdominal pain, constipation, diarrhea, nausea and vomiting. Genitourinary: Negative for dysuria, flank pain, frequency and urgency. Musculoskeletal: Negative for arthralgias and myalgias. Neurological: Negative for dizziness, weakness and headaches. Psychiatric/Behavioral: Negative for agitation and hallucinations. The patient is not nervous/anxious. Objective:     Vitals:  Visit Vitals  /61   Pulse 70   Temp 97.7 °F (36.5 °C)   Resp 22   Ht 6' (1.829 m)   Wt 96.6 kg (212 lb 15.4 oz)   SpO2 95%   BMI 28.88 kg/m²       Physical Exam:  General: Alert, cooperative, no distress. Head:  Normocephalic, without obvious abnormality, atraumatic. Eyes:  Conjunctivae/corneas clear. Pupils equal, round, reactive to light. Extraocular movements intact. Lungs:  Clear to auscultation bilaterally, no wheezes, crackles  Chest wall: No tenderness or deformity.   Heart:  Regular rate and rhythm, S1, S2 normal, no murmur, click, rub, or gallop. Abdomen:   Soft, non-tender. Bowel sounds normal. No masses. No organomegaly. Back:  No spine tenderness to palpation  Extremities: Extremities normal, atraumatic, no cyanosis or edema. Pulses: Symmetric all extremities. Skin: Skin color, texture, turgor normal.   Lymph nodes: Cervical nodes normal.  Neurologic: CNII-XII intact. Normal strength, sensation, and reflexes throughout. Labs:  Recent Results (from the past 24 hour(s))   CBC WITH AUTOMATED DIFF    Collection Time: 03/30/22 10:32 AM   Result Value Ref Range    WBC 12.9 (H) 4.4 - 11.3 K/uL    RBC 3.15 (L) 4.50 - 5.90 M/uL    HGB 8.3 (L) 13.5 - 17.5 g/dL    HCT 27.6 (L) 41 - 53 %    MCV 87.3 80 - 100 FL    MCH 26.5 (L) 31 - 34 PG    MCHC 30.3 (L) 31.0 - 36.0 g/dL    RDW 16.1 (H) 11.5 - 14.5 %    PLATELET 474 (H) 975 - 400 K/uL    MPV 8.1 6.5 - 11.5 FL    NRBC 0.1  WBC    ABSOLUTE NRBC 0.01 K/uL    NEUTROPHILS 82 (H) 42 - 75 %    LYMPHOCYTES 9 (L) 20.5 - 51.1 %    MONOCYTES 9 1.7 - 9.3 %    EOSINOPHILS 0 (L) 0.9 - 2.9 %    BASOPHILS 0 0.0 - 2.5 %    ABS. NEUTROPHILS 10.5 (H) 1.8 - 7.7 K/UL    ABS. LYMPHOCYTES 1.2 1.0 - 4.8 K/UL    ABS. MONOCYTES 1.2 0.2 - 2.4 K/UL    ABS. EOSINOPHILS 0.0 0.0 - 0.7 K/UL    ABS.  BASOPHILS 0.0 0.0 - 0.2 K/UL   PROTHROMBIN TIME + INR    Collection Time: 03/30/22 10:32 AM   Result Value Ref Range    Prothrombin time 15.7 (H) 11.9 - 14.6 sec    INR 1.3 (H) 0.9 - 1.1     METABOLIC PANEL, COMPREHENSIVE    Collection Time: 03/30/22 10:32 AM   Result Value Ref Range    Sodium 135 (L) 136 - 145 mmol/L    Potassium 3.9 3.5 - 5.1 mmol/L    Chloride 100 97 - 108 mmol/L    CO2 24 21 - 32 mmol/L    Anion gap 11 5 - 15 mmol/L    Glucose 76 65 - 100 mg/dL    BUN 45 (H) 6 - 20 mg/dL    Creatinine 1.83 (H) 0.70 - 1.30 mg/dL    BUN/Creatinine ratio 25 (H) 12 - 20      GFR est AA 43 (L) >60 ml/min/1.73m2    GFR est non-AA 35 (L) >60 ml/min/1.73m2    Calcium 9.3 8.5 - 10.1 mg/dL    Bilirubin, total 0.5 0.2 - 1.0 mg/dL    AST (SGOT) 86 (H) 15 - 37 U/L    ALT (SGPT) 29 12 - 78 U/L    Alk. phosphatase 95 45 - 117 U/L    Protein, total 8.7 (H) 6.4 - 8.2 g/dL    Albumin 1.9 (L) 3.5 - 5.0 g/dL    Globulin 6.8 (H) 2.0 - 4.0 g/dL    A-G Ratio 0.3 (L) 1.1 - 2.2     LACTIC ACID    Collection Time: 03/30/22 10:32 AM   Result Value Ref Range    Lactic acid 2.0 0.4 - 2.0 mmol/L   GLUCOSE, POC    Collection Time: 03/30/22  3:24 PM   Result Value Ref Range    Glucose (POC) 46 (LL) 65 - 117 mg/dL    Performed by Joe Rodriguez    GLUCOSE, POC    Collection Time: 03/30/22  3:26 PM   Result Value Ref Range    Glucose (POC) 57 (L) 65 - 117 mg/dL    Performed by Joe Rodriguez    GLUCOSE, POC    Collection Time: 03/30/22  3:50 PM   Result Value Ref Range    Glucose (POC) 73 65 - 117 mg/dL    Performed by Ally Prieto        Imaging:  CT LOW EXT LT W CONT    Result Date: 3/30/2022  Findings/impression: Fluid and multiple small gas bubbles noted in the distal stump and in and around the distal tibia. If the surgery was recent, these findings may be benign but otherwise are concerning for infection. No bone erosion seen, but this does not exclude osteomyelitis. No fracture, alignment abnormality or concerning lytic or blastic lesion is evident. Mild knee joint osteoarthritis. Atherosclerosis.        Assessment & Plan:      Cellulitis of Left BKA  - presented with fever and elevated wbc and noted drainage / redness at surgical site.   - CT of stump shows fluid and multiple small gas bubbles noted in the distal stump and in and around distal tibia and discussed with surgery most likely remnants of recent surgery  -Surgical consultation follow-up recommendations  -Obtain wound culture  -Zosyn every 8 hours  -Follow blood culture    Hypoglycemia  - was 46 on arrival to acute care but patient notes he is eating well  - a1c in 1/2022 was 5.9  - noted on med list to be on glipizide 5mg daily will hold   - monitor with accuchecks with RISS         CKD stage 3B  - patient creat of 1.83 with GFR of 43 appears to be close to baseline as previous early this month had creat 1.78 with GFR of 44  - continue IV fluids overnight and follow repeat labs in AM     Anemia  - most likely from chronic renal dz  - hg 8.3 on admission   - follow iron profile.   - follow daily cbc       HTN  - monitor vitals q4hrs.   - reconcile home meds and restart     GI Prophylaxis   - pepcid bid     DVT prophylaxis   - lovenox     Code Status : Full code     Spent 50 minutes evaluating cording patient's admission to acute remote telemetry and expect at least 2 to 3 days of acute care stay            Electronically signed by Rito Marcos MD on 3/30/2022 at 5:46 PM

## 2022-03-30 NOTE — ROUTINE PROCESS
Bedside shift change report given to Job Dorado LPN  (oncoming nurse) by Raymond Yip RN (offgoing nurse). Report included the following information SBAR.

## 2022-03-30 NOTE — ED TRIAGE NOTES
Pt brought in by EMS from [address] for complaint of left leg drainage from recent BKA 32.8

## 2022-03-30 NOTE — PROGRESS NOTES
Problem: Falls - Risk of  Goal: *Absence of Falls  Description: Document Luis Barragan Fall Risk and appropriate interventions in the flowsheet. Outcome: Progressing Towards Goal  Note: Fall Risk Interventions:  Mobility Interventions: Communicate number of staff needed for ambulation/transfer              Elimination Interventions: Patient to call for help with toileting needs,Call light in reach              Problem: Patient Education: Go to Patient Education Activity  Goal: Patient/Family Education  Outcome: Progressing Towards Goal     Problem: Pressure Injury - Risk of  Goal: *Prevention of pressure injury  Description: Document Al Scale and appropriate interventions in the flowsheet.   Outcome: Progressing Towards Goal  Note: Pressure Injury Interventions:       Moisture Interventions: Absorbent underpads    Activity Interventions: PT/OT evaluation    Mobility Interventions: PT/OT evaluation    Nutrition Interventions: Document food/fluid/supplement intake                     Problem: Patient Education: Go to Patient Education Activity  Goal: Patient/Family Education  Outcome: Progressing Towards Goal

## 2022-03-31 ENCOUNTER — HOSPITAL ENCOUNTER (INPATIENT)
Age: 87
LOS: 6 days | Discharge: NURSING, REHAB OR LONG-TERM CARE | DRG: 475 | End: 2022-04-06
Attending: FAMILY MEDICINE | Admitting: STUDENT IN AN ORGANIZED HEALTH CARE EDUCATION/TRAINING PROGRAM
Payer: MEDICARE

## 2022-03-31 VITALS
WEIGHT: 212.96 LBS | HEIGHT: 72 IN | TEMPERATURE: 99.1 F | RESPIRATION RATE: 14 BRPM | BODY MASS INDEX: 28.85 KG/M2 | HEART RATE: 62 BPM | SYSTOLIC BLOOD PRESSURE: 115 MMHG | DIASTOLIC BLOOD PRESSURE: 47 MMHG | OXYGEN SATURATION: 100 %

## 2022-03-31 LAB
ALBUMIN SERPL-MCNC: 1.8 G/DL (ref 3.5–5)
ALBUMIN/GLOB SERPL: 0.3 {RATIO} (ref 1.1–2.2)
ALP SERPL-CCNC: 95 U/L (ref 45–117)
ALT SERPL-CCNC: 38 U/L (ref 12–78)
ANION GAP SERPL CALC-SCNC: 10 MMOL/L (ref 5–15)
AST SERPL W P-5'-P-CCNC: 105 U/L (ref 15–37)
BASOPHILS # BLD: 0.1 K/UL (ref 0–0.2)
BASOPHILS NFR BLD: 0 % (ref 0–2.5)
BILIRUB SERPL-MCNC: 0.4 MG/DL (ref 0.2–1)
BUN SERPL-MCNC: 41 MG/DL (ref 6–20)
BUN/CREAT SERPL: 22 (ref 12–20)
CA-I BLD-MCNC: 8.2 MG/DL (ref 8.5–10.1)
CHLORIDE SERPL-SCNC: 104 MMOL/L (ref 97–108)
CO2 SERPL-SCNC: 25 MMOL/L (ref 21–32)
CREAT SERPL-MCNC: 1.83 MG/DL (ref 0.7–1.3)
EOSINOPHIL # BLD: 0.1 K/UL (ref 0–0.7)
EOSINOPHIL NFR BLD: 0 % (ref 0.9–2.9)
ERYTHROCYTE [DISTWIDTH] IN BLOOD BY AUTOMATED COUNT: 16.5 % (ref 11.5–14.5)
GLOBULIN SER CALC-MCNC: 5.4 G/DL (ref 2–4)
GLUCOSE BLD STRIP.AUTO-MCNC: 160 MG/DL (ref 65–117)
GLUCOSE BLD STRIP.AUTO-MCNC: 70 MG/DL (ref 65–117)
GLUCOSE BLD STRIP.AUTO-MCNC: 97 MG/DL (ref 65–117)
GLUCOSE SERPL-MCNC: 73 MG/DL (ref 65–100)
HCT VFR BLD AUTO: 24.3 % (ref 41–53)
HGB BLD-MCNC: 7.4 G/DL (ref 13.5–17.5)
LYMPHOCYTES # BLD: 1.1 K/UL (ref 1–4.8)
LYMPHOCYTES NFR BLD: 9 % (ref 20.5–51.1)
MAGNESIUM SERPL-MCNC: 2.5 MG/DL (ref 1.6–2.4)
MCH RBC QN AUTO: 26.4 PG (ref 31–34)
MCHC RBC AUTO-ENTMCNC: 30.5 G/DL (ref 31–36)
MCV RBC AUTO: 86.6 FL (ref 80–100)
MONOCYTES # BLD: 0.8 K/UL (ref 0.2–2.4)
MONOCYTES NFR BLD: 7 % (ref 1.7–9.3)
NEUTS SEG # BLD: 10.1 K/UL (ref 1.8–7.7)
NEUTS SEG NFR BLD: 84 % (ref 42–75)
NRBC # BLD: 0.01 K/UL
NRBC BLD-RTO: 0.1 PER 100 WBC
PERFORMED BY, TECHID: ABNORMAL
PERFORMED BY, TECHID: NORMAL
PERFORMED BY, TECHID: NORMAL
PLATELET # BLD AUTO: 458 K/UL (ref 150–400)
PMV BLD AUTO: 8.1 FL (ref 6.5–11.5)
POTASSIUM SERPL-SCNC: 4.1 MMOL/L (ref 3.5–5.1)
PROCALCITONIN SERPL-MCNC: 1.76 NG/ML
PROT SERPL-MCNC: 7.2 G/DL (ref 6.4–8.2)
RBC # BLD AUTO: 2.8 M/UL (ref 4.5–5.9)
SODIUM SERPL-SCNC: 139 MMOL/L (ref 136–145)
WBC # BLD AUTO: 12.1 K/UL (ref 4.4–11.3)

## 2022-03-31 PROCEDURE — 74011000258 HC RX REV CODE- 258: Performed by: HOSPITALIST

## 2022-03-31 PROCEDURE — 82962 GLUCOSE BLOOD TEST: CPT

## 2022-03-31 PROCEDURE — 74011000250 HC RX REV CODE- 250: Performed by: HOSPITALIST

## 2022-03-31 PROCEDURE — 36415 COLL VENOUS BLD VENIPUNCTURE: CPT

## 2022-03-31 PROCEDURE — 83036 HEMOGLOBIN GLYCOSYLATED A1C: CPT

## 2022-03-31 PROCEDURE — 87205 SMEAR GRAM STAIN: CPT

## 2022-03-31 PROCEDURE — 65660000000 HC RM CCU STEPDOWN

## 2022-03-31 PROCEDURE — 85025 COMPLETE CBC W/AUTO DIFF WBC: CPT

## 2022-03-31 PROCEDURE — 74011250637 HC RX REV CODE- 250/637: Performed by: HOSPITALIST

## 2022-03-31 PROCEDURE — 87077 CULTURE AEROBIC IDENTIFY: CPT

## 2022-03-31 PROCEDURE — 87186 SC STD MICRODIL/AGAR DIL: CPT

## 2022-03-31 PROCEDURE — 83735 ASSAY OF MAGNESIUM: CPT

## 2022-03-31 PROCEDURE — 80053 COMPREHEN METABOLIC PANEL: CPT

## 2022-03-31 PROCEDURE — 74011250636 HC RX REV CODE- 250/636: Performed by: HOSPITALIST

## 2022-03-31 PROCEDURE — 74011636637 HC RX REV CODE- 636/637: Performed by: HOSPITALIST

## 2022-03-31 PROCEDURE — 83540 ASSAY OF IRON: CPT

## 2022-03-31 RX ORDER — ASPIRIN 81 MG/1
81 TABLET ORAL
Status: DISCONTINUED | OUTPATIENT
Start: 2022-03-31 | End: 2022-03-31 | Stop reason: HOSPADM

## 2022-03-31 RX ORDER — DORZOLAMIDE HCL 20 MG/ML
1 SOLUTION/ DROPS OPHTHALMIC 2 TIMES DAILY
Status: DISCONTINUED | OUTPATIENT
Start: 2022-03-31 | End: 2022-03-31 | Stop reason: HOSPADM

## 2022-03-31 RX ORDER — DEXTROSE 50 % IN WATER (D50W) INTRAVENOUS SYRINGE
25-50 AS NEEDED
Status: DISCONTINUED | OUTPATIENT
Start: 2022-03-31 | End: 2022-03-31 | Stop reason: HOSPADM

## 2022-03-31 RX ORDER — LANOLIN ALCOHOL/MO/W.PET/CERES
400 CREAM (GRAM) TOPICAL DAILY
Status: DISCONTINUED | OUTPATIENT
Start: 2022-03-31 | End: 2022-03-31 | Stop reason: HOSPADM

## 2022-03-31 RX ORDER — LACTULOSE 10 G/15ML
10 SOLUTION ORAL; RECTAL
Status: DISCONTINUED | OUTPATIENT
Start: 2022-03-31 | End: 2022-03-31 | Stop reason: HOSPADM

## 2022-03-31 RX ORDER — TIMOLOL MALEATE 5 MG/ML
1 SOLUTION/ DROPS OPHTHALMIC 2 TIMES DAILY
Status: DISCONTINUED | OUTPATIENT
Start: 2022-03-31 | End: 2022-03-31 | Stop reason: HOSPADM

## 2022-03-31 RX ORDER — HYDROCODONE BITARTRATE AND ACETAMINOPHEN 7.5; 325 MG/1; MG/1
1 TABLET ORAL
Status: DISCONTINUED | OUTPATIENT
Start: 2022-03-31 | End: 2022-03-31 | Stop reason: HOSPADM

## 2022-03-31 RX ORDER — MORPHINE SULFATE 2 MG/ML
1 INJECTION, SOLUTION INTRAMUSCULAR; INTRAVENOUS
Status: DISCONTINUED | OUTPATIENT
Start: 2022-03-31 | End: 2022-03-31 | Stop reason: HOSPADM

## 2022-03-31 RX ORDER — VANCOMYCIN 1.5 G/300ML
1500 INJECTION, SOLUTION INTRAVENOUS ONCE
Status: COMPLETED | OUTPATIENT
Start: 2022-03-31 | End: 2022-03-31

## 2022-03-31 RX ORDER — LANOLIN ALCOHOL/MO/W.PET/CERES
325 CREAM (GRAM) TOPICAL
Status: DISCONTINUED | OUTPATIENT
Start: 2022-03-31 | End: 2022-03-31 | Stop reason: HOSPADM

## 2022-03-31 RX ORDER — PANTOPRAZOLE SODIUM 20 MG/1
40 TABLET, DELAYED RELEASE ORAL EVERY MORNING
Status: DISCONTINUED | OUTPATIENT
Start: 2022-04-01 | End: 2022-03-31 | Stop reason: HOSPADM

## 2022-03-31 RX ORDER — ATORVASTATIN CALCIUM 40 MG/1
80 TABLET, FILM COATED ORAL
Status: DISCONTINUED | OUTPATIENT
Start: 2022-03-31 | End: 2022-03-31 | Stop reason: HOSPADM

## 2022-03-31 RX ORDER — INSULIN LISPRO 100 [IU]/ML
INJECTION, SOLUTION INTRAVENOUS; SUBCUTANEOUS
Status: DISCONTINUED | OUTPATIENT
Start: 2022-03-31 | End: 2022-03-31 | Stop reason: HOSPADM

## 2022-03-31 RX ORDER — AMOXICILLIN 250 MG
1 CAPSULE ORAL
Status: DISCONTINUED | OUTPATIENT
Start: 2022-03-31 | End: 2022-03-31 | Stop reason: HOSPADM

## 2022-03-31 RX ORDER — LATANOPROST 50 UG/ML
1 SOLUTION/ DROPS OPHTHALMIC
Status: DISCONTINUED | OUTPATIENT
Start: 2022-03-31 | End: 2022-03-31 | Stop reason: HOSPADM

## 2022-03-31 RX ORDER — METOPROLOL SUCCINATE 25 MG/1
25 TABLET, EXTENDED RELEASE ORAL DAILY
Status: DISCONTINUED | OUTPATIENT
Start: 2022-03-31 | End: 2022-03-31 | Stop reason: HOSPADM

## 2022-03-31 RX ORDER — MAGNESIUM SULFATE 100 %
4 CRYSTALS MISCELLANEOUS AS NEEDED
Status: DISCONTINUED | OUTPATIENT
Start: 2022-03-31 | End: 2022-03-31 | Stop reason: HOSPADM

## 2022-03-31 RX ADMIN — DORZOLAMIDE HYDROCHLORIDE 1 DROP: 20 SOLUTION/ DROPS OPHTHALMIC at 10:09

## 2022-03-31 RX ADMIN — SODIUM CHLORIDE, PRESERVATIVE FREE 10 ML: 5 INJECTION INTRAVENOUS at 06:34

## 2022-03-31 RX ADMIN — FERROUS SULFATE TAB 325 MG (65 MG ELEMENTAL FE) 325 MG: 325 (65 FE) TAB at 10:09

## 2022-03-31 RX ADMIN — ENOXAPARIN SODIUM 40 MG: 100 INJECTION SUBCUTANEOUS at 08:56

## 2022-03-31 RX ADMIN — HYDROCODONE BITARTRATE AND ACETAMINOPHEN 1 TABLET: 7.5; 325 TABLET ORAL at 08:56

## 2022-03-31 RX ADMIN — MAGNESIUM OXIDE 400 MG: 400 TABLET ORAL at 09:25

## 2022-03-31 RX ADMIN — SENNOSIDES, DOCUSATE SODIUM 1 TABLET: 50; 8.6 TABLET, FILM COATED ORAL at 08:56

## 2022-03-31 RX ADMIN — ASPIRIN 81 MG: 81 TABLET, COATED ORAL at 08:56

## 2022-03-31 RX ADMIN — FAMOTIDINE 20 MG: 20 TABLET, FILM COATED ORAL at 08:56

## 2022-03-31 RX ADMIN — POTASSIUM CHLORIDE AND SODIUM CHLORIDE: 900; 150 INJECTION, SOLUTION INTRAVENOUS at 11:28

## 2022-03-31 RX ADMIN — PIPERACILLIN AND TAZOBACTAM 3.38 G: 3; .375 INJECTION, POWDER, LYOPHILIZED, FOR SOLUTION INTRAVENOUS at 06:35

## 2022-03-31 RX ADMIN — INSULIN LISPRO 2 UNITS: 100 INJECTION, SOLUTION INTRAVENOUS; SUBCUTANEOUS at 11:30

## 2022-03-31 RX ADMIN — SODIUM CHLORIDE, PRESERVATIVE FREE 10 ML: 5 INJECTION INTRAVENOUS at 17:48

## 2022-03-31 RX ADMIN — TIMOLOL MALEATE 1 DROP: 5 SOLUTION OPHTHALMIC at 10:09

## 2022-03-31 RX ADMIN — METOPROLOL SUCCINATE 25 MG: 25 TABLET, EXTENDED RELEASE ORAL at 09:25

## 2022-03-31 RX ADMIN — VANCOMYCIN 1500 MG: 1.5 INJECTION, SOLUTION INTRAVENOUS at 11:30

## 2022-03-31 RX ADMIN — POLYETHYLENE GLYCOL 3350 17 G: 17 POWDER, FOR SOLUTION ORAL at 08:57

## 2022-03-31 RX ADMIN — PIPERACILLIN AND TAZOBACTAM 3.38 G: 3; .375 INJECTION, POWDER, LYOPHILIZED, FOR SOLUTION INTRAVENOUS at 17:48

## 2022-03-31 NOTE — CONSULTS
Reason for consult:  Drainage from BKA stump    History of present illness:  Patient status post left below-knee amputation March 21, 2022. He does have a history of peripheral vascular disease. He was discharged to SNF on March 25 with instructions to follow-up with his surgeon in 2 weeks. Patient came to the ER by EMS record his health because of drainage from his BKA stump. He also reports pain. Physical exam:  Significant bogginess at the distal stump, drainage purulent foul-smelling, epidermal lysis of the skin    Recent Results (from the past 12 hour(s))   METABOLIC PANEL, COMPREHENSIVE    Collection Time: 03/31/22  5:20 AM   Result Value Ref Range    Sodium 139 136 - 145 mmol/L    Potassium 4.1 3.5 - 5.1 mmol/L    Chloride 104 97 - 108 mmol/L    CO2 25 21 - 32 mmol/L    Anion gap 10 5 - 15 mmol/L    Glucose 73 65 - 100 mg/dL    BUN 41 (H) 6 - 20 mg/dL    Creatinine 1.83 (H) 0.70 - 1.30 mg/dL    BUN/Creatinine ratio 22 (H) 12 - 20      GFR est AA 43 (L) >60 ml/min/1.73m2    GFR est non-AA 35 (L) >60 ml/min/1.73m2    Calcium 8.2 (L) 8.5 - 10.1 mg/dL    Bilirubin, total 0.4 0.2 - 1.0 mg/dL    AST (SGOT) 105 (H) 15 - 37 U/L    ALT (SGPT) 38 12 - 78 U/L    Alk.  phosphatase 95 45 - 117 U/L    Protein, total 7.2 6.4 - 8.2 g/dL    Albumin 1.8 (L) 3.5 - 5.0 g/dL    Globulin 5.4 (H) 2.0 - 4.0 g/dL    A-G Ratio 0.3 (L) 1.1 - 2.2     MAGNESIUM    Collection Time: 03/31/22  5:20 AM   Result Value Ref Range    Magnesium 2.5 (H) 1.6 - 2.4 mg/dL   CBC WITH AUTOMATED DIFF    Collection Time: 03/31/22  5:20 AM   Result Value Ref Range    WBC 12.1 (H) 4.4 - 11.3 K/uL    RBC 2.80 (L) 4.50 - 5.90 M/uL    HGB 7.4 (L) 13.5 - 17.5 g/dL    HCT 24.3 (L) 41 - 53 %    MCV 86.6 80 - 100 FL    MCH 26.4 (L) 31 - 34 PG    MCHC 30.5 (L) 31.0 - 36.0 g/dL    RDW 16.5 (H) 11.5 - 14.5 %    PLATELET 366 (H) 173 - 400 K/uL    MPV 8.1 6.5 - 11.5 FL    NRBC 0.1  WBC    ABSOLUTE NRBC 0.01 K/uL    NEUTROPHILS 84 (H) 42 - 75 % LYMPHOCYTES 9 (L) 20.5 - 51.1 %    MONOCYTES 7 1.7 - 9.3 %    EOSINOPHILS 0 (L) 0.9 - 2.9 %    BASOPHILS 0 0.0 - 2.5 %    ABS. NEUTROPHILS 10.1 (H) 1.8 - 7.7 K/UL    ABS. LYMPHOCYTES 1.1 1.0 - 4.8 K/UL    ABS. MONOCYTES 0.8 0.2 - 2.4 K/UL    ABS. EOSINOPHILS 0.1 0.0 - 0.7 K/UL    ABS. BASOPHILS 0.1 0.0 - 0.2 K/UL   GLUCOSE, POC    Collection Time: 03/31/22  7:14 AM   Result Value Ref Range    Glucose (POC) 97 65 - 117 mg/dL    Performed by Xavier Boggs        CT scan left lower extremity:    Fluid and multiple small gas bubbles noted in the distal stump and in and around  the distal tibia. If the surgery was recent, these findings may be benign but  otherwise are concerning for infection. No bone erosion seen, but this does not exclude osteomyelitis. No fracture, alignment abnormality or concerning lytic or blastic lesion is  evident. Mild knee joint osteoarthritis. Atherosclerosis      Assessment and plan:  24-year-old gentleman status post below-knee amputation approximately 9 days ago now with significant stump infection. Patient will need operative debridement and revision of stump. Recommend transfer to [address] where he had a surgery to coordinate with his surgeon.

## 2022-03-31 NOTE — PROGRESS NOTES
Problem: Falls - Risk of  Goal: *Absence of Falls  Description: Document Rufina Mandes Fall Risk and appropriate interventions in the flowsheet.   Outcome: Progressing Towards Goal  Note: Fall Risk Interventions:  Mobility Interventions: Communicate number of staff needed for ambulation/transfer              Elimination Interventions: Patient to call for help with toileting needs

## 2022-03-31 NOTE — PROGRESS NOTES
ST consult received. Patient being discharged to outside facility so ST eval not completed at this time. Thank you!

## 2022-03-31 NOTE — DISCHARGE SUMMARY
Physician Discharge Summary       Patient: Alexus Martinez MRN: [id number]     YOB: 1935  Age: 82 y.o. Sex: male    PCP: Cristi Scott NP    Allergies: Patient has no known allergies.     Admit date: 3/30/2022  Admitting Provider: Nirmala Jackson MD    Discharge date: 3/31/2022  Discharging Provider: Nirmala Jackson MD    * Admission Diagnoses:   Cellulitis [I31.90]      * Discharge Diagnoses:    Hospital Problems as of 3/31/2022 Date Reviewed: 3/21/2022          Codes Class Noted - Resolved POA    * (Principal) Cellulitis ICD-10-CM: L03.90  ICD-9-CM: 682.9  3/30/2022 - Present Unknown        Hypertension ICD-10-CM: I10  ICD-9-CM: 401.9  7/21/2020 - Present Yes        Mixed hyperlipidemia ICD-10-CM: E78.2  ICD-9-CM: 272.2  7/21/2020 - Present Yes        Peripheral vascular disease ([facility] ICD-10-CM: I73.9  ICD-9-CM: 443.9  7/21/2020 - Present Yes        GERD (gastroesophageal reflux disease) ICD-10-CM: K21.9  ICD-9-CM: 530.81  7/21/2020 - Present Yes        Type 2 diabetes mellitus without complications ([facility] KHG-12-UW: E11.9  ICD-9-CM: 250.00  7/13/2020 - Present Yes        Benign prostatic hyperplasia ICD-10-CM: N40.0  ICD-9-CM: 600.00  4/26/2017 - Present Yes                * Hospital Course:   HPI as per admitting provider on 3/30:  Alexus Martinez is a 82 y.o. male followed by Cristi Scott NP and  has a past medical history of Anemia (4/26/2017), Anxiety, Arrhythmia, Arthritis, Atherosclerosis of artery of extremity with rest pain ([address] Atrial fibrillation ([facility] (7/21/2020), Benign prostatic hyperplasia (4/26/2017), CAD (coronary artery disease), Cancer ([address] (2010), Chronic kidney disease, Chronic obstructive pulmonary disease ([address] Depression, Diabetes ([facility] Diverticulosis of colon, Dyslipidemia (4/26/2017), GERD (gastroesophageal reflux disease), Glaucoma, History of CVA (cerebrovascular accident) (7/21/2020), Hypercholesteremia, Hypertension, Hypomagnesemia (7/13/2020), Nocturia (4/26/2017), PUD (peptic ulcer disease), PVD (peripheral vascular disease) ([facility] Rectal hemorrhage (4/26/2017), Strabismus, Stroke ([facility] (2000 APPROX.), Type 2 diabetes mellitus without complications ([facility] (8/77/9339), and Venous stasis (4/26/2017). Who presents from [address] [place] [address] where patient has been since his most recent surgery of left BKA done by Dr. Trang Lopes on 3/21 secondary to continued poor peripheral circulation after failed attempts at revascularization and was discharged to skilled rehab at [address] [place] [address] and rehab on 3/25. . It was noted that surgical site has staples in place but foul smelling discharge with redness at suture site. Dressing changed in the ED and was very painful for the patient and didn't allow me to open it but on evaluation of left BKA with surgeon on 3/31 found to have foul smelling purulent drainage noted with extremely painful to touch. . He does give some hx but not the best historian. He was also noted on arrival to medical floor of having low sugars and was given juice and noted by his [facility] [phone] that he has not been eating well and was cotinued on glipizide 5mg oral daily.          Colitis/possible abscess of Left BKA  - presented with fever and elevated wbc and noted drainage / redness at surgical site foul-smelling discharge.   - CT of stump shows fluid and multiple small gas bubbles noted in the distal stump and in and around distal tibia and discussed with surgery most likely remnants of recent surgery  -Surgical consultation appreciated and on evaluation recommended patient to be transferred back to vascular surgery to further evaluate extensive infection currently noted and possible need for further amputation  -Obtain wound culture and pending results at this time  -Zosyn every 8 hours and pharmacy dosing vancomycin  -Follow blood culture  - discussed with Dr Alexys Parisi Vascular Surgeon and he will consult on patient upon arrival to  [facility] and discussed case with Dr. Sinai Ambrosio and he was kind enough to accept patient for transfer to surgical Tele bed . - will communicate with patient daughter about acceptance      Hypoglycemia  - was 46 on arrival to acute care but patient notes he is eating well  - a1c in 1/2022 was 5.9  - noted on med list to be on glipizide 5mg daily will hold   - monitor with accuchecks with RISS       CKD stage 3B  - patient creat of 1.83 with GFR of 43 appears to be close to baseline as previous early this month had creat 1.78 with GFR of 44  - continue IV fluids overnight repeat creatinine in similar range at 1.83     Anemia  - most likely from chronic renal dz  - hg 8.3 on admission and repeat after overnight IV fluids decreased to 7.4 previously on 3/22 hemoglobin was 5.8 and on repeat was in similar range at 8.7  - follow iron profile. An occult blood ending        HTN  - monitor vitals q4hrs. - reconcile home meds and restart     * Procedures:   * No surgery found *        Consults:   Surgery Dr. 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 He does have a history of peripheral vascular disease. He was discharged to SNF on March 25 with instructions to follow-up with his surgeon in 2 weeks. Patient came to the ER by EMS record his health because of drainage from his BKA stump.   He also reports pain.     Physical exam:  Significant bogginess at the distal stump, drainage purulent foul-smelling, epidermal lysis of the skin     Recent Results         Recent Results (from the past 12 hour(s))   METABOLIC PANEL, COMPREHENSIVE     Collection Time: 03/31/22  5:20 AM   Result Value Ref Range     Sodium 139 136 - 145 mmol/L   Potassium 4.1 3.5 - 5.1 mmol/L     Chloride 104 97 - 108 mmol/L     CO2 25 21 - 32 mmol/L     Anion gap 10 5 - 15 mmol/L     Glucose 73 65 - 100 mg/dL     BUN 41 (H) 6 - 20 mg/dL     Creatinine 1.83 (H) 0.70 - 1.30 mg/dL     BUN/Creatinine ratio 22 (H) 12 - 20       GFR est AA 43 (L) >60 ml/min/1.73m2     GFR est non-AA 35 (L) >60 ml/min/1.73m2     Calcium 8.2 (L) 8.5 - 10.1 mg/dL     Bilirubin, total 0.4 0.2 - 1.0 mg/dL     AST (SGOT) 105 (H) 15 - 37 U/L     ALT (SGPT) 38 12 - 78 U/L     Alk. phosphatase 95 45 - 117 U/L     Protein, total 7.2 6.4 - 8.2 g/dL     Albumin 1.8 (L) 3.5 - 5.0 g/dL     Globulin 5.4 (H) 2.0 - 4.0 g/dL     A-G Ratio 0.3 (L) 1.1 - 2.2     MAGNESIUM     Collection Time: 03/31/22  5:20 AM   Result Value Ref Range     Magnesium 2.5 (H) 1.6 - 2.4 mg/dL   CBC WITH AUTOMATED DIFF     Collection Time: 03/31/22  5:20 AM   Result Value Ref Range     WBC 12.1 (H) 4.4 - 11.3 K/uL     RBC 2.80 (L) 4.50 - 5.90 M/uL     HGB 7.4 (L) 13.5 - 17.5 g/dL     HCT 24.3 (L) 41 - 53 %     MCV 86.6 80 - 100 FL     MCH 26.4 (L) 31 - 34 PG     MCHC 30.5 (L) 31.0 - 36.0 g/dL     RDW 16.5 (H) 11.5 - 14.5 %     PLATELET 368 (H) 185 - 400 K/uL     MPV 8.1 6.5 - 11.5 FL     NRBC 0.1  WBC     ABSOLUTE NRBC 0.01 K/uL     NEUTROPHILS 84 (H) 42 - 75 %     LYMPHOCYTES 9 (L) 20.5 - 51.1 %     MONOCYTES 7 1.7 - 9.3 %     EOSINOPHILS 0 (L) 0.9 - 2.9 %     BASOPHILS 0 0.0 - 2.5 %     ABS. NEUTROPHILS 10.1 (H) 1.8 - 7.7 K/UL     ABS. LYMPHOCYTES 1.1 1.0 - 4.8 K/UL     ABS. MONOCYTES 0.8 0.2 - 2.4 K/UL     ABS. EOSINOPHILS 0.1 0.0 - 0.7 K/UL     ABS. BASOPHILS 0.1 0.0 - 0.2 K/UL   GLUCOSE, POC     Collection Time: 03/31/22  7:14 AM   Result Value Ref Range     Glucose (POC) 97 65 - 117 mg/dL     Performed by [facility]              CT scan left lower extremity:     Fluid and multiple small gas bubbles noted in the distal stump and in and around  the distal tibia.  If the surgery was recent, these findings may be benign but  otherwise are concerning for infection. No bone erosion seen, but this does not exclude osteomyelitis. No fracture, alignment abnormality or concerning lytic or blastic lesion is  evident. Mild knee joint osteoarthritis. Atherosclerosis        Assessment and plan:  82year-old gentleman status post below-knee amputation approximately 9 days ago now with significant stump infection. Patient will need operative debridement and revision of stump. Recommend transfer to [facility] where he had a surgery to coordinate with his surgeon. Vitals Last 24 Hours:  Patient Vitals for the past 24 hrs:   Temp Pulse Resp BP SpO2   03/31/22 1145  60      03/31/22 1111 98.7 °F (37.1 °C) 61 20 (!) 108/44 96 %   03/31/22 0750 98.2 °F (36.8 °C) 67 20 (!) 120/49 99 %   03/31/22 0400 98.9 °F (37.2 °C) 64 18 (!) 121/52 94 %   03/31/22 0000  75      03/30/22 2318 99.1 °F (37.3 °C) 75 16 118/60 96 %   03/30/22 2000  76      03/30/22 1948 (!) 100.7 °F (38.2 °C) 80 18 127/67 97 %   03/30/22 1636 97.7 °F (36.5 °C) 70 22 125/61 95 %   03/30/22 1600  70      03/30/22 1345  66 27 (!) 136/57 100 %        Discharge Exam:  General: Alert, cooperative, no distress, appears stated age. Head:  Normocephalic, without obvious abnormality, atraumatic. Eyes:  Conjunctivae/corneas clear. Pupils equal, round, reactive to light. Extraocular movements intact. Lungs:  Clear to auscultation bilaterally. no wheeze, rales, crackles, rhonchi   Chest wall: No tenderness or deformity. Heart:  Regular rate and rhythm, S1, S2 normal, no murmur, click, rub or gallop. Abdomen:  Soft, non-tender. Bowel sounds normal. No masses,  No organomegaly. Extremities: left bka with purulent drainage noted. Pulses: 2+ and symmetric all extremities. Skin: Skin color, texture, turgor normal. No rashes or lesions  Neurologic: Awake, Alert, oriented.  No obvious gross sensory or motor deficits    Labs:  Recent Results (from the past 24 hour(s))   GLUCOSE, POC    Collection Time: 03/30/22  3:24 PM   Result Value Ref Range    Glucose (POC) 46 (LL) 65 - 117 mg/dL    Performed by Moh[facility] Wilkerson    GLUCOSE, POC    Collection Time: 03/30/22  3:26 PM   Result Value Ref Range    Glucose (POC) 57 (L) 65 - 117 mg/dL    Performed by Maggie Jacinto, POC    Collection Time: 03/30/22  3:50 PM   Result Value Ref Range    Glucose (POC) 73 65 - 117 mg/dL    Performed by Rudy Obrien    GLUCOSE, POC    Collection Time: 03/30/22  9:10 PM   Result Value Ref Range    Glucose (POC) 86 65 - 117 mg/dL    Performed by Zeke Lockhart [facility]    PROCALCITONIN    Collection Time: 03/30/22 10:25 PM   Result Value Ref Range    Procalcitonin 1.76 (H) 0 ng/mL   METABOLIC PANEL, COMPREHENSIVE    Collection Time: 03/31/22  5:20 AM   Result Value Ref Range    Sodium 139 136 - 145 mmol/L    Potassium 4.1 3.5 - 5.1 mmol/L    Chloride 104 97 - 108 mmol/L    CO2 25 21 - 32 mmol/L    Anion gap 10 5 - 15 mmol/L    Glucose 73 65 - 100 mg/dL    BUN 41 (H) 6 - 20 mg/dL    Creatinine 1.83 (H) 0.70 - 1.30 mg/dL    BUN/Creatinine ratio 22 (H) 12 - 20      GFR est AA 43 (L) >60 ml/min/1.73m2    GFR est non-AA 35 (L) >60 ml/min/1.73m2    Calcium 8.2 (L) 8.5 - 10.1 mg/dL    Bilirubin, total 0.4 0.2 - 1.0 mg/dL    AST (SGOT) 105 (H) 15 - 37 U/L    ALT (SGPT) 38 12 - 78 U/L    Alk.  phosphatase 95 45 - 117 U/L    Protein, total 7.2 6.4 - 8.2 g/dL    Albumin 1.8 (L) 3.5 - 5.0 g/dL    Globulin 5.4 (H) 2.0 - 4.0 g/dL    A-G Ratio 0.3 (L) 1.1 - 2.2     MAGNESIUM    Collection Time: 03/31/22  5:20 AM   Result Value Ref Range    Magnesium 2.5 (H) 1.6 - 2.4 mg/dL   CBC WITH AUTOMATED DIFF    Collection Time: 03/31/22  5:20 AM   Result Value Ref Range    WBC 12.1 (H) 4.4 - 11.3 K/uL    RBC 2.80 (L) 4.50 - 5.90 M/uL    HGB 7.4 (L) 13.5 - 17.5 g/dL    HCT 24.3 (L) 41 - 53 %    MCV 86.6 80 - 100 FL    MCH 26.4 (L) 31 - 34 PG    MCHC 30.5 (L) 31.0 - 36.0 g/dL    RDW 16.5 (H) 11.5 - 14.5 %    PLATELET 458 (H) 150 - 400 K/uL    MPV 8.1 6.5 - 11.5 FL    NRBC 0.1  WBC    ABSOLUTE NRBC 0.01 K/uL    NEUTROPHILS 84 (H) 42 - 75 %    LYMPHOCYTES 9 (L) 20.5 - 51.1 %    MONOCYTES 7 1.7 - 9.3 %    EOSINOPHILS 0 (L) 0.9 - 2.9 %    BASOPHILS 0 0.0 - 2.5 %    ABS. NEUTROPHILS 10.1 (H) 1.8 - 7.7 K/UL    ABS. LYMPHOCYTES 1.1 1.0 - 4.8 K/UL    ABS. MONOCYTES 0.8 0.2 - 2.4 K/UL    ABS. EOSINOPHILS 0.1 0.0 - 0.7 K/UL    ABS. BASOPHILS 0.1 0.0 - 0.2 K/UL   GLUCOSE, POC    Collection Time: 03/31/22  7:14 AM   Result Value Ref Range    Glucose (POC) 97 65 - 117 mg/dL    Performed by [facility]    GLUCOSE, POC    Collection Time: 03/31/22 11:07 AM   Result Value Ref Range    Glucose (POC) 160 (H) 65 - 117 mg/dL    Performed by [employer]          Imaging:  CT LOW EXT LT W CONT    Result Date: 3/30/2022  Findings/impression: Fluid and multiple small gas bubbles noted in the distal stump and in and around the distal tibia. If the surgery was recent, these findings may be benign but otherwise are concerning for infection. No bone erosion seen, but this does not exclude osteomyelitis. No fracture, alignment abnormality or concerning lytic or blastic lesion is evident. Mild knee joint osteoarthritis. Atherosclerosis. * Discharge Condition: fair  * Disposition: [facility] for higher level of care with patient vascular surgeon       Discharge Medications:  Current Discharge Medication List            * Follow-up Care/Patient Instructions: Activity: Bedrest  Diet: Cardiac Diet and Diabetic Diet      Spent 50 minutes evaluting and coordinating patient care and transfer to [facility]  of which >50% was spent coordinating and counseling.      Signed:  Bahman Kauffman MD  3/31/2022  1:14 PM

## 2022-03-31 NOTE — PROGRESS NOTES
Problem: Falls - Risk of  Goal: *Absence of Falls  Description: Document Shannon Mcburney Fall Risk and appropriate interventions in the flowsheet. Outcome: Progressing Towards Goal  Note: Fall Risk Interventions:  Mobility Interventions: Communicate number of staff needed for ambulation/transfer              Elimination Interventions: Patient to call for help with toileting needs              Problem: Patient Education: Go to Patient Education Activity  Goal: Patient/Family Education  Outcome: Progressing Towards Goal     Problem: Pressure Injury - Risk of  Goal: *Prevention of pressure injury  Description: Document Al Scale and appropriate interventions in the flowsheet.   Outcome: Progressing Towards Goal  Note: Pressure Injury Interventions:       Moisture Interventions: Absorbent underpads    Activity Interventions: PT/OT evaluation    Mobility Interventions: PT/OT evaluation    Nutrition Interventions: Document food/fluid/supplement intake                     Problem: Patient Education: Go to Patient Education Activity  Goal: Patient/Family Education  Outcome: Progressing Towards Goal

## 2022-03-31 NOTE — ROUTINE PROCESS
Writer talked to patients daughter for an update. Daughter is not on Emergency Contact. Patient gave me permission to talk to daughter about status.

## 2022-04-01 ENCOUNTER — ANESTHESIA (OUTPATIENT)
Dept: SURGERY | Age: 87
DRG: 475 | End: 2022-04-01
Payer: MEDICARE

## 2022-04-01 ENCOUNTER — ANESTHESIA EVENT (OUTPATIENT)
Dept: SURGERY | Age: 87
DRG: 475 | End: 2022-04-01
Payer: MEDICARE

## 2022-04-01 PROBLEM — A41.9 SEPSIS (HCC): Status: ACTIVE | Noted: 2022-04-01

## 2022-04-01 LAB
ANION GAP SERPL CALC-SCNC: 6 MMOL/L (ref 5–15)
BUN SERPL-MCNC: 40 MG/DL (ref 6–20)
BUN/CREAT SERPL: 23 (ref 12–20)
CALCIUM SERPL-MCNC: 8.4 MG/DL (ref 8.5–10.1)
CHLORIDE SERPL-SCNC: 110 MMOL/L (ref 97–108)
CO2 SERPL-SCNC: 23 MMOL/L (ref 21–32)
CREAT SERPL-MCNC: 1.71 MG/DL (ref 0.7–1.3)
EST. AVERAGE GLUCOSE BLD GHB EST-MCNC: 123 MG/DL
GLUCOSE BLD STRIP.AUTO-MCNC: 127 MG/DL (ref 65–117)
GLUCOSE BLD STRIP.AUTO-MCNC: 130 MG/DL (ref 65–117)
GLUCOSE BLD STRIP.AUTO-MCNC: 188 MG/DL (ref 65–117)
GLUCOSE SERPL-MCNC: 153 MG/DL (ref 65–100)
HBA1C MFR BLD: 5.9 % (ref 4–5.6)
IRON SATN MFR SERPL: 8 % (ref 20–50)
IRON SERPL-MCNC: 13 UG/DL (ref 35–150)
POTASSIUM SERPL-SCNC: 4.5 MMOL/L (ref 3.5–5.1)
SERVICE CMNT-IMP: ABNORMAL
SODIUM SERPL-SCNC: 139 MMOL/L (ref 136–145)
TIBC SERPL-MCNC: 169 UG/DL (ref 250–450)

## 2022-04-01 PROCEDURE — 74011250636 HC RX REV CODE- 250/636: Performed by: STUDENT IN AN ORGANIZED HEALTH CARE EDUCATION/TRAINING PROGRAM

## 2022-04-01 PROCEDURE — 74011250637 HC RX REV CODE- 250/637: Performed by: STUDENT IN AN ORGANIZED HEALTH CARE EDUCATION/TRAINING PROGRAM

## 2022-04-01 PROCEDURE — 76060000033 HC ANESTHESIA 1 TO 1.5 HR

## 2022-04-01 PROCEDURE — 88307 TISSUE EXAM BY PATHOLOGIST: CPT

## 2022-04-01 PROCEDURE — 77030008462 HC STPLR SKN PROX J&J -A

## 2022-04-01 PROCEDURE — 74011250636 HC RX REV CODE- 250/636: Performed by: NURSE ANESTHETIST, CERTIFIED REGISTERED

## 2022-04-01 PROCEDURE — 74011000250 HC RX REV CODE- 250: Performed by: STUDENT IN AN ORGANIZED HEALTH CARE EDUCATION/TRAINING PROGRAM

## 2022-04-01 PROCEDURE — 76210000016 HC OR PH I REC 1 TO 1.5 HR

## 2022-04-01 PROCEDURE — 77030013079 HC BLNKT BAIR HGGR 3M -A: Performed by: ANESTHESIOLOGY

## 2022-04-01 PROCEDURE — 36415 COLL VENOUS BLD VENIPUNCTURE: CPT

## 2022-04-01 PROCEDURE — 76010000149 HC OR TIME 1 TO 1.5 HR

## 2022-04-01 PROCEDURE — 74011250636 HC RX REV CODE- 250/636: Performed by: ANESTHESIOLOGY

## 2022-04-01 PROCEDURE — 77030026438 HC STYL ET INTUB CARD -A: Performed by: ANESTHESIOLOGY

## 2022-04-01 PROCEDURE — 74011000250 HC RX REV CODE- 250: Performed by: NURSE ANESTHETIST, CERTIFIED REGISTERED

## 2022-04-01 PROCEDURE — 0Y6D0Z3 DETACHMENT AT LEFT UPPER LEG, LOW, OPEN APPROACH: ICD-10-PCS

## 2022-04-01 PROCEDURE — 74011250636 HC RX REV CODE- 250/636

## 2022-04-01 PROCEDURE — 2709999900 HC NON-CHARGEABLE SUPPLY

## 2022-04-01 PROCEDURE — 77030025655 HC BLD SAW GIGLI BIOM -B

## 2022-04-01 PROCEDURE — 74011000258 HC RX REV CODE- 258: Performed by: STUDENT IN AN ORGANIZED HEALTH CARE EDUCATION/TRAINING PROGRAM

## 2022-04-01 PROCEDURE — 80048 BASIC METABOLIC PNL TOTAL CA: CPT

## 2022-04-01 PROCEDURE — 77030008684 HC TU ET CUF COVD -B: Performed by: ANESTHESIOLOGY

## 2022-04-01 PROCEDURE — 88311 DECALCIFY TISSUE: CPT

## 2022-04-01 PROCEDURE — 82962 GLUCOSE BLOOD TEST: CPT

## 2022-04-01 PROCEDURE — 65660000000 HC RM CCU STEPDOWN

## 2022-04-01 PROCEDURE — 77030031139 HC SUT VCRL2 J&J -A

## 2022-04-01 RX ORDER — ACETAMINOPHEN 650 MG/1
650 SUPPOSITORY RECTAL
Status: DISCONTINUED | OUTPATIENT
Start: 2022-04-01 | End: 2022-04-06 | Stop reason: HOSPADM

## 2022-04-01 RX ORDER — ATORVASTATIN CALCIUM 40 MG/1
80 TABLET, FILM COATED ORAL
Status: DISCONTINUED | OUTPATIENT
Start: 2022-04-01 | End: 2022-04-06 | Stop reason: HOSPADM

## 2022-04-01 RX ORDER — LACTULOSE 10 G/15ML
10 SOLUTION ORAL; RECTAL
Status: DISCONTINUED | OUTPATIENT
Start: 2022-04-01 | End: 2022-04-06 | Stop reason: HOSPADM

## 2022-04-01 RX ORDER — LATANOPROST 50 UG/ML
1 SOLUTION/ DROPS OPHTHALMIC
Status: DISCONTINUED | OUTPATIENT
Start: 2022-04-01 | End: 2022-04-06 | Stop reason: HOSPADM

## 2022-04-01 RX ORDER — PROPOFOL 10 MG/ML
INJECTION, EMULSION INTRAVENOUS AS NEEDED
Status: DISCONTINUED | OUTPATIENT
Start: 2022-04-01 | End: 2022-04-01 | Stop reason: HOSPADM

## 2022-04-01 RX ORDER — FENTANYL CITRATE 50 UG/ML
50 INJECTION, SOLUTION INTRAMUSCULAR; INTRAVENOUS AS NEEDED
Status: DISCONTINUED | OUTPATIENT
Start: 2022-04-01 | End: 2022-04-01 | Stop reason: HOSPADM

## 2022-04-01 RX ORDER — DEXAMETHASONE SODIUM PHOSPHATE 4 MG/ML
INJECTION, SOLUTION INTRA-ARTICULAR; INTRALESIONAL; INTRAMUSCULAR; INTRAVENOUS; SOFT TISSUE AS NEEDED
Status: DISCONTINUED | OUTPATIENT
Start: 2022-04-01 | End: 2022-04-01 | Stop reason: HOSPADM

## 2022-04-01 RX ORDER — ONDANSETRON 2 MG/ML
4 INJECTION INTRAMUSCULAR; INTRAVENOUS AS NEEDED
Status: DISCONTINUED | OUTPATIENT
Start: 2022-04-01 | End: 2022-04-01 | Stop reason: HOSPADM

## 2022-04-01 RX ORDER — SODIUM CHLORIDE, SODIUM LACTATE, POTASSIUM CHLORIDE, CALCIUM CHLORIDE 600; 310; 30; 20 MG/100ML; MG/100ML; MG/100ML; MG/100ML
75 INJECTION, SOLUTION INTRAVENOUS CONTINUOUS
Status: DISCONTINUED | OUTPATIENT
Start: 2022-04-01 | End: 2022-04-01 | Stop reason: HOSPADM

## 2022-04-01 RX ORDER — GLYCOPYRROLATE 0.2 MG/ML
INJECTION INTRAMUSCULAR; INTRAVENOUS AS NEEDED
Status: DISCONTINUED | OUTPATIENT
Start: 2022-04-01 | End: 2022-04-01 | Stop reason: HOSPADM

## 2022-04-01 RX ORDER — ONDANSETRON 2 MG/ML
INJECTION INTRAMUSCULAR; INTRAVENOUS AS NEEDED
Status: DISCONTINUED | OUTPATIENT
Start: 2022-04-01 | End: 2022-04-01 | Stop reason: HOSPADM

## 2022-04-01 RX ORDER — METOPROLOL SUCCINATE 25 MG/1
25 TABLET, EXTENDED RELEASE ORAL DAILY
Status: DISCONTINUED | OUTPATIENT
Start: 2022-04-01 | End: 2022-04-06 | Stop reason: HOSPADM

## 2022-04-01 RX ORDER — MIDAZOLAM HYDROCHLORIDE 1 MG/ML
0.5 INJECTION, SOLUTION INTRAMUSCULAR; INTRAVENOUS
Status: DISCONTINUED | OUTPATIENT
Start: 2022-04-01 | End: 2022-04-01 | Stop reason: HOSPADM

## 2022-04-01 RX ORDER — MORPHINE SULFATE 2 MG/ML
1 INJECTION, SOLUTION INTRAMUSCULAR; INTRAVENOUS
Status: DISCONTINUED | OUTPATIENT
Start: 2022-04-01 | End: 2022-04-04

## 2022-04-01 RX ORDER — OXYCODONE AND ACETAMINOPHEN 5; 325 MG/1; MG/1
1 TABLET ORAL AS NEEDED
Status: DISCONTINUED | OUTPATIENT
Start: 2022-04-01 | End: 2022-04-01 | Stop reason: HOSPADM

## 2022-04-01 RX ORDER — ACETAMINOPHEN 325 MG/1
650 TABLET ORAL
Status: DISCONTINUED | OUTPATIENT
Start: 2022-04-01 | End: 2022-04-06 | Stop reason: HOSPADM

## 2022-04-01 RX ORDER — AMOXICILLIN 250 MG
1 CAPSULE ORAL
Status: DISCONTINUED | OUTPATIENT
Start: 2022-04-01 | End: 2022-04-06 | Stop reason: HOSPADM

## 2022-04-01 RX ORDER — POLYETHYLENE GLYCOL 3350 17 G/17G
17 POWDER, FOR SOLUTION ORAL DAILY PRN
Status: DISCONTINUED | OUTPATIENT
Start: 2022-04-01 | End: 2022-04-06 | Stop reason: HOSPADM

## 2022-04-01 RX ORDER — HYDROCODONE BITARTRATE AND ACETAMINOPHEN 7.5; 325 MG/1; MG/1
1 TABLET ORAL
Status: DISCONTINUED | OUTPATIENT
Start: 2022-04-01 | End: 2022-04-04

## 2022-04-01 RX ORDER — SODIUM CHLORIDE 9 MG/ML
50 INJECTION, SOLUTION INTRAVENOUS CONTINUOUS
Status: DISCONTINUED | OUTPATIENT
Start: 2022-04-01 | End: 2022-04-01 | Stop reason: HOSPADM

## 2022-04-01 RX ORDER — LIDOCAINE HYDROCHLORIDE 20 MG/ML
INJECTION, SOLUTION EPIDURAL; INFILTRATION; INTRACAUDAL; PERINEURAL AS NEEDED
Status: DISCONTINUED | OUTPATIENT
Start: 2022-04-01 | End: 2022-04-01 | Stop reason: HOSPADM

## 2022-04-01 RX ORDER — TIMOLOL MALEATE 5 MG/ML
1 SOLUTION/ DROPS OPHTHALMIC 2 TIMES DAILY
Status: DISCONTINUED | OUTPATIENT
Start: 2022-04-01 | End: 2022-04-06 | Stop reason: HOSPADM

## 2022-04-01 RX ORDER — MIDAZOLAM HYDROCHLORIDE 1 MG/ML
1 INJECTION, SOLUTION INTRAMUSCULAR; INTRAVENOUS AS NEEDED
Status: DISCONTINUED | OUTPATIENT
Start: 2022-04-01 | End: 2022-04-01 | Stop reason: HOSPADM

## 2022-04-01 RX ORDER — ROCURONIUM BROMIDE 10 MG/ML
INJECTION, SOLUTION INTRAVENOUS AS NEEDED
Status: DISCONTINUED | OUTPATIENT
Start: 2022-04-01 | End: 2022-04-01 | Stop reason: HOSPADM

## 2022-04-01 RX ORDER — HYDROMORPHONE HYDROCHLORIDE 1 MG/ML
0.2 INJECTION, SOLUTION INTRAMUSCULAR; INTRAVENOUS; SUBCUTANEOUS
Status: DISCONTINUED | OUTPATIENT
Start: 2022-04-01 | End: 2022-04-01 | Stop reason: HOSPADM

## 2022-04-01 RX ORDER — SODIUM CHLORIDE 0.9 % (FLUSH) 0.9 %
5-40 SYRINGE (ML) INJECTION EVERY 8 HOURS
Status: DISCONTINUED | OUTPATIENT
Start: 2022-04-01 | End: 2022-04-01 | Stop reason: HOSPADM

## 2022-04-01 RX ORDER — MORPHINE SULFATE 2 MG/ML
2 INJECTION, SOLUTION INTRAMUSCULAR; INTRAVENOUS
Status: DISCONTINUED | OUTPATIENT
Start: 2022-04-01 | End: 2022-04-01 | Stop reason: HOSPADM

## 2022-04-01 RX ORDER — SODIUM CHLORIDE AND POTASSIUM CHLORIDE .9; .15 G/100ML; G/100ML
SOLUTION INTRAVENOUS CONTINUOUS
Status: DISCONTINUED | OUTPATIENT
Start: 2022-04-01 | End: 2022-04-02

## 2022-04-01 RX ORDER — LANOLIN ALCOHOL/MO/W.PET/CERES
400 CREAM (GRAM) TOPICAL DAILY
Status: DISCONTINUED | OUTPATIENT
Start: 2022-04-01 | End: 2022-04-06 | Stop reason: HOSPADM

## 2022-04-01 RX ORDER — FENTANYL CITRATE 50 UG/ML
25 INJECTION, SOLUTION INTRAMUSCULAR; INTRAVENOUS
Status: DISCONTINUED | OUTPATIENT
Start: 2022-04-01 | End: 2022-04-01 | Stop reason: HOSPADM

## 2022-04-01 RX ORDER — FAMOTIDINE 20 MG/1
20 TABLET, FILM COATED ORAL EVERY 24 HOURS
Status: DISCONTINUED | OUTPATIENT
Start: 2022-04-01 | End: 2022-04-05 | Stop reason: ALTCHOICE

## 2022-04-01 RX ORDER — LANOLIN ALCOHOL/MO/W.PET/CERES
325 CREAM (GRAM) TOPICAL
Status: DISCONTINUED | OUTPATIENT
Start: 2022-04-01 | End: 2022-04-06 | Stop reason: HOSPADM

## 2022-04-01 RX ORDER — NEOSTIGMINE METHYLSULFATE 1 MG/ML
INJECTION, SOLUTION INTRAVENOUS AS NEEDED
Status: DISCONTINUED | OUTPATIENT
Start: 2022-04-01 | End: 2022-04-01 | Stop reason: HOSPADM

## 2022-04-01 RX ORDER — ONDANSETRON 2 MG/ML
4 INJECTION INTRAMUSCULAR; INTRAVENOUS
Status: DISCONTINUED | OUTPATIENT
Start: 2022-04-01 | End: 2022-04-06 | Stop reason: HOSPADM

## 2022-04-01 RX ORDER — SODIUM CHLORIDE 0.9 % (FLUSH) 0.9 %
5-40 SYRINGE (ML) INJECTION AS NEEDED
Status: DISCONTINUED | OUTPATIENT
Start: 2022-04-01 | End: 2022-04-01 | Stop reason: HOSPADM

## 2022-04-01 RX ORDER — MAGNESIUM SULFATE 100 %
4 CRYSTALS MISCELLANEOUS AS NEEDED
Status: DISCONTINUED | OUTPATIENT
Start: 2022-04-01 | End: 2022-04-06 | Stop reason: HOSPADM

## 2022-04-01 RX ORDER — INSULIN LISPRO 100 [IU]/ML
INJECTION, SOLUTION INTRAVENOUS; SUBCUTANEOUS
Status: DISCONTINUED | OUTPATIENT
Start: 2022-04-01 | End: 2022-04-06 | Stop reason: HOSPADM

## 2022-04-01 RX ORDER — DORZOLAMIDE HCL 20 MG/ML
1 SOLUTION/ DROPS OPHTHALMIC 2 TIMES DAILY
Status: DISCONTINUED | OUTPATIENT
Start: 2022-04-01 | End: 2022-04-06 | Stop reason: HOSPADM

## 2022-04-01 RX ORDER — SODIUM CHLORIDE, SODIUM LACTATE, POTASSIUM CHLORIDE, CALCIUM CHLORIDE 600; 310; 30; 20 MG/100ML; MG/100ML; MG/100ML; MG/100ML
INJECTION, SOLUTION INTRAVENOUS
Status: DISCONTINUED | OUTPATIENT
Start: 2022-04-01 | End: 2022-04-01 | Stop reason: HOSPADM

## 2022-04-01 RX ORDER — LIDOCAINE HYDROCHLORIDE 10 MG/ML
0.1 INJECTION, SOLUTION EPIDURAL; INFILTRATION; INTRACAUDAL; PERINEURAL AS NEEDED
Status: DISCONTINUED | OUTPATIENT
Start: 2022-04-01 | End: 2022-04-01 | Stop reason: HOSPADM

## 2022-04-01 RX ORDER — DIPHENHYDRAMINE HYDROCHLORIDE 50 MG/ML
12.5 INJECTION, SOLUTION INTRAMUSCULAR; INTRAVENOUS AS NEEDED
Status: DISCONTINUED | OUTPATIENT
Start: 2022-04-01 | End: 2022-04-01 | Stop reason: HOSPADM

## 2022-04-01 RX ORDER — ASPIRIN 81 MG/1
81 TABLET ORAL
Status: DISCONTINUED | OUTPATIENT
Start: 2022-04-01 | End: 2022-04-06 | Stop reason: HOSPADM

## 2022-04-01 RX ORDER — SUCCINYLCHOLINE CHLORIDE 20 MG/ML
INJECTION INTRAMUSCULAR; INTRAVENOUS AS NEEDED
Status: DISCONTINUED | OUTPATIENT
Start: 2022-04-01 | End: 2022-04-01 | Stop reason: HOSPADM

## 2022-04-01 RX ORDER — FENTANYL CITRATE 50 UG/ML
INJECTION, SOLUTION INTRAMUSCULAR; INTRAVENOUS AS NEEDED
Status: DISCONTINUED | OUTPATIENT
Start: 2022-04-01 | End: 2022-04-01 | Stop reason: HOSPADM

## 2022-04-01 RX ORDER — SODIUM CHLORIDE 0.9 % (FLUSH) 0.9 %
5-40 SYRINGE (ML) INJECTION AS NEEDED
Status: DISCONTINUED | OUTPATIENT
Start: 2022-04-01 | End: 2022-04-06 | Stop reason: HOSPADM

## 2022-04-01 RX ORDER — VANCOMYCIN/0.9 % SOD CHLORIDE 1 G/100 ML
1000 PLASTIC BAG, INJECTION (ML) INTRAVENOUS EVERY 24 HOURS
Status: DISCONTINUED | OUTPATIENT
Start: 2022-04-01 | End: 2022-04-04 | Stop reason: ALTCHOICE

## 2022-04-01 RX ORDER — PANTOPRAZOLE SODIUM 40 MG/1
40 TABLET, DELAYED RELEASE ORAL EVERY MORNING
Status: DISCONTINUED | OUTPATIENT
Start: 2022-04-01 | End: 2022-04-06 | Stop reason: HOSPADM

## 2022-04-01 RX ORDER — SODIUM CHLORIDE 0.9 % (FLUSH) 0.9 %
5-40 SYRINGE (ML) INJECTION EVERY 8 HOURS
Status: DISCONTINUED | OUTPATIENT
Start: 2022-04-01 | End: 2022-04-06 | Stop reason: HOSPADM

## 2022-04-01 RX ADMIN — DORZOLAMIDE HYDROCHLORIDE 1 DROP: 20 SOLUTION/ DROPS OPHTHALMIC at 18:58

## 2022-04-01 RX ADMIN — SODIUM CHLORIDE, PRESERVATIVE FREE 10 ML: 5 INJECTION INTRAVENOUS at 22:46

## 2022-04-01 RX ADMIN — FENTANYL CITRATE 50 MCG: 50 INJECTION, SOLUTION INTRAMUSCULAR; INTRAVENOUS at 16:30

## 2022-04-01 RX ADMIN — ROCURONIUM BROMIDE 5 MG: 10 SOLUTION INTRAVENOUS at 16:30

## 2022-04-01 RX ADMIN — ONDANSETRON HYDROCHLORIDE 4 MG: 2 INJECTION, SOLUTION INTRAMUSCULAR; INTRAVENOUS at 17:01

## 2022-04-01 RX ADMIN — PROPOFOL 50 MG: 10 INJECTION, EMULSION INTRAVENOUS at 16:30

## 2022-04-01 RX ADMIN — PIPERACILLIN SODIUM AND TAZOBACTAM SODIUM 3.38 G: 3; 375 INJECTION, POWDER, FOR SOLUTION INTRAVENOUS at 22:49

## 2022-04-01 RX ADMIN — LATANOPROST 1 DROP: 50 SOLUTION OPHTHALMIC at 23:04

## 2022-04-01 RX ADMIN — LIDOCAINE HYDROCHLORIDE 100 MG: 20 INJECTION, SOLUTION EPIDURAL; INFILTRATION; INTRACAUDAL; PERINEURAL at 16:30

## 2022-04-01 RX ADMIN — Medication 3 MG: at 17:20

## 2022-04-01 RX ADMIN — FAMOTIDINE 20 MG: 20 TABLET ORAL at 08:47

## 2022-04-01 RX ADMIN — PANTOPRAZOLE SODIUM 40 MG: 40 TABLET, DELAYED RELEASE ORAL at 08:47

## 2022-04-01 RX ADMIN — PHENYLEPHRINE HYDROCHLORIDE 20 MCG/MIN: 10 INJECTION INTRAVENOUS at 16:30

## 2022-04-01 RX ADMIN — GLYCOPYRROLATE 0.4 MG: 0.2 INJECTION, SOLUTION INTRAMUSCULAR; INTRAVENOUS at 17:20

## 2022-04-01 RX ADMIN — SODIUM CHLORIDE, PRESERVATIVE FREE 10 ML: 5 INJECTION INTRAVENOUS at 18:51

## 2022-04-01 RX ADMIN — VANCOMYCIN HYDROCHLORIDE 1000 MG: 1 INJECTION, POWDER, LYOPHILIZED, FOR SOLUTION INTRAVENOUS at 22:37

## 2022-04-01 RX ADMIN — DEXAMETHASONE SODIUM PHOSPHATE 4 MG: 4 INJECTION, SOLUTION INTRAMUSCULAR; INTRAVENOUS at 17:01

## 2022-04-01 RX ADMIN — SUCCINYLCHOLINE CHLORIDE 120 MG: 20 INJECTION, SOLUTION INTRAMUSCULAR; INTRAVENOUS at 16:30

## 2022-04-01 RX ADMIN — PIPERACILLIN SODIUM AND TAZOBACTAM SODIUM 3.38 G: 3; 375 INJECTION, POWDER, FOR SOLUTION INTRAVENOUS at 12:32

## 2022-04-01 RX ADMIN — POTASSIUM CHLORIDE AND SODIUM CHLORIDE: 900; 150 INJECTION, SOLUTION INTRAVENOUS at 18:55

## 2022-04-01 RX ADMIN — LATANOPROST 1 DROP: 50 SOLUTION OPHTHALMIC at 04:40

## 2022-04-01 RX ADMIN — METOPROLOL SUCCINATE 25 MG: 25 TABLET, EXTENDED RELEASE ORAL at 08:46

## 2022-04-01 RX ADMIN — ROCURONIUM BROMIDE 25 MG: 10 SOLUTION INTRAVENOUS at 16:35

## 2022-04-01 RX ADMIN — PIPERACILLIN SODIUM AND TAZOBACTAM SODIUM 3.38 G: 3; 375 INJECTION, POWDER, FOR SOLUTION INTRAVENOUS at 04:32

## 2022-04-01 RX ADMIN — Medication 400 MG: at 08:47

## 2022-04-01 RX ADMIN — SODIUM CHLORIDE 50 ML/HR: 9 INJECTION, SOLUTION INTRAVENOUS at 14:57

## 2022-04-01 RX ADMIN — TIMOLOL MALEATE 1 DROP: 5 SOLUTION/ DROPS OPHTHALMIC at 18:58

## 2022-04-01 RX ADMIN — SODIUM CHLORIDE, PRESERVATIVE FREE 10 ML: 5 INJECTION INTRAVENOUS at 06:00

## 2022-04-01 RX ADMIN — FERROUS SULFATE TAB 325 MG (65 MG ELEMENTAL FE) 325 MG: 325 (65 FE) TAB at 08:46

## 2022-04-01 RX ADMIN — TIMOLOL MALEATE 1 DROP: 5 SOLUTION/ DROPS OPHTHALMIC at 10:45

## 2022-04-01 RX ADMIN — ATORVASTATIN CALCIUM 80 MG: 40 TABLET, FILM COATED ORAL at 04:34

## 2022-04-01 RX ADMIN — SODIUM CHLORIDE, POTASSIUM CHLORIDE, SODIUM LACTATE AND CALCIUM CHLORIDE: 600; 310; 30; 20 INJECTION, SOLUTION INTRAVENOUS at 16:14

## 2022-04-01 RX ADMIN — ACETAMINOPHEN 650 MG: 325 TABLET ORAL at 04:33

## 2022-04-01 RX ADMIN — POTASSIUM CHLORIDE AND SODIUM CHLORIDE: 900; 150 INJECTION, SOLUTION INTRAVENOUS at 04:30

## 2022-04-01 RX ADMIN — DORZOLAMIDE HYDROCHLORIDE 1 DROP: 20 SOLUTION/ DROPS OPHTHALMIC at 10:47

## 2022-04-01 NOTE — PROGRESS NOTES
Verbal bedside report given to Gloria Mead RN oncoming nurse by Josemanuel Cabrales. Yoanna Darnell RN off-going nurse. Report included current pt status and condition, recent results, hx of present illness, heart rate and rhythm, and respiratory status. 1845  Pt arrived from PACU, VSS in no distress, family at bedside. Will continue to monitor. 1330  Pt being taken to holding area for surgery. 0900  Pt alert and awake, in no distress. Aware of surgery later today.

## 2022-04-01 NOTE — PROGRESS NOTES
Bedside and Verbal shift change report given to SUJATA Melara (oncoming nurse) by Maria Isabel Street RN (offgoing nurse). Report included the following information SBAR, Kardex, ED Summary, Intake/Output, MAR, Recent Results and Cardiac Rhythm AV-paced.

## 2022-04-01 NOTE — BRIEF OP NOTE
Brief Postoperative Note    Patient: Arely Sim  YOB: 1935  MRN: [id number]    Date of Procedure: 4/1/2022     Pre-Op Diagnosis: non healing left below knee amputation stump    Post-Op Diagnosis: Same as preoperative diagnosis.       Procedure(s):  LEFT AMPUTATION KNEE(AKA)    Surgeon(s):  Ray Novoa MD    Surgical Assistant: Surg Asst-1: Epimenio Ring    Anesthesia: General     Estimated Blood Loss (mL): 292     Complications: None    Specimens:   ID Type Source Tests Collected by Time Destination   1 : left below the knee amputation stump Fresh Knee, left  Ray Novoa MD 4/1/2022 1655 Pathology        Implants: * No implants in log *    Drains: * No LDAs found *    Findings: none    Electronically Signed by Sylvain Woodward MD on 4/1/2022 at 5:23 PM

## 2022-04-01 NOTE — ROUTINE PROCESS
Attempted to call report once more, was put on hold for 10 minutes, hung up and called back, someone answered right away, asked to give report to Ivonne Rothman, the receiving nurse, this nurse was told that the nurse was in another patients room. This nurse reminded the fact that transport will be here momentarily and left a callback number.

## 2022-04-01 NOTE — PROGRESS NOTES
TRANSFER - IN REPORT:    Verbal report received from SUJATA Lopez(name) on Phoebe Side  being received from [facility] (unit) for routine progression of care      Report consisted of patients Situation, Background, Assessment and   Recommendations(SBAR). Information from the following report(s) SBAR, Kardex, ED Summary, Recent Results and Cardiac Rhythm AV-paced was reviewed with the receiving nurse. Opportunity for questions and clarification was provided. Assessment completed upon patients arrival to unit and care assumed. Admission and dual skin assessment with SUJATA Lucero. Patient was given incontinent care, changed brief and applied male pure wick. Patient is A & O x 2. Will put wound consult in for patient as he has two skin tears on scrotum.

## 2022-04-01 NOTE — ROUTINE PROCESS
[facility]'s call center called and stated that transport ETA will be in an hour. Attempted to call report and the receiving nurse requested a callback in 30 minutes to receive report.

## 2022-04-01 NOTE — PROGRESS NOTES
Vascular:    Patient with ischemic, infected left BKA. Needs conversion to AKA - plan for later today - discussed with his daughter who agrees.

## 2022-04-01 NOTE — ANESTHESIA PREPROCEDURE EVALUATION
Relevant Problems   CARDIOVASCULAR   (+) Hypertension   (+) Pacemaker      GASTROINTESTINAL   (+) GERD (gastroesophageal reflux disease)      RENAL FAILURE   (+) Stage 3 chronic kidney disease (HCC)      ENDOCRINE   (+) Type 2 diabetes mellitus with chronic kidney disease (HCC)   (+) Type 2 diabetes mellitus without complications (HCC)       Anesthetic History   No history of anesthetic complications            Review of Systems / Medical History  Patient summary reviewed, nursing notes reviewed and pertinent labs reviewed    Pulmonary  Within defined limits  COPD               Neuro/Psych   Within defined limits    CVA       Cardiovascular  Within defined limits  Hypertension  Valvular problems/murmurs: aortic stenosis      Dysrhythmias : atrial fibrillation  Pacemaker, CAD and PAD    Exercise tolerance: <4 METS  Comments: Mild to moderate AS on 2020 TTE  Normal EF    GI/Hepatic/Renal  Within defined limits   GERD    Renal disease: CRI       Endo/Other  Within defined limits  Diabetes: type 2    Arthritis, cancer and anemia     Other Findings              Physical Exam    Airway  Mallampati: II  TM Distance: > 6 cm  Neck ROM: normal range of motion   Mouth opening: Normal     Cardiovascular          Murmur: Grade 2, Mitral area     Dental    Dentition: Edentulous     Pulmonary  Breath sounds clear to auscultation               Abdominal  GI exam deferred       Other Findings            Anesthetic Plan    ASA: 4  Anesthesia type: general          Induction: Intravenous  Anesthetic plan and risks discussed with: Patient and Son / Daughter      2nd IV  Keep MAP > 65   Phone consent by daughter, Aryan Olivares.

## 2022-04-01 NOTE — PERIOP NOTES
TRANSFER - OUT REPORT:    Verbal report given to Saritha RN(name) on Radha Ivey  being transferred to Room 416(unit) for routine post - op       Report consisted of patients Situation, Background, Assessment and   Recommendations(SBAR). Time Pre op antibiotic given:N/A  Anesthesia Stop time: 3464  Dodson Present on Transfer to floor:no  Order for Dodson on Chart:no  Discharge Prescriptions with Chart:no    Information from the following report(s) SBAR, OR Summary, Procedure Summary, Intake/Output, MAR, Recent Results and Cardiac Rhythm AV paced was reviewed with the receiving nurse. Opportunity for questions and clarification was provided. Is the patient on 02? YES       L/Min 3         Is the patient on a monitor? NO    Is the nurse transporting with the patient? YES    Surgical Waiting Area notified of patient's transfer from PACU?  NO      The following personal items collected during your admission accompanied patient upon transfer:   Dental Appliance: Dental Appliances: None  Vision: Visual Aid: None  Hearing Aid:    Jewelry:    Clothing: Clothing:  (Upstairs in pt's room)  Other Valuables:    Valuables sent to safe:        Valuables in patients room

## 2022-04-01 NOTE — PROGRESS NOTES
Physician Progress Note      Lucian Conner  [facility] #:                  [id number]  :                       1935  ADMIT DATE:       3/31/2022 10:54 PM  [address] [place] DATE:  RESPONDING  PROVIDER #:        Jarek Elena MD        QUERY TEXT:    Stage of Chronic Kidney Disease: Please provide further specificity, if known. Clinical indicators include: ckd, chronic kidney disease, bun  Options provided:  -- Chronic kidney disease stage 1  -- Chronic kidney disease stage 2  -- Chronic kidney disease stage 3  -- Chronic kidney disease stage 3a  -- Chronic kidney disease stage 3b  -- Chronic kidney disease stage 4  -- Chronic kidney disease stage 5  -- Chronic kidney disease stage 5, requiring dialysis  -- End stage renal disease  -- Other - I will add my own diagnosis  -- Disagree - Not applicable / Not valid  -- Disagree - Clinically Unable to determine / Unknown        PROVIDER RESPONSE TEXT:    The patient has chronic kidney disease stage 3.       Electronically signed by:  Jarek Elena MD 2022 6:21 PM

## 2022-04-01 NOTE — ROUTINE PROCESS
Patient: Julián Fuentes MRN: [id number]  SSN: [id number]   YOB: 1935  Age: 82 y.o. Sex: male     Patient is status post Procedure(s):  LEFT AMPUTATION KNEE(AKA). Surgeon(s) and Role:     * Diane Hernandez MD - Primary    Local/Dose/Irrigation:                    Peripheral IV 03/31/22 Left;Posterior Wrist (Active)   Site Assessment Clean, dry, & intact 04/01/22 0851   Phlebitis Assessment 0 04/01/22 0851   Infiltration Assessment 0 04/01/22 0851   Dressing Status Clean, dry, & intact 04/01/22 0851   Dressing Type Transparent;Tape 04/01/22 0851   Hub Color/Line Status Pink; Infusing 04/01/22 6558   Action Taken Open ports on tubing capped 04/01/22 0851   Alcohol Cap Used Yes 04/01/22 0851            Airway - Endotracheal Tube 04/01/22 Oral (Active)                   Dressing/Packing:  Incision 01/05/21 Leg Left-Dressing/Treatment: ABD pad;Cast;Cast padding;Gauze dressing/dressing sponge;Roll gauze;Tape/Soft cloth adhesive tape (04/01/22 1200)  Incision 04/01/22 Leg Left-Dressing/Treatment:  (adaptic, 4x4's, abd, kerlix, ace wrap and foam tape) (04/01/22 1712)    Splint/Cast:  ]    Other:

## 2022-04-01 NOTE — ANESTHESIA POSTPROCEDURE EVALUATION
Post-Anesthesia Evaluation and Assessment    Patient: Yohan Hartman MRN: [id number]  SSN: [id number]    YOB: 1935  Age: 82 y.o. Sex: male      I have evaluated the patient and they are stable and ready for discharge from the PACU. Cardiovascular Function/Vital Signs  Vitals Value Taken Time   /58 04/01/22 1830   Temp 36.6 °C (97.9 °F) 04/01/22 1820   Pulse 53 04/01/22 1834   Resp 26 04/01/22 1834   SpO2 98 % 04/01/22 1834       Patient is status post General anesthesia for Procedure(s):  LEFT AMPUTATION KNEE(AKA). Nausea/Vomiting: None    Postoperative hydration reviewed and adequate. Pain:  Pain Scale 1: Visual (04/01/22 1849)  Pain Intensity 1: 0 (04/01/22 1849)   Managed    Neurological Status: At baseline    Mental Status, Level of Consciousness: Alert and  oriented to person, place, and time    Pulmonary Status:   O2 Device: Nasal cannula (04/01/22 1820)   Adequate oxygenation and airway patent    Complications related to anesthesia: None    Post-anesthesia assessment completed. No concerns.      Signed By: Aleisha Deng DO     April 1, 2022

## 2022-04-01 NOTE — PROGRESS NOTES
Problem: Falls - Risk of  Goal: *Absence of Falls  Description: Document Marcia Nava Fall Risk and appropriate interventions in the flowsheet. Outcome: Progressing Towards Goal  Note: Fall Risk Interventions:  Mobility Interventions: Assess mobility with egress test,Bed/chair exit alarm,Communicate number of staff needed for ambulation/transfer,Mechanical lift,OT consult for ADLs,Patient to call before getting OOB,PT Consult for mobility concerns,PT Consult for assist device competence              Elimination Interventions: Bed/chair exit alarm,Call light in reach,Elevated toilet seat,Patient to call for help with toileting needs,Stay With Me (per policy),Toilet paper/wipes in reach,Toileting schedule/hourly rounds              Problem: Patient Education: Go to Patient Education Activity  Goal: Patient/Family Education  Outcome: Progressing Towards Goal     Problem: Pressure Injury - Risk of  Goal: *Prevention of pressure injury  Description: Document Al Scale and appropriate interventions in the flowsheet.   Outcome: Progressing Towards Goal  Note: Pressure Injury Interventions:       Moisture Interventions: Absorbent underpads,Apply protective barrier, creams and emollients,Assess need for specialty bed,Internal/External urinary devices,Limit adult briefs,Maintain skin hydration (lotion/cream),Minimize layers    Activity Interventions: Increase time out of bed,Pressure redistribution bed/mattress(bed type),PT/OT evaluation    Mobility Interventions: Assess need for specialty bed,Float heels,HOB 30 degrees or less,PT/OT evaluation,Pressure redistribution bed/mattress (bed type)    Nutrition Interventions: Document food/fluid/supplement intake,Discuss nutritional consult with provider,Offer support with meals,snacks and hydration    Friction and Shear Interventions: Apply protective barrier, creams and emollients,Foam dressings/transparent film/skin sealants,HOB 30 degrees or less,Lift sheet,Minimize layers,Sit at 90-degree angle                Problem: Patient Education: Go to Patient Education Activity  Goal: Patient/Family Education  Outcome: Progressing Towards Goal     Problem: Pain - Acute  Goal: *Control of acute pain  Outcome: Progressing Towards Goal     Problem: Patient Education: Go to Patient Education Activity  Goal: Patient/Family Education  Outcome: Progressing Towards Goal     Problem: Pain - Acute  Goal: *Control of acute pain  Outcome: Progressing Towards Goal     Problem: Patient Education: Go to Patient Education Activity  Goal: Patient/Family Education  Outcome: Progressing Towards Goal     Problem: General Medical Care Plan  Goal: *Vital signs within specified parameters  Outcome: Progressing Towards Goal  Goal: *Labs within defined limits  Outcome: Progressing Towards Goal  Goal: *Absence of infection signs and symptoms  Outcome: Progressing Towards Goal  Goal: *Optimal pain control at patient's stated goal  Outcome: Progressing Towards Goal  Goal: *Skin integrity maintained  Outcome: Progressing Towards Goal  Goal: *Fluid volume balance  Outcome: Progressing Towards Goal  Goal: *Optimize nutritional status  Outcome: Progressing Towards Goal  Goal: *Anxiety reduced or absent  Outcome: Progressing Towards Goal  Goal: *Progressive mobility and function (eg: ADL's)  Outcome: Progressing Towards Goal     Problem: Patient Education: Go to Patient Education Activity  Goal: Patient/Family Education  Outcome: Progressing Towards Goal

## 2022-04-01 NOTE — H&P
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 Neris See. We will continue patient's admission orders from previous admission. Please see discharge summary and progress note from same day from the other facility.     Colitis, abscess of left BKA: Continue Zosyn and vancomycin, vascular surgery consult    CKD: IV fluids    Anemia of chronic disease: Monitor    Hypertension: Continue home medications

## 2022-04-01 NOTE — PROGRESS NOTES
[facility] Adult  Hospitalist Group                                                                                          Hospitalist Progress Note  Thaddeus Danielle MD  Answering service: [phone] from in house phone        Date of Service:  2022  NAME:  Fady Guajardo  :  1935  MRN:  [id number]      Admission Summary:   Patient received via transfer. Per chart: Who presents from [address] [place] [address] where patient has been since his most recent surgery of left BKA done by Dr. Claudean Riding on 3/21 secondary to continued poor peripheral circulation after failed attempts at revascularization and was discharged to skilled rehab at [address] [place] [address] and rehab on 3/25. . It was noted that surgical site has staples in place but foul smelling discharge with redness at suture site. Dressing changed in the ED and was very painful for the patient and didn't allow me to open it but on evaluation of left BKA with surgeon on 3/31 found to have foul smelling purulent drainage noted with extremely painful to touch. . He does give some hx but not the best historian. He was also noted on arrival to medical floor of having low sugars and was given juice and noted by his [address] [phone] that he has not been eating well and was cotinued on glipizide 5mg oral daily. Patient is a direct transfer from another [address] [place] [address]  Patient was transferred to this facility for infected BKA to be operated upon by Dr. Brenna Johnson. Interval history / Subjective:   Patient seen for follow-up of infected ischemic BKA stump. Patient seen and examined earlier today by me. At the time of my exam, patient is stable has no acute complaints.   He will be heading to surgery today     Assessment & Plan:     Abscess/infection of left BKA  Continue Zosyn and vancomycin  Vascular surgery aware  Surgery scheduled for today    CKD  Continue to monitor  IV fluids    Anemia of chronic disease  Monitor    Hypertension  Home meds    Colitis  Continue antibiotics    Code status: full  DVT prophylaxis: scd    Care Plan discussed with: Patient/Family and Nurse  Anticipated Disposition: SNF/LTC and [facility]/Rehab  Anticipated Discharge: Greater than 48 hours     Hospital Problems  Date Reviewed: 4/1/2022          Codes Class Noted POA    Sepsis ([facility] ICD-10-CM: A41.9  ICD-9-CM: 038.9, 995.91  4/1/2022 Unknown        Cellulitis ICD-10-CM: L03.90  ICD-9-CM: 682.9  3/30/2022 Yes        Type 2 diabetes mellitus with chronic kidney disease ([facility] ICD-10-CM: E11.22  ICD-9-CM: 250.40, 585.9  2/17/2022 Yes        Hypertension ICD-10-CM: I10  ICD-9-CM: 401.9  7/21/2020 Yes        Mixed hyperlipidemia ICD-10-CM: E78.2  ICD-9-CM: 272.2  7/21/2020 Yes        GERD (gastroesophageal reflux disease) ICD-10-CM: K21.9  ICD-9-CM: 530.81  7/21/2020 Yes        Hypomagnesemia ICD-10-CM: L20.39  ICD-9-CM: 275.2  7/21/2020 Yes        Benign prostatic hyperplasia ICD-10-CM: N40.0  ICD-9-CM: 600.00  4/26/2017 Yes                Review of Systems:   A comprehensive review of systems was negative except for that written in the HPI. Vital Signs:    Last 24hrs VS reviewed since prior progress note. Most recent are:  Visit Vitals  BP (!) 127/57 (BP 1 Location: Left upper arm, BP Patient Position: At rest)   Pulse (!) 53   Temp 98.7 °F (37.1 °C)   Resp 21   Ht 6' (1.829 m)   Wt 84.1 kg (185 lb 6.5 oz)   SpO2 100%   BMI 25.15 kg/m²         Intake/Output Summary (Last 24 hours) at 4/1/2022 1610  Last data filed at 4/1/2022 1200  Gross per 24 hour   Intake 436.25 ml   Output 0 ml   Net 436.25 ml        Physical Examination:     I had a face to face encounter with this patient and independently examined them on 4/1/2022 as outlined below:          Constitutional:  No acute distress, cooperative, pleasant    ENT:  Oral mucosa moist, oropharynx benign. Resp:  CTA bilaterally. No wheezing/rhonchi/rales.  No accessory muscle use   CV: Regular rhythm, normal rate, no murmurs, gallops, rubs    GI:  Soft, non distended, non tender. normoactive bowel sounds, no hepatosplenomegaly     Musculoskeletal:  No edema, warm, 2+ pulses throughout. Left BKA that is bandaged. Neurologic:  Moves all extremities. AAOx3, CN II-XII reviewed            Data Review:    Review and/or order of clinical lab test  Review and/or order of tests in the radiology section of CPT  Review and/or order of tests in the medicine section of CPT      Labs:     Recent Labs     03/31/22  0520 03/30/22  1032   WBC 12.1* 12.9*   HGB 7.4* 8.3*   HCT 24.3* 27.6*   * 501*     Recent Labs     03/31/22  0520 03/30/22  1032    135*   K 4.1 3.9    100   CO2 25 24   BUN 41* 45*   CREA 1.83* 1.83*   GLU 73 76   CA 8.2* 9.3   MG 2.5*  --      Recent Labs     03/31/22  0520 03/30/22  1032   ALT 38 29   AP 95 95   TBILI 0.4 0.5   TP 7.2 8.7*   ALB 1.8* 1.9*   GLOB 5.4* 6.8*     Recent Labs     03/30/22  1032   INR 1.3*   PTP 15.7*      Recent Labs     03/31/22 0520   TIBC 169*   PSAT 8*      No results found for: FOL, RBCF   No results for input(s): PH, PCO2, PO2 in the last 72 hours. No results for input(s): CPK, CKNDX, TROIQ in the last 72 hours.     No lab exists for component: CPKMB  Lab Results   Component Value Date/Time    Cholesterol, total 129 08/24/2021 10:31 AM    HDL Cholesterol 36 (L) 08/24/2021 10:31 AM    LDL, calculated 74 08/24/2021 10:31 AM    Triglyceride 102 08/24/2021 10:31 AM     Lab Results   Component Value Date/Time    Glucose (POC) 127 (H) 04/01/2022 12:05 PM    Glucose (POC) 70 03/31/2022 05:33 PM    Glucose (POC) 160 (H) 03/31/2022 11:07 AM    Glucose (POC) 97 03/31/2022 07:14 AM    Glucose (POC) 86 03/30/2022 09:10 PM     Lab Results   Component Value Date/Time    Color Yellow 08/24/2021 10:31 AM    Appearance Clear 08/24/2021 10:31 AM    pH (UA) 7.5 08/24/2021 10:31 AM    Ketone Negative 08/24/2021 10:31 AM    Bilirubin Negative 08/24/2021 10:31 AM    Nitrites Negative 08/24/2021 10:31 AM    Leukocyte Esterase Negative 08/24/2021 10:31 AM    Bacteria None seen 08/24/2021 10:31 AM    WBC 0-5 08/24/2021 10:31 AM    RBC None seen 08/24/2021 10:31 AM         Medications Reviewed:     Current Facility-Administered Medications   Medication Dose Route Frequency    sodium chloride (NS) flush 5-40 mL  5-40 mL IntraVENous Q8H    sodium chloride (NS) flush 5-40 mL  5-40 mL IntraVENous PRN    acetaminophen (TYLENOL) tablet 650 mg  650 mg Oral Q6H PRN    Or    acetaminophen (TYLENOL) suppository 650 mg  650 mg Rectal Q6H PRN    polyethylene glycol (MIRALAX) packet 17 g  17 g Oral DAILY PRN    ondansetron (ZOFRAN) injection 4 mg  4 mg IntraVENous Q6H PRN    famotidine (PEPCID) tablet 20 mg  20 mg Oral Q24H    piperacillin-tazobactam (ZOSYN) 3.375 g in 0.9% sodium chloride (MBP/ADV) 100 mL MBP  3.375 g IntraVENous Q8H    0.9% sodium chloride with KCl 20 mEq/L infusion   IntraVENous CONTINUOUS    glucose chewable tablet 16 g  4 Tablet Oral PRN    dextrose 10 % infusion 0-250 mL  0-250 mL IntraVENous PRN    glucagon (GLUCAGEN) injection 1 mg  1 mg IntraMUSCular PRN    insulin lispro (HUMALOG) injection   SubCUTAneous AC&HS    atorvastatin (LIPITOR) tablet 80 mg  80 mg Oral QHS    aspirin delayed-release tablet 81 mg  81 mg Oral 7am    dorzolamide (TRUSOPT) 2 % ophthalmic solution 1 Drop  1 Drop Both Eyes BID    ferrous sulfate tablet 325 mg  325 mg Oral DAILY WITH BREAKFAST    HYDROcodone-acetaminophen (NORCO) 7.5-325 mg per tablet 1 Tablet  1 Tablet Oral Q6H PRN    lactulose (CHRONULAC) 10 gram/15 mL solution 15 mL  10 g Oral BID PRN    latanoprost (XALATAN) 0.005 % ophthalmic solution 1 Drop  1 Drop Both Eyes QHS    magnesium oxide (MAG-OX) tablet 400 mg  400 mg Oral DAILY    metoprolol succinate (TOPROL-XL) XL tablet 25 mg  25 mg Oral DAILY    senna-docusate (PERICOLACE) 8.6-50 mg per tablet 1 Tablet  1 Tablet Oral 7am    pantoprazole (PROTONIX) tablet 40 mg  40 mg Oral QAM    timolol (TIMOPTIC) 0.5 % ophthalmic solution 1 Drop  1 Drop Both Eyes BID    morphine injection 1 mg  1 mg IntraVENous Q4H PRN    lactated Ringers infusion  75 mL/hr IntraVENous CONTINUOUS    0.9% sodium chloride infusion  50 mL/hr IntraVENous CONTINUOUS    sodium chloride (NS) flush 5-40 mL  5-40 mL IntraVENous Q8H    sodium chloride (NS) flush 5-40 mL  5-40 mL IntraVENous PRN    lidocaine (PF) (XYLOCAINE) 10 mg/mL (1 %) injection 0.1 mL  0.1 mL SubCUTAneous PRN    fentaNYL citrate (PF) injection 50 mcg  50 mcg IntraVENous PRN    midazolam (VERSED) injection 1 mg  1 mg IntraVENous PRN    midazolam (VERSED) injection 1 mg  1 mg IntraVENous PRN     ______________________________________________________________________  EXPECTED LENGTH OF STAY: 2d 12h  ACTUAL LENGTH OF STAY:          [facility] [address] Nuñez MD

## 2022-04-02 LAB
ANION GAP SERPL CALC-SCNC: 6 MMOL/L (ref 5–15)
BACTERIA SPEC CULT: NORMAL
BASOPHILS # BLD: 0 K/UL (ref 0–0.1)
BASOPHILS NFR BLD: 0 % (ref 0–1)
BUN SERPL-MCNC: 43 MG/DL (ref 6–20)
BUN/CREAT SERPL: 27 (ref 12–20)
CALCIUM SERPL-MCNC: 8.6 MG/DL (ref 8.5–10.1)
CHLORIDE SERPL-SCNC: 111 MMOL/L (ref 97–108)
CO2 SERPL-SCNC: 21 MMOL/L (ref 21–32)
CREAT SERPL-MCNC: 1.57 MG/DL (ref 0.7–1.3)
DIFFERENTIAL METHOD BLD: ABNORMAL
EOSINOPHIL # BLD: 0 K/UL (ref 0–0.4)
EOSINOPHIL NFR BLD: 0 % (ref 0–7)
ERYTHROCYTE [DISTWIDTH] IN BLOOD BY AUTOMATED COUNT: 15.7 % (ref 11.5–14.5)
GLUCOSE BLD STRIP.AUTO-MCNC: 158 MG/DL (ref 65–117)
GLUCOSE BLD STRIP.AUTO-MCNC: 164 MG/DL (ref 65–117)
GLUCOSE BLD STRIP.AUTO-MCNC: 189 MG/DL (ref 65–117)
GLUCOSE SERPL-MCNC: 191 MG/DL (ref 65–100)
GRAM STN SPEC: NORMAL
GRAM STN SPEC: NORMAL
HCT VFR BLD AUTO: 23.1 % (ref 36.6–50.3)
HCT VFR BLD AUTO: 25 % (ref 36.6–50.3)
HGB BLD-MCNC: 6.6 G/DL (ref 12.1–17)
HGB BLD-MCNC: 7.5 G/DL (ref 12.1–17)
HISTORY CHECKED?,CKHIST: NORMAL
IMM GRANULOCYTES # BLD AUTO: 0.1 K/UL (ref 0–0.04)
IMM GRANULOCYTES NFR BLD AUTO: 1 % (ref 0–0.5)
LYMPHOCYTES # BLD: 0.6 K/UL (ref 0.8–3.5)
LYMPHOCYTES NFR BLD: 6 % (ref 12–49)
MCH RBC QN AUTO: 26.5 PG (ref 26–34)
MCHC RBC AUTO-ENTMCNC: 28.6 G/DL (ref 30–36.5)
MCV RBC AUTO: 92.8 FL (ref 80–99)
MONOCYTES # BLD: 0.2 K/UL (ref 0–1)
MONOCYTES NFR BLD: 2 % (ref 5–13)
NEUTS SEG # BLD: 9.1 K/UL (ref 1.8–8)
NEUTS SEG NFR BLD: 91 % (ref 32–75)
NRBC # BLD: 0 K/UL (ref 0–0.01)
NRBC BLD-RTO: 0 PER 100 WBC
PLATELET # BLD AUTO: 532 K/UL (ref 150–400)
PMV BLD AUTO: 10 FL (ref 8.9–12.9)
POTASSIUM SERPL-SCNC: 5 MMOL/L (ref 3.5–5.1)
RBC # BLD AUTO: 2.49 M/UL (ref 4.1–5.7)
RBC MORPH BLD: ABNORMAL
SERVICE CMNT-IMP: ABNORMAL
SODIUM SERPL-SCNC: 138 MMOL/L (ref 136–145)
SPECIAL REQUESTS,SREQ: NORMAL
VANCOMYCIN SERPL-MCNC: 16.2 UG/ML
WBC # BLD AUTO: 10 K/UL (ref 4.1–11.1)

## 2022-04-02 PROCEDURE — 36415 COLL VENOUS BLD VENIPUNCTURE: CPT

## 2022-04-02 PROCEDURE — 82962 GLUCOSE BLOOD TEST: CPT

## 2022-04-02 PROCEDURE — 85018 HEMOGLOBIN: CPT

## 2022-04-02 PROCEDURE — 74011000258 HC RX REV CODE- 258: Performed by: STUDENT IN AN ORGANIZED HEALTH CARE EDUCATION/TRAINING PROGRAM

## 2022-04-02 PROCEDURE — 74011636637 HC RX REV CODE- 636/637: Performed by: STUDENT IN AN ORGANIZED HEALTH CARE EDUCATION/TRAINING PROGRAM

## 2022-04-02 PROCEDURE — 86923 COMPATIBILITY TEST ELECTRIC: CPT

## 2022-04-02 PROCEDURE — 65660000000 HC RM CCU STEPDOWN

## 2022-04-02 PROCEDURE — 80048 BASIC METABOLIC PNL TOTAL CA: CPT

## 2022-04-02 PROCEDURE — 80202 ASSAY OF VANCOMYCIN: CPT

## 2022-04-02 PROCEDURE — 74011250637 HC RX REV CODE- 250/637: Performed by: STUDENT IN AN ORGANIZED HEALTH CARE EDUCATION/TRAINING PROGRAM

## 2022-04-02 PROCEDURE — 74011250636 HC RX REV CODE- 250/636: Performed by: NURSE PRACTITIONER

## 2022-04-02 PROCEDURE — P9016 RBC LEUKOCYTES REDUCED: HCPCS

## 2022-04-02 PROCEDURE — 36430 TRANSFUSION BLD/BLD COMPNT: CPT

## 2022-04-02 PROCEDURE — 74011250636 HC RX REV CODE- 250/636: Performed by: STUDENT IN AN ORGANIZED HEALTH CARE EDUCATION/TRAINING PROGRAM

## 2022-04-02 PROCEDURE — 30233N1 TRANSFUSION OF NONAUTOLOGOUS RED BLOOD CELLS INTO PERIPHERAL VEIN, PERCUTANEOUS APPROACH: ICD-10-PCS | Performed by: FAMILY MEDICINE

## 2022-04-02 PROCEDURE — 74011000250 HC RX REV CODE- 250: Performed by: STUDENT IN AN ORGANIZED HEALTH CARE EDUCATION/TRAINING PROGRAM

## 2022-04-02 PROCEDURE — 86900 BLOOD TYPING SEROLOGIC ABO: CPT

## 2022-04-02 PROCEDURE — 85025 COMPLETE CBC W/AUTO DIFF WBC: CPT

## 2022-04-02 RX ORDER — SODIUM CHLORIDE 9 MG/ML
250 INJECTION, SOLUTION INTRAVENOUS AS NEEDED
Status: DISCONTINUED | OUTPATIENT
Start: 2022-04-02 | End: 2022-04-06 | Stop reason: HOSPADM

## 2022-04-02 RX ORDER — SODIUM CHLORIDE 9 MG/ML
75 INJECTION, SOLUTION INTRAVENOUS CONTINUOUS
Status: DISCONTINUED | OUTPATIENT
Start: 2022-04-02 | End: 2022-04-06 | Stop reason: HOSPADM

## 2022-04-02 RX ADMIN — PIPERACILLIN SODIUM AND TAZOBACTAM SODIUM 3.38 G: 3; 375 INJECTION, POWDER, FOR SOLUTION INTRAVENOUS at 07:12

## 2022-04-02 RX ADMIN — TIMOLOL MALEATE 1 DROP: 5 SOLUTION/ DROPS OPHTHALMIC at 19:16

## 2022-04-02 RX ADMIN — LATANOPROST 1 DROP: 50 SOLUTION OPHTHALMIC at 22:00

## 2022-04-02 RX ADMIN — FAMOTIDINE 20 MG: 20 TABLET ORAL at 10:22

## 2022-04-02 RX ADMIN — HYDROCODONE BITARTRATE AND ACETAMINOPHEN 1 TABLET: 7.5; 325 TABLET ORAL at 10:27

## 2022-04-02 RX ADMIN — PIPERACILLIN SODIUM AND TAZOBACTAM SODIUM 3.38 G: 3; 375 INJECTION, POWDER, FOR SOLUTION INTRAVENOUS at 19:15

## 2022-04-02 RX ADMIN — SODIUM CHLORIDE 75 ML/HR: 9 INJECTION, SOLUTION INTRAVENOUS at 20:59

## 2022-04-02 RX ADMIN — Medication 2 UNITS: at 10:22

## 2022-04-02 RX ADMIN — SODIUM CHLORIDE, PRESERVATIVE FREE 10 ML: 5 INJECTION INTRAVENOUS at 07:08

## 2022-04-02 RX ADMIN — SENNOSIDES AND DOCUSATE SODIUM 1 TABLET: 50; 8.6 TABLET ORAL at 07:08

## 2022-04-02 RX ADMIN — PANTOPRAZOLE SODIUM 40 MG: 40 TABLET, DELAYED RELEASE ORAL at 10:27

## 2022-04-02 RX ADMIN — DORZOLAMIDE HYDROCHLORIDE 1 DROP: 20 SOLUTION/ DROPS OPHTHALMIC at 10:23

## 2022-04-02 RX ADMIN — ATORVASTATIN CALCIUM 80 MG: 40 TABLET, FILM COATED ORAL at 01:46

## 2022-04-02 RX ADMIN — PIPERACILLIN SODIUM AND TAZOBACTAM SODIUM 3.38 G: 3; 375 INJECTION, POWDER, FOR SOLUTION INTRAVENOUS at 10:20

## 2022-04-02 RX ADMIN — TIMOLOL MALEATE 1 DROP: 5 SOLUTION/ DROPS OPHTHALMIC at 10:23

## 2022-04-02 RX ADMIN — DORZOLAMIDE HYDROCHLORIDE 1 DROP: 20 SOLUTION/ DROPS OPHTHALMIC at 19:16

## 2022-04-02 RX ADMIN — Medication 2 UNITS: at 13:00

## 2022-04-02 RX ADMIN — Medication 400 MG: at 10:22

## 2022-04-02 RX ADMIN — FERROUS SULFATE TAB 325 MG (65 MG ELEMENTAL FE) 325 MG: 325 (65 FE) TAB at 10:27

## 2022-04-02 RX ADMIN — SODIUM CHLORIDE, PRESERVATIVE FREE 10 ML: 5 INJECTION INTRAVENOUS at 14:19

## 2022-04-02 RX ADMIN — ASPIRIN 81 MG: 81 TABLET, COATED ORAL at 07:08

## 2022-04-02 NOTE — PROGRESS NOTES
Day #2 of Vancomycin - Level Update  Indication:  Infected ischemic BKA stump  - S/P left AKA on   Current regimen:  1000 mg IV Q 24 hr  Abx regimen:  Zosyn + Vancomycin  ID Following ?: NO  Concomitant nephrotoxic drugs (requires more frequent monitoring): None  Frequency of BMP?: Daily through     Recent Labs     22  0505 22  1918 22  0520 22  1032 22  1032   WBC 10.0  --  12.1*  --  12.9*   CREA 1.57* 1.71* 1.83*   < > 1.83*   BUN 43* 40* 41*   < > 45*    < > = values in this interval not displayed. Est CrCl: ~35-40 ml/min; UO: --- ml/kg/hr  Temp (24hrs), Av.5 °F (36.9 °C), Min:97.9 °F (36.6 °C), Max:98.9 °F (37.2 °C)    Cultures:   3/30 Blood: NGTD  3/31 Wound: heavy probable Proteus spp, pending    MRSA Swab ordered (if applicable)? N/A    Goal target range AUC/VINNY 400-600    Recent level history:  Date/Time Dose & Interval Measured Level (mcg/mL) Associated AUC/VINNY Dose Adjustment     @ 0505 1000 mg Q24H 16.2 (~6.5 hr post-dose) 511 No change                                         Plan: The random vancomycin level drawn this morning correlates with an AUC/VINNY of 511, which is within our goal range. Will continue the current regimen for now. Pharmacy will continue to monitor patient daily and will make dosage adjustments based upon changing renal function. *Random vancomycin levels are used to calculate AUC/VINNY, this level should not be interpreted as a trough. Vancomycin has been dosed using Bayesian kinetics software to target an AUC24:VINNY of 400-600, which provides adequate exposure for as assumed infection due to MRSA with an VINNY of 1 or less while reducing the risk of nephrotoxicity as seen with traditional trough based dosing goals.

## 2022-04-02 NOTE — PROGRESS NOTES
[facility]  Hospitalist Group                                                                                          Hospitalist Progress Note  Melly Chavez MD  Answering service: [phone] from in house phone        Date of Service:  2022  NAME:  Carolyn Vega  :  1935  MRN:  [id number]      Admission Summary:   Patient received via transfer. Per chart: Who presents from [facility] where patient has been since his most recent surgery of left BKA done by Dr. Gentry Vega on 3/21 secondary to continued poor peripheral circulation after failed attempts at revascularization and was discharged to skilled rehab at [facility] and rehab on 3/25. . It was noted that surgical site has staples in place but foul smelling discharge with redness at suture site. Dressing changed in the ED and was very painful for the patient and didn't allow me to open it but on evaluation of left BKA with surgeon on 3/31 found to have foul smelling purulent drainage noted with extremely painful to touch. . He does give some hx but not the best historian. He was also noted on arrival to medical floor of having low sugars and was given juice and noted by his [address] [phone] that he has not been eating well and was cotinued on glipizide 5mg oral daily. Patient is a direct transfer from another [address] [place] [address]  Patient was transferred to this facility for infected BKA to be operated upon by Dr. Stanislaw Reid. Interval history / Subjective:   Patient seen for follow-up of infected ischemic BKA stump. Patient seen and examined earlier this morning by me. Patient had surgery performed yesterday with AKA. Patient having some bouts of confusion and pulling at his lines.   When I speak to him he is calm and denies any acute complaints     Assessment & Plan:     Abscess/infection of left BKA  Continue Zosyn and vancomycin  Vascular surgery aware  Surgery done yesterday  We will discontinue antibiotics when appropriate    CKD  Continue to monitor  IV fluids    Anemia of chronic disease  Acutely worsened post surgery  Giving 1 unit of packed red blood cells    Hypertension  Home meds    Colitis  Continue antibiotics    Confusion  Likely multifactorial due to anesthesia from surgery, pain medication, and dementia  Monitor  Frequent reorientation  Family will be coming    I have added bilateral mitts as the patient has been pulling at his lines and he is receiving a blood transfusion and has tried to remove it. I spoke to the patient's daughter on the phone she understands and consented to this. The patient's daughter also mentioned that the patient's wife and other daughter are can to be coming today. This should help keep the patient distracted and from pulling at his lines    Code status: full  DVT prophylaxis: J Luis Gotti discussed with: Patient/Family and Nurse  Anticipated Disposition: SNF/LTC and [facility]/Rehab  Anticipated Discharge: Greater than 48 hours     Hospital Problems  Date Reviewed: 4/1/2022          Codes Class Noted POA    Sepsis ([facility] ICD-10-CM: A41.9  ICD-9-CM: 038.9, 995.91  4/1/2022 Unknown        Cellulitis ICD-10-CM: L03.90  ICD-9-CM: 682.9  3/30/2022 Yes        Type 2 diabetes mellitus with chronic kidney disease ([facility] ICD-10-CM: E11.22  ICD-9-CM: 250.40, 585.9  2/17/2022 Yes        Hypertension ICD-10-CM: I10  ICD-9-CM: 401.9  7/21/2020 Yes        Mixed hyperlipidemia ICD-10-CM: E78.2  ICD-9-CM: 272.2  7/21/2020 Yes        GERD (gastroesophageal reflux disease) ICD-10-CM: K21.9  ICD-9-CM: 530.81  7/21/2020 Yes        Hypomagnesemia ICD-10-CM: V01.27  ICD-9-CM: 275.2  7/21/2020 Yes        Benign prostatic hyperplasia ICD-10-CM: N40.0  ICD-9-CM: 600.00  4/26/2017 Yes                Review of Systems:   A comprehensive review of systems was negative except for that written in the HPI.        Vital Signs:    Last 24hrs VS reviewed since prior progress note. Most recent are:  Visit Vitals  BP (!) 135/90   Pulse (!) 55   Temp 98.2 °F (36.8 °C)   Resp 22   Ht 6' (1.829 m)   Wt 84.1 kg (185 lb 6.5 oz)   SpO2 100%   BMI 25.15 kg/m²         Intake/Output Summary (Last 24 hours) at 4/2/2022 1537  Last data filed at 4/2/2022 1449  Gross per 24 hour   Intake 500 ml   Output 550 ml   Net -50 ml        Physical Examination:     I had a face to face encounter with this patient and independently examined them on 4/2/2022 as outlined below:          Constitutional:  No acute distress, cooperative, pleasant    ENT:  Oral mucosa moist, oropharynx benign. Resp:  CTA bilaterally. No wheezing/rhonchi/rales. No accessory muscle use   CV:  Regular rhythm, normal rate, no murmurs, gallops, rubs    GI:  Soft, non distended, non tender. normoactive bowel sounds, no hepatosplenomegaly     Musculoskeletal:  No edema, warm, 2+ pulses throughout. Left BKA that is bandaged. Neurologic:  Moves all extremities. AAOx3, CN II-XII reviewed            Data Review:    Review and/or order of clinical lab test  Review and/or order of tests in the radiology section of CPT  Review and/or order of tests in the medicine section of CPT      Labs:     Recent Labs     04/02/22  0505 03/31/22  0520   WBC 10.0 12.1*   HGB 6.6* 7.4*   HCT 23.1* 24.3*   * 458*     Recent Labs     04/02/22  0505 04/01/22  1918 03/31/22  0520    139 139   K 5.0 4.5 4.1   * 110* 104   CO2 21 23 25   BUN 43* 40* 41*   CREA 1.57* 1.71* 1.83*   * 153* 73   CA 8.6 8.4* 8.2*   MG  --   --  2.5*     Recent Labs     03/31/22  0520   ALT 38   AP 95   TBILI 0.4   TP 7.2   ALB 1.8*   GLOB 5.4*     No results for input(s): INR, PTP, APTT, INREXT, INREXT in the last 72 hours. Recent Labs     03/31/22  0520   TIBC 169*   PSAT 8*      No results found for: FOL, RBCF   No results for input(s): PH, PCO2, PO2 in the last 72 hours. No results for input(s): CPK, CKNDX, TROIQ in the last 72 hours.     No lab exists for component: CPKMB  Lab Results   Component Value Date/Time    Cholesterol, total 129 08/24/2021 10:31 AM    HDL Cholesterol 36 (L) 08/24/2021 10:31 AM    LDL, calculated 74 08/24/2021 10:31 AM    Triglyceride 102 08/24/2021 10:31 AM     Lab Results   Component Value Date/Time    Glucose (POC) 164 (H) 04/02/2022 02:28 PM    Glucose (POC) 189 (H) 04/02/2022 10:14 AM    Glucose (POC) 188 (H) 04/01/2022 11:01 PM    Glucose (POC) 130 (H) 04/01/2022 05:37 PM    Glucose (POC) 127 (H) 04/01/2022 12:05 PM     Lab Results   Component Value Date/Time    Color Yellow 08/24/2021 10:31 AM    Appearance Clear 08/24/2021 10:31 AM    pH (UA) 7.5 08/24/2021 10:31 AM    Ketone Negative 08/24/2021 10:31 AM    Bilirubin Negative 08/24/2021 10:31 AM    Nitrites Negative 08/24/2021 10:31 AM    Leukocyte Esterase Negative 08/24/2021 10:31 AM    Bacteria None seen 08/24/2021 10:31 AM    WBC 0-5 08/24/2021 10:31 AM    RBC None seen 08/24/2021 10:31 AM         Medications Reviewed:     Current Facility-Administered Medications   Medication Dose Route Frequency    0.9% sodium chloride infusion 250 mL  250 mL IntraVENous PRN    sodium chloride (NS) flush 5-40 mL  5-40 mL IntraVENous Q8H    sodium chloride (NS) flush 5-40 mL  5-40 mL IntraVENous PRN    acetaminophen (TYLENOL) tablet 650 mg  650 mg Oral Q6H PRN    Or    acetaminophen (TYLENOL) suppository 650 mg  650 mg Rectal Q6H PRN    polyethylene glycol (MIRALAX) packet 17 g  17 g Oral DAILY PRN    ondansetron (ZOFRAN) injection 4 mg  4 mg IntraVENous Q6H PRN    famotidine (PEPCID) tablet 20 mg  20 mg Oral Q24H    piperacillin-tazobactam (ZOSYN) 3.375 g in 0.9% sodium chloride (MBP/ADV) 100 mL MBP  3.375 g IntraVENous Q8H    0.9% sodium chloride with KCl 20 mEq/L infusion   IntraVENous CONTINUOUS    glucose chewable tablet 16 g  4 Tablet Oral PRN    dextrose 10 % infusion 0-250 mL  0-250 mL IntraVENous PRN    glucagon (GLUCAGEN) injection 1 mg  1 mg IntraMUSCular PRN    insulin lispro (HUMALOG) injection   SubCUTAneous AC&HS    atorvastatin (LIPITOR) tablet 80 mg  80 mg Oral QHS    aspirin delayed-release tablet 81 mg  81 mg Oral 7am    dorzolamide (TRUSOPT) 2 % ophthalmic solution 1 Drop  1 Drop Both Eyes BID    ferrous sulfate tablet 325 mg  325 mg Oral DAILY WITH BREAKFAST    HYDROcodone-acetaminophen (NORCO) 7.5-325 mg per tablet 1 Tablet  1 Tablet Oral Q6H PRN    lactulose (CHRONULAC) 10 gram/15 mL solution 15 mL  10 g Oral BID PRN    latanoprost (XALATAN) 0.005 % ophthalmic solution 1 Drop  1 Drop Both Eyes QHS    magnesium oxide (MAG-OX) tablet 400 mg  400 mg Oral DAILY    metoprolol succinate (TOPROL-XL) XL tablet 25 mg  25 mg Oral DAILY    senna-docusate (PERICOLACE) 8.6-50 mg per tablet 1 Tablet  1 Tablet Oral 7am    pantoprazole (PROTONIX) tablet 40 mg  40 mg Oral QAM    timolol (TIMOPTIC) 0.5 % ophthalmic solution 1 Drop  1 Drop Both Eyes BID    morphine injection 1 mg  1 mg IntraVENous Q4H PRN    Vancomycin - pharmacy to dose   Other Rx Dosing/Monitoring    vancomycin (VANCOCIN) 1000 mg in  ml infusion  1,000 mg IntraVENous Q24H     ______________________________________________________________________  EXPECTED LENGTH OF STAY: 2d 12h  ACTUAL LENGTH OF STAY:          2                 Jossy Husain MD

## 2022-04-02 NOTE — PROGRESS NOTES
Pharmacist Note - Vancomycin Dosing    Consult provided for this 82 y.o. male for indication of  infected ischemic BKA stump.  -s/p left AKA on     Antibiotic regimen(s): Vanc + Zosyn  Patient on vancomycin PTA? YES ; 1500 mg x 1 on 3/31 @1130    Recent Labs     22  1918 22  0520 22  1032   WBC  --  12.1* 12.9*   CREA 1.71* 1.83* 1.83*   BUN 40* 41* 45*     Frequency of BMP: daily x 3  Height: 182.9 cm  Weight: 84.1 kg  Est CrCl: 35.3 ml/min; UO: -- ml/kg/hr  Temp (24hrs), Av.4 °F (36.9 °C), Min:97.5 °F (36.4 °C), Max:100.1 °F (37.8 °C)    Cultures:  3/31 wound - 2+ GNR    MRSA Swab ordered (if applicable)? N/A    The plan below is expected to result in a target range of AUC/VINNY 400-600    Therapy will be initiated with a maintenance dose of 1000 mg IV every 24 hours and will check random level with AM labs. Pharmacy to follow patient daily and order levels / make dose adjustments as appropriate. *Vancomycin has been dosed used Bayesian kinetics software to target an AUC/VINNY of 400-600, which provides adequate exposure for an assumed infection due to MRSA with an VINNY of 1 or less while reducing the risk of nephrotoxicity as seen with traditional trough based dosing goals.

## 2022-04-02 NOTE — PROGRESS NOTES
Patients daughter and POA notified of new order for blood transfusion, she verbally consented to have patient receive blood. 1500: nPatient continues to pull out lines and removing medical devices despite distractive methods. Daughter verbally consented to allow restraints to be used.

## 2022-04-02 NOTE — OP NOTES
[facility]  OPERATIVE REPORT    Name:  Charles Garibay  MR#:  [id number]  :  1935  ACCOUNT #:  [de-identified]  DATE OF SERVICE:  2022      PREOPERATIVE DIAGNOSIS:  Nonhealing left below-knee amputation stump. POSTOPERATIVE DIAGNOSIS:  Nonhealing left below-knee amputation stump. PROCEDURE PERFORMED:  Left above-knee amputation. SURGEON:  Gus Monk MD    ASSISTANT:  Aye Conteh SA    ANESTHESIA:  General.    COMPLICATIONS:  None. SPECIMENS REMOVED:  Left below-knee amputation stump. IMPLANTS:  None. ESTIMATED BLOOD LOSS:  200 mL. INDICATIONS:  The patient is an 62-year-old male with diabetes and peripheral vascular disease who had a left below-knee amputation approximately 10 days ago. The amputation incision is ischemic and infected. He will be converted to an above-knee amputation. PROCEDURE:  The patient's left leg was prepped and draped. A circumferential incision was made just above the left knee. The soft tissues were divided circumferentially. The popliteal vessels were ligated and divided. The femur was divided with a Gigli saw. There was good bleeding from the soft tissues. The incision was closed with interrupted Vicryl subcutaneous and fascial sutures and skin staples. Dressings were applied and the patient was returned to the recovery room in stable condition.         Saurabh De Dios MD      [id number]  D:  2022 17:29  T:  2022 21:49  JOB #:  [id number]

## 2022-04-03 LAB
ANION GAP SERPL CALC-SCNC: 4 MMOL/L (ref 5–15)
BASOPHILS # BLD: 0 K/UL (ref 0–0.1)
BASOPHILS NFR BLD: 0 % (ref 0–1)
BUN SERPL-MCNC: 34 MG/DL (ref 6–20)
BUN/CREAT SERPL: 24 (ref 12–20)
CALCIUM SERPL-MCNC: 8.9 MG/DL (ref 8.5–10.1)
CHLORIDE SERPL-SCNC: 114 MMOL/L (ref 97–108)
CO2 SERPL-SCNC: 21 MMOL/L (ref 21–32)
CREAT SERPL-MCNC: 1.43 MG/DL (ref 0.7–1.3)
DIFFERENTIAL METHOD BLD: ABNORMAL
EOSINOPHIL # BLD: 0.1 K/UL (ref 0–0.4)
EOSINOPHIL NFR BLD: 1 % (ref 0–7)
ERYTHROCYTE [DISTWIDTH] IN BLOOD BY AUTOMATED COUNT: 16.1 % (ref 11.5–14.5)
GLUCOSE BLD STRIP.AUTO-MCNC: 101 MG/DL (ref 65–117)
GLUCOSE BLD STRIP.AUTO-MCNC: 115 MG/DL (ref 65–117)
GLUCOSE BLD STRIP.AUTO-MCNC: 127 MG/DL (ref 65–117)
GLUCOSE BLD STRIP.AUTO-MCNC: 129 MG/DL (ref 65–117)
GLUCOSE SERPL-MCNC: 134 MG/DL (ref 65–100)
HCT VFR BLD AUTO: 25.4 % (ref 36.6–50.3)
HGB BLD-MCNC: 7.4 G/DL (ref 12.1–17)
IMM GRANULOCYTES # BLD AUTO: 0.1 K/UL (ref 0–0.04)
IMM GRANULOCYTES NFR BLD AUTO: 1 % (ref 0–0.5)
LYMPHOCYTES # BLD: 0.7 K/UL (ref 0.8–3.5)
LYMPHOCYTES NFR BLD: 7 % (ref 12–49)
MCH RBC QN AUTO: 26.6 PG (ref 26–34)
MCHC RBC AUTO-ENTMCNC: 29.1 G/DL (ref 30–36.5)
MCV RBC AUTO: 91.4 FL (ref 80–99)
MONOCYTES # BLD: 0.4 K/UL (ref 0–1)
MONOCYTES NFR BLD: 4 % (ref 5–13)
NEUTS SEG # BLD: 8.6 K/UL (ref 1.8–8)
NEUTS SEG NFR BLD: 87 % (ref 32–75)
NRBC # BLD: 0.03 K/UL (ref 0–0.01)
NRBC BLD-RTO: 0.3 PER 100 WBC
PLATELET # BLD AUTO: 539 K/UL (ref 150–400)
PMV BLD AUTO: 9.9 FL (ref 8.9–12.9)
POTASSIUM SERPL-SCNC: 4.5 MMOL/L (ref 3.5–5.1)
RBC # BLD AUTO: 2.78 M/UL (ref 4.1–5.7)
SERVICE CMNT-IMP: ABNORMAL
SERVICE CMNT-IMP: ABNORMAL
SERVICE CMNT-IMP: NORMAL
SERVICE CMNT-IMP: NORMAL
SODIUM SERPL-SCNC: 139 MMOL/L (ref 136–145)
WBC # BLD AUTO: 10 K/UL (ref 4.1–11.1)

## 2022-04-03 PROCEDURE — 36415 COLL VENOUS BLD VENIPUNCTURE: CPT

## 2022-04-03 PROCEDURE — 74011000250 HC RX REV CODE- 250: Performed by: STUDENT IN AN ORGANIZED HEALTH CARE EDUCATION/TRAINING PROGRAM

## 2022-04-03 PROCEDURE — 80048 BASIC METABOLIC PNL TOTAL CA: CPT

## 2022-04-03 PROCEDURE — 74011000258 HC RX REV CODE- 258: Performed by: STUDENT IN AN ORGANIZED HEALTH CARE EDUCATION/TRAINING PROGRAM

## 2022-04-03 PROCEDURE — 93005 ELECTROCARDIOGRAM TRACING: CPT

## 2022-04-03 PROCEDURE — 85025 COMPLETE CBC W/AUTO DIFF WBC: CPT

## 2022-04-03 PROCEDURE — 65660000000 HC RM CCU STEPDOWN

## 2022-04-03 PROCEDURE — 74011636637 HC RX REV CODE- 636/637: Performed by: STUDENT IN AN ORGANIZED HEALTH CARE EDUCATION/TRAINING PROGRAM

## 2022-04-03 PROCEDURE — 82962 GLUCOSE BLOOD TEST: CPT

## 2022-04-03 PROCEDURE — 74011250636 HC RX REV CODE- 250/636: Performed by: STUDENT IN AN ORGANIZED HEALTH CARE EDUCATION/TRAINING PROGRAM

## 2022-04-03 PROCEDURE — 74011250637 HC RX REV CODE- 250/637: Performed by: STUDENT IN AN ORGANIZED HEALTH CARE EDUCATION/TRAINING PROGRAM

## 2022-04-03 RX ORDER — TRAMADOL HYDROCHLORIDE 50 MG/1
50 TABLET ORAL
Status: DISCONTINUED | OUTPATIENT
Start: 2022-04-03 | End: 2022-04-06 | Stop reason: HOSPADM

## 2022-04-03 RX ADMIN — SODIUM CHLORIDE, PRESERVATIVE FREE 10 ML: 5 INJECTION INTRAVENOUS at 06:12

## 2022-04-03 RX ADMIN — ATORVASTATIN CALCIUM 80 MG: 40 TABLET, FILM COATED ORAL at 21:55

## 2022-04-03 RX ADMIN — ASPIRIN 81 MG: 81 TABLET, COATED ORAL at 06:12

## 2022-04-03 RX ADMIN — DORZOLAMIDE HYDROCHLORIDE 1 DROP: 20 SOLUTION/ DROPS OPHTHALMIC at 09:45

## 2022-04-03 RX ADMIN — PIPERACILLIN SODIUM AND TAZOBACTAM SODIUM 3.38 G: 3; 375 INJECTION, POWDER, FOR SOLUTION INTRAVENOUS at 19:48

## 2022-04-03 RX ADMIN — SENNOSIDES AND DOCUSATE SODIUM 1 TABLET: 50; 8.6 TABLET ORAL at 06:12

## 2022-04-03 RX ADMIN — PIPERACILLIN SODIUM AND TAZOBACTAM SODIUM 3.38 G: 3; 375 INJECTION, POWDER, FOR SOLUTION INTRAVENOUS at 10:01

## 2022-04-03 RX ADMIN — VANCOMYCIN HYDROCHLORIDE 1000 MG: 1 INJECTION, POWDER, LYOPHILIZED, FOR SOLUTION INTRAVENOUS at 00:16

## 2022-04-03 RX ADMIN — PANTOPRAZOLE SODIUM 40 MG: 40 TABLET, DELAYED RELEASE ORAL at 09:44

## 2022-04-03 RX ADMIN — HYDROCODONE BITARTRATE AND ACETAMINOPHEN 1 TABLET: 7.5; 325 TABLET ORAL at 09:50

## 2022-04-03 RX ADMIN — ATORVASTATIN CALCIUM 80 MG: 40 TABLET, FILM COATED ORAL at 00:17

## 2022-04-03 RX ADMIN — SODIUM CHLORIDE, PRESERVATIVE FREE 10 ML: 5 INJECTION INTRAVENOUS at 00:18

## 2022-04-03 RX ADMIN — FERROUS SULFATE TAB 325 MG (65 MG ELEMENTAL FE) 325 MG: 325 (65 FE) TAB at 09:44

## 2022-04-03 RX ADMIN — Medication 2 UNITS: at 00:17

## 2022-04-03 RX ADMIN — SODIUM CHLORIDE, PRESERVATIVE FREE 5 ML: 5 INJECTION INTRAVENOUS at 21:55

## 2022-04-03 RX ADMIN — FAMOTIDINE 20 MG: 20 TABLET ORAL at 09:44

## 2022-04-03 RX ADMIN — Medication 400 MG: at 09:44

## 2022-04-03 RX ADMIN — PIPERACILLIN SODIUM AND TAZOBACTAM SODIUM 3.38 G: 3; 375 INJECTION, POWDER, FOR SOLUTION INTRAVENOUS at 04:03

## 2022-04-03 RX ADMIN — LATANOPROST 1 DROP: 50 SOLUTION OPHTHALMIC at 21:56

## 2022-04-03 RX ADMIN — MORPHINE SULFATE 1 MG: 2 INJECTION, SOLUTION INTRAMUSCULAR; INTRAVENOUS at 09:44

## 2022-04-03 RX ADMIN — TIMOLOL MALEATE 1 DROP: 5 SOLUTION/ DROPS OPHTHALMIC at 09:45

## 2022-04-03 NOTE — PROGRESS NOTES
S/p L AKA  Dressing c/d/i   hgb 6.6 this AM, received 1U prbcs and now 7.5  PT/OT  Dr. Hubert Tan to follow up next week

## 2022-04-03 NOTE — PROGRESS NOTES
CM attempted to speak with the patient and/or primary decision maker. Call placed to patient's wife contact number and a vm was left. Call placed to patient's room and phone continued to ring.        9:20 AM  PHI Diez

## 2022-04-03 NOTE — PROGRESS NOTES
Patient continues to pull at lines and continues to be confused. Will reorder restraints for now. Family wants to avoid stronger medication that may worsen confusion. Reorientation is only temporarily effective.

## 2022-04-04 ENCOUNTER — PATIENT OUTREACH (OUTPATIENT)
Dept: CASE MANAGEMENT | Age: 87
End: 2022-04-04

## 2022-04-04 LAB
ANION GAP SERPL CALC-SCNC: 4 MMOL/L (ref 5–15)
ATRIAL RATE: 33 BPM
ATRIAL RATE: 51 BPM
BASOPHILS # BLD: 0 K/UL (ref 0–0.1)
BASOPHILS NFR BLD: 0 % (ref 0–1)
BUN SERPL-MCNC: 27 MG/DL (ref 6–20)
BUN/CREAT SERPL: 22 (ref 12–20)
CALCIUM SERPL-MCNC: 7.9 MG/DL (ref 8.5–10.1)
CALCULATED R AXIS, ECG10: -60 DEGREES
CALCULATED R AXIS, ECG10: 172 DEGREES
CALCULATED T AXIS, ECG11: 105 DEGREES
CALCULATED T AXIS, ECG11: 99 DEGREES
CHLORIDE SERPL-SCNC: 115 MMOL/L (ref 97–108)
CO2 SERPL-SCNC: 21 MMOL/L (ref 21–32)
CREAT SERPL-MCNC: 1.24 MG/DL (ref 0.7–1.3)
DIAGNOSIS, 93000: NORMAL
DIAGNOSIS, 93000: NORMAL
DIFFERENTIAL METHOD BLD: ABNORMAL
EOSINOPHIL # BLD: 0.2 K/UL (ref 0–0.4)
EOSINOPHIL NFR BLD: 2 % (ref 0–7)
ERYTHROCYTE [DISTWIDTH] IN BLOOD BY AUTOMATED COUNT: 16.1 % (ref 11.5–14.5)
GLUCOSE BLD STRIP.AUTO-MCNC: 109 MG/DL (ref 65–117)
GLUCOSE BLD STRIP.AUTO-MCNC: 115 MG/DL (ref 65–117)
GLUCOSE BLD STRIP.AUTO-MCNC: 120 MG/DL (ref 65–117)
GLUCOSE BLD STRIP.AUTO-MCNC: 93 MG/DL (ref 65–117)
GLUCOSE BLD STRIP.AUTO-MCNC: 97 MG/DL (ref 65–117)
GLUCOSE SERPL-MCNC: 108 MG/DL (ref 65–100)
HCT VFR BLD AUTO: 23.5 % (ref 36.6–50.3)
HCT VFR BLD AUTO: 25.6 % (ref 36.6–50.3)
HCT VFR BLD AUTO: 31.8 % (ref 36.6–50.3)
HGB BLD-MCNC: 6.8 G/DL (ref 12.1–17)
HGB BLD-MCNC: 7 G/DL (ref 12.1–17)
HGB BLD-MCNC: 9.5 G/DL (ref 12.1–17)
HISTORY CHECKED?,CKHIST: NORMAL
IMM GRANULOCYTES # BLD AUTO: 0.1 K/UL (ref 0–0.04)
IMM GRANULOCYTES NFR BLD AUTO: 1 % (ref 0–0.5)
LYMPHOCYTES # BLD: 0.8 K/UL (ref 0.8–3.5)
LYMPHOCYTES NFR BLD: 11 % (ref 12–49)
MCH RBC QN AUTO: 27.2 PG (ref 26–34)
MCHC RBC AUTO-ENTMCNC: 29.8 G/DL (ref 30–36.5)
MCV RBC AUTO: 91.4 FL (ref 80–99)
MONOCYTES # BLD: 0.3 K/UL (ref 0–1)
MONOCYTES NFR BLD: 4 % (ref 5–13)
NEUTS SEG # BLD: 6.3 K/UL (ref 1.8–8)
NEUTS SEG NFR BLD: 81 % (ref 32–75)
NRBC # BLD: 0.06 K/UL (ref 0–0.01)
NRBC BLD-RTO: 0.8 PER 100 WBC
PLATELET # BLD AUTO: 525 K/UL (ref 150–400)
PMV BLD AUTO: 9.7 FL (ref 8.9–12.9)
POTASSIUM SERPL-SCNC: 4.3 MMOL/L (ref 3.5–5.1)
Q-T INTERVAL, ECG07: 554 MS
Q-T INTERVAL, ECG07: 590 MS
QRS DURATION, ECG06: 174 MS
QRS DURATION, ECG06: 180 MS
QTC CALCULATION (BEZET), ECG08: 505 MS
QTC CALCULATION (BEZET), ECG08: 537 MS
RBC # BLD AUTO: 2.57 M/UL (ref 4.1–5.7)
SERVICE CMNT-IMP: ABNORMAL
SERVICE CMNT-IMP: NORMAL
SODIUM SERPL-SCNC: 140 MMOL/L (ref 136–145)
VENTRICULAR RATE, ECG03: 50 BPM
VENTRICULAR RATE, ECG03: 50 BPM
WBC # BLD AUTO: 7.7 K/UL (ref 4.1–11.1)

## 2022-04-04 PROCEDURE — 85025 COMPLETE CBC W/AUTO DIFF WBC: CPT

## 2022-04-04 PROCEDURE — 74011250636 HC RX REV CODE- 250/636: Performed by: NURSE PRACTITIONER

## 2022-04-04 PROCEDURE — 80048 BASIC METABOLIC PNL TOTAL CA: CPT

## 2022-04-04 PROCEDURE — 74011000258 HC RX REV CODE- 258

## 2022-04-04 PROCEDURE — 74011250637 HC RX REV CODE- 250/637

## 2022-04-04 PROCEDURE — 36430 TRANSFUSION BLD/BLD COMPNT: CPT

## 2022-04-04 PROCEDURE — 93005 ELECTROCARDIOGRAM TRACING: CPT

## 2022-04-04 PROCEDURE — 85014 HEMATOCRIT: CPT

## 2022-04-04 PROCEDURE — 74011250637 HC RX REV CODE- 250/637: Performed by: STUDENT IN AN ORGANIZED HEALTH CARE EDUCATION/TRAINING PROGRAM

## 2022-04-04 PROCEDURE — 74011250636 HC RX REV CODE- 250/636: Performed by: STUDENT IN AN ORGANIZED HEALTH CARE EDUCATION/TRAINING PROGRAM

## 2022-04-04 PROCEDURE — 74011000250 HC RX REV CODE- 250

## 2022-04-04 PROCEDURE — P9016 RBC LEUKOCYTES REDUCED: HCPCS

## 2022-04-04 PROCEDURE — 85018 HEMOGLOBIN: CPT

## 2022-04-04 PROCEDURE — 65660000000 HC RM CCU STEPDOWN

## 2022-04-04 PROCEDURE — 82962 GLUCOSE BLOOD TEST: CPT

## 2022-04-04 PROCEDURE — 36415 COLL VENOUS BLD VENIPUNCTURE: CPT

## 2022-04-04 PROCEDURE — 97161 PT EVAL LOW COMPLEX 20 MIN: CPT

## 2022-04-04 PROCEDURE — 74011250636 HC RX REV CODE- 250/636

## 2022-04-04 RX ORDER — MORPHINE SULFATE 2 MG/ML
4 INJECTION, SOLUTION INTRAMUSCULAR; INTRAVENOUS
Status: DISCONTINUED | OUTPATIENT
Start: 2022-04-04 | End: 2022-04-06 | Stop reason: HOSPADM

## 2022-04-04 RX ORDER — BALSAM PERU/CASTOR OIL
OINTMENT (GRAM) TOPICAL 3 TIMES DAILY
Status: DISCONTINUED | OUTPATIENT
Start: 2022-04-04 | End: 2022-04-06 | Stop reason: HOSPADM

## 2022-04-04 RX ORDER — SODIUM CHLORIDE 9 MG/ML
250 INJECTION, SOLUTION INTRAVENOUS AS NEEDED
Status: DISCONTINUED | OUTPATIENT
Start: 2022-04-04 | End: 2022-04-06 | Stop reason: HOSPADM

## 2022-04-04 RX ADMIN — FERROUS SULFATE TAB 325 MG (65 MG ELEMENTAL FE) 325 MG: 325 (65 FE) TAB at 08:27

## 2022-04-04 RX ADMIN — ACETAMINOPHEN 650 MG: 325 TABLET ORAL at 07:05

## 2022-04-04 RX ADMIN — SODIUM CHLORIDE 75 ML/HR: 9 INJECTION, SOLUTION INTRAVENOUS at 02:39

## 2022-04-04 RX ADMIN — Medication 400 MG: at 08:27

## 2022-04-04 RX ADMIN — SODIUM CHLORIDE, PRESERVATIVE FREE 10 ML: 5 INJECTION INTRAVENOUS at 22:15

## 2022-04-04 RX ADMIN — SODIUM CHLORIDE, PRESERVATIVE FREE 10 ML: 5 INJECTION INTRAVENOUS at 07:05

## 2022-04-04 RX ADMIN — TIMOLOL MALEATE 1 DROP: 5 SOLUTION/ DROPS OPHTHALMIC at 19:32

## 2022-04-04 RX ADMIN — PIPERACILLIN SODIUM AND TAZOBACTAM SODIUM 3.38 G: 3; 375 INJECTION, POWDER, FOR SOLUTION INTRAVENOUS at 12:12

## 2022-04-04 RX ADMIN — FAMOTIDINE 20 MG: 20 TABLET ORAL at 08:27

## 2022-04-04 RX ADMIN — PIPERACILLIN SODIUM AND TAZOBACTAM SODIUM 3.38 G: 3; 375 INJECTION, POWDER, FOR SOLUTION INTRAVENOUS at 02:37

## 2022-04-04 RX ADMIN — ATORVASTATIN CALCIUM 80 MG: 40 TABLET, FILM COATED ORAL at 22:15

## 2022-04-04 RX ADMIN — SENNOSIDES AND DOCUSATE SODIUM 1 TABLET: 50; 8.6 TABLET ORAL at 07:05

## 2022-04-04 RX ADMIN — TIMOLOL MALEATE 1 DROP: 5 SOLUTION/ DROPS OPHTHALMIC at 09:07

## 2022-04-04 RX ADMIN — DORZOLAMIDE HYDROCHLORIDE 1 DROP: 20 SOLUTION/ DROPS OPHTHALMIC at 09:07

## 2022-04-04 RX ADMIN — METOPROLOL SUCCINATE 25 MG: 25 TABLET, EXTENDED RELEASE ORAL at 08:27

## 2022-04-04 RX ADMIN — VANCOMYCIN HYDROCHLORIDE 1000 MG: 1 INJECTION, POWDER, LYOPHILIZED, FOR SOLUTION INTRAVENOUS at 00:14

## 2022-04-04 RX ADMIN — SODIUM CHLORIDE, PRESERVATIVE FREE 10 ML: 5 INJECTION INTRAVENOUS at 17:47

## 2022-04-04 RX ADMIN — SODIUM CHLORIDE 75 ML/HR: 9 INJECTION, SOLUTION INTRAVENOUS at 20:41

## 2022-04-04 RX ADMIN — Medication: at 22:17

## 2022-04-04 RX ADMIN — LATANOPROST 1 DROP: 50 SOLUTION OPHTHALMIC at 22:18

## 2022-04-04 RX ADMIN — ASPIRIN 81 MG: 81 TABLET, COATED ORAL at 07:05

## 2022-04-04 RX ADMIN — PIPERACILLIN SODIUM AND TAZOBACTAM SODIUM 3.38 G: 3; 375 INJECTION, POWDER, FOR SOLUTION INTRAVENOUS at 20:40

## 2022-04-04 RX ADMIN — Medication: at 19:33

## 2022-04-04 RX ADMIN — DORZOLAMIDE HYDROCHLORIDE 1 DROP: 20 SOLUTION/ DROPS OPHTHALMIC at 19:32

## 2022-04-04 RX ADMIN — PANTOPRAZOLE SODIUM 40 MG: 40 TABLET, DELAYED RELEASE ORAL at 08:27
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
## 2022-04-04 NOTE — WOUND CARE
WOCN Note:     New consult placed for assessment of sacrum and left ishial.    Chart reviewed. Assessed in room 416. Admitted DX:  Sepsis   Past Medical History:   Diagnosis Date    Anemia 4/26/2017    Anxiety     Arrhythmia     A FIB    Arthritis     Atherosclerosis of artery of extremity with rest pain (HCC)     Atrial fibrillation ([address] 7/21/2020    Benign prostatic hyperplasia 4/26/2017    CAD (coronary artery disease)     pacemaker    Cancer ([address] 2010    GASTRIC    Chronic kidney disease     Chronic obstructive pulmonary disease ([address]     Depression     Diabetes ([address]     BORDERLINE, NO MEDS.  Diverticulosis of colon     Dyslipidemia 4/26/2017    GERD (gastroesophageal reflux disease)     Glaucoma     History of CVA (cerebrovascular accident) 7/21/2020    Hypercholesteremia     Hypertension     Hypomagnesemia 7/13/2020    Nocturia 4/26/2017    PUD (peptic ulcer disease)     GI BLEEDING    PVD (peripheral vascular disease) ([address]     Rectal hemorrhage 4/26/2017    Strabismus     Stroke ([facility] 2000 APPROX. SOME VISUAL DEFICIT    Type 2 diabetes mellitus without complications ([facility] 5/16/6453    Venous stasis 4/26/2017     Assessment:   Patient is alert, communicative and requires assist of 2 with repositioning. Bed: Drayton; air module added. Patient has a male incontinence suction management device  Restraints:  wrist  Patient reports no pain. Patient repositioned on right side with pillow. Right heel intact without erythema and offloaded on pillow. 1. POA Sacrum, unroofed deep tissue pressure injury that appears to be evolving: 6.5 x 2.5 x 0.2 cm; 70% pale pink; 10% non blanching red; 20% non blanching maroon; no exudate or odor. Periwound hyperpigmented and moist.  Applied foam dressing. 2. POA Left ischial, deep tissue pressure injury:  1.5 x 1.5 x 0.1 cm; unroofed non blanching red; no exudate or odor. Periwound hyperpigmented. Applied foam dressing.     Wound, Pressure Prevention & Skin Care Recommendations:    1. Minimize layers of linen/pads under patient to optimize support surface. 2.  Turn/reposition approximately every 2 hours and offload heels. 3.  Manage moisture/ Keep skin folds clean and dry/minimize brief usage. 4.  Specialty bed: malcolm stuart  5. Sacrum and left ishial:  Venelex TID and foam dressing. Discussed above plan with Katelyn Rae RN.     Transition of Care:   Plan to follow as needed while admitted to hospital.    Bernadette Mohs, ALEN RN [facility] Inpatient Wound Care  Available on [employer]  Office [phone]

## 2022-04-04 NOTE — PROGRESS NOTES
FRANCOIS:  1. RUR-21%  2. Referral sent to [facility] where he was admitted from, family wishes for him to return to this facility. 3. PT/OT/Vascular surgery following. 4. BLS transport on discharge. Care Management Interventions  PCP Verified by CM: Yes  Palliative Care Criteria Met (RRAT>21 & CHF Dx)?: No  Mode of Transport at Discharge: BLS  Discharge Durable Medical Equipment: No  Health Maintenance Reviewed: Yes  Physical Therapy Consult: Yes  Occupational Therapy Consult: Yes  Speech Therapy Consult: No  Support Systems: Child(violetta),Skilled Nursing Facility  Confirm Follow Up Transport: Family  The Patient and/or Patient Representative was Provided with a Choice of Provider and Agrees with the Discharge Plan?: Yes   Resource Information Provided?: No  Discharge Location  Patient Expects to be Discharged to[de-identified] Skilled nursing facility    Readmission Assessment  Number of days since last admission?: 8-30 days  Previous disposition: SNF  Who is being interviewed?: Caregiver  What was the patient's/caregiver's perception as to why they think they needed to return back to the hospital?: Other (Comment) (presented with symptoms)  Did you visit your Primary Care Physician after you left the hospital, before you returned this time?: No  Why weren't you able to visit your PCP?: Other (Comment) (followed by MD at SNF)  Did you see a specialist, such as Cardiac, Pulmonary, Orthopedic Physician, etc. after you left the hospital?: No    Reason for Readmission:     Drainage from recent bka          RUR Score/Risk Level:     21%, level 1    PCP: First and Last name:  Pily uRiz NP   Name of Practice:  [facility]. Are you a current patient: Yes/No: yes   Approximate date of last visit:  2/17/2022   Can you participate in a virtual visit with your PCP: no    Is a Care Conference indicated:   No indication at this time. Did you attend your follow up appointment (s):   If not, why not: Patient was at [facility] followed by facility doctor. Resources/supports as identified by patient/family:   His daughter, Uriah Lin facing patient (as identified by patient/family and CM): Finances/Medication cost?     Patient has [employer] transport  Support system or lack thereof? See above. Living arrangements? Prior to SNF, he was living with his wife. Self-care/ADLs/Cognition? He requires ADL assistance ,and family takes of care all of IADLs. Current Advanced Directive/Advance Care Plan: On file           Plan for utilizing home health:   Return to SNF             Transition of Care Plan:    Based on readmission, the patient's previous Plan of Care   has been evaluated and/or modified. The current Transition of Care Plan is:           DANG spoke with his daughter ,Nuvia Greenwood [phone]. She confirmed his demographics, insurance, and pcp information. Prior to hospital admission, he was living with his wife in a 1 story with 4 steps to enter. The patient ambulated  with a walker and a wheelchair for community use. His wife provided assistance with ADL's and responsible for all IADL's. Patient's daughter lives in [facility] and visits as often as possible. CM verified patient's PCP, demographics and insurance. Patient uses [facility] in order for long term prescriptions and  [facility] in [facility] for short term medications. His daughter and family would like referral sent back to [facility]. DANG spoke with Rush Foreman at the facility, [phone] he requested for clinicals to be faxed to [phone].

## 2022-04-04 NOTE — PROGRESS NOTES
0030: Bedside shift change report given to Mark Tran RN (oncoming nurse) by Salvatore Hdz RN (offgoing nurse). Report included the following information SBAR, Kardex, Intake/Output, MAR and Accordion. 0730: Verbal shift change report given to SUJATA Nix (oncoming nurse) by Salvatore Hdz RN (offgoing nurse). Report included the following information SBAR, Kardex, Intake/Output, MAR, Accordion, Recent Results and Cardiac Rhythm V paced.

## 2022-04-04 NOTE — PROGRESS NOTES
Orders received, chart reviewed and patient evaluated by physical therapy. Pending progression with skilled acute physical therapy, recommend:  Therapy up to 5 days/week in SNF setting    Recommend with nursing Bed to modified chair postion to chair 3x/day. Thank you for completing as able in order to maintain patient strength, endurance and independence. Full evaluation to follow.  Dominga Auguste, PT    Visit Vitals  BP (!) 151/68 (BP 1 Location: Left upper arm, BP Patient Position: Semi fowlers)   Pulse (!) 50   Temp 98.9 °F (37.2 °C)   Resp 19   Ht 6' (1.829 m)   Wt 84.2 kg (185 lb 10 oz)   SpO2 on room air 100%   BMI 25.18 kg/m²

## 2022-04-04 NOTE — PROGRESS NOTES
[facility] Adult  Hospitalist Group                                                                                          Hospitalist Progress Note  Morgan Rodrigez MD  Answering service: [phone] from in house phone        Date of Service:  4/3/2022  NAME:  Nilesh Martinez  :  1935  MRN:  [id number]      Admission Summary:   Patient received via transfer. Per chart: Who presents from [address] [place] [address] where patient has been since his most recent surgery of left BKA done by Dr. Georgina Shelton on 3/21 secondary to continued poor peripheral circulation after failed attempts at revascularization and was discharged to skilled rehab at [address] [place] [address] and rehab on 3/25. . It was noted that surgical site has staples in place but foul smelling discharge with redness at suture site. Dressing changed in the ED and was very painful for the patient and didn't allow me to open it but on evaluation of left BKA with surgeon on 3/31 found to have foul smelling purulent drainage noted with extremely painful to touch. . He does give some hx but not the best historian. He was also noted on arrival to medical floor of having low sugars and was given juice and noted by his [facility] [phone] that he has not been eating well and was cotinued on glipizide 5mg oral daily. Patient is a direct transfer from another [address] [place] [address]  Patient was transferred to this facility for infected BKA to be operated upon by Dr. Gonsalo Batres. Interval history / Subjective:   Patient seen for follow-up of infected ischemic BKA stump. Patient seen and examined earlier this morning by me. Patient had surgery performed yesterday with AKA. Patient continues to have confusion, but is calm.       Assessment & Plan:     Abscess/infection of left BKA  Continue Zosyn and vancomycin  Vascular surgery aware  Surgery done yesterday  We will discontinue antibiotics when appropriate    CKD  Continue to monitor  IV fluids    Anemia of chronic disease  Acutely worsened post surgery  Giving 1 unit of packed red blood cells    Hypertension  Home meds    Colitis  Continue antibiotics    Confusion  Likely multifactorial due to anesthesia from surgery, pain medication, and dementia  Monitor  Frequent reorientation  Family will be coming    The patient's family requested that his paon medicine be decreased to help combat the confusion. I have held norco and added tramadol. Code status: full  DVT prophylaxis: scd    Care Plan discussed with: Patient/Family and Nurse  Anticipated Disposition: SNF/LTC and SAH/Rehab  Anticipated Discharge: Greater than 48 hours     Hospital Problems  Date Reviewed: 4/1/2022          Codes Class Noted POA    Sepsis ([facility] ICD-10-CM: A41.9  ICD-9-CM: 038.9, 995.91  4/1/2022 Unknown        Cellulitis ICD-10-CM: L03.90  ICD-9-CM: 682.9  3/30/2022 Yes        Type 2 diabetes mellitus with chronic kidney disease ([facility] ICD-10-CM: E11.22  ICD-9-CM: 250.40, 585.9  2/17/2022 Yes        Hypertension ICD-10-CM: I10  ICD-9-CM: 401.9  7/21/2020 Yes        Mixed hyperlipidemia ICD-10-CM: E78.2  ICD-9-CM: 272.2  7/21/2020 Yes        GERD (gastroesophageal reflux disease) ICD-10-CM: K21.9  ICD-9-CM: 530.81  7/21/2020 Yes        Hypomagnesemia ICD-10-CM: J72.26  ICD-9-CM: 275.2  7/21/2020 Yes        Benign prostatic hyperplasia ICD-10-CM: N40.0  ICD-9-CM: 600.00  4/26/2017 Yes                Review of Systems:   A comprehensive review of systems was negative except for that written in the HPI. Vital Signs:    Last 24hrs VS reviewed since prior progress note.  Most recent are:  Visit Vitals  BP (!) 92/47 (BP 1 Location: Left upper arm, BP Patient Position: At rest)   Pulse (!) 52   Temp 98.8 °F (37.1 °C)   Resp 24   Ht 6' (1.829 m)   Wt 84.2 kg (185 lb 10 oz)   SpO2 100%   BMI 25.18 kg/m²         Intake/Output Summary (Last 24 hours) at 4/4/2022 1220  Last data filed at 4/4/2022 0730  Gross per 24 hour Intake 2026.25 ml   Output 600 ml   Net 1426.25 ml        Physical Examination:     I had a face to face encounter with this patient and independently examined them on 4/4/2022 as outlined below:          Constitutional:  No acute distress, cooperative, pleasant    ENT:  Oral mucosa moist, oropharynx benign. Resp:  CTA bilaterally. No wheezing/rhonchi/rales. No accessory muscle use   CV:  Regular rhythm, normal rate, no murmurs, gallops, rubs    GI:  Soft, non distended, non tender. normoactive bowel sounds, no hepatosplenomegaly     Musculoskeletal:  No edema, warm, 2+ pulses throughout. Left BKA that is bandaged. Neurologic:  Moves all extremities. AAOx3, CN II-XII reviewed            Data Review:    Review and/or order of clinical lab test  Review and/or order of tests in the radiology section of CPT  Review and/or order of tests in the medicine section of CPT      Labs:     Recent Labs     04/04/22  1138 04/04/22  0245 04/03/22  0905 04/03/22  0905   WBC  --  7.7  --  10.0   HGB  --  7.0*  --  7.4*   HCT 25.6* 23.5*   < > 25.4*   PLT  --  525*  --  539*    < > = values in this interval not displayed. Recent Labs     04/04/22  0245 04/03/22  0905 04/02/22  0505    139 138   K 4.3 4.5 5.0   * 114* 111*   CO2 21 21 21   BUN 27* 34* 43*   CREA 1.24 1.43* 1.57*   * 134* 191*   CA 7.9* 8.9 8.6     No results for input(s): ALT, AP, TBIL, TBILI, TP, ALB, GLOB, GGT, AML, LPSE in the last 72 hours. No lab exists for component: SGOT, GPT, AMYP, HLPSE  No results for input(s): INR, PTP, APTT, INREXT, INREXT in the last 72 hours. No results for input(s): FE, TIBC, PSAT, FERR in the last 72 hours. No results found for: FOL, RBCF   No results for input(s): PH, PCO2, PO2 in the last 72 hours. No results for input(s): CPK, CKNDX, TROIQ in the last 72 hours.     No lab exists for component: CPKMB  Lab Results   Component Value Date/Time    Cholesterol, total 129 08/24/2021 10:31 AM    HDL Cholesterol 36 (L) 08/24/2021 10:31 AM    LDL, calculated 74 08/24/2021 10:31 AM    Triglyceride 102 08/24/2021 10:31 AM     Lab Results   Component Value Date/Time    Glucose (POC) 115 04/04/2022 12:06 PM    Glucose (POC) 120 (H) 04/04/2022 06:39 AM    Glucose (POC) 115 04/03/2022 09:31 PM    Glucose (POC) 101 04/03/2022 05:09 PM    Glucose (POC) 127 (H) 04/03/2022 12:33 PM     Lab Results   Component Value Date/Time    Color Yellow 08/24/2021 10:31 AM    Appearance Clear 08/24/2021 10:31 AM    pH (UA) 7.5 08/24/2021 10:31 AM    Ketone Negative 08/24/2021 10:31 AM    Bilirubin Negative 08/24/2021 10:31 AM    Nitrites Negative 08/24/2021 10:31 AM    Leukocyte Esterase Negative 08/24/2021 10:31 AM    Bacteria None seen 08/24/2021 10:31 AM    WBC 0-5 08/24/2021 10:31 AM    RBC None seen 08/24/2021 10:31 AM         Medications Reviewed:     Current Facility-Administered Medications   Medication Dose Route Frequency    morphine injection 4 mg  4 mg IntraVENous Q2H PRN    traMADoL (ULTRAM) tablet 50 mg  50 mg Oral Q6H PRN    0.9% sodium chloride infusion 250 mL  250 mL IntraVENous PRN    0.9% sodium chloride infusion  75 mL/hr IntraVENous CONTINUOUS    sodium chloride (NS) flush 5-40 mL  5-40 mL IntraVENous Q8H    sodium chloride (NS) flush 5-40 mL  5-40 mL IntraVENous PRN    acetaminophen (TYLENOL) tablet 650 mg  650 mg Oral Q6H PRN    Or    acetaminophen (TYLENOL) suppository 650 mg  650 mg Rectal Q6H PRN    polyethylene glycol (MIRALAX) packet 17 g  17 g Oral DAILY PRN    ondansetron (ZOFRAN) injection 4 mg  4 mg IntraVENous Q6H PRN    famotidine (PEPCID) tablet 20 mg  20 mg Oral Q24H    piperacillin-tazobactam (ZOSYN) 3.375 g in 0.9% sodium chloride (MBP/ADV) 100 mL MBP  3.375 g IntraVENous Q8H    glucose chewable tablet 16 g  4 Tablet Oral PRN    dextrose 10 % infusion 0-250 mL  0-250 mL IntraVENous PRN    glucagon (GLUCAGEN) injection 1 mg  1 mg IntraMUSCular PRN    insulin lispro (HUMALOG) injection   SubCUTAneous AC&HS    atorvastatin (LIPITOR) tablet 80 mg  80 mg Oral QHS    aspirin delayed-release tablet 81 mg  81 mg Oral 7am    dorzolamide (TRUSOPT) 2 % ophthalmic solution 1 Drop  1 Drop Both Eyes BID    ferrous sulfate tablet 325 mg  325 mg Oral DAILY WITH BREAKFAST    [Held by provider] HYDROcodone-acetaminophen (NORCO) 7.5-325 mg per tablet 1 Tablet  1 Tablet Oral Q6H PRN    lactulose (CHRONULAC) 10 gram/15 mL solution 15 mL  10 g Oral BID PRN    latanoprost (XALATAN) 0.005 % ophthalmic solution 1 Drop  1 Drop Both Eyes QHS    magnesium oxide (MAG-OX) tablet 400 mg  400 mg Oral DAILY    metoprolol succinate (TOPROL-XL) XL tablet 25 mg  25 mg Oral DAILY    senna-docusate (PERICOLACE) 8.6-50 mg per tablet 1 Tablet  1 Tablet Oral 7am    pantoprazole (PROTONIX) tablet 40 mg  40 mg Oral QAM    timolol (TIMOPTIC) 0.5 % ophthalmic solution 1 Drop  1 Drop Both Eyes BID     ______________________________________________________________________  EXPECTED LENGTH OF STAY: 2d 12h  ACTUAL LENGTH OF STAY:          Sam Wharton MD

## 2022-04-04 NOTE — PROGRESS NOTES
[facility] Adult  Hospitalist Group                                                                                          Hospitalist Progress Note  Nazia Benito MD  Answering service: [phone] from in house phone        Date of Service:  2022  NAME:  Stacey Allen  :  1935  MRN:  [id number]      Admission Summary:   Patient received via transfer. Per chart: Who presents from [address] [place] [address] where patient has been since his most recent surgery of left BKA done by Dr. Roger Rothman on 3/21 secondary to continued poor peripheral circulation after failed attempts at revascularization and was discharged to skilled rehab at [address] [place] [address] and rehab on 3/25. . It was noted that surgical site has staples in place but foul smelling discharge with redness at suture site. Dressing changed in the ED and was very painful for the patient and didn't allow me to open it but on evaluation of left BKA with surgeon on 3/31 found to have foul smelling purulent drainage noted with extremely painful to touch. . He does give some hx but not the best historian. He was also noted on arrival to medical floor of having low sugars and was given juice and noted by his [facility] [phone] that he has not been eating well and was cotinued on glipizide 5mg oral daily. Patient is a direct transfer from another [address] [place] [address]  Patient was transferred to this facility for infected BKA to be operated upon by Dr. Wandy Ruiz. Interval history / Subjective:   Patient seen for follow-up of infected ischemic BKA stump. Patient seen and examined earlier this morning by me. Patient is calm today but is still confused. He does not seem to understand that he is pulling at his lines.      Assessment & Plan:     Abscess/infection of left BKA  Continue Zosyn and vancomycin  Vascular surgery aware  Surgery done yesterday  We will discontinue antibiotics when appropriate  Stopping vanc and will stop zosyn tomorrow    CKD  Continue to monitor  IV fluids    Anemia of chronic disease  Acutely worsened post surgery  Giving 1 unit of packed red blood cells  Recheck HH   Will likely need another transfusion today    Hypertension  Home meds    Colitis  Continue antibiotics    Confusion  Likely multifactorial due to anesthesia from surgery, pain medication, and dementia  Monitor  Frequent reorientation  Restraints had to be added as the patient continues to pull out leads and lines. I have reoriented him multiple times today and it only works temporarily. He will likely need another transfusion, so I am putting temporary restraints. He is stable at this time. Code status: full  DVT prophylaxis: scd    Care Plan discussed with: Patient/Family and Nurse  Anticipated Disposition: SNF/LTC and SAH/Rehab  Anticipated Discharge: Greater than 48 hours     Hospital Problems  Date Reviewed: 4/1/2022          Codes Class Noted POA    Sepsis ([facility] ICD-10-CM: A41.9  ICD-9-CM: 038.9, 995.91  4/1/2022 Unknown        Cellulitis ICD-10-CM: L03.90  ICD-9-CM: 682.9  3/30/2022 Yes        Type 2 diabetes mellitus with chronic kidney disease ([facility] ICD-10-CM: E11.22  ICD-9-CM: 250.40, 585.9  2/17/2022 Yes        Hypertension ICD-10-CM: I10  ICD-9-CM: 401.9  7/21/2020 Yes        Mixed hyperlipidemia ICD-10-CM: E78.2  ICD-9-CM: 272.2  7/21/2020 Yes        GERD (gastroesophageal reflux disease) ICD-10-CM: K21.9  ICD-9-CM: 530.81  7/21/2020 Yes        Hypomagnesemia ICD-10-CM: H53.65  ICD-9-CM: 275.2  7/21/2020 Yes        Benign prostatic hyperplasia ICD-10-CM: N40.0  ICD-9-CM: 600.00  4/26/2017 Yes                Review of Systems:   A comprehensive review of systems was negative except for that written in the HPI. Vital Signs:    Last 24hrs VS reviewed since prior progress note.  Most recent are:  Visit Vitals  BP (!) 92/47 (BP 1 Location: Left upper arm, BP Patient Position: At rest)   Pulse (!) 52   Temp 98.8 °F (37.1 °C)   Resp 24   Ht 6' (1.829 m)   Wt 84.2 kg (185 lb 10 oz)   SpO2 100%   BMI 25.18 kg/m²         Intake/Output Summary (Last 24 hours) at 4/4/2022 1221  Last data filed at 4/4/2022 0730  Gross per 24 hour   Intake 2026.25 ml   Output 600 ml   Net 1426.25 ml        Physical Examination:     I had a face to face encounter with this patient and independently examined them on 4/4/2022 as outlined below:          Constitutional:  No acute distress, cooperative, pleasant    ENT:  Oral mucosa moist, oropharynx benign. Resp:  CTA bilaterally. No wheezing/rhonchi/rales. No accessory muscle use   CV:  Regular rhythm, normal rate, no murmurs, gallops, rubs    GI:  Soft, non distended, non tender. normoactive bowel sounds, no hepatosplenomegaly     Musculoskeletal:  No edema, warm, 2+ pulses throughout. Left BKA incision looks good. Neurologic:  Moves all extremities. AAOx3, CN II-XII reviewed            Data Review:    Review and/or order of clinical lab test  Review and/or order of tests in the radiology section of CPT  Review and/or order of tests in the medicine section of CPT      Labs:     Recent Labs     04/04/22  1138 04/04/22 0245 04/03/22  0905 04/03/22  0905   WBC  --  7.7  --  10.0   HGB  --  7.0*  --  7.4*   HCT 25.6* 23.5*   < > 25.4*   PLT  --  525*  --  539*    < > = values in this interval not displayed. Recent Labs     04/04/22  0245 04/03/22  0905 04/02/22  0505    139 138   K 4.3 4.5 5.0   * 114* 111*   CO2 21 21 21   BUN 27* 34* 43*   CREA 1.24 1.43* 1.57*   * 134* 191*   CA 7.9* 8.9 8.6     No results for input(s): ALT, AP, TBIL, TBILI, TP, ALB, GLOB, GGT, AML, LPSE in the last 72 hours. No lab exists for component: SGOT, GPT, AMYP, HLPSE  No results for input(s): INR, PTP, APTT, INREXT, INREXT in the last 72 hours. No results for input(s): FE, TIBC, PSAT, FERR in the last 72 hours.    No results found for: FOL, RBCF   No results for input(s): PH, PCO2, PO2 in the last 72 hours. No results for input(s): CPK, CKNDX, TROIQ in the last 72 hours.     No lab exists for component: CPKMB  Lab Results   Component Value Date/Time    Cholesterol, total 129 08/24/2021 10:31 AM    HDL Cholesterol 36 (L) 08/24/2021 10:31 AM    LDL, calculated 74 08/24/2021 10:31 AM    Triglyceride 102 08/24/2021 10:31 AM     Lab Results   Component Value Date/Time    Glucose (POC) 115 04/04/2022 12:06 PM    Glucose (POC) 120 (H) 04/04/2022 06:39 AM    Glucose (POC) 115 04/03/2022 09:31 PM    Glucose (POC) 101 04/03/2022 05:09 PM    Glucose (POC) 127 (H) 04/03/2022 12:33 PM     Lab Results   Component Value Date/Time    Color Yellow 08/24/2021 10:31 AM    Appearance Clear 08/24/2021 10:31 AM    pH (UA) 7.5 08/24/2021 10:31 AM    Ketone Negative 08/24/2021 10:31 AM    Bilirubin Negative 08/24/2021 10:31 AM    Nitrites Negative 08/24/2021 10:31 AM    Leukocyte Esterase Negative 08/24/2021 10:31 AM    Bacteria None seen 08/24/2021 10:31 AM    WBC 0-5 08/24/2021 10:31 AM    RBC None seen 08/24/2021 10:31 AM         Medications Reviewed:     Current Facility-Administered Medications   Medication Dose Route Frequency    morphine injection 4 mg  4 mg IntraVENous Q2H PRN    traMADoL (ULTRAM) tablet 50 mg  50 mg Oral Q6H PRN    0.9% sodium chloride infusion 250 mL  250 mL IntraVENous PRN    0.9% sodium chloride infusion  75 mL/hr IntraVENous CONTINUOUS    sodium chloride (NS) flush 5-40 mL  5-40 mL IntraVENous Q8H    sodium chloride (NS) flush 5-40 mL  5-40 mL IntraVENous PRN    acetaminophen (TYLENOL) tablet 650 mg  650 mg Oral Q6H PRN    Or    acetaminophen (TYLENOL) suppository 650 mg  650 mg Rectal Q6H PRN    polyethylene glycol (MIRALAX) packet 17 g  17 g Oral DAILY PRN    ondansetron (ZOFRAN) injection 4 mg  4 mg IntraVENous Q6H PRN    famotidine (PEPCID) tablet 20 mg  20 mg Oral Q24H    piperacillin-tazobactam (ZOSYN) 3.375 g in 0.9% sodium chloride (MBP/ADV) 100 mL MBP  3.375 g IntraVENous Q8H  glucose chewable tablet 16 g  4 Tablet Oral PRN    dextrose 10 % infusion 0-250 mL  0-250 mL IntraVENous PRN    glucagon (GLUCAGEN) injection 1 mg  1 mg IntraMUSCular PRN    insulin lispro (HUMALOG) injection   SubCUTAneous AC&HS    atorvastatin (LIPITOR) tablet 80 mg  80 mg Oral QHS    aspirin delayed-release tablet 81 mg  81 mg Oral 7am    dorzolamide (TRUSOPT) 2 % ophthalmic solution 1 Drop  1 Drop Both Eyes BID    ferrous sulfate tablet 325 mg  325 mg Oral DAILY WITH BREAKFAST    [Held by provider] HYDROcodone-acetaminophen (NORCO) 7.5-325 mg per tablet 1 Tablet  1 Tablet Oral Q6H PRN    lactulose (CHRONULAC) 10 gram/15 mL solution 15 mL  10 g Oral BID PRN    latanoprost (XALATAN) 0.005 % ophthalmic solution 1 Drop  1 Drop Both Eyes QHS    magnesium oxide (MAG-OX) tablet 400 mg  400 mg Oral DAILY    metoprolol succinate (TOPROL-XL) XL tablet 25 mg  25 mg Oral DAILY    senna-docusate (PERICOLACE) 8.6-50 mg per tablet 1 Tablet  1 Tablet Oral 7am    pantoprazole (PROTONIX) tablet 40 mg  40 mg Oral QAM    timolol (TIMOPTIC) 0.5 % ophthalmic solution 1 Drop  1 Drop Both Eyes BID     ______________________________________________________________________  EXPECTED LENGTH OF STAY: 2d 12h  ACTUAL LENGTH OF STAY:          Sam Myers MD

## 2022-04-04 NOTE — PROGRESS NOTES
8PM- Bedside and Verbal shift change report given to Ana Abad (oncoming nurse) by Kimi Marsh (offgoing nurse). Report included the following information SBAR, Kardex, Intake/Output, MAR, Recent Results and Cardiac Rhythm V-paced/SB. 12:30AM- Bedside and Verbal shift change report given to [address] [place] [address] (oncoming nurse) by Ana Abad (offgoing nurse). Report included the following information SBAR, Kardex, Intake/Output, MAR and Cardiac Rhythm V-Paced/SB. Patient continues to require bilateral soft restraints, still confused and attempts to pull out lines when awake. Patient asleep on final assessment.

## 2022-04-04 NOTE — PROGRESS NOTES
Occupational Therapy   [phone]    Chart reviewed in prep for OT evaluation. RN and PCT at bedside completing bedside care. Will defer and f/u as able. Thank you. Meeta Mancia MS, OTR/L

## 2022-04-04 NOTE — PROGRESS NOTES
Vascular:    Awake and responsive, denies pain    Left AKA incision OK    Continue current care, consult PT/OT. Should be able to return to SNF later this week.

## 2022-04-04 NOTE — PROGRESS NOTES
Bedside shift change report given to [facility] (oncoming nurse) by Beatrice Iniguez (offgoing nurse). Report included the following information SBAR, Intake/Output, MAR, Recent Results, Med Rec Status and Cardiac Rhythm V paced.

## 2022-04-05 LAB
ABO + RH BLD: NORMAL
ANION GAP SERPL CALC-SCNC: 3 MMOL/L (ref 5–15)
BACTERIA SPEC CULT: NORMAL
BLD PROD TYP BPU: NORMAL
BLD PROD TYP BPU: NORMAL
BLOOD GROUP ANTIBODIES SERPL: NORMAL
BPU ID: NORMAL
BPU ID: NORMAL
BUN SERPL-MCNC: 19 MG/DL (ref 6–20)
BUN/CREAT SERPL: 17 (ref 12–20)
CALCIUM SERPL-MCNC: 8.4 MG/DL (ref 8.5–10.1)
CHLORIDE SERPL-SCNC: 112 MMOL/L (ref 97–108)
CO2 SERPL-SCNC: 19 MMOL/L (ref 21–32)
COMMENT, HOLDF: NORMAL
CREAT SERPL-MCNC: 1.09 MG/DL (ref 0.7–1.3)
CROSSMATCH RESULT,%XM: NORMAL
CROSSMATCH RESULT,%XM: NORMAL
GLUCOSE BLD STRIP.AUTO-MCNC: 120 MG/DL (ref 65–117)
GLUCOSE BLD STRIP.AUTO-MCNC: 138 MG/DL (ref 65–117)
GLUCOSE BLD STRIP.AUTO-MCNC: 139 MG/DL (ref 65–117)
GLUCOSE BLD STRIP.AUTO-MCNC: 157 MG/DL (ref 65–117)
GLUCOSE SERPL-MCNC: 104 MG/DL (ref 65–100)
HCT VFR BLD AUTO: 33.6 % (ref 36.6–50.3)
HGB BLD-MCNC: 10.3 G/DL (ref 12.1–17)
POTASSIUM SERPL-SCNC: 5.3 MMOL/L (ref 3.5–5.1)
SAMPLES BEING HELD,HOLD: NORMAL
SERVICE CMNT-IMP: ABNORMAL
SODIUM SERPL-SCNC: 134 MMOL/L (ref 136–145)
SPECIAL REQUESTS,SREQ: NORMAL
SPECIMEN EXP DATE BLD: NORMAL
STATUS OF UNIT,%ST: NORMAL
STATUS OF UNIT,%ST: NORMAL
UNIT DIVISION, %UDIV: 0
UNIT DIVISION, %UDIV: 0

## 2022-04-05 PROCEDURE — 74011250637 HC RX REV CODE- 250/637

## 2022-04-05 PROCEDURE — 65660000000 HC RM CCU STEPDOWN

## 2022-04-05 PROCEDURE — 82962 GLUCOSE BLOOD TEST: CPT

## 2022-04-05 PROCEDURE — 80048 BASIC METABOLIC PNL TOTAL CA: CPT

## 2022-04-05 PROCEDURE — 74011250636 HC RX REV CODE- 250/636

## 2022-04-05 PROCEDURE — 74011250636 HC RX REV CODE- 250/636: Performed by: STUDENT IN AN ORGANIZED HEALTH CARE EDUCATION/TRAINING PROGRAM

## 2022-04-05 PROCEDURE — 85018 HEMOGLOBIN: CPT

## 2022-04-05 PROCEDURE — 74011000250 HC RX REV CODE- 250

## 2022-04-05 PROCEDURE — 74011000258 HC RX REV CODE- 258

## 2022-04-05 PROCEDURE — 97166 OT EVAL MOD COMPLEX 45 MIN: CPT

## 2022-04-05 PROCEDURE — 97535 SELF CARE MNGMENT TRAINING: CPT

## 2022-04-05 PROCEDURE — 36415 COLL VENOUS BLD VENIPUNCTURE: CPT

## 2022-04-05 RX ORDER — HYDRALAZINE HYDROCHLORIDE 20 MG/ML
10 INJECTION INTRAMUSCULAR; INTRAVENOUS
Status: DISCONTINUED | OUTPATIENT
Start: 2022-04-05 | End: 2022-04-06 | Stop reason: HOSPADM

## 2022-04-05 RX ADMIN — TIMOLOL MALEATE 1 DROP: 5 SOLUTION/ DROPS OPHTHALMIC at 09:07

## 2022-04-05 RX ADMIN — SODIUM CHLORIDE, PRESERVATIVE FREE 10 ML: 5 INJECTION INTRAVENOUS at 14:35

## 2022-04-05 RX ADMIN — FERROUS SULFATE TAB 325 MG (65 MG ELEMENTAL FE) 325 MG: 325 (65 FE) TAB at 09:07

## 2022-04-05 RX ADMIN — Medication: at 09:07

## 2022-04-05 RX ADMIN — SODIUM CHLORIDE, PRESERVATIVE FREE 10 ML: 5 INJECTION INTRAVENOUS at 22:09

## 2022-04-05 RX ADMIN — Medication: at 19:20

## 2022-04-05 RX ADMIN — ASPIRIN 81 MG: 81 TABLET, COATED ORAL at 07:40

## 2022-04-05 RX ADMIN — FAMOTIDINE 20 MG: 20 TABLET ORAL at 09:07

## 2022-04-05 RX ADMIN — PANTOPRAZOLE SODIUM 40 MG: 40 TABLET, DELAYED RELEASE ORAL at 09:07

## 2022-04-05 RX ADMIN — ACETAMINOPHEN 650 MG: 325 TABLET ORAL at 09:07

## 2022-04-05 RX ADMIN — HYDRALAZINE HYDROCHLORIDE 10 MG: 20 INJECTION, SOLUTION INTRAMUSCULAR; INTRAVENOUS at 14:28

## 2022-04-05 RX ADMIN — Medication: at 22:10

## 2022-04-05 RX ADMIN — TIMOLOL MALEATE 1 DROP: 5 SOLUTION/ DROPS OPHTHALMIC at 19:20

## 2022-04-05 RX ADMIN — PIPERACILLIN SODIUM AND TAZOBACTAM SODIUM 3.38 G: 3; 375 INJECTION, POWDER, FOR SOLUTION INTRAVENOUS at 02:35

## 2022-04-05 RX ADMIN — SODIUM CHLORIDE, PRESERVATIVE FREE 10 ML: 5 INJECTION INTRAVENOUS at 07:39

## 2022-04-05 RX ADMIN — ATORVASTATIN CALCIUM 80 MG: 40 TABLET, FILM COATED ORAL at 22:09

## 2022-04-05 RX ADMIN — DORZOLAMIDE HYDROCHLORIDE 1 DROP: 20 SOLUTION/ DROPS OPHTHALMIC at 19:20

## 2022-04-05 RX ADMIN — DORZOLAMIDE HYDROCHLORIDE 1 DROP: 20 SOLUTION/ DROPS OPHTHALMIC at 09:07

## 2022-04-05 RX ADMIN — Medication 400 MG: at 09:07

## 2022-04-05 NOTE — PROGRESS NOTES
FRANCOIS:  1. RUR-19%  2. Return to [facility] when stable for discharge. 3. Vascular surgery following. CM participated in IDRs, per conversation the patient is having confusion. Vascular surgery will clear the patient when he is ready for discharge.     William Mercado [facility]

## 2022-04-05 NOTE — PROGRESS NOTES
Critical Result Notification    Received and verbally repeated the following test results CO2 41 ON 04/05/2022 AT 0300 FROM LAB     CASEY Anthony A.MD   On call provider notified    Additional comments: Oksana Snow RN

## 2022-04-05 NOTE — PROGRESS NOTES
BSSR completed. Report given to [facility] on coming RN.  Report includes but not limited to SBAR, Kardex, procedures, intake/output, and MAR    .      - Patient rests comfortably in bed  - Patient's bed is locked and in lowest position  - No complaints at this time, all needs and requests addressed  - Call bell with in reach  Rounds with intent completed throughout shift as hourly, scheduled and PRN nursing care
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
## 2022-04-05 NOTE — PROGRESS NOTES
[address] [facility] Adult  Hospitalist Group                                                                                          Hospitalist Progress Note  Christina Anderson MD  Answering service: [phone] from in house phone        Date of Service:  2022  NAME:  Andry Palacios  :  1935  MRN:  [id number]      Admission Summary:   Patient received via transfer. Per chart: Who presents from [facility] where patient has been since his most recent surgery of left BKA done by Dr. Chioma Castellano on 3/21 secondary to continued poor peripheral circulation after failed attempts at revascularization and was discharged to skilled rehab at [place] [address] and rehab on 3/25. . It was noted that surgical site has staples in place but foul smelling discharge with redness at suture site. Dressing changed in the ED and was very painful for the patient and didn't allow me to open it but on evaluation of left BKA with surgeon on 3/31 found to have foul smelling purulent drainage noted with extremely painful to touch. . He does give some hx but not the best historian. He was also noted on arrival to medical floor of having low sugars and was given juice and noted by his [address] [phone] that he has not been eating well and was cotinued on glipizide 5mg oral daily. Patient is a direct transfer from another [address] [place] [address]  Patient was transferred to this facility for infected BKA to be operated upon by Dr. Caden Toscano. Interval history / Subjective:   Patient seen for follow-up of infected ischemic BKA stump. Patient seen and examined earlier this morning by me. Patient continues to have confusion, but no complaints of pain or other.       Assessment & Plan:     Abscess/infection of left BKA  Continue Zosyn and vancomycin  Vascular surgery aware  Surgery done yesterday  We will discontinue antibiotics when appropriate  Stopping vanc and will stop zosyn tomorrow  Antibiotics stopped    CKD  Continue to monitor  IV fluids    Anemia of chronic disease  Acutely worsened post surgery  Has received 2 units PRBC total during stay  Check AM labs    Hypertension  Home meds    Colitis  Continue antibiotics    Confusion  Likely multifactorial due to anesthesia from surgery, pain medication, and dementia  Monitor  Frequent reorientation      Code status: full  DVT prophylaxis: scd    Care Plan discussed with: Patient/Family and Nurse  Anticipated Disposition: SNF/LTC and [facility]/Rehab  Anticipated Discharge: Greater than 48 hours     Hospital Problems  Date Reviewed: 4/1/2022          Codes Class Noted POA    Sepsis ([facility] ICD-10-CM: A41.9  ICD-9-CM: 038.9, 995.91  4/1/2022 Unknown        Cellulitis ICD-10-CM: L03.90  ICD-9-CM: 682.9  3/30/2022 Yes        Type 2 diabetes mellitus with chronic kidney disease ([facility] ICD-10-CM: E11.22  ICD-9-CM: 250.40, 585.9  2/17/2022 Yes        Hypertension ICD-10-CM: I10  ICD-9-CM: 401.9  7/21/2020 Yes        Mixed hyperlipidemia ICD-10-CM: E78.2  ICD-9-CM: 272.2  7/21/2020 Yes        GERD (gastroesophageal reflux disease) ICD-10-CM: K21.9  ICD-9-CM: 530.81  7/21/2020 Yes        Hypomagnesemia ICD-10-CM: E94.24  ICD-9-CM: 275.2  7/21/2020 Yes        Benign prostatic hyperplasia ICD-10-CM: N40.0  ICD-9-CM: 600.00  4/26/2017 Yes                Review of Systems:   A comprehensive review of systems was negative except for that written in the HPI. Vital Signs:    Last 24hrs VS reviewed since prior progress note.  Most recent are:  Visit Vitals  BP (!) 170/66   Pulse (!) 50   Temp 97.7 °F (36.5 °C)   Resp 18   Ht 6' (1.829 m)   Wt 84.2 kg (185 lb 10 oz)   SpO2 99%   BMI 25.18 kg/m²         Intake/Output Summary (Last 24 hours) at 4/5/2022 1512  Last data filed at 4/5/2022 1217  Gross per 24 hour   Intake 912.1 ml   Output 1301 ml   Net -388.9 ml        Physical Examination:     I had a face to face encounter with this patient and independently examined them on 4/5/2022 as outlined below:          Constitutional:  No acute distress, cooperative, pleasant    ENT:  Oral mucosa moist, oropharynx benign. Resp:  CTA bilaterally. No wheezing/rhonchi/rales. No accessory muscle use   CV:  Regular rhythm, normal rate, no murmurs, gallops, rubs    GI:  Soft, non distended, non tender. normoactive bowel sounds, no hepatosplenomegaly     Musculoskeletal:  No edema, warm, 2+ pulses throughout. Left BKA incision looks good. Neurologic:  Moves all extremities. AAOx3, CN II-XII reviewed            Data Review:    Review and/or order of clinical lab test  Review and/or order of tests in the radiology section of CPT  Review and/or order of tests in the medicine section of CPT      Labs:     Recent Labs     04/05/22  0953 04/04/22  2203 04/04/22  1138 04/04/22  0245 04/03/22  0905 04/03/22  0905   WBC  --   --   --  7.7  --  10.0   HGB 10.3* 9.5*  --  6.8*  7.0*   < > 7.4*   HCT 33.6* 31.8*   < > 23.5*   < > 25.4*   PLT  --   --   --  525*  --  539*    < > = values in this interval not displayed. Recent Labs     04/05/22  0709 04/04/22  0245 04/03/22  0905   * 140 139   K 5.3* 4.3 4.5   * 115* 114*   CO2 19* 21 21   BUN 19 27* 34*   CREA 1.09 1.24 1.43*   * 108* 134*   CA 8.4* 7.9* 8.9     No results for input(s): ALT, AP, TBIL, TBILI, TP, ALB, GLOB, GGT, AML, LPSE in the last 72 hours. No lab exists for component: SGOT, GPT, AMYP, HLPSE  No results for input(s): INR, PTP, APTT, INREXT, INREXT in the last 72 hours. No results for input(s): FE, TIBC, PSAT, FERR in the last 72 hours. No results found for: FOL, RBCF   No results for input(s): PH, PCO2, PO2 in the last 72 hours. No results for input(s): CPK, CKNDX, TROIQ in the last 72 hours.     No lab exists for component: CPKMB  Lab Results   Component Value Date/Time    Cholesterol, total 129 08/24/2021 10:31 AM    HDL Cholesterol 36 (L) 08/24/2021 10:31 AM    LDL, calculated 74 08/24/2021 10:31 AM    Triglyceride 102 08/24/2021 10:31 AM     Lab Results   Component Value Date/Time    Glucose (POC) 138 (H) 04/05/2022 02:23 PM    Glucose (POC) 120 (H) 04/05/2022 07:03 AM    Glucose (POC) 93 04/04/2022 10:00 PM    Glucose (POC) 109 04/04/2022 09:24 PM    Glucose (POC) 97 04/04/2022 05:39 PM     Lab Results   Component Value Date/Time    Color Yellow 08/24/2021 10:31 AM    Appearance Clear 08/24/2021 10:31 AM    pH (UA) 7.5 08/24/2021 10:31 AM    Ketone Negative 08/24/2021 10:31 AM    Bilirubin Negative 08/24/2021 10:31 AM    Nitrites Negative 08/24/2021 10:31 AM    Leukocyte Esterase Negative 08/24/2021 10:31 AM    Bacteria None seen 08/24/2021 10:31 AM    WBC 0-5 08/24/2021 10:31 AM    RBC None seen 08/24/2021 10:31 AM         Medications Reviewed:     Current Facility-Administered Medications   Medication Dose Route Frequency    hydrALAZINE (APRESOLINE) 20 mg/mL injection 10 mg  10 mg IntraVENous Q6H PRN    morphine injection 4 mg  4 mg IntraVENous Q2H PRN    balsam peru-castor oiL (VENELEX) ointment   Topical TID    0.9% sodium chloride infusion 250 mL  250 mL IntraVENous PRN    traMADoL (ULTRAM) tablet 50 mg  50 mg Oral Q6H PRN    0.9% sodium chloride infusion 250 mL  250 mL IntraVENous PRN    0.9% sodium chloride infusion  75 mL/hr IntraVENous CONTINUOUS    sodium chloride (NS) flush 5-40 mL  5-40 mL IntraVENous Q8H    sodium chloride (NS) flush 5-40 mL  5-40 mL IntraVENous PRN    acetaminophen (TYLENOL) tablet 650 mg  650 mg Oral Q6H PRN    Or    acetaminophen (TYLENOL) suppository 650 mg  650 mg Rectal Q6H PRN    polyethylene glycol (MIRALAX) packet 17 g  17 g Oral DAILY PRN    ondansetron (ZOFRAN) injection 4 mg  4 mg IntraVENous Q6H PRN    famotidine (PEPCID) tablet 20 mg  20 mg Oral Q24H    glucose chewable tablet 16 g  4 Tablet Oral PRN    dextrose 10 % infusion 0-250 mL  0-250 mL IntraVENous PRN    glucagon (GLUCAGEN) injection 1 mg  1 mg IntraMUSCular PRN    insulin lispro (HUMALOG) injection   SubCUTAneous AC&HS    atorvastatin (LIPITOR) tablet 80 mg  80 mg Oral QHS    aspirin delayed-release tablet 81 mg  81 mg Oral 7am    dorzolamide (TRUSOPT) 2 % ophthalmic solution 1 Drop  1 Drop Both Eyes BID    ferrous sulfate tablet 325 mg  325 mg Oral DAILY WITH BREAKFAST    lactulose (CHRONULAC) 10 gram/15 mL solution 15 mL  10 g Oral BID PRN    latanoprost (XALATAN) 0.005 % ophthalmic solution 1 Drop  1 Drop Both Eyes QHS    magnesium oxide (MAG-OX) tablet 400 mg  400 mg Oral DAILY    metoprolol succinate (TOPROL-XL) XL tablet 25 mg  25 mg Oral DAILY    senna-docusate (PERICOLACE) 8.6-50 mg per tablet 1 Tablet  1 Tablet Oral 7am    pantoprazole (PROTONIX) tablet 40 mg  40 mg Oral QAM    timolol (TIMOPTIC) 0.5 % ophthalmic solution 1 Drop  1 Drop Both Eyes BID     ______________________________________________________________________  EXPECTED LENGTH OF STAY: 2d 21h  ACTUAL LENGTH OF STAY:          [facility] Avi Hunter MD

## 2022-04-06 VITALS
WEIGHT: 194.89 LBS | OXYGEN SATURATION: 97 % | HEIGHT: 72 IN | TEMPERATURE: 98.1 F | DIASTOLIC BLOOD PRESSURE: 67 MMHG | HEART RATE: 53 BPM | RESPIRATION RATE: 20 BRPM | BODY MASS INDEX: 26.4 KG/M2 | SYSTOLIC BLOOD PRESSURE: 159 MMHG

## 2022-04-06 LAB
ANION GAP SERPL CALC-SCNC: 7 MMOL/L (ref 5–15)
BASOPHILS # BLD: 0 K/UL (ref 0–0.1)
BASOPHILS NFR BLD: 0 % (ref 0–1)
BUN SERPL-MCNC: 12 MG/DL (ref 6–20)
BUN/CREAT SERPL: 15 (ref 12–20)
CALCIUM SERPL-MCNC: 8.1 MG/DL (ref 8.5–10.1)
CHLORIDE SERPL-SCNC: 106 MMOL/L (ref 97–108)
CO2 SERPL-SCNC: 22 MMOL/L (ref 21–32)
CREAT SERPL-MCNC: 0.82 MG/DL (ref 0.7–1.3)
DIFFERENTIAL METHOD BLD: ABNORMAL
EOSINOPHIL # BLD: 0.1 K/UL (ref 0–0.4)
EOSINOPHIL NFR BLD: 1 % (ref 0–7)
ERYTHROCYTE [DISTWIDTH] IN BLOOD BY AUTOMATED COUNT: 15.5 % (ref 11.5–14.5)
GLUCOSE BLD STRIP.AUTO-MCNC: 114 MG/DL (ref 65–117)
GLUCOSE BLD STRIP.AUTO-MCNC: 124 MG/DL (ref 65–117)
GLUCOSE SERPL-MCNC: 130 MG/DL (ref 65–100)
HCT VFR BLD AUTO: 31.6 % (ref 36.6–50.3)
HGB BLD-MCNC: 9.9 G/DL (ref 12.1–17)
IMM GRANULOCYTES # BLD AUTO: 0 K/UL
IMM GRANULOCYTES NFR BLD AUTO: 0 %
LYMPHOCYTES # BLD: 0.7 K/UL (ref 0.8–3.5)
LYMPHOCYTES NFR BLD: 9 % (ref 12–49)
MCH RBC QN AUTO: 27.4 PG (ref 26–34)
MCHC RBC AUTO-ENTMCNC: 31.3 G/DL (ref 30–36.5)
MCV RBC AUTO: 87.5 FL (ref 80–99)
MONOCYTES # BLD: 0.3 K/UL (ref 0–1)
MONOCYTES NFR BLD: 4 % (ref 5–13)
MYELOCYTES NFR BLD MANUAL: 1 %
NEUTS SEG # BLD: 6.8 K/UL (ref 1.8–8)
NEUTS SEG NFR BLD: 85 % (ref 32–75)
NRBC # BLD: 0 K/UL (ref 0–0.01)
NRBC BLD-RTO: 0 PER 100 WBC
PLATELET # BLD AUTO: 597 K/UL (ref 150–400)
PMV BLD AUTO: 9.8 FL (ref 8.9–12.9)
POTASSIUM SERPL-SCNC: 3.2 MMOL/L (ref 3.5–5.1)
RBC # BLD AUTO: 3.61 M/UL (ref 4.1–5.7)
RBC MORPH BLD: ABNORMAL
SERVICE CMNT-IMP: ABNORMAL
SERVICE CMNT-IMP: NORMAL
SODIUM SERPL-SCNC: 135 MMOL/L (ref 136–145)
WBC # BLD AUTO: 8 K/UL (ref 4.1–11.1)

## 2022-04-06 PROCEDURE — 80048 BASIC METABOLIC PNL TOTAL CA: CPT

## 2022-04-06 PROCEDURE — 74011250637 HC RX REV CODE- 250/637: Performed by: FAMILY MEDICINE

## 2022-04-06 PROCEDURE — 82962 GLUCOSE BLOOD TEST: CPT

## 2022-04-06 PROCEDURE — 74011000250 HC RX REV CODE- 250

## 2022-04-06 PROCEDURE — 97530 THERAPEUTIC ACTIVITIES: CPT | Performed by: PHYSICAL THERAPIST

## 2022-04-06 PROCEDURE — 74011250637 HC RX REV CODE- 250/637

## 2022-04-06 PROCEDURE — 85025 COMPLETE CBC W/AUTO DIFF WBC: CPT

## 2022-04-06 PROCEDURE — 36415 COLL VENOUS BLD VENIPUNCTURE: CPT

## 2022-04-06 RX ORDER — AMLODIPINE BESYLATE 5 MG/1
5 TABLET ORAL DAILY
Qty: 30 TABLET | Refills: 0 | Status: SHIPPED | OUTPATIENT
Start: 2022-04-06 | End: 2022-05-06

## 2022-04-06 RX ORDER — POTASSIUM CHLORIDE 750 MG/1
40 TABLET, FILM COATED, EXTENDED RELEASE ORAL
Status: COMPLETED | OUTPATIENT
Start: 2022-04-06 | End: 2022-04-06

## 2022-04-06 RX ORDER — AMLODIPINE BESYLATE 5 MG/1
5 TABLET ORAL DAILY
Status: DISCONTINUED | OUTPATIENT
Start: 2022-04-06 | End: 2022-04-06 | Stop reason: HOSPADM

## 2022-04-06 RX ADMIN — AMLODIPINE BESYLATE 5 MG: 5 TABLET ORAL at 13:25

## 2022-04-06 RX ADMIN — Medication: at 10:53

## 2022-04-06 RX ADMIN — LATANOPROST 1 DROP: 50 SOLUTION OPHTHALMIC at 00:14

## 2022-04-06 RX ADMIN — ACETAMINOPHEN 650 MG: 325 TABLET ORAL at 10:50

## 2022-04-06 RX ADMIN — PANTOPRAZOLE SODIUM 40 MG: 40 TABLET, DELAYED RELEASE ORAL at 10:50

## 2022-04-06 RX ADMIN — ASPIRIN 81 MG: 81 TABLET, COATED ORAL at 07:01

## 2022-04-06 RX ADMIN — POTASSIUM CHLORIDE 40 MEQ: 750 TABLET, EXTENDED RELEASE ORAL at 13:25

## 2022-04-06 RX ADMIN — FERROUS SULFATE TAB 325 MG (65 MG ELEMENTAL FE) 325 MG: 325 (65 FE) TAB at 10:50

## 2022-04-06 RX ADMIN — TIMOLOL MALEATE 1 DROP: 5 SOLUTION/ DROPS OPHTHALMIC at 10:51

## 2022-04-06 RX ADMIN — Medication 400 MG: at 10:50

## 2022-04-06 RX ADMIN — DORZOLAMIDE HYDROCHLORIDE 1 DROP: 20 SOLUTION/ DROPS OPHTHALMIC at 10:51

## 2022-04-06 RX ADMIN — SODIUM CHLORIDE, PRESERVATIVE FREE 10 ML: 5 INJECTION INTRAVENOUS at 07:00

## 2022-04-06 NOTE — DISCHARGE SUMMARY
Discharge Summary       PATIENT ID: Terri Way  MRN: [id number]   YOB: 1935    DATE OF ADMISSION: 3/31/2022 10:54 PM    DATE OF DISCHARGE:   PRIMARY CARE PROVIDER: Sarah Duque NP       DISCHARGING PHYSICIAN: Yancy Bull MD    To contact this individual call [phone] and ask the  to page. If unavailable ask to be transferred the Adult Hospitalist Department. CONSULTATIONS: None    PROCEDURES/SURGERIES: Procedure(s):  LEFT AMPUTATION KNEE(AKA)    ADMITTING DIAGNOSES & HOSPITAL COURSE:   Admission summery and hospital course:  Patient received via transfer. Per chart: Who presents from [address] [place] [address] where patient has been since his most recent surgery of left BKA done by Dr. Derick Walter on 3/21 secondary to continued poor peripheral circulation after failed attempts at revascularization and was discharged to skilled rehab at [address] [place] [address] and rehab on 3/25. . It was noted that surgical site has staples in place but foul smelling discharge with redness at suture site. Dressing changed in the ED and was very painful for the patient and didn't allow me to open it but on evaluation of left BKA with surgeon on 3/31 found to have foul smelling purulent drainage noted with extremely painful to touch. He does give some hx but not the best historian. He was also noted on arrival to medical floor of having low sugars and was given juice and noted by his [facility] [phone] that he has not been eating well and was cotinued on glipizide 5mg oral daily.      Patient is a direct transfer from another [employer] facility. Patient was transferred to this facility for infected BKA to be operated upon by Dr. Darcy Stuart. 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 Patient received Zosyn and vancomycin. Appreciated vascular surgery input.   Patient is afebrile. Repeat CBC today and tomorrow.     CKD  Patient is improving, continue IV fluid.       Hyperkalemia   Repeat CMP today and tomorrow.       Anemia of chronic disease  Acutely worsened post surgery  Has received 2 units PRBC total during stay follow CBC.     Hypertension  Controlled with hypertension. Continue current medications.     Colitis  Patient denies any diarrhea or constipation. Monitor.     Bradycardia  Is in metoprolol, continue to monitor.     Confusion  Patient is probably in the baseline. Unable to determine his mental status changes now.        See orders for other plans. VTE prophylaxis: SCDs. Code status: Full code. Discussed plan of care with Patient/Family and Nurse. Pre-admission lived at home. Discharge planning: pending. Follow today's lab. 11:50 AM: I talked the patient daughter Ms. Ruiz Sanchez over the phone. I answered all of her questions. Plan to discharge the patient to SNF today for further care. PENDING TEST RESULTS:   At the time of discharge the following test results are still pending: NA    FOLLOW UP APPOINTMENTS:    Follow-up Information     Follow up With Specialties Details Why Contact Info    Chino Marcelo NP Physician Assistant   [address] [facility]  [phone]                   DISCHARGE MEDICATIONS:  Current Discharge Medication List      CONTINUE these medications which have CHANGED    Details   amLODIPine (NORVASC) 5 mg tablet Take 1 Tablet by mouth daily for 30 days. Qty: 30 Tablet, Refills: 0  Start date: 4/6/2022, End date: 5/6/2022         CONTINUE these medications which have NOT CHANGED    Details   atorvastatin (LIPITOR) 80 mg tablet Take 80 mg by mouth nightly. dorzolamide-timolol, PF, (COSOPT) 2-0.5 % ophthalmic solution Administer 1 Drop to both eyes two (2) times a day.       lactulose (CHRONULAC) 10 gram/15 mL solution TAKE 30 ML TWICE A DAY AS NEEDED  Qty: 5400 mL, Refills: 0    Associated Diagnoses: Chronic constipation      aspirin delayed-release 81 mg tablet Take 81 mg by mouth every morning.      magnesium oxide (MAG-OX) 400 mg tablet Take 1 Tablet by mouth daily. Qty: 90 Tablet, Refills: 0      omeprazole (PRILOSEC) 40 mg capsule Take 1 Capsule by mouth daily. Qty: 90 Capsule, Refills: 0      FeroSuL 325 mg (65 mg iron) tablet TAKE 1 TABLET BY MOUTH DAILY (BEFORE BREAKFAST). Qty: 90 Tablet, Refills: 0      Accu-Chek Ratna Plus test strp strip TEST BLOOD SUGAR TWICE A DAY  Qty: 200 Strip, Refills: 2      metoprolol succinate (TOPROL-XL) 25 mg XL tablet TAKE 1 TABLET EVERY DAY  Qty: 90 Tablet, Refills: 0      lancets (Accu-Chek Softclix Lancets) misc TEST BLOOD SUGAR TWICE A DAY  Qty: 200 Each, Refills: 1      Accu-Chek Ratna Control Soln soln USE AS DIRECTED. Qty: 3 Bottle, Refills: 0      BD Single Use Swabs Regular padm TEST BLOOD SUGAR TWICE DAILY  Qty: 180 Pad, Refills: 1      acetaminophen (TYLENOL) 325 mg tablet Take 650 mg by mouth every six (6) hours as needed for Pain.      latanoprost (XALATAN) 0.005 % ophthalmic solution Administer 1 Drop to both eyes nightly. senna-docusate (STOOL SOFTENER-LAXATIVE) 8.6-50 mg per tablet Take 1 Tablet by mouth every morning. STOP taking these medications       HYDROcodone-acetaminophen (NORCO) 7.5-325 mg per tablet Comments:   Reason for Stopping:         cyclobenzaprine (FLEXERIL) 5 mg tablet Comments:   Reason for Stopping:         glipiZIDE (GLUCOTROL) 5 mg tablet Comments:   Reason for Stopping:         hydroCHLOROthiazide (HYDRODIURIL) 25 mg tablet Comments:   Reason for Stopping:                 NOTIFY YOUR PHYSICIAN FOR ANY OF THE FOLLOWING:   Fever over 101 degrees for 24 hours. Chest pain, shortness of breath, fever, chills, nausea, vomiting, diarrhea, change in mentation, falling, weakness, bleeding. Severe pain or pain not relieved by medications.   Or, any other signs or symptoms that you may have questions about.    DISPOSITION:    Home With:   OT  PT  HH  RN       Long term SNF/Inpatient Rehab    Independent/assisted living    Hospice    Other:       PATIENT CONDITION AT DISCHARGE:     Functional status    Poor     Deconditioned     Independent      Cognition     Lucid     Forgetful     Dementia      Catheters/lines (plus indication)    Dodson     PICC     PEG     None      Code status     Full code     DNR      PHYSICAL EXAMINATION AT DISCHARGE:   Refer to Progress Note.       CHRONIC MEDICAL DIAGNOSES:  Problem List as of 4/6/2022 Date Reviewed: 4/1/2022          Codes Class Noted - Resolved    Sepsis ([facility] ICD-10-CM: A41.9  ICD-9-CM: 038.9, 995.91  4/1/2022 - Present        Cellulitis ICD-10-CM: L03.90  ICD-9-CM: 682.9  3/30/2022 - Present        Type 2 diabetes mellitus with chronic kidney disease ([facility] ICD-10-CM: E11.22  ICD-9-CM: 250.40, 585.9  2/17/2022 - Present        Elevated PSA ICD-10-CM: R97.20  ICD-9-CM: 790.93  12/20/2021 - Present    Overview Signed 12/20/2021 12:05 PM by Mele Min NP     7/21/20=3.8  8/24/21=4.1  10/20/21=3.7             At high risk for falls ICD-10-CM: Z91.81  ICD-9-CM: V15.88  8/31/2021 - Present        Atherosclerotic PVD with ulceration ([facility] ICD-10-CM: I70.209, L98.499  ICD-9-CM: 440.23, 707.9  1/4/2021 - Present        Hypertension ICD-10-CM: I10  ICD-9-CM: 401.9  7/21/2020 - Present        Mixed hyperlipidemia ICD-10-CM: E78.2  ICD-9-CM: 272.2  7/21/2020 - Present        Peripheral vascular disease ([facility] ICD-10-CM: I73.9  ICD-9-CM: 443.9  7/21/2020 - Present        Glaucoma ICD-10-CM: H40.9  ICD-9-CM: 365.9  7/21/2020 - Present        GERD (gastroesophageal reflux disease) ICD-10-CM: K21.9  ICD-9-CM: 530.81  7/21/2020 - Present        Chronic constipation ICD-10-CM: K59.09  ICD-9-CM: 564.00  7/21/2020 - Present        Pacemaker ICD-10-CM: Z95.0  ICD-9-CM: V45.01  7/21/2020 - Present        Cataract ICD-10-CM: H26.9  ICD-9-CM: 366.9  7/21/2020 - Present Hypomagnesemia ICD-10-CM: Q96.99  ICD-9-CM: 275.2  7/21/2020 - Present        History of CVA (cerebrovascular accident) ICD-10-CM: Z86.73  ICD-9-CM: V12.54  7/21/2020 - Present        Stage 3 chronic kidney disease ([facility] ICD-10-CM: N18.30  ICD-9-CM: 585.3  7/21/2020 - Present        Type 2 diabetes mellitus without complications ([facility] BKQ-93-CC: E11.9  ICD-9-CM: 250.00  7/13/2020 - Present        Benign prostatic hyperplasia ICD-10-CM: N40.0  ICD-9-CM: 600.00  4/26/2017 - Present        RESOLVED: Preop examination ICD-10-CM: T54.831  ICD-9-CM: V72.84  7/21/2020 - 8/20/2020        RESOLVED: Rectal hemorrhage ICD-10-CM: K62.5  ICD-9-CM: 569.3  4/26/2017 - 8/31/2021              Greater than 35 minutes were spent with the patient on counseling and coordination of care    Signed:   Nate Lawler MD  4/6/2022  11:59 AM

## 2022-04-06 NOTE — PROGRESS NOTES
Vascular:    Left AKA incision looks good    His mental status is improved.     Can go to SNF from my standpoint when medically ready

## 2022-04-06 NOTE — WOUND CARE
WOCN Note:      Follow up visit.     Chart reviewed. Assessed in room 416. Admitted DX:  Sepsis        Past Medical History:   Diagnosis Date    Anemia 4/26/2017    Anxiety      Arrhythmia       A FIB    Arthritis      Atherosclerosis of artery of extremity with rest pain (HCC)      Atrial fibrillation ([address] 7/21/2020    Benign prostatic hyperplasia 4/26/2017    CAD (coronary artery disease)       pacemaker    Cancer [facility]) 2010     GASTRIC    Chronic kidney disease      Chronic obstructive pulmonary disease (HCC)      Depression      Diabetes (HCC)       BORDERLINE, NO MEDS.  Diverticulosis of colon      Dyslipidemia 4/26/2017    GERD (gastroesophageal reflux disease)      Glaucoma      History of CVA (cerebrovascular accident) 7/21/2020    Hypercholesteremia      Hypertension      Hypomagnesemia 7/13/2020    Nocturia 4/26/2017    PUD (peptic ulcer disease)       GI BLEEDING    PVD (peripheral vascular disease) ([address]      Rectal hemorrhage 4/26/2017    Strabismus      Stroke ([address] 2000 APPROX.     SOME VISUAL DEFICIT    Type 2 diabetes mellitus without complications ([address] 6/76/9160    Venous stasis 4/26/2017      Assessment:   Patient is alert, communicative and requires assist of 2 with repositioning. Bed: [employer]; air module added. Patient has a male incontinence suction management device  Patient reports no pain. Patient repositioned on right side with pillow. Right heel intact without erythema and offloaded on pillow.     1. POA Sacrum, unroofed deep tissue pressure injury that has evolved to an unstageable pressure injury: 7 x 2.5 x 0.2 cm; 100% yellow; no exudate or odor. Periwound hyperpigmented, resurfaced blanching pink and moist.  Applied hydrocolloid and foam dressing. 2. POA Left ischial, deep tissue pressure injury:  1.5 x 1.5 x 0.1 cm; unroofed non blanching red; no exudate or odor. Periwound hyperpigmented.   Applied hydrocolloid and foam dressing.     Wound, Pressure Prevention & Skin Care Recommendations:    1. Minimize layers of linen/pads under patient to optimize support surface. 2.  Turn/reposition approximately every 2 hours and offload heels. 3.  Manage moisture/ Keep skin folds clean and dry/minimize brief usage. 4.  Specialty bed: [place] with air module  5.   Sacrum and left ishial:  hydrocolloid weekly and foam dressing.     Discussed above plan with RN.     Transition of Care:   Plan to follow as needed while admitted to hospital.     AMAYA Ha RN [facility]patient [facility]  Available on [employer]  Office [phone]

## 2022-04-06 NOTE — PROGRESS NOTES
Patient going back to [facility] today via [facility] transport at 3:00 p.m. Folder will be placed on chart this afternoon. CM notified his daughter who is aware of the discharge plan. Medicare pt has received, reviewed, and signed 2nd IM letter informing them of their right to appeal the discharge. Signed copy has been placed on pt bedside chart.         Fern Mosher, [facility]

## 2022-04-06 NOTE — PROGRESS NOTES
Spiritual Care Partner Volunteer visited patient in Room 416 on [phone]. Documented by:  Chaplain Haley MDiv, [address] [place] ([id number])

## 2022-04-06 NOTE — PROGRESS NOTES
Hospitalist Progress Note          Felicitas Tapia MD  Please call  and page for questions. Call physician on-call through the  7pm-7am    Daily Progress Note: 4/6/2022    Primary care provider:Kaylyn Galan NP    Date of admission: 3/31/2022 10:54 PM    Admission summery and hospital course:  Patient received via transfer. Per chart: Who presents from [address] [place] [address] where patient has been since his most recent surgery of left BKA done by Dr. Joann Francisco on 3/21 secondary to continued poor peripheral circulation after failed attempts at revascularization and was discharged to skilled rehab at [address] [place] [address] and rehab on 3/25. . It was noted that surgical site has staples in place but foul smelling discharge with redness at suture site. Dressing changed in the ED and was very painful for the patient and didn't allow me to open it but on evaluation of left BKA with surgeon on 3/31 found to have foul smelling purulent drainage noted with extremely painful to touch. He does give some hx but not the best historian. He was also noted on arrival to medical floor of having low sugars and was given juice and noted by his [address] [facility] [phone] that he has not been eating well and was cotinued on glipizide 5mg oral daily.      Patient is a direct transfer from another [employer] facility. Patient was transferred to this facility for infected BKA to be operated upon by Dr. Cherylene Showers. Subjective:   Patient said he is doing fine. He denied any acute issues this morning. 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 Patient received Zosyn and vancomycin. Appreciated vascular surgery input. Patient is afebrile. Repeat CBC today and tomorrow.     CKD  Patient is improving, continue IV fluid.       Hyperkalemia   Repeat CMP today and tomorrow.       Anemia of chronic disease  Acutely worsened post surgery  Has received 2 units PRBC total during stay follow CBC.     Hypertension  Controlled with hypertension. Continue current medications.     Colitis  Patient denies any diarrhea or constipation. Monitor. Bradycardia  Is in metoprolol, continue to monitor.     Confusion  Patient is probably in the baseline. Unable to determine his mental status changes now.       See orders for other plans. VTE prophylaxis: SCDs. Code status: Full code. Discussed plan of care with Patient/Family and Nurse. Pre-admission lived at home. Discharge planning: pending. Follow today's lab. 11:50 AM: I talked the patient daughter Ms. Razia Grissom over the phone. I answered all of her questions. Plan to discharge the patient to SNF today for further care. Hypokalemia: We will replace the potassium before discharge. Review of Systems:     Review of Systems:  Symptom  Y/N  Comments   Symptom  Y/N  Comments    Fever/Chills  n    Chest Pain  n    Poor Appetite  n    Edema  n     Cough  n   Abdominal Pain  n     Sputum  n   Joint Pain      SOB/QUINONES  n   Pruritis/Rash      Nausea/vomit  n   Tolerating PT/OT      Diarrhea     Tolerating Diet      Constipation     Other      Could not obtain due to:         Objective:   Physical Exam:     Visit Vitals  BP (!) 152/52 (BP 1 Location: Right upper arm, BP Patient Position: At rest;Lying left side)   Pulse (!) 50   Temp 97.8 °F (36.6 °C)   Resp 19   Ht 6' (1.829 m)   Wt 88.4 kg (194 lb 14.2 oz)   SpO2 99%   BMI 26.43 kg/m²    O2 Flow Rate (L/min): 3 l/min O2 Device: None (Room air)    Temp (24hrs), Av.7 °F (36.5 °C), Min:97.2 °F (36.2 °C), Max:98 °F (36.7 °C)    No intake/output data recorded.  1901 -  0700  In: 602.1 [P.O.:320]  Out: 1301 [Urine:1300]      General:  Alert, cooperative, no distress, appears stated age. Lungs:   Clear to auscultation bilaterally. Chest wall:  No tenderness or deformity.    Heart: Regular rate and rhythm, S1, S2 normal, no murmur, click, rub or gallop. Abdomen:   Soft, non-tender. Bowel sounds normal. No masses,  No organomegaly. Extremities:  Left-sided above-knee amputation, stump clean. No cyanosis or edema. Pulses: 2+ and symmetric all extremities. Skin: Skin color, texture, turgor normal. No rashes or lesions   Neurologic:  Patient has clear voice. He follows command. Data Review:       Recent Days:  Recent Labs     04/05/22  0953 04/04/22  2203 04/04/22  1138 04/04/22  0245 04/04/22  0245   WBC  --   --   --   --  7.7   HGB 10.3* 9.5*  --   --  6.8*  7.0*   HCT 33.6* 31.8* 25.6*   < > 23.5*   PLT  --   --   --   --  525*    < > = values in this interval not displayed. Recent Labs     04/05/22  0709 04/04/22 0245   * 140   K 5.3* 4.3   * 115*   CO2 19* 21   * 108*   BUN 19 27*   CREA 1.09 1.24   CA 8.4* 7.9*     No results for input(s): PH, PCO2, PO2, HCO3, FIO2 in the last 72 hours.     24 Hour Results:  Recent Results (from the past 24 hour(s))   GLUCOSE, POC    Collection Time: 04/05/22  2:23 PM   Result Value Ref Range    Glucose (POC) 138 (H) 65 - 117 mg/dL    Performed by Matt PIRES    GLUCOSE, POC    Collection Time: 04/05/22  6:53 PM   Result Value Ref Range    Glucose (POC) 157 (H) 65 - 117 mg/dL    Performed by Jerman Boothe    GLUCOSE, POC    Collection Time: 04/05/22  9:48 PM   Result Value Ref Range    Glucose (POC) 139 (H) 65 - 117 mg/dL    Performed by Esperanza Hansen    GLUCOSE, POC    Collection Time: 04/06/22  8:21 AM   Result Value Ref Range    Glucose (POC) 124 (H) 65 - 117 mg/dL    Performed by Asya White        Problem List:  Problem List as of 4/6/2022 Date Reviewed: 4/1/2022          Codes Class Noted - Resolved    Sepsis ([facility] ICD-10-CM: A41.9  ICD-9-CM: 038.9, 995.91  4/1/2022 - Present        Cellulitis ICD-10-CM: L03.90  ICD-9-CM: 682.9  3/30/2022 - Present        Type 2 diabetes mellitus with chronic kidney disease ([facility] ICD-10-CM: E11.22  ICD-9-CM: 250.40, 585.9  2/17/2022 - Present        Elevated PSA ICD-10-CM: R97.20  ICD-9-CM: 790.93  12/20/2021 - Present    Overview Signed 12/20/2021 12:05 PM by Angeli Garza, NP     7/21/20=3.8  8/24/21=4.1  10/20/21=3.7             At high risk for falls ICD-10-CM: Z91.81  ICD-9-CM: V15.88  8/31/2021 - Present        Atherosclerotic PVD with ulceration ([facility] ICD-10-CM: I70.209, L98.499  ICD-9-CM: 440.23, 707.9  1/4/2021 - Present        Hypertension ICD-10-CM: I10  ICD-9-CM: 401.9  7/21/2020 - Present        Mixed hyperlipidemia ICD-10-CM: E78.2  ICD-9-CM: 272.2  7/21/2020 - Present        Peripheral vascular disease ([facility] ICD-10-CM: I73.9  ICD-9-CM: 443.9  7/21/2020 - Present        Glaucoma ICD-10-CM: H40.9  ICD-9-CM: 365.9  7/21/2020 - Present        GERD (gastroesophageal reflux disease) ICD-10-CM: K21.9  ICD-9-CM: 530.81  7/21/2020 - Present        Chronic constipation ICD-10-CM: K59.09  ICD-9-CM: 564.00  7/21/2020 - Present        Pacemaker ICD-10-CM: Z95.0  ICD-9-CM: V45.01  7/21/2020 - Present        Cataract ICD-10-CM: H26.9  ICD-9-CM: 366.9  7/21/2020 - Present        Hypomagnesemia ICD-10-CM: E83.42  ICD-9-CM: 275.2  7/21/2020 - Present        History of CVA (cerebrovascular accident) ICD-10-CM: Z86.73  ICD-9-CM: V12.54  7/21/2020 - Present        Stage 3 chronic kidney disease ([facility] ICD-10-CM: N18.30  ICD-9-CM: 585.3  7/21/2020 - Present        Type 2 diabetes mellitus without complications ([facility] SYT-84-CY: E11.9  ICD-9-CM: 250.00  7/13/2020 - Present        Benign prostatic hyperplasia ICD-10-CM: N40.0  ICD-9-CM: 600.00  4/26/2017 - Present        RESOLVED: Preop examination ICD-10-CM: H33.741  ICD-9-CM: V72.84  7/21/2020 - 8/20/2020        RESOLVED: Rectal hemorrhage ICD-10-CM: K62.5  ICD-9-CM: 569.3  4/26/2017 - 8/31/2021              Medications reviewed  Current Facility-Administered Medications   Medication Dose Route Frequency    hydrALAZINE (APRESOLINE) 20 mg/mL injection 10 mg  10 mg IntraVENous Q6H PRN    morphine injection 4 mg  4 mg IntraVENous Q2H PRN    balsam peru-castor oiL (VENELEX) ointment   Topical TID    0.9% sodium chloride infusion 250 mL  250 mL IntraVENous PRN    traMADoL (ULTRAM) tablet 50 mg  50 mg Oral Q6H PRN    0.9% sodium chloride infusion 250 mL  250 mL IntraVENous PRN    0.9% sodium chloride infusion  75 mL/hr IntraVENous CONTINUOUS    sodium chloride (NS) flush 5-40 mL  5-40 mL IntraVENous Q8H    sodium chloride (NS) flush 5-40 mL  5-40 mL IntraVENous PRN    acetaminophen (TYLENOL) tablet 650 mg  650 mg Oral Q6H PRN    Or    acetaminophen (TYLENOL) suppository 650 mg  650 mg Rectal Q6H PRN    polyethylene glycol (MIRALAX) packet 17 g  17 g Oral DAILY PRN    ondansetron (ZOFRAN) injection 4 mg  4 mg IntraVENous Q6H PRN    glucose chewable tablet 16 g  4 Tablet Oral PRN    dextrose 10 % infusion 0-250 mL  0-250 mL IntraVENous PRN    glucagon (GLUCAGEN) injection 1 mg  1 mg IntraMUSCular PRN    insulin lispro (HUMALOG) injection   SubCUTAneous AC&HS    atorvastatin (LIPITOR) tablet 80 mg  80 mg Oral QHS    aspirin delayed-release tablet 81 mg  81 mg Oral 7am    dorzolamide (TRUSOPT) 2 % ophthalmic solution 1 Drop  1 Drop Both Eyes BID    ferrous sulfate tablet 325 mg  325 mg Oral DAILY WITH BREAKFAST    lactulose (CHRONULAC) 10 gram/15 mL solution 15 mL  10 g Oral BID PRN    latanoprost (XALATAN) 0.005 % ophthalmic solution 1 Drop  1 Drop Both Eyes QHS    magnesium oxide (MAG-OX) tablet 400 mg  400 mg Oral DAILY    metoprolol succinate (TOPROL-XL) XL tablet 25 mg  25 mg Oral DAILY    senna-docusate (PERICOLACE) 8.6-50 mg per tablet 1 Tablet  1 Tablet Oral 7am    pantoprazole (PROTONIX) tablet 40 mg  40 mg Oral QAM    timolol (TIMOPTIC) 0.5 % ophthalmic solution 1 Drop  1 Drop Both Eyes BID       Care Plan discussed with: Patient/Family and Nurse    Total time spent with patient: 40 minutes.     Alex PIRES Yoav Fentno MD

## 2022-04-06 NOTE — PROGRESS NOTES
Problem: Mobility Impaired (Adult and Pediatric)  Goal: *Acute Goals and Plan of Care (Insert Text)  Description: FUNCTIONAL STATUS PRIOR TO ADMISSION: Per chart review, prior to left BKA, patient was modified independent using a walker for functional mobility and a wheelchair prn. This admission pt underwent left AKA. Pt unable to provide any history. HOME SUPPORT PRIOR TO ADMISSION: The patient lived with his wife who assisted him prn. Physical Therapy Goals  Initiated 4/4/2022  1. Patient will move from supine to sit and sit to supine , scoot up and down, and roll side to side in bed with maximal assistance within 7 day(s). 2.  Patient will transfer from bed to chair and chair to bed with maximal assistance using the least restrictive device within 7 day(s). 3.  Patient will sit at EOB for 5 minutes engaged in an activity with min assist within 7 day(s). 4.  Patient will participate in ex program with min assist within 7 day(s). Outcome: Progressing Towards Goal  PHYSICAL THERAPY TREATMENT  Patient: Reuben Bumpers (64 y.o. male)  Date: 4/6/2022  Diagnosis: Sepsis ([facility] [A41.9] <principal problem not specified>  Procedure(s) (LRB):  LEFT AMPUTATION KNEE(AKA) (Left) 5 Days Post-Op  Precautions: Fall,Skin  Chart, physical therapy assessment, plan of care and goals were reviewed. ASSESSMENT  Patient continues with skilled PT services and is progressing towards goals. The patient is able to come to sitting EOB with moderate assist of 2 today. Once up he had a few posterior leans which he was unable to correct but once he got to the EOB with his right foot on the floor and holding the bed rail he didn't fall backwards again. He sat EOB for over 10 minutes. He was able to weight bear through the RLE and lift up to scoot sideways to the [facility] and to get his bottom back on the bed.   Once in supine he was able to assist with pulling himself to the head of the bed and was then placed in the chair position. He is cooperative and able to follow directions for participation. He should be ready to get into a chair soon. Current Level of Function Impacting Discharge (mobility/balance): Not yet able to stand or pivot to a chair. Other factors to consider for discharge: None         PLAN :  Patient continues to benefit from skilled intervention to address the above impairments. Continue treatment per established plan of care. to address goals. Recommendation for discharge: (in order for the patient to meet his/her long term goals)  Therapy up to 5 days/week in SNF setting    This discharge recommendation:  Has been made in collaboration with the attending provider and/or case management    IF patient discharges home will need the following DME: none       SUBJECTIVE:   Patient stated I'm just so weak.     OBJECTIVE DATA SUMMARY:   Critical Behavior:  Neurologic State: Alert,Confused  Orientation Level: Disoriented to place,Disoriented to time  Cognition: Decreased attention/concentration,Follows commands     Functional Mobility Training:  Bed Mobility:  Rolling: Moderate assistance  Supine to Sit: Moderate assistance  Sit to Supine: Moderate assistance  Scooting: Moderate assistance        Transfers:   No yet able. Balance:  Sitting: Impaired  Sitting - Static: Fair (occasional)  Sitting - Dynamic: Fair (occasional)  Ambulation/Gait Training:                                                      Not yet able     Activity Tolerance:   Good    After treatment patient left in no apparent distress:   Supine in bed, Call bell within reach, and Side rails x 3    COMMUNICATION/COLLABORATION:   The patients plan of care was discussed with: Registered nurse and Rehabilitation technician.      Kip Spence PT   Time Calculation: 30 mins

## 2022-04-06 NOTE — PROGRESS NOTES
Physician Progress Note      Gustavo HUDSON #:                  [id number]  :                       1935  ADMIT DATE:       3/31/2022 10:54 PM  DISCH DATE:  RESPONDING  PROVIDER #:        Galdino Phelps MD          QUERY TEXT:    Pt noted to have Confusion with documentation that it may be due to anesthesia, pain meds, or underlying dementia. 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 Pt has become confused since admission, pulling at lines, not understanding directions, with documentation that the confusion is multifactorial due to anesthesia, pain meds, and dementia. Treatment: IV ABX for the underlying infection, frequent reorientation, soft restraints    Thank you,  Diony Mckeon RN  Clinical Documentation  [phone]  Options provided:  -- Metabolic encephalopathy  -- Toxic encephalopathy  -- Toxic metabolic encephalopathy  -- Delirium due to, Please specify cause. -- Other - I will add my own diagnosis  -- Disagree - Not applicable / Not valid  -- Disagree - Clinically unable to determine / Unknown  -- Refer to Clinical Documentation Reviewer    PROVIDER RESPONSE TEXT:    Provider is clinically unable to determine a response to this query. Query created by: Surinder Faria on 2022 11:25 AM      QUERY TEXT:    Patient admitted with Left BKA Stump Infection. Cellulitis is noted on the Problem List with 3/2022 Date and is pulling into the Progress Notes. ? Please document in progress notes if the diagnosis of Cellulitis is currently being treated, or is PMH only. Please update active hospital problems appropriately to reflect response.     The medical record reflects the following:  Risk Factors: recent BKA that is now infected, DM  Clinical Indicators: pt with recent BKA, was sent to outside facility for redness/ pain, foul odor from incision to BKA stump. Pt was found to have nonhealing infected Left BKA stump and transferred to [facility]. WBC 12.1 at time of transfer, VS 97.5, 60, 20, 116/55. Documentation that the amputation incision is ischemic and infected. Cellulitis is noted to be on the Problem List that is pulling into the Progress Notes but is not being documented as an active diagnosis. Treatment: BKA converted to AKA, Vanc/ Zosyn, Vascular consult, wound care for dressing changes    Thank you,  Clay Kemp RN  Clinical Documentation  [phone]  Options provided:  -- Cellulitis is currently being treated/evaluated  -- Cellulitis is PMH only  -- Other - I will add my own diagnosis  -- Disagree - Not applicable / Not valid  -- Disagree - Clinically unable to determine / Unknown  -- Refer to Clinical Documentation Reviewer    PROVIDER RESPONSE TEXT:    Provider is clinically unable to determine a response to this query.     Query created by: Fabienne Armas on 4/6/2022 7:58 AM      Electronically signed by:  Lucy Kruse MD 4/6/2022 10:35 AM

## 2022-04-06 NOTE — PROGRESS NOTES
[facility] responded to bedside as requested by nurse for a blood draw. Nurse attempted venous, then RT carlin arterial.      Phone call was then made to make nurse aware that blood was marked and left at bedside.

## 2022-04-06 NOTE — PROGRESS NOTES
Patient left with [facility] transport. IV's removed and surgical dressing clean,dry, and intact. SBAR report given to Jesi at [facility]. Allowed time to answer all questions.

## 2022-04-07 ENCOUNTER — PATIENT OUTREACH (OUTPATIENT)
Dept: CASE MANAGEMENT | Age: 87
End: 2022-04-07

## 2022-04-07 NOTE — PROGRESS NOTES
Transition of care outreach postponed for 14 days due to patient's discharge to SNF.   D/C to [facility] 4/6/22 f/u 14d

## 2022-04-21 ENCOUNTER — PATIENT OUTREACH (OUTPATIENT)
Dept: CASE MANAGEMENT | Age: 87
End: 2022-04-21

## 2022-04-21 NOTE — PROGRESS NOTES
Transition of care outreach postponed for 14 days due to patient's discharge to SNF.   D/C to [facility] 4/6/22 still admitted f/u 14d

## 2022-05-06 ENCOUNTER — PATIENT OUTREACH (OUTPATIENT)
Dept: CASE MANAGEMENT | Age: 87
End: 2022-05-06

## 2022-07-04 ENCOUNTER — APPOINTMENT (OUTPATIENT)
Dept: GENERAL RADIOLOGY | Age: 87
DRG: 291 | End: 2022-07-04
Attending: EMERGENCY MEDICINE
Payer: MEDICARE

## 2022-07-04 ENCOUNTER — HOSPITAL ENCOUNTER (INPATIENT)
Age: 87
LOS: 3 days | Discharge: HOME HEALTH | DRG: 291 | End: 2022-07-07
Attending: EMERGENCY MEDICINE | Admitting: HOSPITALIST
Payer: MEDICARE

## 2022-07-04 DIAGNOSIS — K59.09 CHRONIC CONSTIPATION: ICD-10-CM

## 2022-07-04 DIAGNOSIS — I50.9 CONGESTIVE HEART FAILURE, UNSPECIFIED HF CHRONICITY, UNSPECIFIED HEART FAILURE TYPE (HCC): Primary | ICD-10-CM

## 2022-07-04 PROBLEM — I10 HTN (HYPERTENSION): Status: ACTIVE | Noted: 2022-07-04

## 2022-07-04 PROBLEM — R00.1 BRADYCARDIA: Status: ACTIVE | Noted: 2022-07-04

## 2022-07-04 LAB
ALBUMIN SERPL-MCNC: 2.8 G/DL (ref 3.5–5)
ALBUMIN/GLOB SERPL: 0.6 {RATIO} (ref 1.1–2.2)
ALP SERPL-CCNC: 103 U/L (ref 45–117)
ALT SERPL-CCNC: 27 U/L (ref 12–78)
AMORPH CRY URNS QL MICRO: ABNORMAL
ANION GAP SERPL CALC-SCNC: 10 MMOL/L (ref 5–15)
APPEARANCE UR: CLEAR
AST SERPL W P-5'-P-CCNC: 51 U/L (ref 15–37)
BACTERIA URNS QL MICRO: NEGATIVE /HPF
BASOPHILS # BLD: 0.1 K/UL (ref 0–0.2)
BASOPHILS NFR BLD: 1 % (ref 0–2.5)
BILIRUB SERPL-MCNC: 0.5 MG/DL (ref 0.2–1)
BILIRUB UR QL: NEGATIVE
BNP SERPL-MCNC: 3607 PG/ML
BUN SERPL-MCNC: 24 MG/DL (ref 6–20)
BUN/CREAT SERPL: 22 (ref 12–20)
CA-I BLD-MCNC: 8.6 MG/DL (ref 8.5–10.1)
CHLORIDE SERPL-SCNC: 108 MMOL/L (ref 97–108)
CO2 SERPL-SCNC: 22 MMOL/L (ref 21–32)
COLOR UR: ABNORMAL
CREAT SERPL-MCNC: 1.11 MG/DL (ref 0.7–1.3)
EOSINOPHIL # BLD: 0.2 K/UL (ref 0–0.7)
EOSINOPHIL NFR BLD: 3 % (ref 0.9–2.9)
ERYTHROCYTE [DISTWIDTH] IN BLOOD BY AUTOMATED COUNT: 15.9 % (ref 11.5–14.5)
FLUAV RNA SPEC QL NAA+PROBE: NOT DETECTED
FLUBV RNA SPEC QL NAA+PROBE: NOT DETECTED
GLOBULIN SER CALC-MCNC: 4.7 G/DL (ref 2–4)
GLUCOSE BLD STRIP.AUTO-MCNC: 115 MG/DL (ref 65–117)
GLUCOSE BLD STRIP.AUTO-MCNC: 118 MG/DL (ref 65–117)
GLUCOSE BLD STRIP.AUTO-MCNC: 96 MG/DL (ref 65–117)
GLUCOSE SERPL-MCNC: 116 MG/DL (ref 65–100)
GLUCOSE UR STRIP.AUTO-MCNC: NEGATIVE MG/DL
HCT VFR BLD AUTO: 37.5 % (ref 41–53)
HGB BLD-MCNC: 12 G/DL (ref 13.5–17.5)
HGB UR QL STRIP: NEGATIVE
KETONES UR QL STRIP.AUTO: NEGATIVE MG/DL
LACTATE SERPL-SCNC: 1.2 MMOL/L (ref 0.4–2)
LEUKOCYTE ESTERASE UR QL STRIP.AUTO: NEGATIVE
LYMPHOCYTES # BLD: 1.7 K/UL (ref 1–4.8)
LYMPHOCYTES NFR BLD: 22 % (ref 20.5–51.1)
MAGNESIUM SERPL-MCNC: 1.9 MG/DL (ref 1.6–2.4)
MCH RBC QN AUTO: 28.1 PG (ref 31–34)
MCHC RBC AUTO-ENTMCNC: 32.1 G/DL (ref 31–36)
MCV RBC AUTO: 87.8 FL (ref 80–100)
MONOCYTES # BLD: 0.4 K/UL (ref 0.2–2.4)
MONOCYTES NFR BLD: 6 % (ref 1.7–9.3)
NEUTS SEG # BLD: 5.4 K/UL (ref 1.8–7.7)
NEUTS SEG NFR BLD: 68 % (ref 42–75)
NITRITE UR QL STRIP.AUTO: NEGATIVE
NRBC # BLD: 0.01 K/UL
NRBC BLD-RTO: 0.1 PER 100 WBC
PERFORMED BY, TECHID: ABNORMAL
PERFORMED BY, TECHID: NORMAL
PERFORMED BY, TECHID: NORMAL
PH UR STRIP: 5.5 [PH] (ref 5–8)
PLATELET # BLD AUTO: 314 K/UL (ref 150–400)
PMV BLD AUTO: 9.3 FL (ref 6.5–11.5)
POTASSIUM SERPL-SCNC: 4.7 MMOL/L (ref 3.5–5.1)
PROT SERPL-MCNC: 7.5 G/DL (ref 6.4–8.2)
PROT UR STRIP-MCNC: >300 MG/DL
RBC # BLD AUTO: 4.27 M/UL (ref 4.5–5.9)
RBC #/AREA URNS HPF: ABNORMAL /HPF (ref 0–3)
SARS-COV-2, COV2: NOT DETECTED
SODIUM SERPL-SCNC: 140 MMOL/L (ref 136–145)
SP GR UR REFRACTOMETRY: >1.03 (ref 1–1.03)
TROPONIN-HIGH SENSITIVITY: 36 NG/L (ref 0–76)
TROPONIN-HIGH SENSITIVITY: 40 NG/L (ref 0–76)
TROPONIN-HIGH SENSITIVITY: 42 NG/L (ref 0–76)
TSH SERPL DL<=0.05 MIU/L-ACNC: 2.4 UIU/ML (ref 0.36–3.74)
UROBILINOGEN UR QL STRIP.AUTO: 1 EU/DL (ref 0.2–1)
WBC # BLD AUTO: 7.8 K/UL (ref 4.4–11.3)
WBC URNS QL MICRO: ABNORMAL /HPF (ref 0–5)

## 2022-07-04 PROCEDURE — 74011250636 HC RX REV CODE- 250/636: Performed by: EMERGENCY MEDICINE

## 2022-07-04 PROCEDURE — 74011000250 HC RX REV CODE- 250: Performed by: EMERGENCY MEDICINE

## 2022-07-04 PROCEDURE — 94640 AIRWAY INHALATION TREATMENT: CPT

## 2022-07-04 PROCEDURE — 82962 GLUCOSE BLOOD TEST: CPT

## 2022-07-04 PROCEDURE — 65270000029 HC RM PRIVATE

## 2022-07-04 PROCEDURE — 80061 LIPID PANEL: CPT

## 2022-07-04 PROCEDURE — 74011000250 HC RX REV CODE- 250: Performed by: HOSPITALIST

## 2022-07-04 PROCEDURE — 87636 SARSCOV2 & INF A&B AMP PRB: CPT

## 2022-07-04 PROCEDURE — 74011250637 HC RX REV CODE- 250/637: Performed by: EMERGENCY MEDICINE

## 2022-07-04 PROCEDURE — 83880 ASSAY OF NATRIURETIC PEPTIDE: CPT

## 2022-07-04 PROCEDURE — 83036 HEMOGLOBIN GLYCOSYLATED A1C: CPT

## 2022-07-04 PROCEDURE — 99285 EMERGENCY DEPT VISIT HI MDM: CPT

## 2022-07-04 PROCEDURE — 81001 URINALYSIS AUTO W/SCOPE: CPT

## 2022-07-04 PROCEDURE — 83735 ASSAY OF MAGNESIUM: CPT

## 2022-07-04 PROCEDURE — 93005 ELECTROCARDIOGRAM TRACING: CPT

## 2022-07-04 PROCEDURE — 87040 BLOOD CULTURE FOR BACTERIA: CPT

## 2022-07-04 PROCEDURE — G0378 HOSPITAL OBSERVATION PER HR: HCPCS

## 2022-07-04 PROCEDURE — 96374 THER/PROPH/DIAG INJ IV PUSH: CPT

## 2022-07-04 PROCEDURE — 83605 ASSAY OF LACTIC ACID: CPT

## 2022-07-04 PROCEDURE — 80053 COMPREHEN METABOLIC PANEL: CPT

## 2022-07-04 PROCEDURE — 74011250636 HC RX REV CODE- 250/636: Performed by: HOSPITALIST

## 2022-07-04 PROCEDURE — 84484 ASSAY OF TROPONIN QUANT: CPT

## 2022-07-04 PROCEDURE — 84443 ASSAY THYROID STIM HORMONE: CPT

## 2022-07-04 PROCEDURE — 85025 COMPLETE CBC W/AUTO DIFF WBC: CPT

## 2022-07-04 PROCEDURE — 71045 X-RAY EXAM CHEST 1 VIEW: CPT

## 2022-07-04 PROCEDURE — 74011250637 HC RX REV CODE- 250/637: Performed by: HOSPITALIST

## 2022-07-04 RX ORDER — CETIRIZINE HCL 10 MG
10 TABLET ORAL DAILY
Status: DISCONTINUED | OUTPATIENT
Start: 2022-07-04 | End: 2022-07-07 | Stop reason: HOSPADM

## 2022-07-04 RX ORDER — ASPIRIN 81 MG/1
81 TABLET ORAL
Status: DISCONTINUED | OUTPATIENT
Start: 2022-07-04 | End: 2022-07-07 | Stop reason: HOSPADM

## 2022-07-04 RX ORDER — GUAIFENESIN 100 MG/5ML
325 LIQUID (ML) ORAL
Status: COMPLETED | OUTPATIENT
Start: 2022-07-04 | End: 2022-07-04

## 2022-07-04 RX ORDER — BISMUTH SUBSALICYLATE 262 MG
1 TABLET,CHEWABLE ORAL DAILY
Status: ON HOLD | COMMUNITY
End: 2022-07-06 | Stop reason: SDUPTHER

## 2022-07-04 RX ORDER — FUROSEMIDE 10 MG/ML
40 INJECTION INTRAMUSCULAR; INTRAVENOUS
Status: DISCONTINUED | OUTPATIENT
Start: 2022-07-04 | End: 2022-07-06

## 2022-07-04 RX ORDER — GUAIFENESIN 600 MG/1
600 TABLET, EXTENDED RELEASE ORAL 2 TIMES DAILY
Status: ON HOLD | COMMUNITY
End: 2022-07-06 | Stop reason: SDUPTHER

## 2022-07-04 RX ORDER — HYDRALAZINE HYDROCHLORIDE 20 MG/ML
10 INJECTION INTRAMUSCULAR; INTRAVENOUS
Status: DISCONTINUED | OUTPATIENT
Start: 2022-07-04 | End: 2022-07-07 | Stop reason: HOSPADM

## 2022-07-04 RX ORDER — DONEPEZIL HYDROCHLORIDE 5 MG/1
5 TABLET, FILM COATED ORAL
Status: ON HOLD | COMMUNITY
End: 2022-07-06 | Stop reason: SDUPTHER

## 2022-07-04 RX ORDER — AMLODIPINE BESYLATE 5 MG/1
5 TABLET ORAL DAILY
Status: DISCONTINUED | OUTPATIENT
Start: 2022-07-04 | End: 2022-07-04

## 2022-07-04 RX ORDER — HYDROCHLOROTHIAZIDE 25 MG/1
25 TABLET ORAL DAILY
Status: ON HOLD | COMMUNITY
End: 2022-07-06 | Stop reason: SDUPTHER

## 2022-07-04 RX ORDER — LACTULOSE 10 G/15ML
10 SOLUTION ORAL; RECTAL
Status: DISCONTINUED | OUTPATIENT
Start: 2022-07-04 | End: 2022-07-07 | Stop reason: HOSPADM

## 2022-07-04 RX ORDER — DEXTROSE 50 % IN WATER (D50W) INTRAVENOUS SYRINGE
25-50 AS NEEDED
Status: DISCONTINUED | OUTPATIENT
Start: 2022-07-04 | End: 2022-07-07 | Stop reason: HOSPADM

## 2022-07-04 RX ORDER — AMLODIPINE BESYLATE 10 MG/1
10 TABLET ORAL DAILY
Status: DISCONTINUED | OUTPATIENT
Start: 2022-07-04 | End: 2022-07-07 | Stop reason: HOSPADM

## 2022-07-04 RX ORDER — DORZOLAMIDE HYDROCHLORIDE AND TIMOLOL MALEATE 20; 5 MG/ML; MG/ML
1 SOLUTION/ DROPS OPHTHALMIC 2 TIMES DAILY
Status: DISCONTINUED | OUTPATIENT
Start: 2022-07-04 | End: 2022-07-07 | Stop reason: HOSPADM

## 2022-07-04 RX ORDER — LANOLIN ALCOHOL/MO/W.PET/CERES
400 CREAM (GRAM) TOPICAL DAILY
Status: DISCONTINUED | OUTPATIENT
Start: 2022-07-04 | End: 2022-07-04

## 2022-07-04 RX ORDER — ACETAMINOPHEN 325 MG/1
650 TABLET ORAL
Status: DISCONTINUED | OUTPATIENT
Start: 2022-07-04 | End: 2022-07-07 | Stop reason: HOSPADM

## 2022-07-04 RX ORDER — LISINOPRIL 10 MG/1
10 TABLET ORAL DAILY
Status: DISCONTINUED | OUTPATIENT
Start: 2022-07-04 | End: 2022-07-07 | Stop reason: HOSPADM

## 2022-07-04 RX ORDER — ATORVASTATIN CALCIUM 20 MG/1
20 TABLET, FILM COATED ORAL DAILY
Status: DISCONTINUED | OUTPATIENT
Start: 2022-07-04 | End: 2022-07-07 | Stop reason: HOSPADM

## 2022-07-04 RX ORDER — INSULIN LISPRO 100 [IU]/ML
INJECTION, SOLUTION INTRAVENOUS; SUBCUTANEOUS
Status: DISCONTINUED | OUTPATIENT
Start: 2022-07-04 | End: 2022-07-07 | Stop reason: HOSPADM

## 2022-07-04 RX ORDER — ENOXAPARIN SODIUM 100 MG/ML
40 INJECTION SUBCUTANEOUS DAILY
Status: DISCONTINUED | OUTPATIENT
Start: 2022-07-04 | End: 2022-07-07 | Stop reason: HOSPADM

## 2022-07-04 RX ORDER — ONDANSETRON 4 MG/1
4 TABLET, ORALLY DISINTEGRATING ORAL
Status: DISCONTINUED | OUTPATIENT
Start: 2022-07-04 | End: 2022-07-07 | Stop reason: HOSPADM

## 2022-07-04 RX ORDER — LANOLIN ALCOHOL/MO/W.PET/CERES
1 CREAM (GRAM) TOPICAL
Status: DISCONTINUED | OUTPATIENT
Start: 2022-07-05 | End: 2022-07-07 | Stop reason: HOSPADM

## 2022-07-04 RX ORDER — SODIUM CHLORIDE 0.9 % (FLUSH) 0.9 %
5-40 SYRINGE (ML) INJECTION EVERY 8 HOURS
Status: DISCONTINUED | OUTPATIENT
Start: 2022-07-04 | End: 2022-07-07 | Stop reason: HOSPADM

## 2022-07-04 RX ORDER — CETIRIZINE HCL 10 MG
10 TABLET ORAL DAILY
Status: ON HOLD | COMMUNITY
End: 2022-07-06 | Stop reason: SDUPTHER

## 2022-07-04 RX ORDER — FUROSEMIDE 10 MG/ML
40 INJECTION INTRAMUSCULAR; INTRAVENOUS
Status: COMPLETED | OUTPATIENT
Start: 2022-07-04 | End: 2022-07-04

## 2022-07-04 RX ORDER — SODIUM CHLORIDE 0.9 % (FLUSH) 0.9 %
5-40 SYRINGE (ML) INJECTION AS NEEDED
Status: DISCONTINUED | OUTPATIENT
Start: 2022-07-04 | End: 2022-07-07 | Stop reason: HOSPADM

## 2022-07-04 RX ORDER — ACETAMINOPHEN 650 MG/1
650 SUPPOSITORY RECTAL
Status: DISCONTINUED | OUTPATIENT
Start: 2022-07-04 | End: 2022-07-07 | Stop reason: HOSPADM

## 2022-07-04 RX ORDER — FLUTICASONE PROPIONATE 50 MCG
1 SPRAY, SUSPENSION (ML) NASAL DAILY
Status: DISCONTINUED | OUTPATIENT
Start: 2022-07-04 | End: 2022-07-07 | Stop reason: HOSPADM

## 2022-07-04 RX ORDER — FLUTICASONE PROPIONATE 50 MCG
1 SPRAY, SUSPENSION (ML) NASAL DAILY
Status: ON HOLD | COMMUNITY
End: 2022-07-06 | Stop reason: SDUPTHER

## 2022-07-04 RX ORDER — PANTOPRAZOLE SODIUM 20 MG/1
20 TABLET, DELAYED RELEASE ORAL
Status: DISCONTINUED | OUTPATIENT
Start: 2022-07-05 | End: 2022-07-07 | Stop reason: HOSPADM

## 2022-07-04 RX ORDER — IPRATROPIUM BROMIDE AND ALBUTEROL SULFATE 2.5; .5 MG/3ML; MG/3ML
3 SOLUTION RESPIRATORY (INHALATION)
Status: DISCONTINUED | OUTPATIENT
Start: 2022-07-04 | End: 2022-07-05

## 2022-07-04 RX ORDER — AMLODIPINE BESYLATE 5 MG/1
5 TABLET ORAL DAILY
COMMUNITY
End: 2022-07-07

## 2022-07-04 RX ORDER — GUAIFENESIN 600 MG/1
600 TABLET, EXTENDED RELEASE ORAL 2 TIMES DAILY
Status: DISCONTINUED | OUTPATIENT
Start: 2022-07-04 | End: 2022-07-07 | Stop reason: HOSPADM

## 2022-07-04 RX ORDER — BISMUTH SUBSALICYLATE 262 MG
1 TABLET,CHEWABLE ORAL DAILY
Status: DISCONTINUED | OUTPATIENT
Start: 2022-07-04 | End: 2022-07-04

## 2022-07-04 RX ORDER — ONDANSETRON 2 MG/ML
4 INJECTION INTRAMUSCULAR; INTRAVENOUS
Status: DISCONTINUED | OUTPATIENT
Start: 2022-07-04 | End: 2022-07-07 | Stop reason: HOSPADM

## 2022-07-04 RX ORDER — POLYETHYLENE GLYCOL 3350 17 G/17G
17 POWDER, FOR SOLUTION ORAL DAILY PRN
Status: DISCONTINUED | OUTPATIENT
Start: 2022-07-04 | End: 2022-07-07 | Stop reason: HOSPADM

## 2022-07-04 RX ORDER — LATANOPROST 50 UG/ML
1 SOLUTION/ DROPS OPHTHALMIC
Status: DISCONTINUED | OUTPATIENT
Start: 2022-07-04 | End: 2022-07-07 | Stop reason: HOSPADM

## 2022-07-04 RX ORDER — DONEPEZIL HYDROCHLORIDE 5 MG/1
5 TABLET, FILM COATED ORAL
Status: DISCONTINUED | OUTPATIENT
Start: 2022-07-04 | End: 2022-07-07 | Stop reason: HOSPADM

## 2022-07-04 RX ORDER — LANOLIN ALCOHOL/MO/W.PET/CERES
400 CREAM (GRAM) TOPICAL 2 TIMES DAILY
Status: DISCONTINUED | OUTPATIENT
Start: 2022-07-04 | End: 2022-07-07 | Stop reason: HOSPADM

## 2022-07-04 RX ORDER — AMOXICILLIN 250 MG
1 CAPSULE ORAL
Status: DISCONTINUED | OUTPATIENT
Start: 2022-07-04 | End: 2022-07-07 | Stop reason: HOSPADM

## 2022-07-04 RX ORDER — MAGNESIUM SULFATE 100 %
4 CRYSTALS MISCELLANEOUS AS NEEDED
Status: DISCONTINUED | OUTPATIENT
Start: 2022-07-04 | End: 2022-07-07 | Stop reason: HOSPADM

## 2022-07-04 RX ADMIN — FLUTICASONE PROPIONATE 1 SPRAY: 50 SPRAY, METERED NASAL at 12:31

## 2022-07-04 RX ADMIN — CETIRIZINE HYDROCHLORIDE 10 MG: 10 TABLET, FILM COATED ORAL at 12:28

## 2022-07-04 RX ADMIN — DORZOLAMIDE HYDROCHLORIDE AND TIMOLOL MALEATE 1 DROP: 20; 5 SOLUTION/ DROPS OPHTHALMIC at 12:31

## 2022-07-04 RX ADMIN — FUROSEMIDE 40 MG: 10 INJECTION, SOLUTION INTRAMUSCULAR; INTRAVENOUS at 16:12

## 2022-07-04 RX ADMIN — DORZOLAMIDE HYDROCHLORIDE AND TIMOLOL MALEATE 1 DROP: 20; 5 SOLUTION/ DROPS OPHTHALMIC at 20:53

## 2022-07-04 RX ADMIN — MAGNESIUM OXIDE 400 MG: 400 TABLET ORAL at 12:28

## 2022-07-04 RX ADMIN — DONEPEZIL HYDROCHLORIDE 5 MG: 5 TABLET, FILM COATED ORAL at 20:54

## 2022-07-04 RX ADMIN — LATANOPROST 1 DROP: 50 SOLUTION/ DROPS OPHTHALMIC at 20:54

## 2022-07-04 RX ADMIN — SODIUM CHLORIDE, PRESERVATIVE FREE 10 ML: 5 INJECTION INTRAVENOUS at 21:05

## 2022-07-04 RX ADMIN — DOCUSATE SODIUM 50MG AND SENNOSIDES 8.6MG 1 TABLET: 8.6; 5 TABLET, FILM COATED ORAL at 12:26

## 2022-07-04 RX ADMIN — SODIUM CHLORIDE, PRESERVATIVE FREE 10 ML: 5 INJECTION INTRAVENOUS at 16:14

## 2022-07-04 RX ADMIN — FUROSEMIDE 40 MG: 10 INJECTION, SOLUTION INTRAMUSCULAR; INTRAVENOUS at 04:43

## 2022-07-04 RX ADMIN — GUAIFENESIN 600 MG: 600 TABLET, EXTENDED RELEASE ORAL at 20:53

## 2022-07-04 RX ADMIN — ASPIRIN 81 MG: 81 TABLET, COATED ORAL at 12:29

## 2022-07-04 RX ADMIN — AMLODIPINE BESYLATE 10 MG: 10 TABLET ORAL at 12:29

## 2022-07-04 RX ADMIN — MAGNESIUM OXIDE 400 MG: 400 TABLET ORAL at 20:53

## 2022-07-04 RX ADMIN — GUAIFENESIN 600 MG: 600 TABLET, EXTENDED RELEASE ORAL at 12:26

## 2022-07-04 RX ADMIN — ENOXAPARIN SODIUM 40 MG: 100 INJECTION SUBCUTANEOUS at 09:41

## 2022-07-04 RX ADMIN — SODIUM CHLORIDE, PRESERVATIVE FREE 10 ML: 5 INJECTION INTRAVENOUS at 14:26

## 2022-07-04 RX ADMIN — LISINOPRIL 10 MG: 10 TABLET ORAL at 13:11

## 2022-07-04 RX ADMIN — IPRATROPIUM BROMIDE AND ALBUTEROL SULFATE 3 ML: .5; 2.5 SOLUTION RESPIRATORY (INHALATION) at 16:33

## 2022-07-04 RX ADMIN — ASPIRIN 81 MG 324 MG: 81 TABLET ORAL at 04:43

## 2022-07-04 RX ADMIN — ATORVASTATIN CALCIUM 20 MG: 20 TABLET, FILM COATED ORAL at 12:29

## 2022-07-04 RX ADMIN — MULTIPLE VITAMINS W/ MINERALS TAB 1 TABLET: TAB at 12:28

## 2022-07-04 NOTE — PROGRESS NOTES
Problem: Falls - Risk of  Goal: *Absence of Falls  Description: Document Dresser Fall Risk and appropriate interventions in the flowsheet.   Outcome: Progressing Towards Goal  Note: Fall Risk Interventions:   Keep pathway to restroom clear  Keep call light with in patient's reach                  Elimination Interventions: Call light in reach

## 2022-07-04 NOTE — PROGRESS NOTES
Spiritual Care Assessment/Progress Note  [address]      NAME: Sheri Gomez      MRN: [id number]  AGE: 82 y.o.  SEX: male  Sikh Affiliation: Methodist   Language: English     7/4/2022     Total Time (in minutes): 15     Spiritual Assessment begun in [facility] [address] through conversation with:         [x]Patient        [] Family    [] Friend(s)        Reason for Consult: Initial/Spiritual assessment, patient floor     Spiritual beliefs: (Please include comment if needed)     [x] Identifies with a geetha tradition:  Methodist       [] Supported by a geetha community:            [] Claims no spiritual orientation:           [] Seeking spiritual identity:                [] Adheres to an individual form of spirituality:           [] Not able to assess:                           Identified resources for coping:      [] Prayer                               [] Music                  [] Guided Imagery     [x] Family/friends                 [] Pet visits     [] Devotional reading                         [] Unknown     [] Other:                                              Interventions offered during this visit: (See comments for more details)    Patient Interventions: Affirmation of emotions/emotional suffering,Affirmation of geetha,Sikh beliefs/image of God discussed           Plan of Care:     [] Support spiritual and/or cultural needs    [] Support AMD and/or advance care planning process      [] Support grieving process   [] Coordinate Rites and/or Rituals    [] Coordination with community clergy   [] No spiritual needs identified at this time   [] Detailed Plan of Care below (See Comments)  [] Make referral to Music Therapy  [] Make referral to Pet Therapy     [] Make referral to Addiction services  [] Make referral to [facility]  [] Make referral to Spiritual Care Partner  [] No future visits requested        [x] Contact Spiritual Care for further referrals     Comments: Visited patient while rounding in [facility]. Patient shared that he is [employer] and has support at home.  provided emotional and spiritual support, words of affirmation and encouragement. Advised of  availability. Rev.  Jamal Hussein, [address], Cecille Mora

## 2022-07-04 NOTE — ED NOTES
TRANSFER - OUT REPORT:    Verbal report given to [place] (name) on Marybeth Alvarado  being transferred to [facility] (unit) for routine progression of care       Report consisted of patients Situation, Background, Assessment and   Recommendations(SBAR). Information from the following report(s) SBAR, ED Summary, [facility] and Recent Results was reviewed with the receiving nurse. Lines:   Peripheral IV 07/04/22 Right Forearm (Active)   Site Assessment Clean, dry, & intact 07/04/22 0255   Phlebitis Assessment 0 07/04/22 0255   Infiltration Assessment 0 07/04/22 0255   Dressing Status Clean, dry, & intact 07/04/22 0255   Dressing Type Transparent 07/04/22 0255   Hub Color/Line Status Pink 07/04/22 0255   Action Taken Blood drawn 07/04/22 0255        Opportunity for questions and clarification was provided. (3) adequate

## 2022-07-04 NOTE — ROUTINE PROCESS
TRANSFER - IN REPORT:    Verbal report received from mat(name) on Ana Cristina Morocho  being received from ER(unit) for routine progression of care      Report consisted of patients Situation, Background, Assessment and   Recommendations(SBAR). Information from the following report(s) SBAR was reviewed with the receiving nurse. Opportunity for questions and clarification was provided. Assessment completed upon patients arrival to unit and care assumed.

## 2022-07-04 NOTE — Clinical Note
Patient Class[de-identified] OBSERVATION [104]   Type of Bed: Remote Telemetry [29]   Cardiac Monitoring Required?: Yes   Reason for Observation: chf   Admitting Diagnosis: CHF (congestive heart failure) ([facility] [[id number]]   Admitting Physician: Emerson Nicolas   Attending Physician: Emerson Nicolas

## 2022-07-04 NOTE — ED TRIAGE NOTES
Patient with c/o left upper chest wall pain intermittently for 2-3 days. Denies any radiation, N/V, diaphoresis, or SOB. Patient does have pacemaker. HTN per Nursing home. Noted with audible wheezing.
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
## 2022-07-04 NOTE — H&P
History and Physical      Chief Complaints:     Chief Complaint   Patient presents with    Congestive cough, wheeze         Subjective:   Christina Banks is a 82 y.o. male followed by Manuel Valdes NP and  has a past medical history of Anemia (4/26/2017), Anxiety, Arrhythmia, Arthritis, Atherosclerosis of artery of extremity with rest pain [facility]), Atrial fibrillation ([address] (7/21/2020), Benign prostatic hyperplasia (4/26/2017), CAD (coronary artery disease), Cancer ([address] (2010), Chronic kidney disease, Chronic obstructive pulmonary disease ([address] Depression, Diabetes ([address] Diverticulosis of colon, Dyslipidemia (4/26/2017), GERD (gastroesophageal reflux disease), Glaucoma, History of CVA (cerebrovascular accident) (7/21/2020), Hypercholesteremia, Hypertension, Hypomagnesemia (7/13/2020), Nocturia (4/26/2017), PUD (peptic ulcer disease), PVD (peripheral vascular disease) ([address] Rectal hemorrhage (4/26/2017), Strabismus, Stroke ([address] (2000 APPROX.), Type 2 diabetes mellitus without complications ([address] (6/19/2888), and Venous stasis (4/26/2017). Who presents from [address] [place] [address] for cough and sob. Was last admitted to our service in march for sepsis and left bka infection and was transferred to [facility] for reevaluation by surgeon Dr. Grace Domínguez where he had to have a revision and AKA amputation . Patient continues to be skilled rehab for his left below-knee amputation and overall good historian so the history was obtained from the previous chart reviewed but it is noted that patient was having increasing cold-like symptoms with congestive cough which she said producing clear sputum he also says he is has off-and-on chest wall pain but currently denies any pain. Does have noted lower extremity edema and says that been occurring off and on and usually gets better when he lifts his leg up.   He did note that his blood pressure has been elevated at the nursing home and he states he mentioned it to the nurses and that it never runs that high and was concerned that he was not started on any other medications. Currently patient noted to be on metoprolol XL 25 mg daily as well as Norvasc 5 mg but on arrival patient blood pressure elevated at 186/96 with a heart rate of 52 and EKG showing ventricular paced rhythm as patient does have a [employer] pacemaker and was replaced in 2017 by Dr. Jessica Smith and initially was placed secondary to complete heart block in the setting of A. fib. On further evaluation the emergency room his left stump shows no signs of infection BUN/creatinine appears to be at baseline initial troponin was 40 and 2-hour repeat was at 42 BNP was elevated 3607 and magnesium was 1.9. Chest x-ray showed pulmonary edema versus infiltrates but had negative white blood count and no noted fevers and no hypoxia. So with patient's presentation was referred for admission to remote telemetry for new onset CHF, hypertensive urgency, bradycardia    Past Medical History:   Diagnosis Date    Anemia 4/26/2017    Anxiety     Arrhythmia     A FIB    Arthritis     Atherosclerosis of artery of extremity with rest pain (HCC)     Atrial fibrillation ([address] 7/21/2020    Benign prostatic hyperplasia 4/26/2017    CAD (coronary artery disease)     pacemaker    Cancer ([address] 2010    GASTRIC    Chronic kidney disease     Chronic obstructive pulmonary disease ([address]     Depression     Diabetes ([address]     BORDERLINE, NO MEDS.  Diverticulosis of colon     Dyslipidemia 4/26/2017    GERD (gastroesophageal reflux disease)     Glaucoma     History of CVA (cerebrovascular accident) 7/21/2020    Hypercholesteremia     Hypertension     Hypomagnesemia 7/13/2020    Nocturia 4/26/2017    PUD (peptic ulcer disease)     GI BLEEDING    PVD (peripheral vascular disease) ([address]     Rectal hemorrhage 4/26/2017    Strabismus     Stroke ([address] 2000 APPROX.     SOME VISUAL DEFICIT    Type 2 diabetes mellitus without complications ([address] 7/13/2020    Venous stasis 4/26/2017      Past Surgical History:   Procedure Laterality Date    HX AMPUTATION TOE Right     HX COLONOSCOPY      HX GI  1998    PARTIAL GASTRECTOMY ( DR ALVIN ALBA)    HX HEART CATHETERIZATION      HX ORTHOPAEDIC Left 1980    KNEE CARTILAGE    HX ORTHOPAEDIC Right 2019    AMPUTATION RIGHT GREAT TOE    HX ORTHOPAEDIC Left 2021    AMPUTATION 3 TOES     HX ORTHOPAEDIC Left 02/2022    AMPUTATION 4TH & 5TH TOES    HX OTHER SURGICAL      LEFT HAND SKIN GRAFT (BURN) DONOR RIGHT THIGH    HX PACEMAKER  [id number]    DR Keshia Rasmussen; WATCHMAN PROCEDURE     Family History   Problem Relation Age of Onset    Stroke Mother     Stroke Father     Cancer Brother         PROSTATE    Anesth Problems Neg Hx       Social History     Tobacco Use    Smoking status: Never Smoker    Smokeless tobacco: Never Used   Substance Use Topics    Alcohol use: Not Currently     Comment: QUIT 2000       Prior to Admission medications    Medication Sig Start Date End Date Taking? Authorizing Provider   donepeziL (Aricept) 5 mg tablet Take 5 mg by mouth nightly. Yes Other, Phys, MD   guaiFENesin ER (MUCINEX) 600 mg ER tablet Take 600 mg by mouth two (2) times a day. Yes Provider, Historical   multivitamin (Multiple Vitamins) tablet Take 1 Tablet by mouth daily. Yes Provider, Historical   amLODIPine (Norvasc) 5 mg tablet Take 5 mg by mouth daily. Yes Provider, Historical   cetirizine (ZyrTEC) 10 mg tablet Take 10 mg by mouth daily. Yes Provider, Historical   hydroCHLOROthiazide (HYDRODIURIL) 25 mg tablet Take 25 mg by mouth daily. Yes Provider, Historical   fluticasone propionate (Flonase Allergy Relief) 50 mcg/actuation nasal spray 1 [place] by Both Nostrils route daily. Yes Provider, Historical   atorvastatin (LIPITOR) 80 mg tablet Take 20 mg by mouth daily. Yes Provider, Historical   aspirin delayed-release 81 mg tablet Take 81 mg by mouth every morning.    Yes Provider, Historical dorzolamide-timolol, PF, (COSOPT) 2-0.5 % ophthalmic solution Administer 1 Drop to both eyes two (2) times a day. Provider, Historical   lactulose (CHRONULAC) 10 gram/15 mL solution TAKE 30 ML TWICE A DAY AS NEEDED  Patient taking differently: as needed. TAKE 30 ML TWICE A DAY AS NEEDED 3/16/22   Kalyani Chen NP   magnesium oxide (MAG-OX) 400 mg tablet Take 1 Tablet by mouth daily. Patient taking differently: Take 400 mg by mouth every morning. 1/12/22   Kalyani Chen NP   omeprazole (PRILOSEC) 40 mg capsule Take 1 Capsule by mouth daily. Patient taking differently: Take 20 mg by mouth every morning. 1/11/22   Kalyani Chen NP   FeroSuL 325 mg (65 mg iron) tablet TAKE 1 TABLET BY MOUTH DAILY (BEFORE BREAKFAST). Patient taking differently: Take 65 mg by mouth Daily (before breakfast). 1/3/22   Kalyani Chen NP   Accu-Chek Ratna Plus test strp strip TEST BLOOD SUGAR TWICE A DAY 11/19/21   Loraine Bailon MD   metoprolol succinate (TOPROL-XL) 25 mg XL tablet TAKE 1 TABLET EVERY DAY  Patient taking differently: Take 25 mg by mouth every morning. 10/9/21   Loraine Bailon MD   lancets (Accu-Chek Softclix Lancets) misc TEST BLOOD SUGAR TWICE A DAY 8/16/21   Loraine Bailon MD   Accu-Chek Ratna Control Soln soln USE AS DIRECTED. 7/9/21   Loraine Bailon MD   BD Single Use Swabs Regular padm TEST BLOOD SUGAR TWICE DAILY 10/5/20   Loraine Bailon MD   acetaminophen (TYLENOL) 325 mg tablet Take 650 mg by mouth every six (6) hours as needed for Pain. Provider, Historical   latanoprost (XALATAN) 0.005 % ophthalmic solution Administer 1 Drop to both eyes nightly. Provider, Historical   senna-docusate (STOOL SOFTENER-LAXATIVE) 8.6-50 mg per tablet Take 1 Tablet by mouth every morning. Provider, Historical     No Known Allergies     Review of Systems:  Review of Systems   Constitutional: Negative for chills, diaphoresis, fatigue and fever.    HENT: Negative for congestion, ear pain, postnasal drip, sinus pain and sore throat. Eyes: Negative for pain, discharge and redness. Respiratory: Positive for cough. Negative for shortness of breath and wheezing. Cardiovascular: Negative for chest pain and palpitations. Gastrointestinal: Negative for abdominal pain, constipation, diarrhea, nausea and vomiting. Genitourinary: Negative for dysuria, flank pain, frequency and urgency. Musculoskeletal: Negative for arthralgias and myalgias. Neurological: Negative for dizziness, weakness and headaches. Psychiatric/Behavioral: Negative for agitation and hallucinations. The patient is not nervous/anxious. Objective:     Vitals:  Visit Vitals  BP (!) 179/87 (BP 1 Location: Right upper arm, BP Patient Position: Semi fowlers)   Pulse (!) 51   Temp 98.3 °F (36.8 °C)   Resp 18   Ht 6' (1.829 m)   Wt 85.3 kg (188 lb)   SpO2 100%   BMI 25.50 kg/m²       Physical Exam:  General: Alert, cooperative, no distress. Head:  Normocephalic, without obvious abnormality, atraumatic. Eyes:  Conjunctivae/corneas clear. Pupils equal, round, reactive to light. Extraocular movements intact. Lungs: Bilateral air entry with bilateral basilar crackles noted and wheezes  Chest wall: No tenderness or deformity. Heart:  Regular rate and rhythm, S1, S2 normal, no murmur, click, rub, or gallop. Abdomen:   Soft, non-tender. Bowel sounds normal. No masses. No organomegaly. Back:  No spine tenderness to palpation  Extremities: left AKA amputation   Pulses: Symmetric all extremities. Skin: Skin color, texture, turgor normal.   Lymph nodes: Cervical nodes normal.  Neurologic: CNII-XII intact. Normal strength, sensation, and reflexes throughout.       Labs:  Recent Results (from the past 24 hour(s))   EKG, 12 LEAD, INITIAL    Collection Time: 07/04/22  2:40 AM   Result Value Ref Range    Ventricular Rate 50 BPM    Atrial Rate 0 BPM    P-R Interval 252 ms    QRS Duration 169 ms    Q-T Interval 513 ms    QTC Calculation (Bezet) 468 ms    Calculated P Axis 0 degrees    Calculated R Axis -48 degrees    Calculated T Axis 122 degrees    Diagnosis       Ventricular-paced complexes  No further analysis attempted due to paced rhythm     CBC WITH AUTOMATED DIFF    Collection Time: 07/04/22  3:10 AM   Result Value Ref Range    WBC 7.8 4.4 - 11.3 K/uL    RBC 4.27 (L) 4.50 - 5.90 M/uL    HGB 12.0 (L) 13.5 - 17.5 g/dL    HCT 37.5 (L) 41 - 53 %    MCV 87.8 80 - 100 FL    MCH 28.1 (L) 31 - 34 PG    MCHC 32.1 31.0 - 36.0 g/dL    RDW 15.9 (H) 11.5 - 14.5 %    PLATELET 480 560 - 232 K/uL    MPV 9.3 6.5 - 11.5 FL    NRBC 0.1  WBC    ABSOLUTE NRBC 0.01 K/uL    NEUTROPHILS 68 42 - 75 %    LYMPHOCYTES 22 20.5 - 51.1 %    MONOCYTES 6 1.7 - 9.3 %    EOSINOPHILS 3 (H) 0.9 - 2.9 %    BASOPHILS 1 0.0 - 2.5 %    ABS. NEUTROPHILS 5.4 1.8 - 7.7 K/UL    ABS. LYMPHOCYTES 1.7 1.0 - 4.8 K/UL    ABS. MONOCYTES 0.4 0.2 - 2.4 K/UL    ABS. EOSINOPHILS 0.2 0.0 - 0.7 K/UL    ABS. BASOPHILS 0.1 0.0 - 0.2 K/UL   METABOLIC PANEL, COMPREHENSIVE    Collection Time: 07/04/22  3:10 AM   Result Value Ref Range    Sodium 140 136 - 145 mmol/L    Potassium 4.7 3.5 - 5.1 mmol/L    Chloride 108 97 - 108 mmol/L    CO2 22 21 - 32 mmol/L    Anion gap 10 5 - 15 mmol/L    Glucose 116 (H) 65 - 100 mg/dL    BUN 24 (H) 6 - 20 mg/dL    Creatinine 1.11 0.70 - 1.30 mg/dL    BUN/Creatinine ratio 22 (H) 12 - 20      GFR est AA >60 >60 ml/min/1.73m2    GFR est non-AA >60 >60 ml/min/1.73m2    Calcium 8.6 8.5 - 10.1 mg/dL    Bilirubin, total 0.5 0.2 - 1.0 mg/dL    AST (SGOT) 51 (H) 15 - 37 U/L    ALT (SGPT) 27 12 - 78 U/L    Alk.  phosphatase 103 45 - 117 U/L    Protein, total 7.5 6.4 - 8.2 g/dL    Albumin 2.8 (L) 3.5 - 5.0 g/dL    Globulin 4.7 (H) 2.0 - 4.0 g/dL    A-G Ratio 0.6 (L) 1.1 - 2.2     CULTURE, BLOOD    Collection Time: 07/04/22  3:10 AM    Specimen: Blood   Result Value Ref Range    Special Requests: No Special Requests      Culture result: No growth after 1 hour LACTIC ACID    Collection Time: 07/04/22  3:10 AM   Result Value Ref Range    Lactic acid 1.2 0.4 - 2.0 mmol/L   TROPONIN-HIGH SENSITIVITY    Collection Time: 07/04/22  3:10 AM   Result Value Ref Range    Troponin-High Sensitivity 40 0 - 76 ng/L   COVID-19 WITH INFLUENZA A/B    Collection Time: 07/04/22  3:10 AM   Result Value Ref Range    SARS-CoV-2 by PCR Not Detected Not Detected      Influenza A by PCR Not Detected Not Detected      Influenza B by PCR Not Detected Not Detected     NT-PRO BNP    Collection Time: 07/04/22  3:10 AM   Result Value Ref Range    NT pro-BNP 3,607 (H) <450 pg/mL   MAGNESIUM    Collection Time: 07/04/22  3:10 AM   Result Value Ref Range    Magnesium 1.9 1.6 - 2.4 mg/dL   TSH 3RD GENERATION    Collection Time: 07/04/22  3:10 AM   Result Value Ref Range    TSH 2.40 0.36 - 3.74 uIU/mL   CULTURE, BLOOD    Collection Time: 07/04/22  3:15 AM    Specimen: Blood   Result Value Ref Range    Special Requests: No Special Requests      Culture result: No growth after 1 hour     URINALYSIS W/ RFLX MICROSCOPIC    Collection Time: 07/04/22  4:15 AM   Result Value Ref Range    Color Yellow/Straw      Appearance Clear Clear      Specific gravity >1.030 (H) 1.003 - 1.030    pH (UA) 5.5 5.0 - 8.0      Protein >300 (A) Negative mg/dL    Glucose Negative Negative mg/dL    Ketone Negative Negative mg/dL    Bilirubin Negative Negative      Blood Negative Negative      Urobilinogen 1.0 0.2 - 1.0 EU/dL    Nitrites Negative Negative      Leukocyte Esterase Negative Negative     URINE MICROSCOPIC    Collection Time: 07/04/22  4:15 AM   Result Value Ref Range    WBC 0-4 0 - 5 /hpf    RBC 0-5 0 - 3 /hpf    Bacteria Negative Negative /hpf    Amorphous Crystals 3+ (A) Negative   TROPONIN-HIGH SENSITIVITY    Collection Time: 07/04/22  6:00 AM   Result Value Ref Range    Troponin-High Sensitivity 42 0 - 76 ng/L   TROPONIN-HIGH SENSITIVITY    Collection Time: 07/04/22  9:50 AM   Result Value Ref Range    Troponin-High Sensitivity 36 0 - 76 ng/L       Imaging:  XR CHEST PORT    Result Date: 7/4/2022  Bilateral pulmonary edema versus nonspecific pneumonia. Consider PA and lateral chest views when the patient can better tolerate. Assessment & Plan:     New Onset CHF exacerbation  - elevated BNP 3,607 and cxr showing b/l pulmonary edema vs nonspecific pna   - lasix 40mg IV x 1 given in ED   - troponin HS trended 40 -> 42 -> 36  - TSH normal at 2.4   - ekg showing ventricular paced at 50   - monitor input / output and daily weight   - continue lasix 40mg IV bid   - 2D echo in AM  - Cardiology in AM     HTN Urgency    - blood pressure on admission 186/96   - lasix 40mg IV x 1 in ED and continue BID   - reconciled meds from [address] shows only amolipine 5mg and previously start of HCTZ dosing   - hydralazine 10mg IV q6hr prn for sbp > 165  - restart norvasc at a dose of 10mg daily 1st dose on 7/4  -Also started patient on lisinopril 10 mg oral daily first dose now  - monitor vitals as per floor protocol     Bradycardia  - hx of watchman procedure done by Dr. Frankie Fournier MD and recent replacement in 2017 done by Dr. Avinash Bermudez  - currently ventricular paced at 50   - noted to be on metoprolol succinate 25mg daily which will hold for now.    - monitor on tele  - TSH in normal range at 2.4  - pacemaker noted to be a [employer] device and will need to attempt to get interrogation done    Chronic Hypomagnesemia  - mag level 1.9 and on chronic daily mag oxide 400mg so will increase to bid   - follow mag level closely     Anemia of chronic dz   - hg 12 on admission with previous hg in April 2022 at 9.9  - continue home ferrous sulfate daily with breakfast  - follow h/h     COPD  - restart previous home meds noted of muccinex bid, flonase, cetrezine,   - As needed DuoNebs for shortness of breath/wheeze    HLD   - obtain lipid profile   - restart atorvastatin low dose at 20mg daily     Diabetes   - noted hx of but no medications on [facility] med list but previously was on glipizide and noted to have hypoglycemia episode during last admission and appears that has been discontinued since  - obtain A1c   - monitor with RISS and accuchecks ACHS     CKD 3b  - was noted on previous hx and in April creat was 0.82   - current creat is 1.11  - UA negative for UTI and noted elevated specific Gravity and proteinuria > 300  - follow renal fxn closely while on current IV diuresis     CAD  - denies any chest pain   - serial troponins stable   - monitor on tele  - restart statin and asa therapy but hold beta blocker secondary to HR paced at 50     Recent left below the knee amputation 3/21/22 after failed revascularization attempts   -Currently is in skilled rehab at a [facility] and rehab but patient says that he does not receive that much physical therapy and even noted that  was supprised that he is not more improved with physical therapy  - will consult case management as [facility] but says he was supposed to be discharged on 7/15 from Skilled rehab to home  - will get PT evaluation in AM     Dementia  - continue home aricept 5mg oral bedtime     Glaucoma  - restart home eye drops     Diabetes   - RISS with accuchecks ACHS  - obtain A 1c   - no medications noted for DM    GI prophylaxis   - equivalent home dose of omeprazole 20mg daily      DVT prophylaxis   - lovenox     Code Status: Full code  Patient designates daughter Kassidy Warren [phone] medical decision-maker if for some reason he is unable to make decision for himself    Had discussion with patient daughter Ms Jadon Zaman and gave  Update on her fathers condition and admission to [facility] and plan of care.  She noted that her father was supposed to be discharged from [address] on 15th and wishes for him to go back until can get all equipment set up to come home but patient noting to me that he does not want to go back as he feels they are not taking care of him as he says blood pressure is elevated and nothing being done and many times he has a diaper that would be wet and he is afraid to eat because then he would have bowel movement and it would leak out of diaper and would stay in that state for some time before he would be cleaned.  (as per patient expressed to me and unable to confirm these events and even though it is noted he has dementia but patient is awake, alert and oriented )     And 50 minutes evaluating cording patient's admission to acute remote telemetry and expecting at least 2 to 3 days of acute care stay              Electronically signed by Caden Persaud MD on 7/4/2022 at 10:42 AM

## 2022-07-04 NOTE — PROGRESS NOTES
Remote Hospitalist ED sign out/admission acceptance    Chief complaint: shortness of breath, chest pain      Reason For admission: Acute CHF                                          Chest pain      Admission Status:Inpatient      ED Physician giving sign out: Dr. Anne Newberry      Patient Vitals for the past 12 hrs:   Temp Pulse Resp BP SpO2   07/04/22 0651 -- (!) 50 18 (!) 182/81 99 %   07/04/22 0613 -- (!) 50 16 (!) 172/81 99 %   07/04/22 0532 -- (!) 48 24 (!) 197/85 99 %   07/04/22 0500 -- (!) 48 19 (!) 175/82 99 %   07/04/22 0443 -- (!) 50 -- (!) 174/96 --   07/04/22 0430 -- (!) 50 21 (!) 174/86 98 %   07/04/22 0400 -- (!) 50 18 (!) 181/81 100 %   07/04/22 0330 -- (!) 50 23 (!) 185/81 100 %   07/04/22 0255 -- -- -- -- 99 %   07/04/22 0240 98.2 °F (36.8 °C) (!) 52 26 (!) 186/96 99 %         Recent Results (from the past 24 hour(s))   EKG, 12 LEAD, INITIAL    Collection Time: 07/04/22  2:40 AM   Result Value Ref Range    Ventricular Rate 50 BPM    Atrial Rate 0 BPM    P-R Interval 252 ms    QRS Duration 169 ms    Q-T Interval 513 ms    QTC Calculation (Bezet) 468 ms    Calculated P Axis 0 degrees    Calculated R Axis -48 degrees    Calculated T Axis 122 degrees    Diagnosis       Ventricular-paced complexes  No further analysis attempted due to paced rhythm     CBC WITH AUTOMATED DIFF    Collection Time: 07/04/22  3:10 AM   Result Value Ref Range    WBC 7.8 4.4 - 11.3 K/uL    RBC 4.27 (L) 4.50 - 5.90 M/uL    HGB 12.0 (L) 13.5 - 17.5 g/dL    HCT 37.5 (L) 41 - 53 %    MCV 87.8 80 - 100 FL    MCH 28.1 (L) 31 - 34 PG    MCHC 32.1 31.0 - 36.0 g/dL    RDW 15.9 (H) 11.5 - 14.5 %    PLATELET 525 787 - 385 K/uL    MPV 9.3 6.5 - 11.5 FL    NRBC 0.1  WBC    ABSOLUTE NRBC 0.01 K/uL    NEUTROPHILS 68 42 - 75 %    LYMPHOCYTES 22 20.5 - 51.1 %    MONOCYTES 6 1.7 - 9.3 %    EOSINOPHILS 3 (H) 0.9 - 2.9 %    BASOPHILS 1 0.0 - 2.5 %    ABS. NEUTROPHILS 5.4 1.8 - 7.7 K/UL    ABS. LYMPHOCYTES 1.7 1.0 - 4.8 K/UL    ABS.  MONOCYTES 0.4 0.2 - 2.4 K/UL    ABS. EOSINOPHILS 0.2 0.0 - 0.7 K/UL    ABS. BASOPHILS 0.1 0.0 - 0.2 K/UL   METABOLIC PANEL, COMPREHENSIVE    Collection Time: 07/04/22  3:10 AM   Result Value Ref Range    Sodium 140 136 - 145 mmol/L    Potassium 4.7 3.5 - 5.1 mmol/L    Chloride 108 97 - 108 mmol/L    CO2 22 21 - 32 mmol/L    Anion gap 10 5 - 15 mmol/L    Glucose 116 (H) 65 - 100 mg/dL    BUN 24 (H) 6 - 20 mg/dL    Creatinine 1.11 0.70 - 1.30 mg/dL    BUN/Creatinine ratio 22 (H) 12 - 20      GFR est AA >60 >60 ml/min/1.73m2    GFR est non-AA >60 >60 ml/min/1.73m2    Calcium 8.6 8.5 - 10.1 mg/dL    Bilirubin, total 0.5 0.2 - 1.0 mg/dL    AST (SGOT) 51 (H) 15 - 37 U/L    ALT (SGPT) 27 12 - 78 U/L    Alk.  phosphatase 103 45 - 117 U/L    Protein, total 7.5 6.4 - 8.2 g/dL    Albumin 2.8 (L) 3.5 - 5.0 g/dL    Globulin 4.7 (H) 2.0 - 4.0 g/dL    A-G Ratio 0.6 (L) 1.1 - 2.2     CULTURE, BLOOD    Collection Time: 07/04/22  3:10 AM    Specimen: Blood   Result Value Ref Range    Special Requests: No Special Requests      Culture result: No growth after 1 hour     LACTIC ACID    Collection Time: 07/04/22  3:10 AM   Result Value Ref Range    Lactic acid 1.2 0.4 - 2.0 mmol/L   TROPONIN-HIGH SENSITIVITY    Collection Time: 07/04/22  3:10 AM   Result Value Ref Range    Troponin-High Sensitivity 40 0 - 76 ng/L   COVID-19 WITH INFLUENZA A/B    Collection Time: 07/04/22  3:10 AM   Result Value Ref Range    SARS-CoV-2 by PCR Not Detected Not Detected      Influenza A by PCR Not Detected Not Detected      Influenza B by PCR Not Detected Not Detected     NT-PRO BNP    Collection Time: 07/04/22  3:10 AM   Result Value Ref Range    NT pro-BNP 3,607 (H) <450 pg/mL   CULTURE, BLOOD    Collection Time: 07/04/22  3:15 AM    Specimen: Blood   Result Value Ref Range    Special Requests: No Special Requests      Culture result: No growth after 1 hour     URINALYSIS W/ RFLX MICROSCOPIC    Collection Time: 07/04/22  4:15 AM   Result Value Ref Range    Color Yellow/Straw      Appearance Clear Clear      Specific gravity >1.030 (H) 1.003 - 1.030    pH (UA) 5.5 5.0 - 8.0      Protein >300 (A) Negative mg/dL    Glucose Negative Negative mg/dL    Ketone Negative Negative mg/dL    Bilirubin Negative Negative      Blood Negative Negative      Urobilinogen 1.0 0.2 - 1.0 EU/dL    Nitrites Negative Negative      Leukocyte Esterase Negative Negative     URINE MICROSCOPIC    Collection Time: 07/04/22  4:15 AM   Result Value Ref Range    WBC 0-4 0 - 5 /hpf    RBC 0-5 0 - 3 /hpf    Bacteria Negative Negative /hpf    Amorphous Crystals 3+ (A) Negative   TROPONIN-HIGH SENSITIVITY    Collection Time: 07/04/22  6:00 AM   Result Value Ref Range    Troponin-High Sensitivity 42 0 - 76 ng/L         XR CHEST PORT   Final Result      Bilateral pulmonary edema versus nonspecific pneumonia. Consider PA and lateral   chest views when the patient can better tolerate.                  Results for orders placed or performed in visit on 12/27/21   AMB POC URINALYSIS DIP STICK AUTO W/O MICRO     Status: None   Result Value Ref Range Status    Color (UA POC) Yellow  Final    Clarity (UA POC) Clear  Final    Glucose (UA POC) Negative Negative Final    Bilirubin (UA POC) Negative Negative Final    Ketones (UA POC) Negative Negative Final    Specific gravity (UA POC) 1.030 1.001 - 1.035 Final    Blood (UA POC) Trace Negative Final    pH (UA POC) 5.0 4.6 - 8.0 Final    Protein (UA POC)   Final     Comment: 100MG    Urobilinogen (UA POC)   Final     Comment: 1.0    Nitrites (UA POC) Negative Negative Final    Leukocyte esterase (UA POC) Negative Negative Final

## 2022-07-05 ENCOUNTER — TELEPHONE (OUTPATIENT)
Dept: PRIMARY CARE CLINIC | Age: 87
End: 2022-07-05

## 2022-07-05 ENCOUNTER — APPOINTMENT (OUTPATIENT)
Dept: VASCULAR SURGERY | Age: 87
DRG: 291 | End: 2022-07-05
Attending: HOSPITALIST
Payer: MEDICARE

## 2022-07-05 LAB
ANION GAP SERPL CALC-SCNC: 10 MMOL/L (ref 5–15)
ATRIAL RATE: 0 BPM
BASOPHILS # BLD: 0 K/UL (ref 0–0.2)
BASOPHILS NFR BLD: 1 % (ref 0–2.5)
BUN SERPL-MCNC: 26 MG/DL (ref 6–20)
BUN/CREAT SERPL: 19 (ref 12–20)
CA-I BLD-MCNC: 9 MG/DL (ref 8.5–10.1)
CALCULATED P AXIS, ECG09: 0 DEGREES
CALCULATED R AXIS, ECG10: -48 DEGREES
CALCULATED T AXIS, ECG11: 122 DEGREES
CHLORIDE SERPL-SCNC: 106 MMOL/L (ref 97–108)
CHOLEST SERPL-MCNC: 118 MG/DL
CO2 SERPL-SCNC: 27 MMOL/L (ref 21–32)
CREAT SERPL-MCNC: 1.37 MG/DL (ref 0.7–1.3)
DIAGNOSIS, 93000: NORMAL
ECHO AO ROOT DIAM: 4 CM
ECHO AO ROOT INDEX: 1.94 CM/M2
ECHO AV AREA PEAK VELOCITY: 0.9 CM2
ECHO AV AREA VTI: 1 CM2
ECHO AV AREA/BSA PEAK VELOCITY: 0.4 CM2/M2
ECHO AV AREA/BSA VTI: 0.5 CM2/M2
ECHO AV MEAN GRADIENT: 25 MMHG
ECHO AV MEAN VELOCITY: 2.4 M/S
ECHO AV PEAK GRADIENT: 41 MMHG
ECHO AV PEAK VELOCITY: 3.2 M/S
ECHO AV VELOCITY RATIO: 0.25
ECHO AV VTI: 66.4 CM
ECHO EST RA PRESSURE: 3 MMHG
ECHO LA DIAMETER INDEX: 2.23 CM/M2
ECHO LA DIAMETER: 4.6 CM
ECHO LA TO AORTIC ROOT RATIO: 1.15
ECHO LA VOL 2C: 81 ML (ref 18–58)
ECHO LA VOL 4C: 102 ML (ref 18–58)
ECHO LA VOL BP: 93 ML (ref 18–58)
ECHO LA VOL/BSA BIPLANE: 45 ML/M2 (ref 16–34)
ECHO LA VOLUME AREA LENGTH: 101 ML
ECHO LA VOLUME INDEX A2C: 39 ML/M2 (ref 16–34)
ECHO LA VOLUME INDEX A4C: 50 ML/M2 (ref 16–34)
ECHO LA VOLUME INDEX AREA LENGTH: 49 ML/M2 (ref 16–34)
ECHO LV E' SEPTAL VELOCITY: 6 CM/S
ECHO LV EDV A2C: 83 ML
ECHO LV EDV A4C: 117 ML
ECHO LV EDV BP: 97 ML (ref 67–155)
ECHO LV EDV INDEX A4C: 57 ML/M2
ECHO LV EDV INDEX BP: 47 ML/M2
ECHO LV EDV NDEX A2C: 40 ML/M2
ECHO LV EJECTION FRACTION A2C: 75 %
ECHO LV EJECTION FRACTION A4C: 71 %
ECHO LV EJECTION FRACTION BIPLANE: 65 % (ref 55–100)
ECHO LV ESV A2C: 20 ML
ECHO LV ESV A4C: 34 ML
ECHO LV ESV BP: 34 ML (ref 22–58)
ECHO LV ESV INDEX A2C: 10 ML/M2
ECHO LV ESV INDEX A4C: 17 ML/M2
ECHO LV ESV INDEX BP: 17 ML/M2
ECHO LV FRACTIONAL SHORTENING: 42 % (ref 28–44)
ECHO LV GLOBAL LONGITUDINAL STRAIN (GLS): -7.9 %
ECHO LV INTERNAL DIMENSION DIASTOLE INDEX: 2.18 CM/M2
ECHO LV INTERNAL DIMENSION DIASTOLIC: 4.5 CM (ref 4.2–5.9)
ECHO LV INTERNAL DIMENSION SYSTOLIC INDEX: 1.26 CM/M2
ECHO LV INTERNAL DIMENSION SYSTOLIC: 2.6 CM
ECHO LV IVSD: 1.9 CM (ref 0.6–1)
ECHO LV MASS 2D: 304.6 G (ref 88–224)
ECHO LV MASS INDEX 2D: 147.9 G/M2 (ref 49–115)
ECHO LV POSTERIOR WALL DIASTOLIC: 1.3 CM (ref 0.6–1)
ECHO LV RELATIVE WALL THICKNESS RATIO: 0.58
ECHO LVOT AREA: 3.5 CM2
ECHO LVOT AV VTI INDEX: 0.28
ECHO LVOT DIAM: 2.1 CM
ECHO LVOT MEAN GRADIENT: 2 MMHG
ECHO LVOT PEAK GRADIENT: 3 MMHG
ECHO LVOT PEAK VELOCITY: 0.8 M/S
ECHO LVOT STROKE VOLUME INDEX: 30.9 ML/M2
ECHO LVOT SV: 63.7 ML
ECHO LVOT VTI: 18.4 CM
ECHO RIGHT VENTRICULAR SYSTOLIC PRESSURE (RVSP): 29 MMHG
ECHO RV FREE WALL PEAK S': 15 CM/S
ECHO RV INTERNAL DIMENSION: 3.7 CM
ECHO RV TAPSE: 2 CM (ref 1.7–?)
ECHO TV REGURGITANT MAX VELOCITY: 2.53 M/S
ECHO TV REGURGITANT PEAK GRADIENT: 26 MMHG
EOSINOPHIL # BLD: 0.3 K/UL (ref 0–0.7)
EOSINOPHIL NFR BLD: 4 % (ref 0.9–2.9)
ERYTHROCYTE [DISTWIDTH] IN BLOOD BY AUTOMATED COUNT: 16.4 % (ref 11.5–14.5)
EST. AVERAGE GLUCOSE BLD GHB EST-MCNC: 137 MG/DL
GLUCOSE BLD STRIP.AUTO-MCNC: 102 MG/DL (ref 65–117)
GLUCOSE BLD STRIP.AUTO-MCNC: 108 MG/DL (ref 65–117)
GLUCOSE BLD STRIP.AUTO-MCNC: 120 MG/DL (ref 65–117)
GLUCOSE BLD STRIP.AUTO-MCNC: 138 MG/DL (ref 65–117)
GLUCOSE SERPL-MCNC: 115 MG/DL (ref 65–100)
HBA1C MFR BLD: 6.4 % (ref 4–5.6)
HCT VFR BLD AUTO: 37.2 % (ref 41–53)
HDLC SERPL-MCNC: 42 MG/DL
HDLC SERPL: 2.8 {RATIO} (ref 0–5)
HGB BLD-MCNC: 11.8 G/DL (ref 13.5–17.5)
LDLC SERPL CALC-MCNC: 58.6 MG/DL (ref 0–100)
LIPID PROFILE,FLP: NORMAL
LYMPHOCYTES # BLD: 1.5 K/UL (ref 1–4.8)
LYMPHOCYTES NFR BLD: 23 % (ref 20.5–51.1)
MAGNESIUM SERPL-MCNC: 1.8 MG/DL (ref 1.6–2.4)
MCH RBC QN AUTO: 27.7 PG (ref 31–34)
MCHC RBC AUTO-ENTMCNC: 31.8 G/DL (ref 31–36)
MCV RBC AUTO: 87.3 FL (ref 80–100)
MONOCYTES # BLD: 0.4 K/UL (ref 0.2–2.4)
MONOCYTES NFR BLD: 7 % (ref 1.7–9.3)
NEUTS SEG # BLD: 4.4 K/UL (ref 1.8–7.7)
NEUTS SEG NFR BLD: 65 % (ref 42–75)
NRBC # BLD: 0.01 K/UL
NRBC BLD-RTO: 0.2 PER 100 WBC
P-R INTERVAL, ECG05: 252 MS
PERFORMED BY, TECHID: ABNORMAL
PERFORMED BY, TECHID: ABNORMAL
PERFORMED BY, TECHID: NORMAL
PERFORMED BY, TECHID: NORMAL
PLATELET # BLD AUTO: 290 K/UL (ref 150–400)
PMV BLD AUTO: 8.7 FL (ref 6.5–11.5)
POTASSIUM SERPL-SCNC: 3.3 MMOL/L (ref 3.5–5.1)
Q-T INTERVAL, ECG07: 513 MS
QRS DURATION, ECG06: 169 MS
QTC CALCULATION (BEZET), ECG08: 468 MS
RBC # BLD AUTO: 4.27 M/UL (ref 4.5–5.9)
SODIUM SERPL-SCNC: 143 MMOL/L (ref 136–145)
TRIGL SERPL-MCNC: 87 MG/DL (ref ?–150)
VENTRICULAR RATE, ECG03: 50 BPM
VLDLC SERPL CALC-MCNC: 17.4 MG/DL
WBC # BLD AUTO: 6.6 K/UL (ref 4.4–11.3)

## 2022-07-05 PROCEDURE — 97161 PT EVAL LOW COMPLEX 20 MIN: CPT

## 2022-07-05 PROCEDURE — 65270000029 HC RM PRIVATE

## 2022-07-05 PROCEDURE — 74011250636 HC RX REV CODE- 250/636: Performed by: HOSPITALIST

## 2022-07-05 PROCEDURE — 74011250637 HC RX REV CODE- 250/637: Performed by: HOSPITALIST

## 2022-07-05 PROCEDURE — 83735 ASSAY OF MAGNESIUM: CPT

## 2022-07-05 PROCEDURE — 74011000250 HC RX REV CODE- 250: Performed by: HOSPITALIST

## 2022-07-05 PROCEDURE — 85025 COMPLETE CBC W/AUTO DIFF WBC: CPT

## 2022-07-05 PROCEDURE — 93971 EXTREMITY STUDY: CPT

## 2022-07-05 PROCEDURE — 82962 GLUCOSE BLOOD TEST: CPT

## 2022-07-05 PROCEDURE — 94640 AIRWAY INHALATION TREATMENT: CPT

## 2022-07-05 PROCEDURE — 80048 BASIC METABOLIC PNL TOTAL CA: CPT

## 2022-07-05 PROCEDURE — 93306 TTE W/DOPPLER COMPLETE: CPT

## 2022-07-05 PROCEDURE — 74011000250 HC RX REV CODE- 250: Performed by: EMERGENCY MEDICINE

## 2022-07-05 PROCEDURE — 74011250636 HC RX REV CODE- 250/636: Performed by: EMERGENCY MEDICINE

## 2022-07-05 PROCEDURE — 36415 COLL VENOUS BLD VENIPUNCTURE: CPT

## 2022-07-05 RX ORDER — IPRATROPIUM BROMIDE AND ALBUTEROL SULFATE 2.5; .5 MG/3ML; MG/3ML
3 SOLUTION RESPIRATORY (INHALATION)
Status: DISCONTINUED | OUTPATIENT
Start: 2022-07-05 | End: 2022-07-07

## 2022-07-05 RX ORDER — POTASSIUM CHLORIDE 20 MEQ/1
20 TABLET, EXTENDED RELEASE ORAL 2 TIMES DAILY
Status: DISCONTINUED | OUTPATIENT
Start: 2022-07-05 | End: 2022-07-07 | Stop reason: HOSPADM

## 2022-07-05 RX ORDER — METOPROLOL SUCCINATE 25 MG/1
25 TABLET, EXTENDED RELEASE ORAL DAILY
Status: DISCONTINUED | OUTPATIENT
Start: 2022-07-05 | End: 2022-07-07 | Stop reason: HOSPADM

## 2022-07-05 RX ORDER — MAGNESIUM SULFATE HEPTAHYDRATE 40 MG/ML
2 INJECTION, SOLUTION INTRAVENOUS ONCE
Status: COMPLETED | OUTPATIENT
Start: 2022-07-05 | End: 2022-07-05

## 2022-07-05 RX ORDER — CALCIUM CARB/MAGNESIUM CARB 311-232MG
5 TABLET ORAL
Status: DISCONTINUED | OUTPATIENT
Start: 2022-07-05 | End: 2022-07-07 | Stop reason: HOSPADM

## 2022-07-05 RX ADMIN — IPRATROPIUM BROMIDE AND ALBUTEROL SULFATE 3 ML: .5; 2.5 SOLUTION RESPIRATORY (INHALATION) at 19:15

## 2022-07-05 RX ADMIN — ATORVASTATIN CALCIUM 20 MG: 20 TABLET, FILM COATED ORAL at 08:14

## 2022-07-05 RX ADMIN — HYDRALAZINE HYDROCHLORIDE 10 MG: 20 INJECTION INTRAMUSCULAR; INTRAVENOUS at 03:23

## 2022-07-05 RX ADMIN — POTASSIUM CHLORIDE 20 MEQ: 20 TABLET, EXTENDED RELEASE ORAL at 08:18

## 2022-07-05 RX ADMIN — FUROSEMIDE 40 MG: 10 INJECTION, SOLUTION INTRAMUSCULAR; INTRAVENOUS at 07:15

## 2022-07-05 RX ADMIN — METOPROLOL SUCCINATE 25 MG: 25 TABLET, EXTENDED RELEASE ORAL at 12:22

## 2022-07-05 RX ADMIN — DORZOLAMIDE HYDROCHLORIDE AND TIMOLOL MALEATE 1 DROP: 20; 5 SOLUTION/ DROPS OPHTHALMIC at 21:17

## 2022-07-05 RX ADMIN — SODIUM CHLORIDE, PRESERVATIVE FREE 10 ML: 5 INJECTION INTRAVENOUS at 06:00

## 2022-07-05 RX ADMIN — LISINOPRIL 10 MG: 10 TABLET ORAL at 08:14

## 2022-07-05 RX ADMIN — IPRATROPIUM BROMIDE AND ALBUTEROL SULFATE 3 ML: .5; 2.5 SOLUTION RESPIRATORY (INHALATION) at 15:59

## 2022-07-05 RX ADMIN — POTASSIUM CHLORIDE 20 MEQ: 20 TABLET, EXTENDED RELEASE ORAL at 21:09

## 2022-07-05 RX ADMIN — Medication 5 MG: at 21:09

## 2022-07-05 RX ADMIN — ASPIRIN 81 MG: 81 TABLET, COATED ORAL at 07:26

## 2022-07-05 RX ADMIN — IPRATROPIUM BROMIDE AND ALBUTEROL SULFATE 3 ML: .5; 2.5 SOLUTION RESPIRATORY (INHALATION) at 11:42

## 2022-07-05 RX ADMIN — ENOXAPARIN SODIUM 40 MG: 100 INJECTION SUBCUTANEOUS at 08:13

## 2022-07-05 RX ADMIN — DORZOLAMIDE HYDROCHLORIDE AND TIMOLOL MALEATE 1 DROP: 20; 5 SOLUTION/ DROPS OPHTHALMIC at 08:34

## 2022-07-05 RX ADMIN — SODIUM CHLORIDE, PRESERVATIVE FREE 10 ML: 5 INJECTION INTRAVENOUS at 08:08

## 2022-07-05 RX ADMIN — PANTOPRAZOLE SODIUM 20 MG: 20 TABLET, DELAYED RELEASE ORAL at 07:26

## 2022-07-05 RX ADMIN — FERROUS SULFATE TAB 325 MG (65 MG ELEMENTAL FE) 325 MG: 325 (65 FE) TAB at 07:25

## 2022-07-05 RX ADMIN — MAGNESIUM OXIDE 400 MG: 400 TABLET ORAL at 21:09

## 2022-07-05 RX ADMIN — FLUTICASONE PROPIONATE 1 SPRAY: 50 SPRAY, METERED NASAL at 08:18

## 2022-07-05 RX ADMIN — SODIUM CHLORIDE, PRESERVATIVE FREE 10 ML: 5 INJECTION INTRAVENOUS at 07:16

## 2022-07-05 RX ADMIN — MAGNESIUM SULFATE HEPTAHYDRATE 2 G: 40 INJECTION, SOLUTION INTRAVENOUS at 08:08

## 2022-07-05 RX ADMIN — AMLODIPINE BESYLATE 10 MG: 10 TABLET ORAL at 08:14

## 2022-07-05 RX ADMIN — SODIUM CHLORIDE, PRESERVATIVE FREE 10 ML: 5 INJECTION INTRAVENOUS at 14:05

## 2022-07-05 RX ADMIN — GUAIFENESIN 600 MG: 600 TABLET, EXTENDED RELEASE ORAL at 21:09

## 2022-07-05 RX ADMIN — SODIUM CHLORIDE, PRESERVATIVE FREE 10 ML: 5 INJECTION INTRAVENOUS at 21:19

## 2022-07-05 RX ADMIN — CETIRIZINE HYDROCHLORIDE 10 MG: 10 TABLET, FILM COATED ORAL at 08:14

## 2022-07-05 RX ADMIN — GUAIFENESIN 600 MG: 600 TABLET, EXTENDED RELEASE ORAL at 08:14

## 2022-07-05 RX ADMIN — LATANOPROST 1 DROP: 50 SOLUTION/ DROPS OPHTHALMIC at 21:17

## 2022-07-05 RX ADMIN — MULTIPLE VITAMINS W/ MINERALS TAB 1 TABLET: TAB at 08:14

## 2022-07-05 RX ADMIN — DOCUSATE SODIUM 50MG AND SENNOSIDES 8.6MG 1 TABLET: 8.6; 5 TABLET, FILM COATED ORAL at 07:25

## 2022-07-05 RX ADMIN — MAGNESIUM OXIDE 400 MG: 400 TABLET ORAL at 08:14

## 2022-07-05 NOTE — CONSULTS
CONSULTATION    REASON FOR CONSULT:  Chest Pain    REQUESTING PROVIDER:  Dr. Lidya Del Angel:    Chief Complaint   Patient presents with    Chest Pain         HISTORY OF PRESENT ILLNESS:  Carlos Molina is a 82y.o. 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), and Moderate Aortic valve stenosis  who presented to ED for evaluation of chest pain, cough, shortness of breath. Patient  Is overall poor historian. He tells me that he has had cold-like sx (cough, shortness of breath, sputum production) of recent with occasional pleuritic type left chest pain. No specific aggravating or alleviating factors. He also notes that his BP has poorly controlled at LTC facility of recent. Workup in ED revealed /96 upon arrival, K 3.3, Mag 1.8, BNP 3607, HS Trop trends 40-42-36 with CXR + congestion. He has been diuresing well. He endorses malaise, fatigue, and mild SOB this am, but otherwise no new complaints. Records from hospital admission course thus far reviewed. Telemetry Review: V-Paced      PAST MEDICAL HISTORY:    Past Medical History:   Diagnosis Date    Anemia 4/26/2017    Anxiety     Arrhythmia     A FIB    Arthritis     Atherosclerosis of artery of extremity with rest pain (HCC)     Atrial fibrillation ([address] 7/21/2020    Benign prostatic hyperplasia 4/26/2017    CAD (coronary artery disease)     pacemaker    Cancer ([address] 2010    GASTRIC    Chronic kidney disease     Chronic obstructive pulmonary disease ([address]     Depression     Diabetes ([address]     BORDERLINE, NO MEDS.     Diverticulosis of colon     Dyslipidemia 4/26/2017    GERD (gastroesophageal reflux disease)     Glaucoma     History of complete heart block     s/p watchman procedure and replaced in 11/2017 by Dr. Adilene Oglesby History of CVA (cerebrovascular accident) 7/21/2020    Hypercholesteremia     Hypertension     Hypomagnesemia 7/13/2020    Nocturia 4/26/2017    PUD (peptic ulcer disease)     GI BLEEDING    PVD [facility]peripheral vascular disease) [facility]     Rectal hemorrhage 4/26/2017    Strabismus     Stroke ([facility] 2000 APPROX. SOME VISUAL DEFICIT    Type 2 diabetes mellitus without complications ([facility] 6/40/5769    Venous stasis 4/26/2017       PAST SURGICAL HISTORY:   Past Surgical History:   Procedure Laterality Date    HX AMPUTATION TOE Right     HX COLONOSCOPY      HX GI  1998    PARTIAL GASTRECTOMY ( DR ALVIN ALBA)    HX HEART CATHETERIZATION      HX ORTHOPAEDIC Left 1980    KNEE CARTILAGE    HX ORTHOPAEDIC Right 2019    AMPUTATION RIGHT GREAT TOE    HX ORTHOPAEDIC Left 2021    AMPUTATION 3 TOES     HX ORTHOPAEDIC Left 02/2022    AMPUTATION 4TH & 5TH TOES    HX OTHER SURGICAL      LEFT HAND SKIN GRAFT (BURN) DONOR RIGHT THIGH    HX PACEMAKER  [id number]    DR Munira Capone; WATCHMAN PROCEDURE. .. noted [facility] device on 2017 noted        ALLERGIES:  No Known Allergies    FAMILY HISTORY:    Family History   Problem Relation Age of Onset    Stroke Mother     Stroke Father     Cancer Brother         PROSTATE    Anesth Problems Neg Hx        SOCIAL HISTORY:    Social History     Tobacco Use    Smoking status: Never Smoker    Smokeless tobacco: Never Used   Vaping Use    Vaping Use: Never used   Substance Use Topics    Alcohol use: Not Currently     Comment: QUIT 2000    Drug use: No         HOME MEDICATIONS:    Prior to Admission Medications   Prescriptions Last Dose Informant Patient Reported? Taking? Accu-Chek Ratna Control Soln soln   No No   Sig: USE AS DIRECTED.    Accu-Chek Ratna Plus test strp strip   No No   Sig: TEST BLOOD SUGAR TWICE A DAY   BD Single Use Swabs Regular padm   No No   Sig: TEST BLOOD SUGAR TWICE DAILY   FeroSuL 325 mg (65 mg iron) tablet   No No   Sig: TAKE 1 TABLET BY MOUTH DAILY (BEFORE BREAKFAST). Patient taking differently: Take 65 mg by mouth Daily (before breakfast). acetaminophen (TYLENOL) 325 mg tablet   Yes No   Sig: Take 650 mg by mouth every six (6) hours as needed for Pain. amLODIPine (Norvasc) 5 mg tablet   Yes Yes   Sig: Take 5 mg by mouth daily. aspirin delayed-release 81 mg tablet 7/3/2022 at Unknown time  Yes Yes   Sig: Take 81 mg by mouth every morning. atorvastatin (LIPITOR) 80 mg tablet 7/3/2022 at Unknown time  Yes Yes   Sig: Take 20 mg by mouth daily. cetirizine (ZyrTEC) 10 mg tablet   Yes Yes   Sig: Take 10 mg by mouth daily. donepeziL (Aricept) 5 mg tablet 7/3/2022 at Unknown time  Yes Yes   Sig: Take 5 mg by mouth nightly. dorzolamide-timolol, PF, (COSOPT) 2-0.5 % ophthalmic solution   Yes No   Sig: Administer 1 Drop to both eyes two (2) times a day. fluticasone propionate (Flonase Allergy Relief) 50 mcg/actuation nasal spray   Yes Yes   [place] by Both Nostrils route daily. guaiFENesin ER (MUCINEX) 600 mg ER tablet   Yes Yes   Sig: Take 600 mg by mouth two (2) times a day. hydroCHLOROthiazide (HYDRODIURIL) 25 mg tablet   Yes Yes   Sig: Take 25 mg by mouth daily. lactulose (CHRONULAC) 10 gram/15 mL solution   No No   Sig: TAKE 30 ML TWICE A DAY AS NEEDED   Patient taking differently: as needed. TAKE 30 ML TWICE A DAY AS NEEDED   lancets (Accu-Chek Softclix Lancets) misc   No No   Sig: TEST BLOOD SUGAR TWICE A DAY   latanoprost (XALATAN) 0.005 % ophthalmic solution   Yes No   Sig: Administer 1 Drop to both eyes nightly.   magnesium oxide (MAG-OX) 400 mg tablet   No No   Sig: Take 1 Tablet by mouth daily. Patient taking differently: Take 400 mg by mouth every morning.    metoprolol succinate (TOPROL-XL) 25 mg XL tablet   No No   Sig: TAKE 1 TABLET EVERY DAY   Patient taking differently: Take 25 mg by mouth every morning.   multivitamin (Multiple Vitamins) tablet   Yes Yes   Sig: Take 1 Tablet by mouth daily. omeprazole (PRILOSEC) 40 mg capsule   No No   Sig: Take 1 Capsule by mouth daily. Patient taking differently: Take 20 mg by mouth every morning. senna-docusate (STOOL SOFTENER-LAXATIVE) 8.6-50 mg per tablet   Yes No   Sig: Take 1 Tablet by mouth every morning. Facility-Administered Medications: None       REVIEW OF SYSTEMS:  Complete review of systems performed, pertinents noted above, all other systems are negative. Patient Vitals for the past 24 hrs:   Temp Pulse Resp BP SpO2   07/05/22 0800 -- 60 -- -- --   07/05/22 0715 97.3 °F (36.3 °C) 60 18 120/60 98 %   07/05/22 0412 -- -- -- (!) 163/62 --   07/05/22 0348 -- (!) 50 -- -- --   07/05/22 0309 97.5 °F (36.4 °C) (!) 50 22 (!) 177/72 98 %   07/04/22 2346 -- (!) 50 -- -- --   07/04/22 2331 97.5 °F (36.4 °C) (!) 50 20 (!) 169/71 96 %   07/04/22 1954 -- (!) 50 -- -- --   07/04/22 1942 97.3 °F (36.3 °C) (!) 50 18 (!) 165/67 94 %   07/04/22 1633 -- -- -- -- 96 %   07/04/22 1618 97.3 °F (36.3 °C) (!) 50 18 (!) 170/77 97 %   07/04/22 1600 -- (!) 50 -- -- --   07/04/22 1211 97.5 °F (36.4 °C) (!) 50 18 (!) 170/80 98 %   07/04/22 1200 -- (!) 50 -- -- --       PHYSICAL EXAMINATION:    General: Well nourished chronically ill appearing male lying in bed, NAD, A&O  HEENT: Normocephalic, PERRL, no drainage  Neck: Supple, Trachea midline, No JVD  RESP: CTA bilaterally. + Symmetrical chest movement. No SOB or distress. On RA  Cardiovascular: RRR no RG. + murmur  PVS: No rubor, cyanosis, mild RLE edema, Radial, pulses equal bilaterally, s/p Left AKA (stump has healed well)  ABD: obese, soft, NT, Normoactive BS  Derm: Warm/Dry/Intact with no lesions, poor turgor  Neuro: A&O PPTS, cranial nerves II- XII grossly intact via interaction with patient. Limited historian. PSYCH: No anxiety or agitation      Electrocardiogram performed earlier reviewed, it shows v-paced    Recent labs results and imaging reviewed.       Recent Results (from the past 24 hour(s))   GLUCOSE, POC    Collection Time: 07/04/22 11:56 AM   Result Value Ref Range    Glucose (POC) 96 65 - 117 mg/dL    Performed by Jaqueline Wolfe    GLUCOSE, POC    Collection Time: 07/04/22  4:05 PM   Result Value Ref Range    Glucose (POC) 118 (H) 65 - 117 mg/dL    Performed by Jaqueline Wolfe    GLUCOSE, POC    Collection Time: 07/04/22  8:38 PM   Result Value Ref Range    Glucose (POC) 115 65 - 117 mg/dL    Performed by Juni Fu    MAGNESIUM    Collection Time: 07/05/22  5:36 AM   Result Value Ref Range    Magnesium 1.8 1.6 - 2.4 mg/dL   CBC WITH AUTOMATED DIFF    Collection Time: 07/05/22  5:36 AM   Result Value Ref Range    WBC 6.6 4.4 - 11.3 K/uL    RBC 4.27 (L) 4.50 - 5.90 M/uL    HGB 11.8 (L) 13.5 - 17.5 g/dL    HCT 37.2 (L) 41 - 53 %    MCV 87.3 80 - 100 FL    MCH 27.7 (L) 31 - 34 PG    MCHC 31.8 31.0 - 36.0 g/dL    RDW 16.4 (H) 11.5 - 14.5 %    PLATELET 897 305 - 677 K/uL    MPV 8.7 6.5 - 11.5 FL    NRBC 0.2  WBC    ABSOLUTE NRBC 0.01 K/uL    NEUTROPHILS 65 42 - 75 %    LYMPHOCYTES 23 20.5 - 51.1 %    MONOCYTES 7 1.7 - 9.3 %    EOSINOPHILS 4 (H) 0.9 - 2.9 %    BASOPHILS 1 0.0 - 2.5 %    ABS. NEUTROPHILS 4.4 1.8 - 7.7 K/UL    ABS. LYMPHOCYTES 1.5 1.0 - 4.8 K/UL    ABS. MONOCYTES 0.4 0.2 - 2.4 K/UL    ABS. EOSINOPHILS 0.3 0.0 - 0.7 K/UL    ABS.  BASOPHILS 0.0 0.0 - 0.2 K/UL   METABOLIC PANEL, BASIC    Collection Time: 07/05/22  5:36 AM   Result Value Ref Range    Sodium 143 136 - 145 mmol/L    Potassium 3.3 (L) 3.5 - 5.1 mmol/L    Chloride 106 97 - 108 mmol/L    CO2 27 21 - 32 mmol/L    Anion gap 10 5 - 15 mmol/L    Glucose 115 (H) 65 - 100 mg/dL    BUN 26 (H) 6 - 20 mg/dL    Creatinine 1.37 (H) 0.70 - 1.30 mg/dL    BUN/Creatinine ratio 19 12 - 20      GFR est AA >60 >60 ml/min/1.73m2    GFR est non-AA 50 (L) >60 ml/min/1.73m2    Calcium 9.0 8.5 - 10.1 mg/dL   GLUCOSE, POC    Collection Time: 07/05/22  7:00 AM   Result Value Ref Range    Glucose (POC) 120 (H) 65 - 117 mg/dL    Performed by Santiago Tavarez        XR Results (maximum last 3): Results from [place] encounter on 07/04/22    XR CHEST PORT    Impression  Bilateral pulmonary edema versus nonspecific pneumonia. Consider PA and lateral  chest views when the patient can better tolerate. Results from [place] encounter on 01/17/22    XR CHEST PA LAT    Impression  No acute process. Results from [place] encounter on 08/09/21    XR SHOULDER LT AP/LAT MIN 2 V    Impression  1. No findings of acute osseous abnormality. 2. Mild degenerative change of the acromioclavicular and glenohumeral joints. CT Results (maximum last 3): Results from [place] encounter on 03/30/22    CT LOW EXT LT W CONT    Impression  Findings/impression:    Fluid and multiple small gas bubbles noted in the distal stump and in and around  the distal tibia. If the surgery was recent, these findings may be benign but  otherwise are concerning for infection. No bone erosion seen, but this does not exclude osteomyelitis. No fracture, alignment abnormality or concerning lytic or blastic lesion is  evident. Mild knee joint osteoarthritis. Atherosclerosis. MRI Results (maximum last 3): No results found for this or any previous visit. Nuclear Medicine Results (maximum last 3): No results found for this or any previous visit. US Results (maximum last 3): No results found for this or any previous visit. VAS/US Results (maximum last 3): No results found for this or any previous visit.         Current Facility-Administered Medications:     potassium chloride (K-DUR, KLOR-CON M20) SR tablet 20 mEq, 20 mEq, Oral, BID, Jr Martinez MD, 20 mEq at 07/05/22 0818    albuterol-ipratropium (DUO-NEB) 2.5 MG-0.5 MG/3 ML, 3 mL, Nebulization, QID RT, Jr Martinez MD    metoprolol succinate (TOPROL-XL) XL tablet 25 mg, 25 mg, Oral, DAILY, Jr Martinez MD    sodium chloride (NS) flush 5-40 mL, 5-40 mL, IntraVENous, Q8H, Radha Renteria MD, 10 mL at 07/05/22 0600    sodium chloride (NS) flush 5-40 mL, 5-40 mL, IntraVENous, PRN, Jannell Cogan, MD, 10 mL at 07/05/22 0808    acetaminophen (TYLENOL) tablet 650 mg, 650 mg, Oral, Q6H PRN **OR** acetaminophen (TYLENOL) suppository 650 mg, 650 mg, Rectal, Q6H PRN, Radha Renteria MD    polyethylene glycol (MIRALAX) packet 17 g, 17 g, Oral, DAILY PRN, Radha Renteria MD    ondansetron (ZOFRAN ODT) tablet 4 mg, 4 mg, Oral, Q8H PRN **OR** ondansetron (ZOFRAN) injection 4 mg, 4 mg, IntraVENous, Q6H PRN, Jannell Cogan, MD    enoxaparin (LOVENOX) injection 40 mg, 40 mg, SubCUTAneous, DAILY, Radha MD, 40 mg at 07/05/22 0813    glucose chewable tablet 16 g, 4 Tablet, Oral, PRN, Jr Martinez MD    dextrose (D50W) injection syrg 12.5-25 g, 25-50 mL, IntraVENous, PRN, Jr Martinez MD    glucagon (GLUCAGEN) injection 1 mg, 1 mg, IntraMUSCular, PRN, Jr Martinez MD    insulin lispro (HUMALOG) injection, , SubCUTAneous, AC&HS, Jr Martinez MD    furosemide (LASIX) injection 40 mg, 40 mg, IntraVENous, ACB&D, Jr Martinez MD, 40 mg at 07/05/22 0715    aspirin delayed-release tablet 81 mg, 81 mg, Oral, 7am, Jr Martinez MD, 81 mg at 07/05/22 0726    atorvastatin (LIPITOR) tablet 20 mg, 20 mg, Oral, DAILY, Jr Martinez MD, 20 mg at 07/05/22 0814    cetirizine (ZYRTEC) tablet 10 mg, 10 mg, Oral, DAILY, Jr Martinez MD, 10 mg at 07/05/22 0814    donepeziL (ARICEPT) tablet 5 mg, 5 mg, Oral, QHS, Jr Martinez MD, 5 mg at 07/04/22 2054    fluticasone propionate (FLONASE) 50 mcg/actuation nasal spray 1 Spray, 1 Spray, Both Nostrils, DAILY, Jr Martinez MD, 1 [place] at 07/05/22 0818    guaiFENesin ER (MUCINEX) tablet 600 mg, 600 mg, Oral, BID, Jr Martinez MD, 600 mg at 07/05/22 0814    lactulose (CHRONULAC) 10 gram/15 mL solution 15 mL, 10 g, Oral, BID PRN, Jason Martinez MD    latanoprost (XALATAN) 0.005 % ophthalmic solution 1 Drop, 1 Drop, Both Eyes, QHS, Jr Martinez MD, 1 Drop at 07/04/22 2054    pantoprazole (PROTONIX) tablet 20 mg, 20 mg, Oral, TYLOR, Jr Martinez MD, 20 mg at 07/05/22 0726    senna-docusate (PERICOLACE) 8.6-50 mg per tablet 1 Tablet, 1 Tablet, Oral, 7am, Jr Martinez MD, 1 Tablet at 07/05/22 0725    ferrous sulfate tablet 325 mg, 1 Tablet, Oral, ACB, Jr Martinez MD, 325 mg at 07/05/22 0725    dorzolamide-timoloL (COSOPT) 22.3-6.8 mg/mL ophthalmic solution 1 Drop, 1 Drop, Both Eyes, BID, Jr Martinez MD, 1 Drop at 07/05/22 0834    hydrALAZINE (APRESOLINE) 20 mg/mL injection 10 mg, 10 mg, IntraVENous, Q6H PRN, Jr Martinez MD, 10 mg at 07/05/22 0323    magnesium oxide (MAG-OX) tablet 400 mg, 400 mg, Oral, BID, Jr Martinez MD, 400 mg at 07/05/22 1439    multivitamin, tx-iron-ca-min (THERA-M w/ IRON) tablet 1 Tablet, 1 Tablet, Oral, DAILY, Jr Martinez MD, 1 Tablet at 07/05/22 0814    amLODIPine (NORVASC) tablet 10 mg, 10 mg, Oral, DAILY, Jr Martinez MD, 10 mg at 07/05/22 9685    lisinopriL (PRINIVIL, ZESTRIL) tablet 10 mg, 10 mg, Oral, DAILY, Jr Martinez MD, 10 mg at 07/05/22 3150          Case discussed with collaborating physician Dr. David Kumar and our impression and recommendations are as follows:   1. Chest Pain: ACS ruled out with HS Trop trends and EKG criteria. NST 2019 was negative, however has not had ischemia evauation since that time. TTE pending for this am.  Can consider ischemic evaluation in OP setting at follow-up. Continue risk factor modification, cardioprotective meds, and BP control. 2. Hypertensive Urgency: BP has improved. Restart home BB. Continue to trend and titrate as needed. Would continue IV diuresis until IVC evaluated on TTE. He appears mildly hypervolemic, though well compensated. 3. Elevated BNP:  BNP 3607 on admission. CXR + congestion. Has been diuresing well.  No h/o HF. TTE pending. Continue IV diuresis for now. Continue strict I/O and daily weights. Further recommendations after TTE. 4. Atrial fibrillation:  Prior Watchman procedure. Rate controlled. v-paced currently. No changes at this time. Continue to monitor. 5. Moderate Aortic Stenosis: Per TTE 2020. Repeat TTE today to further evaluate. Continue plan as above otherwise. 6. HLD: Continue current home statin dosing. Consider obtaining lipid panel. 7. PAD: s/p Left BKA 3/21/22 that was revised to AKA 4/1/22 s/t infection. Has healed well. No current sx. Continue to monitor. Further per Vascular recommendations. 8. Hypokalemia: replete to goal 4-5. Continue telemetry. Repeat labs in am.   9. Hypomagnesemia: replete to goal >2. Continue telemetry. Repeat labs in am.       Thank you for involving us in the care of this patient. Please do not hesitate to call if additional questions arise.  If after hours please call [phone]

## 2022-07-05 NOTE — ROUTINE PROCESS
Bedside shift change report given to marcio (oncoming nurse) by Moreno Rick (offgoing nurse). Report included the following information Kardex.

## 2022-07-05 NOTE — PROGRESS NOTES
Progress Note  Date:7/5/2022       Room:[facility]  Patient Name:German Javed     YOB: 1935     Age:86 y.o. Subjective    As per admitting provider on 7/4:  Rick Williamson is a 82 y. o. male followed by Mario Truong past medical history of Anemia (4/26/2017), Anxiety, Arrhythmia, Arthritis, Atherosclerosis of artery of extremity with rest pain [facility]), Atrial fibrillation ([address] (7/21/2020), Benign prostatic hyperplasia (4/26/2017), CAD (coronary artery disease), Cancer ([address] (2010), Chronic kidney disease, Chronic obstructive pulmonary disease ([address] Depression, Diabetes ([address] Diverticulosis of colon, Dyslipidemia (4/26/2017), GERD (gastroesophageal reflux disease), Glaucoma, History of CVA (cerebrovascular accident) (7/21/2020), Hypercholesteremia, Hypertension, Hypomagnesemia (7/13/2020), Nocturia (4/26/2017), PUD (peptic ulcer disease), PVD (peripheral vascular disease) ([address] Rectal hemorrhage (4/26/2017), Strabismus, Stroke ([address] (2000 APPROX.), Type 2 diabetes mellitus without complications ([address] (7/65/5018), and Venous stasis (4/26/2017).    Who presents from [address] [place] [address] for cough and sob. Was last admitted to our service in march for sepsis and left bka infection and was transferred to [facility] for reevaluation by surgeon Dr. Gianna Bardales where he had to have a revision and AKA amputation . Patient continues to be skilled rehab for his left below-knee amputation and overall good historian so the history was obtained from the previous chart reviewed but it is noted that patient was having increasing cold-like symptoms with congestive cough which she said producing clear sputum he also says he is has off-and-on chest wall pain but currently denies any pain. Does have noted lower extremity edema and says that been occurring off and on and usually gets better when he lifts his leg up.   He did note that his blood pressure has been elevated at the nursing home and he states he mentioned it to the nurses and that it never runs that high and was concerned that he was not started on any other medications. Currently patient noted to be on metoprolol XL 25 mg daily as well as Norvasc 5 mg but on arrival patient blood pressure elevated at 186/96 with a heart rate of 52 and EKG showing ventricular paced rhythm as patient does have a [employer] pacemaker and was replaced in 2017 by Dr. Adelaide Díaz and initially was placed secondary to complete heart block in the setting of A. fib. On further evaluation the emergency room his left stump shows no signs of infection BUN/creatinine appears to be at baseline initial troponin was 40 and 2-hour repeat was at 42 BNP was elevated 3607 and magnesium was 1.9. Chest x-ray showed pulmonary edema versus infiltrates but had negative white blood count and no noted fevers and no hypoxia. So with patient's presentation was referred for admission to remote telemetry for new onset CHF, hypertensive urgency, bradycardia    7/5: seen on follow up. Sleepy this AM but arousable and able to answer my questions. Patient states he did not sleep last night. No urine output data but has weight loss from 188lb to 183lb. Blood pressure improved this AM.     Review of Systems   Constitutional: Negative for chills, diaphoresis, fatigue and fever. HENT: Negative for congestion, ear pain, postnasal drip, sinus pain and sore throat. Eyes: Negative for pain, discharge and redness. Respiratory: Negative for cough, shortness of breath and wheezing. Cardiovascular: Negative for chest pain and palpitations. Gastrointestinal: Negative for abdominal pain, constipation, diarrhea, nausea and vomiting. Genitourinary: Negative for dysuria, flank pain, frequency and urgency. Musculoskeletal: Negative for arthralgias and myalgias. Neurological: Negative for dizziness, weakness and headaches. Psychiatric/Behavioral: Negative for agitation and hallucinations.  The patient is not nervous/anxious. Objective           Vitals Last 24 Hours:  Patient Vitals for the past 24 hrs:   Temp Pulse Resp BP SpO2   07/05/22 1142 -- -- -- -- 97 %   07/05/22 0830 -- -- -- -- 98 %   07/05/22 0800 -- 60 -- -- --   07/05/22 0715 97.3 °F (36.3 °C) 60 18 120/60 98 %   07/05/22 0412 -- -- -- (!) 163/62 --   07/05/22 0348 -- (!) 50 -- -- --   07/05/22 0309 97.5 °F (36.4 °C) (!) 50 22 (!) 177/72 98 %   07/04/22 2346 -- (!) 50 -- -- --   07/04/22 2331 97.5 °F (36.4 °C) (!) 50 20 (!) 169/71 96 %   07/04/22 1954 -- (!) 50 -- -- --   07/04/22 1942 97.3 °F (36.3 °C) (!) 50 18 (!) 165/67 94 %   07/04/22 1633 -- -- -- -- 96 %   07/04/22 1618 97.3 °F (36.3 °C) (!) 50 18 (!) 170/77 97 %   07/04/22 1600 -- (!) 50 -- -- --        I/O (24Hr): Intake/Output Summary (Last 24 hours) at 7/5/2022 1213  Last data filed at 7/4/2022 1738  Gross per 24 hour   Intake 120 ml   Output --   Net 120 ml       Physical Exam:  General: Alert, cooperative, no distress. Head:  Normocephalic, without obvious abnormality, atraumatic. Eyes:  Conjunctivae/corneas clear. Pupils equal, round, reactive to light. Extraocular movements intact. Lungs: Bilateral air entry with bilateral basilar crackles noted   Chest wall: No tenderness or deformity. Heart:  Regular rate and rhythm, S1, S2 normal, no murmur, click, rub, or gallop. Abdomen:      Soft, non-tender. Bowel sounds normal. No masses. No organomegaly. Back:  No spine tenderness to palpation  Extremities: left AKA amputation   Pulses: Symmetric all extremities. Skin:   Skin color, texture, turgor normal.   Lymph nodes: Cervical nodes normal.  Neurologic: CNII-XII intact.  Normal strength, sensation, and reflexes throughout.         Medications           Current Facility-Administered Medications   Medication Dose Route Frequency    potassium chloride (K-DUR, KLOR-CON M20) SR tablet 20 mEq  20 mEq Oral BID    albuterol-ipratropium (DUO-NEB) 2.5 MG-0.5 MG/3 ML  3 mL Nebulization QID RT    metoprolol succinate (TOPROL-XL) XL tablet 25 mg  25 mg Oral DAILY    sodium chloride (NS) flush 5-40 mL  5-40 mL IntraVENous Q8H    sodium chloride (NS) flush 5-40 mL  5-40 mL IntraVENous PRN    acetaminophen (TYLENOL) tablet 650 mg  650 mg Oral Q6H PRN    Or    acetaminophen (TYLENOL) suppository 650 mg  650 mg Rectal Q6H PRN    polyethylene glycol (MIRALAX) packet 17 g  17 g Oral DAILY PRN    ondansetron (ZOFRAN ODT) tablet 4 mg  4 mg Oral Q8H PRN    Or    ondansetron (ZOFRAN) injection 4 mg  4 mg IntraVENous Q6H PRN    enoxaparin (LOVENOX) injection 40 mg  40 mg SubCUTAneous DAILY    glucose chewable tablet 16 g  4 Tablet Oral PRN    dextrose (D50W) injection syrg 12.5-25 g  25-50 mL IntraVENous PRN    glucagon (GLUCAGEN) injection 1 mg  1 mg IntraMUSCular PRN    insulin lispro (HUMALOG) injection   SubCUTAneous AC&HS    [Held by provider] furosemide (LASIX) injection 40 mg  40 mg IntraVENous ACB&D    aspirin delayed-release tablet 81 mg  81 mg Oral 7am    atorvastatin (LIPITOR) tablet 20 mg  20 mg Oral DAILY    cetirizine (ZYRTEC) tablet 10 mg  10 mg Oral DAILY    donepeziL (ARICEPT) tablet 5 mg  5 mg Oral QHS    fluticasone propionate (FLONASE) 50 mcg/actuation nasal spray 1 Spray  1 Spray Both Nostrils DAILY    guaiFENesin ER (MUCINEX) tablet 600 mg  600 mg Oral BID    lactulose (CHRONULAC) 10 gram/15 mL solution 15 mL  10 g Oral BID PRN    latanoprost (XALATAN) 0.005 % ophthalmic solution 1 Drop  1 Drop Both Eyes QHS    pantoprazole (PROTONIX) tablet 20 mg  20 mg Oral ACB    senna-docusate (PERICOLACE) 8.6-50 mg per tablet 1 Tablet  1 Tablet Oral 7am    ferrous sulfate tablet 325 mg  1 Tablet Oral ACB    dorzolamide-timoloL (COSOPT) 22.3-6.8 mg/mL ophthalmic solution 1 Drop  1 Drop Both Eyes BID    hydrALAZINE (APRESOLINE) 20 mg/mL injection 10 mg  10 mg IntraVENous Q6H PRN    magnesium oxide (MAG-OX) tablet 400 mg  400 mg Oral BID    multivitamin, tx-iron-ca-min (THERA-M w/ IRON) tablet 1 Tablet  1 Tablet Oral DAILY    amLODIPine (NORVASC) tablet 10 mg  10 mg Oral DAILY    lisinopriL (PRINIVIL, ZESTRIL) tablet 10 mg  10 mg Oral DAILY         Allergies         Patient has no known allergies. Labs/Imaging/Diagnostics      Labs:  Recent Results (from the past 48 hour(s))   EKG, 12 LEAD, INITIAL    Collection Time: 07/04/22  2:40 AM   Result Value Ref Range    Ventricular Rate 50 BPM    Atrial Rate 0 BPM    P-R Interval 252 ms    QRS Duration 169 ms    Q-T Interval 513 ms    QTC Calculation (Bezet) 468 ms    Calculated P Axis 0 degrees    Calculated R Axis -48 degrees    Calculated T Axis 122 degrees    Diagnosis       Ventricular-paced complexes  No further analysis attempted due to paced rhythm    Confirmed by Jamar James ([id number]) on 7/5/2022 11:28:45 AM     CBC WITH AUTOMATED DIFF    Collection Time: 07/04/22  3:10 AM   Result Value Ref Range    WBC 7.8 4.4 - 11.3 K/uL    RBC 4.27 (L) 4.50 - 5.90 M/uL    HGB 12.0 (L) 13.5 - 17.5 g/dL    HCT 37.5 (L) 41 - 53 %    MCV 87.8 80 - 100 FL    MCH 28.1 (L) 31 - 34 PG    MCHC 32.1 31.0 - 36.0 g/dL    RDW 15.9 (H) 11.5 - 14.5 %    PLATELET 726 068 - 275 K/uL    MPV 9.3 6.5 - 11.5 FL    NRBC 0.1  WBC    ABSOLUTE NRBC 0.01 K/uL    NEUTROPHILS 68 42 - 75 %    LYMPHOCYTES 22 20.5 - 51.1 %    MONOCYTES 6 1.7 - 9.3 %    EOSINOPHILS 3 (H) 0.9 - 2.9 %    BASOPHILS 1 0.0 - 2.5 %    ABS. NEUTROPHILS 5.4 1.8 - 7.7 K/UL    ABS. LYMPHOCYTES 1.7 1.0 - 4.8 K/UL    ABS. MONOCYTES 0.4 0.2 - 2.4 K/UL    ABS. EOSINOPHILS 0.2 0.0 - 0.7 K/UL    ABS.  BASOPHILS 0.1 0.0 - 0.2 K/UL   METABOLIC PANEL, COMPREHENSIVE    Collection Time: 07/04/22  3:10 AM   Result Value Ref Range    Sodium 140 136 - 145 mmol/L    Potassium 4.7 3.5 - 5.1 mmol/L    Chloride 108 97 - 108 mmol/L    CO2 22 21 - 32 mmol/L    Anion gap 10 5 - 15 mmol/L    Glucose 116 (H) 65 - 100 mg/dL    BUN 24 (H) 6 - 20 mg/dL    Creatinine 1.11 0.70 - 1.30 mg/dL    BUN/Creatinine ratio 22 (H) 12 - 20      GFR est AA >60 >60 ml/min/1.73m2    GFR est non-AA >60 >60 ml/min/1.73m2    Calcium 8.6 8.5 - 10.1 mg/dL    Bilirubin, total 0.5 0.2 - 1.0 mg/dL    AST (SGOT) 51 (H) 15 - 37 U/L    ALT (SGPT) 27 12 - 78 U/L    Alk.  phosphatase 103 45 - 117 U/L    Protein, total 7.5 6.4 - 8.2 g/dL    Albumin 2.8 (L) 3.5 - 5.0 g/dL    Globulin 4.7 (H) 2.0 - 4.0 g/dL    A-G Ratio 0.6 (L) 1.1 - 2.2     CULTURE, BLOOD    Collection Time: 07/04/22  3:10 AM    Specimen: Blood   Result Value Ref Range    Special Requests: No Special Requests      Culture result: No growth 1 day     LACTIC ACID    Collection Time: 07/04/22  3:10 AM   Result Value Ref Range    Lactic acid 1.2 0.4 - 2.0 mmol/L   TROPONIN-HIGH SENSITIVITY    Collection Time: 07/04/22  3:10 AM   Result Value Ref Range    Troponin-High Sensitivity 40 0 - 76 ng/L   COVID-19 WITH INFLUENZA A/B    Collection Time: 07/04/22  3:10 AM   Result Value Ref Range    SARS-CoV-2 by PCR Not Detected Not Detected      Influenza A by PCR Not Detected Not Detected      Influenza B by PCR Not Detected Not Detected     NT-PRO BNP    Collection Time: 07/04/22  3:10 AM   Result Value Ref Range    NT pro-BNP 3,607 (H) <450 pg/mL   HEMOGLOBIN A1C WITH EAG    Collection Time: 07/04/22  3:10 AM   Result Value Ref Range    Hemoglobin A1c 6.4 (H) 4.0 - 5.6 %    Est. average glucose 137 mg/dL   MAGNESIUM    Collection Time: 07/04/22  3:10 AM   Result Value Ref Range    Magnesium 1.9 1.6 - 2.4 mg/dL   TSH 3RD GENERATION    Collection Time: 07/04/22  3:10 AM   Result Value Ref Range    TSH 2.40 0.36 - 3.74 uIU/mL   CULTURE, BLOOD    Collection Time: 07/04/22  3:15 AM    Specimen: Blood   Result Value Ref Range    Special Requests: No Special Requests      Culture result: No growth 1 day     URINALYSIS W/ RFLX MICROSCOPIC    Collection Time: 07/04/22  4:15 AM   Result Value Ref Range    Color Yellow/Straw      Appearance Clear Clear      Specific gravity >1.030 (H) 1.003 - 1.030 pH (UA) 5.5 5.0 - 8.0      Protein >300 (A) Negative mg/dL    Glucose Negative Negative mg/dL    Ketone Negative Negative mg/dL    Bilirubin Negative Negative      Blood Negative Negative      Urobilinogen 1.0 0.2 - 1.0 EU/dL    Nitrites Negative Negative      Leukocyte Esterase Negative Negative     URINE MICROSCOPIC    Collection Time: 07/04/22  4:15 AM   Result Value Ref Range    WBC 0-4 0 - 5 /hpf    RBC 0-5 0 - 3 /hpf    Bacteria Negative Negative /hpf    Amorphous Crystals 3+ (A) Negative   TROPONIN-HIGH SENSITIVITY    Collection Time: 07/04/22  6:00 AM   Result Value Ref Range    Troponin-High Sensitivity 42 0 - 76 ng/L   TROPONIN-HIGH SENSITIVITY    Collection Time: 07/04/22  9:50 AM   Result Value Ref Range    Troponin-High Sensitivity 36 0 - 76 ng/L   LIPID PANEL    Collection Time: 07/04/22  9:50 AM   Result Value Ref Range    LIPID PROFILE        Cholesterol, total 118 <200 mg/dL    Triglyceride 87 <150 mg/dL    HDL Cholesterol 42 mg/dL    LDL, calculated 58.6 0 - 100 mg/dL    VLDL, calculated 17.4 mg/dL    CHOL/HDL Ratio 2.8 0.0 - 5.0     GLUCOSE, POC    Collection Time: 07/04/22 11:56 AM   Result Value Ref Range    Glucose (POC) 96 65 - 117 mg/dL    Performed by Fatoumata Alonso    GLUCOSE, POC    Collection Time: 07/04/22  4:05 PM   Result Value Ref Range    Glucose (POC) 118 (H) 65 - 117 mg/dL    Performed by Fatoumata Alonso    GLUCOSE, POC    Collection Time: 07/04/22  8:38 PM   Result Value Ref Range    Glucose (POC) 115 65 - 117 mg/dL    Performed by Brant Effingham    MAGNESIUM    Collection Time: 07/05/22  5:36 AM   Result Value Ref Range    Magnesium 1.8 1.6 - 2.4 mg/dL   CBC WITH AUTOMATED DIFF    Collection Time: 07/05/22  5:36 AM   Result Value Ref Range    WBC 6.6 4.4 - 11.3 K/uL    RBC 4.27 (L) 4.50 - 5.90 M/uL    HGB 11.8 (L) 13.5 - 17.5 g/dL    HCT 37.2 (L) 41 - 53 %    MCV 87.3 80 - 100 FL    MCH 27.7 (L) 31 - 34 PG    MCHC 31.8 31.0 - 36.0 g/dL    RDW 16.4 (H) 11.5 - 14.5 %    PLATELET 567 150 - 400 K/uL    MPV 8.7 6.5 - 11.5 FL    NRBC 0.2  WBC    ABSOLUTE NRBC 0.01 K/uL    NEUTROPHILS 65 42 - 75 %    LYMPHOCYTES 23 20.5 - 51.1 %    MONOCYTES 7 1.7 - 9.3 %    EOSINOPHILS 4 (H) 0.9 - 2.9 %    BASOPHILS 1 0.0 - 2.5 %    ABS. NEUTROPHILS 4.4 1.8 - 7.7 K/UL    ABS. LYMPHOCYTES 1.5 1.0 - 4.8 K/UL    ABS. MONOCYTES 0.4 0.2 - 2.4 K/UL    ABS. EOSINOPHILS 0.3 0.0 - 0.7 K/UL    ABS.  BASOPHILS 0.0 0.0 - 0.2 K/UL   METABOLIC PANEL, BASIC    Collection Time: 07/05/22  5:36 AM   Result Value Ref Range    Sodium 143 136 - 145 mmol/L    Potassium 3.3 (L) 3.5 - 5.1 mmol/L    Chloride 106 97 - 108 mmol/L    CO2 27 21 - 32 mmol/L    Anion gap 10 5 - 15 mmol/L    Glucose 115 (H) 65 - 100 mg/dL    BUN 26 (H) 6 - 20 mg/dL    Creatinine 1.37 (H) 0.70 - 1.30 mg/dL    BUN/Creatinine ratio 19 12 - 20      GFR est AA >60 >60 ml/min/1.73m2    GFR est non-AA 50 (L) >60 ml/min/1.73m2    Calcium 9.0 8.5 - 10.1 mg/dL   GLUCOSE, POC    Collection Time: 07/05/22  7:00 AM   Result Value Ref Range    Glucose (POC) 120 (H) 65 - 117 mg/dL    Performed by [employer]    GLUCOSE, POC    Collection Time: 07/05/22 11:41 AM   Result Value Ref Range    Glucose (POC) 108 65 - 117 mg/dL    Performed by [employer]    ECHO ADULT COMPLETE    Collection Time: 07/05/22 12:09 PM   Result Value Ref Range    IVSd 1.9 (A) 0.6 - 1.0 cm    LVIDd 4.5 4.2 - 5.9 cm    LVIDs 2.6 cm    LVOT Diameter 2.1 cm    LVPWd 1.3 (A) 0.6 - 1.0 cm    Global Longitudinal Strain -7.2 %    Global Longitudinal Strain -7.5 %    Global Longitudinal Strain -8.9 %    Global Longitudinal Strain -7.9 %    EF BP 65 55 - 100 %    LV Ejection Fraction A2C 75 %    LV Ejection Fraction A4C 71 %    LV EDV A2C 83 mL    LV EDV A4C 117 mL    LV EDV BP 97 67 - 155 mL    LV ESV A2C 20 mL    LV ESV A4C 34 mL    LV ESV BP 34 22 - 58 mL    LVOT Peak Gradient 3 mmHg    LVOT Mean Gradient 2 mmHg    LVOT SV 66.5 ml    LVOT Peak Velocity 0.8 m/s    LVOT VTI 18.4 cm    RVIDd 3.7 cm RV Free Wall Peak S' 15 cm/s    LA Diameter 4.6 cm    LA Volume A/L 101 mL    LA Volume 2C 81 (A) 18 - 58 mL    LA Volume 4C 102 (A) 18 - 58 mL    LA Volume BP 93 (A) 18 - 58 mL    AV Area by Peak Velocity 0.9 cm2    AV Area by VTI 1.0 cm2    AV Peak Gradient 41 mmHg    AV Mean Gradient 25 mmHg    AV Peak Velocity 3.2 m/s    AV Mean Velocity 2.4 m/s    AV VTI 66.4 cm    LV E' Septal Velocity 6 cm/s    TAPSE 2.0 1.7 cm    TR Peak Gradient 26 mmHg    TR Max Velocity 2.53 m/s    Aortic Root 4.0 cm        Imaging:  XR CHEST PORT    Result Date: 7/4/2022  Bilateral pulmonary edema versus nonspecific pneumonia. Consider PA and lateral chest views when the patient can better tolerate.         Assessment//Plan           Problem List:  Hospital Problems  Date Reviewed: 4/1/2022          Codes Class Noted POA    * (Principal) CHF (congestive heart failure) ([facility] ICD-10-CM: I50.9  ICD-9-CM: 428.0  7/4/2022 Unknown        Bradycardia ICD-10-CM: R00.1  ICD-9-CM: 427.89  7/4/2022 Unknown        HTN (hypertension) ICD-10-CM: I10  ICD-9-CM: 401.9  7/4/2022 Unknown        Hypertension ICD-10-CM: I10  ICD-9-CM: 401.9  7/21/2020 Yes        Type 2 diabetes mellitus without complications ([facility] GYO-35-US: E11.9  ICD-9-CM: 250.00  7/13/2020 Yes            Fluid Overload   - elevated BNP 3,607 and cxr showing b/l pulmonary edema vs nonspecific pna   - lasix 40mg IV x 1 given in ED   - troponin HS trended 40 -> 42 -> 36  - TSH normal at 2.4   - ekg showing ventricular paced at 50   - monitor input / output and daily weight   - continue lasix 40mg IV bid and after AM dose on 7/5 held   - 2D echo shows prelim preserved EF and IVC not dilated but significant change in aortic valve as previous noted mild to moderate stenosis and now noted as severe and most likely cause of his sudden fluid overload   - Cardiology recommendations appreciated   - follow D dimer in AM and possible obtain CT chest to evaluate edema vs pna in AM     Severe Aortic Stenosis  - previous echo 2020 shows mild to moderate and prelim echo 7/5/22 shows severe aortic stenosis  - possible pulmonary edema / fluid overload secondary to aortic stenosis in setting of htn urgency as echo shows preserved EF   - monitor fluid status closely  - avoid uncontrolled blood pressure episodes.      HTN Urgency    - blood pressure on admission 186/96   - lasix 40mg IV x 1 in ED and continue BID with last dose on 7/5 and held secondary to IVC nondilated.    - reconciled meds from [facility] shows only amolipine 5mg and previously start of HCTZ dosing   - hydralazine 10mg IV q6hr prn for sbp > 165  - restart norvasc at a dose of 10mg daily 1st dose on 7/4 and continue   -Also started patient on lisinopril 10 mg oral daily first dose now and will continue   - monitor vitals as per floor protocol      Bradycardia  - hx of watchman procedure done by Dr. Leydi Roa MD and recent replacement in 2017 done by Dr. Grayson James   - hx of complete heart block and sick sinus   - currently ventricular paced at 50 and recommended to continue metoprolol succinate rate of 25mg daily   - TSH in normal range at 2.4  - pacemaker noted to be a [employer] device and will need to attempt to get interrogation done     Chronic Hypomagnesemia  - mag level 1.9 and on chronic daily mag oxide 400mg so will increase to bid but repeat mag level on 7/5 is 1.8 and given 2gm IV mag sulfate rider  - follow mag level closely      Anemia of chronic dz   - hg 12 on admission with previous hg in April 2022 at 9.9  - continue home ferrous sulfate daily with breakfast  - follow h/h      COPD  - restart previous home meds noted of muccinex bid, flonase, cetrezine,   - As needed DuoNebs for shortness of breath/wheeze but continued mild wheeze noted on 7/5 so changed nebs to QID      HLD   - lipid profile in normal limits so continue atorvastatin low dose at 20mg daily      Diabetes   - noted hx of but no medications on [facility] med list but previously was on glipizide and noted to have hypoglycemia episode during last admission and appears that has been discontinued since  - A1c 6.4  - monitor with RISS and accuchecks ACHS      CKD 3b  - was noted on previous hx and in April creat was 0.82   - current creat is 1.11  - UA negative for UTI and noted elevated specific Gravity and proteinuria > 300  - follow renal fxn closely while on current IV diuresis      CAD  - denies any chest pain   - serial troponins stable   - monitor on tele  - restart statin and asa therapy but hold beta blocker secondary to HR paced at 50      Recent left below the knee amputation 3/21/22 after failed revascularization attempts   -Currently is in skilled rehab at [facility] and rehab but patient says that he does not receive that much physical therapy and even noted that  was supprised that he is not more improved with physical therapy  - will consult case management as patient does not [facility] but says he was supposed to be discharged on 7/15 from Skilled rehab to home  - PT devaluation appreciated, overall did well and will continue to follow patient during inpatient stay and discharge plan once medically stable for discharge in 1-2 days      Dementia  - continue home aricept 5mg oral bedtime      Glaucoma  - restart home eye drops      Diabetes   - RISS with accuchecks ACHS  - obtain A 1c   - no medications noted for DM     GI prophylaxis   - equivalent home dose of omeprazole 20mg daily      DVT prophylaxis   - lovenox      Code Status: Full code  Patient designates daughter Dominga Armenta [phone] decision-maker if for some reason he is unable to make decision for himself     7/4/2022: Had discussion with patient daughter Ms Bailee Rosario and gave  Update on her fathers condition and admission to [facility] and plan of care.  She noted that her father was supposed to be discharged from [address] on 15th and wishes for him to go back until can get all equipment set up to come home but patient noting to me that he does not want to go back as he feels they are not taking care of him as he says blood pressure is elevated and nothing being done and many times he has a diaper that would be wet and he is afraid to eat because then he would have bowel movement and it would leak out of diaper and would stay in that state for some time before he would be cleaned. (as per patient     Spent 35 minutes evaluting and coordinating patient care of which >50% was spent coordinating and counseling.          Electronically signed by Caridad Alonso MD on 7/5/2022 at 12:13 PM

## 2022-07-05 NOTE — ROUTINE PROCESS
Dr. Izabella Avila notified that patient's condom catheter came off and patient refused to wear it now.

## 2022-07-05 NOTE — PROGRESS NOTES
Spoke with patient's daughter, Aileen Farah, regarding discharge planning. Patient's wife is the next of kin, but Aileen Farah stated that she is the POA. Supposed to be sending a copy to the hospital. Wife is staying with Aileen Farah in [place]. Aileen Farah stated that she is moving into the home with her parents, but she was planning on moving just before the patient was scheduled for release from the SNF on 7/15/22. Daughter would like for patient to go back to [facility] until she can get everything arranged for the move. Patient does not want to go back to [facility]. Patient did appear somewhat confused. Daughter made aware that the doctor would have to decide if patient is able to make his own decisions regarding his care. Daughter verbalized understanding. Daughter stated that the nursing facility is supposed to be ordering a transfer board, a wheelchair with support for a stump (recent AKA) and a BSC with a arm that drops. If patient is discharged home, these items will need to be ordered. Spoke with Yu at [facility]. She stated that she has not ordered any equipment, but it will be ordered before the patient is discharged from their facility if he returns.

## 2022-07-05 NOTE — ROUTINE PROCESS
Dr. Roger maldonado served with message that patient had 3 episodes of apnea lasting 20 seconds with in 4 minute period while asleep this am.  He awaken easily. His pulse ox 98% room air. He continues with expiratory wheezing in upper air way.

## 2022-07-05 NOTE — PROGRESS NOTES
Problem: Falls - Risk of  Goal: *Absence of Falls  Description: Document Clydene Bone Fall Risk and appropriate interventions in the flowsheet.   Outcome: Progressing Towards Goal  Note: Fall Risk Interventions:  Mobility Interventions: Bed/chair exit alarm,Patient to call before getting OOB         Medication Interventions: Bed/chair exit alarm    Elimination Interventions: Bed/chair exit alarm,Call light in reach

## 2022-07-05 NOTE — PROGRESS NOTES
Problem: Pressure Injury - Risk of  Goal: *Prevention of pressure injury  Description: Document Al Scale and appropriate interventions in the flowsheet. Outcome: Progressing Towards Goal  Note: Pressure Injury Interventions:       Moisture Interventions: Apply protective barrier, creams and emollients,Check for incontinence Q2 hours and as needed    Activity Interventions: PT/OT evaluation    Mobility Interventions: Float heels    Nutrition Interventions: Document food/fluid/supplement intake    Friction and Shear Interventions: Minimize layers                Problem: Patient Education: Go to Patient Education Activity  Goal: Patient/Family Education  Outcome: Progressing Towards Goal     Problem: Falls - Risk of  Goal: *Absence of Falls  Description: Document Ela Fall Risk and appropriate interventions in the flowsheet.   Outcome: Progressing Towards Goal  Note: Fall Risk Interventions:  Mobility Interventions: PT Consult for mobility concerns         Medication Interventions: Patient to call before getting OOB    Elimination Interventions: Patient to call for help with toileting needs,Toileting schedule/hourly rounds,Call light in reach              Problem: Patient Education: Go to Patient Education Activity  Goal: Patient/Family Education  Outcome: Progressing Towards Goal     Problem: Patient Education: Go to Patient Education Activity  Goal: Patient/Family Education  Outcome: Progressing Towards Goal

## 2022-07-05 NOTE — PROGRESS NOTES
Patients case reviewed during interdisciplinary team meeting in [address] [place], [place] [address]Acute Care Unit. Rev.  Get Reyes 00, [address]

## 2022-07-05 NOTE — PROGRESS NOTES
Problem: Mobility Impaired (Adult and Pediatric)  Goal: *Acute Goals and Plan of Care (Insert Text)  Description: FUNCTIONAL STATUS PRIOR TO ADMISSION: The patient was functional at the wheelchair level and required moderate assistance for transfers to the chair. HOME SUPPORT PRIOR TO ADMISSION: The patient lived with family assistance, but has not been home since TKA and currently residing in SNF. PHYSICAL THERAPY EVALUATION  Patient: Geeta Joiner (89 y.o. male)  Date: 7/5/2022  Primary Diagnosis: CHF (congestive heart failure) (HCC) [I50.9]  HTN (hypertension) [I10]  Bradycardia [R00.1]        Precautions: Decreased vision,  Fall    ASSESSMENT  Based on the objective data described below, the patient presents with L AKA and CHF. Since AKA the patient has currently been in SNF receiving skilled PT services. Per pt report he has not done much with therapy that will allow him to return home. He was able to perform today independent bed mobility and supine to sit transition. He was able to sit EOB without loss of balance. He has good strength in both LE. Patient refused to perform sit to stand during eval with reports that he was tired. He does report being able to perform transfer to [facility] with RW at the SNF. Per pt and casework report, he has AD and is gathering other needed equipment to return home. We will continue to assess safety for transfers and other forms of mobility at upcoming treatment session. He will benefit from skilled PT services to maintain current functional ability and assist with transfers with education on fall risk. Current Level of Function Impacting Discharge (mobility/balance): L AKA (recent) and decreased balance     Other factors to consider for discharge: needed AD to perform daily tasks; patient vs caregiver expectations upon DC (SNF vs HH)     Patient will benefit from skilled therapy intervention to address the above noted impairments.        PLAN :  Recommendations and Planned Interventions: transfer training, therapeutic exercises, neuromuscular re-education, orthotic/prosthetic training, patient and family training/education, and therapeutic activities      Frequency/Duration: Patient will be followed by physical therapy:  1-2 times daily, up to 5 days a week, to address goals. Recommendation for discharge: (in order for the patient to meet his/her long term goals)  Therapy up to 5 days/week in SNF setting    This discharge recommendation:  Has been made in collaboration with the attending provider and/or case management    IF patient discharges home will need the following DME: bedside commode, shower chair, walker, and wheelchair         SUBJECTIVE:   Patient stated I am doing okay, but I am tired of being asked these questions. I just want to go home and not back to that place I was at.     OBJECTIVE DATA SUMMARY:   HISTORY:    Past Medical History:   Diagnosis Date    Anemia 4/26/2017    Anxiety     Arrhythmia     A FIB    Arthritis     Atherosclerosis of artery of extremity with rest pain [facility])     Atrial fibrillation ([facility] 7/21/2020    Benign prostatic hyperplasia 4/26/2017    CAD (coronary artery disease)     pacemaker    Cancer ([facility] 2010    GASTRIC    Chronic kidney disease     Chronic obstructive pulmonary disease (HCC)     Depression     Diabetes ([facility]     BORDERLINE, NO MEDS. Diverticulosis of colon     Dyslipidemia 4/26/2017    GERD (gastroesophageal reflux disease)     Glaucoma     History of complete heart block     s/p watchman procedure and replaced in 11/2017 by Dr. Larsen Payment     History of CVA (cerebrovascular accident) 7/21/2020    Hypercholesteremia     Hypertension     Hypomagnesemia 7/13/2020    Nocturia 4/26/2017    PUD (peptic ulcer disease)     GI BLEEDING    PVD (peripheral vascular disease) ([facility]     Rectal hemorrhage 4/26/2017    Strabismus     Stroke ([facility] 2000 APPROX.     SOME VISUAL DEFICIT    Type 2 diabetes mellitus without complications ([facility] 7/13/2020    Venous stasis 4/26/2017     Past Surgical History:   Procedure Laterality Date    HX AMPUTATION TOE Right     HX COLONOSCOPY      HX GI  1998    PARTIAL GASTRECTOMY ( DR ALVIN ALBA)    HX HEART CATHETERIZATION      HX ORTHOPAEDIC Left 1980    KNEE CARTILAGE    HX ORTHOPAEDIC Right 2019    AMPUTATION RIGHT GREAT TOE    HX ORTHOPAEDIC Left 2021    AMPUTATION 3 TOES     HX ORTHOPAEDIC Left 02/2022    AMPUTATION 4TH & 5TH TOES    HX OTHER SURGICAL      LEFT HAND SKIN GRAFT (BURN) DONOR RIGHT THIGH    HX PACEMAKER  [id number]    DR Aaron Hickman; WATCHMAN PROCEDURE. .. noted [facility] device on 2017 noted        Personal factors and/or comorbidities impacting plan of care: L TKA, patient and caregiver disagreement on DC plan, decreased compliance to participating in [address]: Private residence  [address] Name: accordious  # Steps to Enter: 0  Wheelchair Ramp: Yes  One/Two Story Residence: One story  Living Alone: No  Support Systems: Spouse/Significant Other  Patient Expects to be Discharged to[de-identified] Skilled nursing facility  Current DME Used/Available at Home: Adaptive bathing aides,Tub transfer bench,Wheelchair    EXAMINATION/PRESENTATION/DECISION MAKING:   Critical Behavior:  Neurologic State: Alert,Eyes open spontaneously  Orientation Level: Oriented X4  Cognition: Appropriate decision making,Follows commands     Hearing: Auditory  Auditory Impairment: Hard of hearing, bilateral  Range Of Motion:  AROM: Within functional limits (on RLE, TKA on LLE with good hip ROM)  Strength:    Strength: Within functional limits  Coordination:  Coordination: Within functional limits  Functional Mobility:  Bed Mobility:  Rolling: Independent  Supine to Sit: Independent  Sit to Supine: Independent  Scooting: Independent  Balance:   Sitting: Impaired  Treatment Session:   Patient performed bed mobility and sitting balance while therapist was present.  He was able to sit with decent balance while therapist perform ROM and MMT testing. He does not have tenderness on his residual limb on L. He refused to perform sit to stand secondary to being tired. Patient was assisted to return to supine and left with call bell and bed alarm activated. Functional Measure:     Physical Therapy Evaluation Charge Determination   History Examination Presentation Decision-Making   LOW Complexity : Zero comorbidities / personal factors that will impact the outcome / POC LOW Complexity : 1-2 Standardized tests and measures addressing body structure, function, activity limitation and / or participation in recreation  LOW Complexity : Stable, uncomplicated  Unable to perform TUG due to inability to walk secondary to AKA. Based on the above components, the patient evaluation is determined to be of the following complexity level: LOW     Pain Ratin/10    Activity Tolerance:   Fair    After treatment patient left in no apparent distress:   Supine in bed, Call bell within reach, and Bed / chair alarm activated    COMMUNICATION/EDUCATION:   The patients plan of care was discussed with: Physical therapist, Physician, and Case management. Fall prevention education was provided and the patient/caregiver indicated understanding. and Patient/family have participated as able in goal setting and plan of care. Thank you for this referral.  Sue Almeida, PT, DPT   Time Calculation: 20 mins    Physical Therapy Goals  Initiated 2022  1. Patient will transfer from bed to chair and chair to bed with supervision/set-up using the least restrictive device within 7 day(s). 2.  Patient will perform sit to stand with supervision/set-up within 7 day(s). 3. Patient will demonstrate [facility] navigation with minimal support for set up and maneuvering within 7 days (s). 4. Patient will be able to perform standing at [facility] for 3 min without need for break or LOB to improve ability to assist with self care/grooming.       Outcome: Not Met

## 2022-07-05 NOTE — PROGRESS NOTES
Problem: Pressure Injury - Risk of  Goal: *Prevention of pressure injury  Description: Document Al Scale and appropriate interventions in the flowsheet. Outcome: Progressing Towards Goal  Note: Pressure Injury Interventions:       Moisture Interventions: Absorbent underpads,Check for incontinence Q2 hours and as needed    Activity Interventions: PT/OT evaluation    Mobility Interventions: Float heels    Nutrition Interventions: Document food/fluid/supplement intake                     Problem: Patient Education: Go to Patient Education Activity  Goal: Patient/Family Education  Outcome: Progressing Towards Goal     Problem: Falls - Risk of  Goal: *Absence of Falls  Description: Document Ela Fall Risk and appropriate interventions in the flowsheet.   Outcome: Progressing Towards Goal  Note: Fall Risk Interventions:  Mobility Interventions: Bed/chair exit alarm,Patient to call before getting OOB         Medication Interventions: Bed/chair exit alarm    Elimination Interventions: Bed/chair exit alarm,Call light in reach              Problem: Patient Education: Go to Patient Education Activity  Goal: Patient/Family Education  Outcome: Progressing Towards Goal

## 2022-07-06 LAB
ANION GAP SERPL CALC-SCNC: 6 MMOL/L (ref 5–15)
BASOPHILS # BLD: 0 K/UL (ref 0–0.2)
BASOPHILS NFR BLD: 0 % (ref 0–2.5)
BUN SERPL-MCNC: 28 MG/DL (ref 6–20)
BUN/CREAT SERPL: 21 (ref 12–20)
CA-I BLD-MCNC: 8.9 MG/DL (ref 8.5–10.1)
CHLORIDE SERPL-SCNC: 107 MMOL/L (ref 97–108)
CO2 SERPL-SCNC: 28 MMOL/L (ref 21–32)
CREAT SERPL-MCNC: 1.36 MG/DL (ref 0.7–1.3)
D DIMER PPP FEU-MCNC: 1.79 UG/ML(FEU)
EOSINOPHIL # BLD: 0.2 K/UL (ref 0–0.7)
EOSINOPHIL NFR BLD: 4 % (ref 0.9–2.9)
ERYTHROCYTE [DISTWIDTH] IN BLOOD BY AUTOMATED COUNT: 16.5 % (ref 11.5–14.5)
GLUCOSE BLD STRIP.AUTO-MCNC: 104 MG/DL (ref 65–117)
GLUCOSE BLD STRIP.AUTO-MCNC: 105 MG/DL (ref 65–117)
GLUCOSE BLD STRIP.AUTO-MCNC: 113 MG/DL (ref 65–117)
GLUCOSE BLD STRIP.AUTO-MCNC: 118 MG/DL (ref 65–117)
GLUCOSE SERPL-MCNC: 104 MG/DL (ref 65–100)
HCT VFR BLD AUTO: 35.9 % (ref 41–53)
HGB BLD-MCNC: 11.4 G/DL (ref 13.5–17.5)
LYMPHOCYTES # BLD: 1.2 K/UL (ref 1–4.8)
LYMPHOCYTES NFR BLD: 23 % (ref 20.5–51.1)
MAGNESIUM SERPL-MCNC: 2 MG/DL (ref 1.6–2.4)
MCH RBC QN AUTO: 27.5 PG (ref 31–34)
MCHC RBC AUTO-ENTMCNC: 31.7 G/DL (ref 31–36)
MCV RBC AUTO: 86.6 FL (ref 80–100)
MONOCYTES # BLD: 0.4 K/UL (ref 0.2–2.4)
MONOCYTES NFR BLD: 8 % (ref 1.7–9.3)
NEUTS SEG # BLD: 3.5 K/UL (ref 1.8–7.7)
NEUTS SEG NFR BLD: 65 % (ref 42–75)
NRBC # BLD: 0.01 K/UL
NRBC BLD-RTO: 0.2 PER 100 WBC
PERFORMED BY, TECHID: ABNORMAL
PERFORMED BY, TECHID: NORMAL
PLATELET # BLD AUTO: 293 K/UL (ref 150–400)
PMV BLD AUTO: 8.9 FL (ref 6.5–11.5)
POTASSIUM SERPL-SCNC: 4.1 MMOL/L (ref 3.5–5.1)
RBC # BLD AUTO: 4.14 M/UL (ref 4.5–5.9)
SODIUM SERPL-SCNC: 141 MMOL/L (ref 136–145)
WBC # BLD AUTO: 5.4 K/UL (ref 4.4–11.3)

## 2022-07-06 PROCEDURE — 83735 ASSAY OF MAGNESIUM: CPT

## 2022-07-06 PROCEDURE — 80048 BASIC METABOLIC PNL TOTAL CA: CPT

## 2022-07-06 PROCEDURE — 85379 FIBRIN DEGRADATION QUANT: CPT

## 2022-07-06 PROCEDURE — 65270000029 HC RM PRIVATE

## 2022-07-06 PROCEDURE — 36415 COLL VENOUS BLD VENIPUNCTURE: CPT

## 2022-07-06 PROCEDURE — 74011250637 HC RX REV CODE- 250/637: Performed by: HOSPITALIST

## 2022-07-06 PROCEDURE — 74011250636 HC RX REV CODE- 250/636: Performed by: EMERGENCY MEDICINE

## 2022-07-06 PROCEDURE — 94640 AIRWAY INHALATION TREATMENT: CPT

## 2022-07-06 PROCEDURE — 85025 COMPLETE CBC W/AUTO DIFF WBC: CPT

## 2022-07-06 PROCEDURE — 74011000250 HC RX REV CODE- 250: Performed by: EMERGENCY MEDICINE

## 2022-07-06 PROCEDURE — 97530 THERAPEUTIC ACTIVITIES: CPT

## 2022-07-06 PROCEDURE — 82962 GLUCOSE BLOOD TEST: CPT

## 2022-07-06 PROCEDURE — 74011000250 HC RX REV CODE- 250: Performed by: HOSPITALIST

## 2022-07-06 RX ORDER — LACTULOSE 10 G/15ML
SOLUTION ORAL; RECTAL
Qty: 5400 ML | Refills: 0 | Status: SHIPPED | OUTPATIENT
Start: 2022-07-06

## 2022-07-06 RX ORDER — LATANOPROST 50 UG/ML
1 SOLUTION/ DROPS OPHTHALMIC
Qty: 1 EACH | Refills: 0 | Status: SHIPPED | OUTPATIENT
Start: 2022-07-06 | End: 2022-07-22 | Stop reason: SDUPTHER

## 2022-07-06 RX ORDER — DONEPEZIL HYDROCHLORIDE 5 MG/1
5 TABLET, FILM COATED ORAL
Qty: 30 TABLET | Refills: 0 | Status: SHIPPED | OUTPATIENT
Start: 2022-07-06 | End: 2022-07-22 | Stop reason: SDUPTHER

## 2022-07-06 RX ORDER — FUROSEMIDE 20 MG/1
20 TABLET ORAL DAILY
Qty: 30 TABLET | Refills: 0 | Status: SHIPPED | OUTPATIENT
Start: 2022-07-06 | End: 2022-07-22 | Stop reason: SDUPTHER

## 2022-07-06 RX ORDER — LANOLIN ALCOHOL/MO/W.PET/CERES
CREAM (GRAM) TOPICAL
Qty: 90 TABLET | Refills: 0 | Status: SHIPPED | OUTPATIENT
Start: 2022-07-06

## 2022-07-06 RX ORDER — BISMUTH SUBSALICYLATE 262 MG
1 TABLET,CHEWABLE ORAL DAILY
Qty: 30 TABLET | Refills: 0 | Status: SHIPPED | OUTPATIENT
Start: 2022-07-06 | End: 2022-08-05

## 2022-07-06 RX ORDER — FUROSEMIDE 20 MG/1
20 TABLET ORAL DAILY
Status: DISCONTINUED | OUTPATIENT
Start: 2022-07-06 | End: 2022-07-06

## 2022-07-06 RX ORDER — ATORVASTATIN CALCIUM 20 MG/1
20 TABLET, FILM COATED ORAL DAILY
Qty: 30 TABLET | Refills: 0 | Status: SHIPPED | OUTPATIENT
Start: 2022-07-06 | End: 2022-07-22 | Stop reason: SDUPTHER

## 2022-07-06 RX ORDER — LANOLIN ALCOHOL/MO/W.PET/CERES
400 CREAM (GRAM) TOPICAL 2 TIMES DAILY
Qty: 60 TABLET | Refills: 0 | Status: SHIPPED | OUTPATIENT
Start: 2022-07-06 | End: 2022-08-05

## 2022-07-06 RX ORDER — FUROSEMIDE 20 MG/1
20 TABLET ORAL DAILY
Status: DISCONTINUED | OUTPATIENT
Start: 2022-07-07 | End: 2022-07-07 | Stop reason: HOSPADM

## 2022-07-06 RX ORDER — DORZOLAMIDE HYDROCHLORIDE AND TIMOLOL MALEATE PRESERVATIVE FREE 20; 5 MG/ML; MG/ML
1 SOLUTION/ DROPS OPHTHALMIC 2 TIMES DAILY
Qty: 60 EACH | Refills: 0 | Status: SHIPPED | OUTPATIENT
Start: 2022-07-06 | End: 2022-07-22 | Stop reason: SDUPTHER

## 2022-07-06 RX ORDER — GUAIFENESIN 600 MG/1
600 TABLET, EXTENDED RELEASE ORAL 2 TIMES DAILY
Qty: 60 TABLET | Refills: 0 | Status: SHIPPED | OUTPATIENT
Start: 2022-07-06 | End: 2022-08-05

## 2022-07-06 RX ORDER — METOPROLOL SUCCINATE 25 MG/1
25 TABLET, EXTENDED RELEASE ORAL DAILY
Qty: 30 TABLET | Refills: 0 | Status: SHIPPED | OUTPATIENT
Start: 2022-07-07 | End: 2022-07-22 | Stop reason: SDUPTHER

## 2022-07-06 RX ORDER — ASPIRIN 81 MG/1
81 TABLET ORAL
Qty: 30 TABLET | Refills: 0 | Status: SHIPPED | OUTPATIENT
Start: 2022-07-07 | End: 2022-08-06

## 2022-07-06 RX ORDER — HYDROCHLOROTHIAZIDE 25 MG/1
25 TABLET ORAL DAILY
Qty: 30 TABLET | Refills: 0 | Status: SHIPPED | OUTPATIENT
Start: 2022-07-06 | End: 2022-07-22 | Stop reason: SDUPTHER

## 2022-07-06 RX ORDER — AMOXICILLIN 250 MG
1 CAPSULE ORAL
Qty: 30 TABLET | Refills: 0 | Status: SHIPPED | OUTPATIENT
Start: 2022-07-06 | End: 2022-08-05

## 2022-07-06 RX ORDER — AMLODIPINE BESYLATE 10 MG/1
10 TABLET ORAL DAILY
Qty: 30 TABLET | Refills: 0 | Status: SHIPPED | OUTPATIENT
Start: 2022-07-07 | End: 2022-07-22 | Stop reason: SDUPTHER

## 2022-07-06 RX ORDER — OMEPRAZOLE 20 MG/1
20 CAPSULE, DELAYED RELEASE ORAL
Qty: 30 CAPSULE | Refills: 0 | Status: SHIPPED | OUTPATIENT
Start: 2022-07-06 | End: 2022-07-22 | Stop reason: SDUPTHER

## 2022-07-06 RX ORDER — LISINOPRIL 10 MG/1
10 TABLET ORAL DAILY
Qty: 30 TABLET | Refills: 0 | Status: SHIPPED | OUTPATIENT
Start: 2022-07-07 | End: 2022-07-15 | Stop reason: ALTCHOICE

## 2022-07-06 RX ORDER — CETIRIZINE HCL 10 MG
10 TABLET ORAL DAILY
Qty: 30 TABLET | Refills: 0 | Status: SHIPPED | OUTPATIENT
Start: 2022-07-06 | End: 2022-07-22 | Stop reason: SDUPTHER

## 2022-07-06 RX ORDER — FLUTICASONE PROPIONATE 50 MCG
1 SPRAY, SUSPENSION (ML) NASAL DAILY
Qty: 1 EACH | Refills: 0 | Status: SHIPPED | OUTPATIENT
Start: 2022-07-06 | End: 2022-08-05

## 2022-07-06 RX ORDER — POTASSIUM CHLORIDE 20 MEQ/1
20 TABLET, EXTENDED RELEASE ORAL 2 TIMES DAILY
Qty: 60 TABLET | Refills: 0 | Status: SHIPPED | OUTPATIENT
Start: 2022-07-06 | End: 2022-07-22 | Stop reason: SDUPTHER

## 2022-07-06 RX ADMIN — MAGNESIUM OXIDE 400 MG: 400 TABLET ORAL at 09:43

## 2022-07-06 RX ADMIN — IPRATROPIUM BROMIDE AND ALBUTEROL SULFATE 3 ML: .5; 2.5 SOLUTION RESPIRATORY (INHALATION) at 08:24

## 2022-07-06 RX ADMIN — ASPIRIN 81 MG: 81 TABLET, COATED ORAL at 07:43

## 2022-07-06 RX ADMIN — MAGNESIUM OXIDE 400 MG: 400 TABLET ORAL at 21:03

## 2022-07-06 RX ADMIN — LISINOPRIL 10 MG: 10 TABLET ORAL at 09:32

## 2022-07-06 RX ADMIN — SODIUM CHLORIDE, PRESERVATIVE FREE 10 ML: 5 INJECTION INTRAVENOUS at 21:03

## 2022-07-06 RX ADMIN — DORZOLAMIDE HYDROCHLORIDE AND TIMOLOL MALEATE 1 DROP: 20; 5 SOLUTION/ DROPS OPHTHALMIC at 09:28

## 2022-07-06 RX ADMIN — POTASSIUM CHLORIDE 20 MEQ: 20 TABLET, EXTENDED RELEASE ORAL at 21:03

## 2022-07-06 RX ADMIN — POTASSIUM CHLORIDE 20 MEQ: 20 TABLET, EXTENDED RELEASE ORAL at 09:35

## 2022-07-06 RX ADMIN — SODIUM CHLORIDE, PRESERVATIVE FREE 10 ML: 5 INJECTION INTRAVENOUS at 13:48

## 2022-07-06 RX ADMIN — PANTOPRAZOLE SODIUM 20 MG: 20 TABLET, DELAYED RELEASE ORAL at 07:43

## 2022-07-06 RX ADMIN — ATORVASTATIN CALCIUM 20 MG: 20 TABLET, FILM COATED ORAL at 09:26

## 2022-07-06 RX ADMIN — MULTIPLE VITAMINS W/ MINERALS TAB 1 TABLET: TAB at 09:34

## 2022-07-06 RX ADMIN — DORZOLAMIDE HYDROCHLORIDE AND TIMOLOL MALEATE 1 DROP: 20; 5 SOLUTION/ DROPS OPHTHALMIC at 21:04

## 2022-07-06 RX ADMIN — ENOXAPARIN SODIUM 40 MG: 100 INJECTION SUBCUTANEOUS at 09:29

## 2022-07-06 RX ADMIN — SODIUM CHLORIDE, PRESERVATIVE FREE 10 ML: 5 INJECTION INTRAVENOUS at 05:40

## 2022-07-06 RX ADMIN — DONEPEZIL HYDROCHLORIDE 5 MG: 5 TABLET, FILM COATED ORAL at 21:03

## 2022-07-06 RX ADMIN — GUAIFENESIN 600 MG: 600 TABLET, EXTENDED RELEASE ORAL at 21:03

## 2022-07-06 RX ADMIN — LATANOPROST 1 DROP: 50 SOLUTION/ DROPS OPHTHALMIC at 21:05

## 2022-07-06 RX ADMIN — FERROUS SULFATE TAB 325 MG (65 MG ELEMENTAL FE) 325 MG: 325 (65 FE) TAB at 07:43

## 2022-07-06 RX ADMIN — Medication 5 MG: at 21:03

## 2022-07-06 RX ADMIN — CETIRIZINE HYDROCHLORIDE 10 MG: 10 TABLET, FILM COATED ORAL at 09:26

## 2022-07-06 RX ADMIN — GUAIFENESIN 600 MG: 600 TABLET, EXTENDED RELEASE ORAL at 09:30

## 2022-07-06 RX ADMIN — FLUTICASONE PROPIONATE 1 SPRAY: 50 SPRAY, METERED NASAL at 09:30

## 2022-07-06 RX ADMIN — AMLODIPINE BESYLATE 10 MG: 10 TABLET ORAL at 09:24

## 2022-07-06 RX ADMIN — DOCUSATE SODIUM 50MG AND SENNOSIDES 8.6MG 1 TABLET: 8.6; 5 TABLET, FILM COATED ORAL at 07:43

## 2022-07-06 NOTE — PROGRESS NOTES
Patients case reviewed during interdisciplinary team meeting in [address] [place], [place] [address]Acute Care Unit. Rev.  Get Shahid 12, [address]

## 2022-07-06 NOTE — ROUTINE PROCESS
Bedside shift change report given to faiza (oncoming nurse) by Taisha Bhatt (offgoing nurse). Report included the following information Kardex.

## 2022-07-06 NOTE — PROGRESS NOTES
Progress Note    Patient: Lucas Stark MRN: [id number]  SSN: [id number]    YOB: 1935  Age: 82 y.o. Sex: male      Admit Date: 7/4/2022    LOS: 2 days     Subjective:     Patient seen and examined. Lucas Stark is a 82y.o. 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), and Moderate Aortic valve stenosis who is followed for HTN Urgency and Chest Pain. This am patient reports that he is feeling fine and has no new complaints. He is significantly improved from yesterday. Telemetry Review: v-paced    Review of Symptoms:   Review of Systems   Constitutional: Negative. Eyes:        Chronic changes   Cardiovascular: Negative for chest pain, dyspnea on exertion, orthopnea, palpitations and syncope. Respiratory: Negative for cough and shortness of breath. Musculoskeletal:        Left aka   Gastrointestinal: Negative. Genitourinary: Negative. Objective:     Vitals:    07/06/22 0430 07/06/22 0728 07/06/22 0747 07/06/22 0824   BP: (!) 168/69 (!) 158/86     Pulse: (!) 50 (!) 50 (!) 50    Resp: 20 18     Temp: 96.9 °F (36.1 °C) 97.3 °F (36.3 °C)     SpO2: 91% 98%  94%   Weight: 81.1 kg (178 lb 11.2 oz)      Height:            Intake and Output:  Current Shift: No intake/output data recorded. Last three shifts: 07/04 1901 - 07/06 0700  In: 690 [P.O.:600; I.V.:90]  Out: 1051 [Urine:1050]    Physical Exam:   General: Well nourished chronically ill appearing male lying in bed, NAD, A&O  HEENT: Normocephalic, PERRL, no drainage  Neck: Supple, Trachea midline, No JVD  RESP: CTA bilaterally. + Symmetrical chest movement. No SOB or distress.  On RA  Cardiovascular: RRR no RG. + murmur  PVS: No rubor, cyanosis, mild RLE edema, Radial, pulses equal bilaterally, s/p Left AKA (stump has healed well)  ABD: obese, soft, NT, Normoactive BS  Derm: Warm/Dry/Intact with no lesions, poor turgor  Neuro: A&O PPTS, cranial nerves II- XII grossly intact via interaction with patient. Limited historian.    PSYCH: No anxiety or agitation      Lab/Data Review:  BMP:   Lab Results   Component Value Date/Time     07/06/2022 05:08 AM    K 4.1 07/06/2022 05:08 AM     07/06/2022 05:08 AM    CO2 28 07/06/2022 05:08 AM    AGAP 6 07/06/2022 05:08 AM     (H) 07/06/2022 05:08 AM    BUN 28 (H) 07/06/2022 05:08 AM    CREA 1.36 (H) 07/06/2022 05:08 AM    GFRAA >60 07/06/2022 05:08 AM    GFRNA 50 (L) 07/06/2022 05:08 AM            Current Facility-Administered Medications:     [START ON 7/7/2022] furosemide (LASIX) tablet 20 mg, 20 mg, Oral, DAILY, Marc Robles, NP    potassium chloride (K-DUR, KLOR-CON M20) SR tablet 20 mEq, 20 mEq, Oral, BID, Jr Martinez MD, 20 mEq at 07/06/22 0935    albuterol-ipratropium (DUO-NEB) 2.5 MG-0.5 MG/3 ML, 3 mL, Nebulization, QID RT, Jr Martinez MD, 3 mL at 07/06/22 0824    metoprolol succinate (TOPROL-XL) XL tablet 25 mg, 25 mg, Oral, DAILY, Jr Martinez MD, 25 mg at 07/05/22 1222    melatonin (rapid dissolve) tablet 5 mg, 5 mg, Oral, QHS, Jr Martinez MD, 5 mg at 07/05/22 2109    sodium chloride (NS) flush 5-40 mL, 5-40 mL, IntraVENous, Q8H, Ah, Joleen Fu MD, 10 mL at 07/06/22 0540    sodium chloride (NS) flush 5-40 mL, 5-40 mL, IntraVENous, PRN, Ruthann Blanca MD, 10 mL at 07/05/22 0808    acetaminophen (TYLENOL) tablet 650 mg, 650 mg, Oral, Q6H PRN **OR** acetaminophen (TYLENOL) suppository 650 mg, 650 mg, Rectal, Q6H PRN, Joleen MD    polyethylene glycol (MIRALAX) packet 17 g, 17 g, Oral, DAILY PRN, Joleen MD    ondansetron (ZOFRAN ODT) tablet 4 mg, 4 mg, Oral, Q8H PRN **OR** ondansetron (ZOFRAN) injection 4 mg, 4 mg, IntraVENous, Q6H PRN, Joleen MD    enoxaparin (LOVENOX) injection 40 mg, 40 mg, SubCUTAneous, DAILY, AhMan MD, 40 mg at 07/06/22 0929    glucose chewable tablet 16 g, 4 Tablet, Oral, PRN, Jr Martinez MD    dextrose (D50W) injection syrg 12.5-25 g, 25-50 mL, IntraVENous, PRN, Jr Martinez MD    glucagon (GLUCAGEN) injection 1 mg, 1 mg, IntraMUSCular, PRN, Jr Martinez MD    insulin lispro (HUMALOG) injection, , SubCUTAneous, AC&HS, Jr Martinez MD    aspirin delayed-release tablet 81 mg, 81 mg, Oral, 7am, Jr Martinez MD, 81 mg at 07/06/22 0743    atorvastatin (LIPITOR) tablet 20 mg, 20 mg, Oral, DAILY, Jr Martinez MD, 20 mg at 07/06/22 0926    cetirizine (ZYRTEC) tablet 10 mg, 10 mg, Oral, DAILY, Jr Martinez MD, 10 mg at 07/06/22 0926    donepeziL (ARICEPT) tablet 5 mg, 5 mg, Oral, QHS, Jr Martinez MD, 5 mg at 07/04/22 2054    fluticasone propionate (FLONASE) 50 mcg/actuation nasal spray 1 Spray, 1 Spray, Both Nostrils, DAILY, Jr Martinez MD, 1 [place] at 07/06/22 0930    guaiFENesin ER (MUCINEX) tablet 600 mg, 600 mg, Oral, BID, Jr Martinez MD, 600 mg at 07/06/22 0930    lactulose (CHRONULAC) 10 gram/15 mL solution 15 mL, 10 g, Oral, BID PRN, Jr Martinez MD    latanoprost (XALATAN) 0.005 % ophthalmic solution 1 Drop, 1 Drop, Both Eyes, QHS, Jr Martinez MD, 1 Drop at 07/05/22 2117    pantoprazole (PROTONIX) tablet 20 mg, 20 mg, Oral, ACB, Jr Martinez MD, 20 mg at 07/06/22 0743    senna-docusate (PERICOLACE) 8.6-50 mg per tablet 1 Tablet, 1 Tablet, Oral, 7am, Jr Martinez MD, 1 Tablet at 07/06/22 0743    ferrous sulfate tablet 325 mg, 1 Tablet, Oral, ACB, Jr Martinez MD, 325 mg at 07/06/22 0743    dorzolamide-timoloL (COSOPT) 22.3-6.8 mg/mL ophthalmic solution 1 Drop, 1 Drop, Both Eyes, BID, Jr Martinez MD, 1 Drop at 07/06/22 0928    hydrALAZINE (APRESOLINE) 20 mg/mL injection 10 mg, 10 mg, IntraVENous, Q6H PRN, Jr Martinez MD GEORGINA, 10 mg at 07/05/22 0323    magnesium oxide (MAG-OX) tablet 400 mg, 400 mg, Oral, BID, Jr Martinez MD, 400 mg at 07/06/22 0943    multivitamin, tx-iron-ca-min (THERA-M w/ IRON) tablet 1 Tablet, 1 Tablet, Oral, DAILY, Jr Martinez MD, 1 Tablet at 07/06/22 0934    amLODIPine (NORVASC) tablet 10 mg, 10 mg, Oral, DAILY, Jr Martinez MD, 10 mg at 07/06/22 2841    lisinopriL (PRINIVIL, ZESTRIL) tablet 10 mg, 10 mg, Oral, DAILY, Jr Martinez MD, 10 mg at 07/06/22 0932      Assessment:     Principal Problem:    CH[facility] (congestive heart failure) ([facility] (7/4/2022)    Active Problems:    Type 2 diabetes mellitus without complications ([facility] (8/94/2871)      Hypertension (7/21/2020)      Bradycardia (7/4/2022)      HTN (hypertension) (7/4/2022)        Plan:     Case discussed with Collaborating physician Dr. Kathleen Hernandez and our recommendations are as follows:     1. Chest Pain: ACS ruled out with HS Trop trends and EKG criteria. NST 2019 was negative, however has not had ischemia evauation since that time. TTE 7/5 showing EF 65%, and Severe AS. Can discuss ischemic evaluation in OP setting at follow-up as if he wants to move forward with treatment for valve will require Cardiac Catheterization at that time. Continue risk factor modification, cardioprotective meds, and BP control in mean time. Patient is sx free. 2. Hypertensive Urgency: BP has improved. Restart home BB. Continue to trend and titrate as needed. IVC stable on TTE. Recommend starting Lasix 20mg PO daily at discharge and not restarting previous HCTZ regimen. Would monitor volume status closely given severe AS and need to avoid dehydration. He appears euvolemic and well compensated today. 3. Elevated BNP:  BNP 3607 on admission. CXR + congestion. Has diuresed well. No h/o HF. TTE showing normal systolic and diastolic function. This event is likely related to uncontrolled HTN and severe AS. Continue strict I/O and daily weights. Med plan as above. 4. Atrial fibrillation:  Prior Watchman procedure. Rate controlled. v-paced currently. No changes at this time. Continue to monitor. 5. Moderate Aortic Stenosis:  Repeat TTE 7/5 showing Severe Disease with Mean gradient 25mmHg, Peak gradient 41mmHg, and Valve area 1.0. Patient made aware of findings this am and treatment options, but would like to discuss further with family. This can be further addressed in OP setting. Avoid Nitrates. Continue plan as above otherwise. 6. HLD: Continue current home statin dosing. Consider obtaining lipid panel. 7. PAD: s/p Left BKA 3/21/22 that was revised to AKA 4/1/22 s/t infection. Has healed well. No current sx. Continue to monitor. Further per Vascular recommendations. 8. Hypokalemia: replete to goal 4-5. Continue telemetry. Repeat labs in am.   9. Hypomagnesemia: replete to goal >2. Continue telemetry. Repeat labs in am.       Thank you for involving us in the care of this patient. Please do not hesitate to call if additional questions arise.  If after hours please call [phone]    Signed By: Selina Adams NP     July 6, 2022

## 2022-07-06 NOTE — PROGRESS NOTES
HOSPITALIST PROGRESS NOTE  Marin Bland MD, [address]         Daily Progress Note: 7/6/2022      Subjective:     Patient is alert and oriented x2. Shortness of breath significantly improved without any overnight chest pain, fever/chills, nausea/vomiting noted. Consulted with cardiology regarding starting on Lasix 20 Mg on 7/7/2022. Patient with severe aortic stenosis which will need to be followed up on outpatient basis with [facility] cardiology. Also spoke with the patient's daughter/MPOA telephonically who will be able to  patient in a.m. and transferred to [place] for discharge home with certain DME pending. Case management is aware of DME needs.       Medications reviewed  Current Facility-Administered Medications   Medication Dose Route Frequency    [START ON 7/7/2022] furosemide (LASIX) tablet 20 mg  20 mg Oral DAILY    potassium chloride (K-DUR, KLOR-CON M20) SR tablet 20 mEq  20 mEq Oral BID    albuterol-ipratropium (DUO-NEB) 2.5 MG-0.5 MG/3 ML  3 mL Nebulization QID RT    metoprolol succinate (TOPROL-XL) XL tablet 25 mg  25 mg Oral DAILY    melatonin (rapid dissolve) tablet 5 mg  5 mg Oral QHS    sodium chloride (NS) flush 5-40 mL  5-40 mL IntraVENous Q8H    sodium chloride (NS) flush 5-40 mL  5-40 mL IntraVENous PRN    acetaminophen (TYLENOL) tablet 650 mg  650 mg Oral Q6H PRN    Or    acetaminophen (TYLENOL) suppository 650 mg  650 mg Rectal Q6H PRN    polyethylene glycol (MIRALAX) packet 17 g  17 g Oral DAILY PRN    ondansetron (ZOFRAN ODT) tablet 4 mg  4 mg Oral Q8H PRN    Or    ondansetron (ZOFRAN) injection 4 mg  4 mg IntraVENous Q6H PRN    enoxaparin (LOVENOX) injection 40 mg  40 mg SubCUTAneous DAILY    glucose chewable tablet 16 g  4 Tablet Oral PRN    dextrose (D50W) injection syrg 12.5-25 g  25-50 mL IntraVENous PRN    glucagon (GLUCAGEN) injection 1 mg  1 mg IntraMUSCular PRN    insulin lispro (HUMALOG) injection   SubCUTAneous AC&HS    aspirin delayed-release tablet 81 mg  81 mg Oral 7am    atorvastatin (LIPITOR) tablet 20 mg  20 mg Oral DAILY    cetirizine (ZYRTEC) tablet 10 mg  10 mg Oral DAILY    donepeziL (ARICEPT) tablet 5 mg  5 mg Oral QHS    fluticasone propionate (FLONASE) 50 mcg/actuation nasal spray 1 Spray  1 Spray Both Nostrils DAILY    guaiFENesin ER (MUCINEX) tablet 600 mg  600 mg Oral BID    lactulose (CHRONULAC) 10 gram/15 mL solution 15 mL  10 g Oral BID PRN    latanoprost (XALATAN) 0.005 % ophthalmic solution 1 Drop  1 Drop Both Eyes QHS    pantoprazole (PROTONIX) tablet 20 mg  20 mg Oral ACB    senna-docusate (PERICOLACE) 8.6-50 mg per tablet 1 Tablet  1 Tablet Oral 7am    ferrous sulfate tablet 325 mg  1 Tablet Oral ACB    dorzolamide-timoloL (COSOPT) 22.3-6.8 mg/mL ophthalmic solution 1 Drop  1 Drop Both Eyes BID    hydrALAZINE (APRESOLINE) 20 mg/mL injection 10 mg  10 mg IntraVENous Q6H PRN    magnesium oxide (MAG-OX) tablet 400 mg  400 mg Oral BID    multivitamin, tx-iron-ca-min (THERA-M w/ IRON) tablet 1 Tablet  1 Tablet Oral DAILY    amLODIPine (NORVASC) tablet 10 mg  10 mg Oral DAILY    lisinopriL (PRINIVIL, ZESTRIL) tablet 10 mg  10 mg Oral DAILY       Review of Systems:   A comprehensive review of systems was negative except for that written in the HPI.     Objective:   Physical Exam:     Visit Vitals  /66 (BP 1 Location: Right upper arm, BP Patient Position: Sitting)   Pulse 65   Temp 97.1 °F (36.2 °C)   Resp 18   Ht 6' (1.829 m)   Wt 81.1 kg (178 lb 11.2 oz)   SpO2 94%   BMI 24.24 kg/m²      O2 Device: None (Room air)  Patient Vitals for the past 8 hrs:   Temp Pulse Resp BP SpO2   07/06/22 1159 -- 65 -- -- --   07/06/22 1131 97.1 °F (36.2 °C) (!) 51 18 132/66 94 %   07/06/22 0824 -- -- -- -- 94 %   07/06/22 0747 -- (!) 50 -- -- --   07/06/22 0728 97.3 °F (36.3 °C) (!) 50 18 (!) 158/86 98 %   07/06/22 0430 96.9 °F (36.1 °C) (!) 50 20 (!) 168/69 91 %          Temp (24hrs), Av.3 °F (36.3 °C), Min:96.9 °F (36.1 °C), Max:98 °F (36.7 °C)    701 - 1900  In: -   Out: 101 [Urine:100]   1901 - 700  In: 690 [P.O.:600; I.V.:90]  Out: 1051 [Urine:1050]    General:  Alert, cooperative, no distress, appears stated age. Lungs:   Clear to auscultation bilaterally. Chest wall:  No tenderness or deformity. Heart:  Regular rate and rhythm, S1, S2 normal, no murmur, click, rub or gallop. Abdomen:   Soft, non-tender. Bowel sounds normal. No masses,  No organomegaly. Extremities: Extremities normal, atraumatic, no cyanosis or edema. Pulses: 2+ and symmetric all extremities. Skin: Skin color, texture, turgor normal. No rashes or lesions   Neurologic: CNII-XII intact. No gross sensory or motor deficits     Data Review:       Recent Days:  Recent Labs     22  0508 22  0536 22  0310   WBC 5.4 6.6 7.8   HGB 11.4* 11.8* 12.0*   HCT 35.9* 37.2* 37.5*    290 314     Recent Labs     22  0508 22  0536 22  0310    143 140   K 4.1 3.3* 4.7    106 108   CO2 28 27 22   * 115* 116*   BUN 28* 26* 24*   CREA 1.36* 1.37* 1.11   CA 8.9 9.0 8.6   MG 2.0 1.8 1.9   ALB  --   --  2.8*   TBILI  --   --  0.5   ALT  --   --  27     No results for input(s): PH, PCO2, PO2, HCO3, FIO2 in the last 72 hours.     24 Hour Results:  Recent Results (from the past 24 hour(s))   ECHO ADULT COMPLETE    Collection Time: 22 12:09 PM   Result Value Ref Range    IVSd 1.9 (A) 0.6 - 1.0 cm    LVIDd 4.5 4.2 - 5.9 cm    LVIDs 2.6 cm    LVOT Diameter 2.1 cm    LVPWd 1.3 (A) 0.6 - 1.0 cm    Global Longitudinal Strain -7.9 %    EF BP 65 55 - 100 %    LV Ejection Fraction A2C 75 %    LV Ejection Fraction A4C 71 %    LV EDV A2C 83 mL    LV EDV A4C 117 mL    LV EDV BP 97 67 - 155 mL    LV ESV A2C 20 mL    LV ESV A4C 34 mL    LV ESV BP 34 22 - 58 mL    LVOT Peak Gradient 3 mmHg    LVOT Mean Gradient 2 mmHg    LVOT SV 63.7 ml    LVOT Peak Velocity 0.8 m/s    LVOT VTI 18.4 cm    RVIDd 3.7 cm    RV Free Wall Peak S' 15 cm/s    LA Diameter 4.6 cm    LA Volume A/L 101 mL    LA Volume 2C 81 (A) 18 - 58 mL    LA Volume 4C 102 (A) 18 - 58 mL    LA Volume BP 93 (A) 18 - 58 mL    AV Area by Peak Velocity 0.9 cm2    AV Area by VTI 1.0 cm2    AV Peak Gradient 41 mmHg    AV Mean Gradient 25 mmHg    AV Peak Velocity 3.2 m/s    AV Mean Velocity 2.4 m/s    AV VTI 66.4 cm    LV E' Septal Velocity 6 cm/s    TAPSE 2.0 1.7 cm    TR Peak Gradient 26 mmHg    TR Max Velocity 2.53 m/s    Aortic Root 4.0 cm    Fractional Shortening 2D 42 28 - 44 %    LV ESV Index BP 17 mL/m2    LV EDV Index BP 47 mL/m2    LV ESV Index A4C 17 mL/m2    LV EDV Index A4C 57 mL/m2    LV ESV Index A2C 10 mL/m2    LV EDV Index A2C 40 mL/m2    LVIDd Index 2.18 cm/m2    LVIDs Index 1.26 cm/m2    LV RWT Ratio 0.58     LV Mass 2D 304.6 (A) 88 - 224 g    LV Mass 2D Index 147.9 (A) 49 - 115 g/m2    LA Volume Index BP 45 (A) 16 - 34 ml/m2    LA Volume Index A/L 49 16 - 34 mL/m2    LVOT Stroke Volume Index 30.9 mL/m2    LVOT Area 3.5 cm2    LA Volume Index 2C 39 (A) 16 - 34 mL/m2    LA Volume Index 4C 50 (A) 16 - 34 mL/m2    LA Size Index 2.23 cm/m2    LA/AO Root Ratio 1.15     Ao Root Index 1.94 cm/m2    AV Velocity Ratio 0.25     LVOT:AV VTI Index 0.28     ROBERT/BSA VTI 0.5 cm2/m2    ROBERT/BSA Peak Velocity 0.4 cm2/m2    Est. RA Pressure 3 mmHg    RVSP 29 mmHg   GLUCOSE, POC    Collection Time: 07/05/22  3:41 PM   Result Value Ref Range    Glucose (POC) 138 (H) 65 - 117 mg/dL    Performed by Grace Jones    GLUCOSE, POC    Collection Time: 07/05/22  9:05 PM   Result Value Ref Range    Glucose (POC) 102 65 - 117 mg/dL    Performed by ABDIEL NAVA    CBC WITH AUTOMATED DIFF    Collection Time: 07/06/22  5:08 AM   Result Value Ref Range    WBC 5.4 4.4 - 11.3 K/uL    RBC 4.14 (L) 4.50 - 5.90 M/uL    HGB 11.4 (L) 13.5 - 17.5 g/dL    HCT 35.9 (L) 41 - 53 %    MCV 86.6 80 - 100 FL    MCH 27.5 (L) 31 - 34 PG    MCHC 31.7 31.0 - 36.0 g/dL    RDW 16.5 (H) 11.5 - 14.5 %    PLATELET 038 194 - 478 K/uL    MPV 8.9 6.5 - 11.5 FL    NRBC 0.2  WBC    ABSOLUTE NRBC 0.01 K/uL    NEUTROPHILS 65 42 - 75 %    LYMPHOCYTES 23 20.5 - 51.1 %    MONOCYTES 8 1.7 - 9.3 %    EOSINOPHILS 4 (H) 0.9 - 2.9 %    BASOPHILS 0 0.0 - 2.5 %    ABS. NEUTROPHILS 3.5 1.8 - 7.7 K/UL    ABS. LYMPHOCYTES 1.2 1.0 - 4.8 K/UL    ABS. MONOCYTES 0.4 0.2 - 2.4 K/UL    ABS. EOSINOPHILS 0.2 0.0 - 0.7 K/UL    ABS. BASOPHILS 0.0 0.0 - 0.2 K/UL   METABOLIC PANEL, BASIC    Collection Time: 07/06/22  5:08 AM   Result Value Ref Range    Sodium 141 136 - 145 mmol/L    Potassium 4.1 3.5 - 5.1 mmol/L    Chloride 107 97 - 108 mmol/L    CO2 28 21 - 32 mmol/L    Anion gap 6 5 - 15 mmol/L    Glucose 104 (H) 65 - 100 mg/dL    BUN 28 (H) 6 - 20 mg/dL    Creatinine 1.36 (H) 0.70 - 1.30 mg/dL    BUN/Creatinine ratio 21 (H) 12 - 20      GFR est AA >60 >60 ml/min/1.73m2    GFR est non-AA 50 (L) >60 ml/min/1.73m2    Calcium 8.9 8.5 - 10.1 mg/dL   MAGNESIUM    Collection Time: 07/06/22  5:08 AM   Result Value Ref Range    Magnesium 2.0 1.6 - 2.4 mg/dL   D DIMER    Collection Time: 07/06/22  5:08 AM   Result Value Ref Range    D DIMER 1.79 (H) <0.50 ug/ml(FEU)   GLUCOSE, POC    Collection Time: 07/06/22  7:16 AM   Result Value Ref Range    Glucose (POC) 105 65 - 117 mg/dL    Performed by Issac Guzmán    GLUCOSE, POC    Collection Time: 07/06/22 11:21 AM   Result Value Ref Range    Glucose (POC) 113 65 - 117 mg/dL    Performed by Issac Guzmán            Assessment/Plan:     Acute on chronic diastolic heart failure  BNP 3,607 and cxr showing b/l pulmonary edema vs nonspecific pna.    Lasix 40mg IV x 1 given in ED   Troponin HS trended 40 -> 42 -> 36  TSH normal at 2.4   Ekg showing ventricular paced at 50   2D echo shows prelim preserved EF and IVC not dilated but significant change in aortic valve as previous noted mild to moderate stenosis and now noted as severe and most likely cause of his sudden fluid overload. Cardiology recommendations appreciated   Patient with significantly improved in terms of shortness of breath. Patient can be discharged home per cardiology on Lasix 20 mg p.o. daily starting 7/7/2022. Continue metoprolol XL 25 Mg daily. Needs follow-up with general cardiology in the next 1 to 2 weeks.      Severe Aortic Stenosis  Previous echo 2020 shows mild to moderate and prelim echo 7/5/22 shows severe aortic stenosis. Pulmonary edema / fluid overload secondary to aortic stenosis in setting of htn urgency as echo shows preserved EF. Continue low-dose metoprolol XL 25 mg daily. Lower Lasix dosage to 20 Mg daily. Follow-up with Kaveh Trent or cardiology in the next 2 weeks on outpatient basis.      Chest pain  ACS ruled out with normalizing troponins noted. Follow-up with [facility] cardiology for severe aortic stenosis. Continue aspirin, metoprolol and Lipitor. HTN Urgency    Reconciled meds from [facility] shows only amolipine 5mg and previously start of HCTZ dosing. Restarted norvasc at a dose of 10mg.   Also started patient on lisinopril 10 mg daily along with continuing metoprolol and Lasix.     Bradycardia  Status post watchman procedure done by Dr. Milad Kate recent replacement in 2017 done by Dr. Lolis Rios   H/o Complete heart block and sick sinus   Currently ventricular paced at 50 and recommended to continue metoprolol succinate rate of 25mg daily   TSH in normal range at 2.4  Pacemaker noted to be a [employer] device and will need to attempt to get interrogation done     Chronic Hypomagnesemia  Mag level 1.9 and on chronic daily mag oxide 400mg so will increase to bid.     Anemia of chronic dz   Hgb 12 on admission with previous hg in April 2022 at 9.9  Continue home ferrous sulfate daily with breakfast.     COPD  Restarted previous home meds noted of muccinex bid, flonase, cetrezine,   Currently compensated.     HLD   Continue atorvastatin low dose at 20mg daily      Diabetes   Noted hx of but no medications on [facility] med list but previously was on glipizide and noted to have hypoglycemia episode during last admission and appears that has been discontinued since A1c 6.4.      CKD 3b  Current creat is 1.11.  UA negative for UTI and noted elevated specific Gravity and proteinuria > 300.     CAD  Serial troponins stable   Restarted statin and asa therapy.      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 Patient can be discharged home with home health.     Dementia  Continue aricept 5mg oral bedtime      Glaucoma  Restart home eye drops      GI prophylaxis   Omeprazole 20mg daily        Code Status: Full code    Patient designates daughter Julian Corley ([phone] decision-maker if for some reason he is unable to make decision for himself      Care Plan discussed with: Patient/Family, Nurse and , Cardiology    Patient can be discharged home in the afternoon with daughter going back to [place]. Total time spent with patient: 35 minutes. With greater than 50% spent in coordination of care and counseling.     Carolin Palomares MD

## 2022-07-06 NOTE — PROGRESS NOTES
PHYSICAL THERAPY TREATMENT  Patient: Mary King (32 y.o. male)  Date: 7/6/2022  Diagnosis: CHF (congestive heart failure) ([facility]) [I50.9]  HTN (hypertension) [I10]  Bradycardia [R00.1] CHF (congestive heart failure) ([facility] [address]       Precautions: Fall  Chart, physical therapy assessment, plan of care and goals were reviewed. ASSESSMENT  Patient continues with skilled PT services and is progressing towards goals. Pt continues to present with decreased balance. However, he was able to perform sit <--> stand tranfers with FWW and CGA as well as stand with B UE support in FWW for 60 seconds before sitting back down. Pt was able to perform bed to chair transfers, transferring to sound side with min A to clear arm rest of seat. Current Level of Function Impacting Discharge (mobility/balance): Decreased balance, L AKA    Other factors to consider for discharge: Pt does not want to return to SNF          PLAN :  Patient continues to benefit from skilled intervention to address the above impairments. Continue treatment per established plan of care. to address goals. Recommendation for discharge: (in order for the patient to meet his/her long term goals)  Therapy up to 5 days/week in SNF setting    This discharge recommendation:  Has been made in collaboration with the attending provider and/or case management    IF patient discharges home will need the following DME: patient owns DME required for discharge       SUBJECTIVE:   Patient stated I am feeling better today. I can stand up on my own. I am able to get into and out of my wheelchair all by myself.     OBJECTIVE DATA SUMMARY:   Critical Behavior:  Neurologic State: Alert  Orientation Level: Oriented X4  Cognition: Appropriate decision making,Appropriate for age attention/concentration,Appropriate safety awareness     Functional Mobility Training:  Bed Mobility:  Rolling: Independent  Supine to Sit: Independent  Sit to Supine: Independent  Scooting: Independent        Transfers:  Sit to Stand: Contact guard assistance  Stand to Sit: Contact guard assistance        Bed to Chair: Minimum assistance                    Balance:  Sitting: Intact  Standing: Intact; With support              Treatment Session:   Pt performed sit to  FWW with CGA and bed to chair transfer with min A to lift pt above arm rest of chair. Pt required vc for transfer set up and to lean forward to initiate standing. Pt performed therapeutic exercise in chair: 20x seated marching, 20x knee extension, 20x ankle pump, 20x UE chair lifts. Pain Ratin/10    Activity Tolerance:   Fair    After treatment patient left in no apparent distress:   Sitting in chair and Call bell within reach and was informed to not get back into bed without assistance. COMMUNICATION/COLLABORATION:   The patients plan of care was discussed with: Physical therapist, Registered nurse, Physician, and Case management. Tiffany Sawant, PT, DPT   Time Calculation: 23 mins         Problem: Mobility Impaired (Adult and Pediatric)  Goal: *Acute Goals and Plan of Care (Insert Text)  Description: FUNCTIONAL STATUS PRIOR TO ADMISSION: The patient was functional at the wheelchair level and required moderate assistance for transfers to the chair. HOME SUPPORT PRIOR TO ADMISSION: The patient lived with family assistance, but has not been home since TKA and currently residing in SNF. Physical Therapy Goals  Initiated 2022  1. Patient will transfer from bed to chair and chair to bed with supervision/set-up using the least restrictive device within 7 day(s). 2.  Patient will perform sit to stand with supervision/set-up within 7 day(s). 3. Patient will demonstrate [facility] navigation with minimal support for set up and maneuvering within 7 days (s). 4. Patient will be able to perform standing at [facility] for 3 min without need for break or LOB to improve ability to assist with self care/grooming.       Outcome: Progressing Towards Goal

## 2022-07-06 NOTE — PROGRESS NOTES
Problem: Falls - Risk of  Goal: *Absence of Falls  Description: Document Gene Sweeney Fall Risk and appropriate interventions in the flowsheet.   Outcome: Progressing Towards Goal  Note: Fall Risk Interventions:  Mobility Interventions: PT Consult for mobility concerns         Medication Interventions: Patient to call before getting OOB    Elimination Interventions: Patient to call for help with toileting needs,Toileting schedule/hourly rounds,Call light in reach

## 2022-07-06 NOTE — PROGRESS NOTES
Medications given. Well tolerated. Pt received full bed bath and is resting comfortably in semi-fowlers.

## 2022-07-06 NOTE — MANAGEMENT PLAN
DME order placed per Case Management  Transfer board, BSC with drop arm and Wheelchair with leg rest for Left BKA

## 2022-07-06 NOTE — PROGRESS NOTES
Nursing Student administered medications and provided nursing care under the supervision of this writer.

## 2022-07-06 NOTE — PROGRESS NOTES
Pt moved to bed from chair. Pt had large bowel movement in bedside commode. Resting comfortably. Handoff to [facility] [employer] [facility].

## 2022-07-06 NOTE — ROUTINE PROCESS
Bedside shift change report given to Tiara Huston LPN (oncoming nurse) by Murtaza Quintanilla LPN (offgoing nurse). Report included the following information [employer].

## 2022-07-06 NOTE — PROGRESS NOTES
Sent orders for DME to [address] Nella Numbers from [employer] is going to fax me the [facility]. Daughter would like to have personal care set up prior to DC.

## 2022-07-07 VITALS
OXYGEN SATURATION: 95 % | HEART RATE: 50 BPM | WEIGHT: 177.5 LBS | TEMPERATURE: 97.5 F | SYSTOLIC BLOOD PRESSURE: 129 MMHG | BODY MASS INDEX: 24.04 KG/M2 | HEIGHT: 72 IN | RESPIRATION RATE: 18 BRPM | DIASTOLIC BLOOD PRESSURE: 62 MMHG

## 2022-07-07 LAB
ANION GAP SERPL CALC-SCNC: 7 MMOL/L (ref 5–15)
BASOPHILS # BLD: 0 K/UL (ref 0–0.2)
BASOPHILS NFR BLD: 1 % (ref 0–2.5)
BUN SERPL-MCNC: 24 MG/DL (ref 6–20)
BUN/CREAT SERPL: 19 (ref 12–20)
CA-I BLD-MCNC: 8.9 MG/DL (ref 8.5–10.1)
CHLORIDE SERPL-SCNC: 106 MMOL/L (ref 97–108)
CO2 SERPL-SCNC: 27 MMOL/L (ref 21–32)
CREAT SERPL-MCNC: 1.28 MG/DL (ref 0.7–1.3)
EOSINOPHIL # BLD: 0.3 K/UL (ref 0–0.7)
EOSINOPHIL NFR BLD: 6 % (ref 0.9–2.9)
ERYTHROCYTE [DISTWIDTH] IN BLOOD BY AUTOMATED COUNT: 16 % (ref 11.5–14.5)
GLUCOSE BLD STRIP.AUTO-MCNC: 104 MG/DL (ref 65–117)
GLUCOSE BLD STRIP.AUTO-MCNC: 104 MG/DL (ref 65–117)
GLUCOSE SERPL-MCNC: 98 MG/DL (ref 65–100)
HCT VFR BLD AUTO: 35.3 % (ref 41–53)
HGB BLD-MCNC: 11.3 G/DL (ref 13.5–17.5)
LYMPHOCYTES # BLD: 1.3 K/UL (ref 1–4.8)
LYMPHOCYTES NFR BLD: 26 % (ref 20.5–51.1)
MCH RBC QN AUTO: 27.9 PG (ref 31–34)
MCHC RBC AUTO-ENTMCNC: 32 G/DL (ref 31–36)
MCV RBC AUTO: 87.2 FL (ref 80–100)
MONOCYTES # BLD: 0.5 K/UL (ref 0.2–2.4)
MONOCYTES NFR BLD: 9 % (ref 1.7–9.3)
NEUTS SEG # BLD: 3 K/UL (ref 1.8–7.7)
NEUTS SEG NFR BLD: 58 % (ref 42–75)
NRBC # BLD: 0 K/UL
NRBC BLD-RTO: 0.1 PER 100 WBC
PERFORMED BY, TECHID: NORMAL
PERFORMED BY, TECHID: NORMAL
PLATELET # BLD AUTO: 277 K/UL (ref 150–400)
PMV BLD AUTO: 8.9 FL (ref 6.5–11.5)
POTASSIUM SERPL-SCNC: 4.1 MMOL/L (ref 3.5–5.1)
RBC # BLD AUTO: 4.05 M/UL (ref 4.5–5.9)
SODIUM SERPL-SCNC: 140 MMOL/L (ref 136–145)
WBC # BLD AUTO: 5.1 K/UL (ref 4.4–11.3)

## 2022-07-07 PROCEDURE — 80048 BASIC METABOLIC PNL TOTAL CA: CPT

## 2022-07-07 PROCEDURE — 74011250637 HC RX REV CODE- 250/637: Performed by: HOSPITALIST

## 2022-07-07 PROCEDURE — 94762 N-INVAS EAR/PLS OXIMTRY CONT: CPT

## 2022-07-07 PROCEDURE — 74011250637 HC RX REV CODE- 250/637: Performed by: NURSE PRACTITIONER

## 2022-07-07 PROCEDURE — 36415 COLL VENOUS BLD VENIPUNCTURE: CPT

## 2022-07-07 PROCEDURE — 85025 COMPLETE CBC W/AUTO DIFF WBC: CPT

## 2022-07-07 PROCEDURE — 74011250636 HC RX REV CODE- 250/636: Performed by: EMERGENCY MEDICINE

## 2022-07-07 PROCEDURE — 82962 GLUCOSE BLOOD TEST: CPT

## 2022-07-07 PROCEDURE — 97530 THERAPEUTIC ACTIVITIES: CPT

## 2022-07-07 PROCEDURE — 74011000250 HC RX REV CODE- 250: Performed by: HOSPITALIST

## 2022-07-07 PROCEDURE — 74011000250 HC RX REV CODE- 250: Performed by: EMERGENCY MEDICINE

## 2022-07-07 RX ORDER — IPRATROPIUM BROMIDE AND ALBUTEROL SULFATE 2.5; .5 MG/3ML; MG/3ML
3 SOLUTION RESPIRATORY (INHALATION)
Status: DISCONTINUED | OUTPATIENT
Start: 2022-07-07 | End: 2022-07-07 | Stop reason: HOSPADM

## 2022-07-07 RX ADMIN — POTASSIUM CHLORIDE 20 MEQ: 20 TABLET, EXTENDED RELEASE ORAL at 09:23

## 2022-07-07 RX ADMIN — ATORVASTATIN CALCIUM 20 MG: 20 TABLET, FILM COATED ORAL at 09:18

## 2022-07-07 RX ADMIN — PANTOPRAZOLE SODIUM 20 MG: 20 TABLET, DELAYED RELEASE ORAL at 07:46

## 2022-07-07 RX ADMIN — FERROUS SULFATE TAB 325 MG (65 MG ELEMENTAL FE) 325 MG: 325 (65 FE) TAB at 07:45

## 2022-07-07 RX ADMIN — FLUTICASONE PROPIONATE 1 SPRAY: 50 SPRAY, METERED NASAL at 09:24

## 2022-07-07 RX ADMIN — MULTIPLE VITAMINS W/ MINERALS TAB 1 TABLET: TAB at 09:23

## 2022-07-07 RX ADMIN — SODIUM CHLORIDE, PRESERVATIVE FREE 10 ML: 5 INJECTION INTRAVENOUS at 06:11

## 2022-07-07 RX ADMIN — METOPROLOL SUCCINATE 25 MG: 25 TABLET, EXTENDED RELEASE ORAL at 09:22

## 2022-07-07 RX ADMIN — LISINOPRIL 10 MG: 10 TABLET ORAL at 09:21

## 2022-07-07 RX ADMIN — ENOXAPARIN SODIUM 40 MG: 100 INJECTION SUBCUTANEOUS at 09:19

## 2022-07-07 RX ADMIN — CETIRIZINE HYDROCHLORIDE 10 MG: 10 TABLET, FILM COATED ORAL at 09:19

## 2022-07-07 RX ADMIN — AMLODIPINE BESYLATE 10 MG: 10 TABLET ORAL at 09:17

## 2022-07-07 RX ADMIN — FUROSEMIDE 20 MG: 20 TABLET ORAL at 09:20

## 2022-07-07 RX ADMIN — GUAIFENESIN 600 MG: 600 TABLET, EXTENDED RELEASE ORAL at 09:21

## 2022-07-07 RX ADMIN — DOCUSATE SODIUM 50MG AND SENNOSIDES 8.6MG 1 TABLET: 8.6; 5 TABLET, FILM COATED ORAL at 07:47

## 2022-07-07 RX ADMIN — DORZOLAMIDE HYDROCHLORIDE AND TIMOLOL MALEATE 1 DROP: 20; 5 SOLUTION/ DROPS OPHTHALMIC at 09:25

## 2022-07-07 RX ADMIN — MAGNESIUM OXIDE 400 MG: 400 TABLET ORAL at 09:22

## 2022-07-07 RX ADMIN — ASPIRIN 81 MG: 81 TABLET, COATED ORAL at 07:44

## 2022-07-07 NOTE — PROGRESS NOTES
Spoke to daughter, she would like transportation set for him to be picked up from here at 4pm. She was at [employer] and they stated the insurance was not correct. Called Steph Brown. to verify his insurance, she verified the [facility] [employer] [facility] medicaid is correct. Called [employer] to give them the correct insurance information.

## 2022-07-07 NOTE — PROGRESS NOTES
Progress Note    Patient: Panfilo Colby MRN: [id number]  SSN: [id number]    YOB: 1935  Age: 82 y.o. Sex: male      Admit Date: 7/4/2022    LOS: 3 days     Subjective:     Patient seen and examined. Panfilo Colby is a 82y.o. 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), and Severe Aortic valve stenosis who is followed for HTN Urgency and Chest Pain. This am patient reports that he is feeling fine and has no new complaints. He is significantly improved from yesterday and reports he is ready to go home. Telemetry Review: v-paced    Review of Symptoms:   Review of Systems   Constitutional: Negative. Eyes:        Chronic changes   Cardiovascular: Negative for chest pain, dyspnea on exertion, orthopnea, palpitations and syncope. Respiratory: Negative for cough and shortness of breath. Musculoskeletal:        Left aka   Gastrointestinal: Negative. Genitourinary: Negative. Objective:     Vitals:    07/07/22 0400 07/07/22 0412 07/07/22 0736 07/07/22 0800   BP:  (!) 157/73 (!) 152/60    Pulse:  (!) 53 (!) 50 (!) 52   Resp:  18 18    Temp:  97.5 °F (36.4 °C) 97.3 °F (36.3 °C)    SpO2:  95% 96%    Weight: 80.5 kg (177 lb 8 oz)      Height:            Intake and Output:  Current Shift: 07/07 0701 - 07/07 1900  In: 673 [P.O.:414]  Out: 430 [Urine:430]  Last three shifts: 07/05 1901 - 07/07 0700  In: 720 [P.O.:720]  Out: 102 [Urine:100]    Physical Exam:   General: Well nourished chronically ill appearing male lying in bed, NAD, A&O  HEENT: Normocephalic, PERRL, no drainage  Neck: Supple, Trachea midline, No JVD  RESP: CTA bilaterally. + Symmetrical chest movement. No SOB or distress.  On RA  Cardiovascular: RRR no RG. + murmur  PVS: No rubor, cyanosis, mild RLE edema, Radial, pulses equal bilaterally, s/p Left AKA (stump has healed well)  ABD: obese, soft, NT, Normoactive BS  Derm: Warm/Dry/Intact with no lesions, poor turgor  Neuro: A&O PPTS, cranial nerves II- XII grossly intact via interaction with patient. Limited historian.    PSYCH: No anxiety or agitation      Lab/Data Review:  BMP:   Lab Results   Component Value Date/Time     07/07/2022 05:12 AM    K 4.1 07/07/2022 05:12 AM     07/07/2022 05:12 AM    CO2 27 07/07/2022 05:12 AM    AGAP 7 07/07/2022 05:12 AM    GLU 98 07/07/2022 05:12 AM    BUN 24 (H) 07/07/2022 05:12 AM    CREA 1.28 07/07/2022 05:12 AM    GFRAA >60 07/07/2022 05:12 AM    GFRNA 54 (L) 07/07/2022 05:12 AM            Current Facility-Administered Medications:     furosemide (LASIX) tablet 20 mg, 20 mg, Oral, DAILY, America Robles, SRI, 20 mg at 07/07/22 0920    potassium chloride (K-DUR, KLOR-CON M20) SR tablet 20 mEq, 20 mEq, Oral, BID, Jr Martinez MD, 20 mEq at 07/07/22 0923    albuterol-ipratropium (DUO-NEB) 2.5 MG-0.5 MG/3 ML, 3 mL, Nebulization, QID RT, Jr Martinez MD, 3 mL at 07/06/22 0824    metoprolol succinate (TOPROL-XL) XL tablet 25 mg, 25 mg, Oral, DAILY, Jr Martinez MD, 25 mg at 07/07/22 0922    melatonin (rapid dissolve) tablet 5 mg, 5 mg, Oral, QHS, Jr Martinez MD, 5 mg at 07/06/22 2103    sodium chloride (NS) flush 5-40 mL, 5-40 mL, IntraVENous, Q8H, Ah, Joleen Fu MD, 10 mL at 07/07/22 4536    sodium chloride (NS) flush 5-40 mL, 5-40 mL, IntraVENous, PRN, Ruthann Blanca MD, 10 mL at 07/05/22 0808    acetaminophen (TYLENOL) tablet 650 mg, 650 mg, Oral, Q6H PRN **OR** acetaminophen (TYLENOL) suppository 650 mg, 650 mg, Rectal, Q6H PRN, Joleen MD    polyethylene glycol (MIRALAX) packet 17 g, 17 g, Oral, DAILY PRN, Joleen MD    ondansetron (ZOFRAN ODT) tablet 4 mg, 4 mg, Oral, Q8H PRN **OR** ondansetron (ZOFRAN) injection 4 mg, 4 mg, IntraVENous, Q6H PRN, Juli Webb MD    enoxaparin (LOVENOX) injection 40 mg, 40 mg, SubCUTAneous, DAILY, Ah, Prieto Noriega MD, 40 mg at 07/07/22 0919    glucose chewable tablet 16 g, 4 Tablet, Oral, PRN, Jr Martinez MD    dextrose (D50W) injection syrg 12.5-25 g, 25-50 mL, IntraVENous, PRN, Jr Martinez MD    glucagon (GLUCAGEN) injection 1 mg, 1 mg, IntraMUSCular, PRN, Jr Martinez MD    insulin lispro (HUMALOG) injection, , SubCUTAneous, AC&HS, Jr Martinez MD    aspirin delayed-release tablet 81 mg, 81 mg, Oral, 7am, Jr Martinez MD, 81 mg at 07/07/22 0744    atorvastatin (LIPITOR) tablet 20 mg, 20 mg, Oral, DAILY, Jr Martinez MD, 20 mg at 07/07/22 0918    cetirizine (ZYRTEC) tablet 10 mg, 10 mg, Oral, DAILY, Jr Martinez MD, 10 mg at 07/07/22 0919    donepeziL (ARICEPT) tablet 5 mg, 5 mg, Oral, QHS, Jr Martinez MD, 5 mg at 07/06/22 2103    fluticasone propionate (FLONASE) 50 mcg/actuation nasal spray 1 Spray, 1 Spray, Both Nostrils, DAILY, Jr Martinez MD, 1 [place] at 07/07/22 0924    guaiFENesin ER (MUCINEX) tablet 600 mg, 600 mg, Oral, BID, Jr Martinez MD, 600 mg at 07/07/22 0921    lactulose (CHRONULAC) 10 gram/15 mL solution 15 mL, 10 g, Oral, BID PRN, Jr Martinez MD    latanoprost (XALATAN) 0.005 % ophthalmic solution 1 Drop, 1 Drop, Both Eyes, QHS, Jr Martinez MD, 1 Drop at 07/06/22 2105    pantoprazole (PROTONIX) tablet 20 mg, 20 mg, Oral, ACB, Jr Martinez MD, 20 mg at 07/07/22 0746    senna-docusate (PERICOLACE) 8.6-50 mg per tablet 1 Tablet, 1 Tablet, Oral, 7am, Jr Martinez MD, 1 Tablet at 07/07/22 0747    ferrous sulfate tablet 325 mg, 1 Tablet, Oral, ACB, Jr Martinez MD, 325 mg at 07/07/22 0745    dorzolamide-timoloL (COSOPT) 22.3-6.8 mg/mL ophthalmic solution 1 Drop, 1 Drop, Both Eyes, BID, Jr Martinez MD, 1 Drop at 07/07/22 0925    hydrALAZINE (APRESOLINE) 20 mg/mL injection 10 mg, 10 mg, IntraVENous, Q6H PRN, Jr Martinez MD, 10 mg at 07/05/22 0323    magnesium oxide (MAG-OX) tablet 400 mg, 400 mg, Oral, BID, Jr Martinez MD, 400 mg at 07/07/22 5242    multivitamin, tx-iron-ca-min (THERA-M w/ IRON) tablet 1 Tablet, 1 Tablet, Oral, DAILY, Jr Martinez MD, 1 Tablet at 07/07/22 0923    amLODIPine (NORVASC) tablet 10 mg, 10 mg, Oral, DAILY, Jr Martinez MD, 10 mg at 07/07/22 9385    lisinopriL (PRINIVIL, ZESTRIL) tablet 10 mg, 10 mg, Oral, DAILY, Jr Martinez MD, 10 mg at 07/07/22 9435      Assessment:     Principal Problem:    CHF (congestive heart failure) ([facility] (7/4/2022)    Active Problems:    Type 2 diabetes mellitus without complications ([facility] (6/71/4635)      Hypertension (7/21/2020)      Bradycardia (7/4/2022)      HTN (hypertension) (7/4/2022)        Plan:     Case discussed with Collaborating physician Dr. Rory Coombs and our recommendations are as follows:     1. Chest Pain: ACS ruled out with HS Trop trends and EKG criteria. NST 2019 was negative, however has not had ischemia evauation since that time. TTE 7/5 showing EF 65%, and Severe AS. Can discuss ischemic evaluation in OP setting at follow-up as if he wants to move forward with treatment for valve will require Cardiac Catheterization at that time. Continue risk factor modification, cardioprotective meds, and BP control in mean time. Patient remains sx free as he has this entire hospitalization. 2. Hypertensive Urgency: BP has improved. Restart home BB. Continue to trend and titrate as needed. IVC stable on TTE 7/5. Started Lasix 20mg PO daily today. Would monitor volume status closely given severe AS and need to avoid dehydration. He appears euvolemic and well compensated today. 3. Elevated BNP:  BNP 3607 on admission. CXR + congestion. Has diuresed well. No h/o HF. TTE showing normal systolic and diastolic function. This event is likely related to uncontrolled HTN and severe AS.   Continue strict I/O and daily weights. Med plan as above. 4. Atrial fibrillation:  Prior Watchman procedure. Rate controlled. v-paced currently. No changes at this time. Continue to monitor. PLEASE NOTE PPM RATE IS 50 and he has appropriate device function. 5. Severe Aortic Stenosis:  Repeat TTE 7/5 showing Moderate disease has progressed to Severe Disease with Mean gradient 25mmHg, Peak gradient 41mmHg, and Valve area 1.0. Patient made aware of findings 7/6 am and treatment options, but would like to discuss further with family. This can be further addressed in OP setting. Avoid Nitrates. Continue plan as above otherwise. 6. HLD: Continue current home statin dosing. Consider obtaining lipid panel. 7. PAD: s/p Left BKA 3/21/22 that was revised to AKA 4/1/22 s/t infection. Has healed well. No current sx. Continue to monitor. Further per Vascular recommendations. 8. Hypokalemia: replete to goal 4-5. Continue telemetry. Repeat labs in am.   9. Hypomagnesemia: replete to goal >2. Continue telemetry. Repeat labs in am.       Thank you for involving us in the care of this patient. Please do not hesitate to call if additional questions arise.  If after hours please call [phone]    Signed By: Lee Erwin NP     July 7, 2022

## 2022-07-07 NOTE — PROGRESS NOTES
Bed bath given. New briefs and underpads placed. Perineal care provided. Pt tolerated well. A & O x 4. Resting comfortably.

## 2022-07-07 NOTE — ROUTINE PROCESS
Patient awake, A&O x4, Paced on telemetry, RA and tolerating well, no c/o pain, blood sugar 104, no sliding scale insulin required.

## 2022-07-07 NOTE — PROGRESS NOTES
PHYSICAL THERAPY TREATMENT  Patient: Jeyson Mascorro (81 y.o. male)  Date: 7/7/2022  Diagnosis: CHF (congestive heart failure) ([facility]) [I50.9]  HTN (hypertension) [I10]  Bradycardia [R00.1] CHF (congestive heart failure) ([facility]       Precautions: Fall  Chart, physical therapy assessment, plan of care and goals were reviewed. ASSESSMENT  Patient continues with skilled PT services and is progressing towards goals. Pt continues to present with decreased balance. However, pt was able to transfer from bed to chair with SBA and performed exercises in chair before having lunch. Current Level of Function Impacting Discharge (mobility/balance): Decreased balance below baseline    Other factors to consider for discharge: none         PLAN :  Patient continues to benefit from skilled intervention to address the above impairments. Continue treatment per established plan of care. to address goals. Recommendation for discharge: (in order for the patient to meet his/her long term goals)  Therapy up to 5 days/week in SNF setting    This discharge recommendation:  Has been made in collaboration with the attending provider and/or case management    IF patient discharges home will need the following DME: patient owns DME required for discharge       SUBJECTIVE:   Patient stated I am feeling good today and would like to get up and sit in the chair to eat lunch.     OBJECTIVE DATA SUMMARY:   Critical Behavior:  Neurologic State: Alert  Orientation Level: Oriented X4  Cognition: Appropriate decision making     Functional Mobility Training:  Bed Mobility:  Rolling: Independent  Supine to Sit: Independent  Sit to Supine: Independent  Scooting: Independent        Transfers:  Sit to Stand: Contact guard assistance  Stand to Sit: Contact guard assistance        Bed to Chair: Stand-by assistance   Balance:  Sitting: Intact  Standing: Intact; With support    Treatment Session:   Pt perofrmed bed mobility independently and then transferred to chair with SBA requiring cuing for hand placement and set up. Pt then performed exercises: Hip flexion x30, Knee extension x30, ankle pumps x30, depression lifts x10. Pain Ratin/10    Activity Tolerance:   Fair    After treatment patient left in no apparent distress:   Sitting in chair and Call bell within reach    COMMUNICATION/COLLABORATION:   The patients plan of care was discussed with: Physical therapist, Registered nurse, Physician, and Case management. Nasim Asencio, PT, DPT   Time Calculation: 17 mins             Problem: Mobility Impaired (Adult and Pediatric)  Goal: *Acute Goals and Plan of Care (Insert Text)  Description: FUNCTIONAL STATUS PRIOR TO ADMISSION: The patient was functional at the wheelchair level and required moderate assistance for transfers to the chair. HOME SUPPORT PRIOR TO ADMISSION: The patient lived with family assistance, but has not been home since TKA and currently residing in SNF. Physical Therapy Goals  Initiated 2022  1. Patient will transfer from bed to chair and chair to bed with supervision/set-up using the least restrictive device within 7 day(s). 2.  Patient will perform sit to stand with supervision/set-up within 7 day(s). 3. Patient will demonstrate [facility] navigation with minimal support for set up and maneuvering within 7 days (s). 4. Patient will be able to perform standing at [facility] for 3 min without need for break or LOB to improve ability to assist with self care/grooming.       2022 1308 by Ryan Alfaro, PT  Outcome: Progressing Towards Goal  2022 1308 by Ryan Alfaro PT  Outcome: Progressing Towards Goal  2022 1307 by Ryan Alfaro, PT  Outcome: Progressing Towards Goal

## 2022-07-07 NOTE — PROGRESS NOTES
Transportation set up with access of care, [phone] reservation # [phone], they stated they will call back and let us know who this is set up with.

## 2022-07-07 NOTE — DISCHARGE SUMMARY
HOSPITALIST DISCHARGE NOTE  Megan Zabala MD, [place], [place]       PATIENT ID: Carol Blanca  MRN: [id number]   YOB: 1935    DATE OF ADMISSION: 7/4/2022  2:34 AM    DATE OF DISCHARGE: 07/07/22    PRIMARY CARE PROVIDER: Tate Carballo NP     ATTENDING PHYSICIAN: Megan Zabala MD  DISCHARGING PROVIDER: Megan Zabala MD        CONSULTATIONS: IP CONSULT TO CARDIOLOGY    PROCEDURES/SURGERIES: * No surgery found *    ADMITTING [address] [place] [address] COURSE:     Danika Williamson is a 82 y. o. male followed by Narciso Cha a past medical history of Anemia (4/26/2017), Anxiety, Arrhythmia, Arthritis, Atherosclerosis of artery of extremity with rest pain [facility]), Atrial fibrillation ([address] (7/21/2020), Benign prostatic hyperplasia (4/26/2017), CAD (coronary artery disease), Cancer ([address] (2010), Chronic kidney disease, Chronic obstructive pulmonary disease ([address] Depression, Diabetes ([address] Diverticulosis of colon, Dyslipidemia (4/26/2017), GERD (gastroesophageal reflux disease), Glaucoma, History of CVA (cerebrovascular accident) (7/21/2020), Hypercholesteremia, Hypertension, Hypomagnesemia (7/13/2020), Nocturia (4/26/2017), PUD (peptic ulcer disease), PVD (peripheral vascular disease) ([address] Rectal hemorrhage (4/26/2017), Strabismus, Stroke ([address] (2000 APPROX.), Type 2 diabetes mellitus without complications ([address] (4/78/4108), and Venous stasis (4/26/2017).    Who presents from [address] [place] [address] for cough and sob. Was last admitted to our service in march for sepsis and left bka infection and was transferred to [facility] for reevaluation by surgeon Dr. Tereza Kate where he had to have a revision and AKA amputation .   Patient continues to be skilled rehab for his left below-knee amputation and overall good historian so the history was obtained from the previous chart reviewed but it is noted that patient was having increasing cold-like symptoms with congestive cough which she said producing clear sputum he also says he is has off-and-on chest wall pain but currently denies any pain. Does have noted lower extremity edema and says that been occurring off and on and usually gets better when he lifts his leg up. He did note that his blood pressure has been elevated at the nursing home and he states he mentioned it to the nurses and that it never runs that high and was concerned that he was not started on any other medications. Currently patient noted to be on metoprolol XL 25 mg daily as well as Norvasc 5 mg but on arrival patient blood pressure elevated at 186/96 with a heart rate of 52 and EKG showing ventricular paced rhythm as patient does have a [employer] pacemaker and was replaced in 2017 by Dr. Avinash Bermudez and initially was placed secondary to complete heart block in the setting of A. fib. On further evaluation the emergency room his left stump shows no signs of infection BUN/creatinine appears to be at baseline initial troponin was 40 and 2-hour repeat was at 42 BNP was elevated 3607 and magnesium was 1.9. Chest x-ray showed pulmonary edema versus infiltrates but had negative white blood count and no noted fevers and no hypoxia. So with patient's presentation was referred for admission to remote telemetry for new onset CHF, hypertensive urgency, bradycardia    DISCHARGE DIAGNOSES / PLAN:      Acute on chronic diastolic heart failure  BNP 3,607 and cxr showing b/l pulmonary edema vs nonspecific pna. Lasix 40mg IV x 1 given in ED   Troponin HS trended 40 -> 42 -> 36  TSH normal at 2.4   Ekg showing ventricular paced at 50.   2D echo shows prelim preserved EF and IVC not dilated but significant change in aortic valve as previous noted mild to moderate stenosis and now noted as severe. Cardiology recommendations appreciated.    Patient with significantly improved in terms of shortness of breath. Patient can be discharged home per cardiology on Lasix 20 mg p.o. daily starting 7/7/2022. Continue metoprolol XL 25 Mg daily. Needs follow-up with general cardiology in the next 1 to 2 weeks.     Severe Aortic Stenosis  Previous echo 2020 shows mild to moderate and prelim echo 7/5/22 shows severe aortic stenosis. Pulmonary edema / fluid overload secondary to Severe Aortic stenosis in setting of Hypertensive urgency as echo shows preserved EF. Continue low-dose metoprolol XL 25 mg daily. Lower Lasix dosage to 20 Mg daily. Follow-up with Krista Mckenna or cardiology in the next 2 weeks on outpatient basis.       Chest pain  ACS ruled out with normalizing troponins noted. Follow[facility] with [facility] cardiology for severe aortic stenosis. Continue aspirin, metoprolol and Lipitor.     HTN Urgency    Reconciled meds from [facility] shows only amolipine 5mg and previously start of HCTZ dosing. Restarted norvasc at a dose of 10mg.   Also started patient on lisinopril 10 mg daily along with continuing metoprolol and Lasix.     Bradycardia  Status post watchman procedure done by Dr. Haven Holter recent replacement in 2017 done by Dr. Llanos People  H/o Complete heart block and sick sinus   Currently ventricular paced at 50 and recommended to continue metoprolol succinate rate of 25mg daily   TSH in normal range at 2.4  Pacemaker noted to be a [employer] device and will need to attempt to get interrogation done     Chronic Hypomagnesemia  Mag level 1.9 and on chronic daily Mag Oxide 400mg so will increase to bid.     Anemia of chronic dz   Hgb 12 on admission with previous hg in April 2022 at 9.9  Continue home ferrous sulfate daily with breakfast.     COPD  Restarted previous home meds noted of muccinex bid, flonase, cetrezine,   Currently compensated.     HLD   Continue atorvastatin low dose at 20mg daily      Diabetes   Noted hx of but no medications on [facility] med list but previously was on glipizide and noted to have hypoglycemia episode during last admission and appears that has been discontinued since A1c 6.4.      CKD 3b  Current creat is 1.11.  UA negative for UTI and noted elevated specific Gravity and proteinuria > 300.     CAD  Serial troponins stable   Restarted statin and asa therapy.      Recent left below the knee amputation 3/21/22 after failed revascularization attempts   Currently is in skilled rehab at [facility] and rehab but patient says that he does not receive that much physical therapy and even noted that  was supprised that he is not more improved with physical therapy  PT debilitation appreciated. Physical therapy evaluated patient and recommended for further return to SNF for further rehab, however patient has refused rehab and would like to be discharged home. Arranged with her daughter for him to be discharged home with DME ordered. Patient can be discharged home with home health.     Dementia  Continue aricept 5mg oral bedtime      Glaucoma  Restart home eye drops      GI prophylaxis   Omeprazole 20mg daily     Per daughter/MPOA request, all medications sent his new prescriptions to [address] [facility]. DME also sent to home with assistance from case management. PENDING TEST RESULTS:   At the time of discharge the following test results are still pending: None    FOLLOW UP APPOINTMENTS:    Follow-up Information     Follow up With Specialties Details Why Contact Info    Bre Duran NP Family Nurse Practitioner  Per Cristy Astorga at [facility] office will contact patient with follow up appointment as soon as possible [address] [place] [address],[address]      Abdoulaye Gift  On 7/19/2022 @ 12:00 [facility] Cardiogist  [address] [place] [address].  [id number]  [phone]             DIET: Cardiac Diet    ACTIVITY: Activity as tolerated    DISCHARGE MEDICATIONS:  Current Discharge Medication List      START taking these medications    Details   furosemide (LASIX) 20 mg tablet Take 1 Tablet by mouth daily for 30 days. Qty: 30 Tablet, Refills: 0  Start date: 7/6/2022, End date: 8/5/2022      lisinopriL (PRINIVIL, ZESTRIL) 10 mg tablet Take 1 Tablet by mouth daily for 30 days. Qty: 30 Tablet, Refills: 0  Start date: 7/7/2022, End date: 8/6/2022      potassium chloride (K-DUR, KLOR-CON M20) 20 mEq tablet Take 1 Tablet by mouth two (2) times a day for 30 days. Qty: 60 Tablet, Refills: 0  Start date: 7/6/2022, End date: 8/5/2022         CONTINUE these medications which have CHANGED    Details   amLODIPine (NORVASC) 10 mg tablet Take 1 Tablet by mouth daily for 30 days. Qty: 30 Tablet, Refills: 0  Start date: 7/7/2022, End date: 8/6/2022      aspirin delayed-release 81 mg tablet Take 1 Tablet by mouth every morning for 30 days. Qty: 30 Tablet, Refills: 0  Start date: 7/7/2022, End date: 8/6/2022      atorvastatin (LIPITOR) 20 mg tablet Take 1 Tablet by mouth daily for 30 days. Qty: 30 Tablet, Refills: 0  Start date: 7/6/2022, End date: 8/5/2022      cetirizine (ZyrTEC) 10 mg tablet Take 1 Tablet by mouth daily for 30 days. Qty: 30 Tablet, Refills: 0  Start date: 7/6/2022, End date: 8/5/2022      donepeziL (Aricept) 5 mg tablet Take 1 Tablet by mouth nightly for 30 days. Qty: 30 Tablet, Refills: 0  Start date: 7/6/2022, End date: 8/5/2022      dorzolamide-timolol, PF, (COSOPT) 2-0.5 % ophthalmic solution Administer 1 Drop to both eyes two (2) times a day for 30 days. Qty: 60 Each, Refills: 0  Start date: 7/6/2022, End date: 8/5/2022      ferrous sulfate (FeroSuL) 325 mg (65 mg iron) tablet TAKE 1 TABLET BY MOUTH DAILY (BEFORE BREAKFAST). Qty: 90 Tablet, Refills: 0  Start date: 7/6/2022      fluticasone propionate (Flonase Allergy Relief) 50 mcg/actuation nasal spray 1 [place] by Both Nostrils route daily for 30 days.   Qty: 1 Each, Refills: 0  Start date: 7/6/2022, End date: 8/5/2022      guaiFENesin ER (MUCINEX) 600 mg ER tablet Take 1 Tablet by mouth two (2) times a day for 30 days.  Qty: 60 Tablet, Refills: 0  Start date: 7/6/2022, End date: 8/5/2022      hydroCHLOROthiazide (HYDRODIURIL) 25 mg tablet Take 1 Tablet by mouth daily for 30 days. Qty: 30 Tablet, Refills: 0  Start date: 7/6/2022, End date: 8/5/2022      lactulose (CHRONULAC) 10 gram/15 mL solution TAKE 30 ML TWICE A DAY AS NEEDED  Qty: 5400 mL, Refills: 0  Start date: 7/6/2022    Associated Diagnoses: Chronic constipation      latanoprost (XALATAN) 0.005 % ophthalmic solution Administer 1 Drop to both eyes nightly for 30 days. Qty: 1 Each, Refills: 0  Start date: 7/6/2022, End date: 8/5/2022      magnesium oxide (MAG-OX) 400 mg tablet Take 1 Tablet by mouth two (2) times a day for 30 days. Qty: 60 Tablet, Refills: 0  Start date: 7/6/2022, End date: 8/5/2022      metoprolol succinate (TOPROL-XL) 25 mg XL tablet Take 1 Tablet by mouth daily for 30 days. Qty: 30 Tablet, Refills: 0  Start date: 7/7/2022, End date: 8/6/2022      multivitamin (Multiple Vitamins) tablet Take 1 Tablet by mouth daily for 30 days. Qty: 30 Tablet, Refills: 0  Start date: 7/6/2022, End date: 8/5/2022      omeprazole (PRILOSEC) 20 mg capsule Take 1 Capsule by mouth every morning for 30 days. Qty: 30 Capsule, Refills: 0  Start date: 7/6/2022, End date: 8/5/2022      senna-docusate (Stool Softener-Laxative) 8.6-50 mg per tablet Take 1 Tablet by mouth every morning for 30 days. Qty: 30 Tablet, Refills: 0  Start date: 7/6/2022, End date: 8/5/2022         CONTINUE these medications which have NOT CHANGED    Details   Accu-Chek Ratna Plus test strp strip TEST BLOOD SUGAR TWICE A DAY  Qty: 200 Strip, Refills: 2      lancets (Accu-Chek Softclix Lancets) misc TEST BLOOD SUGAR TWICE A DAY  Qty: 200 Each, Refills: 1      Accu-Chek Ratna Control Soln soln USE AS DIRECTED.   Qty: 3 Bottle, Refills: 0      BD Single Use Swabs Regular padm TEST BLOOD SUGAR TWICE DAILY  Qty: 180 Pad, Refills: 1      acetaminophen (TYLENOL) 325 mg tablet Take 650 mg by mouth every six (6) hours as needed for Pain. Recent Days:  Recent Labs     07/07/22  0512 07/06/22  0508 07/05/22  0536   WBC 5.1 5.4 6.6   HGB 11.3* 11.4* 11.8*   HCT 35.3* 35.9* 37.2*    293 290     Recent Labs     07/07/22  0512 07/06/22  0508 07/05/22  0536    141 143   K 4.1 4.1 3.3*    107 106   CO2 27 28 27   GLU 98 104* 115*   BUN 24* 28* 26*   CREA 1.28 1.36* 1.37*   CA 8.9 8.9 9.0   MG  --  2.0 1.8     No results for input(s): PH, PCO2, PO2, HCO3, FIO2 in the last 72 hours. Recent Results (from the past 336 hour(s))   EKG, 12 LEAD, INITIAL    Collection Time: 07/04/22  2:40 AM   Result Value Ref Range    Ventricular Rate 50 BPM    Atrial Rate 0 BPM    P-R Interval 252 ms    QRS Duration 169 ms    Q-T Interval 513 ms    QTC Calculation (Bezet) 468 ms    Calculated P Axis 0 degrees    Calculated R Axis -48 degrees    Calculated T Axis 122 degrees    Diagnosis       Ventricular-paced complexes  No further analysis attempted due to paced rhythm    Confirmed by Ursula Elder ([id number]) on 7/5/2022 11:28:45 AM     CBC WITH AUTOMATED DIFF    Collection Time: 07/04/22  3:10 AM   Result Value Ref Range    WBC 7.8 4.4 - 11.3 K/uL    RBC 4.27 (L) 4.50 - 5.90 M/uL    HGB 12.0 (L) 13.5 - 17.5 g/dL    HCT 37.5 (L) 41 - 53 %    MCV 87.8 80 - 100 FL    MCH 28.1 (L) 31 - 34 PG    MCHC 32.1 31.0 - 36.0 g/dL    RDW 15.9 (H) 11.5 - 14.5 %    PLATELET 656 351 - 249 K/uL    MPV 9.3 6.5 - 11.5 FL    NRBC 0.1  WBC    ABSOLUTE NRBC 0.01 K/uL    NEUTROPHILS 68 42 - 75 %    LYMPHOCYTES 22 20.5 - 51.1 %    MONOCYTES 6 1.7 - 9.3 %    EOSINOPHILS 3 (H) 0.9 - 2.9 %    BASOPHILS 1 0.0 - 2.5 %    ABS. NEUTROPHILS 5.4 1.8 - 7.7 K/UL    ABS. LYMPHOCYTES 1.7 1.0 - 4.8 K/UL    ABS. MONOCYTES 0.4 0.2 - 2.4 K/UL    ABS. EOSINOPHILS 0.2 0.0 - 0.7 K/UL    ABS.  BASOPHILS 0.1 0.0 - 0.2 K/UL   METABOLIC PANEL, COMPREHENSIVE    Collection Time: 07/04/22  3:10 AM   Result Value Ref Range    Sodium 140 136 - 145 mmol/L Potassium 4.7 3.5 - 5.1 mmol/L    Chloride 108 97 - 108 mmol/L    CO2 22 21 - 32 mmol/L    Anion gap 10 5 - 15 mmol/L    Glucose 116 (H) 65 - 100 mg/dL    BUN 24 (H) 6 - 20 mg/dL    Creatinine 1.11 0.70 - 1.30 mg/dL    BUN/Creatinine ratio 22 (H) 12 - 20      GFR est AA >60 >60 ml/min/1.73m2    GFR est non-AA >60 >60 ml/min/1.73m2    Calcium 8.6 8.5 - 10.1 mg/dL    Bilirubin, total 0.5 0.2 - 1.0 mg/dL    AST (SGOT) 51 (H) 15 - 37 U/L    ALT (SGPT) 27 12 - 78 U/L    Alk.  phosphatase 103 45 - 117 U/L    Protein, total 7.5 6.4 - 8.2 g/dL    Albumin 2.8 (L) 3.5 - 5.0 g/dL    Globulin 4.7 (H) 2.0 - 4.0 g/dL    A-G Ratio 0.6 (L) 1.1 - 2.2     CULTURE, BLOOD    Collection Time: 07/04/22  3:10 AM    Specimen: Blood   Result Value Ref Range    Special Requests: No Special Requests      Culture result: No growth 3 days     LACTIC ACID    Collection Time: 07/04/22  3:10 AM   Result Value Ref Range    Lactic acid 1.2 0.4 - 2.0 mmol/L   TROPONIN-HIGH SENSITIVITY    Collection Time: 07/04/22  3:10 AM   Result Value Ref Range    Troponin-High Sensitivity 40 0 - 76 ng/L   COVID-19 WITH INFLUENZA A/B    Collection Time: 07/04/22  3:10 AM   Result Value Ref Range    SARS-CoV-2 by PCR Not Detected Not Detected      Influenza A by PCR Not Detected Not Detected      Influenza B by PCR Not Detected Not Detected     NT-PRO BNP    Collection Time: 07/04/22  3:10 AM   Result Value Ref Range    NT pro-BNP 3,607 (H) <450 pg/mL   HEMOGLOBIN A1C WITH EAG    Collection Time: 07/04/22  3:10 AM   Result Value Ref Range    Hemoglobin A1c 6.4 (H) 4.0 - 5.6 %    Est. average glucose 137 mg/dL   MAGNESIUM    Collection Time: 07/04/22  3:10 AM   Result Value Ref Range    Magnesium 1.9 1.6 - 2.4 mg/dL   TSH 3RD GENERATION    Collection Time: 07/04/22  3:10 AM   Result Value Ref Range    TSH 2.40 0.36 - 3.74 uIU/mL   CULTURE, BLOOD    Collection Time: 07/04/22  3:15 AM    Specimen: Blood   Result Value Ref Range    Special Requests: No Special Requests Culture result: No growth 3 days     URINALYSIS W/ RFLX MICROSCOPIC    Collection Time: 07/04/22  4:15 AM   Result Value Ref Range    Color Yellow/Straw      Appearance Clear Clear      Specific gravity >1.030 (H) 1.003 - 1.030    pH (UA) 5.5 5.0 - 8.0      Protein >300 (A) Negative mg/dL    Glucose Negative Negative mg/dL    Ketone Negative Negative mg/dL    Bilirubin Negative Negative      Blood Negative Negative      Urobilinogen 1.0 0.2 - 1.0 EU/dL    Nitrites Negative Negative      Leukocyte Esterase Negative Negative     URINE MICROSCOPIC    Collection Time: 07/04/22  4:15 AM   Result Value Ref Range    WBC 0-4 0 - 5 /hpf    RBC 0-5 0 - 3 /hpf    Bacteria Negative Negative /hpf    Amorphous Crystals 3+ (A) Negative   TROPONIN-HIGH SENSITIVITY    Collection Time: 07/04/22  6:00 AM   Result Value Ref Range    Troponin-High Sensitivity 42 0 - 76 ng/L   TROPONIN-HIGH SENSITIVITY    Collection Time: 07/04/22  9:50 AM   Result Value Ref Range    Troponin-High Sensitivity 36 0 - 76 ng/L   LIPID PANEL    Collection Time: 07/04/22  9:50 AM   Result Value Ref Range    LIPID PROFILE        Cholesterol, total 118 <200 mg/dL    Triglyceride 87 <150 mg/dL    HDL Cholesterol 42 mg/dL    LDL, calculated 58.6 0 - 100 mg/dL    VLDL, calculated 17.4 mg/dL    CHOL/HDL Ratio 2.8 0.0 - 5.0     GLUCOSE, POC    Collection Time: 07/04/22 11:56 AM   Result Value Ref Range    Glucose (POC) 96 65 - 117 mg/dL    Performed by Catherine Chua    GLUCOSE, POC    Collection Time: 07/04/22  4:05 PM   Result Value Ref Range    Glucose (POC) 118 (H) 65 - 117 mg/dL    Performed by Genita Dadds, POC    Collection Time: 07/04/22  8:38 PM   Result Value Ref Range    Glucose (POC) 115 65 - 117 mg/dL    Performed by Oh Campbell    MAGNESIUM    Collection Time: 07/05/22  5:36 AM   Result Value Ref Range    Magnesium 1.8 1.6 - 2.4 mg/dL   CBC WITH AUTOMATED DIFF    Collection Time: 07/05/22  5:36 AM   Result Value Ref Range    WBC 6.6 4.4 - 11.3 K/uL    RBC 4.27 (L) 4.50 - 5.90 M/uL    HGB 11.8 (L) 13.5 - 17.5 g/dL    HCT 37.2 (L) 41 - 53 %    MCV 87.3 80 - 100 FL    MCH 27.7 (L) 31 - 34 PG    MCHC 31.8 31.0 - 36.0 g/dL    RDW 16.4 (H) 11.5 - 14.5 %    PLATELET 493 414 - 447 K/uL    MPV 8.7 6.5 - 11.5 FL    NRBC 0.2  WBC    ABSOLUTE NRBC 0.01 K/uL    NEUTROPHILS 65 42 - 75 %    LYMPHOCYTES 23 20.5 - 51.1 %    MONOCYTES 7 1.7 - 9.3 %    EOSINOPHILS 4 (H) 0.9 - 2.9 %    BASOPHILS 1 0.0 - 2.5 %    ABS. NEUTROPHILS 4.4 1.8 - 7.7 K/UL    ABS. LYMPHOCYTES 1.5 1.0 - 4.8 K/UL    ABS. MONOCYTES 0.4 0.2 - 2.4 K/UL    ABS. EOSINOPHILS 0.3 0.0 - 0.7 K/UL    ABS.  BASOPHILS 0.0 0.0 - 0.2 K/UL   METABOLIC PANEL, BASIC    Collection Time: 07/05/22  5:36 AM   Result Value Ref Range    Sodium 143 136 - 145 mmol/L    Potassium 3.3 (L) 3.5 - 5.1 mmol/L    Chloride 106 97 - 108 mmol/L    CO2 27 21 - 32 mmol/L    Anion gap 10 5 - 15 mmol/L    Glucose 115 (H) 65 - 100 mg/dL    BUN 26 (H) 6 - 20 mg/dL    Creatinine 1.37 (H) 0.70 - 1.30 mg/dL    BUN/Creatinine ratio 19 12 - 20      GFR est AA >60 >60 ml/min/1.73m2    GFR est non-AA 50 (L) >60 ml/min/1.73m2    Calcium 9.0 8.5 - 10.1 mg/dL   GLUCOSE, POC    Collection Time: 07/05/22  7:00 AM   Result Value Ref Range    Glucose (POC) 120 (H) 65 - 117 mg/dL    Performed by [employer]    GLUCOSE, POC    Collection Time: 07/05/22 11:41 AM   Result Value Ref Range    Glucose (POC) 108 65 - 117 mg/dL    Performed by [employer]    ECHO ADULT COMPLETE    Collection Time: 07/05/22 12:09 PM   Result Value Ref Range    IVSd 1.9 (A) 0.6 - 1.0 cm    LVIDd 4.5 4.2 - 5.9 cm    LVIDs 2.6 cm    LVOT Diameter 2.1 cm    LVPWd 1.3 (A) 0.6 - 1.0 cm    Global Longitudinal Strain -7.9 %    EF BP 65 55 - 100 %    LV Ejection Fraction A2C 75 %    LV Ejection Fraction A4C 71 %    LV EDV A2C 83 mL    LV EDV A4C 117 mL    LV EDV BP 97 67 - 155 mL    LV ESV A2C 20 mL    LV ESV A4C 34 mL    LV ESV BP 34 22 - 58 mL    LVOT Peak Gradient 3 mmHg    LVOT Mean Gradient 2 mmHg    LVOT SV 63.7 ml    LVOT Peak Velocity 0.8 m/s    LVOT VTI 18.4 cm    RVIDd 3.7 cm    RV Free Wall Peak S' 15 cm/s    LA Diameter 4.6 cm    LA Volume A/L 101 mL    LA Volume 2C 81 (A) 18 - 58 mL    LA Volume 4C 102 (A) 18 - 58 mL    LA Volume BP 93 (A) 18 - 58 mL    AV Area by Peak Velocity 0.9 cm2    AV Area by VTI 1.0 cm2    AV Peak Gradient 41 mmHg    AV Mean Gradient 25 mmHg    AV Peak Velocity 3.2 m/s    AV Mean Velocity 2.4 m/s    AV VTI 66.4 cm    LV E' Septal Velocity 6 cm/s    TAPSE 2.0 1.7 cm    TR Peak Gradient 26 mmHg    TR Max Velocity 2.53 m/s    Aortic Root 4.0 cm    Fractional Shortening 2D 42 28 - 44 %    LV ESV Index BP 17 mL/m2    LV EDV Index BP 47 mL/m2    LV ESV Index A4C 17 mL/m2    LV EDV Index A4C 57 mL/m2    LV ESV Index A2C 10 mL/m2    LV EDV Index A2C 40 mL/m2    LVIDd Index 2.18 cm/m2    LVIDs Index 1.26 cm/m2    LV RWT Ratio 0.58     LV Mass 2D 304.6 (A) 88 - 224 g    LV Mass 2D Index 147.9 (A) 49 - 115 g/m2    LA Volume Index BP 45 (A) 16 - 34 ml/m2    LA Volume Index A/L 49 16 - 34 mL/m2    LVOT Stroke Volume Index 30.9 mL/m2    LVOT Area 3.5 cm2    LA Volume Index 2C 39 (A) 16 - 34 mL/m2    LA Volume Index 4C 50 (A) 16 - 34 mL/m2    LA Size Index 2.23 cm/m2    LA/AO Root Ratio 1.15     Ao Root Index 1.94 cm/m2    AV Velocity Ratio 0.25     LVOT:AV VTI Index 0.28     ROBERT/BSA VTI 0.5 cm2/m2    ROBERT/BSA Peak Velocity 0.4 cm2/m2    Est. RA Pressure 3 mmHg    RVSP 29 mmHg   GLUCOSE, POC    Collection Time: 07/05/22  3:41 PM   Result Value Ref Range    Glucose (POC) 138 (H) 65 - 117 mg/dL    Performed by Marty Oates    GLUCOSE, POC    Collection Time: 07/05/22  9:05 PM   Result Value Ref Range    Glucose (POC) 102 65 - 117 mg/dL    Performed by ABDIEL NAVA    CBC WITH AUTOMATED DIFF    Collection Time: 07/06/22  5:08 AM   Result Value Ref Range    WBC 5.4 4.4 - 11.3 K/uL    RBC 4.14 (L) 4.50 - 5.90 M/uL    HGB 11.4 (L) 13.5 - 17.5 g/dL    HCT 35.9 (L) 41 - 53 %    MCV 86.6 80 - 100 FL    MCH 27.5 (L) 31 - 34 PG    MCHC 31.7 31.0 - 36.0 g/dL    RDW 16.5 (H) 11.5 - 14.5 %    PLATELET 839 461 - 071 K/uL    MPV 8.9 6.5 - 11.5 FL    NRBC 0.2  WBC    ABSOLUTE NRBC 0.01 K/uL    NEUTROPHILS 65 42 - 75 %    LYMPHOCYTES 23 20.5 - 51.1 %    MONOCYTES 8 1.7 - 9.3 %    EOSINOPHILS 4 (H) 0.9 - 2.9 %    BASOPHILS 0 0.0 - 2.5 %    ABS. NEUTROPHILS 3.5 1.8 - 7.7 K/UL    ABS. LYMPHOCYTES 1.2 1.0 - 4.8 K/UL    ABS. MONOCYTES 0.4 0.2 - 2.4 K/UL    ABS. EOSINOPHILS 0.2 0.0 - 0.7 K/UL    ABS.  BASOPHILS 0.0 0.0 - 0.2 K/UL   METABOLIC PANEL, BASIC    Collection Time: 07/06/22  5:08 AM   Result Value Ref Range    Sodium 141 136 - 145 mmol/L    Potassium 4.1 3.5 - 5.1 mmol/L    Chloride 107 97 - 108 mmol/L    CO2 28 21 - 32 mmol/L    Anion gap 6 5 - 15 mmol/L    Glucose 104 (H) 65 - 100 mg/dL    BUN 28 (H) 6 - 20 mg/dL    Creatinine 1.36 (H) 0.70 - 1.30 mg/dL    BUN/Creatinine ratio 21 (H) 12 - 20      GFR est AA >60 >60 ml/min/1.73m2    GFR est non-AA 50 (L) >60 ml/min/1.73m2    Calcium 8.9 8.5 - 10.1 mg/dL   MAGNESIUM    Collection Time: 07/06/22  5:08 AM   Result Value Ref Range    Magnesium 2.0 1.6 - 2.4 mg/dL   D DIMER    Collection Time: 07/06/22  5:08 AM   Result Value Ref Range    D DIMER 1.79 (H) <0.50 ug/ml(FEU)   GLUCOSE, POC    Collection Time: 07/06/22  7:16 AM   Result Value Ref Range    Glucose (POC) 105 65 - 117 mg/dL    Performed by [employer], POC    Collection Time: 07/06/22 11:21 AM   Result Value Ref Range    Glucose (POC) 113 65 - 117 mg/dL    Performed by Ernestine Lua, POC    Collection Time: 07/06/22  4:08 PM   Result Value Ref Range    Glucose (POC) 104 65 - 117 mg/dL    Performed by Jefferson Andrews    GLUCOSE, POC    Collection Time: 07/06/22  8:56 PM   Result Value Ref Range    Glucose (POC) 118 (H) 65 - 117 mg/dL    Performed by Zeke JUNG WITH AUTOMATED DIFF    Collection Time: 07/07/22  5:12 AM   Result Value Ref Range WBC 5.1 4.4 - 11.3 K/uL    RBC 4.05 (L) 4.50 - 5.90 M/uL    HGB 11.3 (L) 13.5 - 17.5 g/dL    HCT 35.3 (L) 41 - 53 %    MCV 87.2 80 - 100 FL    MCH 27.9 (L) 31 - 34 PG    MCHC 32.0 31.0 - 36.0 g/dL    RDW 16.0 (H) 11.5 - 14.5 %    PLATELET 164 745 - 455 K/uL    MPV 8.9 6.5 - 11.5 FL    NRBC 0.1  WBC    ABSOLUTE NRBC 0.00 K/uL    NEUTROPHILS 58 42 - 75 %    LYMPHOCYTES 26 20.5 - 51.1 %    MONOCYTES 9 1.7 - 9.3 %    EOSINOPHILS 6 (H) 0.9 - 2.9 %    BASOPHILS 1 0.0 - 2.5 %    ABS. NEUTROPHILS 3.0 1.8 - 7.7 K/UL    ABS. LYMPHOCYTES 1.3 1.0 - 4.8 K/UL    ABS. MONOCYTES 0.5 0.2 - 2.4 K/UL    ABS. EOSINOPHILS 0.3 0.0 - 0.7 K/UL    ABS. BASOPHILS 0.0 0.0 - 0.2 K/UL   METABOLIC PANEL, BASIC    Collection Time: 07/07/22  5:12 AM   Result Value Ref Range    Sodium 140 136 - 145 mmol/L    Potassium 4.1 3.5 - 5.1 mmol/L    Chloride 106 97 - 108 mmol/L    CO2 27 21 - 32 mmol/L    Anion gap 7 5 - 15 mmol/L    Glucose 98 65 - 100 mg/dL    BUN 24 (H) 6 - 20 mg/dL    Creatinine 1.28 0.70 - 1.30 mg/dL    BUN/Creatinine ratio 19 12 - 20      GFR est AA >60 >60 ml/min/1.73m2    GFR est non-AA 54 (L) >60 ml/min/1.73m2    Calcium 8.9 8.5 - 10.1 mg/dL   GLUCOSE, POC    Collection Time: 07/07/22  7:28 AM   Result Value Ref Range    Glucose (POC) 104 65 - 117 mg/dL    Performed by Arnold Ree         NOTIFY YOUR PHYSICIAN FOR ANY OF THE FOLLOWING:   Fever over 101 degrees for 24 hours. Chest pain, shortness of breath, fever, chills, nausea, vomiting, diarrhea, change in mentation, falling, weakness, bleeding. Severe pain or pain not relieved by medications. Or, any other signs or symptoms that you may have questions about.     DISPOSITION:   x Home With:   OT  PT xx HH  RN       Long term SNF/Inpatient Rehab    Independent/assisted living    Hospice    Other:       PATIENT CONDITION AT DISCHARGE:     Functional status    Poor    x Deconditioned     Independent      Cognition     Lucid    x Forgetful     Dementia Catheters/lines (plus indication)    Dodson     PICC     PEG    x None      Code status   x  Full code     DNR      PHYSICAL EXAMINATION AT DISCHARGE:  General:          Alert, cooperative, no distress, appears stated age. Neck:               Supple, symmetrical  Lungs:             Clear to auscultation bilaterally. No Wheezing or Rhonchi. No rales. Chest wall:      No tenderness  No Accessory muscle use. Heart:              Regular  rhythm,  No  murmur   No edema  Abdomen:        Soft, non-tender. Not distended. Bowel sounds normal  Extremities:     No cyanosis. No clubbing,                            Skin turgor normal, Capillary refill normal  Skin:                Not pale. Not Jaundiced  No rashes   Psych:             Not anxious or agitated.   Neurologic:      Alert, moves all extremities, answers questions appropriately and responds to commands       CHRONIC MEDICAL DIAGNOSES:  Problem List as of 7/7/2022 Date Reviewed: 4/1/2022          Codes Class Noted - Resolved    * (Principal) CHF (congestive heart failure) ([facility] ICD-10-CM: I50.9  ICD-9-CM: 428.0  7/4/2022 - Present        Bradycardia ICD-10-CM: R00.1  ICD-9-CM: 427.89  7/4/2022 - Present        HTN (hypertension) ICD-10-CM: I10  ICD-9-CM: 401.9  7/4/2022 - Present        Sepsis ([facility] ICD-10-CM: A41.9  ICD-9-CM: 038.9, 995.91  4/1/2022 - Present        Cellulitis ICD-10-CM: L03.90  ICD-9-CM: 682.9  3/30/2022 - Present        Type 2 diabetes mellitus with chronic kidney disease ([facility] ICD-10-CM: E11.22  ICD-9-CM: 250.40, 585.9  2/17/2022 - Present        Elevated PSA ICD-10-CM: R97.20  ICD-9-CM: 790.93  12/20/2021 - Present    Overview Signed 12/20/2021 12:05 PM by Amanda Tamez NP     7/21/20=3.8  8/24/21=4.1  10/20/21=3.7             At high risk for falls ICD-10-CM: Z91.81  ICD-9-CM: V15.88  8/31/2021 - Present        Atherosclerotic PVD with ulceration ([facility] ICD-10-CM: T98.261, L98.499  ICD-9-CM: 440.23, 707.9  1/4/2021 - Present Hypertension ICD-10-CM: I10  ICD-9-CM: 401.9  7/21/2020 - Present        Mixed hyperlipidemia ICD-10-CM: E78.2  ICD-9-CM: 272.2  7/21/2020 - Present        Peripheral vascular disease ([facility] ICD-10-CM: I73.9  ICD-9-CM: 443.9  7/21/2020 - Present        Glaucoma ICD-10-CM: H40.9  ICD-9-CM: 365.9  7/21/2020 - Present        GERD (gastroesophageal reflux disease) ICD-10-CM: K21.9  ICD-9-CM: 530.81  7/21/2020 - Present        Chronic constipation ICD-10-CM: K59.09  ICD-9-CM: 564.00  7/21/2020 - Present        Pacemaker ICD-10-CM: Z95.0  ICD-9-CM: V45.01  7/21/2020 - Present        Cataract ICD-10-CM: H26.9  ICD-9-CM: 366.9  7/21/2020 - Present        Hypomagnesemia ICD-10-CM: E83.42  ICD-9-CM: 275.2  7/21/2020 - Present        History of CVA (cerebrovascular accident) ICD-10-CM: Z86.73  ICD-9-CM: V12.54  7/21/2020 - Present        Stage 3 chronic kidney disease ([facility] ICD-10-CM: N18.30  ICD-9-CM: 585.3  7/21/2020 - Present        Type 2 diabetes mellitus without complications ([place] [address].) EPQ-93-SP: E11.9  ICD-9-CM: 250.00  7/13/2020 - Present        Benign prostatic hyperplasia ICD-10-CM: N40.0  ICD-9-CM: 600.00  4/26/2017 - Present        RESOLVED: Preop examination ICD-10-CM: H02.047  ICD-9-CM: V72.84  7/21/2020 - 8/20/2020        RESOLVED: Rectal hemorrhage ICD-10-CM: K62.5  ICD-9-CM: 569.3  4/26/2017 - 8/31/2021              Greater than 35 minutes were spent with the patient on counseling and coordination of care    Signed:   Tomi Coyle MD  7/7/2022  8:21 AM

## 2022-07-07 NOTE — PROGRESS NOTES
Left message for daughter to call CM regarding DC plan and time. Will leave copy of UAI and IMM letter at nurses station for her.

## 2022-07-07 NOTE — PROGRESS NOTES
Care Management Interventions  PCP Verified by CM: Yes  Mode of Transport at Discharge: BLS  Transition of Care Consult (CM Consult): Home Health,Discharge Planning,DME/Supply Assistance,Other (persoanl care- [facility])  [facility] [place] - INPATIENT: No  Reason Outside [place]: Out of service area  Discharge Durable Medical Equipment: Yes (bedside commonde, transfer board and wheelchair)  Physical Therapy Consult: Yes  Support Systems: Spouse/Significant Other  Confirm Follow Up Transport: Family (has medicaid transportation benefits)  The Plan for Transition of Care is Related to the Following Treatment Goals : Patient will be discharged to SNF vs home with family and home health.   Discharge Location  Patient Expects to be Discharged to[de-identified] Home with home health

## 2022-07-07 NOTE — PROGRESS NOTES
Problem: Pressure Injury - Risk of  Goal: *Prevention of pressure injury  Description: Document Al Scale and appropriate interventions in the flowsheet. Outcome: Progressing Towards Goal  Note: Pressure Injury Interventions:       Moisture Interventions: Absorbent underpads    Activity Interventions: PT/OT evaluation    Mobility Interventions: Float heels    Nutrition Interventions: Document food/fluid/supplement intake    Friction and Shear Interventions: Minimize layers                Problem: Falls - Risk of  Goal: *Absence of Falls  Description: Document Ela Fall Risk and appropriate interventions in the flowsheet.   Outcome: Progressing Towards Goal  Note: Fall Risk Interventions:  Mobility Interventions: PT Consult for mobility concerns         Medication Interventions: Bed/chair exit alarm    Elimination Interventions: Call light in reach

## 2022-07-07 NOTE — PROGRESS NOTES
Patients case reviewed during interdisciplinary team meeting in [address] [place], [place] [address]Acute Care Unit. Rev.  [address], [address] no

## 2022-07-07 NOTE — PROGRESS NOTES
Nursing Student provided Nursing Care and administered medications under the supervision of this writer

## 2022-07-07 NOTE — ROUTINE PROCESS
HR has been maintaining 50-52 with a decrease occasionally but not sustaining, Verified with Dr. Cara Mckeon and Charu Pavon to administer all cardiac medications.

## 2022-07-07 NOTE — PROGRESS NOTES
Faxed clinical to [facility] for home health and [address] [place] [address] for personal care.  Per syeda at [facility], they will deliver the equipment after 3pm

## 2022-07-08 ENCOUNTER — PATIENT OUTREACH (OUTPATIENT)
Dept: CASE MANAGEMENT | Age: 87
End: 2022-07-08

## 2022-07-08 NOTE — PROGRESS NOTES
Care Transitions Initial Call    Call within 2 business days of discharge: Yes     Patient: Mary King Patient : 1935 MRN: [id number]    Last Discharge [address]       Complaint Diagnosis Description Type Department Provider    22 Chest Pain Congestive heart failure, unspecified HF chronicity, unspecified heart failure type ([facility] . .. ED to Hosp-Admission (Discharged) (ADMIT) [id number] Gwendolyn Lopes MD; Florencia Landa. .. Was this an external facility discharge? No Discharge Facility: na    Challenges to be reviewed by the provider   Additional needs identified to be addressed with provider: yes  medications not covered by insurance, pt's daughter is looking into this         Method of communication with provider : none    Advance Care Planning:   Does patient have an Advance Directive: not on file. Inpatient Readmission Risk score: Unplanned Readmit Risk Score: 17.5 ( )    Was this a readmission? no   Patient stated reason for the admission: SOB, CHF per pt's daughter    Patients top risk factors for readmission: medical condition-complex medical condition   Interventions to address risk factors: Obtained and reviewed discharge summary and/or continuity of care documents    Care Transition Nurse (CTN) contacted the family by telephone to perform post hospital discharge assessment. Verified name and  with family as identifiers. Provided introduction to self, and explanation of the CTN role. Spoke with pt's daughter Elio Baer, on [place] 2021    CTN reviewed discharge instructions, medical action plan and red flags with family who verbalized understanding. Were discharge instructions available to patient? yes. Reviewed appropriate site of care based on symptoms and resources available to patient including: PCP, Specialist and When to call 911. Family given an opportunity to ask questions and does not have any further questions or concerns at this time.  The family agrees to contact the PCP office for questions related to their healthcare. Medication reconciliation was performed with family, who verbalizes understanding of administration of home medications. Advised obtaining a 90-day supply of all daily and as-needed medications. Referral to Pharm D needed: no     Home Health/Outpatient orders at discharge: [facility]: [facility]  Date of initial visit: 7/9/2022    Was patient discharged with a pulse oximeter? no    Discussed follow-up appointments. If no appointment was previously scheduled, appointment scheduling offered: na. Is follow up appointment scheduled within 7 days of discharge? no.   [address] follow up appointment(s):   Future Appointments   Date Time Provider Josué Magana   7/15/2022  4:15 PM Russell Etienne NP SPCPE BS [facility]     Non-BS follow up appointment(s): cardiology 7/19/2022    Plan for follow-up call in 5-7 days based on severity of symptoms and risk factors. Plan for next call: will review s/s of increased edema, will follow up on lisinopril     CTN provided contact information for future needs. Goals Addressed                 This Visit's Progress     Prevent complications post hospitalization. 7/8/2022  Pt's daughter returned my call and is on pt's PHI 12/27/2021  Pt's daughter reports that she picked up medications from [facility] and that medications are currently out of pocket. Pt's daughter confirmed follow up appts with PCP on 7/15/2022 and with cardiology on 7/19/2022. Pt's daughter is questioning lisinopril due to pt having a cough while on that medication as well as expressing concern about kidney function and dual diuretics. Pt's daughter is a nurse. Pt's daughter reports that pt cannot be weighed so we discussed alternative ways of checking for fluid in pt's right leg. Pt's daughter states understanding of low Na diet and reports that pt has a hx of HTN and DM.   Pt's daughter reports that [facility] will see pt on 7/9/2022 and begin PT.  Pt's daughter reports that a cousin can help her with parents but that she will need more help. Pt's daughter reports that pt has new wheezing and that he had been at a SNF prior to hospital visit. Pt's daughter is agreeable to a call in a week to review medications, follow up appt with PCP and to review any signs of edema.   AFP

## 2022-07-08 NOTE — PROGRESS NOTES
Called pt's daughter Nesha Blevins (home and mobile telephone) and LVM on mobile telephone stating my name, CTN with [facility], date and time of call, and return telephone number. Called pt and spoke with pt's wife and with pt. Pt could not hear me. Pt's wife reports that daughter has gone to [facility] to  medications. I provided pt's wife with my contact information. Pt's wife reports that their daughter takes care of these matters. Pt's wife will have daughter contact me once she is back to their home.

## 2022-07-08 NOTE — PROGRESS NOTES
Pt's daughter returned my call, no VM left. Returned her call and she reports that she is currently at [facility] and will call me once she is back with pt.

## 2022-07-09 LAB
BACTERIA SPEC CULT: NORMAL
BACTERIA SPEC CULT: NORMAL
SPECIAL REQUESTS,SREQ: NORMAL
SPECIAL REQUESTS,SREQ: NORMAL

## 2022-07-12 ENCOUNTER — TELEPHONE (OUTPATIENT)
Dept: PRIMARY CARE CLINIC | Age: 87
End: 2022-07-12

## 2022-07-14 ENCOUNTER — PATIENT OUTREACH (OUTPATIENT)
Dept: CASE MANAGEMENT | Age: 87
End: 2022-07-14

## 2022-07-14 NOTE — PROGRESS NOTES
Care Transitions Follow Up Call    Challenges to be reviewed by the provider   Additional needs identified to be addressed with provider: yes  home health care- pt's daughter reports that he needs orders for PT, also daughter is requesting personal care aid assistance           Method of communication with provider : none    Care Transition Nurse (CTN) contacted the family by telephone to follow up after admission on 2022. Verified name and  with family as identifiers. Spoke with pt's daughter, on [address] from 2021    Addressed changes since last contact: none  Follow up appointment completed? no.   Was follow up appointment scheduled within 7 days of discharge? no.     Advance Care Planning:   Does patient have an Advance Directive:  did not discuss on this call    Patients top risk factors for readmission: medical condition-complex medical hx   Interventions to address risk factors: reviewed s/s with pt's daughter, verified PCP appt and [facility] follow up appointment(s):   Future Appointments   Date Time Provider Josué Magana   7/15/2022  4:15 PM Kalyani Chen NP SPCPE BS [facility]     Non-BS follow up appointment(s): cardiology 2022    CTN provided contact information for future needs. Plan for follow-up call in 5-7 days based on severity of symptoms and risk factors. Plan for next call: review PCP follow up, review medications, review HH and personal care aide situation     Goals Addressed                 This Visit's Progress     Prevent complications post hospitalization. 2022  Pt's daughter returned my call and is on pt's PHI 2021  Pt's daughter reports that she picked up medications from [facility] and that medications are currently out of pocket. Pt's daughter confirmed follow up appts with PCP on 7/15/2022 and with cardiology on 2022.   Pt's daughter is questioning lisinopril due to pt having a cough while on that medication as well as expressing concern about kidney function and dual diuretics. Pt's daughter is a nurse. Pt's daughter reports that pt cannot be weighed so we discussed alternative ways of checking for fluid in pt's right leg. Pt's daughter states understanding of low Na diet and reports that pt has a hx of HTN and DM. Pt's daughter reports that [place] will see pt on 7/9/2022 and begin PT.  Pt's daughter reports that a cousin can help her with parents but that she will need more help. Pt's daughter reports that pt has new wheezing and that he had been at a SNF prior to hospital visit. Pt's daughter is agreeable to a call in a week to review medications, follow up appt with PCP and to review any signs of edema. AFP    7/14/2022  Pt's daughter confirmed PCP appt on 7/15/2022 and she will attend with pt. Pt to go to PCP appt in wheelchair transport. Pt's daughter reports that pt continues with wheezing and cough and she will follow up with PCP for inhaler. Pt's daughter reports no personal care aide and she needs assistance. Pt's daughter reports that order for [place] was not sent from the hospital to agency. Advised pt's daughter to follow up with PCP to see if she will write order. Will call in one week to review PCP follow up appt, check on pt's symptoms, and review medications.   AFP

## 2022-07-15 ENCOUNTER — TELEPHONE (OUTPATIENT)
Dept: PRIMARY CARE CLINIC | Age: 87
End: 2022-07-15

## 2022-07-15 ENCOUNTER — OFFICE VISIT (OUTPATIENT)
Dept: PRIMARY CARE CLINIC | Age: 87
End: 2022-07-15
Payer: MEDICARE

## 2022-07-15 VITALS
TEMPERATURE: 97 F | SYSTOLIC BLOOD PRESSURE: 115 MMHG | OXYGEN SATURATION: 97 % | HEART RATE: 50 BPM | BODY MASS INDEX: 24.07 KG/M2 | HEIGHT: 72 IN | DIASTOLIC BLOOD PRESSURE: 56 MMHG | RESPIRATION RATE: 18 BRPM

## 2022-07-15 DIAGNOSIS — D64.9 ANEMIA, UNSPECIFIED TYPE: ICD-10-CM

## 2022-07-15 DIAGNOSIS — M19.90 ARTHRITIS: ICD-10-CM

## 2022-07-15 DIAGNOSIS — Z09 HOSPITAL DISCHARGE FOLLOW-UP: ICD-10-CM

## 2022-07-15 DIAGNOSIS — Z86.73 HISTORY OF CVA (CEREBROVASCULAR ACCIDENT): ICD-10-CM

## 2022-07-15 DIAGNOSIS — I10 PRIMARY HYPERTENSION: Primary | ICD-10-CM

## 2022-07-15 DIAGNOSIS — E11.22 TYPE 2 DIABETES MELLITUS WITH STAGE 3 CHRONIC KIDNEY DISEASE, WITHOUT LONG-TERM CURRENT USE OF INSULIN, UNSPECIFIED WHETHER STAGE 3A OR 3B CKD (HCC): ICD-10-CM

## 2022-07-15 DIAGNOSIS — K21.9 GASTROESOPHAGEAL REFLUX DISEASE WITHOUT ESOPHAGITIS: ICD-10-CM

## 2022-07-15 DIAGNOSIS — N40.0 BENIGN PROSTATIC HYPERPLASIA WITHOUT LOWER URINARY TRACT SYMPTOMS: ICD-10-CM

## 2022-07-15 DIAGNOSIS — I50.9 ACUTE ON CHRONIC HEART FAILURE, UNSPECIFIED HEART FAILURE TYPE (HCC): ICD-10-CM

## 2022-07-15 DIAGNOSIS — I35.0 SEVERE AORTIC STENOSIS: ICD-10-CM

## 2022-07-15 DIAGNOSIS — I25.10 CORONARY ARTERY DISEASE DUE TO LIPID RICH PLAQUE: ICD-10-CM

## 2022-07-15 DIAGNOSIS — N18.30 TYPE 2 DIABETES MELLITUS WITH STAGE 3 CHRONIC KIDNEY DISEASE, WITHOUT LONG-TERM CURRENT USE OF INSULIN, UNSPECIFIED WHETHER STAGE 3A OR 3B CKD (HCC): ICD-10-CM

## 2022-07-15 DIAGNOSIS — E83.42 HYPOMAGNESEMIA: ICD-10-CM

## 2022-07-15 DIAGNOSIS — N18.31 STAGE 3A CHRONIC KIDNEY DISEASE (HCC): ICD-10-CM

## 2022-07-15 DIAGNOSIS — I48.0 PAROXYSMAL ATRIAL FIBRILLATION (HCC): ICD-10-CM

## 2022-07-15 DIAGNOSIS — H40.9 GLAUCOMA OF BOTH EYES, UNSPECIFIED GLAUCOMA TYPE: ICD-10-CM

## 2022-07-15 DIAGNOSIS — S88.912A AMPUTATION OF LEFT LOWER EXTREMITY, INITIAL ENCOUNTER (HCC): ICD-10-CM

## 2022-07-15 DIAGNOSIS — I25.83 CORONARY ARTERY DISEASE DUE TO LIPID RICH PLAQUE: ICD-10-CM

## 2022-07-15 DIAGNOSIS — I73.9 PERIPHERAL VASCULAR DISEASE (HCC): ICD-10-CM

## 2022-07-15 DIAGNOSIS — J44.9 CHRONIC OBSTRUCTIVE PULMONARY DISEASE, UNSPECIFIED COPD TYPE (HCC): ICD-10-CM

## 2022-07-15 DIAGNOSIS — R41.3 MEMORY CHANGES: ICD-10-CM

## 2022-07-15 DIAGNOSIS — E78.2 MIXED HYPERLIPIDEMIA: ICD-10-CM

## 2022-07-15 PROCEDURE — G8427 DOCREV CUR MEDS BY ELIG CLIN: HCPCS | Performed by: NURSE PRACTITIONER

## 2022-07-15 PROCEDURE — 99495 TRANSJ CARE MGMT MOD F2F 14D: CPT | Performed by: NURSE PRACTITIONER

## 2022-07-15 PROCEDURE — 1111F DSCHRG MED/CURRENT MED MERGE: CPT | Performed by: NURSE PRACTITIONER

## 2022-07-15 RX ORDER — ALBUTEROL SULFATE 90 UG/1
1 AEROSOL, METERED RESPIRATORY (INHALATION)
Qty: 18 G | Refills: 3 | Status: SHIPPED | OUTPATIENT
Start: 2022-07-15

## 2022-07-15 RX ORDER — INSULIN PUMP SYRINGE, 3 ML
EACH MISCELLANEOUS
Qty: 1 KIT | Refills: 0 | Status: SHIPPED | OUTPATIENT
Start: 2022-07-15

## 2022-07-15 RX ORDER — LOSARTAN POTASSIUM 25 MG/1
25 TABLET ORAL DAILY
Qty: 90 TABLET | Refills: 1 | Status: SHIPPED | OUTPATIENT
Start: 2022-07-15

## 2022-07-15 RX ORDER — LANCETS
EACH MISCELLANEOUS
Qty: 100 EACH | Refills: 11 | Status: SHIPPED | OUTPATIENT
Start: 2022-07-15

## 2022-07-15 NOTE — PROGRESS NOTES
Chief Complaint   Patient presents with    Hospital Follow Up     States was in hospital from 07/04/2022 thru 07/07/2022 for CHF [facility]). Cough     Per daughter, patient has some coughing at night. She states he was put back on Lisinopril which years ago he was on this and it caused him to cough. Wheezing     Usually the wheezing starts in the evening. Request For New Medication     Hospitalist was to write for rescue Inhaler but did not. 1. Have you been to the ER, urgent care clinic since your last visit? Hospitalized since your last visit? Yes When: [facility] 07/04/2022 thru 07/07/2022 for CHF. Where:      2. Have you seen or consulted any other health care providers outside of the [address] [place] [address] since your last visit? Include any pap smears or colon screening. No    3. A1C was 6.4 on 07/04/2022.

## 2022-07-18 ENCOUNTER — TELEPHONE (OUTPATIENT)
Dept: PRIMARY CARE CLINIC | Age: 87
End: 2022-07-18

## 2022-07-18 RX ORDER — DOXYCYCLINE 100 MG/1
100 TABLET ORAL 2 TIMES DAILY
Qty: 20 TABLET | Refills: 0 | Status: SHIPPED | OUTPATIENT
Start: 2022-07-18 | End: 2022-07-28

## 2022-07-18 NOTE — TELEPHONE ENCOUNTER
I have already given her an order for the prosthesis. I will send an antibiotic but at this time, it will be end of day before will be able to finish notes.

## 2022-07-18 NOTE — TELEPHONE ENCOUNTER
1.  Patient's daughter  left message wanting to know if you were going to send an antibiotic to pharmacy. 2.  Were you going to give her referral to [address] therapy for him to be evaluated for prosthesis?

## 2022-07-18 NOTE — TELEPHONE ENCOUNTER
Just VALENTINAI need notes when you get a chance so we can try to get prosthetic approved, than [employer]

## 2022-07-20 ENCOUNTER — PATIENT OUTREACH (OUTPATIENT)
Dept: CASE MANAGEMENT | Age: 87
End: 2022-07-20

## 2022-07-20 NOTE — PROGRESS NOTES
Goals        Prevent complications post hospitalization. 7/8/2022  Pt's daughter returned my call and is on pt's PHI 12/27/2021  Pt's daughter reports that she picked up medications from [facility] and that medications are currently out of pocket. Pt's daughter confirmed follow up appts with PCP on 7/15/2022 and with cardiology on 7/19/2022. Pt's daughter is questioning lisinopril due to pt having a cough while on that medication as well as expressing concern about kidney function and dual diuretics. Pt's daughter is a nurse. Pt's daughter reports that pt cannot be weighed so we discussed alternative ways of checking for fluid in pt's right leg. Pt's daughter states understanding of low Na diet and reports that pt has a hx of HTN and DM. Pt's daughter reports that [facility] will see pt on 7/9/2022 and begin PT.  Pt's daughter reports that a cousin can help her with parents but that she will need more help. Pt's daughter reports that pt has new wheezing and that he had been at a SNF prior to hospital visit. Pt's daughter is agreeable to a call in a week to review medications, follow up appt with PCP and to review any signs of edema. AFP    7/14/2022  Pt's daughter confirmed PCP appt on 7/15/2022 and she will attend with pt. Pt to go to PCP appt in wheelchair transport. Pt's daughter reports that pt continues with wheezing and cough and she will follow up with PCP for inhaler. Pt's daughter reports no personal care aide and she needs assistance. Pt's daughter reports that order for [facility] was not sent from the hospital to agency. Advised pt's daughter to follow up with PCP to see if she will write order. Will call in one week to review PCP follow up appt, check on pt's symptoms, and review medications. AFP    7/20/2022  Pt's daughter confirmed that pt followed up with PCP and with cardiologist (7/19/2022). Pt's daughter reports that pt is on abx and has an appt for prosthesis next week.   Pt reports that she continues to work on help with caregiving. Pt's daughter reports that pt is doing well in general.  Pt's daughter states that wheelchair transport has been a challenge. Pt's daughter offered access to  for assistance and pt is working with agency [facility] for care aide. Will call in 7 days to follow up on s/s of HF and follow up appts.   AFP

## 2022-07-21 ENCOUNTER — TRANSCRIBE ORDER (OUTPATIENT)
Dept: LAB | Age: 87
End: 2022-07-21

## 2022-07-21 ENCOUNTER — HOSPITAL ENCOUNTER (OUTPATIENT)
Dept: LAB | Age: 87
Discharge: HOME | End: 2022-07-21
Payer: MEDICARE

## 2022-07-21 DIAGNOSIS — I12.9 BENIGN HYPERTENSION WITH CHRONIC KIDNEY DISEASE, STAGE III (HCC): ICD-10-CM

## 2022-07-21 DIAGNOSIS — E11.21 DIABETIC GLOMERULOPATHY (HCC): ICD-10-CM

## 2022-07-21 DIAGNOSIS — N18.6 ANEMIA IN END-STAGE RENAL DISEASE (HCC): ICD-10-CM

## 2022-07-21 DIAGNOSIS — I13.0 HYPERTENSIVE HEART AND RENAL DISEASE WITH CONGESTIVE HEART FAILURE (HCC): ICD-10-CM

## 2022-07-21 DIAGNOSIS — I13.0 HYPERTENSIVE HEART AND RENAL DISEASE WITH CONGESTIVE HEART FAILURE (HCC): Primary | ICD-10-CM

## 2022-07-21 DIAGNOSIS — N28.9 ABNORMAL RENAL FUNCTION: ICD-10-CM

## 2022-07-21 DIAGNOSIS — N18.30 BENIGN HYPERTENSION WITH CHRONIC KIDNEY DISEASE, STAGE III (HCC): ICD-10-CM

## 2022-07-21 DIAGNOSIS — D63.1 ANEMIA IN END-STAGE RENAL DISEASE (HCC): ICD-10-CM

## 2022-07-21 LAB
ALBUMIN SERPL-MCNC: 3 G/DL (ref 3.5–5)
ALBUMIN/GLOB SERPL: 0.7 {RATIO} (ref 1.1–2.2)
ALP SERPL-CCNC: 98 U/L (ref 45–117)
ALT SERPL-CCNC: 77 U/L (ref 12–78)
ANION GAP SERPL CALC-SCNC: 8 MMOL/L (ref 5–15)
AST SERPL W P-5'-P-CCNC: 52 U/L (ref 15–37)
BILIRUB SERPL-MCNC: 0.3 MG/DL (ref 0.2–1)
BUN SERPL-MCNC: 41 MG/DL (ref 6–20)
BUN/CREAT SERPL: 24 (ref 12–20)
CA-I BLD-MCNC: 9.2 MG/DL (ref 8.5–10.1)
CHLORIDE SERPL-SCNC: 104 MMOL/L (ref 97–108)
CO2 SERPL-SCNC: 28 MMOL/L (ref 21–32)
CREAT SERPL-MCNC: 1.72 MG/DL (ref 0.7–1.3)
ERYTHROCYTE [DISTWIDTH] IN BLOOD BY AUTOMATED COUNT: 15.9 % (ref 11.5–14.5)
GLOBULIN SER CALC-MCNC: 4.1 G/DL (ref 2–4)
GLUCOSE SERPL-MCNC: 122 MG/DL (ref 65–100)
HCT VFR BLD AUTO: 39.8 % (ref 41–53)
HGB BLD-MCNC: 12.8 G/DL (ref 13.5–17.5)
MCH RBC QN AUTO: 28 PG (ref 31–34)
MCHC RBC AUTO-ENTMCNC: 32.2 G/DL (ref 31–36)
MCV RBC AUTO: 87 FL (ref 80–100)
NRBC # BLD: 0.01 K/UL
NRBC BLD-RTO: 0.2 PER 100 WBC
PLATELET # BLD AUTO: 280 K/UL (ref 150–400)
PMV BLD AUTO: 9.3 FL (ref 6.5–11.5)
POTASSIUM SERPL-SCNC: 4.4 MMOL/L (ref 3.5–5.1)
PROT SERPL-MCNC: 7.1 G/DL (ref 6.4–8.2)
RBC # BLD AUTO: 4.57 M/UL (ref 4.5–5.9)
SODIUM SERPL-SCNC: 140 MMOL/L (ref 136–145)
WBC # BLD AUTO: 4.8 K/UL (ref 4.4–11.3)

## 2022-07-21 PROCEDURE — 80053 COMPREHEN METABOLIC PANEL: CPT

## 2022-07-21 PROCEDURE — 85027 COMPLETE CBC AUTOMATED: CPT

## 2022-07-22 RX ORDER — ATORVASTATIN CALCIUM 20 MG/1
20 TABLET, FILM COATED ORAL DAILY
Qty: 30 TABLET | Refills: 0 | Status: SHIPPED | OUTPATIENT
Start: 2022-07-22 | End: 2022-07-28 | Stop reason: SDUPTHER

## 2022-07-22 RX ORDER — OMEPRAZOLE 20 MG/1
20 CAPSULE, DELAYED RELEASE ORAL
Qty: 30 CAPSULE | Refills: 0 | Status: SHIPPED | OUTPATIENT
Start: 2022-07-22 | End: 2022-07-28 | Stop reason: SDUPTHER

## 2022-07-22 RX ORDER — HYDROCHLOROTHIAZIDE 25 MG/1
25 TABLET ORAL DAILY
Qty: 30 TABLET | Refills: 0 | Status: SHIPPED | OUTPATIENT
Start: 2022-07-22 | End: 2022-07-28 | Stop reason: SDUPTHER

## 2022-07-22 RX ORDER — DORZOLAMIDE HYDROCHLORIDE AND TIMOLOL MALEATE PRESERVATIVE FREE 20; 5 MG/ML; MG/ML
1 SOLUTION/ DROPS OPHTHALMIC 2 TIMES DAILY
Qty: 60 EACH | Refills: 0 | Status: SHIPPED | OUTPATIENT
Start: 2022-07-22 | End: 2022-07-28 | Stop reason: SDUPTHER

## 2022-07-22 RX ORDER — FUROSEMIDE 20 MG/1
20 TABLET ORAL DAILY
Qty: 30 TABLET | Refills: 0 | Status: SHIPPED | OUTPATIENT
Start: 2022-07-22 | End: 2022-07-28 | Stop reason: SDUPTHER

## 2022-07-22 RX ORDER — LATANOPROST 50 UG/ML
1 SOLUTION/ DROPS OPHTHALMIC
Qty: 1 EACH | Refills: 0 | Status: SHIPPED | OUTPATIENT
Start: 2022-07-22 | End: 2022-07-28 | Stop reason: SDUPTHER

## 2022-07-22 RX ORDER — POTASSIUM CHLORIDE 20 MEQ/1
20 TABLET, EXTENDED RELEASE ORAL 2 TIMES DAILY
Qty: 60 TABLET | Refills: 0 | Status: SHIPPED | OUTPATIENT
Start: 2022-07-22 | End: 2022-07-28 | Stop reason: SDUPTHER

## 2022-07-22 RX ORDER — CETIRIZINE HCL 10 MG
10 TABLET ORAL DAILY
Qty: 30 TABLET | Refills: 0 | Status: SHIPPED | OUTPATIENT
Start: 2022-07-22 | End: 2022-07-28 | Stop reason: SDUPTHER

## 2022-07-22 RX ORDER — AMLODIPINE BESYLATE 10 MG/1
10 TABLET ORAL DAILY
Qty: 30 TABLET | Refills: 0 | Status: SHIPPED | OUTPATIENT
Start: 2022-07-22 | End: 2022-07-28 | Stop reason: SDUPTHER

## 2022-07-22 RX ORDER — METOPROLOL SUCCINATE 25 MG/1
25 TABLET, EXTENDED RELEASE ORAL DAILY
Qty: 30 TABLET | Refills: 0 | Status: SHIPPED | OUTPATIENT
Start: 2022-07-22 | End: 2022-07-28 | Stop reason: SDUPTHER

## 2022-07-22 RX ORDER — DONEPEZIL HYDROCHLORIDE 5 MG/1
5 TABLET, FILM COATED ORAL
Qty: 30 TABLET | Refills: 0 | Status: SHIPPED | OUTPATIENT
Start: 2022-07-22 | End: 2022-07-28 | Stop reason: SDUPTHER

## 2022-07-22 NOTE — TELEPHONE ENCOUNTER
Requested Prescriptions     Pending Prescriptions Disp Refills    cetirizine (ZyrTEC) 10 mg tablet 30 Tablet 0     Sig: Take 1 Tablet by mouth in the morning for 30 days. donepeziL (Aricept) 5 mg tablet 30 Tablet 0     Sig: Take 1 Tablet by mouth nightly for 30 days. dorzolamide-timolol, PF, (COSOPT) 2-0.5 % ophthalmic solution 60 Each 0     Sig: Administer 1 Drop to both eyes two (2) times a day for 30 days. latanoprost (XALATAN) 0.005 % ophthalmic solution 1 Each 0     Sig: Administer 1 Drop to both eyes nightly for 30 days. furosemide (LASIX) 20 mg tablet 30 Tablet 0     Sig: Take 1 Tablet by mouth in the morning for 30 days. hydroCHLOROthiazide (HYDRODIURIL) 25 mg tablet 30 Tablet 0     Sig: Take 1 Tablet by mouth in the morning for 30 days. metoprolol succinate (TOPROL-XL) 25 mg XL tablet 30 Tablet 0     Sig: Take 1 Tablet by mouth in the morning for 30 days. omeprazole (PRILOSEC) 20 mg capsule 30 Capsule 0     Sig: Take 1 Capsule by mouth every morning for 30 days. potassium chloride (K-DUR, KLOR-CON M20) 20 mEq tablet 60 Tablet 0     Sig: Take 1 Tablet by mouth two (2) times a day for 30 days. amLODIPine (NORVASC) 10 mg tablet 30 Tablet 0     Sig: Take 1 Tablet by mouth in the morning for 30 days. atorvastatin (LIPITOR) 20 mg tablet 30 Tablet 0     Sig: Take 1 Tablet by mouth in the morning for 30 days.

## 2022-07-27 DIAGNOSIS — N28.9 ABNORMAL RENAL FUNCTION: ICD-10-CM

## 2022-07-27 NOTE — PROGRESS NOTES
Please notify patient/family that CR has gone up significantly since he had labs 2 weeks ago. Going to reduce his lasix to 20mg every other day and repeat the kidney functions in 2 weeks.  If develops  increased sob or swelling, notify provider immediately

## 2022-07-28 ENCOUNTER — TELEPHONE (OUTPATIENT)
Dept: PRIMARY CARE CLINIC | Age: 87
End: 2022-07-28

## 2022-07-28 RX ORDER — HYDROCHLOROTHIAZIDE 25 MG/1
25 TABLET ORAL DAILY
Qty: 90 TABLET | Refills: 1 | Status: SHIPPED | OUTPATIENT
Start: 2022-07-28

## 2022-07-28 RX ORDER — DONEPEZIL HYDROCHLORIDE 5 MG/1
5 TABLET, FILM COATED ORAL
Qty: 90 TABLET | Refills: 1 | Status: SHIPPED | OUTPATIENT
Start: 2022-07-28

## 2022-07-28 RX ORDER — DORZOLAMIDE HYDROCHLORIDE AND TIMOLOL MALEATE PRESERVATIVE FREE 20; 5 MG/ML; MG/ML
1 SOLUTION/ DROPS OPHTHALMIC 2 TIMES DAILY
Qty: 1 EACH | Refills: 0 | Status: SHIPPED | OUTPATIENT
Start: 2022-07-28 | End: 2022-08-26 | Stop reason: SDUPTHER

## 2022-07-28 RX ORDER — LATANOPROST 50 UG/ML
1 SOLUTION/ DROPS OPHTHALMIC
Qty: 1 EACH | Refills: 0 | Status: SHIPPED | OUTPATIENT
Start: 2022-07-28

## 2022-07-28 RX ORDER — CETIRIZINE HCL 10 MG
10 TABLET ORAL DAILY
Qty: 90 TABLET | Refills: 1 | Status: SHIPPED | OUTPATIENT
Start: 2022-07-28

## 2022-07-28 RX ORDER — POTASSIUM CHLORIDE 20 MEQ/1
20 TABLET, EXTENDED RELEASE ORAL 2 TIMES DAILY
Qty: 180 TABLET | Refills: 1 | Status: SHIPPED | OUTPATIENT
Start: 2022-07-28

## 2022-07-28 RX ORDER — AMLODIPINE BESYLATE 10 MG/1
10 TABLET ORAL DAILY
Qty: 90 TABLET | Refills: 1 | Status: SHIPPED | OUTPATIENT
Start: 2022-07-28

## 2022-07-28 RX ORDER — FUROSEMIDE 20 MG/1
20 TABLET ORAL DAILY
Qty: 90 TABLET | Refills: 1 | Status: SHIPPED | OUTPATIENT
Start: 2022-07-28 | End: 2022-10-25 | Stop reason: SDUPTHER

## 2022-07-28 RX ORDER — ATORVASTATIN CALCIUM 20 MG/1
20 TABLET, FILM COATED ORAL DAILY
Qty: 90 TABLET | Refills: 1 | Status: SHIPPED | OUTPATIENT
Start: 2022-07-28

## 2022-07-28 RX ORDER — METOPROLOL SUCCINATE 25 MG/1
25 TABLET, EXTENDED RELEASE ORAL DAILY
Qty: 90 TABLET | Refills: 1 | Status: SHIPPED | OUTPATIENT
Start: 2022-07-28

## 2022-07-28 RX ORDER — OMEPRAZOLE 20 MG/1
20 CAPSULE, DELAYED RELEASE ORAL
Qty: 90 CAPSULE | Refills: 1 | Status: SHIPPED | OUTPATIENT
Start: 2022-07-28

## 2022-07-28 NOTE — PROGRESS NOTES
Called daughter ( denys)  and had to [facility] for her to call me back, I did leave message of recommendation

## 2022-07-29 ENCOUNTER — PATIENT OUTREACH (OUTPATIENT)
Dept: CASE MANAGEMENT | Age: 87
End: 2022-07-29

## 2022-07-29 NOTE — PROGRESS NOTES
Care Transitions Outreach Attempt    Attempted to reach patient for transitions of care follow up. Unable to reach patient. Left voicemail stating my name, CTN with [facility], date and time of call, and return telephone number. Patient: Doni Pugh Patient : 1935 MRN: [id number]    Last Discharge  [address]       Date Complaint Diagnosis Description Type Department Provider    22 Chest Pain Congestive heart failure, unspecified HF chronicity, unspecified heart failure type ([facility] . .. ED to Hosp-Admission (Discharged) (ADMIT) [id number] Cuba Hoskins MD; Stephanie Hall. ..             Noted following upcoming appointments from discharge chart review:   [address] follow up appointment(s):   Future Appointments   Date Time Provider Josué Magana   2022  2:00 PM Darene Carrel, NP SPCPE BS [facility]

## 2022-07-29 NOTE — PROGRESS NOTES
Transitional Care Management Progress Note    Patient: Leopold Oka  : 1935  PCP: Una Alonso NP    Date of office visit: 7/15/2022   Date of admission: 22  Date of discharge: 22  Hospital: [facility]    Call initiated w/i 2 business dates of discharge: YES  Date of the most recent call to the patient: 22 1134am    Assessment/Plan:   Diagnoses and all orders for this visit:    1. Primary hypertension  -     losartan (COZAAR) 25 mg tablet; Take 1 Tablet by mouth daily. 2. Chronic obstructive pulmonary disease, unspecified COPD type ([facility] [address]  -     albuterol (PROVENTIL HFA, VENTOLIN HFA, PROAIR HFA) 90 mcg/actuation inhaler; Take 1 Puff by inhalation every four (4) hours as needed for Wheezing.  -     doxycycline (ADOXA) 100 mg tablet; Take 1 Tablet by mouth two (2) times a day for 10 days. 3. Type 2 diabetes mellitus with stage 3 chronic kidney disease, without long-term current use of insulin, unspecified whether stage 3a or 3b CKD ([facility] [address]  Lab Results   Component Value Date/Time    Hemoglobin A1c 6.4 (H) 2022 03:10 AM    Hemoglobin A1c (POC) 5.8 2021 09:21 AM    Hemoglobin A1c, External 6.2 2019 12:00 AM      -     Blood-Glucose Meter monitoring kit; Use to check glucose daily  -     lancets misc; Use to check glucose daily  -     glucose blood VI test strips (ASCENSIA AUTODISC VI, ONE TOUCH ULTRA TEST VI) strip; Use to check glucose daily  -     METABOLIC PANEL, COMPREHENSIVE  -     CBC WITH AUTOMATED DIFF    4. Amputation of left lower extremity, initial encounter (HCC)  -     AMB SUPPLY ORDER    5. Acute on chronic heart failure, unspecified heart failure type ([facility]  22    ECHO ADULT COMPLETE 2022    Interpretation Summary  Formatting of this result is different from the original.      Left Ventricle: Normal left ventricular systolic function. EF by 2D Simpsons Biplane is 65%.  Left ventricle size is normal. Moderately increased wall thickness. Normal wall motion. Right Ventricle: Right ventricle size is normal. TAPSE is normal.    Aortic Valve: Severely calcified cusp. Moderate regurgitation. Severe stenosis of the aortic valve. AV mean gradient is 25 mmHg. AV peak gradient is 41 mmHg. AV area by continuity VTI is 1.0 cm2. Mitral Valve: Calcified leaflet. Mild regurgitation. Tricuspid Valve: Valve structure is normal. Mild regurgitation. The estimated RVSP is 29 mmHg. Pulmonic Valve: Valve structure is normal. Trace regurgitation. Right Atrium: Right atrium size is normal.    Pericardium: No pericardial effusion. IVC/SVC: IVC diameter is less than or equal to 21 mm and decreases greater than 50% during inspiration; therefore the estimated right atrial pressure is normal (~3 mmHg). Signed by: Terrell Mendoza MD on 7/5/2022  7:06 PM     Lab Results   Component Value Date/Time    NT pro-BNP 3,607 (H) 07/04/2022 03:10 AM      6. Stage 3a chronic kidney disease (HCC)  Cr 1.28 7/7/22    7. Mixed hyperlipidemia  Lab Results   Component Value Date/Time    LDL, calculated 58.6 07/04/2022 09:50 AM        8. History of CVA (cerebrovascular accident)    9. Glaucoma of both eyes, unspecified glaucoma type    10. Anemia, unspecified type  11.3 35.3 87.2    11. Arthritis    12. Hypomagnesemia  Magnesium   Date Value Ref Range Status   07/06/2022 2.0 1.6 - 2.4 mg/dL Final   07/05/2022 1.8 1.6 - 2.4 mg/dL Final   07/04/2022 1.9 1.6 - 2.4 mg/dL Final   03/31/2022 2.5 (H) 1.6 - 2.4 mg/dL Final   07/21/2020 2.0 1.6 - 2.3 mg/dL Final        13. Memory changes    14. Paroxysmal atrial fibrillation (HCC)  Hx watchman device    15. Benign prostatic hyperplasia without lower urinary tract symptoms  Lab Results   Component Value Date/Time    Prostate Specific Ag 3.7 10/20/2021 02:06 PM    Prostate Specific Ag 4.1 (H) 08/24/2021 10:31 AM    Prostate Specific Ag 3.8 07/21/2020 12:36 PM    % Free PSA 32.4 08/24/2021 10:31 AM        16. Gastroesophageal reflux disease without esophagitis    17. Peripheral vascular disease ([facility]    18. Severe aortic stenosis  07/04/22    ECHO ADULT COMPLETE 07/05/2022 7/5/2022    Interpretation Summary  Formatting of this result is different from the original.      Left Ventricle: Normal left ventricular systolic function. EF by 2D Simpsons Biplane is 65%. Left ventricle size is normal. Moderately increased wall thickness. Normal wall motion. Right Ventricle: Right ventricle size is normal. TAPSE is normal.    Aortic Valve: Severely calcified cusp. Moderate regurgitation. Severe stenosis of the aortic valve. AV mean gradient is 25 mmHg. AV peak gradient is 41 mmHg. AV area by continuity VTI is 1.0 cm2. Mitral Valve: Calcified leaflet. Mild regurgitation. Tricuspid Valve: Valve structure is normal. Mild regurgitation. The estimated RVSP is 29 mmHg. Pulmonic Valve: Valve structure is normal. Trace regurgitation. Right Atrium: Right atrium size is normal.    Pericardium: No pericardial effusion. IVC/SVC: IVC diameter is less than or equal to 21 mm and decreases greater than 50% during inspiration; therefore the estimated right atrial pressure is normal (~3 mmHg). Signed by: Candance Herd, MD on 7/5/2022  7:06 PM      20.  CAD      Reviewed notes from hospitalization  Reports feeling better today  Started on Lasix 20 mg daily and will repeat BMP  Blood pressure was elevated during hospitalization and lisinopril 10 mg was added  Today blood pressure is stable and encouraged to continue to monitor at home and notify provider if greater than 140/90  Does experience intermittent wheezing with history of COPD  Did not get albuterol refilled at hospital and will send today  Is currently receiving physical therapy and needs order for prosthesis of LLE  Will continue all other medications as directed and refilled today  Monitor glucose and notify provider if <70 or > 200 fasting  He has follow-up scheduled with [facility] cardiology and will continue care with other specialists including vascular, urology,ophthalmology   He refused return to rehab and daughter is assisting him at home along with home health following        The complexity of medical decision making for this patient's transitional care is moderate   Follow-up and Dispositions    Return in about 3 months (around 10/15/2022) for or sooner for worsening symptoms. Subjective:   Zane Vyas is a 82 y.o. male presenting today for follow-up after hospital discharge. This encounter and supporting documentation was reviewed if available. Medication reconciliation was performed today. The main problem requiring admission was congestive heart failure exacerbation. Other pertinent conditions include  hypertension, hyperlipdimia, paroxysmal afib w/ watchman implant, GI bleed, BPH, cva, arthritis, anemia, ckd stage 3, type 2 diabetes, glaucoma, GERD, hypomagnesium, constipation and PVD. Daughter is with him today and reports he has been doing better since home. She is assisting with ADL's and medication. Has still had some intermittent cough and wheezing but denies any increased sob or chest pain. Appetite has been ok. Is set up to start PT outpatient and needs order for prosthesis eval due to recent LLE amputation. Has follow up with cardiology next week. Interval history/Current status: stable    Admitting symptoms have: improved      Medications marked \"taking\" at this time:  Prior to Admission medications    Medication Sig Start Date End Date Taking? Authorizing Provider   doxycycline (ADOXA) 100 mg tablet Take 1 Tablet by mouth two (2) times a day for 10 days. 7/18/22 7/28/22 Yes Jana Welch NP   losartan (COZAAR) 25 mg tablet Take 1 Tablet by mouth daily.  7/15/22  Yes Jana Welch NP   Blood-Glucose Meter monitoring kit Use to check glucose daily 7/15/22  Yes Jana Welch NP   lancets misc Use to check glucose daily 7/15/22  Yes Augusta Antonia, SRI   glucose blood VI test strips (ASCENSIA AUTODISC VI, ONE TOUCH ULTRA TEST VI) strip Use to check glucose daily 7/15/22  Yes Augusta Knight NP   albuterol (PROVENTIL HFA, VENTOLIN HFA, PROAIR HFA) 90 mcg/actuation inhaler Take 1 Puff by inhalation every four (4) hours as needed for Wheezing. 7/15/22  Yes Augusta Knight NP   aspirin delayed-release 81 mg tablet Take 1 Tablet by mouth every morning for 30 days. 7/7/22 8/6/22 Yes Meredith Reyes MD   ferrous sulfate (FeroSuL) 325 mg (65 mg iron) tablet TAKE 1 TABLET BY MOUTH DAILY (BEFORE BREAKFAST). 7/6/22  Yes Meredith Reyes MD   fluticasone propionate (Flonase Allergy Relief) 50 mcg/actuation nasal spray 1 [place] by Both Nostrils route daily for 30 days. 7/6/22 8/5/22 Yes Meredith Reyes MD   guaiFENesin ER (MUCINEX) 600 mg ER tablet Take 1 Tablet by mouth two (2) times a day for 30 days. 7/6/22 8/5/22 Yes Meredith Reyes MD   lactulose (CHRONULAC) 10 gram/15 mL solution TAKE 30 ML TWICE A DAY AS NEEDED 7/6/22  Yes Meredith Reyes MD   magnesium oxide (MAG-OX) 400 mg tablet Take 1 Tablet by mouth two (2) times a day for 30 days. 7/6/22 8/5/22 Yes Meredith Reyes MD   multivitamin (Multiple Vitamins) tablet Take 1 Tablet by mouth daily for 30 days. 7/6/22 8/5/22 Yes Meredith Reyes MD   senna-docusate (Stool Softener-Laxative) 8.6-50 mg per tablet Take 1 Tablet by mouth every morning for 30 days. 7/6/22 8/5/22 Yes Meredith Reyes MD   AccuJoeyChelary Ratna Plus test strp strip TEST BLOOD SUGAR TWICE A DAY 11/19/21  Yes Preet Figueroa MD   lancets (Accu-Chek Softclix Lancets) misc TEST BLOOD SUGAR TWICE A DAY 8/16/21  Yes Preet Figueroa MD   Accu-Chek Ratna Control Soln soln USE AS DIRECTED. 7/9/21  Yes Preet Figueroa MD   BD Single Use Swabs Regular padm TEST BLOOD SUGAR TWICE DAILY 10/5/20  Yes Preet Figueroa MD   acetaminophen (TYLENOL) 325 mg tablet Take 650 mg by mouth every six (6) hours as needed for Pain. Yes Provider, Historical   hydroCHLOROthiazide (HYDRODIURIL) 25 mg tablet Take 1 Tablet by mouth in the morning. 7/28/22   Cora Gallego NP   furosemide (LASIX) 20 mg tablet Take 1 Tablet by mouth in the morning. 7/28/22   Cora Gallego NP   latanoprost (XALATAN) 0.005 % ophthalmic solution Administer 1 Drop to both eyes nightly. 7/28/22   Cora Gallego NP   atorvastatin (LIPITOR) 20 mg tablet Take 1 Tablet by mouth in the morning. 7/28/22   Cora Gallego NP   donepeziL (Aricept) 5 mg tablet Take 1 Tablet by mouth nightly. 7/28/22   Cora Gallego NP   cetirizine (ZyrTEC) 10 mg tablet Take 1 Tablet by mouth in the morning. 7/28/22   Cora Gallego NP   dorzolamide-timolol, PF, (COSOPT) 2-0.5 % ophthalmic solution Administer 1 Drop to both eyes two (2) times a day. 7/28/22   Cora Gallego NP   amLODIPine (NORVASC) 10 mg tablet Take 1 Tablet by mouth in the morning. 7/28/22   Cora Gallego NP   potassium chloride (K-DUR, KLOR-CON M20) 20 mEq tablet Take 1 Tablet by mouth two (2) times a day. 7/28/22   Cora Gallego NP   omeprazole (PRILOSEC) 20 mg capsule Take 1 Capsule by mouth every morning. 7/28/22   Cora Gallego NP   metoprolol succinate (TOPROL-XL) 25 mg XL tablet Take 1 Tablet by mouth in the morning. 7/28/22   Cora Gallego NP        Review of Systems   Constitutional: Negative. Respiratory:  Positive for cough, sputum production and wheezing. Negative for hemoptysis and shortness of breath. Cardiovascular: Negative. Gastrointestinal: Negative. Genitourinary: Negative. Musculoskeletal:  Positive for myalgias. Neurological:  Positive for weakness. Negative for dizziness, seizures, loss of consciousness and headaches. Psychiatric/Behavioral:  Positive for memory loss.          Patient Active Problem List   Diagnosis Code    Benign prostatic hyperplasia N40.0    Type 2 diabetes mellitus without complications ([facility] [address] Q91.6    Hypertension I10    Mixed hyperlipidemia E78.2 Peripheral vascular disease (HCC) I73.9    Glaucoma H40.9    GERD (gastroesophageal reflux disease) K21.9    Chronic constipation K59.09    Pacemaker Z95.0    Cataract H26.9    Hypomagnesemia E83.42    History of CVA (cerebrovascular accident) Z86.73    Stage 3 chronic kidney disease (HCC) N18.30    Atherosclerotic PVD with ulceration (HCC) I70.209, L98.499    At high risk for falls Z91.81    Elevated PSA R97.20    Type 2 diabetes mellitus with chronic kidney disease (HCC) E11.22    Cellulitis L03.90    Sepsis (HCC) A41.9    CHF (congestive heart failure) ([facility]) I50.9    Bradycardia R00.1    HTN (hypertension) I10    Amputation of left lower extremity ([facility] [address] B79.757W     Patient Active Problem List    Diagnosis Date Noted    [facility] extremity ([facility] 07/15/2022    CHF (congestive heart failure) ([facility] [address] 07/04/2022    Bradycardia 07/04/2022    HTN (hypertension) 07/04/2022    Sepsis ([facility] [address] 04/01/2022    Cellulitis 03/30/2022    Type 2 diabetes mellitus with chronic kidney disease ([facility] [address] 02/17/2022    Elevated PSA 12/20/2021    At high risk for falls 08/31/2021    Atherosclerotic PVD with ulceration ([facility] [address] 01/04/2021    Hypertension 07/21/2020    Mixed hyperlipidemia 07/21/2020    Peripheral vascular disease ([facility] [address] 07/21/2020    Glaucoma 07/21/2020    GERD (gastroesophageal reflux disease) 07/21/2020    Chronic constipation 07/21/2020    Pacemaker 07/21/2020    Cataract 07/21/2020    Hypomagnesemia 07/21/2020    History of CVA (cerebrovascular accident) 07/21/2020    Stage 3 chronic kidney disease ([facility] [address] 07/21/2020    Type 2 diabetes mellitus without complications ([facility] [address] [birth year]1917    Benign prostatic hyperplasia 04/26/2017     Current Outpatient Medications   Medication Sig Dispense Refill    losartan (COZAAR) 25 mg tablet Take 1 Tablet by mouth daily.  90 Tablet 1    Blood-Glucose Meter monitoring kit Use to check glucose daily 1 Kit 0    lancets misc Use to check glucose daily 100 Each 11    glucose blood VI test strips (ASCENSIA AUTODISC VI, ONE TOUCH ULTRA TEST VI) strip Use to check glucose daily 100 Strip 5    albuterol (PROVENTIL HFA, VENTOLIN HFA, PROAIR HFA) 90 mcg/actuation inhaler Take 1 Puff by inhalation every four (4) hours as needed for Wheezing. 18 g 3    aspirin delayed-release 81 mg tablet Take 1 Tablet by mouth every morning for 30 days. 30 Tablet 0    ferrous sulfate (FeroSuL) 325 mg (65 mg iron) tablet TAKE 1 TABLET BY MOUTH DAILY (BEFORE BREAKFAST). 90 Tablet 0    fluticasone propionate (Flonase Allergy Relief) 50 mcg/actuation nasal spray 1 [place] by Both Nostrils route daily for 30 days. 1 Each 0    guaiFENesin ER (MUCINEX) 600 mg ER tablet Take 1 Tablet by mouth two (2) times a day for 30 days. 60 Tablet 0    lactulose (CHRONULAC) 10 gram/15 mL solution TAKE 30 ML TWICE A DAY AS NEEDED 5400 mL 0    magnesium oxide (MAG-OX) 400 mg tablet Take 1 Tablet by mouth two (2) times a day for 30 days. 60 Tablet 0    multivitamin (Multiple Vitamins) tablet Take 1 Tablet by mouth daily for 30 days. 30 Tablet 0    senna-docusate (Stool Softener-Laxative) 8.6-50 mg per tablet Take 1 Tablet by mouth every morning for 30 days. 30 Tablet 0    Accu-Chek Ratna Plus test strp strip TEST BLOOD SUGAR TWICE A  Strip 2    lancets (Accu-Chek Softclix Lancets) misc TEST BLOOD SUGAR TWICE A  Each 1    Accu-Chek Ratna Control Soln soln USE AS DIRECTED. 3 Bottle 0    BD Single Use Swabs Regular padm TEST BLOOD SUGAR TWICE DAILY 180 Pad 1    acetaminophen (TYLENOL) 325 mg tablet Take 650 mg by mouth every six (6) hours as needed for Pain.      hydroCHLOROthiazide (HYDRODIURIL) 25 mg tablet Take 1 Tablet by mouth in the morning. 90 Tablet 1    furosemide (LASIX) 20 mg tablet Take 1 Tablet by mouth in the morning. 90 Tablet 1    latanoprost (XALATAN) 0.005 % ophthalmic solution Administer 1 Drop to both eyes nightly. 1 Each 0    atorvastatin (LIPITOR) 20 mg tablet Take 1 Tablet by mouth in the morning.  90 Tablet 1 donepeziL (Aricept) 5 mg tablet Take 1 Tablet by mouth nightly. 90 Tablet 1    cetirizine (ZyrTEC) 10 mg tablet Take 1 Tablet by mouth in the morning. 90 Tablet 1    dorzolamide-timolol, PF, (COSOPT) 2-0.5 % ophthalmic solution Administer 1 Drop to both eyes two (2) times a day. 1 Each 0    amLODIPine (NORVASC) 10 mg tablet Take 1 Tablet by mouth in the morning. 90 Tablet 1    potassium chloride (K-DUR, KLOR-CON M20) 20 mEq tablet Take 1 Tablet by mouth two (2) times a day. 180 Tablet 1    omeprazole (PRILOSEC) 20 mg capsule Take 1 Capsule by mouth every morning. 90 Capsule 1    metoprolol succinate (TOPROL-XL) 25 mg XL tablet Take 1 Tablet by mouth in the morning. 90 Tablet 1     No Known Allergies  Past Medical History:   Diagnosis Date    Anemia 4/26/2017    Anxiety     Arrhythmia     A FIB    Arthritis     Atherosclerosis of artery of extremity with rest pain [facility])     Atrial fibrillation ([address] 7/21/2020    Benign prostatic hyperplasia 4/26/2017    CAD (coronary artery disease)     pacemaker    Cancer ([address] 2010    GASTRIC    Chronic kidney disease     Chronic obstructive pulmonary disease (HCC)     Depression     Diabetes ([address]     BORDERLINE, NO MEDS. Diverticulosis of colon     Dyslipidemia 4/26/2017    GERD (gastroesophageal reflux disease)     Glaucoma     History of complete heart block     s/p watchman procedure and replaced in 11/2017 by Dr. Ravi Villa     History of CVA (cerebrovascular accident) 7/21/2020    Hypercholesteremia     Hypertension     Hypomagnesemia 7/13/2020    Nocturia 4/26/2017    PUD (peptic ulcer disease)     GI BLEEDING    PVD (peripheral vascular disease) ([address]     Rectal hemorrhage 4/26/2017    Strabismus     Stroke ([address] 2000 APPROX.     SOME VISUAL DEFICIT    Type 2 diabetes mellitus without complications ([address] 1/94/8129    Venous stasis 4/26/2017     Past Surgical History:   Procedure Laterality Date    HX AMPUTATION TOE Right     HX COLONOSCOPY      HX GI  1998    PARTIAL GASTRECTOMY ( DR ALVIN ALBA)    HX HEART CATHETERIZATION      HX ORTHOPAEDIC Left 1980    KNEE CARTILAGE    HX ORTHOPAEDIC Right 2019    AMPUTATION RIGHT GREAT TOE    HX ORTHOPAEDIC Left 2021    AMPUTATION 3 TOES     HX ORTHOPAEDIC Left 02/2022    AMPUTATION 4TH & 5TH TOES    HX OTHER SURGICAL      LEFT HAND SKIN GRAFT (BURN) DONOR RIGHT THIGH    HX PACEMAKER  6527,2022    DR Higginbotham March; WATCHMAN PROCEDURE. .. noted [facility] device on 2017 noted      Family History   Problem Relation Age of Onset    Stroke Mother     Stroke Father     Cancer Brother         PROSTATE    Anesth Problems Neg Hx      Social History     Tobacco Use    Smoking status: Never    Smokeless tobacco: Never   Substance Use Topics    Alcohol use: Not Currently     Comment: QUIT 2000          Objective:   Visit Vitals  BP (!) 115/56 (BP 1 Location: Right upper arm, BP Patient Position: Sitting, BP Cuff Size: Adult)   Pulse (!) 50   Temp 97 °F (36.1 °C) (Skin)   Resp 18   Ht 6' (1.829 m)   SpO2 97%   BMI 24.07 kg/m²        Physical Exam  Vitals and nursing note reviewed. Constitutional:       Appearance: Normal appearance. Cardiovascular:      Rate and Rhythm: Bradycardia present. Heart sounds: Murmur heard. Pulmonary:      Effort: Pulmonary effort is normal.      Breath sounds: Normal breath sounds. Abdominal:      General: Bowel sounds are normal.      Palpations: Abdomen is soft. Tenderness: There is no abdominal tenderness. There is no guarding. Musculoskeletal:        Legs:    Skin:     General: Skin is warm and dry. Neurological:      Mental Status: He is alert and oriented to person, place, and time. Mental status is at baseline. Psychiatric:         Mood and Affect: Mood normal.         Behavior: Behavior normal.          We discussed the expected course, resolution and complications of the diagnosis(es) in detail. Medication risks, benefits, costs, interactions, and alternatives were discussed as indicated.   I advised him to contact the office if his condition worsens, changes or fails to improve as anticipated. He expressed understanding with the diagnosis(es) and plan.      Marcial Akins NP

## 2022-08-11 ENCOUNTER — PATIENT OUTREACH (OUTPATIENT)
Dept: CASE MANAGEMENT | Age: 87
End: 2022-08-11

## 2022-08-11 ENCOUNTER — TELEPHONE (OUTPATIENT)
Dept: INTERNAL MEDICINE | Age: 87
End: 2022-08-11

## 2022-08-11 NOTE — PROGRESS NOTES
Patient has graduated from the Transitions of Care Coordination  program on 8/11/2022. Patient/family has the ability to self-manage at this time Care management goals have been completed. Patient was not referred to the [facility] team for further management. Goals Addressed                   This Visit's Progress     COMPLETED: Prevent complications post hospitalization. 7/8/2022  Pt's daughter returned my call and is on pt's PHI 12/27/2021  Pt's daughter reports that she picked up medications from [facility] and that medications are currently out of pocket. Pt's daughter confirmed follow up appts with PCP on 7/15/2022 and with cardiology on 7/19/2022. Pt's daughter is questioning lisinopril due to pt having a cough while on that medication as well as expressing concern about kidney function and dual diuretics. Pt's daughter is a nurse. Pt's daughter reports that pt cannot be weighed so we discussed alternative ways of checking for fluid in pt's right leg. Pt's daughter states understanding of low Na diet and reports that pt has a hx of HTN and DM. Pt's daughter reports that [facility] will see pt on 7/9/2022 and begin PT.  Pt's daughter reports that a cousin can help her with parents but that she will need more help. Pt's daughter reports that pt has new wheezing and that he had been at a SNF prior to hospital visit. Pt's daughter is agreeable to a call in a week to review medications, follow up appt with PCP and to review any signs of edema. AFP    7/14/2022  Pt's daughter confirmed PCP appt on 7/15/2022 and she will attend with pt. Pt to go to PCP appt in wheelchair transport. Pt's daughter reports that pt continues with wheezing and cough and she will follow up with PCP for inhaler. Pt's daughter reports no personal care aide and she needs assistance. Pt's daughter reports that order for [facility] was not sent from the hospital to agency.   Advised pt's daughter to follow up with PCP to see if she will write order. Will call in one week to review PCP follow up appt, check on pt's symptoms, and review medications. AFP    7/20/2022  Pt's daughter confirmed that pt followed up with PCP and with cardiologist (7/19/2022). Pt's daughter reports that pt is on abx and has an appt for prosthesis next week. Pt reports that she continues to work on help with caregiving. Pt's daughter reports that pt is doing well in general.  Pt's daughter states that wheelchair transport has been a challenge. Pt's daughter offered access to  for assistance and pt is working with agency [facility] for care aide. Will call in 7 days to follow up on s/s of HF and follow up appts. AFP    7/27/2022  Left voicemail stating my name, CTN with [employer], date and time of call, and return telephone number. AFP    [phone]  Pt's daughter reports that pt is currently at [facility] for a cardiac cath and she reports that he needs a new valve. Pt's daughter reports that the challenge is finding an aide to assist with caring for patents and she reports that a cousin is helping and that her brother will be able to assist more in October. Pt's daughter has my contact information if needed in the future. AFP              Patient has Care Transition Nurse's contact information for any further questions, concerns, or needs.   Patients upcoming visits:    Future Appointments   Date Time Provider Josué Magana   11/16/2022  2:00 PM SRI DuránPE BS AMB

## 2022-08-12 ENCOUNTER — TELEPHONE (OUTPATIENT)
Dept: PRIMARY CARE CLINIC | Age: 87
End: 2022-08-12

## 2022-08-18 ENCOUNTER — TRANSCRIBE ORDER (OUTPATIENT)
Dept: LAB | Age: 87
End: 2022-08-18

## 2022-08-18 ENCOUNTER — HOSPITAL ENCOUNTER (OUTPATIENT)
Dept: LAB | Age: 87
Discharge: HOME | End: 2022-08-18
Payer: MEDICARE

## 2022-08-18 DIAGNOSIS — I13.0 HYPERTENSIVE HEART AND RENAL DISEASE WITH CONGESTIVE HEART FAILURE (HCC): ICD-10-CM

## 2022-08-18 DIAGNOSIS — I13.0 HYPERTENSIVE HEART AND RENAL DISEASE WITH CONGESTIVE HEART FAILURE (HCC): Primary | ICD-10-CM

## 2022-08-18 LAB
ALBUMIN SERPL-MCNC: 2.7 G/DL (ref 3.5–5)
ANION GAP SERPL CALC-SCNC: 8 MMOL/L (ref 5–15)
BUN SERPL-MCNC: 26 MG/DL (ref 6–20)
BUN/CREAT SERPL: 17 (ref 12–20)
CA-I BLD-MCNC: 8.5 MG/DL (ref 8.5–10.1)
CHLORIDE SERPL-SCNC: 108 MMOL/L (ref 97–108)
CO2 SERPL-SCNC: 25 MMOL/L (ref 21–32)
CREAT SERPL-MCNC: 1.55 MG/DL (ref 0.7–1.3)
GLUCOSE SERPL-MCNC: 97 MG/DL (ref 65–100)
PHOSPHATE SERPL-MCNC: 3.3 MG/DL (ref 2.6–4.7)
POTASSIUM SERPL-SCNC: 3.6 MMOL/L (ref 3.5–5.1)
SODIUM SERPL-SCNC: 141 MMOL/L (ref 136–145)

## 2022-08-18 PROCEDURE — 80069 RENAL FUNCTION PANEL: CPT

## 2022-08-26 DIAGNOSIS — H40.9 GLAUCOMA OF BOTH EYES, UNSPECIFIED GLAUCOMA TYPE: Primary | ICD-10-CM

## 2022-08-26 RX ORDER — DORZOLAMIDE HYDROCHLORIDE AND TIMOLOL MALEATE PRESERVATIVE FREE 20; 5 MG/ML; MG/ML
1 SOLUTION/ DROPS OPHTHALMIC 2 TIMES DAILY
Qty: 1 EACH | Refills: 0 | Status: SHIPPED | OUTPATIENT
Start: 2022-08-26

## 2022-08-29 ENCOUNTER — TELEPHONE (OUTPATIENT)
Dept: PRIMARY CARE CLINIC | Age: 87
End: 2022-08-29

## 2022-08-29 NOTE — TELEPHONE ENCOUNTER
[place],  PT LVM stating patient slid out of wheelchair yesterday onto floor but did not sustain any injuries. They believe he fell asleep. He got himself up and back into the chair. His daughter has ordered a seatbelt for his chair and is waiting for that to come in. If any questions,  Please call [place] at [phone].

## 2022-08-29 NOTE — TELEPHONE ENCOUNTER
Zohreh with [facility] std Dondra Schirmer needs to send letter of medical necessity to get bathroom door widen for wheel chair use.  Her fx is [phone] cell # is  if you need to speak with her

## 2022-09-05 NOTE — PROGRESS NOTES
Kidney functions remain abnormal but  to baseline. Albumin low so want to have him increase his protein in diet. Repeat in 3 mo. If any questions, let me know.

## 2022-09-06 NOTE — PROGRESS NOTES
Informed (daughter) Anita Dobbs of lab results and recommendations per MANE Fuentes NP. She stated understanding and did not have any questions at this time. For repeat labs in 3 months.

## 2022-09-14 ENCOUNTER — TELEPHONE (OUTPATIENT)
Dept: PRIMARY CARE CLINIC | Age: 87
End: 2022-09-14

## 2022-09-14 DIAGNOSIS — S88.912A AMPUTATION OF LEFT LOWER EXTREMITY, INITIAL ENCOUNTER (HCC): Primary | ICD-10-CM

## 2022-09-14 NOTE — TELEPHONE ENCOUNTER
Called ms mcfarlane  and brian need letter of medical ness, to make bathroom door bigger. Fax [phone]. .is it ok to put in order for pt and a letter to [facility]

## 2022-09-14 NOTE — LETTER
9/14/2022 3:05 PM    Mr. Lelo Giles  [address] [place] [address]      To whom it may concern,    My patient Haim Escoto has impaired mobility related to left lower leg amputation as well as multiple other co-morbid conditions which includes CHF, type 2 diabetes, PVD, COPD, CVA, CKD, hypertension and hyperlipidemia. I feel that it is medically necessary for patient home bathroom be renovated in order to help accommodate wheelchair access. This would allow patient to perform ADL's such as bathing and toileting.          Sincerely,      Alka Portillo NP

## 2022-09-14 NOTE — TELEPHONE ENCOUNTER
I sent the letter to [facility] - DEBBIE RIVERO. The PT order for gait training has not been done. Daughter Maria Eugenia Goncalves wants this to be done thru [facility] if approved.

## 2022-09-14 NOTE — TELEPHONE ENCOUNTER
----- Message from Pierce Peres sent at 9/14/2022  2:17 PM EDT -----  Subject: Message to Provider    QUESTIONS  Information for Provider? Pt daughter Elio Magnuson called to request a Rx sent   to [facility] and the [facility] for gait training. Please fax to [phone]. She states   that his  with Steffany informed the office that it was a   medical necessity that he has his bathroom renovated.  is   Yanna Chavez, phone [phone]. Please Call Elio Baer with any questions.     ---------------------------------------------------------------------------  --------------  Nate Ruvalcaba UXQG  [phone]; OK to leave message on voicemail  ---------------------------------------------------------------------------  --------------  SCRIPT ANSWERS  Relationship to Patient? Other  Representative Name? Mario-daughter  Is the Representative on the appropriate HIPAA document in Epic?  Yes

## 2022-09-28 NOTE — PROGRESS NOTES
Physical Therapy  PT evaluation complete and full note to follow. Currently recommend SNF rehab at [facility] to progress to more independent level of function.   Clay Cohen, PT, DPT RN called the patient and left a detailed message including the following information for procedure:   Procedure Date: 10/4/22  Arrival Time: 1200  Procedure Time: 1300  NPO Status/Time: NA  Medication Instructions: NA  Ride needed if applicable   Address of Facility: [address] [place] [address], [address]   Call Back Number for Questions: [phone], option 7

## 2022-10-13 ENCOUNTER — CLINICAL SUPPORT (OUTPATIENT)
Dept: PRIMARY CARE CLINIC | Age: 87
End: 2022-10-13
Payer: MEDICARE

## 2022-10-13 DIAGNOSIS — Z23 NEEDS FLU SHOT: Primary | ICD-10-CM

## 2022-10-13 PROCEDURE — 90694 VACC AIIV4 NO PRSRV 0.5ML IM: CPT | Performed by: NURSE PRACTITIONER

## 2022-10-13 PROCEDURE — G0008 ADMIN INFLUENZA VIRUS VAC: HCPCS | Performed by: NURSE PRACTITIONER

## 2022-10-24 ENCOUNTER — TELEPHONE (OUTPATIENT)
Dept: PRIMARY CARE CLINIC | Age: 87
End: 2022-10-24

## 2022-10-24 NOTE — TELEPHONE ENCOUNTER
Patient's daughter, Jane Sheffield, requesting refill Furosemide. She would like the directions changed to 1/2 tab  daily. He is currently taking one tab every other day which confuses him.

## 2022-10-25 DIAGNOSIS — I50.9 CHRONIC CONGESTIVE HEART FAILURE, UNSPECIFIED HEART FAILURE TYPE (HCC): Primary | ICD-10-CM

## 2022-10-25 RX ORDER — FUROSEMIDE 20 MG/1
10 TABLET ORAL DAILY
Qty: 45 TABLET | Refills: 1 | Status: SHIPPED | OUTPATIENT
Start: 2022-10-25

## 2022-11-16 ENCOUNTER — OFFICE VISIT (OUTPATIENT)
Dept: PRIMARY CARE CLINIC | Age: 87
End: 2022-11-16
Payer: MEDICARE

## 2022-11-16 VITALS
SYSTOLIC BLOOD PRESSURE: 116 MMHG | OXYGEN SATURATION: 97 % | RESPIRATION RATE: 18 BRPM | TEMPERATURE: 97.3 F | DIASTOLIC BLOOD PRESSURE: 65 MMHG | HEART RATE: 60 BPM

## 2022-11-16 DIAGNOSIS — N18.30 TYPE 2 DIABETES MELLITUS WITH STAGE 3 CHRONIC KIDNEY DISEASE, WITHOUT LONG-TERM CURRENT USE OF INSULIN, UNSPECIFIED WHETHER STAGE 3A OR 3B CKD (HCC): Primary | ICD-10-CM

## 2022-11-16 DIAGNOSIS — Z86.73 HISTORY OF CVA (CEREBROVASCULAR ACCIDENT): ICD-10-CM

## 2022-11-16 DIAGNOSIS — E78.2 MIXED HYPERLIPIDEMIA: ICD-10-CM

## 2022-11-16 DIAGNOSIS — E11.22 TYPE 2 DIABETES MELLITUS WITH STAGE 3 CHRONIC KIDNEY DISEASE, WITHOUT LONG-TERM CURRENT USE OF INSULIN, UNSPECIFIED WHETHER STAGE 3A OR 3B CKD (HCC): Primary | ICD-10-CM

## 2022-11-16 DIAGNOSIS — N18.31 STAGE 3A CHRONIC KIDNEY DISEASE (HCC): ICD-10-CM

## 2022-11-16 LAB — HBA1C MFR BLD HPLC: 5.9 %

## 2022-11-16 PROCEDURE — G9231 DOC ESRD DIA TRANS PREG: HCPCS | Performed by: NURSE PRACTITIONER

## 2022-11-16 PROCEDURE — 1123F ACP DISCUSS/DSCN MKR DOCD: CPT | Performed by: NURSE PRACTITIONER

## 2022-11-16 PROCEDURE — G8432 DEP SCR NOT DOC, RNG: HCPCS | Performed by: NURSE PRACTITIONER

## 2022-11-16 PROCEDURE — G8420 CALC BMI NORM PARAMETERS: HCPCS | Performed by: NURSE PRACTITIONER

## 2022-11-16 PROCEDURE — 99214 OFFICE O/P EST MOD 30 MIN: CPT | Performed by: NURSE PRACTITIONER

## 2022-11-16 PROCEDURE — 83036 HEMOGLOBIN GLYCOSYLATED A1C: CPT | Performed by: NURSE PRACTITIONER

## 2022-11-16 PROCEDURE — 3074F SYST BP LT 130 MM HG: CPT | Performed by: NURSE PRACTITIONER

## 2022-11-16 PROCEDURE — 1101F PT FALLS ASSESS-DOCD LE1/YR: CPT | Performed by: NURSE PRACTITIONER

## 2022-11-16 PROCEDURE — 3078F DIAST BP <80 MM HG: CPT | Performed by: NURSE PRACTITIONER

## 2022-11-16 PROCEDURE — 3044F HG A1C LEVEL LT 7.0%: CPT | Performed by: NURSE PRACTITIONER

## 2022-11-16 PROCEDURE — G8427 DOCREV CUR MEDS BY ELIG CLIN: HCPCS | Performed by: NURSE PRACTITIONER

## 2022-11-16 PROCEDURE — G8536 NO DOC ELDER MAL SCRN: HCPCS | Performed by: NURSE PRACTITIONER

## 2022-11-16 RX ORDER — DORZOLAMIDE HYDROCHLORIDE AND TIMOLOL MALEATE 20; 5 MG/ML; MG/ML
SOLUTION/ DROPS OPHTHALMIC
COMMUNITY
Start: 2022-10-26

## 2022-11-16 RX ORDER — BLOOD-GLUCOSE METER
EACH MISCELLANEOUS
COMMUNITY
Start: 2022-08-09

## 2022-11-16 RX ORDER — CLOPIDOGREL BISULFATE 75 MG/1
75 TABLET ORAL DAILY
COMMUNITY
Start: 2022-10-06

## 2022-11-16 RX ORDER — AMMONIUM LACTATE 12 G/100G
LOTION TOPICAL
COMMUNITY
Start: 2022-10-21

## 2022-11-16 NOTE — PROGRESS NOTES
Chief Complaint   Patient presents with    Diabetes     Follow up    1. Have you been to the ER, urgent care clinic since your last visit? Hospitalized since your last visit? No    2. Have you seen or consulted any other health care providers outside of the [address] [place] [address] since your last visit? Include any pap smears or colon screening.  No

## 2022-11-16 NOTE — PROGRESS NOTES
Progress Note    Patient: Maura Aguilar  : 1935  PCP: Gil Walters NP      Assessment/Plan:   Diagnoses and all orders for this visit:    1. Primary hypertension  Blood pressure is controlled and continue HCTZ, furosemide,metoprolol, amlodipine and losartan  as directed  Checking fasting labs today  Encourage to monitor at home and notify provider if > 140/90 consistently     2. Chronic obstructive pulmonary disease, unspecified COPD type ([facility]  Stable  Reports infrequent use of albuterol    3. Type 2 diabetes mellitus with stage 3 chronic kidney disease, without long-term current use of insulin, unspecified whether stage 3a or 3b CKD ([facility]  Lab Results   Component Value Date/Time    Hemoglobin A1c 6.4 (H) 2022 03:10 AM    Hemoglobin A1c (POC) 5.9 2022 02:44 PM    Hemoglobin A1c, External 6.2 2019 12:00 AM   Well controlled  Continue to encourage following diabetic diet and staying active as possible  Check glucose 1-2 times weekly fasting. Notify provider if > 200 or < 70  Continue regular eye exams   Check foot daily and notify provider immediately if wound develops      4. Amputation of left lower extremity, initial encounter ([facility]      5. Acute on chronic heart failure, unspecified heart failure type ([facility]  22    ECHO ADULT COMPLETE 2022    Interpretation Summary  Formatting of this result is different from the original.      Left Ventricle: Normal left ventricular systolic function. EF by 2D Simpsons Biplane is 65%. Left ventricle size is normal. Moderately increased wall thickness. Normal wall motion. Right Ventricle: Right ventricle size is normal. TAPSE is normal.    Aortic Valve: Severely calcified cusp. Moderate regurgitation. Severe stenosis of the aortic valve. AV mean gradient is 25 mmHg. AV peak gradient is 41 mmHg. AV area by continuity VTI is 1.0 cm2. Mitral Valve: Calcified leaflet. Mild regurgitation.     Tricuspid Valve: Valve structure is normal. Mild regurgitation. The estimated RVSP is 29 mmHg. Pulmonic Valve: Valve structure is normal. Trace regurgitation. Right Atrium: Right atrium size is normal.    Pericardium: No pericardial effusion. IVC/SVC: IVC diameter is less than or equal to 21 mm and decreases greater than 50% during inspiration; therefore the estimated right atrial pressure is normal (~3 mmHg). Signed by: Mary Hirsch MD on 7/5/2022  7:06 PM     Euvolemic on exam today  Continue care and management with cardiology    6. Stage 3a chronic kidney disease ([facility]  Lab Results   Component Value Date/Time    Creatinine 1.46 (H) 11/16/2022 03:20 PM        7. Mixed hyperlipidemia  Lab Results   Component Value Date/Time    LDL, calculated 58.6 07/04/2022 09:50 AM   Stable and continue atorvastatin as directed    8. History of CVA (cerebrovascular accident)  On plavix and statin  Hx of watchman     9. Glaucoma of both eyes, unspecified glaucoma type  Stable and continues eyedrops as directed  Under care and management by ophthalmology    10. Anemia, unspecified type  Lab Results   Component Value Date/Time    WBC 7.1 11/16/2022 03:20 PM    Hemoglobin (POC) 10.5 (L) 03/21/2022 07:11 AM    HGB 11.2 (L) 11/16/2022 03:20 PM    HCT 34.5 (L) 11/16/2022 03:20 PM    PLATELET 060 97/44/2315 03:20 PM    MCV 92 11/16/2022 03:20 PM   Stable and will continue monitoring with labs    11. Arthritis  Stable and uses Tylenol or topical Voltaren as needed  Encourage range of motion exercises      13. Memory changes  Stable and on donepezil  Assisted by family with ADL's    15. Paroxysmal atrial fibrillation (HCC)  Stable and on metoprolol 25 mg XR daily  Hx watchman device  Continue care with cardiology    15.  Benign prostatic hyperplasia without lower urinary tract symptoms  Lab Results   Component Value Date/Time    Prostate Specific Ag 3.7 10/20/2021 02:06 PM    Prostate Specific Ag 4.1 (H) 08/24/2021 10:31 AM    Prostate Specific Ag 3.8 07/21/2020 12:36 PM    % Free PSA 32.4 08/24/2021 10:31 AM   Continue care and management with urology    16. Gastroesophageal reflux disease without esophagitis  Stable and continues omeprazole as directed    17. Peripheral vascular disease ([address]  On Plavix  Continue care with vascular Dr. Miller Robbins    18. Severe aortic stenosis  07/04/22    ECHO ADULT COMPLETE 07/05/2022 7/5/2022    Interpretation Summary  Formatting of this result is different from the original.      Left Ventricle: Normal left ventricular systolic function. EF by 2D Simpsons Biplane is 65%. Left ventricle size is normal. Moderately increased wall thickness. Normal wall motion. Right Ventricle: Right ventricle size is normal. TAPSE is normal.    Aortic Valve: Severely calcified cusp. Moderate regurgitation. Severe stenosis of the aortic valve. AV mean gradient is 25 mmHg. AV peak gradient is 41 mmHg. AV area by continuity VTI is 1.0 cm2. Mitral Valve: Calcified leaflet. Mild regurgitation. Tricuspid Valve: Valve structure is normal. Mild regurgitation. The estimated RVSP is 29 mmHg. Pulmonic Valve: Valve structure is normal. Trace regurgitation. Right Atrium: Right atrium size is normal.    Pericardium: No pericardial effusion. IVC/SVC: IVC diameter is less than or equal to 21 mm and decreases greater than 50% during inspiration; therefore the estimated right atrial pressure is normal (~3 mmHg). Signed by: Anderson Gar MD on 7/5/2022  7:06 PM    Continue care and management with cardiology    20. CAD  Continue care and management with cardiology    Follow-up and Dispositions    Return in about 6 months (around 5/16/2023) for or sooner for worsening symptoms.         Subjective:   Marisela Jane is a 82 y.o. male presenting today for follow-up of chronic conditions with PMH of   hypertension, hyperlipdimia, paroxysmal afib w/ watchman implant, GI bleed, BPH, cva, arthritis, anemia, ckd stage 3, type 2 diabetes, glaucoma, GERD, hypomagnesium, constipation and PVD. Family  is with him today and reports he has been doing ok. They continue assisting with ADL's and medication. He is following with multiple specialists including cardiology, vascular, urology and ophthalmology. Does not have any new concerns today. Prior to Admission medications    Medication Sig Start Date End Date Taking? Authorizing Provider   doxycycline (ADOXA) 100 mg tablet Take 1 Tablet by mouth two (2) times a day for 10 days. 7/18/22 7/28/22 Yes Kaia Adams NP   losartan (COZAAR) 25 mg tablet Take 1 Tablet by mouth daily. 7/15/22  Yes Kaia Adams NP   Blood-Glucose Meter monitoring kit Use to check glucose daily 7/15/22  Yes Kaia Adams NP   lancets misc Use to check glucose daily 7/15/22  Yes Kaia Adams NP   glucose blood VI test strips (ASCENSIA AUTODISC VI, ONE TOUCH ULTRA TEST VI) strip Use to check glucose daily 7/15/22  Yes Kaia Adams NP   albuterol (PROVENTIL HFA, VENTOLIN HFA, PROAIR HFA) 90 mcg/actuation inhaler Take 1 Puff by inhalation every four (4) hours as needed for Wheezing. 7/15/22  Yes Kaia dAams NP   aspirin delayed-release 81 mg tablet Take 1 Tablet by mouth every morning for 30 days. 7/7/22 8/6/22 Yes Cheryle Salk, MD   ferrous sulfate (FeroSuL) 325 mg (65 mg iron) tablet TAKE 1 TABLET BY MOUTH DAILY (BEFORE BREAKFAST). 7/6/22  Yes Cheryle Salk, MD   fluticasone propionate (Flonase Allergy Relief) 50 mcg/actuation nasal spray 1 [place] by Both Nostrils route daily for 30 days. 7/6/22 8/5/22 Yes Cheryle Salk, MD   guaiFENesin ER (MUCINEX) 600 mg ER tablet Take 1 Tablet by mouth two (2) times a day for 30 days. 7/6/22 8/5/22 Yes Cheryle Salk, MD   lactulose (CHRONULAC) 10 gram/15 mL solution TAKE 30 ML TWICE A DAY AS NEEDED 7/6/22  Yes Cheryle Salk, MD   magnesium oxide (MAG-OX) 400 mg tablet Take 1 Tablet by mouth two (2) times a day for 30 days.  7/6/22 8/5/22 Yes Cheryle Salk, MD multivitamin (Multiple Vitamins) tablet Take 1 Tablet by mouth daily for 30 days. 7/6/22 8/5/22 Yes Bob Cid MD   senna-docusate (Stool Softener-Laxative) 8.6-50 mg per tablet Take 1 Tablet by mouth every morning for 30 days. 7/6/22 8/5/22 Yes MD Lyudmila Oconnor Ratna Plus test strp strip TEST BLOOD SUGAR TWICE A DAY 11/19/21  Yes Narendra Vo MD   lancets (Accu-Chek Softclix Lancets) misc TEST BLOOD SUGAR TWICE A DAY 8/16/21  Yes Narendra Vo MD   Accu-Chelary Ratna Control Soln soln USE AS DIRECTED. 7/9/21  Yes Narendra Vo MD   BD Single Use Swabs Regular padm TEST BLOOD SUGAR TWICE DAILY 10/5/20  Yes Narendra Vo MD   acetaminophen (TYLENOL) 325 mg tablet Take 650 mg by mouth every six (6) hours as needed for Pain. Yes Provider, Historical   hydroCHLOROthiazide (HYDRODIURIL) 25 mg tablet Take 1 Tablet by mouth in the morning. 7/28/22   Joaquim Yang NP   furosemide (LASIX) 20 mg tablet Take 1 Tablet by mouth in the morning. 7/28/22   Joaquim Yang NP   latanoprost (XALATAN) 0.005 % ophthalmic solution Administer 1 Drop to both eyes nightly. 7/28/22   Joaquim Yang NP   atorvastatin (LIPITOR) 20 mg tablet Take 1 Tablet by mouth in the morning. 7/28/22   Joaquim Yang NP   donepeziL (Aricept) 5 mg tablet Take 1 Tablet by mouth nightly. 7/28/22   Joaquim Yang NP   cetirizine (ZyrTEC) 10 mg tablet Take 1 Tablet by mouth in the morning. 7/28/22   Joaquim Yang NP   dorzolamide-timolol, PF, (COSOPT) 2-0.5 % ophthalmic solution Administer 1 Drop to both eyes two (2) times a day. 7/28/22   Joaquim Yang NP   amLODIPine (NORVASC) 10 mg tablet Take 1 Tablet by mouth in the morning. 7/28/22   Joaquim Yang NP   potassium chloride (K-DUR, KLOR-CON M20) 20 mEq tablet Take 1 Tablet by mouth two (2) times a day.  7/28/22   Joaquim Yang NP   omeprazole (PRILOSEC) 20 mg capsule Take 1 Capsule by mouth every morning. 7/28/22   Joaquim Yang NP   metoprolol succinate (TOPROL-XL) 25 mg XL tablet Take 1 Tablet by mouth in the morning. 7/28/22   Gabriel Brothers, NP        Review of Systems   Constitutional: Negative. Respiratory:  Positive for cough, sputum production and wheezing. Negative for hemoptysis and shortness of breath. Cardiovascular: Negative. Gastrointestinal: Negative. Genitourinary: Negative. Musculoskeletal:  Positive for myalgias. Neurological:  Positive for weakness. Negative for dizziness, seizures, loss of consciousness and headaches. Psychiatric/Behavioral:  Positive for memory loss.          Patient Active Problem List   Diagnosis Code    Benign prostatic hyperplasia N40.0    Type 2 diabetes mellitus without complications ([facility] W09.3    Hypertension I10    Mixed hyperlipidemia E78.2    Peripheral vascular disease (HCC) I73.9    Glaucoma H40.9    GERD (gastroesophageal reflux disease) K21.9    Chronic constipation K59.09    Pacemaker Z95.0    Cataract H26.9    Hypomagnesemia E83.42    History of CVA (cerebrovascular accident) Z86.73    Stage 3 chronic kidney disease (HCC) N18.30    Atherosclerotic PVD with ulceration (HCC) I70.209, L98.499    At high risk for falls Z91.81    Elevated PSA R97.20    Type 2 diabetes mellitus with chronic kidney disease (HCC) E11.22    Cellulitis L03.90    Sepsis (HCC) A41.9    CHF (congestive heart failure) ([facility]) I50.9    Bradycardia R00.1    HTN (hypertension) I10    Amputation of left lower extremity ([facility] Y68.251T     Patient Active Problem List    Diagnosis Date Noted    Amputation of left lower extremity ([facility] 07/15/2022    CHF (congestive heart failure) ([facility] 07/04/2022    Bradycardia 07/04/2022    HTN (hypertension) 07/04/2022    Sepsis ([facility] 04/01/2022    Cellulitis 03/30/2022    Type 2 diabetes mellitus with chronic kidney disease ([facility] 02/17/2022    Elevated PSA 12/20/2021    At high risk for falls 08/31/2021    Atherosclerotic PVD with ulceration ([facility] 01/04/2021    Hypertension 07/21/2020 Mixed hyperlipidemia 07/21/2020    Peripheral vascular disease ([facility] 07/21/2020    Glaucoma 07/21/2020    GERD (gastroesophageal reflux disease) 07/21/2020    Chronic constipation 07/21/2020    Pacemaker 07/21/2020    Cataract 07/21/2020    Hypomagnesemia 07/21/2020    History of CVA (cerebrovascular accident) 07/21/2020    Stage 3 chronic kidney disease ([facility] 07/21/2020    Type 2 diabetes mellitus without complications ([facility] 76/04/8837    Benign prostatic hyperplasia 04/26/2017     Current Outpatient Medications   Medication Sig Dispense Refill    clopidogreL (PLAVIX) 75 mg tab Take 75 mg by mouth daily. dorzolamide-timoloL (COSOPT) 22.3-6.8 mg/mL ophthalmic solution INSTILL 1 DROP INTO EACH EYE TWICE DAILY      OneTouch Ultra2 Meter misc TO CHECK GLUCOSE ONCE DAILY      ammonium lactate (LAC-HYDRIN) 12 % lotion APPLY LOTION TOPICALLY TO AFFECTED AREA ONCE DAILY TO FEET AND ANKLES      furosemide (LASIX) 20 mg tablet Take 0.5 Tablets by mouth daily. 45 Tablet 1    hydroCHLOROthiazide (HYDRODIURIL) 25 mg tablet Take 1 Tablet by mouth in the morning. 90 Tablet 1    latanoprost (XALATAN) 0.005 % ophthalmic solution Administer 1 Drop to both eyes nightly. 1 Each 0    atorvastatin (LIPITOR) 20 mg tablet Take 1 Tablet by mouth in the morning. 90 Tablet 1    donepeziL (Aricept) 5 mg tablet Take 1 Tablet by mouth nightly. 90 Tablet 1    cetirizine (ZyrTEC) 10 mg tablet Take 1 Tablet by mouth in the morning. 90 Tablet 1    amLODIPine (NORVASC) 10 mg tablet Take 1 Tablet by mouth in the morning. 90 Tablet 1    potassium chloride (K-DUR, KLOR-CON M20) 20 mEq tablet Take 1 Tablet by mouth two (2) times a day. 180 Tablet 1    omeprazole (PRILOSEC) 20 mg capsule Take 1 Capsule by mouth every morning. 90 Capsule 1    metoprolol succinate (TOPROL-XL) 25 mg XL tablet Take 1 Tablet by mouth in the morning. 90 Tablet 1    losartan (COZAAR) 25 mg tablet Take 1 Tablet by mouth daily.  90 Tablet 1    lancets misc Use to check glucose daily 100 Each 11    glucose blood VI test strips (ASCENSIA AUTODISC VI, ONE TOUCH ULTRA TEST VI) strip Use to check glucose daily 100 Strip 5    albuterol (PROVENTIL HFA, VENTOLIN HFA, PROAIR HFA) 90 mcg/actuation inhaler Take 1 Puff by inhalation every four (4) hours as needed for Wheezing. 18 g 3    ferrous sulfate (FeroSuL) 325 mg (65 mg iron) tablet TAKE 1 TABLET BY MOUTH DAILY (BEFORE BREAKFAST). 90 Tablet 0    lactulose (CHRONULAC) 10 gram/15 mL solution TAKE 30 ML TWICE A DAY AS NEEDED 5400 mL 0    Accu-Chek Ratna Plus test strp strip TEST BLOOD SUGAR TWICE A  Strip 2    lancets (Accu-Chek Softclix Lancets) misc TEST BLOOD SUGAR TWICE A  Each 1    Accu-Chek Ratna Control Soln soln USE AS DIRECTED. 3 Bottle 0    BD Single Use Swabs Regular padm TEST BLOOD SUGAR TWICE DAILY 180 Pad 1    acetaminophen (TYLENOL) 325 mg tablet Take 650 mg by mouth every six (6) hours as needed for Pain. No Known Allergies  Past Medical History:   Diagnosis Date    Anemia 4/26/2017    Anxiety     Arrhythmia     A FIB    Arthritis     Atherosclerosis of artery of extremity with rest pain [facility])     Atrial fibrillation ([facility] 7/21/2020    Benign prostatic hyperplasia 4/26/2017    CAD (coronary artery disease)     pacemaker    Cancer ([facility] 2010    GASTRIC    Chronic kidney disease     Chronic obstructive pulmonary disease (HCC)     Depression     Diabetes ([facility]     BORDERLINE, NO MEDS. Diverticulosis of colon     Dyslipidemia 4/26/2017    GERD (gastroesophageal reflux disease)     Glaucoma     History of complete heart block     s/p watchman procedure and replaced in 11/2017 by Dr. Neetu Malik     History of CVA (cerebrovascular accident) 7/21/2020    Hypercholesteremia     Hypertension     Hypomagnesemia 7/13/2020    Nocturia 4/26/2017    PUD (peptic ulcer disease)     GI BLEEDING    PVD (peripheral vascular disease) ([address]     Rectal hemorrhage 4/26/2017    Strabismus     Stroke ([facility] 2000 APPROX.     SOME VISUAL DEFICIT    Type 2 diabetes mellitus without complications ([facility] 3/35/5134    Venous stasis 4/26/2017     Past Surgical History:   Procedure Laterality Date    HX AMPUTATION TOE Right     HX COLONOSCOPY      HX GI  1998    PARTIAL GASTRECTOMY ( DR ALVIN ALBA)    HX HEART CATHETERIZATION      HX ORTHOPAEDIC Left 1980    KNEE CARTILAGE    HX ORTHOPAEDIC Right 2019    AMPUTATION RIGHT GREAT TOE    HX ORTHOPAEDIC Left 2021    AMPUTATION 3 TOES     HX ORTHOPAEDIC Left 02/2022    AMPUTATION 4TH & 5TH TOES    HX OTHER SURGICAL      LEFT HAND SKIN GRAFT (BURN) DONOR RIGHT THIGH    HX PACEMAKER  7806,6851    DR Bhargav Chou; WATCHMAN PROCEDURE. .. noted [facility] device on 2017 noted      Family History   Problem Relation Age of Onset    Stroke Mother     Stroke Father     Cancer Brother         PROSTATE    Anesth Problems Neg Hx      Social History     Tobacco Use    Smoking status: Never    Smokeless tobacco: Never   Substance Use Topics    Alcohol use: Not Currently     Comment: QUIT 2000          Objective:   Visit Vitals  BP (!) 115/56 (BP 1 Location: Right upper arm, BP Patient Position: Sitting, BP Cuff Size: Adult)   Pulse (!) 50   Temp 97 °F (36.1 °C) (Skin)   Resp 18   Ht 6' (1.829 m)   SpO2 97%   BMI 24.07 kg/m²        Physical Exam  Vitals and nursing note reviewed. Constitutional:       Appearance: Normal appearance. Cardiovascular:      Rate and Rhythm: Bradycardia present. Heart sounds: Murmur heard. Pulmonary:      Effort: Pulmonary effort is normal.      Breath sounds: Normal breath sounds. Abdominal:      General: Bowel sounds are normal.      Palpations: Abdomen is soft. Tenderness: There is no abdominal tenderness. There is no guarding. Musculoskeletal:        Legs:    Skin:     General: Skin is warm and dry. Neurological:      Mental Status: He is alert and oriented to person, place, and time. Mental status is at baseline.    Psychiatric:         Mood and Affect: Mood normal. Behavior: Behavior normal.          We discussed the expected course, resolution and complications of the diagnosis(es) in detail. Medication risks, benefits, costs, interactions, and alternatives were discussed as indicated. I advised him to contact the office if his condition worsens, changes or fails to improve as anticipated. He expressed understanding with the diagnosis(es) and plan.      Anjali Vivas NP

## 2022-11-17 LAB
ALBUMIN SERPL-MCNC: 3.6 G/DL (ref 3.6–4.6)
BASOPHILS # BLD AUTO: 0 X10E3/UL (ref 0–0.2)
BASOPHILS NFR BLD AUTO: 0 %
BUN SERPL-MCNC: 26 MG/DL (ref 8–27)
BUN/CREAT SERPL: 18 (ref 10–24)
CALCIUM SERPL-MCNC: 9.3 MG/DL (ref 8.6–10.2)
CHLORIDE SERPL-SCNC: 103 MMOL/L (ref 96–106)
CO2 SERPL-SCNC: 21 MMOL/L (ref 20–29)
CREAT SERPL-MCNC: 1.46 MG/DL (ref 0.76–1.27)
EGFR: 47 ML/MIN/1.73
EOSINOPHIL # BLD AUTO: 0.2 X10E3/UL (ref 0–0.4)
EOSINOPHIL NFR BLD AUTO: 3 %
ERYTHROCYTE [DISTWIDTH] IN BLOOD BY AUTOMATED COUNT: 13.2 % (ref 11.6–15.4)
GLUCOSE SERPL-MCNC: 99 MG/DL (ref 70–99)
HCT VFR BLD AUTO: 34.5 % (ref 37.5–51)
HGB BLD-MCNC: 11.2 G/DL (ref 13–17.7)
IMM GRANULOCYTES # BLD AUTO: 0 X10E3/UL (ref 0–0.1)
IMM GRANULOCYTES NFR BLD AUTO: 1 %
LYMPHOCYTES # BLD AUTO: 1.6 X10E3/UL (ref 0.7–3.1)
LYMPHOCYTES NFR BLD AUTO: 22 %
MCH RBC QN AUTO: 29.9 PG (ref 26.6–33)
MCHC RBC AUTO-ENTMCNC: 32.5 G/DL (ref 31.5–35.7)
MCV RBC AUTO: 92 FL (ref 79–97)
MONOCYTES # BLD AUTO: 0.6 X10E3/UL (ref 0.1–0.9)
MONOCYTES NFR BLD AUTO: 9 %
NEUTROPHILS # BLD AUTO: 4.6 X10E3/UL (ref 1.4–7)
NEUTROPHILS NFR BLD AUTO: 65 %
PHOSPHATE SERPL-MCNC: 3 MG/DL (ref 2.8–4.1)
PLATELET # BLD AUTO: 289 X10E3/UL (ref 150–450)
POTASSIUM SERPL-SCNC: 5.1 MMOL/L (ref 3.5–5.2)
RBC # BLD AUTO: 3.74 X10E6/UL (ref 4.14–5.8)
SODIUM SERPL-SCNC: 142 MMOL/L (ref 134–144)
SPECIMEN STATUS REPORT, ROLRST: NORMAL
TSH SERPL DL<=0.005 MIU/L-ACNC: 1.41 UIU/ML (ref 0.45–4.5)
WBC # BLD AUTO: 7.1 X10E3/UL (ref 3.4–10.8)

## 2022-11-21 NOTE — PROGRESS NOTES
Please let patient know that blood counts and kidney functions are decreased but stable compared to his baseline. Continue monitoring with labs again in 3-4 mo. If any questions, let me know.  may schedule.

## 2022-11-22 ENCOUNTER — TELEPHONE (OUTPATIENT)
Dept: PRIMARY CARE CLINIC | Age: 87
End: 2022-11-22

## 2022-11-22 NOTE — TELEPHONE ENCOUNTER
Phoebe Phelps, lianahtr cld & std she thought pt was having meds ordered by Irvin Silva at visit last wk but she has not seen any meds. Please call Phoebe Phelps to advise. Lisa Weiss  [phone]

## 2022-12-12 ENCOUNTER — TELEPHONE (OUTPATIENT)
Dept: PRIMARY CARE CLINIC | Age: 87
End: 2022-12-12

## 2022-12-16 ENCOUNTER — OFFICE VISIT (OUTPATIENT)
Dept: PRIMARY CARE CLINIC | Age: 87
End: 2022-12-16
Payer: MEDICARE

## 2022-12-16 VITALS
DIASTOLIC BLOOD PRESSURE: 63 MMHG | HEART RATE: 62 BPM | SYSTOLIC BLOOD PRESSURE: 124 MMHG | OXYGEN SATURATION: 95 % | RESPIRATION RATE: 18 BRPM | TEMPERATURE: 98.4 F

## 2022-12-16 DIAGNOSIS — L97.511 DIABETIC ULCER OF TOE OF RIGHT FOOT ASSOCIATED WITH DIABETES MELLITUS DUE TO UNDERLYING CONDITION, LIMITED TO BREAKDOWN OF SKIN (HCC): Primary | ICD-10-CM

## 2022-12-16 DIAGNOSIS — E08.621 DIABETIC ULCER OF TOE OF RIGHT FOOT ASSOCIATED WITH DIABETES MELLITUS DUE TO UNDERLYING CONDITION, LIMITED TO BREAKDOWN OF SKIN (HCC): Primary | ICD-10-CM

## 2022-12-16 PROBLEM — I50.22 CHRONIC SYSTOLIC (CONGESTIVE) HEART FAILURE (HCC): Status: ACTIVE | Noted: 2022-12-16

## 2022-12-16 PROCEDURE — 3078F DIAST BP <80 MM HG: CPT

## 2022-12-16 PROCEDURE — 3074F SYST BP LT 130 MM HG: CPT

## 2022-12-16 PROCEDURE — 99213 OFFICE O/P EST LOW 20 MIN: CPT

## 2022-12-16 PROCEDURE — 1123F ACP DISCUSS/DSCN MKR DOCD: CPT

## 2022-12-16 RX ORDER — DOXYCYCLINE 100 MG/1
100 CAPSULE ORAL 2 TIMES DAILY
Qty: 20 CAPSULE | Refills: 0 | Status: SHIPPED | OUTPATIENT
Start: 2022-12-16

## 2022-12-16 NOTE — PROGRESS NOTES
Chief Complaint   Patient presents with    Diabetes     DM follow up    Toe Pain     Complain of toes on right foot painful. Annual Wellness Visit     Medicare wellness      1. Have you been to the ER, urgent care clinic since your last visit? Hospitalized since your last visit? No    2. Have you seen or consulted any other health care providers outside of the [address] [place] [address] since your last visit? Include any pap smears or colon screening.  No  Visit Vitals  /63 (BP 1 Location: Right arm, BP Patient Position: Sitting, BP Cuff Size: Adult)   Pulse 62   Temp 98.4 °F (36.9 °C) (Oral)   Resp 18   SpO2 95%

## 2022-12-16 NOTE — PROGRESS NOTES
Lang Mata is a 82 y.o. male who presents to the office today for the following:    Chief Complaint   Patient presents with    Diabetes     DM follow up    Toe Pain     Complain of toes on right foot painful. Annual Wellness Visit     Medicare wellness        Past Medical History:   Diagnosis Date    Anemia 4/26/2017    Anxiety     Arrhythmia     A FIB    Arthritis     Atherosclerosis of artery of extremity with rest pain [facility])     Atrial fibrillation ([address] 7/21/2020    Benign prostatic hyperplasia 4/26/2017    CAD (coronary artery disease)     pacemaker    Cancer ([address] 2010    GASTRIC    Chronic kidney disease     Chronic obstructive pulmonary disease (HCC)     Depression     Diabetes ([address]     BORDERLINE, NO MEDS. Diverticulosis of colon     Dyslipidemia 4/26/2017    GERD (gastroesophageal reflux disease)     Glaucoma     History of complete heart block     s/p watchman procedure and replaced in 11/2017 by Dr. Gilma Frey     History of CVA (cerebrovascular accident) 7/21/2020    Hypercholesteremia     Hypertension     Hypomagnesemia 7/13/2020    Nocturia 4/26/2017    PUD (peptic ulcer disease)     GI BLEEDING    PVD (peripheral vascular disease) ([address]     Rectal hemorrhage 4/26/2017    Strabismus     Stroke ([address] 2000 APPROX. SOME VISUAL DEFICIT    Type 2 diabetes mellitus without complications ([address] 7/52/8011    Venous stasis 4/26/2017       Past Surgical History:   Procedure Laterality Date    HX AMPUTATION TOE Right     HX COLONOSCOPY      HX GI  1998    PARTIAL GASTRECTOMY ( DR ALVIN ALBA)    HX HEART CATHETERIZATION      HX ORTHOPAEDIC Left 1980    KNEE CARTILAGE    HX ORTHOPAEDIC Right 2019    AMPUTATION RIGHT GREAT TOE    HX ORTHOPAEDIC Left 2021    AMPUTATION 3 TOES     HX ORTHOPAEDIC Left 02/2022    AMPUTATION 4TH & 5TH TOES    HX OTHER SURGICAL      LEFT HAND SKIN GRAFT (BURN) DONOR RIGHT THIGH    HX PACEMAKER  0413,7369    DR Dreama Runner; WATCHMAN PROCEDURE. .. noted [facility] device on 2017 noted         Family History   Problem Relation Age of Onset    Stroke Mother     Stroke Father     Cancer Brother         PROSTATE    Anesth Problems Neg Hx         Social History     Tobacco Use    Smoking status: Never    Smokeless tobacco: Never   Vaping Use    Vaping Use: Never used   Substance Use Topics    Alcohol use: Not Currently     Comment: QUIT 2000    Drug use: No        HPI  Patient here for right foot pain involving second and third toe with extensive PMH of hypertension, hyperlipidemia, constipation, GERD, glaucoma,BPH, cataracts, CVA, stage 3 CKD, type 2 DM, elevated PSA, CHF, Pacemaker, and left BKA. Patient states that he believes his shoe wore a small wound of top of 2nd and 3rd toe during physical therapy this week. Scant drainage with some swelling per patient. Denies foul odor, significant swelling, fever, body aches, or chills. Current Outpatient Medications on File Prior to Visit   Medication Sig    clopidogreL (PLAVIX) 75 mg tab Take 75 mg by mouth daily. dorzolamide-timoloL (COSOPT) 22.3-6.8 mg/mL ophthalmic solution INSTILL 1 DROP INTO EACH EYE TWICE DAILY    OneTouch Ultra2 Meter misc TO CHECK GLUCOSE ONCE DAILY    ammonium lactate (LAC-HYDRIN) 12 % lotion APPLY LOTION TOPICALLY TO AFFECTED AREA ONCE DAILY TO FEET AND ANKLES    furosemide (LASIX) 20 mg tablet Take 0.5 Tablets by mouth daily. hydroCHLOROthiazide (HYDRODIURIL) 25 mg tablet Take 1 Tablet by mouth in the morning. latanoprost (XALATAN) 0.005 % ophthalmic solution Administer 1 Drop to both eyes nightly. atorvastatin (LIPITOR) 20 mg tablet Take 1 Tablet by mouth in the morning. donepeziL (Aricept) 5 mg tablet Take 1 Tablet by mouth nightly. cetirizine (ZyrTEC) 10 mg tablet Take 1 Tablet by mouth in the morning. amLODIPine (NORVASC) 10 mg tablet Take 1 Tablet by mouth in the morning. potassium chloride (K-DUR, KLOR-CON M20) 20 mEq tablet Take 1 Tablet by mouth two (2) times a day.     omeprazole (PRILOSEC) 20 mg capsule Take 1 Capsule by mouth every morning. metoprolol succinate (TOPROL-XL) 25 mg XL tablet Take 1 Tablet by mouth in the morning. losartan (COZAAR) 25 mg tablet Take 1 Tablet by mouth daily. lancets misc Use to check glucose daily    glucose blood VI test strips (ASCENSIA AUTODISC VI, ONE TOUCH ULTRA TEST VI) strip Use to check glucose daily    albuterol (PROVENTIL HFA, VENTOLIN HFA, PROAIR HFA) 90 mcg/actuation inhaler Take 1 Puff by inhalation every four (4) hours as needed for Wheezing. ferrous sulfate (FeroSuL) 325 mg (65 mg iron) tablet TAKE 1 TABLET BY MOUTH DAILY (BEFORE BREAKFAST). lactulose (CHRONULAC) 10 gram/15 mL solution TAKE 30 ML TWICE A DAY AS NEEDED    Accu-Chek Ratna Plus test strp strip TEST BLOOD SUGAR TWICE A DAY    lancets (Accu-Chek Softclix Lancets) misc TEST BLOOD SUGAR TWICE A DAY    Accu-Chek Ratna Control Soln soln USE AS DIRECTED. BD Single Use Swabs Regular padm TEST BLOOD SUGAR TWICE DAILY    acetaminophen (TYLENOL) 325 mg tablet Take 650 mg by mouth every six (6) hours as needed for Pain. No current facility-administered medications on file prior to visit. Medications Ordered Today   Medications    doxycycline (MONODOX) 100 mg capsule     Sig: Take 1 Capsule by mouth two (2) times a day. Indications: an infection of the skin and the tissue below the skin     Dispense:  20 Capsule     Refill:  0        Review of Systems   Constitutional:  Negative for chills, fever and malaise/fatigue. HENT:  Negative for congestion, sinus pain and sore throat. Eyes:  Negative for blurred vision and double vision. Respiratory:  Negative for cough and shortness of breath. Cardiovascular:  Negative for chest pain and palpitations. Gastrointestinal:  Negative for abdominal pain, nausea and vomiting. Genitourinary:  Negative for frequency, hematuria and urgency. Musculoskeletal:  Negative for falls and myalgias.    Skin:         Wound noted below joint on second and third toe with minimal swelling and scant drainage. Neurological:  Negative for dizziness and headaches. Psychiatric/Behavioral:  Positive for memory loss. Negative for depression, hallucinations, substance abuse and suicidal ideas. The patient is not nervous/anxious and does not have insomnia. Visit Vitals  /63 (BP 1 Location: Right arm, BP Patient Position: Sitting, BP Cuff Size: Adult)   Pulse 62   Temp 98.4 °F (36.9 °C) (Oral)   Resp 18   SpO2 95%       Last Point of Care HGB A1C  Hemoglobin A1c (POC)   Date Value Ref Range Status   11/16/2022 5.9 % Final         Physical Exam  Constitutional:       Appearance: Normal appearance. He is normal weight. HENT:      Head: Normocephalic and atraumatic. Cardiovascular:      Rate and Rhythm: Normal rate. Heart sounds: Murmur heard. Pulmonary:      Effort: Pulmonary effort is normal.      Breath sounds: Normal breath sounds. Musculoskeletal:      Left Lower Extremity: Left leg is amputated below knee. Skin:            Comments: Abrasion noted on right foot 2nd and third toes, scant purulent drainage noted, tender with palpation, some soft tissue swelling however not encircling or sausage-like appearance. Neurological:      Mental Status: He is alert and oriented to person, place, and time. Mental status is at baseline. Psychiatric:         Mood and Affect: Mood normal.         Behavior: Behavior normal.        1. Diabetic ulcer of toe of right foot associated with diabetes mellitus due to underlying condition, limited to breakdown of skin (HCC)  -     doxycycline (MONODOX) 100 mg capsule; Take 1 Capsule by mouth two (2) times a day. Indications: an infection of the skin and the tissue below the skin, Normal, Disp-20 Capsule, R-00  Will start doxycycline now for 10 days. Take as directed. Recommended follow up on 12/19/2022 for re-evaluation and close monitoring.   Recommended to take Lantus as directed and home FSBS to optimal glucose control to help with healing. Educated to contact provider immediately or go to closest ER if significant swelling, pain, or fever occurs. We discussed the expected course, resolution and complications of the diagnosis(es) in detail. Medication risks, benefits, costs, interactions, and alternatives were discussed as indicated. I advised her to contact the office if her condition worsens, changes or fails to improve as anticipated. She expressed understanding with the diagnosis(es) and plan. Patient verbalizes understanding of plan of care as discussed above    Follow-up and Dispositions    Return in about 3 days (around 12/19/2022), or if symptoms worsen or fail to improve.

## 2022-12-19 ENCOUNTER — OFFICE VISIT (OUTPATIENT)
Dept: PRIMARY CARE CLINIC | Age: 87
End: 2022-12-19
Payer: MEDICARE

## 2022-12-19 VITALS — SYSTOLIC BLOOD PRESSURE: 123 MMHG | HEART RATE: 80 BPM | DIASTOLIC BLOOD PRESSURE: 71 MMHG

## 2022-12-19 DIAGNOSIS — L97.511 DIABETIC ULCER OF TOE OF RIGHT FOOT ASSOCIATED WITH DIABETES MELLITUS DUE TO UNDERLYING CONDITION, LIMITED TO BREAKDOWN OF SKIN (HCC): Primary | ICD-10-CM

## 2022-12-19 DIAGNOSIS — E08.621 DIABETIC ULCER OF TOE OF RIGHT FOOT ASSOCIATED WITH DIABETES MELLITUS DUE TO UNDERLYING CONDITION, LIMITED TO BREAKDOWN OF SKIN (HCC): Primary | ICD-10-CM

## 2022-12-19 PROCEDURE — 3074F SYST BP LT 130 MM HG: CPT

## 2022-12-19 PROCEDURE — 1123F ACP DISCUSS/DSCN MKR DOCD: CPT

## 2022-12-19 PROCEDURE — 3078F DIAST BP <80 MM HG: CPT

## 2022-12-19 PROCEDURE — 99213 OFFICE O/P EST LOW 20 MIN: CPT

## 2022-12-19 NOTE — PROGRESS NOTES
Lang Mata is a 82 y.o. male who presents to the office today for the following:    Chief Complaint   Patient presents with    Follow-up     Follow up look at toe        Past Medical History:   Diagnosis Date    Anemia 4/26/2017    Anxiety     Arrhythmia     A FIB    Arthritis     Atherosclerosis of artery of extremity with rest pain [facility])     Atrial fibrillation ([facility] 7/21/2020    Benign prostatic hyperplasia 4/26/2017    CAD (coronary artery disease)     pacemaker    Cancer ([facility] [address] 2010    GASTRIC    Chronic kidney disease     Chronic obstructive pulmonary disease (HCC)     Depression     Diabetes ([address]     BORDERLINE, NO MEDS. Diverticulosis of colon     Dyslipidemia 4/26/2017    GERD (gastroesophageal reflux disease)     Glaucoma     History of complete heart block     s/p watchman procedure and replaced in 11/2017 by Dr. Gilma Frey     History of CVA (cerebrovascular accident) 7/21/2020    Hypercholesteremia     Hypertension     Hypomagnesemia 7/13/2020    Nocturia 4/26/2017    PUD (peptic ulcer disease)     GI BLEEDING    PVD (peripheral vascular disease) ([facility]     Rectal hemorrhage 4/26/2017    Strabismus     Stroke ([address] 2000 APPROX. SOME VISUAL DEFICIT    Type 2 diabetes mellitus without complications ([address] 9/95/6307    Venous stasis 4/26/2017       Past Surgical History:   Procedure Laterality Date    HX AMPUTATION TOE Right     HX COLONOSCOPY      HX GI  1998    PARTIAL GASTRECTOMY ( DR ALVIN ALBA)    HX HEART CATHETERIZATION      HX ORTHOPAEDIC Left 1980    KNEE CARTILAGE    HX ORTHOPAEDIC Right 2019    AMPUTATION RIGHT GREAT TOE    HX ORTHOPAEDIC Left 2021    AMPUTATION 3 TOES     HX ORTHOPAEDIC Left 02/2022    AMPUTATION 4TH & 5TH TOES    HX OTHER SURGICAL      LEFT HAND SKIN GRAFT (BURN) DONOR RIGHT THIGH    HX PACEMAKER  5212,1684    DR Dreama Runner; WATCHMAN PROCEDURE. .. noted [facility] device on 2017 noted         Family History   Problem Relation Age of Onset    Stroke Mother     Stroke Father     Cancer Brother         PROSTATE    Anesth Problems Neg Hx         Social History     Tobacco Use    Smoking status: Never    Smokeless tobacco: Never   Vaping Use    Vaping Use: Never used   Substance Use Topics    Alcohol use: Not Currently     Comment: QUIT 2000    Drug use: No        HPI  Patient here for followup of right foot wound involving 2nd and 3rd toe. Extensive PMH of hypertension, hyperlipidemia, constipation, GERD, glaucoma,BPH, cataracts, CVA, stage 3 CKD, type 2 DM, elevated PSA, CHF, Pacemaker, and left BKA. Patient stated during first visit that he believed his shoe wore a small wound of top of 2nd and 3rd toe during physical therapy. Scant drainage with some swelling. Patient reports significant decrease in pain. Some tenderness continues of 2nd toe of right foot. Patient states swelling and discoloration of foot has improved and no visible drainage noted. Patient states that daughter is working with Podiatrist (cannot recall name) in [place] to make earlier appointment. Current appointment is scheduled for 1/11/2023. Current Outpatient Medications on File Prior to Visit   Medication Sig    doxycycline (MONODOX) 100 mg capsule Take 1 Capsule by mouth two (2) times a day. Indications: an infection of the skin and the tissue below the skin    clopidogreL (PLAVIX) 75 mg tab Take 75 mg by mouth daily. dorzolamide-timoloL (COSOPT) 22.3-6.8 mg/mL ophthalmic solution INSTILL 1 DROP INTO EACH EYE TWICE DAILY    OneTouch Ultra2 Meter misc TO CHECK GLUCOSE ONCE DAILY    ammonium lactate (LAC-HYDRIN) 12 % lotion APPLY LOTION TOPICALLY TO AFFECTED AREA ONCE DAILY TO FEET AND ANKLES    furosemide (LASIX) 20 mg tablet Take 0.5 Tablets by mouth daily. hydroCHLOROthiazide (HYDRODIURIL) 25 mg tablet Take 1 Tablet by mouth in the morning. latanoprost (XALATAN) 0.005 % ophthalmic solution Administer 1 Drop to both eyes nightly.     atorvastatin (LIPITOR) 20 mg tablet Take 1 Tablet by mouth in the morning. donepeziL (Aricept) 5 mg tablet Take 1 Tablet by mouth nightly. cetirizine (ZyrTEC) 10 mg tablet Take 1 Tablet by mouth in the morning. amLODIPine (NORVASC) 10 mg tablet Take 1 Tablet by mouth in the morning. potassium chloride (K-DUR, KLOR-CON M20) 20 mEq tablet Take 1 Tablet by mouth two (2) times a day. omeprazole (PRILOSEC) 20 mg capsule Take 1 Capsule by mouth every morning. metoprolol succinate (TOPROL-XL) 25 mg XL tablet Take 1 Tablet by mouth in the morning. losartan (COZAAR) 25 mg tablet Take 1 Tablet by mouth daily. lancets misc Use to check glucose daily    glucose blood VI test strips (ASCENSIA AUTODISC VI, ONE TOUCH ULTRA TEST VI) strip Use to check glucose daily    albuterol (PROVENTIL HFA, VENTOLIN HFA, PROAIR HFA) 90 mcg/actuation inhaler Take 1 Puff by inhalation every four (4) hours as needed for Wheezing. ferrous sulfate (FeroSuL) 325 mg (65 mg iron) tablet TAKE 1 TABLET BY MOUTH DAILY (BEFORE BREAKFAST). lactulose (CHRONULAC) 10 gram/15 mL solution TAKE 30 ML TWICE A DAY AS NEEDED    Accu-Chek Ratna Plus test strp strip TEST BLOOD SUGAR TWICE A DAY    lancets (Accu-Chek Softclix Lancets) misc TEST BLOOD SUGAR TWICE A DAY    Accu-Chek Ratna Control Soln soln USE AS DIRECTED. BD Single Use Swabs Regular padm TEST BLOOD SUGAR TWICE DAILY    acetaminophen (TYLENOL) 325 mg tablet Take 650 mg by mouth every six (6) hours as needed for Pain. No current facility-administered medications on file prior to visit. No orders of the defined types were placed in this encounter. Review of Systems   Constitutional:  Negative for chills, fever and malaise/fatigue. HENT:  Negative for congestion, sinus pain and sore throat. Eyes:  Negative for blurred vision and double vision. Respiratory:  Negative for cough and shortness of breath. Cardiovascular:  Negative for chest pain and palpitations.    Gastrointestinal:  Negative for abdominal pain, nausea and vomiting. Genitourinary:  Negative for frequency, hematuria and urgency. Musculoskeletal:  Negative for falls and myalgias. Skin:         Wound noted below joint on second toe with resolution of swelling and drainage. Neurological:  Negative for dizziness and headaches. Psychiatric/Behavioral:  Positive for memory loss. Negative for depression, hallucinations, substance abuse and suicidal ideas. The patient is not nervous/anxious and does not have insomnia. Visit Vitals  /71 (BP 1 Location: Right arm, BP Patient Position: Sitting, BP Cuff Size: Adult)   Pulse 80       Last Point of Care HGB A1C  Hemoglobin A1c (POC)   Date Value Ref Range Status   11/16/2022 5.9 % Final         Physical Exam  Constitutional:       Appearance: Normal appearance. He is normal weight. HENT:      Head: Normocephalic and atraumatic. Cardiovascular:      Rate and Rhythm: Normal rate. Heart sounds: Murmur heard. Pulmonary:      Effort: Pulmonary effort is normal.      Breath sounds: Normal breath sounds. Musculoskeletal:      Left Lower Extremity: Left leg is amputated below knee. Skin:            Comments: Abrasion to 2nd toe of right foot. No drainage noted. Significant improvement of soft tissue swelling. No tenderness with palpation. Neurological:      Mental Status: He is alert and oriented to person, place, and time. Mental status is at baseline. Psychiatric:         Mood and Affect: Mood normal.         Behavior: Behavior normal.        1. Diabetic ulcer of toe of right foot associated with diabetes mellitus due to underlying condition, limited to breakdown of skin (HCC)  -     doxycycline (MONODOX) 100 mg capsule; Take 1 Capsule by mouth two (2) times a day. Indications: an infection of the skin and the tissue below the skin, Normal, Disp-20 Capsule, R-000  Continue doxycycline. Take as directed.   Take Lantus as directed and monitor home FSBS for optimal glucose control to assist with wound healing. Educated to contact provider immediately or go to closest ER if significant swelling, pain or fever. Keep follow up with Podiatry. Wear good fitting shoes. Check feet daily. Contact provider immediately if new wound develops. We discussed the expected course, resolution and complications of the diagnosis(es) in detail. Medication risks, benefits, costs, interactions, and alternatives were discussed as indicated. I advised her to contact the office if her condition worsens, changes or fails to improve as anticipated. She expressed understanding with the diagnosis(es) and plan.      Patient verbalizes understanding of plan of care as discussed above

## 2022-12-19 NOTE — PROGRESS NOTES
Chief Complaint   Patient presents with    Follow-up     Follow up look at toe      1. Have you been to the ER, urgent care clinic since your last visit? Hospitalized since your last visit? No    2. Have you seen or consulted any other health care providers outside of the [address] [place] [address] since your last visit? Include any pap smears or colon screening.  No  Visit Vitals  /71 (BP 1 Location: Right arm, BP Patient Position: Sitting, BP Cuff Size: Adult)   Pulse 80

## 2023-01-16 ENCOUNTER — TELEPHONE (OUTPATIENT)
Dept: PRIMARY CARE CLINIC | Age: 88
End: 2023-01-16

## 2023-01-23 ENCOUNTER — TELEPHONE (OUTPATIENT)
Dept: PRIMARY CARE CLINIC | Age: 88
End: 2023-01-23

## 2023-01-23 NOTE — TELEPHONE ENCOUNTER
Zohreh, Care coordinator with [facility], request that last H&P be faxed to [facility] and to --fax for  [phone],  She is trying to find a bed for this pt.

## 2023-01-25 ENCOUNTER — TELEPHONE (OUTPATIENT)
Dept: PRIMARY CARE CLINIC | Age: 88
End: 2023-01-25

## 2023-01-25 DIAGNOSIS — Z86.73 HISTORY OF CVA (CEREBROVASCULAR ACCIDENT): Primary | ICD-10-CM

## 2023-01-25 DIAGNOSIS — S98.911A: ICD-10-CM

## 2023-01-25 DIAGNOSIS — S88.912A AMPUTATION OF LEFT LOWER EXTREMITY, INITIAL ENCOUNTER (HCC): ICD-10-CM

## 2023-01-25 NOTE — TELEPHONE ENCOUNTER
Zohreh, care coordinator with [facility] needs you to call her about this patient. She needs info trying to get pt into [facility].  Her # is

## 2023-01-25 NOTE — TELEPHONE ENCOUNTER
Zohreh stated patient is having 1.5 toes amputated tomorrow by Podiatrist and needs to be in a skilled facility, post op.  will accept patient but not orders from the Podiatrist. She asked if you would consider writing the orders for admission and care (she will call the Podiatrist and get the post op orders from him for you). But if you do not feel comfortable doing this, she fully understands and will try other options. I informed her most patient's insurance won't even accept your orders for diabetic shoes without a doctor's signature! She stated she would check on this if you wrote the orders and to thank you. Her number is [phone].

## 2023-01-26 ENCOUNTER — TELEPHONE (OUTPATIENT)
Dept: PRIMARY CARE CLINIC | Age: 88
End: 2023-01-26

## 2023-01-26 NOTE — TELEPHONE ENCOUNTER
Man Emmanuel rtned your call and std you can fax the order straight to [facility] for this pt to be admitted there.

## 2023-03-22 RX ORDER — HYDROCHLOROTHIAZIDE 25 MG/1
25 TABLET ORAL DAILY
Qty: 30 TABLET | Refills: 0 | Status: SHIPPED | OUTPATIENT
Start: 2023-03-22

## 2023-04-07 NOTE — PROGRESS NOTES
TRANSFER - IN REPORT:    Verbal report received from Kerri(name) on Maura Aguilar  being received from [facility]) for routine post - op      Report consisted of patients Situation, Background, Assessment and   Recommendations(SBAR). Information from the following report(s) SBAR and OR Summary was reviewed with the receiving nurse. Opportunity for questions and clarification was provided. Assessment completed upon patients arrival to unit and care assumed. No

## 2023-04-19 ENCOUNTER — TELEPHONE (OUTPATIENT)
Dept: PRIMARY CARE CLINIC | Age: 88
End: 2023-04-19

## 2023-04-19 DIAGNOSIS — R31.9 URINARY TRACT INFECTION WITH HEMATURIA, SITE UNSPECIFIED: Primary | ICD-10-CM

## 2023-04-19 DIAGNOSIS — N39.0 URINARY TRACT INFECTION WITH HEMATURIA, SITE UNSPECIFIED: Primary | ICD-10-CM

## 2023-04-19 NOTE — TELEPHONE ENCOUNTER
Daughter , Gabino Villalba, states that father is  having incontinence, strong urine smell,  no fever, getting things mixed  since end of last week. Thinks he has UTI. He is unable to come to office as transportation has to be arranged 3 days in advance. Please advise.     [phone]

## 2023-04-20 ENCOUNTER — CLINICAL SUPPORT (OUTPATIENT)
Dept: PRIMARY CARE CLINIC | Age: 88
End: 2023-04-20

## 2023-04-20 DIAGNOSIS — R31.9 URINARY TRACT INFECTION WITH HEMATURIA, SITE UNSPECIFIED: Primary | ICD-10-CM

## 2023-04-20 DIAGNOSIS — R82.90 BAD ODOR OF URINE: ICD-10-CM

## 2023-04-20 DIAGNOSIS — N39.42 URINARY INCONTINENCE WITHOUT SENSORY AWARENESS: Primary | ICD-10-CM

## 2023-04-20 DIAGNOSIS — N39.0 URINARY TRACT INFECTION WITH HEMATURIA, SITE UNSPECIFIED: Primary | ICD-10-CM

## 2023-04-20 LAB
BILIRUB UR QL STRIP: NEGATIVE
GLUCOSE UR-MCNC: NEGATIVE MG/DL
KETONES P FAST UR STRIP-MCNC: NEGATIVE MG/DL
PH UR STRIP: 6 [PH] (ref 4.6–8)
PROT UR QL STRIP: NORMAL
SP GR UR STRIP: 1.02 (ref 1–1.03)
UA UROBILINOGEN AMB POC: NORMAL (ref 0.2–1)
URINALYSIS CLARITY POC: NORMAL
URINALYSIS COLOR POC: YELLOW
URINE BLOOD POC: NORMAL
URINE LEUKOCYTES POC: NORMAL
URINE NITRITES POC: NEGATIVE

## 2023-04-20 RX ORDER — CIPROFLOXACIN 500 MG/1
500 TABLET ORAL 2 TIMES DAILY
Qty: 20 TABLET | Refills: 0 | Status: SHIPPED | OUTPATIENT
Start: 2023-04-20 | End: 2023-04-23 | Stop reason: ALTCHOICE

## 2023-04-23 LAB — BACTERIA UR CULT: ABNORMAL

## 2023-04-23 RX ORDER — NITROFURANTOIN 25; 75 MG/1; MG/1
100 CAPSULE ORAL 2 TIMES DAILY
Qty: 14 CAPSULE | Refills: 0 | Status: SHIPPED | OUTPATIENT
Start: 2023-04-23 | End: 2023-04-30

## 2023-05-16 ENCOUNTER — APPOINTMENT (OUTPATIENT)
Facility: HOSPITAL | Age: 88
DRG: 854 | End: 2023-05-16
Payer: MEDICARE

## 2023-05-16 ENCOUNTER — HOSPITAL ENCOUNTER (INPATIENT)
Facility: HOSPITAL | Age: 88
LOS: 10 days | Discharge: NURSING, REHAB OR LONG-TERM CARE | DRG: 854 | End: 2023-05-26
Attending: EMERGENCY MEDICINE | Admitting: INTERNAL MEDICINE
Payer: MEDICARE

## 2023-05-16 DIAGNOSIS — T14.8XXA SURGICAL WOUND PRESENT: ICD-10-CM

## 2023-05-16 DIAGNOSIS — I96 DRY GANGRENE (HCC): Primary | ICD-10-CM

## 2023-05-16 LAB
ALBUMIN SERPL-MCNC: 2.5 G/DL (ref 3.5–5)
ALBUMIN/GLOB SERPL: 0.5 (ref 1.1–2.2)
ALP SERPL-CCNC: 100 U/L (ref 45–117)
ALT SERPL-CCNC: 35 U/L (ref 12–78)
ANION GAP SERPL CALC-SCNC: 2 MMOL/L (ref 5–15)
APPEARANCE UR: ABNORMAL
AST SERPL W P-5'-P-CCNC: 71 U/L (ref 15–37)
BACTERIA URNS QL MICRO: ABNORMAL /HPF
BASOPHILS # BLD: 0 K/UL (ref 0–0.1)
BASOPHILS NFR BLD: 0 % (ref 0–1)
BILIRUB SERPL-MCNC: 0.5 MG/DL (ref 0.2–1)
BILIRUB UR QL: NEGATIVE
BUN SERPL-MCNC: 56 MG/DL (ref 6–20)
BUN/CREAT SERPL: 35 (ref 12–20)
CA-I BLD-MCNC: 8.5 MG/DL (ref 8.5–10.1)
CHLORIDE SERPL-SCNC: 108 MMOL/L (ref 97–108)
CO2 SERPL-SCNC: 25 MMOL/L (ref 21–32)
COLOR UR: ABNORMAL
CREAT SERPL-MCNC: 1.58 MG/DL (ref 0.7–1.3)
CRP SERPL-MCNC: 18.4 MG/DL (ref 0–0.6)
DIFFERENTIAL METHOD BLD: ABNORMAL
EOSINOPHIL # BLD: 0.2 K/UL (ref 0–0.4)
EOSINOPHIL NFR BLD: 2 % (ref 0–7)
EPITH CASTS URNS QL MICRO: ABNORMAL /LPF
ERYTHROCYTE [DISTWIDTH] IN BLOOD BY AUTOMATED COUNT: 18.4 % (ref 11.5–14.5)
ERYTHROCYTE [SEDIMENTATION RATE] IN BLOOD: >140 MM/HR (ref 0–20)
GLOBULIN SER CALC-MCNC: 5.2 G/DL (ref 2–4)
GLUCOSE SERPL-MCNC: 130 MG/DL (ref 65–100)
GLUCOSE UR STRIP.AUTO-MCNC: NEGATIVE MG/DL
HCT VFR BLD AUTO: 28.6 % (ref 36.6–50.3)
HGB BLD-MCNC: 8.2 G/DL (ref 12.1–17)
HGB UR QL STRIP: NEGATIVE
IMM GRANULOCYTES # BLD AUTO: 0.2 K/UL (ref 0–0.04)
IMM GRANULOCYTES NFR BLD AUTO: 2 % (ref 0–0.5)
KETONES UR QL STRIP.AUTO: NEGATIVE MG/DL
LEUKOCYTE ESTERASE UR QL STRIP.AUTO: ABNORMAL
LYMPHOCYTES # BLD: 1 K/UL (ref 0.8–3.5)
LYMPHOCYTES NFR BLD: 8 % (ref 12–49)
MCH RBC QN AUTO: 25 PG (ref 26–34)
MCHC RBC AUTO-ENTMCNC: 28.7 G/DL (ref 30–36.5)
MCV RBC AUTO: 87.2 FL (ref 80–99)
MONOCYTES # BLD: 0.7 K/UL (ref 0–1)
MONOCYTES NFR BLD: 6 % (ref 5–13)
MUCOUS THREADS URNS QL MICRO: ABNORMAL /LPF
NEUTS SEG # BLD: 10.4 K/UL (ref 1.8–8)
NEUTS SEG NFR BLD: 82 % (ref 32–75)
NITRITE UR QL STRIP.AUTO: NEGATIVE
NRBC # BLD: 0 K/UL (ref 0–0.01)
NRBC BLD-RTO: 0 PER 100 WBC
PH UR STRIP: 5 (ref 5–8)
PLATELET # BLD AUTO: 578 K/UL (ref 150–400)
PMV BLD AUTO: 10.3 FL (ref 8.9–12.9)
POTASSIUM SERPL-SCNC: 4.6 MMOL/L (ref 3.5–5.1)
PROCALCITONIN SERPL-MCNC: 0.26 NG/ML
PROT SERPL-MCNC: 7.7 G/DL (ref 6.4–8.2)
PROT UR STRIP-MCNC: NEGATIVE MG/DL
RBC # BLD AUTO: 3.28 M/UL (ref 4.1–5.7)
RBC #/AREA URNS HPF: ABNORMAL /HPF (ref 0–5)
SODIUM SERPL-SCNC: 135 MMOL/L (ref 136–145)
SP GR UR REFRACTOMETRY: 1.01 (ref 1–1.03)
URINE CULTURE IF INDICATED: ABNORMAL
UROBILINOGEN UR QL STRIP.AUTO: 0.1 EU/DL (ref 0.1–1)
WBC # BLD AUTO: 12.5 K/UL (ref 4.1–11.1)
WBC URNS QL MICRO: ABNORMAL /HPF (ref 0–4)

## 2023-05-16 PROCEDURE — 85025 COMPLETE CBC W/AUTO DIFF WBC: CPT

## 2023-05-16 PROCEDURE — 87086 URINE CULTURE/COLONY COUNT: CPT

## 2023-05-16 PROCEDURE — 2580000003 HC RX 258: Performed by: INTERNAL MEDICINE

## 2023-05-16 PROCEDURE — 6360000002 HC RX W HCPCS: Performed by: INTERNAL MEDICINE

## 2023-05-16 PROCEDURE — 2580000003 HC RX 258: Performed by: NURSE PRACTITIONER

## 2023-05-16 PROCEDURE — 80053 COMPREHEN METABOLIC PANEL: CPT

## 2023-05-16 PROCEDURE — 87205 SMEAR GRAM STAIN: CPT

## 2023-05-16 PROCEDURE — 36415 COLL VENOUS BLD VENIPUNCTURE: CPT

## 2023-05-16 PROCEDURE — 6370000000 HC RX 637 (ALT 250 FOR IP): Performed by: NURSE PRACTITIONER

## 2023-05-16 PROCEDURE — 86140 C-REACTIVE PROTEIN: CPT

## 2023-05-16 PROCEDURE — 87186 SC STD MICRODIL/AGAR DIL: CPT

## 2023-05-16 PROCEDURE — 84145 PROCALCITONIN (PCT): CPT

## 2023-05-16 PROCEDURE — 99223 1ST HOSP IP/OBS HIGH 75: CPT | Performed by: INTERNAL MEDICINE

## 2023-05-16 PROCEDURE — 99285 EMERGENCY DEPT VISIT HI MDM: CPT

## 2023-05-16 PROCEDURE — 81001 URINALYSIS AUTO W/SCOPE: CPT

## 2023-05-16 PROCEDURE — 85652 RBC SED RATE AUTOMATED: CPT

## 2023-05-16 PROCEDURE — 87040 BLOOD CULTURE FOR BACTERIA: CPT

## 2023-05-16 PROCEDURE — 87077 CULTURE AEROBIC IDENTIFY: CPT

## 2023-05-16 PROCEDURE — 1100000000 HC RM PRIVATE

## 2023-05-16 PROCEDURE — 73630 X-RAY EXAM OF FOOT: CPT

## 2023-05-16 PROCEDURE — 87070 CULTURE OTHR SPECIMN AEROBIC: CPT

## 2023-05-16 RX ORDER — ACETAMINOPHEN 650 MG/1
650 SUPPOSITORY RECTAL EVERY 6 HOURS PRN
Status: DISCONTINUED | OUTPATIENT
Start: 2023-05-16 | End: 2023-05-26 | Stop reason: HOSPADM

## 2023-05-16 RX ORDER — SODIUM CHLORIDE 0.9 % (FLUSH) 0.9 %
5-40 SYRINGE (ML) INJECTION PRN
Status: DISCONTINUED | OUTPATIENT
Start: 2023-05-16 | End: 2023-05-26 | Stop reason: HOSPADM

## 2023-05-16 RX ORDER — ATORVASTATIN CALCIUM 20 MG/1
20 TABLET, FILM COATED ORAL DAILY
Status: DISCONTINUED | OUTPATIENT
Start: 2023-05-16 | End: 2023-05-18

## 2023-05-16 RX ORDER — ONDANSETRON 4 MG/1
4 TABLET, ORALLY DISINTEGRATING ORAL EVERY 8 HOURS PRN
Status: DISCONTINUED | OUTPATIENT
Start: 2023-05-16 | End: 2023-05-26 | Stop reason: HOSPADM

## 2023-05-16 RX ORDER — ACETAMINOPHEN 325 MG/1
650 TABLET ORAL EVERY 6 HOURS PRN
Status: DISCONTINUED | OUTPATIENT
Start: 2023-05-16 | End: 2023-05-26 | Stop reason: HOSPADM

## 2023-05-16 RX ORDER — M-VIT,TX,IRON,MINS/CALC/FOLIC 27MG-0.4MG
1 TABLET ORAL DAILY
COMMUNITY

## 2023-05-16 RX ORDER — CLOPIDOGREL BISULFATE 75 MG/1
75 TABLET ORAL DAILY
Status: DISCONTINUED | OUTPATIENT
Start: 2023-05-16 | End: 2023-05-16

## 2023-05-16 RX ORDER — CETIRIZINE HYDROCHLORIDE 10 MG/1
10 TABLET ORAL DAILY
Status: DISCONTINUED | OUTPATIENT
Start: 2023-05-16 | End: 2023-05-18

## 2023-05-16 RX ORDER — HYDROCHLOROTHIAZIDE 25 MG/1
25 TABLET ORAL DAILY
Status: DISCONTINUED | OUTPATIENT
Start: 2023-05-16 | End: 2023-05-26 | Stop reason: HOSPADM

## 2023-05-16 RX ORDER — ASPIRIN 81 MG/1
81 TABLET ORAL DAILY
COMMUNITY

## 2023-05-16 RX ORDER — SODIUM CHLORIDE 0.9 % (FLUSH) 0.9 %
5-40 SYRINGE (ML) INJECTION EVERY 12 HOURS SCHEDULED
Status: DISCONTINUED | OUTPATIENT
Start: 2023-05-16 | End: 2023-05-26 | Stop reason: HOSPADM

## 2023-05-16 RX ORDER — METOPROLOL SUCCINATE 25 MG/1
25 TABLET, EXTENDED RELEASE ORAL DAILY
Status: DISCONTINUED | OUTPATIENT
Start: 2023-05-16 | End: 2023-05-26 | Stop reason: HOSPADM

## 2023-05-16 RX ORDER — LOSARTAN POTASSIUM 25 MG/1
25 TABLET ORAL DAILY
Status: DISCONTINUED | OUTPATIENT
Start: 2023-05-16 | End: 2023-05-26 | Stop reason: HOSPADM

## 2023-05-16 RX ORDER — DORZOLAMIDE HYDROCHLORIDE AND TIMOLOL MALEATE 20; 5 MG/ML; MG/ML
1 SOLUTION/ DROPS OPHTHALMIC EVERY 12 HOURS SCHEDULED
Status: DISCONTINUED | OUTPATIENT
Start: 2023-05-16 | End: 2023-05-16

## 2023-05-16 RX ORDER — FERROUS SULFATE 325(65) MG
325 TABLET ORAL
Status: DISCONTINUED | OUTPATIENT
Start: 2023-05-17 | End: 2023-05-26 | Stop reason: HOSPADM

## 2023-05-16 RX ORDER — DONEPEZIL HYDROCHLORIDE 5 MG/1
5 TABLET, FILM COATED ORAL NIGHTLY
Status: DISCONTINUED | OUTPATIENT
Start: 2023-05-16 | End: 2023-05-26 | Stop reason: HOSPADM

## 2023-05-16 RX ORDER — LATANOPROST 50 UG/ML
1 SOLUTION/ DROPS OPHTHALMIC NIGHTLY
Status: DISCONTINUED | OUTPATIENT
Start: 2023-05-16 | End: 2023-05-23 | Stop reason: SDUPTHER

## 2023-05-16 RX ORDER — ONDANSETRON 2 MG/ML
4 INJECTION INTRAMUSCULAR; INTRAVENOUS EVERY 6 HOURS PRN
Status: DISCONTINUED | OUTPATIENT
Start: 2023-05-16 | End: 2023-05-26 | Stop reason: HOSPADM

## 2023-05-16 RX ORDER — SODIUM CHLORIDE 9 MG/ML
INJECTION, SOLUTION INTRAVENOUS PRN
Status: DISCONTINUED | OUTPATIENT
Start: 2023-05-16 | End: 2023-05-26 | Stop reason: HOSPADM

## 2023-05-16 RX ORDER — FUROSEMIDE 20 MG/1
10 TABLET ORAL DAILY
Status: DISCONTINUED | OUTPATIENT
Start: 2023-05-16 | End: 2023-05-26 | Stop reason: HOSPADM

## 2023-05-16 RX ORDER — BRIMONIDINE TARTRATE 2 MG/ML
1 SOLUTION/ DROPS OPHTHALMIC 3 TIMES DAILY
COMMUNITY

## 2023-05-16 RX ORDER — LANOLIN ALCOHOL/MO/W.PET/CERES
400 CREAM (GRAM) TOPICAL 2 TIMES DAILY
COMMUNITY

## 2023-05-16 RX ORDER — PANTOPRAZOLE SODIUM 40 MG/1
40 TABLET, DELAYED RELEASE ORAL
Status: DISCONTINUED | OUTPATIENT
Start: 2023-05-17 | End: 2023-05-26 | Stop reason: HOSPADM

## 2023-05-16 RX ORDER — POLYETHYLENE GLYCOL 3350 17 G/17G
17 POWDER, FOR SOLUTION ORAL DAILY PRN
Status: DISCONTINUED | OUTPATIENT
Start: 2023-05-16 | End: 2023-05-26 | Stop reason: HOSPADM

## 2023-05-16 RX ADMIN — PIPERACILLIN AND TAZOBACTAM 3375 MG: 3; .375 INJECTION, POWDER, LYOPHILIZED, FOR SOLUTION INTRAVENOUS at 20:26

## 2023-05-16 RX ADMIN — SODIUM CHLORIDE, PRESERVATIVE FREE 10 ML: 5 INJECTION INTRAVENOUS at 20:37

## 2023-05-16 RX ADMIN — DONEPEZIL HYDROCHLORIDE 5 MG: 5 TABLET, FILM COATED ORAL at 20:35

## 2023-05-16 RX ADMIN — ATORVASTATIN CALCIUM 20 MG: 20 TABLET, FILM COATED ORAL at 20:34

## 2023-05-16 RX ADMIN — CETIRIZINE HYDROCHLORIDE 10 MG: 10 TABLET, FILM COATED ORAL at 20:34

## 2023-05-16 ASSESSMENT — ENCOUNTER SYMPTOMS
GASTROINTESTINAL NEGATIVE: 1
ALLERGIC/IMMUNOLOGIC NEGATIVE: 1
EYES NEGATIVE: 1
RESPIRATORY NEGATIVE: 1

## 2023-05-16 ASSESSMENT — LIFESTYLE VARIABLES
HOW OFTEN DO YOU HAVE A DRINK CONTAINING ALCOHOL: NEVER
HOW MANY STANDARD DRINKS CONTAINING ALCOHOL DO YOU HAVE ON A TYPICAL DAY: PATIENT DOES NOT DRINK

## 2023-05-17 ENCOUNTER — ANESTHESIA EVENT (OUTPATIENT)
Facility: HOSPITAL | Age: 88
End: 2023-05-17
Payer: MEDICARE

## 2023-05-17 ENCOUNTER — ANESTHESIA (OUTPATIENT)
Facility: HOSPITAL | Age: 88
End: 2023-05-17
Payer: MEDICARE

## 2023-05-17 PROBLEM — R82.71 BACTERIURIA WITH PYURIA: Status: ACTIVE | Noted: 2023-05-17

## 2023-05-17 PROBLEM — R82.81 BACTERIURIA WITH PYURIA: Status: ACTIVE | Noted: 2023-05-17

## 2023-05-17 PROBLEM — I73.9 PVD (PERIPHERAL VASCULAR DISEASE) (HCC): Status: ACTIVE | Noted: 2021-01-04

## 2023-05-17 LAB
ANION GAP SERPL CALC-SCNC: 4 MMOL/L (ref 5–15)
BASOPHILS # BLD: 0 K/UL (ref 0–0.1)
BASOPHILS NFR BLD: 0 % (ref 0–1)
BUN SERPL-MCNC: 49 MG/DL (ref 6–20)
BUN/CREAT SERPL: 32 (ref 12–20)
CA-I BLD-MCNC: 9.2 MG/DL (ref 8.5–10.1)
CHLORIDE SERPL-SCNC: 108 MMOL/L (ref 97–108)
CO2 SERPL-SCNC: 26 MMOL/L (ref 21–32)
CREAT SERPL-MCNC: 1.55 MG/DL (ref 0.7–1.3)
CRP SERPL-MCNC: 16.5 MG/DL (ref 0–0.6)
DIFFERENTIAL METHOD BLD: ABNORMAL
EOSINOPHIL # BLD: 0.2 K/UL (ref 0–0.4)
EOSINOPHIL NFR BLD: 2 % (ref 0–7)
ERYTHROCYTE [DISTWIDTH] IN BLOOD BY AUTOMATED COUNT: 18.4 % (ref 11.5–14.5)
GLUCOSE SERPL-MCNC: 143 MG/DL (ref 65–100)
HCT VFR BLD AUTO: 26.4 % (ref 36.6–50.3)
HGB BLD-MCNC: 7.9 G/DL (ref 12.1–17)
IMM GRANULOCYTES # BLD AUTO: 0.1 K/UL (ref 0–0.04)
IMM GRANULOCYTES NFR BLD AUTO: 1 % (ref 0–0.5)
LYMPHOCYTES # BLD: 1.1 K/UL (ref 0.8–3.5)
LYMPHOCYTES NFR BLD: 10 % (ref 12–49)
MCH RBC QN AUTO: 25.2 PG (ref 26–34)
MCHC RBC AUTO-ENTMCNC: 29.9 G/DL (ref 30–36.5)
MCV RBC AUTO: 84.3 FL (ref 80–99)
MONOCYTES # BLD: 0.7 K/UL (ref 0–1)
MONOCYTES NFR BLD: 6 % (ref 5–13)
NEUTS SEG # BLD: 8.8 K/UL (ref 1.8–8)
NEUTS SEG NFR BLD: 81 % (ref 32–75)
NRBC # BLD: 0 K/UL (ref 0–0.01)
NRBC BLD-RTO: 0 PER 100 WBC
PLATELET # BLD AUTO: 615 K/UL (ref 150–400)
PMV BLD AUTO: 9.3 FL (ref 8.9–12.9)
POTASSIUM SERPL-SCNC: 3.5 MMOL/L (ref 3.5–5.1)
PROCALCITONIN SERPL-MCNC: 0.24 NG/ML
RBC # BLD AUTO: 3.13 M/UL (ref 4.1–5.7)
RBC MORPH BLD: ABNORMAL
SODIUM SERPL-SCNC: 138 MMOL/L (ref 136–145)
WBC # BLD AUTO: 10.9 K/UL (ref 4.1–11.1)

## 2023-05-17 PROCEDURE — 86140 C-REACTIVE PROTEIN: CPT

## 2023-05-17 PROCEDURE — 0Y6M0ZC DETACHMENT AT RIGHT FOOT, PARTIAL 3RD RAY, OPEN APPROACH: ICD-10-PCS | Performed by: PODIATRIST

## 2023-05-17 PROCEDURE — 80048 BASIC METABOLIC PNL TOTAL CA: CPT

## 2023-05-17 PROCEDURE — 2580000003 HC RX 258: Performed by: NURSE PRACTITIONER

## 2023-05-17 PROCEDURE — 3700000001 HC ADD 15 MINUTES (ANESTHESIA): Performed by: PODIATRIST

## 2023-05-17 PROCEDURE — 86901 BLOOD TYPING SEROLOGIC RH(D): CPT

## 2023-05-17 PROCEDURE — 7100000001 HC PACU RECOVERY - ADDTL 15 MIN: Performed by: PODIATRIST

## 2023-05-17 PROCEDURE — 2580000003 HC RX 258: Performed by: NURSE ANESTHETIST, CERTIFIED REGISTERED

## 2023-05-17 PROCEDURE — 1100000000 HC RM PRIVATE

## 2023-05-17 PROCEDURE — 36415 COLL VENOUS BLD VENIPUNCTURE: CPT

## 2023-05-17 PROCEDURE — 6360000002 HC RX W HCPCS: Performed by: NURSE ANESTHETIST, CERTIFIED REGISTERED

## 2023-05-17 PROCEDURE — 0Y6M0ZD DETACHMENT AT RIGHT FOOT, PARTIAL 4TH RAY, OPEN APPROACH: ICD-10-PCS | Performed by: PODIATRIST

## 2023-05-17 PROCEDURE — 0Y6M0Z9 DETACHMENT AT RIGHT FOOT, PARTIAL 1ST RAY, OPEN APPROACH: ICD-10-PCS | Performed by: PODIATRIST

## 2023-05-17 PROCEDURE — 28805 AMPUTATION THRU METATARSAL: CPT | Performed by: PODIATRIST

## 2023-05-17 PROCEDURE — 3700000000 HC ANESTHESIA ATTENDED CARE: Performed by: PODIATRIST

## 2023-05-17 PROCEDURE — 84145 PROCALCITONIN (PCT): CPT

## 2023-05-17 PROCEDURE — 0Y6M0ZF DETACHMENT AT RIGHT FOOT, PARTIAL 5TH RAY, OPEN APPROACH: ICD-10-PCS | Performed by: PODIATRIST

## 2023-05-17 PROCEDURE — 2500000003 HC RX 250 WO HCPCS: Performed by: NURSE ANESTHETIST, CERTIFIED REGISTERED

## 2023-05-17 PROCEDURE — 99223 1ST HOSP IP/OBS HIGH 75: CPT | Performed by: PODIATRIST

## 2023-05-17 PROCEDURE — 6370000000 HC RX 637 (ALT 250 FOR IP): Performed by: NURSE PRACTITIONER

## 2023-05-17 PROCEDURE — 2709999900 HC NON-CHARGEABLE SUPPLY: Performed by: PODIATRIST

## 2023-05-17 PROCEDURE — 86850 RBC ANTIBODY SCREEN: CPT

## 2023-05-17 PROCEDURE — 86900 BLOOD TYPING SEROLOGIC ABO: CPT

## 2023-05-17 PROCEDURE — 3600000002 HC SURGERY LEVEL 2 BASE: Performed by: PODIATRIST

## 2023-05-17 PROCEDURE — 86923 COMPATIBILITY TEST ELECTRIC: CPT

## 2023-05-17 PROCEDURE — 99232 SBSQ HOSP IP/OBS MODERATE 35: CPT | Performed by: INTERNAL MEDICINE

## 2023-05-17 PROCEDURE — 2580000003 HC RX 258: Performed by: INTERNAL MEDICINE

## 2023-05-17 PROCEDURE — 3600000012 HC SURGERY LEVEL 2 ADDTL 15MIN: Performed by: PODIATRIST

## 2023-05-17 PROCEDURE — 2580000003 HC RX 258: Performed by: ANESTHESIOLOGY

## 2023-05-17 PROCEDURE — 6370000000 HC RX 637 (ALT 250 FOR IP): Performed by: PODIATRIST

## 2023-05-17 PROCEDURE — 0Y6M0ZB DETACHMENT AT RIGHT FOOT, PARTIAL 2ND RAY, OPEN APPROACH: ICD-10-PCS | Performed by: PODIATRIST

## 2023-05-17 PROCEDURE — 6360000002 HC RX W HCPCS: Performed by: INTERNAL MEDICINE

## 2023-05-17 PROCEDURE — 6370000000 HC RX 637 (ALT 250 FOR IP): Performed by: INTERNAL MEDICINE

## 2023-05-17 PROCEDURE — 85025 COMPLETE CBC W/AUTO DIFF WBC: CPT

## 2023-05-17 PROCEDURE — 7100000000 HC PACU RECOVERY - FIRST 15 MIN: Performed by: PODIATRIST

## 2023-05-17 RX ORDER — HYDROMORPHONE HYDROCHLORIDE 1 MG/ML
0.25 INJECTION, SOLUTION INTRAMUSCULAR; INTRAVENOUS; SUBCUTANEOUS EVERY 5 MIN PRN
Status: DISCONTINUED | OUTPATIENT
Start: 2023-05-17 | End: 2023-05-18

## 2023-05-17 RX ORDER — SODIUM CHLORIDE, SODIUM LACTATE, POTASSIUM CHLORIDE, CALCIUM CHLORIDE 600; 310; 30; 20 MG/100ML; MG/100ML; MG/100ML; MG/100ML
INJECTION, SOLUTION INTRAVENOUS CONTINUOUS
Status: DISCONTINUED | OUTPATIENT
Start: 2023-05-17 | End: 2023-05-18

## 2023-05-17 RX ORDER — FENTANYL CITRATE 50 UG/ML
INJECTION, SOLUTION INTRAMUSCULAR; INTRAVENOUS PRN
Status: DISCONTINUED | OUTPATIENT
Start: 2023-05-17 | End: 2023-05-17 | Stop reason: SDUPTHER

## 2023-05-17 RX ORDER — DEXAMETHASONE SODIUM PHOSPHATE 4 MG/ML
INJECTION, SOLUTION INTRA-ARTICULAR; INTRALESIONAL; INTRAMUSCULAR; INTRAVENOUS; SOFT TISSUE PRN
Status: DISCONTINUED | OUTPATIENT
Start: 2023-05-17 | End: 2023-05-17 | Stop reason: SDUPTHER

## 2023-05-17 RX ORDER — SODIUM CHLORIDE 0.9 % (FLUSH) 0.9 %
5-40 SYRINGE (ML) INJECTION PRN
Status: DISCONTINUED | OUTPATIENT
Start: 2023-05-17 | End: 2023-05-18

## 2023-05-17 RX ORDER — PROPOFOL 10 MG/ML
INJECTION, EMULSION INTRAVENOUS PRN
Status: DISCONTINUED | OUTPATIENT
Start: 2023-05-17 | End: 2023-05-17 | Stop reason: SDUPTHER

## 2023-05-17 RX ORDER — ONDANSETRON 2 MG/ML
4 INJECTION INTRAMUSCULAR; INTRAVENOUS
Status: DISCONTINUED | OUTPATIENT
Start: 2023-05-17 | End: 2023-05-18

## 2023-05-17 RX ORDER — POVIDONE-IODINE 10 MG/G
OINTMENT TOPICAL PRN
Status: DISCONTINUED | OUTPATIENT
Start: 2023-05-17 | End: 2023-05-17 | Stop reason: HOSPADM

## 2023-05-17 RX ORDER — PHENYLEPHRINE HCL IN 0.9% NACL 1 MG/10 ML
SYRINGE (ML) INTRAVENOUS PRN
Status: DISCONTINUED | OUTPATIENT
Start: 2023-05-17 | End: 2023-05-17 | Stop reason: SDUPTHER

## 2023-05-17 RX ORDER — GLIMEPIRIDE 2 MG/1
1 TABLET ORAL 2 TIMES DAILY
Status: DISCONTINUED | OUTPATIENT
Start: 2023-05-17 | End: 2023-05-26 | Stop reason: HOSPADM

## 2023-05-17 RX ORDER — SODIUM CHLORIDE, SODIUM LACTATE, POTASSIUM CHLORIDE, CALCIUM CHLORIDE 600; 310; 30; 20 MG/100ML; MG/100ML; MG/100ML; MG/100ML
INJECTION, SOLUTION INTRAVENOUS CONTINUOUS PRN
Status: DISCONTINUED | OUTPATIENT
Start: 2023-05-17 | End: 2023-05-17 | Stop reason: SDUPTHER

## 2023-05-17 RX ORDER — ONDANSETRON 2 MG/ML
INJECTION INTRAMUSCULAR; INTRAVENOUS PRN
Status: DISCONTINUED | OUTPATIENT
Start: 2023-05-17 | End: 2023-05-17 | Stop reason: SDUPTHER

## 2023-05-17 RX ORDER — LORAZEPAM 2 MG/ML
0.5 INJECTION INTRAMUSCULAR
Status: DISCONTINUED | OUTPATIENT
Start: 2023-05-17 | End: 2023-05-18

## 2023-05-17 RX ORDER — HYDRALAZINE HYDROCHLORIDE 20 MG/ML
10 INJECTION INTRAMUSCULAR; INTRAVENOUS
Status: DISCONTINUED | OUTPATIENT
Start: 2023-05-17 | End: 2023-05-18

## 2023-05-17 RX ORDER — LABETALOL HYDROCHLORIDE 5 MG/ML
10 INJECTION, SOLUTION INTRAVENOUS
Status: DISCONTINUED | OUTPATIENT
Start: 2023-05-17 | End: 2023-05-18

## 2023-05-17 RX ORDER — IPRATROPIUM BROMIDE AND ALBUTEROL SULFATE 2.5; .5 MG/3ML; MG/3ML
1 SOLUTION RESPIRATORY (INHALATION)
Status: DISCONTINUED | OUTPATIENT
Start: 2023-05-17 | End: 2023-05-18

## 2023-05-17 RX ORDER — SODIUM CHLORIDE 0.9 % (FLUSH) 0.9 %
5-40 SYRINGE (ML) INJECTION EVERY 12 HOURS SCHEDULED
Status: DISCONTINUED | OUTPATIENT
Start: 2023-05-17 | End: 2023-05-18 | Stop reason: SDUPTHER

## 2023-05-17 RX ORDER — LIDOCAINE HYDROCHLORIDE 20 MG/ML
INJECTION, SOLUTION EPIDURAL; INFILTRATION; INTRACAUDAL; PERINEURAL PRN
Status: DISCONTINUED | OUTPATIENT
Start: 2023-05-17 | End: 2023-05-17 | Stop reason: SDUPTHER

## 2023-05-17 RX ORDER — OXYCODONE HYDROCHLORIDE 5 MG/1
5 TABLET ORAL
Status: DISCONTINUED | OUTPATIENT
Start: 2023-05-17 | End: 2023-05-18

## 2023-05-17 RX ORDER — FENTANYL CITRATE 50 UG/ML
25 INJECTION, SOLUTION INTRAMUSCULAR; INTRAVENOUS EVERY 5 MIN PRN
Status: DISCONTINUED | OUTPATIENT
Start: 2023-05-17 | End: 2023-05-18

## 2023-05-17 RX ORDER — SODIUM CHLORIDE 9 MG/ML
INJECTION, SOLUTION INTRAVENOUS PRN
Status: DISCONTINUED | OUTPATIENT
Start: 2023-05-17 | End: 2023-05-18

## 2023-05-17 RX ORDER — DIPHENHYDRAMINE HYDROCHLORIDE 50 MG/ML
12.5 INJECTION INTRAMUSCULAR; INTRAVENOUS
Status: DISCONTINUED | OUTPATIENT
Start: 2023-05-17 | End: 2023-05-18

## 2023-05-17 RX ADMIN — Medication 200 MCG: at 14:09

## 2023-05-17 RX ADMIN — SODIUM CHLORIDE, POTASSIUM CHLORIDE, SODIUM LACTATE AND CALCIUM CHLORIDE: 600; 310; 30; 20 INJECTION, SOLUTION INTRAVENOUS at 16:44

## 2023-05-17 RX ADMIN — SODIUM CHLORIDE, PRESERVATIVE FREE 10 ML: 5 INJECTION INTRAVENOUS at 08:07

## 2023-05-17 RX ADMIN — POLYETHYLENE GLYCOL 3350 17 G: 17 POWDER, FOR SOLUTION ORAL at 20:43

## 2023-05-17 RX ADMIN — Medication 200 MCG: at 14:16

## 2023-05-17 RX ADMIN — FENTANYL CITRATE 25 MCG: 50 INJECTION, SOLUTION INTRAMUSCULAR; INTRAVENOUS at 14:13

## 2023-05-17 RX ADMIN — PROPOFOL 30 MG: 10 INJECTION, EMULSION INTRAVENOUS at 14:04

## 2023-05-17 RX ADMIN — TIMOLOL MALEATE 1 DROP: 2.5 SOLUTION OPHTHALMIC at 09:39

## 2023-05-17 RX ADMIN — LIDOCAINE HYDROCHLORIDE 80 MG: 20 INJECTION, SOLUTION EPIDURAL; INFILTRATION; INTRACAUDAL; PERINEURAL at 13:57

## 2023-05-17 RX ADMIN — DEXAMETHASONE SODIUM PHOSPHATE 4 MG: 4 INJECTION, SOLUTION INTRA-ARTICULAR; INTRALESIONAL; INTRAMUSCULAR; INTRAVENOUS; SOFT TISSUE at 14:09

## 2023-05-17 RX ADMIN — FENTANYL CITRATE 25 MCG: 50 INJECTION, SOLUTION INTRAMUSCULAR; INTRAVENOUS at 14:07

## 2023-05-17 RX ADMIN — Medication 200 MCG: at 14:01

## 2023-05-17 RX ADMIN — PIPERACILLIN AND TAZOBACTAM 3375 MG: 3; .375 INJECTION, POWDER, LYOPHILIZED, FOR SOLUTION INTRAVENOUS at 08:06

## 2023-05-17 RX ADMIN — TIMOLOL MALEATE 1 DROP: 2.5 SOLUTION OPHTHALMIC at 21:00

## 2023-05-17 RX ADMIN — ACETAMINOPHEN 650 MG: 325 TABLET ORAL at 20:43

## 2023-05-17 RX ADMIN — SODIUM CHLORIDE, POTASSIUM CHLORIDE, SODIUM LACTATE AND CALCIUM CHLORIDE: 600; 310; 30; 20 INJECTION, SOLUTION INTRAVENOUS at 13:45

## 2023-05-17 RX ADMIN — ONDANSETRON 4 MG: 2 INJECTION INTRAMUSCULAR; INTRAVENOUS at 14:09

## 2023-05-17 RX ADMIN — PROPOFOL 60 MG: 10 INJECTION, EMULSION INTRAVENOUS at 13:57

## 2023-05-17 RX ADMIN — ATORVASTATIN CALCIUM 20 MG: 20 TABLET, FILM COATED ORAL at 08:05

## 2023-05-17 RX ADMIN — FENTANYL CITRATE 25 MCG: 50 INJECTION, SOLUTION INTRAMUSCULAR; INTRAVENOUS at 14:18

## 2023-05-17 RX ADMIN — LATANOPROST 1 DROP: 50 SOLUTION OPHTHALMIC at 21:00

## 2023-05-17 RX ADMIN — CETIRIZINE HYDROCHLORIDE 10 MG: 10 TABLET, FILM COATED ORAL at 08:05

## 2023-05-17 RX ADMIN — DONEPEZIL HYDROCHLORIDE 5 MG: 5 TABLET, FILM COATED ORAL at 20:43

## 2023-05-17 RX ADMIN — PIPERACILLIN AND TAZOBACTAM 3375 MG: 3; .375 INJECTION, POWDER, LYOPHILIZED, FOR SOLUTION INTRAVENOUS at 20:44

## 2023-05-17 RX ADMIN — FENTANYL CITRATE 25 MCG: 50 INJECTION, SOLUTION INTRAMUSCULAR; INTRAVENOUS at 14:11

## 2023-05-17 RX ADMIN — Medication 200 MCG: at 14:13

## 2023-05-17 ASSESSMENT — ENCOUNTER SYMPTOMS
ROS SKIN COMMENTS: RIGHT FOOT WOUND
GASTROINTESTINAL NEGATIVE: 1
RESPIRATORY NEGATIVE: 1

## 2023-05-17 ASSESSMENT — PAIN - FUNCTIONAL ASSESSMENT: PAIN_FUNCTIONAL_ASSESSMENT: NONE - DENIES PAIN

## 2023-05-17 ASSESSMENT — COPD QUESTIONNAIRES: CAT_SEVERITY: MILD

## 2023-05-17 ASSESSMENT — PAIN SCALES - WONG BAKER: WONGBAKER_NUMERICALRESPONSE: 0

## 2023-05-17 NOTE — ANESTHESIA POSTPROCEDURE EVALUATION
Department of Anesthesiology  Postprocedure Note    Patient: Asim Sneed  MRN: [id number]  YOB: 1935  Date of evaluation: 5/17/2023      Procedure Summary     Date: 05/17/23 Room / Location: [facility] MAIN OR 04 / [facility] MAIN OR    Anesthesia Start: 1943 Anesthesia Stop: 1437    Procedure: Open Transmetatarsal Amputation Right Foot (Right: Foot) Diagnosis:       Gangrene ([address]      (Gangrene ([address] [place]s)    Surgeons: Maryse Echols DPM Responsible Provider: Kera Herman MD    Anesthesia Type: General ASA Status: 4          Anesthesia Type: General    Sheeba Phase I: Sheeba Score: 9    Sheeba Phase II:        Anesthesia Post Evaluation    Patient location during evaluation: PACU  Patient participation: complete - patient cannot participate  Level of consciousness: awake  Pain score: 0  Airway patency: patent  Nausea & Vomiting: no nausea and no vomiting  Complications: no  Cardiovascular status: hemodynamically stable  Respiratory status: acceptable  Hydration status: stable  Multimodal analgesia pain management approach

## 2023-05-17 NOTE — ANESTHESIA PRE PROCEDURE
Pulmonary:normal exam  breath sounds clear to auscultation  (+) COPD: mild,                             Cardiovascular:    (+) hypertension:, valvular problems/murmurs (s/p TAVR in 10/2022): AS, pacemaker: pacemaker, CAD:, dysrhythmias: atrial fibrillation, CHF:, hyperlipidemia        Rhythm: regular  Rate: normal                    Neuro/Psych:   (+) CVA:, psychiatric history:             ROS comment: Blindness GI/Hepatic/Renal:   (+) GERD:, PUD, renal disease: CRI,           Endo/Other:    (+) DiabetesType II DM, no interval change, , : arthritis:., .                 Abdominal:              PE comment: Deferred. Vascular:   + PVD, aortic or cerebral, . Other Findings:           Anesthesia Plan      general     ASA 4     (Standard ASA monitors: continuous EKG, BP, HR, pulse oximeter, temperature, and capnography.)  Induction: intravenous. continuous noninvasive hemodynamic monitor  MIPS: Postoperative opioids intended and Prophylactic antiemetics administered. Anesthetic plan and risks discussed with patient (and family, if present. ). Plan discussed with CRNA.     Attending anesthesiologist reviewed and agrees with Stacy Santa MD   5/17/2023

## 2023-05-18 PROBLEM — T14.8XXA SURGICAL WOUND PRESENT: Status: ACTIVE | Noted: 2023-05-18

## 2023-05-18 LAB
CRP SERPL-MCNC: 12.7 MG/DL (ref 0–0.6)
HCT VFR BLD AUTO: 18.7 % (ref 36.6–50.3)
HGB BLD-MCNC: 5.6 G/DL (ref 12.1–17)
PROCALCITONIN SERPL-MCNC: 0.59 NG/ML

## 2023-05-18 PROCEDURE — 36430 TRANSFUSION BLD/BLD COMPNT: CPT

## 2023-05-18 PROCEDURE — 86140 C-REACTIVE PROTEIN: CPT

## 2023-05-18 PROCEDURE — 2580000003 HC RX 258: Performed by: INTERNAL MEDICINE

## 2023-05-18 PROCEDURE — 30233N1 TRANSFUSION OF NONAUTOLOGOUS RED BLOOD CELLS INTO PERIPHERAL VEIN, PERCUTANEOUS APPROACH: ICD-10-PCS | Performed by: ANESTHESIOLOGY

## 2023-05-18 PROCEDURE — 99233 SBSQ HOSP IP/OBS HIGH 50: CPT | Performed by: PODIATRIST

## 2023-05-18 PROCEDURE — 2580000003 HC RX 258: Performed by: NURSE PRACTITIONER

## 2023-05-18 PROCEDURE — 85018 HEMOGLOBIN: CPT

## 2023-05-18 PROCEDURE — 84145 PROCALCITONIN (PCT): CPT

## 2023-05-18 PROCEDURE — 2580000003 HC RX 258: Performed by: ANESTHESIOLOGY

## 2023-05-18 PROCEDURE — P9016 RBC LEUKOCYTES REDUCED: HCPCS

## 2023-05-18 PROCEDURE — 6370000000 HC RX 637 (ALT 250 FOR IP): Performed by: NURSE PRACTITIONER

## 2023-05-18 PROCEDURE — 85014 HEMATOCRIT: CPT

## 2023-05-18 PROCEDURE — 6360000002 HC RX W HCPCS: Performed by: PODIATRIST

## 2023-05-18 PROCEDURE — 1100000000 HC RM PRIVATE

## 2023-05-18 PROCEDURE — 36415 COLL VENOUS BLD VENIPUNCTURE: CPT

## 2023-05-18 PROCEDURE — 6360000002 HC RX W HCPCS: Performed by: INTERNAL MEDICINE

## 2023-05-18 RX ORDER — LANOLIN ALCOHOL/MO/W.PET/CERES
400 CREAM (GRAM) TOPICAL 2 TIMES DAILY
Status: DISCONTINUED | OUTPATIENT
Start: 2023-05-18 | End: 2023-05-26 | Stop reason: HOSPADM

## 2023-05-18 RX ORDER — ATORVASTATIN CALCIUM 20 MG/1
20 TABLET, FILM COATED ORAL NIGHTLY
Status: DISCONTINUED | OUTPATIENT
Start: 2023-05-19 | End: 2023-05-26 | Stop reason: HOSPADM

## 2023-05-18 RX ORDER — ATORVASTATIN CALCIUM 20 MG/1
20 TABLET, FILM COATED ORAL NIGHTLY
Status: CANCELLED | OUTPATIENT
Start: 2023-05-19

## 2023-05-18 RX ORDER — SODIUM CHLORIDE 9 MG/ML
INJECTION, SOLUTION INTRAVENOUS PRN
Status: DISCONTINUED | OUTPATIENT
Start: 2023-05-18 | End: 2023-05-26 | Stop reason: HOSPADM

## 2023-05-18 RX ORDER — AMLODIPINE BESYLATE 5 MG/1
10 TABLET ORAL DAILY
Status: DISCONTINUED | OUTPATIENT
Start: 2023-05-18 | End: 2023-05-26 | Stop reason: HOSPADM

## 2023-05-18 RX ORDER — MORPHINE SULFATE 2 MG/ML
2 INJECTION, SOLUTION INTRAMUSCULAR; INTRAVENOUS EVERY 6 HOURS PRN
Status: DISPENSED | OUTPATIENT
Start: 2023-05-18 | End: 2023-05-20

## 2023-05-18 RX ORDER — BRIMONIDINE TARTRATE 2 MG/ML
1 SOLUTION/ DROPS OPHTHALMIC 3 TIMES DAILY
Status: DISCONTINUED | OUTPATIENT
Start: 2023-05-18 | End: 2023-05-26 | Stop reason: HOSPADM

## 2023-05-18 RX ORDER — LATANOPROST 50 UG/ML
1 SOLUTION/ DROPS OPHTHALMIC NIGHTLY
Status: DISCONTINUED | OUTPATIENT
Start: 2023-05-18 | End: 2023-05-26 | Stop reason: HOSPADM

## 2023-05-18 RX ORDER — ASPIRIN 81 MG/1
81 TABLET ORAL DAILY
Status: DISCONTINUED | OUTPATIENT
Start: 2023-05-18 | End: 2023-05-26 | Stop reason: HOSPADM

## 2023-05-18 RX ADMIN — BRIMONIDINE TARTRATE 1 DROP: 2 SOLUTION OPHTHALMIC at 21:57

## 2023-05-18 RX ADMIN — PIPERACILLIN AND TAZOBACTAM 3375 MG: 3; .375 INJECTION, POWDER, LYOPHILIZED, FOR SOLUTION INTRAVENOUS at 08:53

## 2023-05-18 RX ADMIN — DONEPEZIL HYDROCHLORIDE 5 MG: 5 TABLET, FILM COATED ORAL at 21:59

## 2023-05-18 RX ADMIN — PANTOPRAZOLE SODIUM 40 MG: 40 TABLET, DELAYED RELEASE ORAL at 05:53

## 2023-05-18 RX ADMIN — PIPERACILLIN AND TAZOBACTAM 3375 MG: 3; .375 INJECTION, POWDER, LYOPHILIZED, FOR SOLUTION INTRAVENOUS at 19:02

## 2023-05-18 RX ADMIN — SODIUM CHLORIDE, PRESERVATIVE FREE 10 ML: 5 INJECTION INTRAVENOUS at 08:54

## 2023-05-18 RX ADMIN — MORPHINE SULFATE 2 MG: 2 INJECTION, SOLUTION INTRAMUSCULAR; INTRAVENOUS at 15:36

## 2023-05-18 RX ADMIN — TIMOLOL MALEATE 1 DROP: 2.5 SOLUTION OPHTHALMIC at 21:58

## 2023-05-18 RX ADMIN — MAGNESIUM OXIDE TAB 400 MG (241.3 MG ELEMENTAL MG) 400 MG: 400 (241.3 MG) TAB at 22:00

## 2023-05-18 RX ADMIN — SODIUM CHLORIDE, PRESERVATIVE FREE 10 ML: 5 INJECTION INTRAVENOUS at 23:11

## 2023-05-18 RX ADMIN — FERROUS SULFATE TAB 325 MG (65 MG ELEMENTAL FE) 325 MG: 325 (65 FE) TAB at 08:54

## 2023-05-18 RX ADMIN — SODIUM CHLORIDE, POTASSIUM CHLORIDE, SODIUM LACTATE AND CALCIUM CHLORIDE: 600; 310; 30; 20 INJECTION, SOLUTION INTRAVENOUS at 01:58

## 2023-05-18 RX ADMIN — TIMOLOL MALEATE 1 DROP: 2.5 SOLUTION OPHTHALMIC at 08:54

## 2023-05-18 RX ADMIN — LATANOPROST 1 DROP: 50 SOLUTION OPHTHALMIC at 21:58

## 2023-05-18 RX ADMIN — BRIMONIDINE TARTRATE 1 DROP: 2 SOLUTION OPHTHALMIC at 14:30

## 2023-05-18 ASSESSMENT — PAIN DESCRIPTION - DESCRIPTORS: DESCRIPTORS: HEAVINESS

## 2023-05-18 ASSESSMENT — PAIN SCALES - GENERAL
PAINLEVEL_OUTOF10: 3
PAINLEVEL_OUTOF10: 10

## 2023-05-18 ASSESSMENT — PAIN DESCRIPTION - LOCATION: LOCATION: FOOT

## 2023-05-18 ASSESSMENT — ENCOUNTER SYMPTOMS
ROS SKIN COMMENTS: RIGHT FOOT WOUND
RESPIRATORY NEGATIVE: 1
GASTROINTESTINAL NEGATIVE: 1

## 2023-05-18 ASSESSMENT — PAIN DESCRIPTION - ORIENTATION: ORIENTATION: RIGHT

## 2023-05-19 ENCOUNTER — ANESTHESIA EVENT (OUTPATIENT)
Facility: HOSPITAL | Age: 88
DRG: 854 | End: 2023-05-19
Payer: MEDICARE

## 2023-05-19 ENCOUNTER — ANESTHESIA (OUTPATIENT)
Facility: HOSPITAL | Age: 88
DRG: 854 | End: 2023-05-19
Payer: MEDICARE

## 2023-05-19 LAB
BACTERIA SPEC CULT: ABNORMAL
BACTERIA SPEC CULT: NORMAL
COLONY COUNT, CNT: ABNORMAL
CRP SERPL-MCNC: 6.4 MG/DL (ref 0–0.6)
GRAM STN SPEC: NORMAL
GRAM STN SPEC: NORMAL
HCT VFR BLD AUTO: 25.3 % (ref 36.6–50.3)
HGB BLD-MCNC: 8 G/DL (ref 12.1–17)
Lab: ABNORMAL
Lab: NORMAL
PROCALCITONIN SERPL-MCNC: 0.47 NG/ML

## 2023-05-19 PROCEDURE — 86140 C-REACTIVE PROTEIN: CPT

## 2023-05-19 PROCEDURE — 1100000000 HC RM PRIVATE

## 2023-05-19 PROCEDURE — 85018 HEMOGLOBIN: CPT

## 2023-05-19 PROCEDURE — 2709999900 HC NON-CHARGEABLE SUPPLY: Performed by: PODIATRIST

## 2023-05-19 PROCEDURE — 36415 COLL VENOUS BLD VENIPUNCTURE: CPT

## 2023-05-19 PROCEDURE — 7100000001 HC PACU RECOVERY - ADDTL 15 MIN: Performed by: PODIATRIST

## 2023-05-19 PROCEDURE — 15275 SKIN SUB GRAFT FACE/NK/HF/G: CPT | Performed by: PODIATRIST

## 2023-05-19 PROCEDURE — 2580000003 HC RX 258: Performed by: NURSE PRACTITIONER

## 2023-05-19 PROCEDURE — 3600000003 HC SURGERY LEVEL 3 BASE: Performed by: PODIATRIST

## 2023-05-19 PROCEDURE — 2580000003 HC RX 258: Performed by: INTERNAL MEDICINE

## 2023-05-19 PROCEDURE — 3700000001 HC ADD 15 MINUTES (ANESTHESIA): Performed by: PODIATRIST

## 2023-05-19 PROCEDURE — 2580000003 HC RX 258: Performed by: NURSE ANESTHETIST, CERTIFIED REGISTERED

## 2023-05-19 PROCEDURE — 84145 PROCALCITONIN (PCT): CPT

## 2023-05-19 PROCEDURE — 6360000002 HC RX W HCPCS: Performed by: INTERNAL MEDICINE

## 2023-05-19 PROCEDURE — 3600000013 HC SURGERY LEVEL 3 ADDTL 15MIN: Performed by: PODIATRIST

## 2023-05-19 PROCEDURE — 3700000000 HC ANESTHESIA ATTENDED CARE: Performed by: PODIATRIST

## 2023-05-19 PROCEDURE — 2720000010 HC SURG SUPPLY STERILE: Performed by: PODIATRIST

## 2023-05-19 PROCEDURE — 15276 SKIN SUB GRAFT F/N/HF/G ADDL: CPT | Performed by: PODIATRIST

## 2023-05-19 PROCEDURE — 6370000000 HC RX 637 (ALT 250 FOR IP): Performed by: NURSE PRACTITIONER

## 2023-05-19 PROCEDURE — 7100000000 HC PACU RECOVERY - FIRST 15 MIN: Performed by: PODIATRIST

## 2023-05-19 PROCEDURE — 0HRMXK3 REPLACEMENT OF RIGHT FOOT SKIN WITH NONAUTOLOGOUS TISSUE SUBSTITUTE, FULL THICKNESS, EXTERNAL APPROACH: ICD-10-PCS | Performed by: PODIATRIST

## 2023-05-19 PROCEDURE — 6360000002 HC RX W HCPCS: Performed by: NURSE ANESTHETIST, CERTIFIED REGISTERED

## 2023-05-19 PROCEDURE — 2500000003 HC RX 250 WO HCPCS: Performed by: PODIATRIST

## 2023-05-19 PROCEDURE — P9016 RBC LEUKOCYTES REDUCED: HCPCS

## 2023-05-19 PROCEDURE — 99232 SBSQ HOSP IP/OBS MODERATE 35: CPT | Performed by: INTERNAL MEDICINE

## 2023-05-19 PROCEDURE — 36430 TRANSFUSION BLD/BLD COMPNT: CPT

## 2023-05-19 PROCEDURE — 15004 WOUND PREP F/N/HF/G: CPT | Performed by: PODIATRIST

## 2023-05-19 PROCEDURE — 85014 HEMATOCRIT: CPT

## 2023-05-19 DEVICE — GRAFT HUM TISS W3XL6CM CRYOPRESERVED PLCNTA TISS UMB AMNION: Type: IMPLANTABLE DEVICE | Site: FOOT | Status: FUNCTIONAL

## 2023-05-19 DEVICE — GRAFT HUM TISS W2XL4CM CRYOPRESERVED PLCNTA TISS UMB AMNION: Type: IMPLANTABLE DEVICE | Site: FOOT | Status: FUNCTIONAL

## 2023-05-19 RX ORDER — SODIUM CHLORIDE 9 MG/ML
INJECTION, SOLUTION INTRAVENOUS PRN
Status: DISCONTINUED | OUTPATIENT
Start: 2023-05-19 | End: 2023-05-19 | Stop reason: HOSPADM

## 2023-05-19 RX ORDER — OXYCODONE HYDROCHLORIDE 5 MG/1
5 TABLET ORAL PRN
Status: DISCONTINUED | OUTPATIENT
Start: 2023-05-19 | End: 2023-05-19 | Stop reason: HOSPADM

## 2023-05-19 RX ORDER — LABETALOL HYDROCHLORIDE 5 MG/ML
10 INJECTION, SOLUTION INTRAVENOUS EVERY 10 MIN PRN
Status: DISCONTINUED | OUTPATIENT
Start: 2023-05-19 | End: 2023-05-19 | Stop reason: ALTCHOICE

## 2023-05-19 RX ORDER — HYDRALAZINE HYDROCHLORIDE 20 MG/ML
10 INJECTION INTRAMUSCULAR; INTRAVENOUS
Status: DISCONTINUED | OUTPATIENT
Start: 2023-05-19 | End: 2023-05-19 | Stop reason: HOSPADM

## 2023-05-19 RX ORDER — OXYCODONE HYDROCHLORIDE 5 MG/1
10 TABLET ORAL PRN
Status: DISCONTINUED | OUTPATIENT
Start: 2023-05-19 | End: 2023-05-19 | Stop reason: HOSPADM

## 2023-05-19 RX ORDER — MEPERIDINE HYDROCHLORIDE 25 MG/ML
12.5 INJECTION INTRAMUSCULAR; INTRAVENOUS; SUBCUTANEOUS EVERY 5 MIN PRN
Status: DISCONTINUED | OUTPATIENT
Start: 2023-05-19 | End: 2023-05-19 | Stop reason: HOSPADM

## 2023-05-19 RX ORDER — HYDROMORPHONE HYDROCHLORIDE 1 MG/ML
0.5 INJECTION, SOLUTION INTRAMUSCULAR; INTRAVENOUS; SUBCUTANEOUS EVERY 5 MIN PRN
Status: DISCONTINUED | OUTPATIENT
Start: 2023-05-19 | End: 2023-05-19 | Stop reason: HOSPADM

## 2023-05-19 RX ORDER — LIDOCAINE HYDROCHLORIDE 10 MG/ML
INJECTION, SOLUTION INFILTRATION; PERINEURAL PRN
Status: DISCONTINUED | OUTPATIENT
Start: 2023-05-19 | End: 2023-05-19 | Stop reason: ALTCHOICE

## 2023-05-19 RX ORDER — SODIUM CHLORIDE 0.9 % (FLUSH) 0.9 %
5-40 SYRINGE (ML) INJECTION PRN
Status: DISCONTINUED | OUTPATIENT
Start: 2023-05-19 | End: 2023-05-19 | Stop reason: HOSPADM

## 2023-05-19 RX ORDER — SODIUM CHLORIDE, SODIUM LACTATE, POTASSIUM CHLORIDE, CALCIUM CHLORIDE 600; 310; 30; 20 MG/100ML; MG/100ML; MG/100ML; MG/100ML
INJECTION, SOLUTION INTRAVENOUS CONTINUOUS PRN
Status: DISCONTINUED | OUTPATIENT
Start: 2023-05-19 | End: 2023-05-19 | Stop reason: SDUPTHER

## 2023-05-19 RX ORDER — LABETALOL HYDROCHLORIDE 5 MG/ML
10 INJECTION, SOLUTION INTRAVENOUS
Status: DISCONTINUED | OUTPATIENT
Start: 2023-05-19 | End: 2023-05-19 | Stop reason: HOSPADM

## 2023-05-19 RX ORDER — ONDANSETRON 2 MG/ML
4 INJECTION INTRAMUSCULAR; INTRAVENOUS
Status: DISCONTINUED | OUTPATIENT
Start: 2023-05-19 | End: 2023-05-19 | Stop reason: HOSPADM

## 2023-05-19 RX ORDER — SODIUM CHLORIDE 9 MG/ML
INJECTION, SOLUTION INTRAVENOUS PRN
Status: DISCONTINUED | OUTPATIENT
Start: 2023-05-19 | End: 2023-05-26 | Stop reason: HOSPADM

## 2023-05-19 RX ORDER — SODIUM CHLORIDE 0.9 % (FLUSH) 0.9 %
5-40 SYRINGE (ML) INJECTION EVERY 12 HOURS SCHEDULED
Status: DISCONTINUED | OUTPATIENT
Start: 2023-05-19 | End: 2023-05-19 | Stop reason: HOSPADM

## 2023-05-19 RX ORDER — LIDOCAINE HYDROCHLORIDE 20 MG/ML
INJECTION, SOLUTION INTRAVENOUS PRN
Status: DISCONTINUED | OUTPATIENT
Start: 2023-05-19 | End: 2023-05-19 | Stop reason: SDUPTHER

## 2023-05-19 RX ORDER — DIPHENHYDRAMINE HYDROCHLORIDE 50 MG/ML
12.5 INJECTION INTRAMUSCULAR; INTRAVENOUS
Status: DISCONTINUED | OUTPATIENT
Start: 2023-05-19 | End: 2023-05-19 | Stop reason: HOSPADM

## 2023-05-19 RX ORDER — SODIUM CHLORIDE, SODIUM LACTATE, POTASSIUM CHLORIDE, CALCIUM CHLORIDE 600; 310; 30; 20 MG/100ML; MG/100ML; MG/100ML; MG/100ML
INJECTION, SOLUTION INTRAVENOUS CONTINUOUS
Status: DISCONTINUED | OUTPATIENT
Start: 2023-05-19 | End: 2023-05-19

## 2023-05-19 RX ORDER — FENTANYL CITRATE 50 UG/ML
50 INJECTION, SOLUTION INTRAMUSCULAR; INTRAVENOUS EVERY 5 MIN PRN
Status: DISCONTINUED | OUTPATIENT
Start: 2023-05-19 | End: 2023-05-19 | Stop reason: HOSPADM

## 2023-05-19 RX ORDER — HYDRALAZINE HYDROCHLORIDE 20 MG/ML
10 INJECTION INTRAMUSCULAR; INTRAVENOUS EVERY 10 MIN PRN
Status: DISCONTINUED | OUTPATIENT
Start: 2023-05-19 | End: 2023-05-19 | Stop reason: ALTCHOICE

## 2023-05-19 RX ORDER — IPRATROPIUM BROMIDE AND ALBUTEROL SULFATE 2.5; .5 MG/3ML; MG/3ML
1 SOLUTION RESPIRATORY (INHALATION)
Status: DISCONTINUED | OUTPATIENT
Start: 2023-05-19 | End: 2023-05-19 | Stop reason: HOSPADM

## 2023-05-19 RX ORDER — LORAZEPAM 2 MG/ML
0.5 INJECTION INTRAMUSCULAR
Status: DISCONTINUED | OUTPATIENT
Start: 2023-05-19 | End: 2023-05-19 | Stop reason: HOSPADM

## 2023-05-19 RX ORDER — METOPROLOL TARTRATE 5 MG/5ML
5 INJECTION INTRAVENOUS EVERY 10 MIN PRN
Status: DISCONTINUED | OUTPATIENT
Start: 2023-05-19 | End: 2023-05-19 | Stop reason: ALTCHOICE

## 2023-05-19 RX ORDER — METOCLOPRAMIDE HYDROCHLORIDE 5 MG/ML
10 INJECTION INTRAMUSCULAR; INTRAVENOUS
Status: DISCONTINUED | OUTPATIENT
Start: 2023-05-19 | End: 2023-05-19 | Stop reason: HOSPADM

## 2023-05-19 RX ORDER — SODIUM CHLORIDE 9 MG/ML
INJECTION, SOLUTION INTRAVENOUS PRN
Status: DISCONTINUED | OUTPATIENT
Start: 2023-05-19 | End: 2023-05-19

## 2023-05-19 RX ORDER — PROPOFOL 10 MG/ML
INJECTION, EMULSION INTRAVENOUS CONTINUOUS PRN
Status: DISCONTINUED | OUTPATIENT
Start: 2023-05-19 | End: 2023-05-19 | Stop reason: SDUPTHER

## 2023-05-19 RX ADMIN — PIPERACILLIN AND TAZOBACTAM 3375 MG: 3; .375 INJECTION, POWDER, LYOPHILIZED, FOR SOLUTION INTRAVENOUS at 18:10

## 2023-05-19 RX ADMIN — PROPOFOL 50 MCG/KG/MIN: 10 INJECTION, EMULSION INTRAVENOUS at 13:15

## 2023-05-19 RX ADMIN — BRIMONIDINE TARTRATE 1 DROP: 2 SOLUTION OPHTHALMIC at 20:37

## 2023-05-19 RX ADMIN — DONEPEZIL HYDROCHLORIDE 5 MG: 5 TABLET, FILM COATED ORAL at 20:36

## 2023-05-19 RX ADMIN — SODIUM CHLORIDE, POTASSIUM CHLORIDE, SODIUM LACTATE AND CALCIUM CHLORIDE: 600; 310; 30; 20 INJECTION, SOLUTION INTRAVENOUS at 13:15

## 2023-05-19 RX ADMIN — SODIUM CHLORIDE, PRESERVATIVE FREE 10 ML: 5 INJECTION INTRAVENOUS at 08:47

## 2023-05-19 RX ADMIN — TIMOLOL MALEATE 1 DROP: 2.5 SOLUTION OPHTHALMIC at 08:46

## 2023-05-19 RX ADMIN — PANTOPRAZOLE SODIUM 40 MG: 40 TABLET, DELAYED RELEASE ORAL at 05:57

## 2023-05-19 RX ADMIN — TIMOLOL MALEATE 1 DROP: 2.5 SOLUTION OPHTHALMIC at 20:37

## 2023-05-19 RX ADMIN — MAGNESIUM OXIDE TAB 400 MG (241.3 MG ELEMENTAL MG) 400 MG: 400 (241.3 MG) TAB at 20:36

## 2023-05-19 RX ADMIN — BRIMONIDINE TARTRATE 1 DROP: 2 SOLUTION OPHTHALMIC at 08:46

## 2023-05-19 RX ADMIN — SODIUM CHLORIDE, PRESERVATIVE FREE 10 ML: 5 INJECTION INTRAVENOUS at 20:54

## 2023-05-19 RX ADMIN — FERROUS SULFATE TAB 325 MG (65 MG ELEMENTAL FE) 325 MG: 325 (65 FE) TAB at 08:45

## 2023-05-19 RX ADMIN — ATORVASTATIN CALCIUM 20 MG: 20 TABLET, FILM COATED ORAL at 20:36

## 2023-05-19 RX ADMIN — LIDOCAINE HYDROCHLORIDE 50 MG: 20 INJECTION, SOLUTION INTRAVENOUS at 13:15

## 2023-05-19 RX ADMIN — LATANOPROST 1 DROP: 50 SOLUTION OPHTHALMIC at 20:36

## 2023-05-19 RX ADMIN — MAGNESIUM OXIDE TAB 400 MG (241.3 MG ELEMENTAL MG) 400 MG: 400 (241.3 MG) TAB at 08:47

## 2023-05-19 RX ADMIN — AMLODIPINE BESYLATE 10 MG: 5 TABLET ORAL at 08:45

## 2023-05-19 RX ADMIN — PIPERACILLIN AND TAZOBACTAM 3375 MG: 3; .375 INJECTION, POWDER, LYOPHILIZED, FOR SOLUTION INTRAVENOUS at 10:13

## 2023-05-19 ASSESSMENT — ENCOUNTER SYMPTOMS
GASTROINTESTINAL NEGATIVE: 1
ROS SKIN COMMENTS: RIGHT FOOT WOUND
RESPIRATORY NEGATIVE: 1

## 2023-05-20 LAB
ABO + RH BLD: NORMAL
ANION GAP SERPL CALC-SCNC: 4 MMOL/L (ref 5–15)
BASOPHILS # BLD: 0 K/UL (ref 0–0.1)
BASOPHILS NFR BLD: 0 % (ref 0–1)
BLD PROD TYP BPU: NORMAL
BLD PROD TYP BPU: NORMAL
BLOOD BANK DISPENSE STATUS: NORMAL
BLOOD BANK DISPENSE STATUS: NORMAL
BLOOD GROUP ANTIBODIES SERPL: NEGATIVE
BPU ID: NORMAL
BPU ID: NORMAL
BUN SERPL-MCNC: 25 MG/DL (ref 6–20)
BUN/CREAT SERPL: 18 (ref 12–20)
CA-I BLD-MCNC: 8.2 MG/DL (ref 8.5–10.1)
CHLORIDE SERPL-SCNC: 107 MMOL/L (ref 97–108)
CO2 SERPL-SCNC: 26 MMOL/L (ref 21–32)
CREAT SERPL-MCNC: 1.42 MG/DL (ref 0.7–1.3)
CROSSMATCH RESULT: NORMAL
CROSSMATCH RESULT: NORMAL
DIFFERENTIAL METHOD BLD: ABNORMAL
EOSINOPHIL # BLD: 0.3 K/UL (ref 0–0.4)
EOSINOPHIL NFR BLD: 3 % (ref 0–7)
ERYTHROCYTE [DISTWIDTH] IN BLOOD BY AUTOMATED COUNT: 16.6 % (ref 11.5–14.5)
GLUCOSE SERPL-MCNC: 156 MG/DL (ref 65–100)
HCT VFR BLD AUTO: 24.5 % (ref 36.6–50.3)
HGB BLD-MCNC: 7.6 G/DL (ref 12.1–17)
IMM GRANULOCYTES # BLD AUTO: 0.1 K/UL (ref 0–0.04)
IMM GRANULOCYTES NFR BLD AUTO: 1 % (ref 0–0.5)
LYMPHOCYTES # BLD: 1.2 K/UL (ref 0.8–3.5)
LYMPHOCYTES NFR BLD: 13 % (ref 12–49)
MCH RBC QN AUTO: 26.5 PG (ref 26–34)
MCHC RBC AUTO-ENTMCNC: 31 G/DL (ref 30–36.5)
MCV RBC AUTO: 85.4 FL (ref 80–99)
MONOCYTES # BLD: 0.6 K/UL (ref 0–1)
MONOCYTES NFR BLD: 6 % (ref 5–13)
NEUTS SEG # BLD: 7.1 K/UL (ref 1.8–8)
NEUTS SEG NFR BLD: 77 % (ref 32–75)
NRBC # BLD: 0 K/UL (ref 0–0.01)
NRBC BLD-RTO: 0 PER 100 WBC
PLATELET # BLD AUTO: 456 K/UL (ref 150–400)
PMV BLD AUTO: 9.1 FL (ref 8.9–12.9)
POTASSIUM SERPL-SCNC: 3.6 MMOL/L (ref 3.5–5.1)
RBC # BLD AUTO: 2.87 M/UL (ref 4.1–5.7)
SODIUM SERPL-SCNC: 137 MMOL/L (ref 136–145)
SPECIMEN EXP DATE BLD: NORMAL
TRANSFUSION STATUS PATIENT QL: NORMAL
TRANSFUSION STATUS PATIENT QL: NORMAL
UNIT DIVISION: 0
UNIT DIVISION: 0
WBC # BLD AUTO: 9.3 K/UL (ref 4.1–11.1)

## 2023-05-20 PROCEDURE — 2580000003 HC RX 258: Performed by: NURSE PRACTITIONER

## 2023-05-20 PROCEDURE — 94640 AIRWAY INHALATION TREATMENT: CPT

## 2023-05-20 PROCEDURE — 1100000000 HC RM PRIVATE

## 2023-05-20 PROCEDURE — 2580000003 HC RX 258: Performed by: INTERNAL MEDICINE

## 2023-05-20 PROCEDURE — 2500000003 HC RX 250 WO HCPCS: Performed by: NURSE PRACTITIONER

## 2023-05-20 PROCEDURE — 85025 COMPLETE CBC W/AUTO DIFF WBC: CPT

## 2023-05-20 PROCEDURE — 6360000002 HC RX W HCPCS: Performed by: PODIATRIST

## 2023-05-20 PROCEDURE — 6370000000 HC RX 637 (ALT 250 FOR IP): Performed by: NURSE PRACTITIONER

## 2023-05-20 PROCEDURE — 80048 BASIC METABOLIC PNL TOTAL CA: CPT

## 2023-05-20 PROCEDURE — 36415 COLL VENOUS BLD VENIPUNCTURE: CPT

## 2023-05-20 PROCEDURE — 6360000002 HC RX W HCPCS: Performed by: INTERNAL MEDICINE

## 2023-05-20 RX ORDER — GUAIFENESIN 200 MG/10ML
100 LIQUID ORAL EVERY 4 HOURS PRN
Status: DISCONTINUED | OUTPATIENT
Start: 2023-05-20 | End: 2023-05-26 | Stop reason: HOSPADM

## 2023-05-20 RX ORDER — OXYCODONE HYDROCHLORIDE AND ACETAMINOPHEN 5; 325 MG/1; MG/1
1 TABLET ORAL EVERY 4 HOURS PRN
Status: DISCONTINUED | OUTPATIENT
Start: 2023-05-20 | End: 2023-05-26 | Stop reason: HOSPADM

## 2023-05-20 RX ORDER — IPRATROPIUM BROMIDE AND ALBUTEROL SULFATE 2.5; .5 MG/3ML; MG/3ML
1 SOLUTION RESPIRATORY (INHALATION) EVERY 6 HOURS PRN
Status: DISCONTINUED | OUTPATIENT
Start: 2023-05-20 | End: 2023-05-26 | Stop reason: HOSPADM

## 2023-05-20 RX ORDER — GUAIFENESIN 200 MG/10ML
200 LIQUID ORAL EVERY 4 HOURS PRN
Status: DISCONTINUED | OUTPATIENT
Start: 2023-05-20 | End: 2023-05-20

## 2023-05-20 RX ADMIN — MAGNESIUM OXIDE TAB 400 MG (241.3 MG ELEMENTAL MG) 400 MG: 400 (241.3 MG) TAB at 08:35

## 2023-05-20 RX ADMIN — BRIMONIDINE TARTRATE 1 DROP: 2 SOLUTION OPHTHALMIC at 15:58

## 2023-05-20 RX ADMIN — FERROUS SULFATE TAB 325 MG (65 MG ELEMENTAL FE) 325 MG: 325 (65 FE) TAB at 08:35

## 2023-05-20 RX ADMIN — LATANOPROST 1 DROP: 50 SOLUTION OPHTHALMIC at 20:53

## 2023-05-20 RX ADMIN — ATORVASTATIN CALCIUM 20 MG: 20 TABLET, FILM COATED ORAL at 20:48

## 2023-05-20 RX ADMIN — TIMOLOL MALEATE 1 DROP: 2.5 SOLUTION OPHTHALMIC at 20:54

## 2023-05-20 RX ADMIN — MORPHINE SULFATE 2 MG: 2 INJECTION, SOLUTION INTRAMUSCULAR; INTRAVENOUS at 11:42

## 2023-05-20 RX ADMIN — OXYCODONE AND ACETAMINOPHEN 1 TABLET: 5; 325 TABLET ORAL at 09:57

## 2023-05-20 RX ADMIN — PIPERACILLIN AND TAZOBACTAM 3375 MG: 3; .375 INJECTION, POWDER, LYOPHILIZED, FOR SOLUTION INTRAVENOUS at 20:48

## 2023-05-20 RX ADMIN — OXYCODONE AND ACETAMINOPHEN 1 TABLET: 5; 325 TABLET ORAL at 20:49

## 2023-05-20 RX ADMIN — AMLODIPINE BESYLATE 10 MG: 5 TABLET ORAL at 08:35

## 2023-05-20 RX ADMIN — BRIMONIDINE TARTRATE 1 DROP: 2 SOLUTION OPHTHALMIC at 20:53

## 2023-05-20 RX ADMIN — PIPERACILLIN AND TAZOBACTAM 3375 MG: 3; .375 INJECTION, POWDER, LYOPHILIZED, FOR SOLUTION INTRAVENOUS at 02:22

## 2023-05-20 RX ADMIN — GUAIFENESIN 100 MG: 100 SOLUTION ORAL at 09:58

## 2023-05-20 RX ADMIN — ACETAMINOPHEN 650 MG: 325 TABLET ORAL at 04:09

## 2023-05-20 RX ADMIN — SODIUM CHLORIDE, PRESERVATIVE FREE 10 ML: 5 INJECTION INTRAVENOUS at 21:02

## 2023-05-20 RX ADMIN — OXYCODONE AND ACETAMINOPHEN 1 TABLET: 5; 325 TABLET ORAL at 15:58

## 2023-05-20 RX ADMIN — PIPERACILLIN AND TAZOBACTAM 3375 MG: 3; .375 INJECTION, POWDER, LYOPHILIZED, FOR SOLUTION INTRAVENOUS at 09:10

## 2023-05-20 RX ADMIN — MAGNESIUM OXIDE TAB 400 MG (241.3 MG ELEMENTAL MG) 400 MG: 400 (241.3 MG) TAB at 20:54

## 2023-05-20 RX ADMIN — ASPIRIN 81 MG: 81 TABLET, COATED ORAL at 08:35

## 2023-05-20 RX ADMIN — PANTOPRAZOLE SODIUM 40 MG: 40 TABLET, DELAYED RELEASE ORAL at 06:32

## 2023-05-20 RX ADMIN — IPRATROPIUM BROMIDE AND ALBUTEROL SULFATE 1 AMPULE: 2.5; .5 SOLUTION RESPIRATORY (INHALATION) at 09:58

## 2023-05-20 RX ADMIN — BRIMONIDINE TARTRATE 1 DROP: 2 SOLUTION OPHTHALMIC at 08:36

## 2023-05-20 RX ADMIN — DONEPEZIL HYDROCHLORIDE 5 MG: 5 TABLET, FILM COATED ORAL at 20:49

## 2023-05-20 RX ADMIN — TIMOLOL MALEATE 1 DROP: 2.5 SOLUTION OPHTHALMIC at 08:36

## 2023-05-20 RX ADMIN — GUAIFENESIN 100 MG: 100 SOLUTION ORAL at 15:59

## 2023-05-20 RX ADMIN — SODIUM CHLORIDE, PRESERVATIVE FREE 10 ML: 5 INJECTION INTRAVENOUS at 08:40

## 2023-05-20 ASSESSMENT — PAIN DESCRIPTION - DESCRIPTORS
DESCRIPTORS: ACHING
DESCRIPTORS: ACHING
DESCRIPTORS: SORE
DESCRIPTORS: SORE
DESCRIPTORS: ACHING
DESCRIPTORS: ACHING

## 2023-05-20 ASSESSMENT — PAIN SCALES - GENERAL
PAINLEVEL_OUTOF10: 8
PAINLEVEL_OUTOF10: 6
PAINLEVEL_OUTOF10: 3
PAINLEVEL_OUTOF10: 2
PAINLEVEL_OUTOF10: 9
PAINLEVEL_OUTOF10: 3
PAINLEVEL_OUTOF10: 8

## 2023-05-20 ASSESSMENT — ENCOUNTER SYMPTOMS
GASTROINTESTINAL NEGATIVE: 1
ROS SKIN COMMENTS: RIGHT FOOT WOUND
RESPIRATORY NEGATIVE: 1

## 2023-05-20 ASSESSMENT — PAIN DESCRIPTION - ORIENTATION
ORIENTATION: RIGHT

## 2023-05-20 ASSESSMENT — PAIN DESCRIPTION - LOCATION
LOCATION: FOOT
LOCATION: LEG;FOOT

## 2023-05-20 ASSESSMENT — PAIN - FUNCTIONAL ASSESSMENT
PAIN_FUNCTIONAL_ASSESSMENT: PREVENTS OR INTERFERES SOME ACTIVE ACTIVITIES AND ADLS
PAIN_FUNCTIONAL_ASSESSMENT: PREVENTS OR INTERFERES SOME ACTIVE ACTIVITIES AND ADLS
PAIN_FUNCTIONAL_ASSESSMENT: PREVENTS OR INTERFERES WITH MANY ACTIVE NOT PASSIVE ACTIVITIES

## 2023-05-20 ASSESSMENT — PAIN SCALES - WONG BAKER
WONGBAKER_NUMERICALRESPONSE: 0
WONGBAKER_NUMERICALRESPONSE: 0

## 2023-05-21 LAB
BASOPHILS # BLD: 0 K/UL (ref 0–0.1)
BASOPHILS NFR BLD: 0 % (ref 0–1)
DIFFERENTIAL METHOD BLD: ABNORMAL
EOSINOPHIL # BLD: 0.3 K/UL (ref 0–0.4)
EOSINOPHIL NFR BLD: 3 % (ref 0–7)
ERYTHROCYTE [DISTWIDTH] IN BLOOD BY AUTOMATED COUNT: 17.2 % (ref 11.5–14.5)
GLUCOSE BLD STRIP.AUTO-MCNC: 131 MG/DL (ref 65–100)
GLUCOSE BLD STRIP.AUTO-MCNC: 163 MG/DL (ref 65–100)
GLUCOSE BLD STRIP.AUTO-MCNC: 176 MG/DL (ref 65–100)
HCT VFR BLD AUTO: 27 % (ref 36.6–50.3)
HGB BLD-MCNC: 8.3 G/DL (ref 12.1–17)
IMM GRANULOCYTES # BLD AUTO: 0.1 K/UL (ref 0–0.04)
IMM GRANULOCYTES NFR BLD AUTO: 1 % (ref 0–0.5)
LYMPHOCYTES # BLD: 0.9 K/UL (ref 0.8–3.5)
LYMPHOCYTES NFR BLD: 9 % (ref 12–49)
MCH RBC QN AUTO: 26.7 PG (ref 26–34)
MCHC RBC AUTO-ENTMCNC: 30.7 G/DL (ref 30–36.5)
MCV RBC AUTO: 86.8 FL (ref 80–99)
MONOCYTES # BLD: 0.4 K/UL (ref 0–1)
MONOCYTES NFR BLD: 5 % (ref 5–13)
NEUTS SEG # BLD: 7.5 K/UL (ref 1.8–8)
NEUTS SEG NFR BLD: 82 % (ref 32–75)
NRBC # BLD: 0 K/UL (ref 0–0.01)
NRBC BLD-RTO: 0 PER 100 WBC
PERFORMED BY:: ABNORMAL
PLATELET # BLD AUTO: 487 K/UL (ref 150–400)
PMV BLD AUTO: 9.4 FL (ref 8.9–12.9)
RBC # BLD AUTO: 3.11 M/UL (ref 4.1–5.7)
WBC # BLD AUTO: 9.2 K/UL (ref 4.1–11.1)

## 2023-05-21 PROCEDURE — 6360000002 HC RX W HCPCS: Performed by: INTERNAL MEDICINE

## 2023-05-21 PROCEDURE — 87070 CULTURE OTHR SPECIMN AEROBIC: CPT

## 2023-05-21 PROCEDURE — 87077 CULTURE AEROBIC IDENTIFY: CPT

## 2023-05-21 PROCEDURE — 87205 SMEAR GRAM STAIN: CPT

## 2023-05-21 PROCEDURE — 6370000000 HC RX 637 (ALT 250 FOR IP): Performed by: NURSE PRACTITIONER

## 2023-05-21 PROCEDURE — 85025 COMPLETE CBC W/AUTO DIFF WBC: CPT

## 2023-05-21 PROCEDURE — 2580000003 HC RX 258: Performed by: NURSE PRACTITIONER

## 2023-05-21 PROCEDURE — 1100000000 HC RM PRIVATE

## 2023-05-21 PROCEDURE — 36415 COLL VENOUS BLD VENIPUNCTURE: CPT

## 2023-05-21 PROCEDURE — 87186 SC STD MICRODIL/AGAR DIL: CPT

## 2023-05-21 PROCEDURE — 2580000003 HC RX 258: Performed by: INTERNAL MEDICINE

## 2023-05-21 PROCEDURE — 82962 GLUCOSE BLOOD TEST: CPT

## 2023-05-21 RX ORDER — INSULIN LISPRO 100 [IU]/ML
0-4 INJECTION, SOLUTION INTRAVENOUS; SUBCUTANEOUS
Status: DISCONTINUED | OUTPATIENT
Start: 2023-05-21 | End: 2023-05-26 | Stop reason: HOSPADM

## 2023-05-21 RX ORDER — INSULIN LISPRO 100 [IU]/ML
0-4 INJECTION, SOLUTION INTRAVENOUS; SUBCUTANEOUS NIGHTLY
Status: DISCONTINUED | OUTPATIENT
Start: 2023-05-21 | End: 2023-05-26 | Stop reason: HOSPADM

## 2023-05-21 RX ADMIN — PIPERACILLIN AND TAZOBACTAM 3375 MG: 3; .375 INJECTION, POWDER, LYOPHILIZED, FOR SOLUTION INTRAVENOUS at 04:55

## 2023-05-21 RX ADMIN — TIMOLOL MALEATE 1 DROP: 2.5 SOLUTION OPHTHALMIC at 08:25

## 2023-05-21 RX ADMIN — PIPERACILLIN AND TAZOBACTAM 3375 MG: 3; .375 INJECTION, POWDER, LYOPHILIZED, FOR SOLUTION INTRAVENOUS at 20:31

## 2023-05-21 RX ADMIN — SODIUM CHLORIDE, PRESERVATIVE FREE 10 ML: 5 INJECTION INTRAVENOUS at 20:33

## 2023-05-21 RX ADMIN — SODIUM CHLORIDE, PRESERVATIVE FREE 10 ML: 5 INJECTION INTRAVENOUS at 20:36

## 2023-05-21 RX ADMIN — FERROUS SULFATE TAB 325 MG (65 MG ELEMENTAL FE) 325 MG: 325 (65 FE) TAB at 08:25

## 2023-05-21 RX ADMIN — OXYCODONE AND ACETAMINOPHEN 1 TABLET: 5; 325 TABLET ORAL at 11:06

## 2023-05-21 RX ADMIN — BRIMONIDINE TARTRATE 1 DROP: 2 SOLUTION OPHTHALMIC at 13:40

## 2023-05-21 RX ADMIN — BRIMONIDINE TARTRATE 1 DROP: 2 SOLUTION OPHTHALMIC at 08:25

## 2023-05-21 RX ADMIN — AMLODIPINE BESYLATE 10 MG: 5 TABLET ORAL at 08:25

## 2023-05-21 RX ADMIN — MAGNESIUM OXIDE TAB 400 MG (241.3 MG ELEMENTAL MG) 400 MG: 400 (241.3 MG) TAB at 08:25

## 2023-05-21 RX ADMIN — TIMOLOL MALEATE 1 DROP: 2.5 SOLUTION OPHTHALMIC at 20:30

## 2023-05-21 RX ADMIN — OXYCODONE AND ACETAMINOPHEN 1 TABLET: 5; 325 TABLET ORAL at 20:56

## 2023-05-21 RX ADMIN — BRIMONIDINE TARTRATE 1 DROP: 2 SOLUTION OPHTHALMIC at 20:30

## 2023-05-21 RX ADMIN — MAGNESIUM OXIDE TAB 400 MG (241.3 MG ELEMENTAL MG) 400 MG: 400 (241.3 MG) TAB at 20:35

## 2023-05-21 RX ADMIN — ASPIRIN 81 MG: 81 TABLET, COATED ORAL at 08:25

## 2023-05-21 RX ADMIN — LATANOPROST 1 DROP: 50 SOLUTION OPHTHALMIC at 20:30

## 2023-05-21 RX ADMIN — ATORVASTATIN CALCIUM 20 MG: 20 TABLET, FILM COATED ORAL at 20:31

## 2023-05-21 RX ADMIN — PIPERACILLIN AND TAZOBACTAM 3375 MG: 3; .375 INJECTION, POWDER, LYOPHILIZED, FOR SOLUTION INTRAVENOUS at 13:40

## 2023-05-21 RX ADMIN — DONEPEZIL HYDROCHLORIDE 5 MG: 5 TABLET, FILM COATED ORAL at 20:31

## 2023-05-21 RX ADMIN — SODIUM CHLORIDE, PRESERVATIVE FREE 10 ML: 5 INJECTION INTRAVENOUS at 08:26

## 2023-05-21 RX ADMIN — PANTOPRAZOLE SODIUM 40 MG: 40 TABLET, DELAYED RELEASE ORAL at 06:33

## 2023-05-21 ASSESSMENT — PAIN DESCRIPTION - LOCATION
LOCATION: FOOT
LOCATION: FOOT

## 2023-05-21 ASSESSMENT — PAIN SCALES - GENERAL
PAINLEVEL_OUTOF10: 8
PAINLEVEL_OUTOF10: 7
PAINLEVEL_OUTOF10: 5
PAINLEVEL_OUTOF10: 0

## 2023-05-21 ASSESSMENT — ENCOUNTER SYMPTOMS
RESPIRATORY NEGATIVE: 1
GASTROINTESTINAL NEGATIVE: 1
ROS SKIN COMMENTS: RIGHT FOOT WOUND

## 2023-05-21 ASSESSMENT — PAIN SCALES - WONG BAKER: WONGBAKER_NUMERICALRESPONSE: 0

## 2023-05-21 ASSESSMENT — PAIN DESCRIPTION - DESCRIPTORS
DESCRIPTORS: BURNING
DESCRIPTORS: BURNING;SORE

## 2023-05-21 ASSESSMENT — PAIN - FUNCTIONAL ASSESSMENT: PAIN_FUNCTIONAL_ASSESSMENT: PREVENTS OR INTERFERES SOME ACTIVE ACTIVITIES AND ADLS

## 2023-05-21 ASSESSMENT — PAIN DESCRIPTION - ORIENTATION
ORIENTATION: DISTAL
ORIENTATION: RIGHT

## 2023-05-22 PROBLEM — I10 HYPERTENSION: Status: ACTIVE | Noted: 2020-07-21

## 2023-05-22 PROBLEM — I73.9 PERIPHERAL VASCULAR DISEASE (HCC): Status: ACTIVE | Noted: 2020-07-21

## 2023-05-22 PROBLEM — I50.22 CHRONIC SYSTOLIC (CONGESTIVE) HEART FAILURE (HCC): Status: ACTIVE | Noted: 2022-12-16

## 2023-05-22 PROBLEM — N18.30 STAGE 3 CHRONIC KIDNEY DISEASE (HCC): Status: ACTIVE | Noted: 2020-07-21

## 2023-05-22 PROBLEM — E11.9 TYPE 2 DIABETES MELLITUS WITHOUT COMPLICATIONS (HCC): Status: ACTIVE | Noted: 2020-07-13

## 2023-05-22 PROBLEM — N39.0 E. COLI UTI: Status: ACTIVE | Noted: 2023-05-22

## 2023-05-22 PROBLEM — Z86.73 HISTORY OF CVA (CEREBROVASCULAR ACCIDENT): Status: ACTIVE | Noted: 2020-07-21

## 2023-05-22 PROBLEM — B96.20 E. COLI UTI: Status: ACTIVE | Noted: 2023-05-22

## 2023-05-22 PROBLEM — E78.2 MIXED HYPERLIPIDEMIA: Status: ACTIVE | Noted: 2020-07-21

## 2023-05-22 LAB
BASOPHILS # BLD: 0 K/UL (ref 0–0.1)
BASOPHILS NFR BLD: 0 % (ref 0–1)
CRP SERPL-MCNC: 6.2 MG/DL (ref 0–0.6)
DIFFERENTIAL METHOD BLD: ABNORMAL
EOSINOPHIL # BLD: 0.3 K/UL (ref 0–0.4)
EOSINOPHIL NFR BLD: 3 % (ref 0–7)
ERYTHROCYTE [DISTWIDTH] IN BLOOD BY AUTOMATED COUNT: 17.3 % (ref 11.5–14.5)
GLUCOSE BLD STRIP.AUTO-MCNC: 118 MG/DL (ref 65–100)
GLUCOSE BLD STRIP.AUTO-MCNC: 161 MG/DL (ref 65–100)
GLUCOSE BLD STRIP.AUTO-MCNC: 164 MG/DL (ref 65–100)
GLUCOSE BLD STRIP.AUTO-MCNC: 165 MG/DL (ref 65–100)
HCT VFR BLD AUTO: 26.5 % (ref 36.6–50.3)
HGB BLD-MCNC: 8.1 G/DL (ref 12.1–17)
IMM GRANULOCYTES # BLD AUTO: 0.1 K/UL (ref 0–0.04)
IMM GRANULOCYTES NFR BLD AUTO: 1 % (ref 0–0.5)
LYMPHOCYTES # BLD: 1.2 K/UL (ref 0.8–3.5)
LYMPHOCYTES NFR BLD: 14 % (ref 12–49)
MCH RBC QN AUTO: 26.6 PG (ref 26–34)
MCHC RBC AUTO-ENTMCNC: 30.6 G/DL (ref 30–36.5)
MCV RBC AUTO: 87.2 FL (ref 80–99)
MONOCYTES # BLD: 0.5 K/UL (ref 0–1)
MONOCYTES NFR BLD: 5 % (ref 5–13)
NEUTS SEG # BLD: 6.4 K/UL (ref 1.8–8)
NEUTS SEG NFR BLD: 77 % (ref 32–75)
NRBC # BLD: 0 K/UL (ref 0–0.01)
NRBC BLD-RTO: 0 PER 100 WBC
PERFORMED BY:: ABNORMAL
PLATELET # BLD AUTO: 488 K/UL (ref 150–400)
PMV BLD AUTO: 9.4 FL (ref 8.9–12.9)
PROCALCITONIN SERPL-MCNC: 0.07 NG/ML
RBC # BLD AUTO: 3.04 M/UL (ref 4.1–5.7)
WBC # BLD AUTO: 8.5 K/UL (ref 4.1–11.1)

## 2023-05-22 PROCEDURE — 84145 PROCALCITONIN (PCT): CPT

## 2023-05-22 PROCEDURE — 82962 GLUCOSE BLOOD TEST: CPT

## 2023-05-22 PROCEDURE — 6370000000 HC RX 637 (ALT 250 FOR IP): Performed by: PODIATRIST

## 2023-05-22 PROCEDURE — 2580000003 HC RX 258: Performed by: INTERNAL MEDICINE

## 2023-05-22 PROCEDURE — 1100000000 HC RM PRIVATE

## 2023-05-22 PROCEDURE — 99232 SBSQ HOSP IP/OBS MODERATE 35: CPT | Performed by: INTERNAL MEDICINE

## 2023-05-22 PROCEDURE — 99232 SBSQ HOSP IP/OBS MODERATE 35: CPT | Performed by: PODIATRIST

## 2023-05-22 PROCEDURE — 6370000000 HC RX 637 (ALT 250 FOR IP): Performed by: NURSE PRACTITIONER

## 2023-05-22 PROCEDURE — 86140 C-REACTIVE PROTEIN: CPT

## 2023-05-22 PROCEDURE — 6360000002 HC RX W HCPCS: Performed by: INTERNAL MEDICINE

## 2023-05-22 PROCEDURE — 36415 COLL VENOUS BLD VENIPUNCTURE: CPT

## 2023-05-22 PROCEDURE — 2580000003 HC RX 258: Performed by: NURSE PRACTITIONER

## 2023-05-22 PROCEDURE — 85025 COMPLETE CBC W/AUTO DIFF WBC: CPT

## 2023-05-22 RX ORDER — GABAPENTIN 100 MG/1
100 CAPSULE ORAL 2 TIMES DAILY
Status: DISCONTINUED | OUTPATIENT
Start: 2023-05-22 | End: 2023-05-26 | Stop reason: HOSPADM

## 2023-05-22 RX ADMIN — SODIUM CHLORIDE, PRESERVATIVE FREE 10 ML: 5 INJECTION INTRAVENOUS at 20:58

## 2023-05-22 RX ADMIN — TIMOLOL MALEATE 1 DROP: 2.5 SOLUTION OPHTHALMIC at 08:32

## 2023-05-22 RX ADMIN — PIPERACILLIN AND TAZOBACTAM 3375 MG: 3; .375 INJECTION, POWDER, LYOPHILIZED, FOR SOLUTION INTRAVENOUS at 13:34

## 2023-05-22 RX ADMIN — TIMOLOL MALEATE 1 DROP: 2.5 SOLUTION OPHTHALMIC at 20:52

## 2023-05-22 RX ADMIN — BRIMONIDINE TARTRATE 1 DROP: 2 SOLUTION OPHTHALMIC at 08:32

## 2023-05-22 RX ADMIN — DONEPEZIL HYDROCHLORIDE 5 MG: 5 TABLET, FILM COATED ORAL at 20:52

## 2023-05-22 RX ADMIN — OXYCODONE AND ACETAMINOPHEN 1 TABLET: 5; 325 TABLET ORAL at 05:54

## 2023-05-22 RX ADMIN — PIPERACILLIN AND TAZOBACTAM 3375 MG: 3; .375 INJECTION, POWDER, LYOPHILIZED, FOR SOLUTION INTRAVENOUS at 20:52

## 2023-05-22 RX ADMIN — ASPIRIN 81 MG: 81 TABLET, COATED ORAL at 08:32

## 2023-05-22 RX ADMIN — MAGNESIUM OXIDE TAB 400 MG (241.3 MG ELEMENTAL MG) 400 MG: 400 (241.3 MG) TAB at 08:32

## 2023-05-22 RX ADMIN — MAGNESIUM OXIDE TAB 400 MG (241.3 MG ELEMENTAL MG) 400 MG: 400 (241.3 MG) TAB at 20:52

## 2023-05-22 RX ADMIN — GABAPENTIN 100 MG: 100 CAPSULE ORAL at 08:32

## 2023-05-22 RX ADMIN — BRIMONIDINE TARTRATE 1 DROP: 2 SOLUTION OPHTHALMIC at 13:34

## 2023-05-22 RX ADMIN — OXYCODONE AND ACETAMINOPHEN 1 TABLET: 5; 325 TABLET ORAL at 10:55

## 2023-05-22 RX ADMIN — OXYCODONE AND ACETAMINOPHEN 1 TABLET: 5; 325 TABLET ORAL at 14:37

## 2023-05-22 RX ADMIN — PIPERACILLIN AND TAZOBACTAM 3375 MG: 3; .375 INJECTION, POWDER, LYOPHILIZED, FOR SOLUTION INTRAVENOUS at 05:23

## 2023-05-22 RX ADMIN — AMLODIPINE BESYLATE 10 MG: 5 TABLET ORAL at 08:31

## 2023-05-22 RX ADMIN — OXYCODONE AND ACETAMINOPHEN 1 TABLET: 5; 325 TABLET ORAL at 20:52

## 2023-05-22 RX ADMIN — FERROUS SULFATE TAB 325 MG (65 MG ELEMENTAL FE) 325 MG: 325 (65 FE) TAB at 08:32

## 2023-05-22 RX ADMIN — SODIUM CHLORIDE, PRESERVATIVE FREE 10 ML: 5 INJECTION INTRAVENOUS at 08:44

## 2023-05-22 RX ADMIN — GABAPENTIN 100 MG: 100 CAPSULE ORAL at 20:52

## 2023-05-22 RX ADMIN — LATANOPROST 1 DROP: 50 SOLUTION OPHTHALMIC at 21:05

## 2023-05-22 RX ADMIN — LATANOPROST 1 DROP: 50 SOLUTION OPHTHALMIC at 20:52

## 2023-05-22 RX ADMIN — BRIMONIDINE TARTRATE 1 DROP: 2 SOLUTION OPHTHALMIC at 20:52

## 2023-05-22 RX ADMIN — ATORVASTATIN CALCIUM 20 MG: 20 TABLET, FILM COATED ORAL at 20:52

## 2023-05-22 RX ADMIN — PANTOPRAZOLE SODIUM 40 MG: 40 TABLET, DELAYED RELEASE ORAL at 06:20

## 2023-05-22 ASSESSMENT — PAIN SCALES - WONG BAKER
WONGBAKER_NUMERICALRESPONSE: 0

## 2023-05-22 ASSESSMENT — PAIN DESCRIPTION - LOCATION
LOCATION: FOOT
LOCATION: BACK;FOOT

## 2023-05-22 ASSESSMENT — PAIN DESCRIPTION - DESCRIPTORS
DESCRIPTORS: BURNING;ACHING
DESCRIPTORS: BURNING;GNAWING

## 2023-05-22 ASSESSMENT — ENCOUNTER SYMPTOMS
ABDOMINAL PAIN: 0
SHORTNESS OF BREATH: 0
VOMITING: 0
DIARRHEA: 0

## 2023-05-22 ASSESSMENT — PAIN SCALES - GENERAL
PAINLEVEL_OUTOF10: 8
PAINLEVEL_OUTOF10: 10
PAINLEVEL_OUTOF10: 6

## 2023-05-22 ASSESSMENT — PAIN DESCRIPTION - ORIENTATION
ORIENTATION: DISTAL;MID
ORIENTATION: RIGHT

## 2023-05-22 ASSESSMENT — PAIN - FUNCTIONAL ASSESSMENT: PAIN_FUNCTIONAL_ASSESSMENT: PREVENTS OR INTERFERES SOME ACTIVE ACTIVITIES AND ADLS

## 2023-05-22 NOTE — CARE COORDINATION
CM awaiting PT/OT recc to start insurance auth. Patient accepted at [facility] for SNF once medically stable. CM will need to complete [facility] prior to d/c.    CM continues to follow and monitor for needs.

## 2023-05-23 LAB
ANION GAP SERPL CALC-SCNC: 1 MMOL/L (ref 5–15)
BACTERIA SPEC CULT: NORMAL
BACTERIA SPEC CULT: NORMAL
BASOPHILS # BLD: 0 K/UL (ref 0–0.1)
BASOPHILS NFR BLD: 0 % (ref 0–1)
BUN SERPL-MCNC: 21 MG/DL (ref 6–20)
BUN/CREAT SERPL: 16 (ref 12–20)
CA-I BLD-MCNC: 8.8 MG/DL (ref 8.5–10.1)
CHLORIDE SERPL-SCNC: 109 MMOL/L (ref 97–108)
CO2 SERPL-SCNC: 28 MMOL/L (ref 21–32)
CREAT SERPL-MCNC: 1.33 MG/DL (ref 0.7–1.3)
CRP SERPL-MCNC: 5.83 MG/DL (ref 0–0.6)
DIFFERENTIAL METHOD BLD: ABNORMAL
EOSINOPHIL # BLD: 0.3 K/UL (ref 0–0.4)
EOSINOPHIL NFR BLD: 4 % (ref 0–7)
ERYTHROCYTE [DISTWIDTH] IN BLOOD BY AUTOMATED COUNT: 17.7 % (ref 11.5–14.5)
GLUCOSE BLD STRIP.AUTO-MCNC: 110 MG/DL (ref 65–100)
GLUCOSE BLD STRIP.AUTO-MCNC: 139 MG/DL (ref 65–100)
GLUCOSE BLD STRIP.AUTO-MCNC: 139 MG/DL (ref 65–100)
GLUCOSE BLD STRIP.AUTO-MCNC: 187 MG/DL (ref 65–100)
GLUCOSE SERPL-MCNC: 113 MG/DL (ref 65–100)
HCT VFR BLD AUTO: 27.3 % (ref 36.6–50.3)
HGB BLD-MCNC: 8.2 G/DL (ref 12.1–17)
IMM GRANULOCYTES # BLD AUTO: 0.1 K/UL (ref 0–0.04)
IMM GRANULOCYTES NFR BLD AUTO: 1 % (ref 0–0.5)
LYMPHOCYTES # BLD: 1.3 K/UL (ref 0.8–3.5)
LYMPHOCYTES NFR BLD: 15 % (ref 12–49)
Lab: NORMAL
Lab: NORMAL
MCH RBC QN AUTO: 26.6 PG (ref 26–34)
MCHC RBC AUTO-ENTMCNC: 30 G/DL (ref 30–36.5)
MCV RBC AUTO: 88.6 FL (ref 80–99)
MONOCYTES # BLD: 0.5 K/UL (ref 0–1)
MONOCYTES NFR BLD: 6 % (ref 5–13)
NEUTS SEG # BLD: 6.1 K/UL (ref 1.8–8)
NEUTS SEG NFR BLD: 74 % (ref 32–75)
NRBC # BLD: 0 K/UL (ref 0–0.01)
NRBC BLD-RTO: 0 PER 100 WBC
PERFORMED BY:: ABNORMAL
PLATELET # BLD AUTO: 500 K/UL (ref 150–400)
PMV BLD AUTO: 9.5 FL (ref 8.9–12.9)
POTASSIUM SERPL-SCNC: 3.8 MMOL/L (ref 3.5–5.1)
PROCALCITONIN SERPL-MCNC: 0.05 NG/ML
RBC # BLD AUTO: 3.08 M/UL (ref 4.1–5.7)
SODIUM SERPL-SCNC: 138 MMOL/L (ref 136–145)
WBC # BLD AUTO: 8.3 K/UL (ref 4.1–11.1)

## 2023-05-23 PROCEDURE — 6370000000 HC RX 637 (ALT 250 FOR IP): Performed by: NURSE PRACTITIONER

## 2023-05-23 PROCEDURE — 97530 THERAPEUTIC ACTIVITIES: CPT

## 2023-05-23 PROCEDURE — 84145 PROCALCITONIN (PCT): CPT

## 2023-05-23 PROCEDURE — 82962 GLUCOSE BLOOD TEST: CPT

## 2023-05-23 PROCEDURE — 85025 COMPLETE CBC W/AUTO DIFF WBC: CPT

## 2023-05-23 PROCEDURE — 97165 OT EVAL LOW COMPLEX 30 MIN: CPT

## 2023-05-23 PROCEDURE — 36415 COLL VENOUS BLD VENIPUNCTURE: CPT

## 2023-05-23 PROCEDURE — 1100000000 HC RM PRIVATE

## 2023-05-23 PROCEDURE — 2580000003 HC RX 258: Performed by: NURSE PRACTITIONER

## 2023-05-23 PROCEDURE — 6370000000 HC RX 637 (ALT 250 FOR IP): Performed by: PODIATRIST

## 2023-05-23 PROCEDURE — 80048 BASIC METABOLIC PNL TOTAL CA: CPT

## 2023-05-23 PROCEDURE — 99232 SBSQ HOSP IP/OBS MODERATE 35: CPT | Performed by: INTERNAL MEDICINE

## 2023-05-23 PROCEDURE — 97161 PT EVAL LOW COMPLEX 20 MIN: CPT

## 2023-05-23 PROCEDURE — 2580000003 HC RX 258: Performed by: INTERNAL MEDICINE

## 2023-05-23 PROCEDURE — 6360000002 HC RX W HCPCS: Performed by: INTERNAL MEDICINE

## 2023-05-23 PROCEDURE — 86140 C-REACTIVE PROTEIN: CPT

## 2023-05-23 RX ADMIN — GABAPENTIN 100 MG: 100 CAPSULE ORAL at 21:22

## 2023-05-23 RX ADMIN — SODIUM CHLORIDE, PRESERVATIVE FREE 10 ML: 5 INJECTION INTRAVENOUS at 08:53

## 2023-05-23 RX ADMIN — BRIMONIDINE TARTRATE 1 DROP: 2 SOLUTION OPHTHALMIC at 12:58

## 2023-05-23 RX ADMIN — OXYCODONE AND ACETAMINOPHEN 1 TABLET: 5; 325 TABLET ORAL at 08:54

## 2023-05-23 RX ADMIN — SODIUM CHLORIDE, PRESERVATIVE FREE 10 ML: 5 INJECTION INTRAVENOUS at 21:38

## 2023-05-23 RX ADMIN — PIPERACILLIN AND TAZOBACTAM 3375 MG: 3; .375 INJECTION, POWDER, LYOPHILIZED, FOR SOLUTION INTRAVENOUS at 05:00

## 2023-05-23 RX ADMIN — AMLODIPINE BESYLATE 10 MG: 5 TABLET ORAL at 08:54

## 2023-05-23 RX ADMIN — TIMOLOL MALEATE 1 DROP: 2.5 SOLUTION OPHTHALMIC at 21:21

## 2023-05-23 RX ADMIN — FERROUS SULFATE TAB 325 MG (65 MG ELEMENTAL FE) 325 MG: 325 (65 FE) TAB at 08:54

## 2023-05-23 RX ADMIN — MAGNESIUM OXIDE TAB 400 MG (241.3 MG ELEMENTAL MG) 400 MG: 400 (241.3 MG) TAB at 21:22

## 2023-05-23 RX ADMIN — PIPERACILLIN AND TAZOBACTAM 3375 MG: 3; .375 INJECTION, POWDER, LYOPHILIZED, FOR SOLUTION INTRAVENOUS at 12:58

## 2023-05-23 RX ADMIN — ATORVASTATIN CALCIUM 20 MG: 20 TABLET, FILM COATED ORAL at 21:22

## 2023-05-23 RX ADMIN — OXYCODONE AND ACETAMINOPHEN 1 TABLET: 5; 325 TABLET ORAL at 05:00

## 2023-05-23 RX ADMIN — LATANOPROST 1 DROP: 50 SOLUTION OPHTHALMIC at 21:21

## 2023-05-23 RX ADMIN — MAGNESIUM OXIDE TAB 400 MG (241.3 MG ELEMENTAL MG) 400 MG: 400 (241.3 MG) TAB at 08:54

## 2023-05-23 RX ADMIN — DONEPEZIL HYDROCHLORIDE 5 MG: 5 TABLET, FILM COATED ORAL at 21:22

## 2023-05-23 RX ADMIN — GABAPENTIN 100 MG: 100 CAPSULE ORAL at 08:54

## 2023-05-23 RX ADMIN — TIMOLOL MALEATE 1 DROP: 2.5 SOLUTION OPHTHALMIC at 08:54

## 2023-05-23 RX ADMIN — BRIMONIDINE TARTRATE 1 DROP: 2 SOLUTION OPHTHALMIC at 21:21

## 2023-05-23 RX ADMIN — BRIMONIDINE TARTRATE 1 DROP: 2 SOLUTION OPHTHALMIC at 08:54

## 2023-05-23 RX ADMIN — OXYCODONE AND ACETAMINOPHEN 1 TABLET: 5; 325 TABLET ORAL at 17:20

## 2023-05-23 RX ADMIN — PANTOPRAZOLE SODIUM 40 MG: 40 TABLET, DELAYED RELEASE ORAL at 08:54

## 2023-05-23 RX ADMIN — ASPIRIN 81 MG: 81 TABLET, COATED ORAL at 08:54

## 2023-05-23 ASSESSMENT — PAIN DESCRIPTION - ORIENTATION
ORIENTATION: RIGHT
ORIENTATION: RIGHT

## 2023-05-23 ASSESSMENT — PAIN SCALES - GENERAL
PAINLEVEL_OUTOF10: 4
PAINLEVEL_OUTOF10: 2
PAINLEVEL_OUTOF10: 3
PAINLEVEL_OUTOF10: 5
PAINLEVEL_OUTOF10: 5
PAINLEVEL_OUTOF10: 2

## 2023-05-23 ASSESSMENT — PAIN DESCRIPTION - LOCATION
LOCATION: FOOT;LEG
LOCATION: FOOT;LEG

## 2023-05-23 ASSESSMENT — PAIN DESCRIPTION - DESCRIPTORS
DESCRIPTORS: ACHING
DESCRIPTORS: OTHER (COMMENT)

## 2023-05-23 ASSESSMENT — ENCOUNTER SYMPTOMS: SHORTNESS OF BREATH: 0

## 2023-05-23 ASSESSMENT — PAIN - FUNCTIONAL ASSESSMENT: PAIN_FUNCTIONAL_ASSESSMENT: PREVENTS OR INTERFERES SOME ACTIVE ACTIVITIES AND ADLS

## 2023-05-24 LAB
ANION GAP SERPL CALC-SCNC: 1 MMOL/L (ref 5–15)
BACTERIA SPEC CULT: ABNORMAL
BACTERIA SPEC CULT: ABNORMAL
BUN SERPL-MCNC: 18 MG/DL (ref 6–20)
BUN/CREAT SERPL: 15 (ref 12–20)
CA-I BLD-MCNC: 8.4 MG/DL (ref 8.5–10.1)
CHLORIDE SERPL-SCNC: 109 MMOL/L (ref 97–108)
CO2 SERPL-SCNC: 27 MMOL/L (ref 21–32)
CREAT SERPL-MCNC: 1.17 MG/DL (ref 0.7–1.3)
CRP SERPL-MCNC: 4.79 MG/DL (ref 0–0.6)
GLUCOSE BLD STRIP.AUTO-MCNC: 120 MG/DL (ref 65–100)
GLUCOSE BLD STRIP.AUTO-MCNC: 141 MG/DL (ref 65–100)
GLUCOSE BLD STRIP.AUTO-MCNC: 158 MG/DL (ref 65–100)
GLUCOSE SERPL-MCNC: 112 MG/DL (ref 65–100)
GRAM STN SPEC: ABNORMAL
GRAM STN SPEC: ABNORMAL
Lab: ABNORMAL
PERFORMED BY:: ABNORMAL
POTASSIUM SERPL-SCNC: 3.7 MMOL/L (ref 3.5–5.1)
PROCALCITONIN SERPL-MCNC: <0.05 NG/ML
SODIUM SERPL-SCNC: 137 MMOL/L (ref 136–145)

## 2023-05-24 PROCEDURE — 36415 COLL VENOUS BLD VENIPUNCTURE: CPT

## 2023-05-24 PROCEDURE — 86140 C-REACTIVE PROTEIN: CPT

## 2023-05-24 PROCEDURE — 80048 BASIC METABOLIC PNL TOTAL CA: CPT

## 2023-05-24 PROCEDURE — 2580000003 HC RX 258: Performed by: NURSE PRACTITIONER

## 2023-05-24 PROCEDURE — 2580000003 HC RX 258: Performed by: INTERNAL MEDICINE

## 2023-05-24 PROCEDURE — 6360000002 HC RX W HCPCS: Performed by: INTERNAL MEDICINE

## 2023-05-24 PROCEDURE — 84145 PROCALCITONIN (PCT): CPT

## 2023-05-24 PROCEDURE — 97530 THERAPEUTIC ACTIVITIES: CPT

## 2023-05-24 PROCEDURE — 1100000000 HC RM PRIVATE

## 2023-05-24 PROCEDURE — 6370000000 HC RX 637 (ALT 250 FOR IP): Performed by: NURSE PRACTITIONER

## 2023-05-24 PROCEDURE — 6370000000 HC RX 637 (ALT 250 FOR IP): Performed by: PODIATRIST

## 2023-05-24 PROCEDURE — 99232 SBSQ HOSP IP/OBS MODERATE 35: CPT | Performed by: INTERNAL MEDICINE

## 2023-05-24 PROCEDURE — 82962 GLUCOSE BLOOD TEST: CPT

## 2023-05-24 RX ADMIN — AMLODIPINE BESYLATE 10 MG: 5 TABLET ORAL at 08:42

## 2023-05-24 RX ADMIN — ATORVASTATIN CALCIUM 20 MG: 20 TABLET, FILM COATED ORAL at 21:14

## 2023-05-24 RX ADMIN — GABAPENTIN 100 MG: 100 CAPSULE ORAL at 08:42

## 2023-05-24 RX ADMIN — MAGNESIUM OXIDE TAB 400 MG (241.3 MG ELEMENTAL MG) 400 MG: 400 (241.3 MG) TAB at 08:42

## 2023-05-24 RX ADMIN — GABAPENTIN 100 MG: 100 CAPSULE ORAL at 21:14

## 2023-05-24 RX ADMIN — TIMOLOL MALEATE 1 DROP: 2.5 SOLUTION OPHTHALMIC at 08:43

## 2023-05-24 RX ADMIN — OXYCODONE AND ACETAMINOPHEN 1 TABLET: 5; 325 TABLET ORAL at 21:14

## 2023-05-24 RX ADMIN — BRIMONIDINE TARTRATE 1 DROP: 2 SOLUTION OPHTHALMIC at 21:14

## 2023-05-24 RX ADMIN — PIPERACILLIN AND TAZOBACTAM 3375 MG: 3; .375 INJECTION, POWDER, LYOPHILIZED, FOR SOLUTION INTRAVENOUS at 17:18

## 2023-05-24 RX ADMIN — PANTOPRAZOLE SODIUM 40 MG: 40 TABLET, DELAYED RELEASE ORAL at 05:58

## 2023-05-24 RX ADMIN — FERROUS SULFATE TAB 325 MG (65 MG ELEMENTAL FE) 325 MG: 325 (65 FE) TAB at 08:42

## 2023-05-24 RX ADMIN — OXYCODONE AND ACETAMINOPHEN 1 TABLET: 5; 325 TABLET ORAL at 05:58

## 2023-05-24 RX ADMIN — OXYCODONE AND ACETAMINOPHEN 1 TABLET: 5; 325 TABLET ORAL at 17:35

## 2023-05-24 RX ADMIN — ASPIRIN 81 MG: 81 TABLET, COATED ORAL at 08:42

## 2023-05-24 RX ADMIN — TIMOLOL MALEATE 1 DROP: 2.5 SOLUTION OPHTHALMIC at 21:14

## 2023-05-24 RX ADMIN — LATANOPROST 1 DROP: 50 SOLUTION OPHTHALMIC at 21:14

## 2023-05-24 RX ADMIN — SODIUM CHLORIDE, PRESERVATIVE FREE 10 ML: 5 INJECTION INTRAVENOUS at 08:43

## 2023-05-24 RX ADMIN — MAGNESIUM OXIDE TAB 400 MG (241.3 MG ELEMENTAL MG) 400 MG: 400 (241.3 MG) TAB at 21:14

## 2023-05-24 RX ADMIN — BRIMONIDINE TARTRATE 1 DROP: 2 SOLUTION OPHTHALMIC at 08:43

## 2023-05-24 RX ADMIN — SODIUM CHLORIDE, PRESERVATIVE FREE 10 ML: 5 INJECTION INTRAVENOUS at 21:19

## 2023-05-24 RX ADMIN — DONEPEZIL HYDROCHLORIDE 5 MG: 5 TABLET, FILM COATED ORAL at 21:14

## 2023-05-24 RX ADMIN — BRIMONIDINE TARTRATE 1 DROP: 2 SOLUTION OPHTHALMIC at 14:21

## 2023-05-24 ASSESSMENT — PAIN SCALES - WONG BAKER
WONGBAKER_NUMERICALRESPONSE: 0
WONGBAKER_NUMERICALRESPONSE: 0

## 2023-05-24 ASSESSMENT — PAIN SCALES - GENERAL
PAINLEVEL_OUTOF10: 6
PAINLEVEL_OUTOF10: 0
PAINLEVEL_OUTOF10: 5
PAINLEVEL_OUTOF10: 0
PAINLEVEL_OUTOF10: 5
PAINLEVEL_OUTOF10: 5

## 2023-05-24 ASSESSMENT — PAIN DESCRIPTION - ORIENTATION
ORIENTATION: MID
ORIENTATION: RIGHT
ORIENTATION: RIGHT

## 2023-05-24 ASSESSMENT — PAIN DESCRIPTION - DESCRIPTORS
DESCRIPTORS: ACHING
DESCRIPTORS: ACHING
DESCRIPTORS: SORE

## 2023-05-24 ASSESSMENT — ENCOUNTER SYMPTOMS: SHORTNESS OF BREATH: 0

## 2023-05-24 ASSESSMENT — PAIN DESCRIPTION - LOCATION
LOCATION: LEG
LOCATION: SACRUM
LOCATION: FOOT;LEG

## 2023-05-24 NOTE — CARE COORDINATION
Chart reviewed, DCP remains for patient to d/c to [facility] for SNF once medically stable. Updates have been sent via careport to the facility, patient will not require insurance auth prior to d/c.     CM continues to follow and monitor for needs.

## 2023-05-24 NOTE — FLOWSHEET NOTE
Nutrition Assessment     Type and Reason for Visit: (P) RD Nutrition Re-Screen/LOS    Nutrition Recommendations/Plan:   Continue current diet  Sal 2x/day to support wound healing  Obtain updated measured weight as able  Monitor and record PO intakes, supplement acceptance, and Bms in I/Os     Malnutrition Assessment:  Malnutrition Status:  No malnutrition    Nutrition Assessment:  (P) Admitted for gangrene of foot. s/p R foot transmet amputation. Intakes good, >76%, no reported weight loss. Labs: Na 137, K 3.7, BUN 18, Creat 1.17, Gluc 112. Meds: atorvastatin, ferrous sulfate, furosemide, insulin lispro, pantoprazole    Nutrition Related Findings:   (P) NFPE deferred r/t contact iso. No n/v, d/c, or problems chewing/swallowing. No edema. BM 5/19. Wound Type: (P) Surgical Incision    Current Nutrition Therapies:    ADULT DIET;  Regular    Anthropometric Measures:  Height: (P) 175.3 cm (5' 9.02\")  Current Body Wt: (P) 179 lb 0.2 oz (81.2 kg)   BMI: (P) 26.4    Nutrition Diagnosis:   (P) No nutrition diagnosis at this time     Nutrition Interventions:   Food and/or Nutrient Delivery: (P) Continue Current Diet  Nutrition Education/Counseling: (P) No recommendation at this time  Coordination of Nutrition Care: (P) Continue to monitor while inpatient    Goals:  Goals: (P) other (specify)  Specify Other Goals: (P) s/s wound healing    Nutrition Monitoring and Evaluation:   Behavioral-Environmental Outcomes: (P) None Identified  Food/Nutrient Intake Outcomes: (P) Food and Nutrient Intake, Supplement Intake  Physical Signs/Symptoms Outcomes: (P) Meal Time Behavior, Weight    Discharge Planning:    (P) Too soon to determine     Milady Dunacn, [address]: [id number]

## 2023-05-24 NOTE — ANESTHESIA POSTPROCEDURE EVALUATION
Department of Anesthesiology  Postprocedure Note    Patient: Tommie Woodard  MRN: [id number]  YOB: 1935      Procedure Summary     Date: 05/19/23 Room / Location: [facility] MAIN OR 06 / [facility] MAIN OR    Anesthesia Start: 0472 Anesthesia Stop: 4670    Procedure: WOUND BED PREPERATION WITH ALLOGRAFT APPLICATION OF RIGHT FOOT (Right: Foot) Diagnosis:       Granulation of wound bed      (Granulation of wound bed [L92.9])    Surgeons: Tong Hale DPM Responsible Provider: Luna Greenfield MD    Anesthesia Type: MAC, TIVA ASA Status: 3          Anesthesia Type: No value filed.     Sheeba Phase I: Sheeba Score: 10    Sheeba Phase II:        Anesthesia Post Evaluation    Patient location during evaluation: PACU  Patient participation: complete - patient cannot participate  Level of consciousness: awake  Pain score: 0  Airway patency: patent  Nausea & Vomiting: no nausea and no vomiting  Complications: no  Cardiovascular status: hemodynamically stable  Respiratory status: acceptable  Hydration status: stable  Multimodal analgesia pain management approach

## 2023-05-25 ENCOUNTER — APPOINTMENT (OUTPATIENT)
Facility: HOSPITAL | Age: 88
DRG: 854 | End: 2023-05-25
Payer: MEDICARE

## 2023-05-25 LAB
ANION GAP SERPL CALC-SCNC: 2 MMOL/L (ref 5–15)
BUN SERPL-MCNC: 20 MG/DL (ref 6–20)
BUN/CREAT SERPL: 17 (ref 12–20)
CA-I BLD-MCNC: 8.6 MG/DL (ref 8.5–10.1)
CHLORIDE SERPL-SCNC: 108 MMOL/L (ref 97–108)
CO2 SERPL-SCNC: 25 MMOL/L (ref 21–32)
CREAT SERPL-MCNC: 1.18 MG/DL (ref 0.7–1.3)
CRP SERPL-MCNC: 5.35 MG/DL (ref 0–0.6)
GLUCOSE BLD STRIP.AUTO-MCNC: 109 MG/DL (ref 65–100)
GLUCOSE BLD STRIP.AUTO-MCNC: 131 MG/DL (ref 65–100)
GLUCOSE BLD STRIP.AUTO-MCNC: 173 MG/DL (ref 65–100)
GLUCOSE BLD STRIP.AUTO-MCNC: 175 MG/DL (ref 65–100)
GLUCOSE SERPL-MCNC: 115 MG/DL (ref 65–100)
PERFORMED BY:: ABNORMAL
POTASSIUM SERPL-SCNC: 4 MMOL/L (ref 3.5–5.1)
SODIUM SERPL-SCNC: 135 MMOL/L (ref 136–145)

## 2023-05-25 PROCEDURE — 36415 COLL VENOUS BLD VENIPUNCTURE: CPT

## 2023-05-25 PROCEDURE — 86140 C-REACTIVE PROTEIN: CPT

## 2023-05-25 PROCEDURE — 82962 GLUCOSE BLOOD TEST: CPT

## 2023-05-25 PROCEDURE — 1100000000 HC RM PRIVATE

## 2023-05-25 PROCEDURE — 6360000002 HC RX W HCPCS: Performed by: INTERNAL MEDICINE

## 2023-05-25 PROCEDURE — C1751 CATH, INF, PER/CENT/MIDLINE: HCPCS

## 2023-05-25 PROCEDURE — 71045 X-RAY EXAM CHEST 1 VIEW: CPT

## 2023-05-25 PROCEDURE — 36573 INSJ PICC RS&I 5 YR+: CPT

## 2023-05-25 PROCEDURE — 6370000000 HC RX 637 (ALT 250 FOR IP): Performed by: NURSE PRACTITIONER

## 2023-05-25 PROCEDURE — 2580000003 HC RX 258: Performed by: NURSE PRACTITIONER

## 2023-05-25 PROCEDURE — 99232 SBSQ HOSP IP/OBS MODERATE 35: CPT | Performed by: INTERNAL MEDICINE

## 2023-05-25 PROCEDURE — 97530 THERAPEUTIC ACTIVITIES: CPT

## 2023-05-25 PROCEDURE — 2580000003 HC RX 258: Performed by: INTERNAL MEDICINE

## 2023-05-25 PROCEDURE — 80048 BASIC METABOLIC PNL TOTAL CA: CPT

## 2023-05-25 PROCEDURE — 99232 SBSQ HOSP IP/OBS MODERATE 35: CPT | Performed by: PODIATRIST

## 2023-05-25 PROCEDURE — 6370000000 HC RX 637 (ALT 250 FOR IP): Performed by: PODIATRIST

## 2023-05-25 PROCEDURE — 02HV33Z INSERTION OF INFUSION DEVICE INTO SUPERIOR VENA CAVA, PERCUTANEOUS APPROACH: ICD-10-PCS | Performed by: NURSE PRACTITIONER

## 2023-05-25 RX ORDER — TAZOBACTAM SODIUM AND PIPERACILLIN SODIUM 375; 3 MG/50ML; G/50ML
3375 INJECTION, SOLUTION INTRAVENOUS EVERY 6 HOURS
Qty: 2800 ML | Refills: 0 | Status: SHIPPED | OUTPATIENT
Start: 2023-05-25 | End: 2023-06-08

## 2023-05-25 RX ORDER — GABAPENTIN 100 MG/1
100 CAPSULE ORAL 2 TIMES DAILY
Qty: 60 CAPSULE | Refills: 0 | Status: SHIPPED | OUTPATIENT
Start: 2023-05-25 | End: 2023-06-24

## 2023-05-25 RX ORDER — POLYETHYLENE GLYCOL 3350 17 G/17G
17 POWDER, FOR SOLUTION ORAL DAILY PRN
Qty: 527 G | Refills: 0 | Status: SHIPPED | OUTPATIENT
Start: 2023-05-25 | End: 2023-06-24

## 2023-05-25 RX ADMIN — PIPERACILLIN AND TAZOBACTAM 3375 MG: 3; .375 INJECTION, POWDER, LYOPHILIZED, FOR SOLUTION INTRAVENOUS at 17:12

## 2023-05-25 RX ADMIN — SODIUM CHLORIDE, PRESERVATIVE FREE 10 ML: 5 INJECTION INTRAVENOUS at 09:40

## 2023-05-25 RX ADMIN — OXYCODONE AND ACETAMINOPHEN 1 TABLET: 5; 325 TABLET ORAL at 13:01

## 2023-05-25 RX ADMIN — ASPIRIN 81 MG: 81 TABLET, COATED ORAL at 09:38

## 2023-05-25 RX ADMIN — PIPERACILLIN AND TAZOBACTAM 3375 MG: 3; .375 INJECTION, POWDER, LYOPHILIZED, FOR SOLUTION INTRAVENOUS at 01:26

## 2023-05-25 RX ADMIN — OXYCODONE AND ACETAMINOPHEN 1 TABLET: 5; 325 TABLET ORAL at 20:44

## 2023-05-25 RX ADMIN — AMLODIPINE BESYLATE 10 MG: 5 TABLET ORAL at 09:37

## 2023-05-25 RX ADMIN — DONEPEZIL HYDROCHLORIDE 5 MG: 5 TABLET, FILM COATED ORAL at 20:44

## 2023-05-25 RX ADMIN — ATORVASTATIN CALCIUM 20 MG: 20 TABLET, FILM COATED ORAL at 20:44

## 2023-05-25 RX ADMIN — SODIUM CHLORIDE, PRESERVATIVE FREE 10 ML: 5 INJECTION INTRAVENOUS at 20:44

## 2023-05-25 RX ADMIN — PANTOPRAZOLE SODIUM 40 MG: 40 TABLET, DELAYED RELEASE ORAL at 06:00

## 2023-05-25 RX ADMIN — MAGNESIUM OXIDE TAB 400 MG (241.3 MG ELEMENTAL MG) 400 MG: 400 (241.3 MG) TAB at 09:38

## 2023-05-25 RX ADMIN — BRIMONIDINE TARTRATE 1 DROP: 2 SOLUTION OPHTHALMIC at 09:46

## 2023-05-25 RX ADMIN — TIMOLOL MALEATE 1 DROP: 2.5 SOLUTION OPHTHALMIC at 20:59

## 2023-05-25 RX ADMIN — BRIMONIDINE TARTRATE 1 DROP: 2 SOLUTION OPHTHALMIC at 20:59

## 2023-05-25 RX ADMIN — TIMOLOL MALEATE 1 DROP: 2.5 SOLUTION OPHTHALMIC at 09:46

## 2023-05-25 RX ADMIN — FERROUS SULFATE TAB 325 MG (65 MG ELEMENTAL FE) 325 MG: 325 (65 FE) TAB at 09:37

## 2023-05-25 RX ADMIN — LATANOPROST 1 DROP: 50 SOLUTION OPHTHALMIC at 20:59

## 2023-05-25 RX ADMIN — BRIMONIDINE TARTRATE 1 DROP: 2 SOLUTION OPHTHALMIC at 15:37

## 2023-05-25 RX ADMIN — GABAPENTIN 100 MG: 100 CAPSULE ORAL at 20:44

## 2023-05-25 RX ADMIN — GABAPENTIN 100 MG: 100 CAPSULE ORAL at 09:37

## 2023-05-25 RX ADMIN — MAGNESIUM OXIDE TAB 400 MG (241.3 MG ELEMENTAL MG) 400 MG: 400 (241.3 MG) TAB at 20:44

## 2023-05-25 RX ADMIN — PIPERACILLIN AND TAZOBACTAM 3375 MG: 3; .375 INJECTION, POWDER, LYOPHILIZED, FOR SOLUTION INTRAVENOUS at 09:38

## 2023-05-25 ASSESSMENT — PAIN SCALES - GENERAL
PAINLEVEL_OUTOF10: 6
PAINLEVEL_OUTOF10: 1
PAINLEVEL_OUTOF10: 6

## 2023-05-25 ASSESSMENT — PAIN DESCRIPTION - DESCRIPTORS: DESCRIPTORS: ACHING;BURNING

## 2023-05-25 ASSESSMENT — PAIN DESCRIPTION - ORIENTATION: ORIENTATION: RIGHT

## 2023-05-25 ASSESSMENT — PAIN DESCRIPTION - LOCATION
LOCATION: LEG
LOCATION: FOOT

## 2023-05-25 NOTE — DISCHARGE SUMMARY
Hospitalist Discharge Summary     Patient ID:    Flower Wiseman  [id number]  82 y.o.  1935    Admit date: 5/16/2023    Discharge date : 5/25/2023      Final Diagnoses:   Principal Problem:    Gangrene of foot ([facility]  Active Problems:    Type 2 diabetes mellitus without complications ([facility])    Mixed hyperlipidemia    Stage 3 chronic kidney disease ([facility]    History of CVA (cerebrovascular accident)    Peripheral vascular disease ([facility]    Hypertension    PVD (peripheral vascular disease) ([facility])    Chronic systolic (congestive) heart failure ([facility])    Bacteriuria with pyuria    Surgical wound present    E. coli UTI  Resolved Problems:    * No resolved hospital problems. *      Reason for Hospitalization/Hospital Course:   Flower Wiseman is a 82 y.o. male who presents to the emergency room for right foot gangrene. Patient is currently under the care of of Dr. Sheela Resendiz vascular as well as outpatient podiatry. Family reports that patient has had multiple failed attempts of revascularization outpatient. Past medical history of A-fib, BPH, CKD, COPD, hypertension, stroke, peripheral vascular disease. Of note daughter also reports patient recently on treatment for acute UTI. Hospitalist team consulted for further evaluation and treatment. X-ray right foot shows osteopenia and degenerative changes, heavy vascular calcification. In the ED patient noted alert and oriented with daughter at bedside. Hard of hearing at baseline. Right foot gangrene and foul odor noted on examination. Left AKA noted. Patient elects full code. Daughter present at bedside and requests discharge to [facility] rehab in [place] [employer]. 5/17 s/p right foot transmet amputation. S/p surgical closure today 5/19. Hgb improved s/p 1u prbc on 5/18. Noted excoriation scrotal area, wound care consulted for recommendations.      5/22 placed in isolation due to multidrug-resistant

## 2023-05-25 NOTE — PROCEDURES
Ultrasound-guided Bedside Peripherally Inserted Central Catheter (PICC) Placement with [employer] TPS Tip Location Technology    PICC Placement Note - inclusive with additional documentation included in the patient electronic health record (EHR) flowsheet: Epic Avatar device information, site/line assessment, and timeout. PRE-PROCEDURE VERIFICATION:  Consent obtained prior and placed on chart: yes  Correct procedure: yes  Correct site: yes  Timeout completed: yes; charted in the EHR    Temperature: Temp: 98.3 °F (36.8 °C), Temperature Source: Temp Source: Oral  Recent Labs     05/23/23  0627 05/24/23  0624 05/25/23  0500   BUN 21*   < > 20   HGB 8.2*  --   --    HCT 27.3*  --   --    *  --   --    WBC 8.3  --   --     < > = values in this interval not displayed. Allergies: Patient has no known allergies. Education materials for [facility] given to Christo Chahal, the patient and/or family: yes. See Patient Education activity in EHR flowsheet for further details. PICC booklet placed on chart: yes. A single lumen PICC line was started for antibiotic therapy. The following documentation is in addition to the PICC properties in the lines/airways EHR flowsheet. LOT #: [id number]  Exp: 6/30/2024  PICC Associated Meds: xylocaine used: yes  Mid-Arm Circumference (MAC): 31 (cm)  Complication Related to Insertion: inability to advance catheter  Estimated Blood Loss: <5 (mL)    The PICC tip placement was verified by 3CG ECG [facility] Tip Location Technology: no. If NO, has been verified by x-ray: yes. Failed to place. PICCLine discontinued primary care nurse, Junie Richardson, notified.      Domenica Gagnon, RN

## 2023-05-25 NOTE — CARE COORDINATION
Patient is clear to d/c from  to [facility], room 305, nurse report can be called to [phone], nurse notified. Patient will require ambulance transport.

## 2023-05-26 ENCOUNTER — APPOINTMENT (OUTPATIENT)
Facility: HOSPITAL | Age: 88
DRG: 854 | End: 2023-05-26
Attending: INTERNAL MEDICINE
Payer: MEDICARE

## 2023-05-26 VITALS
DIASTOLIC BLOOD PRESSURE: 66 MMHG | OXYGEN SATURATION: 98 % | WEIGHT: 179 LBS | HEIGHT: 69 IN | SYSTOLIC BLOOD PRESSURE: 129 MMHG | BODY MASS INDEX: 26.51 KG/M2 | RESPIRATION RATE: 18 BRPM | TEMPERATURE: 98.4 F | HEART RATE: 60 BPM

## 2023-05-26 LAB
ANION GAP SERPL CALC-SCNC: 4 MMOL/L (ref 5–15)
BUN SERPL-MCNC: 17 MG/DL (ref 6–20)
BUN/CREAT SERPL: 15 (ref 12–20)
CA-I BLD-MCNC: 9 MG/DL (ref 8.5–10.1)
CHLORIDE SERPL-SCNC: 109 MMOL/L (ref 97–108)
CO2 SERPL-SCNC: 24 MMOL/L (ref 21–32)
CREAT SERPL-MCNC: 1.14 MG/DL (ref 0.7–1.3)
CRP SERPL-MCNC: 5.22 MG/DL (ref 0–0.6)
GLUCOSE BLD STRIP.AUTO-MCNC: 117 MG/DL (ref 65–100)
GLUCOSE SERPL-MCNC: 108 MG/DL (ref 65–100)
PERFORMED BY:: ABNORMAL
POTASSIUM SERPL-SCNC: 4 MMOL/L (ref 3.5–5.1)
SODIUM SERPL-SCNC: 137 MMOL/L (ref 136–145)

## 2023-05-26 PROCEDURE — 6360000002 HC RX W HCPCS: Performed by: INTERNAL MEDICINE

## 2023-05-26 PROCEDURE — 6370000000 HC RX 637 (ALT 250 FOR IP): Performed by: PODIATRIST

## 2023-05-26 PROCEDURE — 76937 US GUIDE VASCULAR ACCESS: CPT

## 2023-05-26 PROCEDURE — 82962 GLUCOSE BLOOD TEST: CPT

## 2023-05-26 PROCEDURE — 86140 C-REACTIVE PROTEIN: CPT

## 2023-05-26 PROCEDURE — 80048 BASIC METABOLIC PNL TOTAL CA: CPT

## 2023-05-26 PROCEDURE — 36415 COLL VENOUS BLD VENIPUNCTURE: CPT

## 2023-05-26 PROCEDURE — 6370000000 HC RX 637 (ALT 250 FOR IP): Performed by: NURSE PRACTITIONER

## 2023-05-26 PROCEDURE — 2580000003 HC RX 258: Performed by: INTERNAL MEDICINE

## 2023-05-26 PROCEDURE — 2580000003 HC RX 258: Performed by: NURSE PRACTITIONER

## 2023-05-26 RX ADMIN — MAGNESIUM OXIDE TAB 400 MG (241.3 MG ELEMENTAL MG) 400 MG: 400 (241.3 MG) TAB at 09:00

## 2023-05-26 RX ADMIN — OXYCODONE AND ACETAMINOPHEN 1 TABLET: 5; 325 TABLET ORAL at 07:45

## 2023-05-26 RX ADMIN — GABAPENTIN 100 MG: 100 CAPSULE ORAL at 09:00

## 2023-05-26 RX ADMIN — AMLODIPINE BESYLATE 10 MG: 5 TABLET ORAL at 08:59

## 2023-05-26 RX ADMIN — PANTOPRAZOLE SODIUM 40 MG: 40 TABLET, DELAYED RELEASE ORAL at 06:06

## 2023-05-26 RX ADMIN — FERROUS SULFATE TAB 325 MG (65 MG ELEMENTAL FE) 325 MG: 325 (65 FE) TAB at 09:00

## 2023-05-26 RX ADMIN — OXYCODONE AND ACETAMINOPHEN 1 TABLET: 5; 325 TABLET ORAL at 03:07

## 2023-05-26 RX ADMIN — BRIMONIDINE TARTRATE 1 DROP: 2 SOLUTION OPHTHALMIC at 09:01

## 2023-05-26 RX ADMIN — ASPIRIN 81 MG: 81 TABLET, COATED ORAL at 09:00

## 2023-05-26 RX ADMIN — PIPERACILLIN AND TAZOBACTAM 3375 MG: 3; .375 INJECTION, POWDER, LYOPHILIZED, FOR SOLUTION INTRAVENOUS at 09:00

## 2023-05-26 RX ADMIN — OXYCODONE AND ACETAMINOPHEN 1 TABLET: 5; 325 TABLET ORAL at 17:01

## 2023-05-26 RX ADMIN — SODIUM CHLORIDE, PRESERVATIVE FREE 10 ML: 5 INJECTION INTRAVENOUS at 09:00

## 2023-05-26 RX ADMIN — BRIMONIDINE TARTRATE 1 DROP: 2 SOLUTION OPHTHALMIC at 14:41

## 2023-05-26 RX ADMIN — PIPERACILLIN AND TAZOBACTAM 3375 MG: 3; .375 INJECTION, POWDER, LYOPHILIZED, FOR SOLUTION INTRAVENOUS at 01:56

## 2023-05-26 RX ADMIN — TIMOLOL MALEATE 1 DROP: 2.5 SOLUTION OPHTHALMIC at 09:01

## 2023-05-26 ASSESSMENT — PAIN SCALES - GENERAL
PAINLEVEL_OUTOF10: 6
PAINLEVEL_OUTOF10: 6
PAINLEVEL_OUTOF10: 2
PAINLEVEL_OUTOF10: 7

## 2023-05-26 ASSESSMENT — PAIN DESCRIPTION - LOCATION
LOCATION: FOOT

## 2023-05-26 ASSESSMENT — PAIN DESCRIPTION - ORIENTATION
ORIENTATION: RIGHT
ORIENTATION: RIGHT

## 2023-05-26 ASSESSMENT — PAIN SCALES - WONG BAKER: WONGBAKER_NUMERICALRESPONSE: 2

## 2023-05-26 ASSESSMENT — PAIN DESCRIPTION - DESCRIPTORS
DESCRIPTORS: ACHING;BURNING
DESCRIPTORS: ACHING

## 2023-05-26 NOTE — OR NURSING
Dual lumen tunneled powerline placed to right chest. Placement confirmed by x-ray; line ready for use. Line sutured and sterile dressing applied. Vitals stable. Report called to primary nurse.
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
## 2023-05-26 NOTE — PROGRESS NOTES
[address] [facility] PODIATRY & FOOT SURGERY       Progress Note    Date:2023       Room:[facility]  Patient Name:Akash Woods     YOB: 1935     Age:87 y.o. Subjective:     Patient is a 82 y.o. male who is being seen at bedside for    Review of Systems   Constitutional:  Negative for activity change, appetite change, chills, fatigue and fever. HENT:  Negative for ear pain. Respiratory:  Negative for shortness of breath. Cardiovascular:  Negative for leg swelling. Gastrointestinal:  Negative for abdominal pain, diarrhea and vomiting. Neurological:  Negative for weakness. Psychiatric/Behavioral:  Negative for behavioral problems. The patient is not nervous/anxious. Objective:     Vitals Last 24 Hours:  TEMPERATURE:  Temp  Av.1 °F (36.7 °C)  Min: 97.6 °F (36.4 °C)  Max: 98.7 °F (37.1 °C)  RESPIRATIONS RANGE: Resp  Av.9  Min: 16  Max: 20  PULSE OXIMETRY RANGE: SpO2  Av.5 %  Min: 97 %  Max: 100 %  PULSE RANGE: Pulse  Av.7  Min: 60  Max: 98  BLOOD PRESSURE RANGE: Systolic (48QGL), UZS:489 , Min:116 , QOX:046        Diastolic (82UNI), CKR:59, Min:60, Max:74        I/O (24Hr): Intake/Output Summary (Last 24 hours) at 2023 2149  Last data filed at 2023 1836  Gross per 24 hour   Intake 901.25 ml   Output 800 ml   Net 101.25 ml     Physical Exam:    GEN: Pt is AAOx4 and in NAD. DERM: Wound right foot Dry skin noted to right foot  VASC: Pedal pulses (DP/PT) diminshed to right footCFT<3sec to all digits of right foot. No pedal hair growth noted to the level of the digits for right foot. Skin temp is warm to warm from proximal to distal for B/L LE. Neg homans/maral signs to right foot No varicosities or telangectasias noted to right foot  NEURO: Protective and epicritic sensations grossly absent to right foot  MSK: (-) POP, Left AKA.  Decreased muscle tone and bulk noted to right foot  PSYCH: Cooperative with normal mood and affect
[address] [facility] PODIATRY & FOOT SURGERY       Progress Note    Date:2023       Room:[facility]  Patient Name:Akash Mendoza     YOB: 1935     Age:87 y.o. Subjective:     Patient is a 82 y.o. male who is being seen at bedside for S/P open transmetatarsal amputation with application of skin graft. Review of Systems   Constitutional:  Negative for activity change, appetite change, chills, fatigue and fever. HENT:  Negative for ear pain. Respiratory:  Negative for shortness of breath. Cardiovascular:  Negative for leg swelling. Gastrointestinal:  Negative for abdominal pain, diarrhea and vomiting. Neurological:  Negative for weakness. Psychiatric/Behavioral:  Negative for behavioral problems. The patient is not nervous/anxious. Objective:     Vitals Last 24 Hours:  TEMPERATURE:  Temp  Av.1 °F (36.7 °C)  Min: 97.9 °F (36.6 °C)  Max: 98.7 °F (37.1 °C)  RESPIRATIONS RANGE: Resp  Av.3  Min: 16  Max: 20  PULSE OXIMETRY RANGE: SpO2  Av.5 %  Min: 99 %  Max: 100 %  PULSE RANGE: Pulse  Av.8  Min: 60  Max: 64  BLOOD PRESSURE RANGE: Systolic (17KGJ), OZQ:093 , Min:106 , TRR:475        Diastolic (96SCE), ENT:76, Min:58, Max:73        I/O (24Hr): Intake/Output Summary (Last 24 hours) at 2023 1309  Last data filed at 2023 0601  Gross per 24 hour   Intake 995 ml   Output 700 ml   Net 295 ml     Physical Exam:    GEN: Pt is AAOx4 and in NAD. DERM: Wound right foot Dry skin noted to right foot  VASC: Pedal pulses (DP/PT) diminshed to right footCFT<3sec to all digits of right foot. No pedal hair growth noted to the level of the digits for right foot. Skin temp is warm to warm from proximal to distal for B/L LE. Neg homans/maral signs to right foot No varicosities or telangectasias noted to right foot  NEURO: Protective and epicritic sensations grossly absent to right foot  MSK: (-) POP, Left AKA.  Decreased muscle tone and bulk noted to
Assessment complete. Right foot dressing clean dry and intact. VSS. Pain tolerable at this time.  Will medicate as needed
Failed PICC Placement, will refer patient to IR in am for placement. Oscar Page RN called and spoke with Daughter Alberta Helms.
Hospitalist Progress Note               Daily Progress Note: 5/22/2023 2:04 PM  Hospital course:     Mya Kilgore is a 82 y.o. male who presents to the emergency room for right foot gangrene. Patient is currently under the care of of Dr. Heri Reese vascular as well as outpatient podiatry. Family reports that patient has had multiple failed attempts of revascularization outpatient. Past medical history of A-fib, BPH, CKD, COPD, hypertension, stroke, peripheral vascular disease. Of note daughter also reports patient recently on treatment for acute UTI. Hospitalist team consulted for further evaluation and treatment. X-ray right foot shows osteopenia and degenerative changes, heavy vascular calcification. In the ED patient noted alert and oriented with daughter at bedside. Hard of hearing at baseline. Right foot gangrene and foul odor noted on examination. Left AKA noted. Patient elects full code. Daughter present at bedside and requests discharge to [facility] rehab in [facility]. 5/17 s/p right foot transmet amputation. S/p surgical closure today 5/19. Hgb improved s/p 1u prbc on 5/18.     5/22 placed in isolation due to multidrug-resistant organism E. coli UTI. Subjective:     Examined patient at bedside. Patient is complaining of generalized pain this morning not adequately covered with current medication. Assessment/Plan:   Principal Problem:    Gangrene of foot ([address]  Active Problems:    Type 2 diabetes mellitus without complications ([facility])    Mixed hyperlipidemia    Stage 3 chronic kidney disease ([facility])    History of CVA (cerebrovascular accident)    Peripheral vascular disease (HCC)    Hypertension    PVD (peripheral vascular disease) ([facility])    Chronic systolic (congestive) heart failure ([facility])    Bacteriuria with pyuria    Surgical wound present    E. coli UTI  Resolved Problems:    * No resolved hospital problems. *    1.  Right foot gangrene  S/p surgical amputation  Surgical closure
Hospitalist Progress Note               Daily Progress Note: 5/24/2023 6:49 AM  Hospital course:     Maria Antonia Echols is a 82 y.o. male who presents to the emergency room for right foot gangrene. Patient is currently under the care of of Dr. Hiwot Miller vascular as well as outpatient podiatry. Family reports that patient has had multiple failed attempts of revascularization outpatient. Past medical history of A-fib, BPH, CKD, COPD, hypertension, stroke, peripheral vascular disease. Of note daughter also reports patient recently on treatment for acute UTI. Hospitalist team consulted for further evaluation and treatment. X-ray right foot shows osteopenia and degenerative changes, heavy vascular calcification. In the ED patient noted alert and oriented with daughter at bedside. Hard of hearing at baseline. Right foot gangrene and foul odor noted on examination. Left AKA noted. Patient elects full code. Daughter present at bedside and requests discharge to [facility] rehab in [facility]. 5/17 s/p right foot transmet amputation. S/p surgical closure today 5/19. Hgb improved s/p 1u prbc on 5/18. Noted excoriation scrotal area, wound care consulted for recommendations. 5/22 placed in isolation due to multidrug-resistant organism E. coli UTI.  5/23 continues on IV antibiotic Zosyn for UTI.    5/24 per ID repeat wound cultures are positive for GNR Enterococcus sensitivities pending. Continue on IV Zosyn monotherapy currently. Subjective: Follow-up examination of patient at bedside. Resting comfortably without complaints today.     Assessment/Plan:   Principal Problem:    Gangrene of foot ([address]  Active Problems:    Type 2 diabetes mellitus without complications ([facility])    Mixed hyperlipidemia    Stage 3 chronic kidney disease ([facility])    History of CVA (cerebrovascular accident)    Peripheral vascular disease ([address]    Hypertension    PVD (peripheral vascular disease) ([facility])    Chronic systolic (congestive)
OCCUPATIONAL THERAPY TREATMENT  Patient: Jorge Lezama (44 y.o. male)  Date: 5/25/2023  Primary Diagnosis: Gangrene of foot ([facility] [I96]  Dry gangrene ([facility] Sofía Smith  Procedure(s) (LRB):  WOUND BED PREPERATION WITH ALLOGRAFT APPLICATION OF RIGHT FOOT (Right) 6 Days Post-Op   Precautions: General Precautions, Contact Precautions, Weight Bearing Right Lower Extremity Weight Bearing: Non Weight Bearing Left Lower Extremity Weight Bearing: Non Weight Bearing            In place during session: External Catheter and EKG/telemetry   Chart, occupational therapy assessment, plan of care, and goals were reviewed. ASSESSMENT  Patient continues with skilled OT/PT services and is progressing towards goals. Pt semi supine upon PTA/ SHARMA arrival, agreeable to session. Pt A&O x 4. Pt pleasant and cooperative during session. . (See below for objective details and assist levels). Overall pt tolerated session fair today with rest breaks. See below for bed mobilty status. Pt tolerated eob for ~  15 minutes and completed UE and LE therex. See PTA note for LE and below grid for UE therex. Therex completed to maintain/ increase strength and endurance to aid in adl performance. Pt returned to semi supine at end of session and all known needs met. Will continue to benefit from skilled OT services, and will continue to progress as tolerated. Other factors to consider for discharge: available support system works or is unable to provide adequate supervision and the patient would be alone and patient's current support system is unable to meet their requirements for physical assistance        PLAN :  Patient continues to benefit from skilled intervention to address the above impairments. Continue treatment per established plan of care to address goals.     Recommend with staff: Encourage HEP in prep for ADLs/mobility and Frequent repositioning to prevent skin breakdown    Recommend next OT session: Seated grooming    Recommendation
PT eval order received and acknowledged. Pt attempted completion of PT evaluation. Pt adamantly refused to mobilize with therapy again this session, repeatedly stating he ambulates to and from the bathroom and that his main compliant is edema in bilateral LE, MD advised to keep LE elevated at all times until edema decreases. Offered to fit RW to pt to decreased LE pain during amb to and from the bathroom; pt again refused education stating he knows how to use a RW and that he has one at home. Pt became agitated and began to talk over therapist throughout conversation, PT offered max education during this attempt and previous attempt regarding role and importance of skilled PT while admitted in the hospital, pt continues to decline PT services at this time. Order discontinued, please reorder if/when pt is willing to participate with therapy while admitted. Thank you.
Patient needs PICC line placement before discharge for IV antibiotics. Unable to place PICC line today. Patient to go to IR tomorrow. Daughter updated.
Progress Note  Date:2023       Room:[facility]  Patient Name:Akash Mantilla     YOB: 1935     Age:87 y.o. Subjective    Subjective  Patient followed for dry gangrene right foot with sepsis and UTI secondary to E. Coli. He underwent right TMA and and wound bed preparation wth allograft application. Repeat wound culture growing Gram negative rods and Enterococcus. WBC normal with decreasing CRP. Currently on IV Zosyn. .     Review of Systems  Negative except as noted above. Objective         Vitals Last 24 Hours:  TEMPERATURE:  Temp  Av °F (36.7 °C)  Min: 97.6 °F (36.4 °C)  Max: 98.7 °F (37.1 °C)  RESPIRATIONS RANGE: Resp  Av.2  Min: 16  Max: 20  PULSE OXIMETRY RANGE: SpO2  Av %  Min: 97 %  Max: 100 %  PULSE RANGE: Pulse  Av.5  Min: 60  Max: 98  BLOOD PRESSURE RANGE: Systolic (36LUV), PFV:103 , Min:116 , UAN:003   ; Diastolic (44BBB), UXK:61, Min:60, Max:74       Objective:  Vital signs: (most recent): Blood pressure 116/64, pulse 60, temperature 98.2 °F (36.8 °C), temperature source Oral, resp. rate 18, height 5' 9\" (1.753 m), weight 179 lb (81.2 kg), SpO2 97 %. Vitals and nursing note reviewed. Constitutional:       Appearance: He is ill-appearing. HENT: unremarkable  Cardiovascular:      Rate and Rhythm: Normal rate and regular rhythm. Heart sounds: No murmur heard. Comments: Subcutaneous pacemaker left chest, well healed  Pulmonary:      Effort: Pulmonary effort is normal.      Breath sounds: Normal breath sounds. Chest:        Abdominal:      General: Bowel sounds are normal. There is no distension. Palpations: Abdomen is soft. Tenderness: There is no abdominal tenderness. Genitourinary:     Comments: External urinary catheter  Musculoskeletal:         General: No swelling. Cervical back: Neck supple. Right lower leg: No edema. Comments:  Well healed left AKA stump  Right foot with bulky pressure dressing and acewrap,
Progress Note  Date:2023       Room:[facility]  Patient Name:Akash Mata     YOB: 1935     Age:87 y.o. Subjective    Subjective  Patient followed for dry gangrene right foot with sepsis and UTI secondary to E. Coli and wound infection secondary to E. Coli and E. Faecalis. Pio Bradley He underwent right TMA and and wound bed preparation wth allograft application. WBC and procal normal with decreasing CRP. Currently on IV Zosyn. Patient resting comfortably with no new complaints. Review of Systems  Negative except as noted above. Objective         Vitals Last 24 Hours:  TEMPERATURE:  Temp  Av.4 °F (36.9 °C)  Min: 97.9 °F (36.6 °C)  Max: 98.8 °F (37.1 °C)  RESPIRATIONS RANGE: Resp  Av.3  Min: 16  Max: 20  PULSE OXIMETRY RANGE: SpO2  Av %  Min: 97 %  Max: 100 %  PULSE RANGE: Pulse  Av.8  Min: 60  Max: 80  BLOOD PRESSURE RANGE: Systolic (60VSP), EGF:401 , Min:121 , VEM:047   ; Diastolic (72LJX), BCR:42, Min:62, Max:77       Objective:  Vital signs: (most recent): Blood pressure 121/64, pulse 60, temperature 98 °F (36.7 °C), temperature source Oral, resp. rate 16, height 5' 9.02\" (1.753 m), weight 179 lb (81.2 kg), SpO2 100 %. Vitals and nursing note reviewed. Constitutional:       Appearance: He is ill-appearing. HENT: unremarkable  Cardiovascular:      Rate and Rhythm: Normal rate and regular rhythm. Heart sounds: No murmur heard. Comments: Subcutaneous pacemaker left chest, well healed  Pulmonary:      Effort: Pulmonary effort is normal.      Breath sounds: Normal breath sounds. Chest:        Abdominal:      General: Bowel sounds are normal. There is no distension. Palpations: Abdomen is soft. Tenderness: There is no abdominal tenderness. Genitourinary:     Comments: External urinary catheter  Musculoskeletal:         General: No swelling. Cervical back: Neck supple. Right lower leg: No edema. Comments:  Well healed left AKA
Progress Note  Date:2023       Room:[facility]  Patient Name:Akash Overton     YOB: 1935     Age:87 y.o. Subjective    Subjective  Patient followed for dry gangrene right foot with sepsis and UTI secondary to E. Coli and wound infection secondary to E. Coli and E. Faecalis. Traci Naqvi He underwent right TMA and and wound bed preparation wth allograft application. WBC and procal normal with decreasing CRP. Currently on IV Zosyn. Patient currently having a PICC line inserted for OPAT. [facility] Review of Systems  Negative except as noted above. Objective         Vitals Last 24 Hours:  TEMPERATURE:  Temp  Av.2 °F (36.8 °C)  Min: 97.9 °F (36.6 °C)  Max: 98.7 °F (37.1 °C)  RESPIRATIONS RANGE: Resp  Av.9  Min: 16  Max: 20  PULSE OXIMETRY RANGE: SpO2  Av %  Min: 99 %  Max: 99 %  PULSE RANGE: Pulse  Av.2  Min: 60  Max: 64  BLOOD PRESSURE RANGE: Systolic (80FKJ), XUO:810 , Min:106 , OAE:726   ; Diastolic (31NHD), RCY:91, Min:58, Max:73       Objective:  Vital signs: (most recent): Blood pressure 134/61, pulse 60, temperature 98.3 °F (36.8 °C), temperature source Oral, resp. rate 20, height 5' 9.02\" (1.753 m), weight 179 lb (81.2 kg), SpO2 99 %. Vitals and nursing note reviewed. Constitutional:       Appearance: He is ill-appearing. HENT: unremarkable  Cardiovascular:      Rate and Rhythm: Normal rate and regular rhythm. Heart sounds: No murmur heard. Comments: Subcutaneous pacemaker left chest, well healed  Pulmonary:      Effort: Pulmonary effort is normal.      Breath sounds: Normal breath sounds. Chest:        Abdominal:      General: Bowel sounds are normal. There is no distension. Palpations: Abdomen is soft. Tenderness: There is no abdominal tenderness. Genitourinary:     Comments: External urinary catheter  Musculoskeletal:         General: No swelling. Cervical back: Neck supple. Right lower leg: No edema. Comments:  Well healed left
Progress Note  Date:2023       Room:[facility]  Patient Name:Akash Huizar     YOB: 1935     Age:87 y.o. Subjective    Subjective  Patient followed for dry gangrene right foot with sepsis and UTI secondary to E. Coli. He underwent right TMA and and wound bed preparation wth allograft application. Repeat wound culture growing Gram negative rods and Enterococcus. WBC normal with decreasing CRP. Currently on IV Zosyn. .     Review of Systems  Negative except as noted above. Objective         Vitals Last 24 Hours:  TEMPERATURE:  Temp  Av.9 °F (36.6 °C)  Min: 97.6 °F (36.4 °C)  Max: 98.5 °F (36.9 °C)  RESPIRATIONS RANGE: Resp  Av.9  Min: 16  Max: 18  PULSE OXIMETRY RANGE: SpO2  Av.5 %  Min: 97 %  Max: 100 %  PULSE RANGE: Pulse  Av.2  Min: 60  Max: 64  BLOOD PRESSURE RANGE: Systolic (08LTD), EDGAR:311 , Min:116 , LZJ:286   ; Diastolic (61ONH), UAN:77, Min:61, Max:79       Objective:  Vital signs: (most recent): Blood pressure 138/74, pulse 63, temperature 97.7 °F (36.5 °C), temperature source Oral, resp. rate 18, height 5' 9\" (1.753 m), weight 179 lb (81.2 kg), SpO2 100 %. Vitals and nursing note reviewed. Constitutional:       Appearance: He is ill-appearing. HENT: unremarkable  Cardiovascular:      Rate and Rhythm: Normal rate and regular rhythm. Heart sounds: No murmur heard. Comments: Subcutaneous pacemaker left chest, well healed  Pulmonary:      Effort: Pulmonary effort is normal.      Breath sounds: Normal breath sounds. Chest:        Abdominal:      General: Bowel sounds are normal. There is no distension. Palpations: Abdomen is soft. Tenderness: There is no abdominal tenderness. Genitourinary:     Comments: External urinary catheter  Musculoskeletal:         General: No swelling. Cervical back: Neck supple. Right lower leg: No edema. Comments:  Well healed left AKA stump  Right foot with bulky pressure dressing and
Progress Note  Date:2023       Room:[facility]  Patient Name:Akash Chamberlain     YOB: 1935     Age:87 y.o. Subjective    Subjective  Patient followed for dry gangrene right foot with sepsis and UTI secondary to E. Coli and wound infection secondary to E. Coli and E. Faecalis. Starr Balls He underwent right TMA and and wound bed preparation wth allograft application. WBC and procal normal with decreasing CRP. Currently on IV Zosyn. PICC line was unsuccessful yesterday but Powerline was placed today. Patient was a little upset by this. He is scheduled for discharge this afternoon. Daughter at bedside. Review of Systems  Negative except as noted above. Objective         Vitals Last 24 Hours:  TEMPERATURE:  Temp  Av.5 °F (36.9 °C)  Min: 98.4 °F (36.9 °C)  Max: 98.7 °F (37.1 °C)  RESPIRATIONS RANGE: Resp  Av.8  Min: 16  Max: 20  PULSE OXIMETRY RANGE: SpO2  Av %  Min: 98 %  Max: 100 %  PULSE RANGE: Pulse  Av  Min: 60  Max: 67  BLOOD PRESSURE RANGE: Systolic (66AWR), YRK:944 , Min:124 , DNE:143   ; Diastolic (57LVC), FSS:15, Min:61, Max:66       Objective:  Vital signs: (most recent): Blood pressure 129/66, pulse 60, temperature 98.4 °F (36.9 °C), temperature source Oral, resp. rate 18, height 5' 9.02\" (1.753 m), weight 179 lb (81.2 kg), SpO2 98 %. Vitals and nursing note reviewed. Constitutional:       Appearance: He is ill-appearing. HENT: unremarkable  Cardiovascular:      Rate and Rhythm: Normal rate and regular rhythm. Heart sounds: No murmur heard. Comments: Subcutaneous pacemaker left chest, well healed  Pulmonary:      Effort: Pulmonary effort is normal.      Breath sounds: Normal breath sounds. Chest:        Abdominal:      General: Bowel sounds are normal. There is no distension. Palpations: Abdomen is soft. Tenderness: There is no abdominal tenderness.    Genitourinary:     Comments: External urinary catheter  Musculoskeletal:
Report called into [facility]
Spiritual Care Assessment/Progress Note  [address]    Name: Josias Almeida MRN: [id number]    Age: 82 y.o. Sex: male   Language: English     Date: 5/22/2023            Total Time Calculated: 10 min              Spiritual Assessment begun in [facility]  Service Provided For[de-identified] Patient not available     Encounter Overview/Reason : Initial Encounter    Spiritual beliefs:      [] Involved in a steve tradition/spiritual practice:      [] Supported by a steve community:      [] Claims no spiritual orientation:      [] Seeking spiritual identity:           [] Adheres to an individual form of spirituality:      [x] Not able to assess:                Identified resources for coping and support system:           [] Prayer                  [] Devotional reading               [] Music                  [] Guided Imagery     [] Pet visits                                        [] Other: (COMMENT)     Specific area/focus of visit   Encounter: Type: Initial Screen/Assessment  Crisis:    Spiritual/Emotional needs: Type: Spiritual Support  Ritual, Rites and Sacraments:    Grief, Loss, and Adjustments:    Ethics/Mediation:    Behavioral Health:    Palliative Care: Advance Care Planning:      Plan/Referrals: Continue Support (comment)    Narrative:   Spiritual care assessment attempted for Mr. Haris Carrion on [address] [facility]. Pt sleeping at this time, no visitors known to be present, and breathing seemingly normal. Reviewed chart and will remain available for continued care and support. Please contact Spiritual Care for any emotional and spiritual needs and/or referrals. Thank you. Chaplain China Blake M.Div. Please contact Hospital  for [address] [facility].    [phone]
Provided: Role of Therapy;Plan of Care;Home Exercise Program;Fall Prevention Strategies;Precautions  Education Method: Demonstration;Verbal;Teach Back  Education Outcome: Verbalized understanding;Demonstrated understanding      [employer], SPT, under direct supervision of Gloria Sanchez, PT, DPT provided care and treatment of pt with verbal consent from pt received.        Thank you for this referral.  Marina Valdes, PT, DPT  Minutes: 29
Result   No acute bony abnormality. Osteopenia and degenerative change. Heavy   vascular calcification. Intake and Output:  Current Shift: No intake/output data recorded. Last three shifts: 05/21 1901 - 05/23 0700  In: 1141.3 [P.O.:840]  Out: 800 [Urine:800]      Lab/Data Review:  Recent Labs     05/21/23  1113 05/22/23  0825 05/23/23  0627   WBC 9.2 8.5 8.3   HGB 8.3* 8.1* 8.2*   HCT 27.0* 26.5* 27.3*   * 488* 500*       Recent Labs     05/20/23  1505 05/23/23  0627    138   K 3.6 3.8    109*   CO2 26 28   GLUCOSE 156* 113*   BUN 25* 21*   CREATININE 1.42* 1.33*   CALCIUM 8.2* 8.8       No results for input(s): PHART, QVN0BOE, PO2ART, PSV5AST, BEART, QGI7MXRO, HGBART, JD8DXHEUN, FIO2A, S1QEZKPC, OXYHEM, CARBOXHGBART, METHGBART, Y7BQYKOJN, PHCORART, TEMP in the last 72 hours.     Invalid input(s): ZMY9JTTEE  Recent Results (from the past 24 hour(s))   POCT Glucose    Collection Time: 05/22/23 11:10 AM   Result Value Ref Range    POC Glucose 165 (H) 65 - 100 mg/dL    Performed by: Ketan Smyth    POCT Glucose    Collection Time: 05/22/23  4:02 PM   Result Value Ref Range    POC Glucose 164 (H) 65 - 100 mg/dL    Performed by: Rosa Bridges    POCT Glucose    Collection Time: 05/22/23  9:02 PM   Result Value Ref Range    POC Glucose 161 (H) 65 - 100 mg/dL    Performed by: Margie Navarrete    CBC with Auto Differential    Collection Time: 05/23/23  6:27 AM   Result Value Ref Range    WBC 8.3 4.1 - 11.1 K/uL    RBC 3.08 (L) 4.10 - 5.70 M/uL    Hemoglobin 8.2 (L) 12.1 - 17.0 g/dL    Hematocrit 27.3 (L) 36.6 - 50.3 %    MCV 88.6 80.0 - 99.0 FL    MCH 26.6 26.0 - 34.0 PG    MCHC 30.0 30.0 - 36.5 g/dL    RDW 17.7 (H) 11.5 - 14.5 %    Platelets 510 (H) 468 - 400 K/uL    MPV 9.5 8.9 - 12.9 FL    Nucleated RBCs 0.0 0.0  WBC    nRBC 0.00 0.00 - 0.01 K/uL    Neutrophils % 74 32 - 75 %    Lymphocytes % 15 12 - 49 %    Monocytes % 6 5 - 13 %    Eosinophils % 4 0 - 7 %    Basophils % 0 0 - 1 %

## 2023-05-26 NOTE — CARE COORDINATION
Patient remains clear to d/c from  to [facility], room 305, nurse report can be called to [phone].      Patient will require ambulance transport

## 2023-05-26 NOTE — DISCHARGE SUMMARY
Hospitalist Discharge Summary     Patient ID:    Feliciano Green  [id number]  82 y.o.  1935    Admit date: 5/16/2023    Discharge date : 5/26/2023      Final Diagnoses:   Principal Problem:    Gangrene of foot ([facility]  Active Problems:    Type 2 diabetes mellitus without complications ([facility]    Mixed hyperlipidemia    Stage 3 chronic kidney disease ([facility]    History of CVA (cerebrovascular accident)    Peripheral vascular disease ([facility]    Hypertension    PVD (peripheral vascular disease) ([facility])    Chronic systolic (congestive) heart failure ([facility])    Bacteriuria with pyuria    Surgical wound present    E. coli UTI  Resolved Problems:    * No resolved hospital problems. *      Reason for Hospitalization/Hospital Course:   Feliciano Green is a 82 y.o. male who presents to the emergency room for right foot gangrene. Patient is currently under the care of of Dr. Rocio Rand vascular as well as outpatient podiatry. Family reports that patient has had multiple failed attempts of revascularization outpatient. Past medical history of A-fib, BPH, CKD, COPD, hypertension, stroke, peripheral vascular disease. Of note daughter also reports patient recently on treatment for acute UTI. Hospitalist team consulted for further evaluation and treatment. X-ray right foot shows osteopenia and degenerative changes, heavy vascular calcification. In the ED patient noted alert and oriented with daughter at bedside. Hard of hearing at baseline. Right foot gangrene and foul odor noted on examination. Left AKA noted. Patient elects full code. Daughter present at bedside and requests discharge to [facility] rehab in [facility]. 5/17 s/p right foot transmet amputation. S/p surgical closure today 5/19. Hgb improved s/p 1u prbc on 5/18. Noted excoriation scrotal area, wound care consulted for recommendations.      5/22 placed in isolation due to multidrug-resistant

## 2023-05-26 NOTE — PLAN OF CARE
OCCUPATIONAL THERAPY EVALUATION  Patient: Andreas Hendrix (14 y.o. male)  Date: 5/23/2023  Primary Diagnosis: Gangrene of foot ([facility] [I96]  Dry gangrene ([facility] Geraldine Mudavidy  Procedure(s) (LRB):  WOUND BED PREPERATION WITH ALLOGRAFT APPLICATION OF RIGHT FOOT (Right) 4 Days Post-Op   Precautions: Fall Risk, Bed Alarm                In place during session:Peripheral IV and EKG/telemetry     ASSESSMENT  Pt is a 82 y.o. male presenting to [facility] with c/o foot pain, admitted 5/16 and currently being treated for gangrene of foot. Pt is s/p R foot trans met amputation on 5/17 and underwent surgical closure on 5/19. PMHx consists of L AKA. Pt received semi-supine in bed upon arrival, AXO x3 (disoriented to time and intermittent confusion throughout session), and agreeable to OT evaluation. Based on current observations, pt presents with decreased  functional mobility, ROM, strength, activity tolerance, balance, increased pain levels (see below for objective details and assist levels). Overall, pt tolerates session fair with c/o 8/10 pain in R foot and impaired sensation. Pt req'd mod-max A for bed mobility; initially resistive and demo'd decreased flexion at hips but able to A toward end of transfer. Pt initially req'd mod A for sitting balance but able to sit w/ SBA/CGA using B handrails for support. Req'd min-mod A for hospital gown at [facility]; declined further ADLs. Pt will benefit from continued skilled OT services to address current impairments and improve IND and safety with self cares and functional transfers/mobility. Current OT d/c recommendation [place] once medically appropriate to return to baseline and reduce caregiver burden. Other factors to consider for discharge: family/social support, DME, time since onset, severity of deficits, functional baseline     Patient will benefit from skilled therapy intervention to address the above noted impairments.        PLAN :  Recommendations and Planned
PHYSICAL THERAPY TREATMENT     Patient: Holli Michelle (85 y.o. male)  Date: 5/24/2023  Diagnosis: Gangrene of foot ([address] [I96]  Dry gangrene ([address] [I96] Gangrene of foot ([address]  Procedure(s) (LRB):  WOUND BED PREPERATION WITH ALLOGRAFT APPLICATION OF RIGHT FOOT (Right) 5 Days Post-Op  Precautions:  General Precautions, Contact Precautions, Weight Bearing Right Lower Extremity Weight Bearing: Non Weight Bearing Left Lower Extremity Weight Bearing: Non Weight Bearing            In place during session: Peripheral IV and External Catheter  Chart, physical therapy assessment, plan of care and goals were reviewed. ASSESSMENT  Patient continues with skilled PT services and is slowly progressing towards goals. Pt seated in the bed upon PT arrival, agreeable to session. Pt still req increased assist for bed mobility at this time, max A x1 for mobility to EOB with post lean initially noted and req assist to shift weight fwd. Patient sat up approx 5 min overall and completed a few exercises at EOB. Patient educated on supine therex as well. Will cont to progress towards goals, rec d/c to SNF. Pt remains NWB on BLE but reported that he may able to bear weight through heel. Will cont to assess WB orders/precautions. (See below for objective details and assist levels). Overall pt tolerated session fair today. Will continue to benefit from skilled PT services, and will continue to progress as tolerated. Current Level of Function Impacting Discharge (mobility/balance): safety, weakness, max A    Other factors to consider for discharge: high risk for falls       PLAN :  Patient continues to benefit from skilled intervention to address impaired functional mobility, impaired strength, decreased activity tolerance, poor safety awareness, and impaired balance. Continue treatment per established plan of care to address goals.       Recommendation for discharge: (in order for the patient to meet his/her long term
Problem: Discharge Planning  Goal: Discharge to home or other facility with appropriate resources  Recent Flowsheet Documentation  Taken 5/26/2023 0845 by Leopold Irwin, LPN  Discharge to home or other facility with appropriate resources: Identify barriers to discharge with patient and caregiver     Problem: Chronic Conditions and Co-morbidities  Goal: Patient's chronic conditions and co-morbidity symptoms are monitored and maintained or improved  Recent Flowsheet Documentation  Taken 5/26/2023 0845 by Leopold Irwin, LPN  Care Plan - Patient's Chronic Conditions and Co-Morbidity Symptoms are Monitored and Maintained or Improved: Monitor and assess patient's chronic conditions and comorbid symptoms for stability, deterioration, or improvement     Problem: Musculoskeletal - Adult  Goal: Return mobility to safest level of function  Recent Flowsheet Documentation  Taken 5/26/2023 0845 by Leopold Irwin, LPN  Return Mobility to Safest Level of Function: Ensure adequate protection for wounds/incisions during mobilization  Goal: Return ADL status to a safe level of function  Recent Flowsheet Documentation  Taken 5/26/2023 0845 by Leopold Irwin, LPN  Return ADL Status to a Safe Level of Function: Administer medication as ordered     Problem: Infection - Adult  Goal: Absence of infection at discharge  Recent Flowsheet Documentation  Taken 5/26/2023 0845 by Leopold Irwin, LPN  Absence of infection at discharge:   Monitor lab/diagnostic results   Monitor all insertion sites i.e., indwelling lines, tubes and drains     Problem: Metabolic/Fluid and Electrolytes - Adult  Goal: Electrolytes maintained within normal limits  Recent Flowsheet Documentation  Taken 5/26/2023 0845 by Leopold Irwin, LPN  Electrolytes maintained within normal limits:   Monitor labs and assess patient for signs and symptoms of electrolyte imbalances   Administer electrolyte replacement as ordered  Goal: Hemodynamic stability and optimal renal
Strategies;Precautions  Education Method: Demonstration;Verbal;Teach Back  Education Outcome: Verbalized understanding;Demonstrated understanding      Charito Chong PTA  Minutes: 31           Problem: Physical Therapy - Adult  Goal: By Discharge: Performs mobility at highest level of function for planned discharge setting. See evaluation for individualized goals. Description: FUNCTIONAL STATUS PRIOR TO ADMISSION: The patient required assistance at a SNF prior to admission. HOME SUPPORT PRIOR TO ADMISSION: Pt was at a SNF prior to admission, and prior to that he lived at home with family. Physical Therapy Goals  Initiated 5/23/2023  Pt stated goal: To get better  Pt will be I with [facility] in 7 days. Pt will perform bed mobility with contact guard assist in 7 days. Pt will perform transfers with Contact Guard Assist in 7 days. Pt will verbalize and demonstrate compliance with NWB B/L precautions in 7 days. Pt will demonstrate improvement in dynamic sitting balance from Maximal Assist to Contact Guard Assist in 7 days.      Outcome: Progressing

## 2023-06-08 ENCOUNTER — OFFICE VISIT (OUTPATIENT)
Age: 88
End: 2023-06-08

## 2023-06-08 VITALS
DIASTOLIC BLOOD PRESSURE: 80 MMHG | BODY MASS INDEX: 33.79 KG/M2 | HEIGHT: 61 IN | HEART RATE: 67 BPM | RESPIRATION RATE: 16 BRPM | WEIGHT: 179 LBS | SYSTOLIC BLOOD PRESSURE: 123 MMHG

## 2023-06-08 DIAGNOSIS — L24.A9 DRAINAGE FROM WOUND: Primary | ICD-10-CM

## 2023-06-08 DIAGNOSIS — L24.A9 DRAINAGE FROM WOUND: ICD-10-CM

## 2023-06-08 PROCEDURE — 99024 POSTOP FOLLOW-UP VISIT: CPT | Performed by: PODIATRIST

## 2023-06-14 LAB
BACTERIA SPEC AEROBE CULT: NORMAL
BACTERIA SPEC ANAEROBE CULT: NORMAL

## 2023-06-15 ENCOUNTER — OFFICE VISIT (OUTPATIENT)
Age: 88
End: 2023-06-15

## 2023-06-15 DIAGNOSIS — S88.912D AMPUTATION OF LEFT LOWER EXTREMITY, SUBSEQUENT ENCOUNTER (HCC): Primary | ICD-10-CM

## 2023-06-19 NOTE — PERIOP NOTE
Patient POA/Daughter ,Bishop Grace,  stated during PA MD was ok for patient to continue blood thinner prior to planned procedure, also states patient is currently in [facility] and bed bound.    Will send preop packet to facility

## 2023-06-21 ENCOUNTER — TELEPHONE (OUTPATIENT)
Age: 88
End: 2023-06-21

## 2023-06-21 NOTE — TELEPHONE ENCOUNTER
LPN from [facility] called in stating that they have a order to remove picc line but when the RN tried to remove picc she was met with resistance also the pt is scheduled to have his foot amputated on Friday and per the pt's daughter she is wanting to keep the picc line due to pt having surgery this upcoming Friday.  The nurse did not want to ignore picc line removal order Nurse can be reached at [phone]

## 2023-06-23 ENCOUNTER — ANESTHESIA EVENT (OUTPATIENT)
Facility: HOSPITAL | Age: 88
End: 2023-06-23
Payer: MEDICARE

## 2023-06-23 ENCOUNTER — ANESTHESIA (OUTPATIENT)
Facility: HOSPITAL | Age: 88
End: 2023-06-23
Payer: MEDICARE

## 2023-06-23 ENCOUNTER — HOSPITAL ENCOUNTER (OUTPATIENT)
Facility: HOSPITAL | Age: 88
Setting detail: OUTPATIENT SURGERY
Discharge: HOME | End: 2023-06-23
Attending: PODIATRIST | Admitting: INTERNAL MEDICINE
Payer: MEDICARE

## 2023-06-23 VITALS
TEMPERATURE: 98.4 F | HEART RATE: 61 BPM | WEIGHT: 179 LBS | DIASTOLIC BLOOD PRESSURE: 75 MMHG | RESPIRATION RATE: 20 BRPM | OXYGEN SATURATION: 100 % | BODY MASS INDEX: 33.82 KG/M2 | SYSTOLIC BLOOD PRESSURE: 181 MMHG

## 2023-06-23 LAB
ANION GAP BLD CALC-SCNC: 10
CA-I BLD-MCNC: 1.19 MMOL/L (ref 1.12–1.32)
CHLORIDE BLD-SCNC: 107 MMOL/L (ref 98–107)
CO2 BLD-SCNC: 25 MMOL/L
CREAT UR-MCNC: 0.86 MG/DL (ref 0.6–1.3)
GLUCOSE BLD STRIP.AUTO-MCNC: 93 MG/DL (ref 65–100)
LACTATE BLD-SCNC: 0.6 MMOL/L (ref 0.4–2)
PERFORMED BY:: NORMAL
POTASSIUM BLD-SCNC: 3.7 MMOL/L (ref 3.5–5.5)
SODIUM BLD-SCNC: 141 MMOL/L (ref 136–145)
SPECIMEN SITE: NORMAL

## 2023-06-23 PROCEDURE — 7100000000 HC PACU RECOVERY - FIRST 15 MIN: Performed by: PODIATRIST

## 2023-06-23 PROCEDURE — 6360000002 HC RX W HCPCS: Performed by: ANESTHESIOLOGY

## 2023-06-23 PROCEDURE — 2580000003 HC RX 258: Performed by: PODIATRIST

## 2023-06-23 PROCEDURE — 15275 SKIN SUB GRAFT FACE/NK/HF/G: CPT | Performed by: PODIATRIST

## 2023-06-23 PROCEDURE — 80047 BASIC METABLC PNL IONIZED CA: CPT

## 2023-06-23 PROCEDURE — 7100000010 HC PHASE II RECOVERY - FIRST 15 MIN: Performed by: PODIATRIST

## 2023-06-23 PROCEDURE — 6360000002 HC RX W HCPCS: Performed by: PODIATRIST

## 2023-06-23 PROCEDURE — 3600000012 HC SURGERY LEVEL 2 ADDTL 15MIN: Performed by: PODIATRIST

## 2023-06-23 PROCEDURE — 3700000000 HC ANESTHESIA ATTENDED CARE: Performed by: PODIATRIST

## 2023-06-23 PROCEDURE — 2709999900 HC NON-CHARGEABLE SUPPLY: Performed by: PODIATRIST

## 2023-06-23 PROCEDURE — 3600000002 HC SURGERY LEVEL 2 BASE: Performed by: PODIATRIST

## 2023-06-23 PROCEDURE — 83605 ASSAY OF LACTIC ACID: CPT

## 2023-06-23 PROCEDURE — 7100000001 HC PACU RECOVERY - ADDTL 15 MIN: Performed by: PODIATRIST

## 2023-06-23 PROCEDURE — 15004 WOUND PREP F/N/HF/G: CPT | Performed by: PODIATRIST

## 2023-06-23 PROCEDURE — 2580000003 HC RX 258: Performed by: NURSE ANESTHETIST, CERTIFIED REGISTERED

## 2023-06-23 PROCEDURE — 3700000001 HC ADD 15 MINUTES (ANESTHESIA): Performed by: PODIATRIST

## 2023-06-23 PROCEDURE — 15276 SKIN SUB GRAFT F/N/HF/G ADDL: CPT | Performed by: PODIATRIST

## 2023-06-23 PROCEDURE — 7100000011 HC PHASE II RECOVERY - ADDTL 15 MIN: Performed by: PODIATRIST

## 2023-06-23 PROCEDURE — 6360000002 HC RX W HCPCS: Performed by: NURSE ANESTHETIST, CERTIFIED REGISTERED

## 2023-06-23 DEVICE — ALLOGRAFT HUMAN TISSUE STRAVIX 18SQCM 3CM X 6CM MESHED: Type: IMPLANTABLE DEVICE | Site: FOOT | Status: FUNCTIONAL

## 2023-06-23 RX ORDER — SODIUM CHLORIDE 0.9 % (FLUSH) 0.9 %
5-40 SYRINGE (ML) INJECTION PRN
Status: DISCONTINUED | OUTPATIENT
Start: 2023-06-23 | End: 2023-06-23 | Stop reason: HOSPADM

## 2023-06-23 RX ORDER — DOCUSATE SODIUM 100 MG/1
100 CAPSULE, LIQUID FILLED ORAL 2 TIMES DAILY
COMMUNITY

## 2023-06-23 RX ORDER — HYDRALAZINE HYDROCHLORIDE 20 MG/ML
10 INJECTION INTRAMUSCULAR; INTRAVENOUS
Status: DISCONTINUED | OUTPATIENT
Start: 2023-06-23 | End: 2023-06-23 | Stop reason: HOSPADM

## 2023-06-23 RX ORDER — LABETALOL HYDROCHLORIDE 5 MG/ML
10 INJECTION, SOLUTION INTRAVENOUS
Status: DISCONTINUED | OUTPATIENT
Start: 2023-06-23 | End: 2023-06-23 | Stop reason: HOSPADM

## 2023-06-23 RX ORDER — TRAMADOL HYDROCHLORIDE 50 MG/1
50 TABLET ORAL
Status: DISCONTINUED | OUTPATIENT
Start: 2023-06-23 | End: 2023-06-23 | Stop reason: HOSPADM

## 2023-06-23 RX ORDER — SODIUM CHLORIDE 0.9 % (FLUSH) 0.9 %
5-40 SYRINGE (ML) INJECTION EVERY 12 HOURS SCHEDULED
Status: DISCONTINUED | OUTPATIENT
Start: 2023-06-23 | End: 2023-06-23 | Stop reason: HOSPADM

## 2023-06-23 RX ORDER — SODIUM CHLORIDE 9 MG/ML
INJECTION, SOLUTION INTRAVENOUS CONTINUOUS
Status: DISCONTINUED | OUTPATIENT
Start: 2023-06-23 | End: 2023-06-23 | Stop reason: HOSPADM

## 2023-06-23 RX ORDER — IPRATROPIUM BROMIDE AND ALBUTEROL SULFATE 2.5; .5 MG/3ML; MG/3ML
1 SOLUTION RESPIRATORY (INHALATION)
Status: DISCONTINUED | OUTPATIENT
Start: 2023-06-23 | End: 2023-06-23 | Stop reason: HOSPADM

## 2023-06-23 RX ORDER — FENTANYL CITRATE 50 UG/ML
25 INJECTION, SOLUTION INTRAMUSCULAR; INTRAVENOUS EVERY 5 MIN PRN
Status: DISCONTINUED | OUTPATIENT
Start: 2023-06-23 | End: 2023-06-23 | Stop reason: HOSPADM

## 2023-06-23 RX ORDER — SODIUM CHLORIDE, SODIUM LACTATE, POTASSIUM CHLORIDE, CALCIUM CHLORIDE 600; 310; 30; 20 MG/100ML; MG/100ML; MG/100ML; MG/100ML
INJECTION, SOLUTION INTRAVENOUS ONCE
Status: DISCONTINUED | OUTPATIENT
Start: 2023-06-23 | End: 2023-06-23 | Stop reason: HOSPADM

## 2023-06-23 RX ORDER — HYDROMORPHONE HYDROCHLORIDE 1 MG/ML
0.25 INJECTION, SOLUTION INTRAMUSCULAR; INTRAVENOUS; SUBCUTANEOUS EVERY 5 MIN PRN
Status: DISCONTINUED | OUTPATIENT
Start: 2023-06-23 | End: 2023-06-23 | Stop reason: HOSPADM

## 2023-06-23 RX ORDER — PROPOFOL 10 MG/ML
INJECTION, EMULSION INTRAVENOUS PRN
Status: DISCONTINUED | OUTPATIENT
Start: 2023-06-23 | End: 2023-06-23 | Stop reason: SDUPTHER

## 2023-06-23 RX ORDER — DIPHENHYDRAMINE HYDROCHLORIDE 50 MG/ML
12.5 INJECTION INTRAMUSCULAR; INTRAVENOUS
Status: DISCONTINUED | OUTPATIENT
Start: 2023-06-23 | End: 2023-06-23 | Stop reason: HOSPADM

## 2023-06-23 RX ORDER — METOCLOPRAMIDE HYDROCHLORIDE 5 MG/ML
10 INJECTION INTRAMUSCULAR; INTRAVENOUS
Status: DISCONTINUED | OUTPATIENT
Start: 2023-06-23 | End: 2023-06-23 | Stop reason: HOSPADM

## 2023-06-23 RX ORDER — SODIUM CHLORIDE 9 MG/ML
INJECTION, SOLUTION INTRAVENOUS PRN
Status: DISCONTINUED | OUTPATIENT
Start: 2023-06-23 | End: 2023-06-23 | Stop reason: HOSPADM

## 2023-06-23 RX ORDER — ONDANSETRON 2 MG/ML
4 INJECTION INTRAMUSCULAR; INTRAVENOUS
Status: DISCONTINUED | OUTPATIENT
Start: 2023-06-23 | End: 2023-06-23 | Stop reason: HOSPADM

## 2023-06-23 RX ADMIN — FENTANYL CITRATE 25 MCG: 50 INJECTION, SOLUTION INTRAMUSCULAR; INTRAVENOUS at 15:39

## 2023-06-23 RX ADMIN — SODIUM CHLORIDE: 9 INJECTION, SOLUTION INTRAVENOUS at 12:50

## 2023-06-23 RX ADMIN — PROPOFOL 125 MCG/KG/MIN: 10 INJECTION, EMULSION INTRAVENOUS at 14:42

## 2023-06-23 RX ADMIN — PROPOFOL 30 MG: 10 INJECTION, EMULSION INTRAVENOUS at 14:41

## 2023-06-23 RX ADMIN — PHENYLEPHRINE HYDROCHLORIDE 1 ML: 10 INJECTION INTRAVENOUS at 14:56

## 2023-06-23 RX ADMIN — CEFAZOLIN SODIUM 2000 MG: 1 INJECTION, POWDER, FOR SOLUTION INTRAMUSCULAR; INTRAVENOUS at 14:34

## 2023-06-23 ASSESSMENT — PAIN DESCRIPTION - ORIENTATION
ORIENTATION: RIGHT

## 2023-06-23 ASSESSMENT — PAIN - FUNCTIONAL ASSESSMENT: PAIN_FUNCTIONAL_ASSESSMENT: 0-10

## 2023-06-23 ASSESSMENT — PAIN DESCRIPTION - DESCRIPTORS
DESCRIPTORS: ACHING
DESCRIPTORS: ACHING

## 2023-06-23 ASSESSMENT — PAIN DESCRIPTION - LOCATION
LOCATION: FOOT

## 2023-06-23 ASSESSMENT — PAIN SCALES - GENERAL
PAINLEVEL_OUTOF10: 4
PAINLEVEL_OUTOF10: 3
PAINLEVEL_OUTOF10: 7
PAINLEVEL_OUTOF10: 0

## 2023-06-23 ASSESSMENT — COPD QUESTIONNAIRES: CAT_SEVERITY: MILD

## 2023-06-23 NOTE — ANESTHESIA PRE PROCEDURE
Department of Anesthesiology  Preprocedure Note       Name:  Kasey Age   Age:  82 y.o.  :  1935                                          MRN:  [id number]         Date:  2023      Surgeon: Brooklynn Haskins):  Tierra Avendaño DPM    Procedure: Procedure(s):  RIGHT FOOT WOUND BED PREP WITH ALLOGRAFT APPLICATION    Medications prior to admission:   Prior to Admission medications    Medication Sig Start Date End Date Taking? Authorizing Provider   apixaban (ELIQUIS) 2.5 MG TABS tablet Take 1 tablet by mouth 2 times daily 23  Monica Srinivasan APRN - NP   gabapentin (NEURONTIN) 100 MG capsule Take 1 capsule by mouth 2 times daily for 30 days.  Max Daily Amount: 200 mg 23  ELVI Dale - NP   polyethylene glycol (GLYCOLAX) 17 g packet Take 17 g by mouth daily as needed for Constipation 23  ELVI Dale - NP   aspirin 81 MG EC tablet Take 1 tablet by mouth daily    Historical Provider, MD   Multiple Vitamins-Minerals (THERAPEUTIC MULTIVITAMIN-MINERALS) tablet Take 1 tablet by mouth daily    Historical Provider, MD   brimonidine (ALPHAGAN) 0.2 % ophthalmic solution Place 1 drop into both eyes 3 times daily    Historical Provider, MD   magnesium oxide (MAG-OX) 400 (240 Mg) MG tablet Take 1 tablet by mouth 2 times daily    Historical Provider, MD   acetaminophen (TYLENOL) 325 MG tablet Take 2 tablets by mouth every 6 hours as needed    Ar Automatic Reconciliation   amLODIPine (NORVASC) 10 MG tablet Take 1 tablet by mouth daily 22   Ar Automatic Reconciliation   atorvastatin (LIPITOR) 20 MG tablet Take 1 tablet by mouth nightly 23   Ar Automatic Reconciliation   donepezil (ARICEPT) 5 MG tablet Take 1 tablet by mouth nightly 23   Ar Automatic Reconciliation   dorzolamide-timolol (COSOPT) 22.3-6.8 MG/ML ophthalmic solution INSTILL 1 DROP INTO EACH EYE TWICE DAILY 10/26/22   Ar Automatic Reconciliation   ferrous sulfate (IRON 325) 325 (65

## 2023-06-23 NOTE — H&P
Name:  Jerome Alfonso         Age:  82 y.o.  :  1935                                               MRN:  [id number]                                                                      Date:  2023              Surgeon: Cam Warren):  Esteban Echevarria DPM     Chief Complaint: Diabetic ulcer of right foot associated with other specified diabetes mellitus, unspecified part of foot, unspecified ulcer stage       HPI: Patient is an 82year old male with a significant pmh as noted below who is presenting today for continued surgical treatment of a right foot diabetic foot wound. Medications prior to admission:   Home Medications           Prior to Admission medications    Medication Sig Start Date End Date Taking? Authorizing Provider   apixaban (ELIQUIS) 2.5 MG TABS tablet Take 1 tablet by mouth 2 times daily 23   ELVI Walters NP   gabapentin (NEURONTIN) 100 MG capsule Take 1 capsule by mouth 2 times daily for 30 days.  Max Daily Amount: 200 mg 23   ELVI Walters NP   polyethylene glycol (GLYCOLAX) 17 g packet Take 17 g by mouth daily as needed for Constipation 23   ELVI Walters NP   aspirin 81 MG EC tablet Take 1 tablet by mouth daily       Historical Provider, MD   Multiple Vitamins-Minerals (THERAPEUTIC MULTIVITAMIN-MINERALS) tablet Take 1 tablet by mouth daily       Historical Provider, MD   brimonidine (ALPHAGAN) 0.2 % ophthalmic solution Place 1 drop into both eyes 3 times daily       Historical Provider, MD   magnesium oxide (MAG-OX) 400 (240 Mg) MG tablet Take 1 tablet by mouth 2 times daily       Historical Provider, MD   acetaminophen (TYLENOL) 325 MG tablet Take 2 tablets by mouth every 6 hours as needed       Ar Automatic Reconciliation   amLODIPine (NORVASC) 10 MG tablet Take 1 tablet by mouth daily 22     Ar Automatic Reconciliation   atorvastatin (LIPITOR) 20 MG tablet Take 1 tablet by mouth nightly

## 2023-06-23 NOTE — OP NOTE
Operative Note      Patient: Raymundo Abarca  YOB: 1935  MRN: [id number]    Date of Procedure: 6/23/2023    Pre-Op Diagnosis Codes:     * Diabetic ulcer of right foot associated with other specified diabetes mellitus, unspecified part of foot, unspecified ulcer stage ([facility] [E13.621, L97.519]    Post-Op Diagnosis: {MH OR VBSR:[id number]}       Procedure(s):  RIGHT FOOT WOUND BED PREP WITH ALLOGRAFT APPLICATION    Surgeon(s):  Gabe Andarde DPM    Assistant:   * No surgical staff found *    Anesthesia: Monitor Anesthesia Care    Estimated Blood Loss (mL): {NUMBERS; EJV:16929}    Complications: {Symptoms; Intra-op complications:83551}    Specimens:   * No specimens in log *    Implants:  Implant Name Type Inv.  Item Serial No.  Lot No. LRB No. Used Action   ALLOGRAFT HUMAN TISSUE STRAVIX 18SQCM 3CM X 6CM MESHED - [id number]  ALLOGRAFT HUMAN TISSUE STRAVIX 18SQCM 3CM X 6CM MESHED [id number] [employer] [id number] Right 1 Implanted   ALLOGRAFT HUMAN TISSUE STRAVIX 18SQCM 3CM X 6CM MESHED - [id number]  ALLOGRAFT HUMAN TISSUE STRAVIX 18SQCM 3CM X 6CM MESHED [id number] [employer] [id number] Right 1 Implanted   ALLOGRAFT HUMAN TISSUE STRAVIX 18SQCM 3CM X 6CM MESHED - [id number]  ALLOGRAFT HUMAN TISSUE STRAVIX 18SQCM 3CM X 6CM MESHED [id number] [employer] [id number] Right 1 Implanted   ALLOGRAFT HUMAN TISSUE STRAVIX 18SQCM 3CM X 6CM MESHED - [id number]  ALLOGRAFT HUMAN TISSUE STRAVIX 18SQCM 3CM X 6CM MESHED [id number] [employer] [id number] Right 1 Implanted         Drains:   [REMOVED] External Urinary Catheter (Removed)   Site Assessment Clean,dry & intact 05/25/23 0800   Placement Replaced 05/24/23 1204   Securement Method Tape 05/24/23 1204   Catheter Care Suction Canister/Tubing changed;Catheter/Wick replaced 05/24/23 1204   Perineal Care No 05/24/23 2028   Suction 40 mmgHg continuous 05/24/23 2028   Urine Color Deborah 05/24/23 2028   Urine Appearance Sediment 05/24/23 2028

## 2023-06-23 NOTE — PROGRESS NOTES
REPORT CALLED TO [address] [place] [address] AND REHAB SPOKE WITH Martina Broderick LPN , PICC LINE RIGHT CHEST DRY AND INTACT , FLUIDS DC'D AND PICC LINE FLUSHED WITH NORMAL SALINE AND ALCOHOL CAP APPLIED , DISCHARGE INSTRUCTIONS GIVEN TO PT'S DAUGHTER , SHE VERBALIZED UNDERSTANDING , DRESSING RIGHT FOOT DRY AND INTACT NO DRAINAGE NOTED , PT ACCEPTED CRACKERS AND LIQUIDS WITHOUT PROBLEMS , NO DISTRESS NOTED

## 2023-06-25 RX ORDER — LOSARTAN POTASSIUM 25 MG/1
TABLET ORAL
Qty: 90 TABLET | Refills: 0 | Status: SHIPPED | OUTPATIENT
Start: 2023-06-25

## 2023-06-27 ENCOUNTER — TELEPHONE (OUTPATIENT)
Facility: CLINIC | Age: 88
End: 2023-06-27

## 2023-06-29 ENCOUNTER — OFFICE VISIT (OUTPATIENT)
Age: 88
End: 2023-06-29

## 2023-06-29 VITALS
HEART RATE: 75 BPM | DIASTOLIC BLOOD PRESSURE: 80 MMHG | SYSTOLIC BLOOD PRESSURE: 150 MMHG | BODY MASS INDEX: 33.79 KG/M2 | HEIGHT: 61 IN | RESPIRATION RATE: 16 BRPM | WEIGHT: 179 LBS

## 2023-06-29 DIAGNOSIS — L03.119 CELLULITIS OF LOWER EXTREMITY, UNSPECIFIED LATERALITY: Primary | ICD-10-CM

## 2023-07-07 NOTE — PROGRESS NOTES
[employer] [facility] PODIATRY & FOOT SURGERY      HPI:  Patient is being seen for first outpatient postoperative visit status post right foot surgery. Patient is accompanied by his daughter. She states he is currently at a skilled nursing facility. He is receiving local wound care. He denies any pain. He denies any recent trauma. He states he is kept his dressings clean/dry/intact. He has remained strict nonweightbearing to the right lower extremity. He denies any local/systemic signs infection. States he has continued with his IV antibiotics, as managed by infectious disease he denies any other pedal complaints       ROS:  A complete review of systems is unremarkable outside of HPI       PE:  Upon removing the dressing, large wound noted to the distal aspect of the right foot. Incorporating allograft noted. Serosanguineous drainage noted. Fibrogranular base. No malodor. No proximal lymphatic streaking noted       ASSESSMENT:  S/p open TMA to the right foot (DOS: 05/17/2023) with subsequent wound bed prep and allograft gallo (DOS: 05/22/2023)       PLAN:  Dressing removed to the right lower extremity. Local wound care performed. Instructed patient to keep his dressings clean/dry/intact  To continue with his IV antibiotics, as managed by infectious disease. Wound culture was taken today and will discuss with infectious disease regarding tailoring of antibiotics  He is nonweightbearing to the right lower extremity  RTC (1) week          Pepe Tapia DPM, CW, [address] [place] [address] Podiatry and Foot Surgery  [address]  O: [phone]  F: [phone]    [address] at [facility], [facility], [address] [place] [address] Janet  O: [phone]  F: [phone]    * Available via [facility] 24/7

## 2023-07-12 ENCOUNTER — TELEPHONE (OUTPATIENT)
Age: 88
End: 2023-07-12

## 2023-07-12 NOTE — TELEPHONE ENCOUNTER
Patient's daughter called stating that he rehab facility the patient is currently in is unable to take it out the central venous acces catheter . Please advise what the patient should do to have it removed.

## 2023-07-13 ENCOUNTER — OFFICE VISIT (OUTPATIENT)
Age: 88
End: 2023-07-13
Payer: MEDICARE

## 2023-07-13 VITALS
BODY MASS INDEX: 33.79 KG/M2 | DIASTOLIC BLOOD PRESSURE: 60 MMHG | WEIGHT: 179 LBS | HEART RATE: 62 BPM | TEMPERATURE: 99.1 F | HEIGHT: 61 IN | SYSTOLIC BLOOD PRESSURE: 119 MMHG

## 2023-07-13 DIAGNOSIS — L97.516 ULCER OF RIGHT FOOT WITH BONE INVOLVEMENT WITHOUT EVIDENCE OF NECROSIS (HCC): Primary | ICD-10-CM

## 2023-07-13 PROCEDURE — 11043 DBRDMT MUSC&/FSCA 1ST 20/<: CPT | Performed by: PODIATRIST

## 2023-07-13 PROCEDURE — 11046 DBRDMT MUSC&/FSCA EA ADDL: CPT | Performed by: PODIATRIST

## 2023-07-14 ENCOUNTER — TELEPHONE (OUTPATIENT)
Age: 88
End: 2023-07-14

## 2023-07-17 ENCOUNTER — TELEPHONE (OUTPATIENT)
Age: 88
End: 2023-07-17

## 2023-07-17 DIAGNOSIS — Z78.9 CENTRAL VENOUS CATHETER IN PLACE: Primary | ICD-10-CM

## 2023-07-17 NOTE — TELEPHONE ENCOUNTER
Central line maintained until surgery completed. It can now be removed. Order placed for Interventional Radiology and contacted them personally to arrange for removal.  Spoke to caller.
[phone]

## 2023-07-17 NOTE — TELEPHONE ENCOUNTER
Pt's daughter called in stating that her father has a central line and it needs to come out she knows it needs to come out through intervention radiology.  She would like a call back at [phone]

## 2023-07-18 NOTE — ANESTHESIA POSTPROCEDURE EVALUATION
Department of Anesthesiology  Postprocedure Note    Patient: Selene Lang  MRN: [id number]  YOB: 1935  Date of evaluation: 7/18/2023      Procedure Summary     Date: 06/23/23 Room / Location: [facility] MAIN OR 08 / [facility] MAIN OR    Anesthesia Start: 1434 Anesthesia Stop: 1    Procedure: RIGHT FOOT WOUND BED PREP WITH ALLOGRAFT APPLICATION (Right: Foot) Diagnosis:       Diabetic ulcer of right foot associated with other specified diabetes mellitus, unspecified part of foot, unspecified ulcer stage ([address])      (Diabetic ulcer of right foot associated with other specified diabetes mellitus, unspecified part of foot, unspecified ulcer stage ([address]) [F22.538, L97.519])    Surgeons: Smiley Nguyen DPM Responsible Provider: Ander Dan MD    Anesthesia Type: MAC ASA Status: 4          Anesthesia Type: MAC    Sheeba Phase I: Sheeba Score: 10    Sheeba Phase II: Sheeba Score: 10      Anesthesia Post Evaluation    Patient location during evaluation: PACU  Patient participation: complete - patient participated  Level of consciousness: awake  Pain score: 0  Airway patency: patent  Nausea & Vomiting: no nausea and no vomiting  Complications: no  Cardiovascular status: hemodynamically stable  Respiratory status: acceptable  Hydration status: stable  Multimodal analgesia pain management approach

## 2023-07-24 ENCOUNTER — HOSPITAL ENCOUNTER (OUTPATIENT)
Facility: HOSPITAL | Age: 88
Discharge: HOME | End: 2023-07-27
Attending: INTERNAL MEDICINE
Payer: MEDICARE

## 2023-07-24 DIAGNOSIS — Z95.828 PRESENCE OF PERMANENT CENTRAL VENOUS CATHETER: ICD-10-CM

## 2023-07-24 PROCEDURE — 36589 REMOVAL TUNNELED CV CATH: CPT

## 2023-07-24 NOTE — OR NURSING
Powerline removal completed. Patient tolerated procedure well. Manual pressure held to site and gauze and tegaderm applied. Patient and daughter instructed to keep site and dressing dry for 24 hours post-procedure. Transportation service notified.

## 2023-07-27 ENCOUNTER — OFFICE VISIT (OUTPATIENT)
Age: 88
End: 2023-07-27

## 2023-07-27 VITALS
WEIGHT: 179 LBS | HEIGHT: 61 IN | DIASTOLIC BLOOD PRESSURE: 65 MMHG | SYSTOLIC BLOOD PRESSURE: 132 MMHG | TEMPERATURE: 98.1 F | BODY MASS INDEX: 33.79 KG/M2 | HEART RATE: 60 BPM

## 2023-07-27 DIAGNOSIS — L97.516 ULCER OF RIGHT FOOT WITH BONE INVOLVEMENT WITHOUT EVIDENCE OF NECROSIS (HCC): Primary | ICD-10-CM

## 2023-08-09 ASSESSMENT — ENCOUNTER SYMPTOMS
ABDOMINAL PAIN: 0
SHORTNESS OF BREATH: 0
VOMITING: 0
DIARRHEA: 0

## 2023-08-09 NOTE — PROGRESS NOTES
[facility] FOOT SURGERY         Patient Name: Josiah Westfall    : 1935    Visit Date: 2023    Office Visit Note      Subjective:         Patient is a 82 y.o. male who is being seen in office follow up visit for right foot ulcer. Patient is currently in a nursing facility. Patient was seen wound care at nursing facility. Past Medical History:   Diagnosis Date    Anemia 2017    Anxiety     Arrhythmia     A FIB    Arthritis     Atherosclerosis of artery of extremity with rest pain [facility])     Atrial fibrillation ([address]) 2020    Benign prostatic hyperplasia 2017    CAD (coronary artery disease)     pacemaker    Cancer ([address])     GASTRIC    Chronic kidney disease     Chronic obstructive pulmonary disease (HCC)     Depression     Diabetes ([address])     BORDERLINE, NO MEDS. Diverticulosis of colon     Dyslipidemia 2017    GERD (gastroesophageal reflux disease)     Glaucoma     History of complete heart block     s/p watchman procedure and replaced in 2017 by Dr. Ellis Hurtado     History of CVA (cerebrovascular accident) 2020    Hypercholesteremia     Hypertension     Hypomagnesemia 2020    Nocturia 2017    PUD (peptic ulcer disease)     GI BLEEDING    PVD (peripheral vascular disease) ([address])     Rectal hemorrhage 2017    Strabismus     Stroke ([address]) 2000 APPROX.     SOME VISUAL DEFICIT    Type 2 diabetes mellitus without complications ([address])     Venous stasis 2017     Past Surgical History:   Procedure Laterality Date    CARDIAC CATHETERIZATION      COLONOSCOPY      FOOT SURGERY Right 2023    Open Transmetatarsal Amputation Right Foot performed by Shyam Tripathi DPM at [address] ( DR VINNY BOOTHE)    IR TUNNELED CATHETER PLACEMENT GREATER THAN 5 YEARS  2023    IR TUNNELED CATHETER PLACEMENT GREATER THAN 5 YEARS 2023 ELVI Ortiz NP [facility] ANGIO IR    LEG SURGERY Right 2023

## 2023-08-14 RX ORDER — OMEPRAZOLE 20 MG/1
CAPSULE, DELAYED RELEASE ORAL
Qty: 90 CAPSULE | Refills: 0 | Status: ON HOLD | OUTPATIENT
Start: 2023-08-14

## 2023-08-17 NOTE — PERIOP NOTE
1650 Communication report form with Information guide to surgery with 5830 arrival time for surgery scheduled on 8/18/2023 faxed to [facility] facility on 8/16/2023 at 1530 with confirmation received.

## 2023-08-18 ENCOUNTER — ANESTHESIA EVENT (OUTPATIENT)
Facility: HOSPITAL | Age: 88
End: 2023-08-18
Payer: MEDICARE

## 2023-08-18 ENCOUNTER — ANESTHESIA (OUTPATIENT)
Facility: HOSPITAL | Age: 88
End: 2023-08-18
Payer: MEDICARE

## 2023-08-18 ENCOUNTER — HOSPITAL ENCOUNTER (INPATIENT)
Facility: HOSPITAL | Age: 88
LOS: 7 days | Discharge: NURSING, REHAB OR LONG-TERM CARE | DRG: 240 | End: 2023-08-25
Attending: PODIATRIST | Admitting: INTERNAL MEDICINE
Payer: MEDICARE

## 2023-08-18 DIAGNOSIS — B95.61 BACTEREMIA DUE TO STAPHYLOCOCCUS AUREUS: Primary | ICD-10-CM

## 2023-08-18 DIAGNOSIS — I96 GANGRENE (HCC): ICD-10-CM

## 2023-08-18 DIAGNOSIS — R78.81 BACTEREMIA DUE TO STAPHYLOCOCCUS AUREUS: Primary | ICD-10-CM

## 2023-08-18 PROBLEM — T14.8XXA WOUND, OPEN: Status: ACTIVE | Noted: 2023-08-18

## 2023-08-18 LAB
ALBUMIN SERPL-MCNC: 2.3 G/DL (ref 3.5–5)
ALBUMIN/GLOB SERPL: 0.4 (ref 1.1–2.2)
ALP SERPL-CCNC: 93 U/L (ref 45–117)
ALT SERPL-CCNC: 14 U/L (ref 12–78)
ANION GAP SERPL CALC-SCNC: 3 MMOL/L (ref 5–15)
AST SERPL W P-5'-P-CCNC: 23 U/L (ref 15–37)
BASOPHILS # BLD: 0.1 K/UL (ref 0–0.1)
BASOPHILS NFR BLD: 1 % (ref 0–1)
BILIRUB SERPL-MCNC: 0.3 MG/DL (ref 0.2–1)
BUN SERPL-MCNC: 21 MG/DL (ref 6–20)
BUN/CREAT SERPL: 19 (ref 12–20)
CA-I BLD-MCNC: 8.8 MG/DL (ref 8.5–10.1)
CHLORIDE SERPL-SCNC: 113 MMOL/L (ref 97–108)
CO2 SERPL-SCNC: 23 MMOL/L (ref 21–32)
CREAT SERPL-MCNC: 1.1 MG/DL (ref 0.7–1.3)
CRP SERPL-MCNC: 10.6 MG/DL (ref 0–0.6)
DIFFERENTIAL METHOD BLD: ABNORMAL
EOSINOPHIL # BLD: 0.2 K/UL (ref 0–0.4)
EOSINOPHIL NFR BLD: 3 % (ref 0–7)
ERYTHROCYTE [DISTWIDTH] IN BLOOD BY AUTOMATED COUNT: 16.6 % (ref 11.5–14.5)
ERYTHROCYTE [SEDIMENTATION RATE] IN BLOOD: >140 MM/HR (ref 0–20)
GLOBULIN SER CALC-MCNC: 5.5 G/DL (ref 2–4)
GLUCOSE BLD STRIP.AUTO-MCNC: 101 MG/DL (ref 65–100)
GLUCOSE BLD STRIP.AUTO-MCNC: 102 MG/DL (ref 65–100)
GLUCOSE BLD STRIP.AUTO-MCNC: 110 MG/DL (ref 65–100)
GLUCOSE BLD STRIP.AUTO-MCNC: 157 MG/DL (ref 65–100)
GLUCOSE SERPL-MCNC: 97 MG/DL (ref 65–100)
HCT VFR BLD AUTO: 27.1 % (ref 36.6–50.3)
HGB BLD-MCNC: 7.7 G/DL (ref 12.1–17)
IMM GRANULOCYTES # BLD AUTO: 0.1 K/UL (ref 0–0.04)
IMM GRANULOCYTES NFR BLD AUTO: 1 % (ref 0–0.5)
LYMPHOCYTES # BLD: 1.5 K/UL (ref 0.8–3.5)
LYMPHOCYTES NFR BLD: 20 % (ref 12–49)
MCH RBC QN AUTO: 24.4 PG (ref 26–34)
MCHC RBC AUTO-ENTMCNC: 28.4 G/DL (ref 30–36.5)
MCV RBC AUTO: 86 FL (ref 80–99)
MONOCYTES # BLD: 0.6 K/UL (ref 0–1)
MONOCYTES NFR BLD: 8 % (ref 5–13)
NEUTS SEG # BLD: 4.9 K/UL (ref 1.8–8)
NEUTS SEG NFR BLD: 67 % (ref 32–75)
NRBC # BLD: 0 K/UL (ref 0–0.01)
NRBC BLD-RTO: 0 PER 100 WBC
PERFORMED BY:: ABNORMAL
PLATELET # BLD AUTO: 595 K/UL (ref 150–400)
PMV BLD AUTO: 9 FL (ref 8.9–12.9)
POTASSIUM SERPL-SCNC: 4.1 MMOL/L (ref 3.5–5.1)
PROCALCITONIN SERPL-MCNC: <0.05 NG/ML
PROT SERPL-MCNC: 7.8 G/DL (ref 6.4–8.2)
RBC # BLD AUTO: 3.15 M/UL (ref 4.1–5.7)
RBC MORPH BLD: ABNORMAL
RBC MORPH BLD: ABNORMAL
SODIUM SERPL-SCNC: 139 MMOL/L (ref 136–145)
WBC # BLD AUTO: 7.4 K/UL (ref 4.1–11.1)

## 2023-08-18 PROCEDURE — 6370000000 HC RX 637 (ALT 250 FOR IP): Performed by: STUDENT IN AN ORGANIZED HEALTH CARE EDUCATION/TRAINING PROGRAM

## 2023-08-18 PROCEDURE — 2580000003 HC RX 258: Performed by: PODIATRIST

## 2023-08-18 PROCEDURE — 7100000001 HC PACU RECOVERY - ADDTL 15 MIN: Performed by: PODIATRIST

## 2023-08-18 PROCEDURE — 0QBN0ZZ EXCISION OF RIGHT METATARSAL, OPEN APPROACH: ICD-10-PCS | Performed by: PODIATRIST

## 2023-08-18 PROCEDURE — 2580000003 HC RX 258: Performed by: NURSE ANESTHETIST, CERTIFIED REGISTERED

## 2023-08-18 PROCEDURE — 2720000010 HC SURG SUPPLY STERILE: Performed by: PODIATRIST

## 2023-08-18 PROCEDURE — 1100000000 HC RM PRIVATE

## 2023-08-18 PROCEDURE — 2580000003 HC RX 258: Performed by: STUDENT IN AN ORGANIZED HEALTH CARE EDUCATION/TRAINING PROGRAM

## 2023-08-18 PROCEDURE — 6360000002 HC RX W HCPCS: Performed by: STUDENT IN AN ORGANIZED HEALTH CARE EDUCATION/TRAINING PROGRAM

## 2023-08-18 PROCEDURE — 6360000002 HC RX W HCPCS: Performed by: NURSE ANESTHETIST, CERTIFIED REGISTERED

## 2023-08-18 PROCEDURE — 80053 COMPREHEN METABOLIC PANEL: CPT

## 2023-08-18 PROCEDURE — 84145 PROCALCITONIN (PCT): CPT

## 2023-08-18 PROCEDURE — 87150 DNA/RNA AMPLIFIED PROBE: CPT

## 2023-08-18 PROCEDURE — 87186 SC STD MICRODIL/AGAR DIL: CPT

## 2023-08-18 PROCEDURE — 11047 DBRDMT BONE EACH ADDL: CPT | Performed by: PODIATRIST

## 2023-08-18 PROCEDURE — 6360000002 HC RX W HCPCS: Performed by: PODIATRIST

## 2023-08-18 PROCEDURE — 36415 COLL VENOUS BLD VENIPUNCTURE: CPT

## 2023-08-18 PROCEDURE — 2500000003 HC RX 250 WO HCPCS: Performed by: NURSE ANESTHETIST, CERTIFIED REGISTERED

## 2023-08-18 PROCEDURE — 3700000000 HC ANESTHESIA ATTENDED CARE: Performed by: PODIATRIST

## 2023-08-18 PROCEDURE — 86140 C-REACTIVE PROTEIN: CPT

## 2023-08-18 PROCEDURE — 87040 BLOOD CULTURE FOR BACTERIA: CPT

## 2023-08-18 PROCEDURE — 87077 CULTURE AEROBIC IDENTIFY: CPT

## 2023-08-18 PROCEDURE — 85652 RBC SED RATE AUTOMATED: CPT

## 2023-08-18 PROCEDURE — 11044 DBRDMT BONE 1ST 20 SQ CM/<: CPT | Performed by: PODIATRIST

## 2023-08-18 PROCEDURE — 82962 GLUCOSE BLOOD TEST: CPT

## 2023-08-18 PROCEDURE — 3700000001 HC ADD 15 MINUTES (ANESTHESIA): Performed by: PODIATRIST

## 2023-08-18 PROCEDURE — 2709999900 HC NON-CHARGEABLE SUPPLY: Performed by: PODIATRIST

## 2023-08-18 PROCEDURE — 85025 COMPLETE CBC W/AUTO DIFF WBC: CPT

## 2023-08-18 PROCEDURE — 83036 HEMOGLOBIN GLYCOSYLATED A1C: CPT

## 2023-08-18 PROCEDURE — 3600000012 HC SURGERY LEVEL 2 ADDTL 15MIN: Performed by: PODIATRIST

## 2023-08-18 PROCEDURE — 7100000000 HC PACU RECOVERY - FIRST 15 MIN: Performed by: PODIATRIST

## 2023-08-18 PROCEDURE — 99222 1ST HOSP IP/OBS MODERATE 55: CPT | Performed by: SURGERY

## 2023-08-18 PROCEDURE — 3600000002 HC SURGERY LEVEL 2 BASE: Performed by: PODIATRIST

## 2023-08-18 RX ORDER — IPRATROPIUM BROMIDE AND ALBUTEROL SULFATE 2.5; .5 MG/3ML; MG/3ML
1 SOLUTION RESPIRATORY (INHALATION)
Status: DISCONTINUED | OUTPATIENT
Start: 2023-08-18 | End: 2023-08-18 | Stop reason: HOSPADM

## 2023-08-18 RX ORDER — EPHEDRINE SULFATE 50 MG/ML
INJECTION INTRAVENOUS PRN
Status: DISCONTINUED | OUTPATIENT
Start: 2023-08-18 | End: 2023-08-18 | Stop reason: SDUPTHER

## 2023-08-18 RX ORDER — ONDANSETRON 2 MG/ML
4 INJECTION INTRAMUSCULAR; INTRAVENOUS EVERY 6 HOURS PRN
Status: DISCONTINUED | OUTPATIENT
Start: 2023-08-18 | End: 2023-08-24 | Stop reason: SDUPTHER

## 2023-08-18 RX ORDER — ACETAMINOPHEN 325 MG/1
650 TABLET ORAL EVERY 6 HOURS PRN
Status: DISCONTINUED | OUTPATIENT
Start: 2023-08-18 | End: 2023-08-24 | Stop reason: SDUPTHER

## 2023-08-18 RX ORDER — FENTANYL CITRATE 50 UG/ML
50 INJECTION, SOLUTION INTRAMUSCULAR; INTRAVENOUS EVERY 5 MIN PRN
Status: DISCONTINUED | OUTPATIENT
Start: 2023-08-18 | End: 2023-08-18 | Stop reason: HOSPADM

## 2023-08-18 RX ORDER — ASPIRIN 81 MG/1
81 TABLET ORAL DAILY
Status: DISCONTINUED | OUTPATIENT
Start: 2023-08-18 | End: 2023-08-25 | Stop reason: HOSPADM

## 2023-08-18 RX ORDER — ONDANSETRON 2 MG/ML
4 INJECTION INTRAMUSCULAR; INTRAVENOUS EVERY 6 HOURS PRN
Status: DISCONTINUED | OUTPATIENT
Start: 2023-08-18 | End: 2023-08-18

## 2023-08-18 RX ORDER — LATANOPROST 50 UG/ML
1 SOLUTION/ DROPS OPHTHALMIC NIGHTLY
Status: DISCONTINUED | OUTPATIENT
Start: 2023-08-18 | End: 2023-08-25 | Stop reason: HOSPADM

## 2023-08-18 RX ORDER — LORAZEPAM 2 MG/ML
0.5 INJECTION INTRAMUSCULAR
Status: DISCONTINUED | OUTPATIENT
Start: 2023-08-18 | End: 2023-08-18 | Stop reason: HOSPADM

## 2023-08-18 RX ORDER — FENTANYL CITRATE 50 UG/ML
INJECTION, SOLUTION INTRAMUSCULAR; INTRAVENOUS PRN
Status: DISCONTINUED | OUTPATIENT
Start: 2023-08-18 | End: 2023-08-18 | Stop reason: SDUPTHER

## 2023-08-18 RX ORDER — DOCUSATE SODIUM 100 MG/1
100 CAPSULE, LIQUID FILLED ORAL 2 TIMES DAILY
Status: DISCONTINUED | OUTPATIENT
Start: 2023-08-18 | End: 2023-08-25 | Stop reason: HOSPADM

## 2023-08-18 RX ORDER — BRIMONIDINE TARTRATE 2 MG/ML
1 SOLUTION/ DROPS OPHTHALMIC 3 TIMES DAILY
Status: DISCONTINUED | OUTPATIENT
Start: 2023-08-18 | End: 2023-08-25 | Stop reason: HOSPADM

## 2023-08-18 RX ORDER — FERROUS SULFATE 325(65) MG
325 TABLET ORAL
Status: DISCONTINUED | OUTPATIENT
Start: 2023-08-19 | End: 2023-08-25 | Stop reason: HOSPADM

## 2023-08-18 RX ORDER — DEXTROSE MONOHYDRATE 100 MG/ML
INJECTION, SOLUTION INTRAVENOUS CONTINUOUS PRN
Status: DISCONTINUED | OUTPATIENT
Start: 2023-08-18 | End: 2023-08-25 | Stop reason: HOSPADM

## 2023-08-18 RX ORDER — SODIUM CHLORIDE 9 MG/ML
INJECTION, SOLUTION INTRAVENOUS PRN
Status: DISCONTINUED | OUTPATIENT
Start: 2023-08-18 | End: 2023-08-18 | Stop reason: HOSPADM

## 2023-08-18 RX ORDER — SODIUM CHLORIDE, SODIUM LACTATE, POTASSIUM CHLORIDE, CALCIUM CHLORIDE 600; 310; 30; 20 MG/100ML; MG/100ML; MG/100ML; MG/100ML
INJECTION, SOLUTION INTRAVENOUS ONCE
Status: DISCONTINUED | OUTPATIENT
Start: 2023-08-18 | End: 2023-08-18 | Stop reason: HOSPADM

## 2023-08-18 RX ORDER — ONDANSETRON 4 MG/1
4 TABLET, ORALLY DISINTEGRATING ORAL EVERY 8 HOURS PRN
Status: DISCONTINUED | OUTPATIENT
Start: 2023-08-18 | End: 2023-08-18

## 2023-08-18 RX ORDER — DONEPEZIL HYDROCHLORIDE 5 MG/1
5 TABLET, FILM COATED ORAL NIGHTLY
Status: DISCONTINUED | OUTPATIENT
Start: 2023-08-18 | End: 2023-08-25 | Stop reason: HOSPADM

## 2023-08-18 RX ORDER — OXYCODONE HYDROCHLORIDE 5 MG/1
10 TABLET ORAL PRN
Status: DISCONTINUED | OUTPATIENT
Start: 2023-08-18 | End: 2023-08-18 | Stop reason: HOSPADM

## 2023-08-18 RX ORDER — ACETAMINOPHEN 325 MG/1
650 TABLET ORAL EVERY 6 HOURS PRN
Status: DISCONTINUED | OUTPATIENT
Start: 2023-08-18 | End: 2023-08-25 | Stop reason: HOSPADM

## 2023-08-18 RX ORDER — SODIUM CHLORIDE 0.9 % (FLUSH) 0.9 %
5-40 SYRINGE (ML) INJECTION PRN
Status: DISCONTINUED | OUTPATIENT
Start: 2023-08-18 | End: 2023-08-24 | Stop reason: SDUPTHER

## 2023-08-18 RX ORDER — OXYCODONE HYDROCHLORIDE 5 MG/1
5 TABLET ORAL PRN
Status: DISCONTINUED | OUTPATIENT
Start: 2023-08-18 | End: 2023-08-18 | Stop reason: HOSPADM

## 2023-08-18 RX ORDER — ONDANSETRON 4 MG/1
4 TABLET, ORALLY DISINTEGRATING ORAL EVERY 8 HOURS PRN
Status: DISCONTINUED | OUTPATIENT
Start: 2023-08-18 | End: 2023-08-24 | Stop reason: SDUPTHER

## 2023-08-18 RX ORDER — LABETALOL HYDROCHLORIDE 5 MG/ML
10 INJECTION, SOLUTION INTRAVENOUS
Status: DISCONTINUED | OUTPATIENT
Start: 2023-08-18 | End: 2023-08-18 | Stop reason: HOSPADM

## 2023-08-18 RX ORDER — PANTOPRAZOLE SODIUM 40 MG/1
40 TABLET, DELAYED RELEASE ORAL
Status: DISCONTINUED | OUTPATIENT
Start: 2023-08-19 | End: 2023-08-25 | Stop reason: HOSPADM

## 2023-08-18 RX ORDER — INSULIN LISPRO 100 [IU]/ML
0-4 INJECTION, SOLUTION INTRAVENOUS; SUBCUTANEOUS NIGHTLY
Status: DISCONTINUED | OUTPATIENT
Start: 2023-08-18 | End: 2023-08-25 | Stop reason: HOSPADM

## 2023-08-18 RX ORDER — LANOLIN ALCOHOL/MO/W.PET/CERES
400 CREAM (GRAM) TOPICAL 2 TIMES DAILY
Status: DISCONTINUED | OUTPATIENT
Start: 2023-08-18 | End: 2023-08-25 | Stop reason: HOSPADM

## 2023-08-18 RX ORDER — HYDROMORPHONE HYDROCHLORIDE 1 MG/ML
0.5 INJECTION, SOLUTION INTRAMUSCULAR; INTRAVENOUS; SUBCUTANEOUS EVERY 5 MIN PRN
Status: DISCONTINUED | OUTPATIENT
Start: 2023-08-18 | End: 2023-08-18 | Stop reason: HOSPADM

## 2023-08-18 RX ORDER — POLYETHYLENE GLYCOL 3350 17 G/17G
17 POWDER, FOR SOLUTION ORAL DAILY PRN
Status: DISCONTINUED | OUTPATIENT
Start: 2023-08-18 | End: 2023-08-18

## 2023-08-18 RX ORDER — TIMOLOL MALEATE 5 MG/ML
1 SOLUTION/ DROPS OPHTHALMIC 2 TIMES DAILY
Status: DISCONTINUED | OUTPATIENT
Start: 2023-08-18 | End: 2023-08-25 | Stop reason: HOSPADM

## 2023-08-18 RX ORDER — INSULIN LISPRO 100 [IU]/ML
0-8 INJECTION, SOLUTION INTRAVENOUS; SUBCUTANEOUS
Status: DISCONTINUED | OUTPATIENT
Start: 2023-08-18 | End: 2023-08-25 | Stop reason: HOSPADM

## 2023-08-18 RX ORDER — SODIUM CHLORIDE 9 MG/ML
INJECTION, SOLUTION INTRAVENOUS PRN
Status: DISCONTINUED | OUTPATIENT
Start: 2023-08-18 | End: 2023-08-24 | Stop reason: SDUPTHER

## 2023-08-18 RX ORDER — SODIUM CHLORIDE 0.9 % (FLUSH) 0.9 %
5-40 SYRINGE (ML) INJECTION EVERY 12 HOURS SCHEDULED
Status: DISCONTINUED | OUTPATIENT
Start: 2023-08-18 | End: 2023-08-25 | Stop reason: HOSPADM

## 2023-08-18 RX ORDER — ATORVASTATIN CALCIUM 20 MG/1
20 TABLET, FILM COATED ORAL
Status: DISCONTINUED | OUTPATIENT
Start: 2023-08-18 | End: 2023-08-25 | Stop reason: HOSPADM

## 2023-08-18 RX ORDER — 0.9 % SODIUM CHLORIDE 0.9 %
INTRAVENOUS SOLUTION INTRAVENOUS PRN
Status: DISCONTINUED | OUTPATIENT
Start: 2023-08-18 | End: 2023-08-18 | Stop reason: SDUPTHER

## 2023-08-18 RX ORDER — M-VIT,TX,IRON,MINS/CALC/FOLIC 27MG-0.4MG
1 TABLET ORAL DAILY
Status: DISCONTINUED | OUTPATIENT
Start: 2023-08-18 | End: 2023-08-25 | Stop reason: HOSPADM

## 2023-08-18 RX ORDER — AMLODIPINE BESYLATE 5 MG/1
10 TABLET ORAL DAILY
Status: DISCONTINUED | OUTPATIENT
Start: 2023-08-19 | End: 2023-08-25 | Stop reason: HOSPADM

## 2023-08-18 RX ORDER — GABAPENTIN 100 MG/1
100 CAPSULE ORAL 2 TIMES DAILY
Status: DISCONTINUED | OUTPATIENT
Start: 2023-08-18 | End: 2023-08-25 | Stop reason: HOSPADM

## 2023-08-18 RX ORDER — SODIUM CHLORIDE 0.9 % (FLUSH) 0.9 %
5-40 SYRINGE (ML) INJECTION EVERY 12 HOURS SCHEDULED
Status: DISCONTINUED | OUTPATIENT
Start: 2023-08-18 | End: 2023-08-18 | Stop reason: HOSPADM

## 2023-08-18 RX ORDER — LOSARTAN POTASSIUM 50 MG/1
25 TABLET ORAL DAILY
Status: DISCONTINUED | OUTPATIENT
Start: 2023-08-18 | End: 2023-08-25 | Stop reason: HOSPADM

## 2023-08-18 RX ORDER — SODIUM CHLORIDE 0.9 % (FLUSH) 0.9 %
5-40 SYRINGE (ML) INJECTION PRN
Status: DISCONTINUED | OUTPATIENT
Start: 2023-08-18 | End: 2023-08-18 | Stop reason: HOSPADM

## 2023-08-18 RX ORDER — ONDANSETRON 2 MG/ML
4 INJECTION INTRAMUSCULAR; INTRAVENOUS
Status: DISCONTINUED | OUTPATIENT
Start: 2023-08-18 | End: 2023-08-18 | Stop reason: HOSPADM

## 2023-08-18 RX ORDER — ENOXAPARIN SODIUM 100 MG/ML
40 INJECTION SUBCUTANEOUS DAILY
Status: DISCONTINUED | OUTPATIENT
Start: 2023-08-18 | End: 2023-08-18

## 2023-08-18 RX ORDER — DIPHENHYDRAMINE HYDROCHLORIDE 50 MG/ML
12.5 INJECTION INTRAMUSCULAR; INTRAVENOUS
Status: DISCONTINUED | OUTPATIENT
Start: 2023-08-18 | End: 2023-08-18 | Stop reason: HOSPADM

## 2023-08-18 RX ORDER — DORZOLAMIDE HCL 20 MG/ML
1 SOLUTION/ DROPS OPHTHALMIC 2 TIMES DAILY
Status: DISCONTINUED | OUTPATIENT
Start: 2023-08-18 | End: 2023-08-25 | Stop reason: HOSPADM

## 2023-08-18 RX ORDER — ACETAMINOPHEN 650 MG/1
650 SUPPOSITORY RECTAL EVERY 6 HOURS PRN
Status: DISCONTINUED | OUTPATIENT
Start: 2023-08-18 | End: 2023-08-24 | Stop reason: SDUPTHER

## 2023-08-18 RX ORDER — DORZOLAMIDE HYDROCHLORIDE AND TIMOLOL MALEATE 20; 5 MG/ML; MG/ML
1 SOLUTION/ DROPS OPHTHALMIC 2 TIMES DAILY
Status: DISCONTINUED | OUTPATIENT
Start: 2023-08-18 | End: 2023-08-18

## 2023-08-18 RX ORDER — SODIUM CHLORIDE 9 MG/ML
INJECTION, SOLUTION INTRAVENOUS PRN
Status: DISCONTINUED | OUTPATIENT
Start: 2023-08-18 | End: 2023-08-25 | Stop reason: HOSPADM

## 2023-08-18 RX ORDER — MEPERIDINE HYDROCHLORIDE 25 MG/ML
12.5 INJECTION INTRAMUSCULAR; INTRAVENOUS; SUBCUTANEOUS EVERY 5 MIN PRN
Status: DISCONTINUED | OUTPATIENT
Start: 2023-08-18 | End: 2023-08-18 | Stop reason: HOSPADM

## 2023-08-18 RX ORDER — SODIUM CHLORIDE 9 MG/ML
INJECTION, SOLUTION INTRAVENOUS CONTINUOUS
Status: DISCONTINUED | OUTPATIENT
Start: 2023-08-18 | End: 2023-08-25 | Stop reason: HOSPADM

## 2023-08-18 RX ORDER — ENOXAPARIN SODIUM 100 MG/ML
40 INJECTION SUBCUTANEOUS DAILY
Status: DISCONTINUED | OUTPATIENT
Start: 2023-08-19 | End: 2023-08-18

## 2023-08-18 RX ORDER — HYDRALAZINE HYDROCHLORIDE 20 MG/ML
10 INJECTION INTRAMUSCULAR; INTRAVENOUS
Status: DISCONTINUED | OUTPATIENT
Start: 2023-08-18 | End: 2023-08-18 | Stop reason: HOSPADM

## 2023-08-18 RX ORDER — SODIUM CHLORIDE 0.9 % (FLUSH) 0.9 %
5-40 SYRINGE (ML) INJECTION EVERY 12 HOURS SCHEDULED
Status: DISCONTINUED | OUTPATIENT
Start: 2023-08-18 | End: 2023-08-24 | Stop reason: SDUPTHER

## 2023-08-18 RX ORDER — ACETAMINOPHEN 650 MG/1
650 SUPPOSITORY RECTAL EVERY 6 HOURS PRN
Status: DISCONTINUED | OUTPATIENT
Start: 2023-08-18 | End: 2023-08-25 | Stop reason: HOSPADM

## 2023-08-18 RX ORDER — METOCLOPRAMIDE HYDROCHLORIDE 5 MG/ML
10 INJECTION INTRAMUSCULAR; INTRAVENOUS
Status: DISCONTINUED | OUTPATIENT
Start: 2023-08-18 | End: 2023-08-18 | Stop reason: HOSPADM

## 2023-08-18 RX ORDER — POLYETHYLENE GLYCOL 3350 17 G/17G
17 POWDER, FOR SOLUTION ORAL DAILY PRN
Status: DISCONTINUED | OUTPATIENT
Start: 2023-08-18 | End: 2023-08-25 | Stop reason: HOSPADM

## 2023-08-18 RX ADMIN — FENTANYL CITRATE 25 MCG: 50 INJECTION, SOLUTION INTRAMUSCULAR; INTRAVENOUS at 12:47

## 2023-08-18 RX ADMIN — SODIUM CHLORIDE: 9 INJECTION, SOLUTION INTRAVENOUS at 10:58

## 2023-08-18 RX ADMIN — TIMOLOL MALEATE 1 DROP: 5 SOLUTION OPHTHALMIC at 21:07

## 2023-08-18 RX ADMIN — GABAPENTIN 100 MG: 100 CAPSULE ORAL at 21:02

## 2023-08-18 RX ADMIN — SODIUM CHLORIDE, PRESERVATIVE FREE 10 ML: 5 INJECTION INTRAVENOUS at 21:06

## 2023-08-18 RX ADMIN — EPHEDRINE SULFATE 20 MG: 50 INJECTION INTRAVENOUS at 12:37

## 2023-08-18 RX ADMIN — MULTIPLE VITAMINS W/ MINERALS TAB 1 TABLET: TAB at 21:02

## 2023-08-18 RX ADMIN — VANCOMYCIN HYDROCHLORIDE 1750 MG: 1 INJECTION, POWDER, LYOPHILIZED, FOR SOLUTION INTRAVENOUS at 17:40

## 2023-08-18 RX ADMIN — INSULIN LISPRO 0 UNITS: 100 INJECTION, SOLUTION INTRAVENOUS; SUBCUTANEOUS at 21:06

## 2023-08-18 RX ADMIN — DORZOLAMIDE HYDROCHLORIDE 1 DROP: 20 SOLUTION/ DROPS OPHTHALMIC at 21:05

## 2023-08-18 RX ADMIN — PROPOFOL 30 MG: 10 INJECTION, EMULSION INTRAVENOUS at 12:38

## 2023-08-18 RX ADMIN — EPHEDRINE SULFATE 10 MG: 50 INJECTION INTRAVENOUS at 12:58

## 2023-08-18 RX ADMIN — SODIUM CHLORIDE 100 ML: 9 INJECTION, SOLUTION INTRAVENOUS at 13:04

## 2023-08-18 RX ADMIN — PIPERACILLIN AND TAZOBACTAM 4500 MG: 4; .5 INJECTION, POWDER, LYOPHILIZED, FOR SOLUTION INTRAVENOUS at 17:07

## 2023-08-18 RX ADMIN — DOCUSATE SODIUM 100 MG: 100 CAPSULE, LIQUID FILLED ORAL at 21:02

## 2023-08-18 RX ADMIN — PROPOFOL 20 MG: 10 INJECTION, EMULSION INTRAVENOUS at 12:51

## 2023-08-18 RX ADMIN — FENTANYL CITRATE 50 MCG: 50 INJECTION, SOLUTION INTRAMUSCULAR; INTRAVENOUS at 12:34

## 2023-08-18 RX ADMIN — PROPOFOL 20 MG: 10 INJECTION, EMULSION INTRAVENOUS at 12:43

## 2023-08-18 RX ADMIN — LATANOPROST 1 DROP: 50 SOLUTION OPHTHALMIC at 21:07

## 2023-08-18 RX ADMIN — SODIUM CHLORIDE: 9 INJECTION, SOLUTION INTRAVENOUS at 17:07

## 2023-08-18 RX ADMIN — SODIUM CHLORIDE 25 ML: 9 INJECTION, SOLUTION INTRAVENOUS at 12:28

## 2023-08-18 RX ADMIN — SODIUM CHLORIDE 200 ML: 9 INJECTION, SOLUTION INTRAVENOUS at 12:55

## 2023-08-18 RX ADMIN — CEFAZOLIN SODIUM 2000 MG: 1 INJECTION, POWDER, FOR SOLUTION INTRAMUSCULAR; INTRAVENOUS at 12:25

## 2023-08-18 RX ADMIN — FENTANYL CITRATE 25 MCG: 50 INJECTION, SOLUTION INTRAMUSCULAR; INTRAVENOUS at 12:57

## 2023-08-18 RX ADMIN — ATORVASTATIN CALCIUM 20 MG: 20 TABLET, FILM COATED ORAL at 21:02

## 2023-08-18 RX ADMIN — PROPOFOL 20 MG: 10 INJECTION, EMULSION INTRAVENOUS at 12:58

## 2023-08-18 RX ADMIN — DONEPEZIL HYDROCHLORIDE 5 MG: 5 TABLET, FILM COATED ORAL at 21:02

## 2023-08-18 RX ADMIN — PIPERACILLIN AND TAZOBACTAM 3375 MG: 3; .375 INJECTION, POWDER, LYOPHILIZED, FOR SOLUTION INTRAVENOUS at 22:30

## 2023-08-18 RX ADMIN — Medication 400 MG: at 21:02

## 2023-08-18 RX ADMIN — BRIMONIDINE TARTRATE 1 DROP: 2 SOLUTION OPHTHALMIC at 20:59

## 2023-08-18 ASSESSMENT — PAIN SCALES - GENERAL
PAINLEVEL_OUTOF10: 0

## 2023-08-18 ASSESSMENT — ENCOUNTER SYMPTOMS
WHEEZING: 0
SHORTNESS OF BREATH: 0
BACK PAIN: 0
ABDOMINAL PAIN: 0
TROUBLE SWALLOWING: 0
COUGH: 0
ALLERGIC/IMMUNOLOGIC NEGATIVE: 1
EYES NEGATIVE: 1
VOMITING: 0
NAUSEA: 0
SORE THROAT: 0
RHINORRHEA: 0
DIARRHEA: 0

## 2023-08-18 ASSESSMENT — PAIN DESCRIPTION - DESCRIPTORS: DESCRIPTORS: BURNING;THROBBING

## 2023-08-18 ASSESSMENT — PAIN - FUNCTIONAL ASSESSMENT: PAIN_FUNCTIONAL_ASSESSMENT: 0-10

## 2023-08-18 NOTE — PLAN OF CARE
Problem: Discharge Planning  Goal: Discharge to home or other facility with appropriate resources  Recent Flowsheet Documentation  Taken 8/18/2023 1506 by Kevin Carreon RN  Discharge to home or other facility with appropriate resources:   Identify barriers to discharge with patient and caregiver   Refer to discharge planning if patient needs post-hospital services based on physician order or complex needs related to functional status, cognitive ability or social support system   Identify discharge learning needs (meds, wound care, etc)

## 2023-08-18 NOTE — PERIOP NOTE
Spoke with Gifty Webb LPN at [address] [place] [address] to obtain when patient had meds last.

## 2023-08-18 NOTE — PERIOP NOTE
Patient alert and oriented x4, VS stable, no complaints of pain at this time. Daughter at bedside. Bed in low position, call bell within reach. Patient states okay to review and give discharge instructions to robinson Pérez & KEYONA.

## 2023-08-18 NOTE — BRIEF OP NOTE
Brief Postoperative Note      Patient: Todd Vega  YOB: 1935  MRN: [id number]    Date of Procedure: 8/18/2023    Pre-Op Diagnosis Codes:     * Foot ulcer with fat layer exposed, right ([address]) [L97.512]    Post-Op Diagnosis: Same       Procedure(s):  RIGHT FOOT WOUND DEBRIDEMENT    Surgeon(s):  Shelly Guerrero DPM    Assistant:  Surgical Assistant: Shruti Rios    Anesthesia: Monitor Anesthesia Care    Estimated Blood Loss (mL): Minimal    Complications: None    Specimens:   * No specimens in log *    Implants:  * No implants in log *      Drains: * No LDAs found *    Findings:   Extensive purulent drainage with bone exposure. Recommend admission to the hospital and evaluation for BKA.       Electronically signed by Shelly Guerrero DPM on 8/18/2023 at 1:18 PM

## 2023-08-18 NOTE — CONSULTS
Consult Note            Date:8/18/2023        Patient Name:Akash Paul     YOB: 1935     Age:87 y.o. Consults INFECTIOUS DISEASE    Chief Complaint   No chief complaint on file. Follow-up wound    History Obtained From   patient    History of Present Illness   This is an 82 year old male followed in May 2023 for dry gangrene right foot with necrotizing infection  secondary to E. Coli and E. Faecalis, undergoing right TMA. Since then he has been followed in [facility] for right foot plantar ulcer. Earlier today he underwent right foot wound debridement with finding of extensive purulent drainage with bone exposure with recommendation for BKA evaluation. ID has been consulted for this reason. Patient resting comfortably at this time but is dissatisfied with the handling of his wounds. Daughter is at bedside. No decision on BKA. Past Medical History     Past Medical History:   Diagnosis Date    Anemia 4/26/2017    Anxiety     Arrhythmia     A FIB    Arthritis     Atherosclerosis of artery of extremity with rest pain [facility])     Atrial fibrillation ([address]) 7/21/2020    Benign prostatic hyperplasia 4/26/2017    CAD (coronary artery disease)     pacemaker    Cancer ([address]) 2010    GASTRIC    Chronic kidney disease     Chronic obstructive pulmonary disease (HCC)     Depression     Diabetes ([address])     BORDERLINE, NO MEDS. Diverticulosis of colon     Dyslipidemia 4/26/2017    GERD (gastroesophageal reflux disease)     Glaucoma     History of complete heart block     s/p watchman procedure and replaced in 11/2017 by Dr. Olga Fink     History of CVA (cerebrovascular accident) 7/21/2020    Hypercholesteremia     Hypertension     Hypomagnesemia 7/13/2020    Nocturia 4/26/2017    PUD (peptic ulcer disease)     GI BLEEDING    PVD (peripheral vascular disease) ([address])     Rectal hemorrhage 4/26/2017    Strabismus     Stroke ([address]) 2000 APPROX.     SOME VISUAL DEFICIT    Type 2 diabetes mellitus

## 2023-08-18 NOTE — H&P
Hospitalist Admission Note    NAME: Familia Baird   :  1935   MRN:  [id number]     Date/Time:  2023 3:47 PM    Patient PCP: ELVI Medina NP    ______________________________________________________________________  Given the patient's current clinical presentation, I have a high level of concern for decompensation if discharged from the emergency department. Complex decision making was performed, which includes reviewing the patient's available past medical records, laboratory results, and x-ray films. My assessment of this patient's clinical condition and my plan of care is as follows. Assessment / Plan:  Right foot infection; history of diabetic ulcers of right foot  -Patient was scheduled for right foot foot debridement today (2023) and during procedure Dr. Khai Oro discovered extensive purulent drainage with bone exposure. Recommended admission to the hospital for further evaluation for possible BKA  -Afebrile  -CBC, CMP, CRP, Pro-Dwayne, sed rate and blood culture  -Initiate vanc and Zosyn  -Consult infectious disease, podiatry, vascular surgery    History of DM  -Insulin sliding scale    History of A-fib, CAD, HTN, HLD, CVA  -Continue amlodipine, ASA, Lipitor, losartan  -We will hold Eliquis at this time and start Lovenox tomorrow if bleeding controlled from procedure    History of GERD  -Continue Protonix    History of glaucoma  -Continue home eyedrops    Medical Decision Making:   I personally reviewed labs: POC glucose  I personally reviewed imaging: None  Toxic drug monitoring: None  Discussed case with: ED provider. After discussion I am in agreement that acuity of patient's medical condition necessitates hospital stay.       Code Status: Full  DVT Prophylaxis: Lovenox  GI Prophylaxis: Protonix      Subjective:   CHIEF COMPLAINT: Right foot infection    HISTORY OF PRESENT ILLNESS:     Tara William is a 82 y.o.  male with PMHx significant for A-fib with mellitus without complications ([address]) 4/14/4761    Venous stasis 4/26/2017        Past Surgical History:   Procedure Laterality Date    CARDIAC CATHETERIZATION      COLONOSCOPY      FOOT SURGERY Right 5/17/2023    Open Transmetatarsal Amputation Right Foot performed by Princess Cobos DPM at [address] ( DR VINNY BOOTHE)    IR TUNNELED CATHETER PLACEMENT GREATER THAN 5 YEARS  05/26/2023    IR TUNNELED CATHETER PLACEMENT GREATER THAN 5 YEARS 5/26/2023 ELVI Cassidy NP [facility] RAD ANGIO IR    LEG SURGERY Right 5/19/2023    WOUND BED PREPERATION WITH ALLOGRAFT APPLICATION OF RIGHT FOOT performed by Princess Cobos DPM at [facility] Left 02/2022    [address] Left 2021    AMPUTATION 3 TOES     ORTHOPEDIC SURGERY Right 2019    AMPUTATION RIGHT GREAT TOE    ORTHOPEDIC SURGERY Left 1980    KNEE CARTILAGE    OTHER SURGICAL HISTORY      LEFT HAND SKIN GRAFT (BURN) DONOR RIGHT THIGH    PACEMAKER  [id number]    DR Naima Sierra; WATCHMAN PROCEDURE. .. noted [facility] device on 2017 noted     PACEMAKER PLACEMENT      PICC INSERTION VASCULAR ACCESS TEAM  05/25/2023    SKIN GRAFT Right 06/23/2023    RIGHT FOOT WOUND BED PREP WITH ALLOGRAFT APPLICATION performed by Princess Cobos DPM at [facility] MAIN OR    TOE AMPUTATION Right        Social History     Tobacco Use    Smoking status: Never    Smokeless tobacco: Never   Substance Use Topics    Alcohol use: Not Currently        Family History   Problem Relation Age of Onset    Cancer Brother         PROSTATE    Stroke Mother     Stroke Father     Anesth Problems Neg Hx      Allergies   Allergen Reactions    Lisinopril Cough        Prior to Admission medications    Medication Sig Start Date End Date Taking?  Authorizing Provider   omeprazole (PRILOSEC) 20 MG delayed release capsule Take 1 capsule by mouth once daily in the morning 8/14/23   ELVI Marroquin NP   losartan ([address])

## 2023-08-18 NOTE — ANESTHESIA POSTPROCEDURE EVALUATION
Department of Anesthesiology  Postprocedure Note    Patient: Grace Moya  MRN: [id number]  YOB: 1935  Date of evaluation: 8/18/2023      Procedure Summary     Date: 08/18/23 Room / Location: [facility] MAIN OR 04 / [facility] MAIN OR    Anesthesia Start: 0307 Anesthesia Stop: 3550    Procedure: RIGHT FOOT WOUND DEBRIDEMENT (Right: Foot) Diagnosis:       Foot ulcer with fat layer exposed, right ([address])      (Foot ulcer with fat layer exposed, right ([address]) [E90.338])    Surgeons: Dada Montgomery DPM Responsible Provider: Chung Gillespie MD    Anesthesia Type: MAC ASA Status: 3          Anesthesia Type: MAC    Sheeba Phase I: Sheeba Score: 8    Sheeba Phase II:        Anesthesia Post Evaluation    Patient location during evaluation: PACU  Patient participation: complete - patient participated  Level of consciousness: sleepy but conscious  Pain score: 0  Airway patency: patent  Nausea & Vomiting: no nausea and no vomiting  Complications: no  Cardiovascular status: hemodynamically stable  Respiratory status: acceptable  Hydration status: stable  Multimodal analgesia pain management approach

## 2023-08-18 NOTE — ANESTHESIA PRE PROCEDURE
negative vascular ROS. Other Findings:             Anesthesia Plan      MAC and TIVA     ASA 3     (Standard ASA monitors: continuous EKG, BP, HR, pulse oximeter, and capnography.)  Induction: intravenous. continuous noninvasive hemodynamic monitor  MIPS: Prophylactic antiemetics administered. Anesthetic plan and risks discussed with patient (and family, if present. ). Plan discussed with CRNA.     Attending anesthesiologist reviewed and agrees with Oscar Vásquez MD   8/18/2023

## 2023-08-19 PROBLEM — L08.9 RIGHT FOOT INFECTION: Status: ACTIVE | Noted: 2023-08-19

## 2023-08-19 PROBLEM — L97.514 ULCER OF RIGHT FOOT WITH NECROSIS OF BONE (HCC): Status: ACTIVE | Noted: 2023-08-19

## 2023-08-19 PROBLEM — R79.82 CRP ELEVATED: Status: ACTIVE | Noted: 2023-08-19

## 2023-08-19 LAB
ALBUMIN SERPL-MCNC: 2.2 G/DL (ref 3.5–5)
ANION GAP SERPL CALC-SCNC: 5 MMOL/L (ref 5–15)
BUN SERPL-MCNC: 21 MG/DL (ref 6–20)
BUN/CREAT SERPL: 17 (ref 12–20)
CA-I BLD-MCNC: 8.6 MG/DL (ref 8.5–10.1)
CHLORIDE SERPL-SCNC: 110 MMOL/L (ref 97–108)
CO2 SERPL-SCNC: 24 MMOL/L (ref 21–32)
CREAT SERPL-MCNC: 1.24 MG/DL (ref 0.7–1.3)
EST. AVERAGE GLUCOSE BLD GHB EST-MCNC: 117 MG/DL
GLUCOSE BLD STRIP.AUTO-MCNC: 106 MG/DL (ref 65–100)
GLUCOSE BLD STRIP.AUTO-MCNC: 112 MG/DL (ref 65–100)
GLUCOSE BLD STRIP.AUTO-MCNC: 146 MG/DL (ref 65–100)
GLUCOSE BLD STRIP.AUTO-MCNC: 147 MG/DL (ref 65–100)
GLUCOSE SERPL-MCNC: 138 MG/DL (ref 65–100)
HBA1C MFR BLD: 5.7 % (ref 4–5.6)
MRSA DNA SPEC QL NAA+PROBE: DETECTED
PERFORMED BY:: ABNORMAL
PHOSPHATE SERPL-MCNC: 3.2 MG/DL (ref 2.6–4.7)
POTASSIUM SERPL-SCNC: 3.9 MMOL/L (ref 3.5–5.1)
SODIUM SERPL-SCNC: 139 MMOL/L (ref 136–145)
VANCOMYCIN TROUGH SERPL-MCNC: 14.2 UG/ML (ref 5–10)

## 2023-08-19 PROCEDURE — 80069 RENAL FUNCTION PANEL: CPT

## 2023-08-19 PROCEDURE — 87641 MR-STAPH DNA AMP PROBE: CPT

## 2023-08-19 PROCEDURE — 80202 ASSAY OF VANCOMYCIN: CPT

## 2023-08-19 PROCEDURE — 2580000003 HC RX 258: Performed by: STUDENT IN AN ORGANIZED HEALTH CARE EDUCATION/TRAINING PROGRAM

## 2023-08-19 PROCEDURE — 82962 GLUCOSE BLOOD TEST: CPT

## 2023-08-19 PROCEDURE — 6370000000 HC RX 637 (ALT 250 FOR IP): Performed by: STUDENT IN AN ORGANIZED HEALTH CARE EDUCATION/TRAINING PROGRAM

## 2023-08-19 PROCEDURE — 6360000002 HC RX W HCPCS: Performed by: STUDENT IN AN ORGANIZED HEALTH CARE EDUCATION/TRAINING PROGRAM

## 2023-08-19 PROCEDURE — 99222 1ST HOSP IP/OBS MODERATE 55: CPT | Performed by: INTERNAL MEDICINE

## 2023-08-19 PROCEDURE — 99232 SBSQ HOSP IP/OBS MODERATE 35: CPT | Performed by: SURGERY

## 2023-08-19 PROCEDURE — 1100000000 HC RM PRIVATE

## 2023-08-19 RX ADMIN — FERROUS SULFATE TAB 325 MG (65 MG ELEMENTAL FE) 325 MG: 325 (65 FE) TAB at 07:52

## 2023-08-19 RX ADMIN — LATANOPROST 1 DROP: 50 SOLUTION OPHTHALMIC at 20:17

## 2023-08-19 RX ADMIN — BRIMONIDINE TARTRATE 1 DROP: 2 SOLUTION OPHTHALMIC at 07:55

## 2023-08-19 RX ADMIN — ACETAMINOPHEN 650 MG: 325 TABLET ORAL at 07:20

## 2023-08-19 RX ADMIN — DORZOLAMIDE HYDROCHLORIDE 1 DROP: 20 SOLUTION/ DROPS OPHTHALMIC at 20:09

## 2023-08-19 RX ADMIN — GABAPENTIN 100 MG: 100 CAPSULE ORAL at 20:07

## 2023-08-19 RX ADMIN — TIMOLOL MALEATE 1 DROP: 5 SOLUTION OPHTHALMIC at 20:09

## 2023-08-19 RX ADMIN — PANTOPRAZOLE SODIUM 40 MG: 40 TABLET, DELAYED RELEASE ORAL at 05:57

## 2023-08-19 RX ADMIN — PIPERACILLIN AND TAZOBACTAM 3375 MG: 3; .375 INJECTION, POWDER, LYOPHILIZED, FOR SOLUTION INTRAVENOUS at 16:26

## 2023-08-19 RX ADMIN — PIPERACILLIN AND TAZOBACTAM 3375 MG: 3; .375 INJECTION, POWDER, LYOPHILIZED, FOR SOLUTION INTRAVENOUS at 22:30

## 2023-08-19 RX ADMIN — SODIUM CHLORIDE, PRESERVATIVE FREE 10 ML: 5 INJECTION INTRAVENOUS at 20:08

## 2023-08-19 RX ADMIN — VANCOMYCIN HYDROCHLORIDE 500 MG: 500 INJECTION, POWDER, LYOPHILIZED, FOR SOLUTION INTRAVENOUS at 05:43

## 2023-08-19 RX ADMIN — AMLODIPINE BESYLATE 10 MG: 5 TABLET ORAL at 07:52

## 2023-08-19 RX ADMIN — SODIUM CHLORIDE, PRESERVATIVE FREE 10 ML: 5 INJECTION INTRAVENOUS at 08:15

## 2023-08-19 RX ADMIN — DOCUSATE SODIUM 100 MG: 100 CAPSULE, LIQUID FILLED ORAL at 07:52

## 2023-08-19 RX ADMIN — ASPIRIN 81 MG: 81 TABLET, COATED ORAL at 07:52

## 2023-08-19 RX ADMIN — TIMOLOL MALEATE 1 DROP: 5 SOLUTION OPHTHALMIC at 07:55

## 2023-08-19 RX ADMIN — Medication 400 MG: at 20:06

## 2023-08-19 RX ADMIN — MULTIPLE VITAMINS W/ MINERALS TAB 1 TABLET: TAB at 07:52

## 2023-08-19 RX ADMIN — SODIUM CHLORIDE, PRESERVATIVE FREE 10 ML: 5 INJECTION INTRAVENOUS at 20:07

## 2023-08-19 RX ADMIN — BRIMONIDINE TARTRATE 1 DROP: 2 SOLUTION OPHTHALMIC at 16:27

## 2023-08-19 RX ADMIN — INSULIN LISPRO 0 UNITS: 100 INJECTION, SOLUTION INTRAVENOUS; SUBCUTANEOUS at 20:07

## 2023-08-19 RX ADMIN — DOCUSATE SODIUM 100 MG: 100 CAPSULE, LIQUID FILLED ORAL at 20:06

## 2023-08-19 RX ADMIN — GABAPENTIN 100 MG: 100 CAPSULE ORAL at 07:52

## 2023-08-19 RX ADMIN — BRIMONIDINE TARTRATE 1 DROP: 2 SOLUTION OPHTHALMIC at 20:09

## 2023-08-19 RX ADMIN — DONEPEZIL HYDROCHLORIDE 5 MG: 5 TABLET, FILM COATED ORAL at 20:06

## 2023-08-19 RX ADMIN — DORZOLAMIDE HYDROCHLORIDE 1 DROP: 20 SOLUTION/ DROPS OPHTHALMIC at 07:55

## 2023-08-19 RX ADMIN — Medication 400 MG: at 07:52

## 2023-08-19 RX ADMIN — PIPERACILLIN AND TAZOBACTAM 3375 MG: 3; .375 INJECTION, POWDER, LYOPHILIZED, FOR SOLUTION INTRAVENOUS at 06:32

## 2023-08-19 RX ADMIN — VANCOMYCIN HYDROCHLORIDE 500 MG: 500 INJECTION, POWDER, LYOPHILIZED, FOR SOLUTION INTRAVENOUS at 20:06

## 2023-08-19 RX ADMIN — LOSARTAN POTASSIUM 25 MG: 50 TABLET, FILM COATED ORAL at 07:52

## 2023-08-19 RX ADMIN — ATORVASTATIN CALCIUM 20 MG: 20 TABLET, FILM COATED ORAL at 20:06

## 2023-08-19 ASSESSMENT — ENCOUNTER SYMPTOMS
COUGH: 0
SORE THROAT: 0
RESPIRATORY NEGATIVE: 1
SHORTNESS OF BREATH: 0
EYES NEGATIVE: 1
WHEEZING: 0
NAUSEA: 0
ABDOMINAL PAIN: 0
BACK PAIN: 0
VOMITING: 0
DIARRHEA: 0
ALLERGIC/IMMUNOLOGIC NEGATIVE: 1
RHINORRHEA: 0
TROUBLE SWALLOWING: 0

## 2023-08-19 ASSESSMENT — PAIN DESCRIPTION - ORIENTATION: ORIENTATION: RIGHT

## 2023-08-19 ASSESSMENT — PAIN SCALES - GENERAL: PAINLEVEL_OUTOF10: 3

## 2023-08-19 ASSESSMENT — PAIN DESCRIPTION - LOCATION: LOCATION: FOOT

## 2023-08-19 ASSESSMENT — PAIN DESCRIPTION - DESCRIPTORS: DESCRIPTORS: ACHING

## 2023-08-19 NOTE — CONSULTS
General surgery history and Physical    Patient: Bradley Aguilar  MRN: [id number]    YOB: 1935  Age: 82 y.o. Sex: male     Chief Complaint:  No chief complaint on file. History of Present Illness: Bradley Aguilar is a 82 y.o. very pleasant gentleman currently hospitalized with likely patient I was consulted for right BKA. Patient examined sitting. Patient was comfortable with no fever. Patient previous left AKA. Operative report and the podiatry recommendations reviewed. Patient has dressing intact on the right foot. Social History:  Social Connections: Not on file       Past Medical History:  Past Medical History:   Diagnosis Date    Anemia 4/26/2017    Anxiety     Arrhythmia     A FIB    Arthritis     Atherosclerosis of artery of extremity with rest pain [facility])     Atrial fibrillation ([address]) 7/21/2020    Benign prostatic hyperplasia 4/26/2017    CAD (coronary artery disease)     pacemaker    Cancer ([address]) 2010    GASTRIC    Chronic kidney disease     Chronic obstructive pulmonary disease (HCC)     Depression     Diabetes ([address])     BORDERLINE, NO MEDS. Diverticulosis of colon     Dyslipidemia 4/26/2017    GERD (gastroesophageal reflux disease)     Glaucoma     History of complete heart block     s/p watchman procedure and replaced in 11/2017 by Dr. Job Pozo     History of CVA (cerebrovascular accident) 7/21/2020    Hypercholesteremia     Hypertension     Hypomagnesemia 7/13/2020    Nocturia 4/26/2017    PUD (peptic ulcer disease)     GI BLEEDING    PVD (peripheral vascular disease) ([address])     Rectal hemorrhage 4/26/2017    Strabismus     Stroke ([address]) 2000 APPROX.     SOME VISUAL DEFICIT    Type 2 diabetes mellitus without complications ([address]) 3/65/1387    Venous stasis 4/26/2017     Surgical History:  Past Surgical History:   Procedure Laterality Date    CARDIAC CATHETERIZATION      COLONOSCOPY      FOOT SURGERY Right 5/17/2023    Open Transmetatarsal Amputation Right Foot performed 2 diabetes mellitus with chronic kidney disease ([address])    Mixed hyperlipidemia    Pacemaker    Hypomagnesemia    Stage 3 chronic kidney disease (HCC)    History of CVA (cerebrovascular accident)    Benign prostatic hyperplasia    At high risk for falls    Peripheral vascular disease (HCC)    GERD (gastroesophageal reflux disease)    Chronic constipation    Glaucoma    Hypertension    PVD (peripheral vascular disease) (HCC)    Cellulitis    Sepsis (HCC)    CHF (congestive heart failure) (HCC)    Bradycardia    HTN (hypertension)    Amputation of left lower extremity (HCC)    Chronic systolic (congestive) heart failure (HCC)    Gangrene of foot (HCC)    Bacteriuria with pyuria    Surgical wound present    E. coli UTI    Wound, open       Plans: Patient intraoperative investigation, most likely patient with BKA. I did not examine wound personally. I trenton come back later and examined the wound and make further recommendation.         Osmani Ronquillo MD

## 2023-08-19 NOTE — PLAN OF CARE
Problem: Pain  Goal: Verbalizes/displays adequate comfort level or baseline comfort level  Outcome: Progressing     Problem: Safety - Adult  Goal: Free from fall injury  Outcome: Progressing     Problem: Skin/Tissue Integrity  Goal: Absence of new skin breakdown  Description: 1. Monitor for areas of redness and/or skin breakdown  2. Assess vascular access sites hourly  3. Every 4-6 hours minimum:  Change oxygen saturation probe site  4. Every 4-6 hours:  If on nasal continuous positive airway pressure, respiratory therapy assess nares and determine need for appliance change or resting period.   Outcome: Progressing     Problem: Discharge Planning  Goal: Discharge to home or other facility with appropriate resources  Outcome: Progressing  Flowsheets (Taken 8/19/2023 9910 by Belle Yousif RN)  Discharge to home or other facility with appropriate resources: Identify barriers to discharge with patient and caregiver     Problem: ABCDS Injury Assessment  Goal: Absence of physical injury  Outcome: Progressing

## 2023-08-19 NOTE — CARE COORDINATION
08/19/23 1505   Service Assessment   Patient Orientation Alert and Oriented   Cognition Alert   History Provided By Patient; Child/Family   Primary Caregiver Family   Support Systems Family Members   PCP Verified by CM Yes  (Dr John Clifford)   Last Visit to PCP Within last 3 months   Prior Functional Level Assistance with the following:;Dressing; Bathing; Toileting;Feeding;Cooking;Housework   Current Functional Level Assistance with the following:;Bathing;Dressing;Housework;Cooking;Feeding; Toileting   Can patient return to prior living arrangement Yes   Ability to make needs known: Good   Family able to assist with home care needs: Yes   Would you like for me to discuss the discharge plan with any other family members/significant others, and if so, who? Yes   Financial Resources Medicare   Community Resources None   Social/Functional History   Lives With Other (comment)  (SNF)   Type of [address] Equipment None   Home Equipment None   Receives Help From Other (comment)  (Facility)   Active  No   Mode of Transportation Other  (Medical)   Discharge Planning   Type of Residence [address] [facility] Discharge   [address] Provided? No   Mode of Transport at Discharge Other (see comment)   Confirm Follow Up Transport Other (see comment)  (Medical)   Condition of Participation: Discharge Planning   Freedom of Choice list was provided with basic dialogue that supports the patient's individualized plan of care/goals, treatment preferences, and shares the quality data associated with the providers? No     CM discussed discharge planning with two daughters and patient. Patient will return to [facility] to complete SNF stay. Patient does not want to return to facility. Daughters states  that it is the closest facility to their home. CM offered to seek out other facilities but declined by daughter Hamlet Mixon.  Facility provide moderate to

## 2023-08-19 NOTE — PROGRESS NOTES
Hospitalist Progress Note    NAME:   Merry Aragon   : 1935   MRN: [id number]     Date/Time: 2023 8:47 AM  Patient PCP: ELVI Zhong NP    Estimated discharge date: 48+ hours  Barriers: DKA on Monday    Hospital Course:  Patient is 53-year-old male with past medical history of A-fib with Watchman procedure, CAD with pacemaker, CKD, COPD, brought diabetes, HLD, GERD, CVA and progressed disease present with right foot infection. Patient was scheduled for right foot debridement lateralmost on 2023 and her procedure there was extensive purulent drainage with bone exposure noted. Recommend patient be admitted to hospital for evaluation of DKA. Patient denies any complaints during admission. Patient initiated on IV Vanco and Zosyn. CBC CMP CRP Pro-Dwayne sed rate, MRSA PCR and blood cultures were ordered. Consult placed to infectious disease, podiatry and vascular surgery CMP unremarkable. CBC revealed normocytic anemia with hemoglobin of 7.7 and thrombocythemia with platelets of 379. MRSA PCR positive. Dr. Jose Antonio Vega with general surgery evaluated patient and reports after examination it is a non-salvageable right foot. Discussed with patient and patient agreed for scheduled right BKA on Monday. Will continue Vanco and Zosyn continue to monitor labs. Assessment / Plan:   Right foot infection; history of diabetic ulcers of right foot  -Patient was scheduled for right foot foot debridement today (2023) and during procedure Dr. Reg Curiel discovered extensive purulent drainage with bone exposure. Recommended admission to the hospital for further evaluation for possible BKA  -Afebrile  -Blood cultures pending  -CRP 10.60  -Pro-Dwayne <0.05  -Sed rate >140  -MRSA PCR positive  -continue vanc and Zosyn  -Infectious disease and podiatry consult pending  -Dr. Jose Antonio Vega with neurosurgery to evaluate patient reports after examination it is a nonsalvageable right foot.   Patient scheduled for Abdominal:      General: Abdomen is flat. Palpations: Abdomen is soft. Tenderness: There is no abdominal tenderness. Musculoskeletal:      Cervical back: Normal range of motion. Comments: Left above-the-knee amputation   Skin:     Comments: Right foot dressing   Neurological:      Mental Status: He is alert and oriented to person, place, and time. Reviewed most current lab test results and cultures  YES  Reviewed most current radiology test results   YES  Review and summation of old records today    NO  Reviewed patient's current orders and MAR    YES  PMH/SH reviewed - no change compared to H&P  ________________________________________________________________________  Care Plan discussed with:    Comments   Patient x    Family      RN x    Care Manager     Consultant                        Multidiciplinary team rounds were held today with , nursing, pharmacist and clinical coordinator. Patient's plan of care was discussed; medications were reviewed and discharge planning was addressed. ________________________________________________________________________  Total NON critical care TIME:  35  Minutes    Total CRITICAL CARE TIME Spent:   Minutes non procedure based      Comments   >50% of visit spent in counseling and coordination of care     ________________________________________________________________________  Kendell Gutierrez PA-C     Procedures: see electronic medical records for all procedures/Xrays and details which were not copied into this note but were reviewed prior to creation of Plan. LABS:  I reviewed today's most current labs and imaging studies.   Pertinent labs include:  Recent Labs     08/18/23  1632   WBC 7.4   HGB 7.7*   HCT 27.1*   *     Recent Labs     08/18/23  1632      K 4.1   *   CO2 23   BUN 21*   ALT 14       Signed: Kendell Gutierrez PA-C

## 2023-08-19 NOTE — PROGRESS NOTES
Patient admitted to unit from PACU, post r foot surgery. Patient alert and oriented x3. Patients daughter at bedside. Patient placed on tele M72, JR 61. Two nurse skin assessment complete. Patient incontinent of small bowel movement. IV antibiotics given. IV fluids started on patient.

## 2023-08-19 NOTE — PLAN OF CARE
Problem: Pain  Goal: Verbalizes/displays adequate comfort level or baseline comfort level  Outcome: Progressing     Problem: Safety - Adult  Goal: Free from fall injury  Outcome: Progressing     Problem: Skin/Tissue Integrity  Goal: Absence of new skin breakdown  Description: 1. Monitor for areas of redness and/or skin breakdown  2. Assess vascular access sites hourly  3. Every 4-6 hours minimum:  Change oxygen saturation probe site  4. Every 4-6 hours:  If on nasal continuous positive airway pressure, respiratory therapy assess nares and determine need for appliance change or resting period.   Outcome: Progressing     Problem: Discharge Planning  Goal: Discharge to home or other facility with appropriate resources  Outcome: Progressing  Flowsheets (Taken 8/18/2023 1506 by Tabatha Partida RN)  Discharge to home or other facility with appropriate resources:   Identify barriers to discharge with patient and caregiver   Refer to discharge planning if patient needs post-hospital services based on physician order or complex needs related to functional status, cognitive ability or social support system   Identify discharge learning needs (meds, wound care, etc)     Problem: ABCDS Injury Assessment  Goal: Absence of physical injury  Outcome: Progressing

## 2023-08-19 NOTE — PROGRESS NOTES
growth after 5 hours     CBC with Auto Differential    Collection Time: 08/18/23  4:32 PM   Result Value Ref Range    WBC 7.4 4.1 - 11.1 K/uL    RBC 3.15 (L) 4.10 - 5.70 M/uL    Hemoglobin 7.7 (L) 12.1 - 17.0 g/dL    Hematocrit 27.1 (L) 36.6 - 50.3 %    MCV 86.0 80.0 - 99.0 FL    MCH 24.4 (L) 26.0 - 34.0 PG    MCHC 28.4 (L) 30.0 - 36.5 g/dL    RDW 16.6 (H) 11.5 - 14.5 %    Platelets 178 (H) 693 - 400 K/uL    MPV 9.0 8.9 - 12.9 FL    Nucleated RBCs 0.0 0.0  WBC    nRBC 0.00 0.00 - 0.01 K/uL    Neutrophils % 67 32 - 75 %    Lymphocytes % 20 12 - 49 %    Monocytes % 8 5 - 13 %    Eosinophils % 3 0 - 7 %    Basophils % 1 0 - 1 %    Immature Granulocytes 1 (H) 0 - 0.5 %    Neutrophils Absolute 4.9 1.8 - 8.0 K/UL    Lymphocytes Absolute 1.5 0.8 - 3.5 K/UL    Monocytes Absolute 0.6 0.0 - 1.0 K/UL    Eosinophils Absolute 0.2 0.0 - 0.4 K/UL    Basophils Absolute 0.1 0.0 - 0.1 K/UL    Absolute Immature Granulocyte 0.1 (H) 0.00 - 0.04 K/UL    Differential Type Smear Scanned      RBC Comment Anisocytosis  1+        RBC Comment Hypochromia  1+       Comprehensive Metabolic Panel    Collection Time: 08/18/23  4:32 PM   Result Value Ref Range    Sodium 139 136 - 145 mmol/L    Potassium 4.1 3.5 - 5.1 mmol/L    Chloride 113 (H) 97 - 108 mmol/L    CO2 23 21 - 32 mmol/L    Anion Gap 3 (L) 5 - 15 mmol/L    Glucose 97 65 - 100 mg/dL    BUN 21 (H) 6 - 20 mg/dL    Creatinine 1.10 0.70 - 1.30 mg/dL    Bun/Cre Ratio 19 12 - 20      Est, Glom Filt Rate >60 >60 ml/min/1.73m2    Calcium 8.8 8.5 - 10.1 mg/dL    Total Bilirubin 0.3 0.2 - 1.0 mg/dL    AST 23 15 - 37 U/L    ALT 14 12 - 78 U/L    Alk Phosphatase 93 45 - 117 U/L    Total Protein 7.8 6.4 - 8.2 g/dL    Albumin 2.3 (L) 3.5 - 5.0 g/dL    Globulin 5.5 (H) 2.0 - 4.0 g/dL    Albumin/Globulin Ratio 0.4 (L) 1.1 - 2.2     C-Reactive Protein    Collection Time: 08/18/23  4:32 PM   Result Value Ref Range    CRP 10.60 (H) 0.00 - 0.60 mg/dL   Procalcitonin    Collection Time: 08/18/23 4:32 PM   Result Value Ref Range    Procalcitonin <0.05 (H) 0 ng/mL   Sedimentation Rate    Collection Time: 08/18/23  4:32 PM   Result Value Ref Range    Sed Rate, Automated >140 (H) 0 - 20 mm/hr   POCT Glucose    Collection Time: 08/18/23  4:38 PM   Result Value Ref Range    POC Glucose 110 (H) 65 - 100 mg/dL    Performed by: Madhu Viera    POCT Glucose    Collection Time: 08/18/23  8:19 PM   Result Value Ref Range    POC Glucose 157 (H) 65 - 100 mg/dL    Performed by: Bobetta Ormond        ASSESSMENT:   Patient is 82 y.o. with diagnosis of : Principal Problem:    Wound, open  Active Problems:    Sepsis ([address])  Resolved Problems:    * No resolved hospital problems. *      PLAN:                 Wound examination shows nonsalvageable right foot. I did discuss about amputation in detail. Patient is agreeable. I will schedule for right BKA on Monday.

## 2023-08-19 NOTE — PLAN OF CARE
Problem: Pain  Goal: Verbalizes/displays adequate comfort level or baseline comfort level  Outcome: Progressing     Problem: Safety - Adult  Goal: Free from fall injury  Outcome: Progressing     Problem: Skin/Tissue Integrity  Goal: Absence of new skin breakdown  Description: 1. Monitor for areas of redness and/or skin breakdown  2. Assess vascular access sites hourly  3. Every 4-6 hours minimum:  Change oxygen saturation probe site  4. Every 4-6 hours:  If on nasal continuous positive airway pressure, respiratory therapy assess nares and determine need for appliance change or resting period.   Outcome: Progressing     Problem: Discharge Planning  Goal: Discharge to home or other facility with appropriate resources  8/19/2023 1948 by Robyn Snyder, RN  Outcome: Progressing  8/19/2023 1947 by Robyn Snyder, RN  Outcome: Progressing  Flowsheets (Taken 8/19/2023 8081 by Tg Blackburn, ARIELLA)  Discharge to home or other facility with appropriate resources: Identify barriers to discharge with patient and caregiver     Problem: ABCDS Injury Assessment  Goal: Absence of physical injury  8/19/2023 1948 by Robyn Snyder, RN  Outcome: Progressing  8/19/2023 1947 by Roybn Snyder, RN  Outcome: Progressing

## 2023-08-20 LAB
ACCESSION NUMBER, LLC1M: ABNORMAL
ACINETOBACTER CALCOAC BAUMANNII COMPLEX BY PCR: NOT DETECTED
ALBUMIN SERPL-MCNC: 2.2 G/DL (ref 3.5–5)
ANION GAP SERPL CALC-SCNC: 2 MMOL/L (ref 5–15)
BACTEROIDES FRAGILIS BY PCR: NOT DETECTED
BASOPHILS # BLD: 0 K/UL (ref 0–0.1)
BASOPHILS NFR BLD: 0 % (ref 0–1)
BIOFIRE TEST COMMENT: ABNORMAL
BUN SERPL-MCNC: 21 MG/DL (ref 6–20)
BUN/CREAT SERPL: 17 (ref 12–20)
CA-I BLD-MCNC: 8.6 MG/DL (ref 8.5–10.1)
CANDIDA ALBICANS BY PCR: NOT DETECTED
CANDIDA AURIS BY PCR: NOT DETECTED
CANDIDA GLABRATA: NOT DETECTED
CANDIDA KRUSEI BY PCR: NOT DETECTED
CANDIDA PARAPSILOSIS BY PCR: NOT DETECTED
CANDIDA TROPICALIS BY PCR: NOT DETECTED
CHLORIDE SERPL-SCNC: 112 MMOL/L (ref 97–108)
CO2 SERPL-SCNC: 24 MMOL/L (ref 21–32)
CREAT SERPL-MCNC: 1.24 MG/DL (ref 0.7–1.3)
CRP SERPL-MCNC: 13.5 MG/DL (ref 0–0.6)
CRYPTOCOCCUS NEOFORMANS/GATTII BY PCR: NOT DETECTED
DIFFERENTIAL METHOD BLD: ABNORMAL
ENTEROBACTER CLOACAE COMPLEX BY PCR: NOT DETECTED
ENTEROBACTERALES BY PCR: NOT DETECTED
ENTEROCOCCUS FAECALIS BY PCR: NOT DETECTED
ENTEROCOCCUS FAECIUM BY PCR: NOT DETECTED
EOSINOPHIL # BLD: 0.4 K/UL (ref 0–0.4)
EOSINOPHIL NFR BLD: 4 % (ref 0–7)
ERYTHROCYTE [DISTWIDTH] IN BLOOD BY AUTOMATED COUNT: 16.3 % (ref 11.5–14.5)
ESCHERICHIA COLI: NOT DETECTED
GLUCOSE BLD STRIP.AUTO-MCNC: 104 MG/DL (ref 65–100)
GLUCOSE BLD STRIP.AUTO-MCNC: 133 MG/DL (ref 65–100)
GLUCOSE BLD STRIP.AUTO-MCNC: 148 MG/DL (ref 65–100)
GLUCOSE BLD STRIP.AUTO-MCNC: 150 MG/DL (ref 65–100)
GLUCOSE SERPL-MCNC: 97 MG/DL (ref 65–100)
HAEMOPHILUS INFLUENZAE BY PCR: NOT DETECTED
HCT VFR BLD AUTO: 25 % (ref 36.6–50.3)
HGB BLD-MCNC: 7.3 G/DL (ref 12.1–17)
IMM GRANULOCYTES # BLD AUTO: 0 K/UL (ref 0–0.04)
IMM GRANULOCYTES NFR BLD AUTO: 1 % (ref 0–0.5)
KLEBSIELLA AEROGENES BY PCR: NOT DETECTED
KLEBSIELLA OXYTOCA BY PCR: NOT DETECTED
KLEBSIELLA PNEUMONIAE GROUP BY PCR: NOT DETECTED
LISTERIA MONOCYTOGENES BY PCR: NOT DETECTED
LYMPHOCYTES # BLD: 1 K/UL (ref 0.8–3.5)
LYMPHOCYTES NFR BLD: 12 % (ref 12–49)
MAGNESIUM SERPL-MCNC: 2.1 MG/DL (ref 1.6–2.4)
MCH RBC QN AUTO: 24.3 PG (ref 26–34)
MCHC RBC AUTO-ENTMCNC: 29.2 G/DL (ref 30–36.5)
MCV RBC AUTO: 83.3 FL (ref 80–99)
MONOCYTES # BLD: 0.6 K/UL (ref 0–1)
MONOCYTES NFR BLD: 7 % (ref 5–13)
NEISSERIA MENINGITIDIS BY PCR: NOT DETECTED
NEUTS SEG # BLD: 6.8 K/UL (ref 1.8–8)
NEUTS SEG NFR BLD: 76 % (ref 32–75)
NRBC # BLD: 0 K/UL (ref 0–0.01)
NRBC BLD-RTO: 0 PER 100 WBC
PERFORMED BY:: ABNORMAL
PHOSPHATE SERPL-MCNC: 2.6 MG/DL (ref 2.6–4.7)
PLATELET # BLD AUTO: 524 K/UL (ref 150–400)
PMV BLD AUTO: 9.2 FL (ref 8.9–12.9)
POTASSIUM SERPL-SCNC: 3.8 MMOL/L (ref 3.5–5.1)
PROTEUS BY PCR: NOT DETECTED
PSEUDOMONAS AERUGINOSA, PSAEP: NOT DETECTED
RBC # BLD AUTO: 3 M/UL (ref 4.1–5.7)
RESISTANT GENE MECA/C & MREJ BY PCR: DETECTED
RESISTANT GENE TARGETS: ABNORMAL
SALMONELLA SPECIES BY PCR: NOT DETECTED
SERRATIA MARCESCENS BY PCR: NOT DETECTED
SODIUM SERPL-SCNC: 138 MMOL/L (ref 136–145)
STAPHYLOCOCCUS AUREUS: DETECTED
STAPHYLOCOCCUS EPIDERMIDIS BY PCR: NOT DETECTED
STAPHYLOCOCCUS LUGDUNENSIS BY PCR: NOT DETECTED
STAPHYLOCOCCUS: DETECTED
STENOTROPHOMONAS MALTOPHILIA BY PCR: NOT DETECTED
STREPTOCOCCUS AGALACTIAE (GROUP B): NOT DETECTED
STREPTOCOCCUS PNEUMONIAE , SPNP: NOT DETECTED
STREPTOCOCCUS PYOGENES (GROUP A), SPYOP: NOT DETECTED
STREPTOCOCCUS: NOT DETECTED
WBC # BLD AUTO: 8.9 K/UL (ref 4.1–11.1)

## 2023-08-20 PROCEDURE — 86900 BLOOD TYPING SEROLOGIC ABO: CPT

## 2023-08-20 PROCEDURE — 36415 COLL VENOUS BLD VENIPUNCTURE: CPT

## 2023-08-20 PROCEDURE — 2580000003 HC RX 258: Performed by: STUDENT IN AN ORGANIZED HEALTH CARE EDUCATION/TRAINING PROGRAM

## 2023-08-20 PROCEDURE — 82962 GLUCOSE BLOOD TEST: CPT

## 2023-08-20 PROCEDURE — 6360000002 HC RX W HCPCS: Performed by: STUDENT IN AN ORGANIZED HEALTH CARE EDUCATION/TRAINING PROGRAM

## 2023-08-20 PROCEDURE — 86140 C-REACTIVE PROTEIN: CPT

## 2023-08-20 PROCEDURE — 6370000000 HC RX 637 (ALT 250 FOR IP): Performed by: STUDENT IN AN ORGANIZED HEALTH CARE EDUCATION/TRAINING PROGRAM

## 2023-08-20 PROCEDURE — 86923 COMPATIBILITY TEST ELECTRIC: CPT

## 2023-08-20 PROCEDURE — 99232 SBSQ HOSP IP/OBS MODERATE 35: CPT | Performed by: INTERNAL MEDICINE

## 2023-08-20 PROCEDURE — 86850 RBC ANTIBODY SCREEN: CPT

## 2023-08-20 PROCEDURE — 85025 COMPLETE CBC W/AUTO DIFF WBC: CPT

## 2023-08-20 PROCEDURE — 86901 BLOOD TYPING SEROLOGIC RH(D): CPT

## 2023-08-20 PROCEDURE — 1100000000 HC RM PRIVATE

## 2023-08-20 PROCEDURE — 83735 ASSAY OF MAGNESIUM: CPT

## 2023-08-20 PROCEDURE — 80069 RENAL FUNCTION PANEL: CPT

## 2023-08-20 RX ADMIN — GABAPENTIN 100 MG: 100 CAPSULE ORAL at 08:54

## 2023-08-20 RX ADMIN — LOSARTAN POTASSIUM 25 MG: 50 TABLET, FILM COATED ORAL at 08:54

## 2023-08-20 RX ADMIN — PANTOPRAZOLE SODIUM 40 MG: 40 TABLET, DELAYED RELEASE ORAL at 05:25

## 2023-08-20 RX ADMIN — ACETAMINOPHEN 650 MG: 325 TABLET ORAL at 04:49

## 2023-08-20 RX ADMIN — PIPERACILLIN AND TAZOBACTAM 3375 MG: 3; .375 INJECTION, POWDER, LYOPHILIZED, FOR SOLUTION INTRAVENOUS at 13:57

## 2023-08-20 RX ADMIN — MULTIPLE VITAMINS W/ MINERALS TAB 1 TABLET: TAB at 08:53

## 2023-08-20 RX ADMIN — DOCUSATE SODIUM 100 MG: 100 CAPSULE, LIQUID FILLED ORAL at 08:53

## 2023-08-20 RX ADMIN — FERROUS SULFATE TAB 325 MG (65 MG ELEMENTAL FE) 325 MG: 325 (65 FE) TAB at 08:53

## 2023-08-20 RX ADMIN — PIPERACILLIN AND TAZOBACTAM 3375 MG: 3; .375 INJECTION, POWDER, LYOPHILIZED, FOR SOLUTION INTRAVENOUS at 22:31

## 2023-08-20 RX ADMIN — LATANOPROST 1 DROP: 50 SOLUTION OPHTHALMIC at 20:25

## 2023-08-20 RX ADMIN — DOCUSATE SODIUM 100 MG: 100 CAPSULE, LIQUID FILLED ORAL at 20:20

## 2023-08-20 RX ADMIN — TIMOLOL MALEATE 1 DROP: 5 SOLUTION OPHTHALMIC at 20:27

## 2023-08-20 RX ADMIN — DORZOLAMIDE HYDROCHLORIDE 1 DROP: 20 SOLUTION/ DROPS OPHTHALMIC at 20:27

## 2023-08-20 RX ADMIN — PIPERACILLIN AND TAZOBACTAM 3375 MG: 3; .375 INJECTION, POWDER, LYOPHILIZED, FOR SOLUTION INTRAVENOUS at 06:33

## 2023-08-20 RX ADMIN — VANCOMYCIN HYDROCHLORIDE 500 MG: 500 INJECTION, POWDER, LYOPHILIZED, FOR SOLUTION INTRAVENOUS at 04:49

## 2023-08-20 RX ADMIN — BRIMONIDINE TARTRATE 1 DROP: 2 SOLUTION OPHTHALMIC at 13:59

## 2023-08-20 RX ADMIN — DORZOLAMIDE HYDROCHLORIDE 1 DROP: 20 SOLUTION/ DROPS OPHTHALMIC at 08:56

## 2023-08-20 RX ADMIN — ATORVASTATIN CALCIUM 20 MG: 20 TABLET, FILM COATED ORAL at 20:20

## 2023-08-20 RX ADMIN — SODIUM CHLORIDE, PRESERVATIVE FREE 10 ML: 5 INJECTION INTRAVENOUS at 20:21

## 2023-08-20 RX ADMIN — BRIMONIDINE TARTRATE 1 DROP: 2 SOLUTION OPHTHALMIC at 20:27

## 2023-08-20 RX ADMIN — TIMOLOL MALEATE 1 DROP: 5 SOLUTION OPHTHALMIC at 08:56

## 2023-08-20 RX ADMIN — ASPIRIN 81 MG: 81 TABLET, COATED ORAL at 08:53

## 2023-08-20 RX ADMIN — BRIMONIDINE TARTRATE 1 DROP: 2 SOLUTION OPHTHALMIC at 08:55

## 2023-08-20 RX ADMIN — Medication 400 MG: at 20:20

## 2023-08-20 RX ADMIN — INSULIN LISPRO 0 UNITS: 100 INJECTION, SOLUTION INTRAVENOUS; SUBCUTANEOUS at 20:21

## 2023-08-20 RX ADMIN — DONEPEZIL HYDROCHLORIDE 5 MG: 5 TABLET, FILM COATED ORAL at 20:20

## 2023-08-20 RX ADMIN — Medication 400 MG: at 08:52

## 2023-08-20 RX ADMIN — GABAPENTIN 100 MG: 100 CAPSULE ORAL at 20:20

## 2023-08-20 RX ADMIN — ACETAMINOPHEN 650 MG: 325 TABLET ORAL at 20:20

## 2023-08-20 RX ADMIN — AMLODIPINE BESYLATE 10 MG: 5 TABLET ORAL at 08:52

## 2023-08-20 ASSESSMENT — ENCOUNTER SYMPTOMS
RHINORRHEA: 0
DIARRHEA: 0
SHORTNESS OF BREATH: 0
ALLERGIC/IMMUNOLOGIC NEGATIVE: 1
ABDOMINAL PAIN: 0
NAUSEA: 0
TROUBLE SWALLOWING: 0
COUGH: 0
EYES NEGATIVE: 1
SORE THROAT: 0
VOMITING: 0
BACK PAIN: 0
WHEEZING: 0

## 2023-08-20 ASSESSMENT — PAIN SCALES - GENERAL
PAINLEVEL_OUTOF10: 0
PAINLEVEL_OUTOF10: 3
PAINLEVEL_OUTOF10: 0
PAINLEVEL_OUTOF10: 3

## 2023-08-20 ASSESSMENT — PAIN DESCRIPTION - DESCRIPTORS
DESCRIPTORS: ACHING
DESCRIPTORS: ACHING

## 2023-08-20 ASSESSMENT — PAIN DESCRIPTION - ORIENTATION
ORIENTATION: RIGHT
ORIENTATION: RIGHT

## 2023-08-20 ASSESSMENT — PAIN DESCRIPTION - LOCATION
LOCATION: FOOT
LOCATION: FOOT

## 2023-08-21 LAB
ALBUMIN SERPL-MCNC: 2 G/DL (ref 3.5–5)
ANION GAP SERPL CALC-SCNC: 6 MMOL/L (ref 5–15)
BASOPHILS # BLD: 0 K/UL (ref 0–0.1)
BASOPHILS NFR BLD: 0 % (ref 0–1)
BUN SERPL-MCNC: 20 MG/DL (ref 6–20)
BUN/CREAT SERPL: 16 (ref 12–20)
CA-I BLD-MCNC: 8.4 MG/DL (ref 8.5–10.1)
CHLORIDE SERPL-SCNC: 111 MMOL/L (ref 97–108)
CO2 SERPL-SCNC: 23 MMOL/L (ref 21–32)
CREAT SERPL-MCNC: 1.22 MG/DL (ref 0.7–1.3)
CRP SERPL-MCNC: 1.2 MG/DL (ref 0–0.6)
DIFFERENTIAL METHOD BLD: ABNORMAL
EOSINOPHIL # BLD: 0.3 K/UL (ref 0–0.4)
EOSINOPHIL NFR BLD: 4 % (ref 0–7)
ERYTHROCYTE [DISTWIDTH] IN BLOOD BY AUTOMATED COUNT: 16.5 % (ref 11.5–14.5)
GLUCOSE BLD STRIP.AUTO-MCNC: 104 MG/DL (ref 65–100)
GLUCOSE BLD STRIP.AUTO-MCNC: 109 MG/DL (ref 65–100)
GLUCOSE BLD STRIP.AUTO-MCNC: 121 MG/DL (ref 65–100)
GLUCOSE BLD STRIP.AUTO-MCNC: 149 MG/DL (ref 65–100)
GLUCOSE SERPL-MCNC: 93 MG/DL (ref 65–100)
HCT VFR BLD AUTO: 25.5 % (ref 36.6–50.3)
HCT VFR BLD AUTO: 26.3 % (ref 36.6–50.3)
HGB BLD-MCNC: 7.3 G/DL (ref 12.1–17)
HGB BLD-MCNC: 7.5 G/DL (ref 12.1–17)
IMM GRANULOCYTES # BLD AUTO: 0 K/UL (ref 0–0.04)
IMM GRANULOCYTES NFR BLD AUTO: 1 % (ref 0–0.5)
LYMPHOCYTES # BLD: 0.9 K/UL (ref 0.8–3.5)
LYMPHOCYTES NFR BLD: 13 % (ref 12–49)
MCH RBC QN AUTO: 24.1 PG (ref 26–34)
MCHC RBC AUTO-ENTMCNC: 28.5 G/DL (ref 30–36.5)
MCV RBC AUTO: 84.6 FL (ref 80–99)
MONOCYTES # BLD: 0.5 K/UL (ref 0–1)
MONOCYTES NFR BLD: 6 % (ref 5–13)
NEUTS SEG # BLD: 5.6 K/UL (ref 1.8–8)
NEUTS SEG NFR BLD: 76 % (ref 32–75)
NRBC # BLD: 0 K/UL (ref 0–0.01)
NRBC BLD-RTO: 0 PER 100 WBC
PERFORMED BY:: ABNORMAL
PHOSPHATE SERPL-MCNC: 2.6 MG/DL (ref 2.6–4.7)
PLATELET # BLD AUTO: 589 K/UL (ref 150–400)
PMV BLD AUTO: 9.3 FL (ref 8.9–12.9)
POTASSIUM SERPL-SCNC: 3.4 MMOL/L (ref 3.5–5.1)
PROCALCITONIN SERPL-MCNC: <0.05 NG/ML
RBC # BLD AUTO: 3.11 M/UL (ref 4.1–5.7)
SODIUM SERPL-SCNC: 140 MMOL/L (ref 136–145)
WBC # BLD AUTO: 7.4 K/UL (ref 4.1–11.1)

## 2023-08-21 PROCEDURE — 6370000000 HC RX 637 (ALT 250 FOR IP): Performed by: PHYSICIAN ASSISTANT

## 2023-08-21 PROCEDURE — 85018 HEMOGLOBIN: CPT

## 2023-08-21 PROCEDURE — 84145 PROCALCITONIN (PCT): CPT

## 2023-08-21 PROCEDURE — 99232 SBSQ HOSP IP/OBS MODERATE 35: CPT | Performed by: SURGERY

## 2023-08-21 PROCEDURE — 82962 GLUCOSE BLOOD TEST: CPT

## 2023-08-21 PROCEDURE — 2580000003 HC RX 258: Performed by: STUDENT IN AN ORGANIZED HEALTH CARE EDUCATION/TRAINING PROGRAM

## 2023-08-21 PROCEDURE — 1100000000 HC RM PRIVATE

## 2023-08-21 PROCEDURE — 86140 C-REACTIVE PROTEIN: CPT

## 2023-08-21 PROCEDURE — 85014 HEMATOCRIT: CPT

## 2023-08-21 PROCEDURE — P9016 RBC LEUKOCYTES REDUCED: HCPCS

## 2023-08-21 PROCEDURE — 36415 COLL VENOUS BLD VENIPUNCTURE: CPT

## 2023-08-21 PROCEDURE — 85025 COMPLETE CBC W/AUTO DIFF WBC: CPT

## 2023-08-21 PROCEDURE — 2580000003 HC RX 258: Performed by: INTERNAL MEDICINE

## 2023-08-21 PROCEDURE — 6360000002 HC RX W HCPCS: Performed by: INTERNAL MEDICINE

## 2023-08-21 PROCEDURE — 6360000002 HC RX W HCPCS: Performed by: STUDENT IN AN ORGANIZED HEALTH CARE EDUCATION/TRAINING PROGRAM

## 2023-08-21 PROCEDURE — 80069 RENAL FUNCTION PANEL: CPT

## 2023-08-21 PROCEDURE — 6370000000 HC RX 637 (ALT 250 FOR IP): Performed by: STUDENT IN AN ORGANIZED HEALTH CARE EDUCATION/TRAINING PROGRAM

## 2023-08-21 RX ORDER — POTASSIUM CHLORIDE 20 MEQ/1
20 TABLET, EXTENDED RELEASE ORAL ONCE
Status: COMPLETED | OUTPATIENT
Start: 2023-08-21 | End: 2023-08-21

## 2023-08-21 RX ORDER — SODIUM CHLORIDE 9 MG/ML
INJECTION, SOLUTION INTRAVENOUS PRN
Status: DISCONTINUED | OUTPATIENT
Start: 2023-08-21 | End: 2023-08-25 | Stop reason: HOSPADM

## 2023-08-21 RX ADMIN — POTASSIUM CHLORIDE 20 MEQ: 1500 TABLET, EXTENDED RELEASE ORAL at 15:51

## 2023-08-21 RX ADMIN — BRIMONIDINE TARTRATE 1 DROP: 2 SOLUTION OPHTHALMIC at 10:38

## 2023-08-21 RX ADMIN — DORZOLAMIDE HYDROCHLORIDE 1 DROP: 20 SOLUTION/ DROPS OPHTHALMIC at 21:59

## 2023-08-21 RX ADMIN — SODIUM CHLORIDE, PRESERVATIVE FREE 10 ML: 5 INJECTION INTRAVENOUS at 22:02

## 2023-08-21 RX ADMIN — GABAPENTIN 100 MG: 100 CAPSULE ORAL at 21:59

## 2023-08-21 RX ADMIN — LATANOPROST 1 DROP: 50 SOLUTION OPHTHALMIC at 21:59

## 2023-08-21 RX ADMIN — ATORVASTATIN CALCIUM 20 MG: 20 TABLET, FILM COATED ORAL at 21:59

## 2023-08-21 RX ADMIN — FERROUS SULFATE TAB 325 MG (65 MG ELEMENTAL FE) 325 MG: 325 (65 FE) TAB at 10:32

## 2023-08-21 RX ADMIN — VANCOMYCIN HYDROCHLORIDE 500 MG: 500 INJECTION, POWDER, LYOPHILIZED, FOR SOLUTION INTRAVENOUS at 22:25

## 2023-08-21 RX ADMIN — GABAPENTIN 100 MG: 100 CAPSULE ORAL at 10:32

## 2023-08-21 RX ADMIN — DOCUSATE SODIUM 100 MG: 100 CAPSULE, LIQUID FILLED ORAL at 21:59

## 2023-08-21 RX ADMIN — PIPERACILLIN AND TAZOBACTAM 3375 MG: 3; .375 INJECTION, POWDER, LYOPHILIZED, FOR SOLUTION INTRAVENOUS at 06:30

## 2023-08-21 RX ADMIN — AMLODIPINE BESYLATE 10 MG: 5 TABLET ORAL at 10:32

## 2023-08-21 RX ADMIN — SODIUM CHLORIDE, PRESERVATIVE FREE 10 ML: 5 INJECTION INTRAVENOUS at 10:39

## 2023-08-21 RX ADMIN — SODIUM CHLORIDE, PRESERVATIVE FREE 10 ML: 5 INJECTION INTRAVENOUS at 10:41

## 2023-08-21 RX ADMIN — ACETAMINOPHEN 650 MG: 325 TABLET ORAL at 05:17

## 2023-08-21 RX ADMIN — BRIMONIDINE TARTRATE 1 DROP: 2 SOLUTION OPHTHALMIC at 22:00

## 2023-08-21 RX ADMIN — SODIUM CHLORIDE, PRESERVATIVE FREE 10 ML: 5 INJECTION INTRAVENOUS at 22:00

## 2023-08-21 RX ADMIN — DONEPEZIL HYDROCHLORIDE 5 MG: 5 TABLET, FILM COATED ORAL at 21:59

## 2023-08-21 RX ADMIN — Medication 400 MG: at 10:32

## 2023-08-21 RX ADMIN — LOSARTAN POTASSIUM 25 MG: 50 TABLET, FILM COATED ORAL at 10:31

## 2023-08-21 RX ADMIN — PIPERACILLIN AND TAZOBACTAM 3375 MG: 3; .375 INJECTION, POWDER, LYOPHILIZED, FOR SOLUTION INTRAVENOUS at 15:51

## 2023-08-21 RX ADMIN — TIMOLOL MALEATE 1 DROP: 5 SOLUTION OPHTHALMIC at 21:59

## 2023-08-21 RX ADMIN — TIMOLOL MALEATE 1 DROP: 5 SOLUTION OPHTHALMIC at 10:36

## 2023-08-21 RX ADMIN — DORZOLAMIDE HYDROCHLORIDE 1 DROP: 20 SOLUTION/ DROPS OPHTHALMIC at 10:37

## 2023-08-21 RX ADMIN — Medication 400 MG: at 21:59

## 2023-08-21 RX ADMIN — PIPERACILLIN AND TAZOBACTAM 3375 MG: 3; .375 INJECTION, POWDER, LYOPHILIZED, FOR SOLUTION INTRAVENOUS at 23:44

## 2023-08-21 RX ADMIN — PANTOPRAZOLE SODIUM 40 MG: 40 TABLET, DELAYED RELEASE ORAL at 05:17

## 2023-08-21 ASSESSMENT — PAIN DESCRIPTION - DESCRIPTORS: DESCRIPTORS: ACHING

## 2023-08-21 ASSESSMENT — PAIN SCALES - GENERAL
PAINLEVEL_OUTOF10: 3
PAINLEVEL_OUTOF10: 0

## 2023-08-21 ASSESSMENT — PAIN DESCRIPTION - LOCATION: LOCATION: FOOT

## 2023-08-21 ASSESSMENT — PAIN DESCRIPTION - ORIENTATION: ORIENTATION: RIGHT

## 2023-08-21 NOTE — CARE COORDINATION
Chart reviewed, DCP remains for patient to d/c from  to [facility] once medically stable, updates sent via careport to facility. Patient will not require insurance auth prior to d/c.     continues to follow and monitor for needs.

## 2023-08-21 NOTE — PROGRESS NOTES
Notified primary attending through [facility]:  Note says to transfuse blood below 7 currently patient HEM is 7.3. OR on unit to  patient. Provider says to transfuse once patient returns to unit if not completed in OR. Notified OR nurse that the patient had a stat order of blood PACU nurse stated that the blood was released and patient will receive blood in PACU. However patient will be coming back to the floor and procedure will be done on 8/22  PACU nurse stated that the anesthesiologist was not comfortable putting him under at that blood level. Surgeon notified by OR staff. Primary attending notified by this nurse. Patient will return to the unit and will have surgery on 08/22. Patient may resume current plan of care at this time.

## 2023-08-21 NOTE — PROGRESS NOTES
Hospitalist Progress Note    Subjective:   Daily Progress Note: 8/21/2023 10:01 AM    Hospital Course: Patient is 82-year-old male with past medical history of A-fib with Watchman procedure, CAD with pacemaker, CKD, COPD, brought diabetes, HLD, GERD, CVA and progressed disease present with right foot infection. Patient was scheduled for right foot debridement lateralmost on 8/18/2023 and her procedure there was extensive purulent drainage with bone exposure noted. Recommend patient be admitted to hospital for evaluation of DKA. Patient denies any complaints during admission. Patient initiated on IV Vanco and Zosyn. CBC CMP CRP Pro-Dwayne sed rate, MRSA PCR and blood cultures were ordered. Consult placed to infectious disease, podiatry and vascular surgery CMP unremarkable. CBC revealed normocytic anemia with hemoglobin of 7.7 and thrombocythemia with platelets of 005. MRSA PCR positive. Dr. Karin Johnson with general surgery evaluated patient and reports after examination it is a non-salvageable right foot. Discussed with patient and patient agreed for scheduled right BKA on Monday. Dr. Francine Ashley evaluate patient and agrees with continued IV antibiotics. Continue vancomycin and Zosyn. ID consulted      Subjective/Chief Complaint:Daughter in the room.  Denies any pain, nausea, vomiting, diarrhea    Current Facility-Administered Medications   Medication Dose Route Frequency    0.9 % sodium chloride infusion   IntraVENous Continuous    piperacillin-tazobactam (ZOSYN) 3,375 mg in sodium chloride 0.9 % 50 mL IVPB (mini-bag)  3,375 mg IntraVENous q8h    sodium chloride flush 0.9 % injection 5-40 mL  5-40 mL IntraVENous 2 times per day    sodium chloride flush 0.9 % injection 5-40 mL  5-40 mL IntraVENous PRN    0.9 % sodium chloride infusion   IntraVENous PRN    acetaminophen (TYLENOL) tablet 650 mg  650 mg Oral Q6H PRN    Or    acetaminophen (TYLENOL) suppository 650 mg  650 mg Rectal Q6H PRN    glucose chewable tablet 16 discussed management plan with patient/family and with nursing staff. Assessment:   1. Diabetic right foot ulcers complicated due to uncontrolled type 2 diabetes  2. Anemia/thrombocytopenia  3. Type 2 diabetes  4. Atrial fibrillation, secondary hypercoagulable state  5. CAD  6. Hypertension  7. Hyperlipidemia  8. CVA  9. GERD  10.  Glaucoma    Plan:   1. Wound cultures positive for Staphylococcus aureus. ID consulted. Patient went to see podiatry on 8/8/2023 and during debridement had extensive purulent drainage with bone exposure. Patient is scheduled for a right BKA by vascular surgery today. ID consulted. On IV Zosyn. Inflammatory markers in a.m. blood cultures pending  2. Hemoglobin 7.5 today. Platelet count 089. Most likely due to inflammatory response. Suspect patient may require blood transfusion in the a.m. after procedure. We will continue to monitor closely. Holding Eliquis for procedure. Transfuse if hemoglobin comes less than 7 or hemodynamically unstable  3. Glucose levels are stable. Accu-Chek high of 150 in the last 24 hours. Patient on insulin sliding scale  4. Heart rate well controlled. Holding Eliquis due to procedure. 5.  Patient on aspirin and statin  6 for blood pressure stable. Continue to monitor per unit protocol. On Norvasc 10 mg daily. 7.  On statin  8. Aspirin and statin  9. Protonix 4 mg daily  10. Continue eyedrops  11. CBC BMP in a.m. Dispo: 48 hours. Barriers include pos-op clearance and outpatient antibiotic regimen. Suspect will need placement. MDM  Reviewed: previous chart, nursing note and vitals  Reviewed previous: labs  Interpretation: labs  Consults: ID and Surgery. CODE STATUS Full     VTE prophylaxis: Hold due to surgery. Ulcer prophylaxis: Protonix    Care Plan discussed with: Patient/Family, Nurse, and     Total time spent with patient: 36 minutes.

## 2023-08-21 NOTE — PROGRESS NOTES
Physician Progress Note      PATIENTDenita Acuña  CSN #:                  [id number]  :                       1935  ADMIT DATE:       2023 9:40 AM  DISCH DATE:  Pierre Morales  PROVIDER #:        Neil Marshall PA-C          QUERY TEXT:    Pt admitted with foot infection. Noted documentation of sepsis on  by   ordered Infectious Disease consultant. If possible, please document in   progress notes and discharge summary:      The medical record reflects the following:  Risk Factors: 81 yo male with diabetic foot ulcer, anemia, atrial fibrillation  Clinical Indicators:  CRP 13.5  Temp 99.4  RR 26  Treatment: IV Zosyn    Thank you,  Deann Mi RN, CCDS  Options provided:  -- Sepsis confirmed present on admission  -- Sepsis ruled out  -- Other - I will add my own diagnosis  -- Disagree - Not applicable / Not valid  -- Disagree - Clinically unable to determine / Unknown  -- Refer to Clinical Documentation Reviewer    PROVIDER RESPONSE TEXT:    The diagnosis of sepsis was ruled out.     Query created by: Carlyon Lombard on 2023 2:33 PM      Electronically signed by:  Neil Marshall PA-C 2023 2:49 PM

## 2023-08-21 NOTE — PLAN OF CARE
Problem: Pain  Goal: Verbalizes/displays adequate comfort level or baseline comfort level  Outcome: Progressing     Problem: Safety - Adult  Goal: Free from fall injury  Outcome: Progressing     Problem: Skin/Tissue Integrity  Goal: Absence of new skin breakdown  Outcome: Progressing     Problem: Discharge Planning  Goal: Discharge to home or other facility with appropriate resources  Outcome: Progressing     Problem: ABCDS Injury Assessment  Goal: Absence of physical injury  Outcome: Progressing

## 2023-08-21 NOTE — PROGRESS NOTES
Amenorrhea     Normally, the uterine lining builds up and is shed each month during a womans period. With amenorrhea, this process does not happen.   Menstruation occurs when a woman sheds the lining of her uterus (womb). It is also called a period. For most women, this happens once a month. But some women may not get their periods. This is called amenorrhea.  Amenorrhea may be due to a number of causes. These include:  · Pregnancy, breastfeeding, or menopause  · Hormone problems  · Emotional stress  · Being at too low body weight  · Exercising too much  · Poor nutrition or eating disorders  · Taking certain medicines  · Having certain birth defects or genetic disorders  There are 2 types of amenorrhea:  · Primary amenorrhea is when a girl has not had her first period by age 15.  · Secondary amenorrhea is when:  ¨ A girl or woman who has been having normal periods stops getting them for 3 months in a row  ¨ A girl or woman who has been having irregular periods stops getting them for 6 months in a row  One or more tests can be done to find out why youre not having periods. These include blood tests and imaging tests. Once the cause is found, it can be treated. Treatments can range from lifestyle and diet changes to medicines, procedures, or surgery. Your healthcare provider will discuss options with you as needed.  Follow-up care  Follow up with your healthcare provider as advised. You'll be told the results of any tests as soon as they are ready.   Note: Know that you can still become pregnant even if you are not having regular periods. It is important to use some form of birth control if you are sexually active and do not wish to become pregnant.   When to seek medical advice  Call your healthcare provider right way if any of these occur:  · Severe pain in the abdomen (belly)  · Swelling of the belly  · Sudden, severe vaginal bleeding  · Feeling faint or passing out  Date Last Reviewed: 6/11/2015  © 5381-1722  @3815 Patient transferred to [facility] for RBKA procedure. [employer]. [address] [place] [address], [place] [address]. All rights reserved. This information is not intended as a substitute for professional medical care. Always follow your healthcare professional's instructions.        Treating Constipation    Constipation is a common and often uncomfortable problem. Constipation means you have bowel movements fewer than 3 times per week, or strain to pass hard, dry stool. It can last a short time. Or it can be a problem that never seems to go away. The good news is that it can often be treated and controlled.  Eat more fiber  One of the best ways to help treat constipation is to increase your fiber intake. You can do this either through diet or by using fiber supplements. Fiber (in whole grains, fruits, and vegetables) adds bulk and absorbs water to soften the stool. This helps the stool pass through the colon more easily. When you increase your fiber intake, do it slowly to avoid side effects such as bloating. Also increase the amount of water that you drink. Eating more of the following foods can add fiber to your diet.  · High-fiber cereals  · Whole grains, bran, and brown rice  · Vegetables such as carrots, broccoli, and greens  · Fresh fruits (especially apples, pears, and dried fruits like raisins and apricots)  · Nuts and legumes (especially beans such as lentils, kidney beans, and lima beans)  Get physically active  Exercise helps improve the working of your colon which helps ease constipation. Try to get some physical activity every day. If you havent been active for a while, talk to your healthcare provider before starting again.  Laxatives  Your healthcare provider may suggest an over-the-counter product to help ease your constipation. He or she may suggest the use of bulk-forming agents or laxatives. The use of laxatives, if used as directed, is common and safe. Follow directions carefully when using them. See your healthcare provider for new-onset  constipation, or long-term constipation, to rule out other causes such as medicines or thyroid disease.  Date Last Reviewed: 7/1/2016  © 0722-4854 [employer], [employer]. [address] [place] [address], [place] [address]. All rights reserved. This information is not intended as a substitute for professional medical care. Always follow your healthcare professional's instructions.        Constipation (Adult)  Constipation means that you have bowel movements that are less frequent than usual. Stools often become very hard and difficult to pass.  Constipation is very common. At some point in life it affects almost everyone. Since everyone's bowel habits are different, what is constipation to one person may not be to another. Your healthcare provider may do tests to diagnose constipation. It depends on what he or she finds when evaluating you.    Symptoms of constipation include:  · Abdominal pain  · Bloating  · Vomiting  · Painful bowel movements  · Itching, swelling, bleeding, or pain around the anus  Causes  Constipation can have many causes. These include:  · Diet low in fiber  · Too much dairy  · Not drinking enough liquids  · Lack of exercise or physical activity. This is especially true for older adults.  · Changes in lifestyle or daily routine, including pregnancy, aging, work, and travel  · Frequent use or misuse of laxatives  · Ignoring the urge to have a bowel movement or delaying it until later  · Medicines, such as certain prescription pain medicines, iron supplements, antacids, certain antidepressants, and calcium supplements  · Diseases like irritable bowel syndrome, bowel obstructions, stroke, diabetes, thyroid disease, Parkinson disease, hemorrhoids, and colon cancer  Complications  Potential complications of constipation can include:  · Hemorrhoids  · Rectal bleeding from hemorrhoids or anal fissures (skin tears)  · Hernias  · Dependency on laxatives  · Chronic constipation  · Fecal impaction  · Bowel  obstruction or perforation  Home care  All treatment should be done after talking with your healthcare provider. This is especially true if you have another medical problems, are taking prescription medicines, or are an older adult. Treatment most often involves lifestyle changes. You may also need medicines. Your healthcare provider will tell you which will work best for you. Follow the advice below to help avoid this problem in the future.  Lifestyle changes  These lifestyle changes can help prevent constipation:  · Diet. Eat a high-fiber diet, with fresh fruit and vegetables, and reduce dairy intake, meats, and processed foods  · Fluids. It's important to get enough fluids each day. Drink plenty of water when you eat more fiber. If you are on diet that limits the amount of fluid you can have, talk about this with your healthcare provider.  · Regular exercise. Check with your healthcare provider first.  Medications  Take any medicines as directed. Some laxatives are safe to use only every now and then. Others can be taken on a regular basis. Talk with your doctor or pharmacist if you have questions.  Prescription pain medicines can cause constipation. If you are taking this kind of medicine, ask your healthcare provider if you should also take a stool softener.  Medicines you may take to treat constipation include:  · Fiber supplements  · Stool softeners  · Laxatives  · Enemas  · Rectal suppositories  Follow-up care  Follow up with your healthcare provider if symptoms don't get better in the next few days. You may need to have more tests or see a specialist.  Call 911  Call 911 if any of these occur:  · Trouble breathing  · Stiff, rigid abdomen that is severely painful to touch  · Confusion  · Fainting or loss of consciousness  · Rapid heart rate  · Chest pain  When to seek medical advice  Call your healthcare provider right away if any of these occur:  · Fever over 100.4°F (38°C)  · Failure to resume normal bowel  movements  · Pain in your abdomen or back gets worse  · Nausea or vomiting  · Swelling in your abdomen  · Blood in the stool  · Black, tarry stool  · Involuntary weight loss  · Weakness  Date Last Reviewed: 12/30/2015  © 4645-4025 [employer]. [address] [place] [address], [place] [address]. All rights reserved. This information is not intended as a substitute for professional medical care. Always follow your healthcare professional's instructions.

## 2023-08-22 ENCOUNTER — ANESTHESIA (OUTPATIENT)
Facility: HOSPITAL | Age: 88
End: 2023-08-22
Payer: MEDICARE

## 2023-08-22 ENCOUNTER — ANESTHESIA EVENT (OUTPATIENT)
Facility: HOSPITAL | Age: 88
End: 2023-08-22
Payer: MEDICARE

## 2023-08-22 ENCOUNTER — APPOINTMENT (OUTPATIENT)
Facility: HOSPITAL | Age: 88
DRG: 240 | End: 2023-08-22
Attending: INTERNAL MEDICINE
Payer: MEDICARE

## 2023-08-22 PROBLEM — S88.111A BELOW-KNEE AMPUTATION OF RIGHT LOWER EXTREMITY (HCC): Status: ACTIVE | Noted: 2023-08-22

## 2023-08-22 PROBLEM — B95.61 BACTEREMIA DUE TO STAPHYLOCOCCUS AUREUS: Status: ACTIVE | Noted: 2023-08-22

## 2023-08-22 PROBLEM — R78.81 BACTEREMIA DUE TO STAPHYLOCOCCUS AUREUS: Status: ACTIVE | Noted: 2023-08-22

## 2023-08-22 LAB
ABO + RH BLD: NORMAL
ALBUMIN SERPL-MCNC: 2.1 G/DL (ref 3.5–5)
ALBUMIN/GLOB SERPL: 0.4 (ref 1.1–2.2)
ALP SERPL-CCNC: 71 U/L (ref 45–117)
ALT SERPL-CCNC: 11 U/L (ref 12–78)
ANION GAP SERPL CALC-SCNC: 4 MMOL/L (ref 5–15)
ANION GAP SERPL CALC-SCNC: 5 MMOL/L (ref 5–15)
AST SERPL W P-5'-P-CCNC: 21 U/L (ref 15–37)
BACTERIA SPEC CULT: NORMAL
BACTERIA SPEC CULT: NORMAL
BASOPHILS # BLD: 0 K/UL (ref 0–0.1)
BASOPHILS NFR BLD: 0 % (ref 0–1)
BILIRUB SERPL-MCNC: 0.6 MG/DL (ref 0.2–1)
BLD PROD TYP BPU: NORMAL
BLD PROD TYP BPU: NORMAL
BLOOD BANK DISPENSE STATUS: NORMAL
BLOOD BANK DISPENSE STATUS: NORMAL
BLOOD GROUP ANTIBODIES SERPL: NEGATIVE
BPU ID: NORMAL
BPU ID: NORMAL
BUN SERPL-MCNC: 17 MG/DL (ref 6–20)
BUN SERPL-MCNC: 17 MG/DL (ref 6–20)
BUN/CREAT SERPL: 15 (ref 12–20)
BUN/CREAT SERPL: 15 (ref 12–20)
CA-I BLD-MCNC: 8.6 MG/DL (ref 8.5–10.1)
CA-I BLD-MCNC: 8.8 MG/DL (ref 8.5–10.1)
CHLORIDE SERPL-SCNC: 112 MMOL/L (ref 97–108)
CHLORIDE SERPL-SCNC: 113 MMOL/L (ref 97–108)
CO2 SERPL-SCNC: 24 MMOL/L (ref 21–32)
CO2 SERPL-SCNC: 24 MMOL/L (ref 21–32)
CREAT SERPL-MCNC: 1.17 MG/DL (ref 0.7–1.3)
CREAT SERPL-MCNC: 1.17 MG/DL (ref 0.7–1.3)
CROSSMATCH RESULT: NORMAL
CROSSMATCH RESULT: NORMAL
CRP SERPL-MCNC: 7.05 MG/DL (ref 0–0.6)
DIFFERENTIAL METHOD BLD: ABNORMAL
ECHO AO ASC DIAM: 3.3 CM
ECHO AO ASCENDING AORTA INDEX: 1.71 CM/M2
ECHO AO ROOT DIAM: 2.9 CM
ECHO AO ROOT INDEX: 1.5 CM/M2
ECHO AR MAX VEL PISA: 3.7 M/S
ECHO AV AREA PEAK VELOCITY: 1.3 CM2
ECHO AV AREA VTI: 1.3 CM2
ECHO AV AREA/BSA PEAK VELOCITY: 0.7 CM2/M2
ECHO AV AREA/BSA VTI: 0.7 CM2/M2
ECHO AV MEAN GRADIENT: 24 MMHG
ECHO AV MEAN VELOCITY: 2.2 M/S
ECHO AV PEAK GRADIENT: 42 MMHG
ECHO AV PEAK VELOCITY: 3.2 M/S
ECHO AV REGURGITANT PHT: 638 MS
ECHO AV VELOCITY RATIO: 0.38
ECHO AV VTI: 64.7 CM
ECHO BSA: 1.97 M2
ECHO LA AREA 2C: 16.1 CM2
ECHO LA AREA 4C: 14.5 CM2
ECHO LA DIAMETER INDEX: 2.64 CM/M2
ECHO LA DIAMETER: 5.1 CM
ECHO LA MAJOR AXIS: 4.7 CM
ECHO LA MINOR AXIS: 4.9 CM
ECHO LA TO AORTIC ROOT RATIO: 1.76
ECHO LA VOL 2C: 44 ML (ref 18–58)
ECHO LA VOL 4C: 35 ML (ref 18–58)
ECHO LA VOL BP: 40 ML (ref 18–58)
ECHO LA VOL/BSA BIPLANE: 21 ML/M2 (ref 16–34)
ECHO LA VOLUME INDEX A2C: 23 ML/M2 (ref 16–34)
ECHO LA VOLUME INDEX A4C: 18 ML/M2 (ref 16–34)
ECHO LV E' LATERAL VELOCITY: 10 CM/S
ECHO LV E' SEPTAL VELOCITY: 4 CM/S
ECHO LV EDV A2C: 78 ML
ECHO LV EDV A4C: 127 ML
ECHO LV EDV INDEX A4C: 66 ML/M2
ECHO LV EDV NDEX A2C: 40 ML/M2
ECHO LV EJECTION FRACTION A2C: 60 %
ECHO LV EJECTION FRACTION A4C: 58 %
ECHO LV EJECTION FRACTION BIPLANE: 59 % (ref 55–100)
ECHO LV ESV A2C: 31 ML
ECHO LV ESV A4C: 54 ML
ECHO LV ESV INDEX A2C: 16 ML/M2
ECHO LV ESV INDEX A4C: 28 ML/M2
ECHO LV FRACTIONAL SHORTENING: 16 % (ref 28–44)
ECHO LV INTERNAL DIMENSION DIASTOLE INDEX: 2.23 CM/M2
ECHO LV INTERNAL DIMENSION DIASTOLIC: 4.3 CM (ref 4.2–5.9)
ECHO LV INTERNAL DIMENSION SYSTOLIC INDEX: 1.87 CM/M2
ECHO LV INTERNAL DIMENSION SYSTOLIC: 3.6 CM
ECHO LV IVSD: 1.9 CM (ref 0.6–1)
ECHO LV MASS 2D: 285.5 G (ref 88–224)
ECHO LV MASS INDEX 2D: 147.9 G/M2 (ref 49–115)
ECHO LV POSTERIOR WALL DIASTOLIC: 1.3 CM (ref 0.6–1)
ECHO LV RELATIVE WALL THICKNESS RATIO: 0.6
ECHO LVOT AREA: 3.5 CM2
ECHO LVOT AV VTI INDEX: 0.38
ECHO LVOT DIAM: 2.1 CM
ECHO LVOT MEAN GRADIENT: 3 MMHG
ECHO LVOT PEAK GRADIENT: 6 MMHG
ECHO LVOT PEAK VELOCITY: 1.2 M/S
ECHO LVOT STROKE VOLUME INDEX: 43.6 ML/M2
ECHO LVOT SV: 84.1 ML
ECHO LVOT VTI: 24.3 CM
ECHO MV A VELOCITY: 0.23 M/S
ECHO MV AREA VTI: 2.5 CM2
ECHO MV E DECELERATION TIME (DT): 123 MS
ECHO MV E VELOCITY: 0.79 M/S
ECHO MV E/A RATIO: 3.43
ECHO MV E/E' LATERAL: 7.9
ECHO MV E/E' RATIO (AVERAGED): 13.83
ECHO MV E/E' SEPTAL: 19.75
ECHO MV LVOT VTI INDEX: 1.4
ECHO MV MAX VELOCITY: 1.4 M/S
ECHO MV MEAN GRADIENT: 2 MMHG
ECHO MV MEAN VELOCITY: 0.7 M/S
ECHO MV PEAK GRADIENT: 7 MMHG
ECHO MV REGURGITANT PEAK GRADIENT: 112 MMHG
ECHO MV REGURGITANT PEAK VELOCITY: 5.3 M/S
ECHO MV VTI: 33.9 CM
ECHO PV MAX VELOCITY: 0.7 M/S
ECHO PV PEAK GRADIENT: 2 MMHG
ECHO RA AREA 4C: 12.8 CM2
ECHO RA END SYSTOLIC VOLUME APICAL 4 CHAMBER INDEX BSA: 12 ML/M2
ECHO RA VOLUME: 24 ML
ECHO RV BASAL DIMENSION: 4 CM
ECHO RV FREE WALL PEAK S': 13 CM/S
ECHO RV TAPSE: 2.4 CM (ref 1.7–?)
ECHO TV REGURGITANT MAX VELOCITY: 3.13 M/S
ECHO TV REGURGITANT PEAK GRADIENT: 39 MMHG
EOSINOPHIL # BLD: 0.4 K/UL (ref 0–0.4)
EOSINOPHIL NFR BLD: 5 % (ref 0–7)
ERYTHROCYTE [DISTWIDTH] IN BLOOD BY AUTOMATED COUNT: 15.7 % (ref 11.5–14.5)
GLOBULIN SER CALC-MCNC: 5.5 G/DL (ref 2–4)
GLUCOSE BLD STRIP.AUTO-MCNC: 127 MG/DL (ref 65–100)
GLUCOSE BLD STRIP.AUTO-MCNC: 129 MG/DL (ref 65–100)
GLUCOSE BLD STRIP.AUTO-MCNC: 172 MG/DL (ref 65–100)
GLUCOSE BLD STRIP.AUTO-MCNC: 91 MG/DL (ref 65–100)
GLUCOSE SERPL-MCNC: 89 MG/DL (ref 65–100)
GLUCOSE SERPL-MCNC: 89 MG/DL (ref 65–100)
HCT VFR BLD AUTO: 31.3 % (ref 36.6–50.3)
HGB BLD-MCNC: 9.6 G/DL (ref 12.1–17)
IMM GRANULOCYTES # BLD AUTO: 0 K/UL (ref 0–0.04)
IMM GRANULOCYTES NFR BLD AUTO: 1 % (ref 0–0.5)
LYMPHOCYTES # BLD: 1.2 K/UL (ref 0.8–3.5)
LYMPHOCYTES NFR BLD: 16 % (ref 12–49)
Lab: NORMAL
MCH RBC QN AUTO: 25.9 PG (ref 26–34)
MCHC RBC AUTO-ENTMCNC: 30.7 G/DL (ref 30–36.5)
MCV RBC AUTO: 84.4 FL (ref 80–99)
MONOCYTES # BLD: 0.5 K/UL (ref 0–1)
MONOCYTES NFR BLD: 6 % (ref 5–13)
NEUTS SEG # BLD: 5.8 K/UL (ref 1.8–8)
NEUTS SEG NFR BLD: 72 % (ref 32–75)
NRBC # BLD: 0 K/UL (ref 0–0.01)
NRBC BLD-RTO: 0 PER 100 WBC
PERFORMED BY:: ABNORMAL
PERFORMED BY:: NORMAL
PLATELET # BLD AUTO: 474 K/UL (ref 150–400)
PMV BLD AUTO: 9.8 FL (ref 8.9–12.9)
POTASSIUM SERPL-SCNC: 3.7 MMOL/L (ref 3.5–5.1)
POTASSIUM SERPL-SCNC: 3.8 MMOL/L (ref 3.5–5.1)
PROCALCITONIN SERPL-MCNC: <0.05 NG/ML
PROT SERPL-MCNC: 7.6 G/DL (ref 6.4–8.2)
RBC # BLD AUTO: 3.71 M/UL (ref 4.1–5.7)
SODIUM SERPL-SCNC: 141 MMOL/L (ref 136–145)
SODIUM SERPL-SCNC: 141 MMOL/L (ref 136–145)
SPECIMEN EXP DATE BLD: NORMAL
TRANSFUSION STATUS PATIENT QL: NORMAL
TRANSFUSION STATUS PATIENT QL: NORMAL
UNIT DIVISION: 0
UNIT DIVISION: 0
WBC # BLD AUTO: 8 K/UL (ref 4.1–11.1)

## 2023-08-22 PROCEDURE — 2580000003 HC RX 258: Performed by: NURSE ANESTHETIST, CERTIFIED REGISTERED

## 2023-08-22 PROCEDURE — 80053 COMPREHEN METABOLIC PANEL: CPT

## 2023-08-22 PROCEDURE — 2580000003 HC RX 258: Performed by: SURGERY

## 2023-08-22 PROCEDURE — 6370000000 HC RX 637 (ALT 250 FOR IP): Performed by: STUDENT IN AN ORGANIZED HEALTH CARE EDUCATION/TRAINING PROGRAM

## 2023-08-22 PROCEDURE — 93306 TTE W/DOPPLER COMPLETE: CPT

## 2023-08-22 PROCEDURE — 84145 PROCALCITONIN (PCT): CPT

## 2023-08-22 PROCEDURE — 80048 BASIC METABOLIC PNL TOTAL CA: CPT

## 2023-08-22 PROCEDURE — 3600000013 HC SURGERY LEVEL 3 ADDTL 15MIN: Performed by: SURGERY

## 2023-08-22 PROCEDURE — 7100000001 HC PACU RECOVERY - ADDTL 15 MIN: Performed by: SURGERY

## 2023-08-22 PROCEDURE — 36415 COLL VENOUS BLD VENIPUNCTURE: CPT

## 2023-08-22 PROCEDURE — 88307 TISSUE EXAM BY PATHOLOGIST: CPT

## 2023-08-22 PROCEDURE — 1100000000 HC RM PRIVATE

## 2023-08-22 PROCEDURE — 99232 SBSQ HOSP IP/OBS MODERATE 35: CPT | Performed by: INTERNAL MEDICINE

## 2023-08-22 PROCEDURE — 6360000002 HC RX W HCPCS: Performed by: INTERNAL MEDICINE

## 2023-08-22 PROCEDURE — 2500000003 HC RX 250 WO HCPCS: Performed by: NURSE ANESTHETIST, CERTIFIED REGISTERED

## 2023-08-22 PROCEDURE — 6360000002 HC RX W HCPCS: Performed by: NURSE ANESTHETIST, CERTIFIED REGISTERED

## 2023-08-22 PROCEDURE — 88311 DECALCIFY TISSUE: CPT

## 2023-08-22 PROCEDURE — 3700000001 HC ADD 15 MINUTES (ANESTHESIA): Performed by: SURGERY

## 2023-08-22 PROCEDURE — 86140 C-REACTIVE PROTEIN: CPT

## 2023-08-22 PROCEDURE — 7100000000 HC PACU RECOVERY - FIRST 15 MIN: Performed by: SURGERY

## 2023-08-22 PROCEDURE — 3600000003 HC SURGERY LEVEL 3 BASE: Performed by: SURGERY

## 2023-08-22 PROCEDURE — 36430 TRANSFUSION BLD/BLD COMPNT: CPT

## 2023-08-22 PROCEDURE — 2580000003 HC RX 258: Performed by: STUDENT IN AN ORGANIZED HEALTH CARE EDUCATION/TRAINING PROGRAM

## 2023-08-22 PROCEDURE — 2709999900 HC NON-CHARGEABLE SUPPLY: Performed by: SURGERY

## 2023-08-22 PROCEDURE — 82962 GLUCOSE BLOOD TEST: CPT

## 2023-08-22 PROCEDURE — 6360000002 HC RX W HCPCS: Performed by: STUDENT IN AN ORGANIZED HEALTH CARE EDUCATION/TRAINING PROGRAM

## 2023-08-22 PROCEDURE — 3700000000 HC ANESTHESIA ATTENDED CARE: Performed by: SURGERY

## 2023-08-22 PROCEDURE — 85025 COMPLETE CBC W/AUTO DIFF WBC: CPT

## 2023-08-22 PROCEDURE — 2580000003 HC RX 258: Performed by: INTERNAL MEDICINE

## 2023-08-22 PROCEDURE — 99232 SBSQ HOSP IP/OBS MODERATE 35: CPT | Performed by: SURGERY

## 2023-08-22 RX ORDER — SODIUM CHLORIDE, SODIUM LACTATE, POTASSIUM CHLORIDE, CALCIUM CHLORIDE 600; 310; 30; 20 MG/100ML; MG/100ML; MG/100ML; MG/100ML
INJECTION, SOLUTION INTRAVENOUS ONCE
Status: DISCONTINUED | OUTPATIENT
Start: 2023-08-22 | End: 2023-08-22 | Stop reason: HOSPADM

## 2023-08-22 RX ORDER — ONDANSETRON 2 MG/ML
4 INJECTION INTRAMUSCULAR; INTRAVENOUS
Status: DISCONTINUED | OUTPATIENT
Start: 2023-08-22 | End: 2023-08-22 | Stop reason: HOSPADM

## 2023-08-22 RX ORDER — ONDANSETRON 2 MG/ML
4 INJECTION INTRAMUSCULAR; INTRAVENOUS EVERY 6 HOURS PRN
Status: DISCONTINUED | OUTPATIENT
Start: 2023-08-22 | End: 2023-08-25 | Stop reason: HOSPADM

## 2023-08-22 RX ORDER — OXYCODONE HYDROCHLORIDE 5 MG/1
5 TABLET ORAL PRN
Status: DISCONTINUED | OUTPATIENT
Start: 2023-08-22 | End: 2023-08-22 | Stop reason: HOSPADM

## 2023-08-22 RX ORDER — HYDROMORPHONE HYDROCHLORIDE 1 MG/ML
0.25 INJECTION, SOLUTION INTRAMUSCULAR; INTRAVENOUS; SUBCUTANEOUS EVERY 5 MIN PRN
Status: DISCONTINUED | OUTPATIENT
Start: 2023-08-22 | End: 2023-08-22 | Stop reason: HOSPADM

## 2023-08-22 RX ORDER — FENTANYL CITRATE 50 UG/ML
25 INJECTION, SOLUTION INTRAMUSCULAR; INTRAVENOUS EVERY 5 MIN PRN
Status: DISCONTINUED | OUTPATIENT
Start: 2023-08-22 | End: 2023-08-22 | Stop reason: HOSPADM

## 2023-08-22 RX ORDER — LABETALOL HYDROCHLORIDE 5 MG/ML
10 INJECTION, SOLUTION INTRAVENOUS
Status: DISCONTINUED | OUTPATIENT
Start: 2023-08-22 | End: 2023-08-22 | Stop reason: HOSPADM

## 2023-08-22 RX ORDER — SODIUM CHLORIDE 9 MG/ML
INJECTION, SOLUTION INTRAVENOUS CONTINUOUS PRN
Status: DISCONTINUED | OUTPATIENT
Start: 2023-08-22 | End: 2023-08-22 | Stop reason: SDUPTHER

## 2023-08-22 RX ORDER — IPRATROPIUM BROMIDE AND ALBUTEROL SULFATE 2.5; .5 MG/3ML; MG/3ML
1 SOLUTION RESPIRATORY (INHALATION)
Status: DISCONTINUED | OUTPATIENT
Start: 2023-08-22 | End: 2023-08-22 | Stop reason: HOSPADM

## 2023-08-22 RX ORDER — SODIUM CHLORIDE 0.9 % (FLUSH) 0.9 %
5-40 SYRINGE (ML) INJECTION EVERY 12 HOURS SCHEDULED
Status: DISCONTINUED | OUTPATIENT
Start: 2023-08-22 | End: 2023-08-25 | Stop reason: HOSPADM

## 2023-08-22 RX ORDER — HYDROCODONE BITARTRATE AND ACETAMINOPHEN 5; 325 MG/1; MG/1
1 TABLET ORAL EVERY 6 HOURS PRN
Status: DISCONTINUED | OUTPATIENT
Start: 2023-08-22 | End: 2023-08-25 | Stop reason: HOSPADM

## 2023-08-22 RX ORDER — HYDRALAZINE HYDROCHLORIDE 20 MG/ML
10 INJECTION INTRAMUSCULAR; INTRAVENOUS
Status: DISCONTINUED | OUTPATIENT
Start: 2023-08-22 | End: 2023-08-22 | Stop reason: HOSPADM

## 2023-08-22 RX ORDER — SODIUM CHLORIDE 0.9 % (FLUSH) 0.9 %
5-40 SYRINGE (ML) INJECTION EVERY 12 HOURS SCHEDULED
Status: DISCONTINUED | OUTPATIENT
Start: 2023-08-22 | End: 2023-08-22 | Stop reason: HOSPADM

## 2023-08-22 RX ORDER — SODIUM CHLORIDE 9 MG/ML
INJECTION, SOLUTION INTRAVENOUS PRN
Status: DISCONTINUED | OUTPATIENT
Start: 2023-08-22 | End: 2023-08-25 | Stop reason: HOSPADM

## 2023-08-22 RX ORDER — ONDANSETRON 4 MG/1
4 TABLET, ORALLY DISINTEGRATING ORAL EVERY 8 HOURS PRN
Status: DISCONTINUED | OUTPATIENT
Start: 2023-08-22 | End: 2023-08-25 | Stop reason: HOSPADM

## 2023-08-22 RX ORDER — SODIUM CHLORIDE 9 MG/ML
INJECTION, SOLUTION INTRAVENOUS PRN
Status: DISCONTINUED | OUTPATIENT
Start: 2023-08-22 | End: 2023-08-22 | Stop reason: HOSPADM

## 2023-08-22 RX ORDER — OXYCODONE HYDROCHLORIDE 5 MG/1
10 TABLET ORAL PRN
Status: DISCONTINUED | OUTPATIENT
Start: 2023-08-22 | End: 2023-08-22 | Stop reason: HOSPADM

## 2023-08-22 RX ORDER — SODIUM CHLORIDE 0.9 % (FLUSH) 0.9 %
5-40 SYRINGE (ML) INJECTION PRN
Status: DISCONTINUED | OUTPATIENT
Start: 2023-08-22 | End: 2023-08-22 | Stop reason: HOSPADM

## 2023-08-22 RX ORDER — ONDANSETRON 2 MG/ML
INJECTION INTRAMUSCULAR; INTRAVENOUS PRN
Status: DISCONTINUED | OUTPATIENT
Start: 2023-08-22 | End: 2023-08-22 | Stop reason: SDUPTHER

## 2023-08-22 RX ORDER — SODIUM CHLORIDE 0.9 % (FLUSH) 0.9 %
5-40 SYRINGE (ML) INJECTION PRN
Status: DISCONTINUED | OUTPATIENT
Start: 2023-08-22 | End: 2023-08-25 | Stop reason: HOSPADM

## 2023-08-22 RX ORDER — ROCURONIUM BROMIDE 10 MG/ML
INJECTION, SOLUTION INTRAVENOUS PRN
Status: DISCONTINUED | OUTPATIENT
Start: 2023-08-22 | End: 2023-08-22 | Stop reason: SDUPTHER

## 2023-08-22 RX ORDER — DEXAMETHASONE SODIUM PHOSPHATE 4 MG/ML
INJECTION, SOLUTION INTRA-ARTICULAR; INTRALESIONAL; INTRAMUSCULAR; INTRAVENOUS; SOFT TISSUE PRN
Status: DISCONTINUED | OUTPATIENT
Start: 2023-08-22 | End: 2023-08-22 | Stop reason: SDUPTHER

## 2023-08-22 RX ORDER — FENTANYL CITRATE 50 UG/ML
INJECTION, SOLUTION INTRAMUSCULAR; INTRAVENOUS PRN
Status: DISCONTINUED | OUTPATIENT
Start: 2023-08-22 | End: 2023-08-22 | Stop reason: SDUPTHER

## 2023-08-22 RX ORDER — HYDROMORPHONE HYDROCHLORIDE 1 MG/ML
0.5 INJECTION, SOLUTION INTRAMUSCULAR; INTRAVENOUS; SUBCUTANEOUS EVERY 5 MIN PRN
Status: DISCONTINUED | OUTPATIENT
Start: 2023-08-22 | End: 2023-08-22 | Stop reason: HOSPADM

## 2023-08-22 RX ORDER — FENTANYL CITRATE 50 UG/ML
50 INJECTION, SOLUTION INTRAMUSCULAR; INTRAVENOUS EVERY 5 MIN PRN
Status: DISCONTINUED | OUTPATIENT
Start: 2023-08-22 | End: 2023-08-22 | Stop reason: HOSPADM

## 2023-08-22 RX ORDER — DIPHENHYDRAMINE HYDROCHLORIDE 50 MG/ML
12.5 INJECTION INTRAMUSCULAR; INTRAVENOUS
Status: DISCONTINUED | OUTPATIENT
Start: 2023-08-22 | End: 2023-08-22 | Stop reason: HOSPADM

## 2023-08-22 RX ORDER — SODIUM CHLORIDE 9 MG/ML
INJECTION, SOLUTION INTRAVENOUS CONTINUOUS
Status: DISCONTINUED | OUTPATIENT
Start: 2023-08-22 | End: 2023-08-25 | Stop reason: HOSPADM

## 2023-08-22 RX ORDER — PHENYLEPHRINE HCL IN 0.9% NACL 0.4MG/10ML
SYRINGE (ML) INTRAVENOUS PRN
Status: DISCONTINUED | OUTPATIENT
Start: 2023-08-22 | End: 2023-08-22 | Stop reason: SDUPTHER

## 2023-08-22 RX ADMIN — ATORVASTATIN CALCIUM 20 MG: 20 TABLET, FILM COATED ORAL at 20:54

## 2023-08-22 RX ADMIN — Medication 100 MCG: at 11:16

## 2023-08-22 RX ADMIN — DOCUSATE SODIUM 100 MG: 100 CAPSULE, LIQUID FILLED ORAL at 13:07

## 2023-08-22 RX ADMIN — AMLODIPINE BESYLATE 10 MG: 5 TABLET ORAL at 13:06

## 2023-08-22 RX ADMIN — SODIUM CHLORIDE: 9 INJECTION, SOLUTION INTRAVENOUS at 10:35

## 2023-08-22 RX ADMIN — DOCUSATE SODIUM 100 MG: 100 CAPSULE, LIQUID FILLED ORAL at 20:54

## 2023-08-22 RX ADMIN — VANCOMYCIN HYDROCHLORIDE 500 MG: 500 INJECTION, POWDER, LYOPHILIZED, FOR SOLUTION INTRAVENOUS at 13:15

## 2023-08-22 RX ADMIN — PROPOFOL 80 MG: 10 INJECTION, EMULSION INTRAVENOUS at 10:46

## 2023-08-22 RX ADMIN — ASPIRIN 81 MG: 81 TABLET, COATED ORAL at 13:07

## 2023-08-22 RX ADMIN — SODIUM CHLORIDE: 9 INJECTION, SOLUTION INTRAVENOUS at 04:50

## 2023-08-22 RX ADMIN — MULTIPLE VITAMINS W/ MINERALS TAB 1 TABLET: TAB at 13:07

## 2023-08-22 RX ADMIN — SODIUM CHLORIDE, PRESERVATIVE FREE 10 ML: 5 INJECTION INTRAVENOUS at 21:05

## 2023-08-22 RX ADMIN — PIPERACILLIN AND TAZOBACTAM 3375 MG: 3; .375 INJECTION, POWDER, LYOPHILIZED, FOR SOLUTION INTRAVENOUS at 06:29

## 2023-08-22 RX ADMIN — PIPERACILLIN AND TAZOBACTAM 3375 MG: 3; .375 INJECTION, POWDER, LYOPHILIZED, FOR SOLUTION INTRAVENOUS at 15:07

## 2023-08-22 RX ADMIN — PIPERACILLIN AND TAZOBACTAM 3375 MG: 3; .375 INJECTION, POWDER, LYOPHILIZED, FOR SOLUTION INTRAVENOUS at 23:09

## 2023-08-22 RX ADMIN — ACETAMINOPHEN 650 MG: 325 TABLET ORAL at 13:31

## 2023-08-22 RX ADMIN — GABAPENTIN 100 MG: 100 CAPSULE ORAL at 20:54

## 2023-08-22 RX ADMIN — TIMOLOL MALEATE 1 DROP: 5 SOLUTION OPHTHALMIC at 20:54

## 2023-08-22 RX ADMIN — SODIUM CHLORIDE, PRESERVATIVE FREE 10 ML: 5 INJECTION INTRAVENOUS at 21:04

## 2023-08-22 RX ADMIN — DORZOLAMIDE HYDROCHLORIDE 1 DROP: 20 SOLUTION/ DROPS OPHTHALMIC at 20:54

## 2023-08-22 RX ADMIN — DEXAMETHASONE SODIUM PHOSPHATE 4 MG: 4 INJECTION, SOLUTION INTRA-ARTICULAR; INTRALESIONAL; INTRAMUSCULAR; INTRAVENOUS; SOFT TISSUE at 10:35

## 2023-08-22 RX ADMIN — ONDANSETRON 4 MG: 2 INJECTION INTRAMUSCULAR; INTRAVENOUS at 10:35

## 2023-08-22 RX ADMIN — Medication 400 MG: at 20:54

## 2023-08-22 RX ADMIN — LIDOCAINE HYDROCHLORIDE 5 ML: 20 INJECTION, SOLUTION EPIDURAL; INFILTRATION; INTRACAUDAL; PERINEURAL at 10:46

## 2023-08-22 RX ADMIN — VANCOMYCIN HYDROCHLORIDE 500 MG: 500 INJECTION, POWDER, LYOPHILIZED, FOR SOLUTION INTRAVENOUS at 20:54

## 2023-08-22 RX ADMIN — BRIMONIDINE TARTRATE 1 DROP: 2 SOLUTION OPHTHALMIC at 13:14

## 2023-08-22 RX ADMIN — GABAPENTIN 100 MG: 100 CAPSULE ORAL at 13:06

## 2023-08-22 RX ADMIN — FENTANYL CITRATE 25 MCG: 50 INJECTION, SOLUTION INTRAMUSCULAR; INTRAVENOUS at 11:02

## 2023-08-22 RX ADMIN — ROCURONIUM BROMIDE 10 MG: 10 INJECTION, SOLUTION INTRAVENOUS at 10:51

## 2023-08-22 RX ADMIN — BRIMONIDINE TARTRATE 1 DROP: 2 SOLUTION OPHTHALMIC at 20:54

## 2023-08-22 RX ADMIN — FENTANYL CITRATE 25 MCG: 50 INJECTION, SOLUTION INTRAMUSCULAR; INTRAVENOUS at 10:46

## 2023-08-22 RX ADMIN — FENTANYL CITRATE 25 MCG: 50 INJECTION, SOLUTION INTRAMUSCULAR; INTRAVENOUS at 11:15

## 2023-08-22 RX ADMIN — SODIUM CHLORIDE, PRESERVATIVE FREE 10 ML: 5 INJECTION INTRAVENOUS at 13:07

## 2023-08-22 RX ADMIN — FERROUS SULFATE TAB 325 MG (65 MG ELEMENTAL FE) 325 MG: 325 (65 FE) TAB at 13:06

## 2023-08-22 RX ADMIN — PROPOFOL 20 MG: 10 INJECTION, EMULSION INTRAVENOUS at 10:48

## 2023-08-22 RX ADMIN — DONEPEZIL HYDROCHLORIDE 5 MG: 5 TABLET, FILM COATED ORAL at 20:54

## 2023-08-22 RX ADMIN — FENTANYL CITRATE 25 MCG: 50 INJECTION, SOLUTION INTRAMUSCULAR; INTRAVENOUS at 11:25

## 2023-08-22 RX ADMIN — LOSARTAN POTASSIUM 25 MG: 50 TABLET, FILM COATED ORAL at 13:06

## 2023-08-22 RX ADMIN — PANTOPRAZOLE SODIUM 40 MG: 40 TABLET, DELAYED RELEASE ORAL at 06:29

## 2023-08-22 RX ADMIN — LATANOPROST 1 DROP: 50 SOLUTION OPHTHALMIC at 20:54

## 2023-08-22 RX ADMIN — ACETAMINOPHEN 650 MG: 325 TABLET ORAL at 00:19

## 2023-08-22 ASSESSMENT — PAIN SCALES - GENERAL
PAINLEVEL_OUTOF10: 6
PAINLEVEL_OUTOF10: 7
PAINLEVEL_OUTOF10: 3
PAINLEVEL_OUTOF10: 0

## 2023-08-22 ASSESSMENT — PAIN DESCRIPTION - ORIENTATION
ORIENTATION: RIGHT
ORIENTATION: RIGHT

## 2023-08-22 ASSESSMENT — PAIN DESCRIPTION - DESCRIPTORS
DESCRIPTORS: ACHING
DESCRIPTORS: ACHING

## 2023-08-22 ASSESSMENT — PAIN DESCRIPTION - LOCATION
LOCATION: FOOT
LOCATION: LEG

## 2023-08-22 ASSESSMENT — COPD QUESTIONNAIRES: CAT_SEVERITY: MILD

## 2023-08-22 NOTE — PROGRESS NOTES
Hospitalist Progress Note    Subjective:   Daily Progress Note: 8/22/2023 7:16 AM    Hospital Course: Patient is 19-year-old male with past medical history of A-fib with Watchman procedure, CAD with pacemaker, CKD, COPD, brought diabetes, HLD, GERD, CVA and progressed disease present with right foot infection. Patient was scheduled for right foot debridement lateralmost on 8/18/2023 and her procedure there was extensive purulent drainage with bone exposure noted. Recommend patient be admitted to hospital for evaluation of DKA. Patient denies any complaints during admission. Patient initiated on IV Vanco and Zosyn. CBC CMP CRP Pro-Dwayne sed rate, MRSA PCR and blood cultures were ordered. Consult placed to infectious disease, podiatry and vascular surgery CMP unremarkable. CBC revealed normocytic anemia with hemoglobin of 7.7 and thrombocythemia with platelets of 618. Transfused 1 unit of blood. MRSA PCR positive. Dr. Frazier First with general surgery evaluated patient and reports after examination it is a non-salvageable right foot. Discussed with patient and patient agreed for scheduled right BKA on 8/22/2023. Dr. Francine Ashley evaluate patient and agrees with continued IV antibiotics. Continue vancomycin and Zosyn. ID consulted. Blood culture positive for MRSA. HORTENCIA to rule out vegetations. IV vancomycin started on 8/21/2023      Subjective/Chief Complaint:Patient is resting comfortably. Denies any pain. Surgery delayed yesterday due to anesthesia and low HGB.      Current Facility-Administered Medications   Medication Dose Route Frequency    0.9 % sodium chloride infusion   IntraVENous Continuous    0.9 % sodium chloride infusion   IntraVENous PRN    vancomycin (VANCOCIN) intermittent dosing (placeholder)   Other RX Placeholder    vancomycin (VANCOCIN) 500 mg in sodium chloride 0.9 % 100 mL IVPB (mini-bag)  500 mg IntraVENous Q12H    [START ON 8/23/2023] Vancomycin draw random level on 8/23 @2000   Other RX

## 2023-08-22 NOTE — ANESTHESIA POSTPROCEDURE EVALUATION
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[address])      (Gangrene ([address]) Dov Flood)    Surgeons: Georgi Grover MD Responsible Provider: Reid Britton MD    Anesthesia Type: General ASA Status: 4          Anesthesia Type: General    Sheeba Phase I: Sheeba Score: 10    Sheeba Phase II:        Anesthesia Post Evaluation    Patient location during evaluation: PACU  Patient participation: complete - patient participated  Level of consciousness: awake  Airway patency: patent  Nausea & Vomiting: no nausea and no vomiting  Complications: no  Cardiovascular status: hemodynamically stable  Respiratory status: acceptable  Hydration status: euvolemic  Pain management: adequate

## 2023-08-22 NOTE — BRIEF OP NOTE
Brief Postoperative Note      Patient: Clifton Mueller  YOB: 1935  MRN: [id number]    Date of Procedure: 8/22/2023    Pre-Op Diagnosis Codes:     * Gangrene ([address]) Doyce Flood    Post-Op Diagnosis: same       Procedure(s):  LEG AMPUTATION BELOW KNEE, RIGHT    Surgeon(s):  MD Irving    Assistant:  Surgical Assistant: Shareen Bumpers    Anesthesia: General    Estimated Blood Loss (mL): 155LG    Complications: none    Specimens:   ID Type Source Tests Collected by Time Destination   A : RIGHT LOWER LEG Tissue Leg SURGICAL PATHOLOGY MD Irving 8/22/2023 1100        Implants:  * No implants in log *      Drains:   External Urinary Catheter (Active)   Site Assessment Clean,dry & intact 08/21/23 2358   Placement Repositioned 08/21/23 2358   Securement Method Securing device (Describe) 08/21/23 2358   Catheter Care Catheter/Wick replaced 08/21/23 1840   Perineal Care Yes 08/21/23 2358   Suction Other 08/21/23 2358   Urine Color Yellow 08/21/23 2358   Urine Appearance Clear 08/21/23 2358   Output (mL) 650 mL 08/22/23 0629       Findings: as above      Electronically signed by Kwan King MD on 8/22/2023 at 11:38 AM

## 2023-08-22 NOTE — PROGRESS NOTES
CINTHYA Gomez   100 mg at 23    latanoprost (XALATAN) 0.005 % ophthalmic solution 1 drop  1 drop Both Eyes Nightly [facility], PA-C   1 drop at 23    losartan (COZAAR) tablet 25 mg  25 mg Oral Daily Alexander [place], PA-C   25 mg at 23 1031    magnesium oxide (MAG-OX) tablet 400 mg  400 mg Oral BID Alexander , PA-C   400 mg at 23    therapeutic multivitamin-minerals 1 tablet  1 tablet Oral Daily Alexander [place], PA-C   1 tablet at 23 0853    pantoprazole (PROTONIX) tablet 40 mg  40 mg Oral QAM AC Alexander , PA-C   40 mg at 23 6988    [Held by provider] apixaban (ELIQUIS) tablet 2.5 mg  2.5 mg Oral BID [facility], PA-C        sodium chloride flush 0.9 % injection 5-40 mL  5-40 mL IntraVENous 2 times per day [facility], PA-C   10 mL at 23    sodium chloride flush 0.9 % injection 5-40 mL  5-40 mL IntraVENous PRN [facility], PA-C        0.9 % sodium chloride infusion   IntraVENous PRN [facility], PA-C        ondansetron (ZOFRAN-ODT) disintegrating tablet 4 mg  4 mg Oral Q8H PRN [facility], PA-C        Or    ondansetron [facility]) injection 4 mg  4 mg IntraVENous Q6H PRN [facility], PA-C        polyethylene glycol (GLYCOLAX) packet 17 g  17 g Oral Daily PRN [facility], PA-C        acetaminophen (TYLENOL) tablet 650 mg  650 mg Oral Q6H PRN [facility], PA-C   650 mg at 23 4529    Or    acetaminophen (TYLENOL) suppository 650 mg  650 mg Rectal Q6H PRN [facility], PA-C        dorzolamide (TRUSOPT) 2 % ophthalmic solution 1 drop  1 drop Both Eyes BID Alexander , PA-C   1 drop at 23    And    timolol (TIMOPTIC) 0.5 % ophthalmic solution 1 drop  1 drop Both Eyes BID Alexander , PA-C   1 drop at 239           Recent Results (from the past 24 hour(s))   POCT Glucose    Collection Time: 23 11:26 AM   Result Value Ref Range    POC Glucose 149 (H) 65 - 100 mg/dL    Performed by: Nisa Bush and Hematocrit    Collection Time: 08/21/23  3:09 PM   Result Value Ref Range    Hemoglobin 7.3 (L) 12.1 - 17.0 g/dL    Hematocrit 25.5 (L) 36.6 - 50.3 %   POCT Glucose    Collection Time: 08/21/23  5:20 PM   Result Value Ref Range    POC Glucose 104 (H) 65 - 100 mg/dL    Performed by: [address] [place] [address]    POCT Glucose    Collection Time: 08/21/23  8:12 PM   Result Value Ref Range    POC Glucose 121 (H) 65 - 100 mg/dL    Performed by: Freda Calvin        ASSESSMENT:   Patient is 82 y.o. 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 *      PLAN:                 Right BKA was canceled due to anemia. Patient did receive 2 units of packed red blood cell. Patient tentatively scheduled for right BKA today.

## 2023-08-22 NOTE — ANESTHESIA PRE PROCEDURE
Department of Anesthesiology  Preprocedure Note       Name:  Sydna Fabry   Age:  82 y.o.  :  1935                                          MRN:  [id number]         Date:  2023      Surgeon: Nellie Hopson):  Franca Grant MD    Procedure: Procedure(s):  LEG AMPUTATION BELOW KNEE, RIGHT    Medications prior to admission:   Prior to Admission medications    Medication Sig Start Date End Date Taking? Authorizing Provider   omeprazole (PRILOSEC) 20 MG delayed release capsule Take 1 capsule by mouth once daily in the morning 23   ELVI Jose NP   losartan (COZAAR) 25 MG tablet Take 1 tablet by mouth once daily 23   ELVI Jose - NP   docusate sodium (COLACE) 100 MG capsule Take 1 capsule by mouth 2 times daily    Historical Provider, MD   apixaban (ELIQUIS) 2.5 MG TABS tablet Take 1 tablet by mouth 2 times daily 23  ELVI Diaz NP   gabapentin (NEURONTIN) 100 MG capsule Take 1 capsule by mouth 2 times daily for 30 days.  Max Daily Amount: 200 mg 23  ELVI Diaz NP   aspirin 81 MG EC tablet Take 1 tablet by mouth daily    Historical Provider, MD   Multiple Vitamins-Minerals (THERAPEUTIC MULTIVITAMIN-MINERALS) tablet Take 1 tablet by mouth daily    Historical Provider, MD   brimonidine (ALPHAGAN) 0.2 % ophthalmic solution Place 1 drop into both eyes 3 times daily    Historical Provider, MD   magnesium oxide (MAG-OX) 400 (240 Mg) MG tablet Take 1 tablet by mouth 2 times daily    Historical Provider, MD   acetaminophen (TYLENOL) 325 MG tablet Take 2 tablets by mouth every 6 hours as needed    Ar Automatic Reconciliation   amLODIPine (NORVASC) 10 MG tablet Take 1 tablet by mouth daily 22   Ar Automatic Reconciliation   atorvastatin (LIPITOR) 20 MG tablet Take 1 tablet by mouth nightly 23   Ar Automatic Reconciliation   donepezil (ARICEPT) 5 MG tablet Take 1 tablet by mouth nightly 23   Ar Automatic
BMI:   Wt Readings from Last 3 Encounters:   08/18/23 79.2 kg (174 lb 9.7 oz)   07/27/23 81.2 kg (179 lb)   07/13/23 81.2 kg (179 lb)     There is no height or weight on file to calculate BMI.    CBC:   Lab Results   Component Value Date/Time    WBC 8.0 08/22/2023 07:40 AM    RBC 3.71 08/22/2023 07:40 AM    HGB 9.6 08/22/2023 07:40 AM    HCT 31.3 08/22/2023 07:40 AM    MCV 84.4 08/22/2023 07:40 AM    RDW 15.7 08/22/2023 07:40 AM     08/22/2023 07:40 AM       CMP:   Lab Results   Component Value Date/Time     08/22/2023 07:40 AM    K 3.7 08/22/2023 07:40 AM     08/22/2023 07:40 AM    CO2 24 08/22/2023 07:40 AM    BUN 17 08/22/2023 07:40 AM    CREATININE 1.17 08/22/2023 07:40 AM    GFRAA 52 08/18/2022 12:30 PM    AGRATIO 0.7 07/21/2022 10:38 AM    LABGLOM >60 08/22/2023 07:40 AM    LABGLOM 47 11/16/2022 03:20 PM    GLUCOSE 89 08/22/2023 07:40 AM    PROT 7.6 08/22/2023 07:40 AM    CALCIUM 8.6 08/22/2023 07:40 AM    BILITOT 0.6 08/22/2023 07:40 AM    ALKPHOS 71 08/22/2023 07:40 AM    ALKPHOS 98 07/21/2022 10:38 AM    AST 21 08/22/2023 07:40 AM    ALT 11 08/22/2023 07:40 AM       POC Tests:   Recent Labs     08/22/23  0817   POCGLU 91       Coags:   Lab Results   Component Value Date/Time    PROTIME 15.7 03/30/2022 10:32 AM    INR 1.3 03/30/2022 10:32 AM    APTT 31.4 03/16/2022 02:32 PM       HCG (If Applicable): No results found for: PREGTESTUR, PREGSERUM, HCG, HCGQUANT     ABGs: No results found for: PHART, PO2ART, LWA0FSW, VDU7CEZ, BEART, C4HMRXWK     Type & Screen (If Applicable):  No results found for: LABABO, LABRH    Drug/Infectious Status (If Applicable):  No results found for: HIV, HEPCAB    COVID-19 Screening (If Applicable):   Lab Results   Component Value Date/Time    COVID19 Not Detected 07/04/2022 03:10 AM           Anesthesia Evaluation  Patient summary reviewed and Nursing notes reviewed  Airway: Mallampati: II  TM distance: >3 FB

## 2023-08-23 LAB
ALBUMIN SERPL-MCNC: 2.2 G/DL (ref 3.5–5)
ALBUMIN/GLOB SERPL: 0.4 (ref 1.1–2.2)
ALP SERPL-CCNC: 79 U/L (ref 45–117)
ALT SERPL-CCNC: 11 U/L (ref 12–78)
ANION GAP SERPL CALC-SCNC: 3 MMOL/L (ref 5–15)
AST SERPL W P-5'-P-CCNC: 24 U/L (ref 15–37)
BASOPHILS # BLD: 0 K/UL (ref 0–0.1)
BASOPHILS NFR BLD: 0 % (ref 0–1)
BILIRUB SERPL-MCNC: 0.6 MG/DL (ref 0.2–1)
BUN SERPL-MCNC: 15 MG/DL (ref 6–20)
BUN/CREAT SERPL: 15 (ref 12–20)
CA-I BLD-MCNC: 8.8 MG/DL (ref 8.5–10.1)
CHLORIDE SERPL-SCNC: 113 MMOL/L (ref 97–108)
CO2 SERPL-SCNC: 24 MMOL/L (ref 21–32)
CREAT SERPL-MCNC: 1 MG/DL (ref 0.7–1.3)
CREAT SERPL-MCNC: 1.36 MG/DL (ref 0.7–1.3)
CRP SERPL-MCNC: 4.8 MG/DL (ref 0–0.6)
DIFFERENTIAL METHOD BLD: ABNORMAL
EOSINOPHIL # BLD: 0.1 K/UL (ref 0–0.4)
EOSINOPHIL NFR BLD: 1 % (ref 0–7)
ERYTHROCYTE [DISTWIDTH] IN BLOOD BY AUTOMATED COUNT: 15.9 % (ref 11.5–14.5)
GLOBULIN SER CALC-MCNC: 5.4 G/DL (ref 2–4)
GLUCOSE BLD STRIP.AUTO-MCNC: 111 MG/DL (ref 65–100)
GLUCOSE BLD STRIP.AUTO-MCNC: 118 MG/DL (ref 65–100)
GLUCOSE BLD STRIP.AUTO-MCNC: 158 MG/DL (ref 65–100)
GLUCOSE BLD STRIP.AUTO-MCNC: 184 MG/DL (ref 65–100)
GLUCOSE SERPL-MCNC: 106 MG/DL (ref 65–100)
HCT VFR BLD AUTO: 29.9 % (ref 36.6–50.3)
HGB BLD-MCNC: 9.1 G/DL (ref 12.1–17)
IMM GRANULOCYTES # BLD AUTO: 0.1 K/UL (ref 0–0.04)
IMM GRANULOCYTES NFR BLD AUTO: 1 % (ref 0–0.5)
LYMPHOCYTES # BLD: 1.2 K/UL (ref 0.8–3.5)
LYMPHOCYTES NFR BLD: 11 % (ref 12–49)
MCH RBC QN AUTO: 25.4 PG (ref 26–34)
MCHC RBC AUTO-ENTMCNC: 30.4 G/DL (ref 30–36.5)
MCV RBC AUTO: 83.5 FL (ref 80–99)
MONOCYTES # BLD: 0.6 K/UL (ref 0–1)
MONOCYTES NFR BLD: 6 % (ref 5–13)
NEUTS SEG # BLD: 8.8 K/UL (ref 1.8–8)
NEUTS SEG NFR BLD: 81 % (ref 32–75)
NRBC # BLD: 0 K/UL (ref 0–0.01)
NRBC BLD-RTO: 0 PER 100 WBC
PERFORMED BY:: ABNORMAL
PLATELET # BLD AUTO: 555 K/UL (ref 150–400)
PMV BLD AUTO: 9.2 FL (ref 8.9–12.9)
POTASSIUM SERPL-SCNC: 4 MMOL/L (ref 3.5–5.1)
PROCALCITONIN SERPL-MCNC: <0.05 NG/ML
PROT SERPL-MCNC: 7.6 G/DL (ref 6.4–8.2)
RBC # BLD AUTO: 3.58 M/UL (ref 4.1–5.7)
SODIUM SERPL-SCNC: 140 MMOL/L (ref 136–145)
WBC # BLD AUTO: 10.8 K/UL (ref 4.1–11.1)

## 2023-08-23 PROCEDURE — 2580000003 HC RX 258: Performed by: STUDENT IN AN ORGANIZED HEALTH CARE EDUCATION/TRAINING PROGRAM

## 2023-08-23 PROCEDURE — 87040 BLOOD CULTURE FOR BACTERIA: CPT

## 2023-08-23 PROCEDURE — 80053 COMPREHEN METABOLIC PANEL: CPT

## 2023-08-23 PROCEDURE — 36415 COLL VENOUS BLD VENIPUNCTURE: CPT

## 2023-08-23 PROCEDURE — 86140 C-REACTIVE PROTEIN: CPT

## 2023-08-23 PROCEDURE — 6370000000 HC RX 637 (ALT 250 FOR IP): Performed by: PHYSICIAN ASSISTANT

## 2023-08-23 PROCEDURE — 85025 COMPLETE CBC W/AUTO DIFF WBC: CPT

## 2023-08-23 PROCEDURE — 82962 GLUCOSE BLOOD TEST: CPT

## 2023-08-23 PROCEDURE — 2580000003 HC RX 258: Performed by: INTERNAL MEDICINE

## 2023-08-23 PROCEDURE — 84145 PROCALCITONIN (PCT): CPT

## 2023-08-23 PROCEDURE — 2580000003 HC RX 258: Performed by: SURGERY

## 2023-08-23 PROCEDURE — 1100000000 HC RM PRIVATE

## 2023-08-23 PROCEDURE — 99232 SBSQ HOSP IP/OBS MODERATE 35: CPT | Performed by: INTERNAL MEDICINE

## 2023-08-23 PROCEDURE — 6360000002 HC RX W HCPCS: Performed by: STUDENT IN AN ORGANIZED HEALTH CARE EDUCATION/TRAINING PROGRAM

## 2023-08-23 PROCEDURE — 6370000000 HC RX 637 (ALT 250 FOR IP): Performed by: STUDENT IN AN ORGANIZED HEALTH CARE EDUCATION/TRAINING PROGRAM

## 2023-08-23 PROCEDURE — 6360000002 HC RX W HCPCS: Performed by: INTERNAL MEDICINE

## 2023-08-23 RX ADMIN — Medication 400 MG: at 08:28

## 2023-08-23 RX ADMIN — PIPERACILLIN AND TAZOBACTAM 3375 MG: 3; .375 INJECTION, POWDER, LYOPHILIZED, FOR SOLUTION INTRAVENOUS at 15:21

## 2023-08-23 RX ADMIN — AMLODIPINE BESYLATE 10 MG: 5 TABLET ORAL at 08:28

## 2023-08-23 RX ADMIN — ASPIRIN 81 MG: 81 TABLET, COATED ORAL at 08:29

## 2023-08-23 RX ADMIN — DOCUSATE SODIUM 100 MG: 100 CAPSULE, LIQUID FILLED ORAL at 20:26

## 2023-08-23 RX ADMIN — TIMOLOL MALEATE 1 DROP: 5 SOLUTION OPHTHALMIC at 08:32

## 2023-08-23 RX ADMIN — DONEPEZIL HYDROCHLORIDE 5 MG: 5 TABLET, FILM COATED ORAL at 20:26

## 2023-08-23 RX ADMIN — FERROUS SULFATE TAB 325 MG (65 MG ELEMENTAL FE) 325 MG: 325 (65 FE) TAB at 08:29

## 2023-08-23 RX ADMIN — ACETAMINOPHEN 650 MG: 325 TABLET ORAL at 08:39

## 2023-08-23 RX ADMIN — SODIUM CHLORIDE, PRESERVATIVE FREE 10 ML: 5 INJECTION INTRAVENOUS at 08:35

## 2023-08-23 RX ADMIN — Medication 400 MG: at 20:25

## 2023-08-23 RX ADMIN — TIMOLOL MALEATE 1 DROP: 5 SOLUTION OPHTHALMIC at 20:32

## 2023-08-23 RX ADMIN — BRIMONIDINE TARTRATE 1 DROP: 2 SOLUTION OPHTHALMIC at 08:32

## 2023-08-23 RX ADMIN — MULTIPLE VITAMINS W/ MINERALS TAB 1 TABLET: TAB at 08:39

## 2023-08-23 RX ADMIN — LOSARTAN POTASSIUM 25 MG: 50 TABLET, FILM COATED ORAL at 08:29

## 2023-08-23 RX ADMIN — BRIMONIDINE TARTRATE 1 DROP: 2 SOLUTION OPHTHALMIC at 20:32

## 2023-08-23 RX ADMIN — ATORVASTATIN CALCIUM 20 MG: 20 TABLET, FILM COATED ORAL at 20:26

## 2023-08-23 RX ADMIN — VANCOMYCIN HYDROCHLORIDE 500 MG: 500 INJECTION, POWDER, LYOPHILIZED, FOR SOLUTION INTRAVENOUS at 10:52

## 2023-08-23 RX ADMIN — PIPERACILLIN AND TAZOBACTAM 3375 MG: 3; .375 INJECTION, POWDER, LYOPHILIZED, FOR SOLUTION INTRAVENOUS at 06:11

## 2023-08-23 RX ADMIN — BRIMONIDINE TARTRATE 1 DROP: 2 SOLUTION OPHTHALMIC at 13:48

## 2023-08-23 RX ADMIN — SODIUM CHLORIDE, PRESERVATIVE FREE 20 ML: 5 INJECTION INTRAVENOUS at 08:34

## 2023-08-23 RX ADMIN — DORZOLAMIDE HYDROCHLORIDE 1 DROP: 20 SOLUTION/ DROPS OPHTHALMIC at 08:32

## 2023-08-23 RX ADMIN — LATANOPROST 1 DROP: 50 SOLUTION OPHTHALMIC at 20:32

## 2023-08-23 RX ADMIN — ACETAMINOPHEN 650 MG: 325 TABLET ORAL at 17:18

## 2023-08-23 RX ADMIN — VANCOMYCIN HYDROCHLORIDE 1250 MG: 1.25 INJECTION, POWDER, LYOPHILIZED, FOR SOLUTION INTRAVENOUS at 20:24

## 2023-08-23 RX ADMIN — SODIUM CHLORIDE, PRESERVATIVE FREE 10 ML: 5 INJECTION INTRAVENOUS at 08:34

## 2023-08-23 RX ADMIN — DORZOLAMIDE HYDROCHLORIDE 1 DROP: 20 SOLUTION/ DROPS OPHTHALMIC at 20:32

## 2023-08-23 RX ADMIN — GABAPENTIN 100 MG: 100 CAPSULE ORAL at 08:28

## 2023-08-23 RX ADMIN — PANTOPRAZOLE SODIUM 40 MG: 40 TABLET, DELAYED RELEASE ORAL at 06:11

## 2023-08-23 RX ADMIN — HYDROCODONE BITARTRATE AND ACETAMINOPHEN 1 TABLET: 5; 325 TABLET ORAL at 20:36

## 2023-08-23 RX ADMIN — GABAPENTIN 100 MG: 100 CAPSULE ORAL at 20:25

## 2023-08-23 RX ADMIN — PIPERACILLIN AND TAZOBACTAM 3375 MG: 3; .375 INJECTION, POWDER, LYOPHILIZED, FOR SOLUTION INTRAVENOUS at 22:50

## 2023-08-23 ASSESSMENT — PAIN SCALES - GENERAL
PAINLEVEL_OUTOF10: 3
PAINLEVEL_OUTOF10: 7
PAINLEVEL_OUTOF10: 8
PAINLEVEL_OUTOF10: 3

## 2023-08-23 ASSESSMENT — PAIN DESCRIPTION - ORIENTATION
ORIENTATION: RIGHT

## 2023-08-23 ASSESSMENT — PAIN DESCRIPTION - DESCRIPTORS: DESCRIPTORS: ACHING

## 2023-08-23 ASSESSMENT — PAIN DESCRIPTION - LOCATION
LOCATION: LEG
LOCATION: LEG

## 2023-08-23 NOTE — PROGRESS NOTES
Hospitalist Progress Note    Subjective:   Daily Progress Note: 8/23/2023 7:26 AM    Hospital Course: Patient is 82-year-old male with past medical history of A-fib with Watchman procedure, CAD with pacemaker, CKD, COPD, brought diabetes, HLD, GERD, CVA and progressed disease present with right foot infection. Patient was scheduled for right foot debridement lateralmost on 8/18/2023 and her procedure there was extensive purulent drainage with bone exposure noted. Recommend patient be admitted to hospital for evaluation of DKA. Patient denies any complaints during admission. Patient initiated on IV Vanco and Zosyn. CBC CMP CRP Pro-Dwayne sed rate, MRSA PCR and blood cultures were ordered. Consult placed to infectious disease, podiatry and vascular surgery CMP unremarkable. CBC revealed normocytic anemia with hemoglobin of 7.7 and thrombocythemia with platelets of 174. Transfused 1 unit of blood. MRSA PCR positive. Dr. Diego Weiss with general surgery evaluated patient and reports after examination it is a non-salvageable right foot. Discussed with patient and patient agreed for scheduled right BKA on 8/22/2023. Dr. Danielle Sam evaluate patient and agrees with continued IV antibiotics. Continue vancomycin and Zosyn. ID consulted. Blood culture positive for MRSA. TTE unremarkable. IV vancomycin started on 8/21/2023      Subjective/Chief Complaint:Patient says he has some pain.  No nausea, vomiting, or diarrhea     Current Facility-Administered Medications   Medication Dose Route Frequency    0.9 % sodium chloride infusion   IntraVENous Continuous    0.9 % sodium chloride infusion   IntraVENous PRN    sodium chloride flush 0.9 % injection 5-40 mL  5-40 mL IntraVENous 2 times per day    sodium chloride flush 0.9 % injection 5-40 mL  5-40 mL IntraVENous PRN    0.9 % sodium chloride infusion   IntraVENous PRN    ondansetron (ZOFRAN-ODT) disintegrating tablet 4 mg  4 mg Oral Q8H PRN    Or    ondansetron (ZOFRAN) injection 4 4.7 cm    LA Area 2C 16.1 cm2    LA Area 4C 14.5 cm2    LA Volume 2C 44 18 - 58 mL    LA Volume 4C 35 18 - 58 mL    LA Volume BP 40 18 - 58 mL    LA Diameter 5.1 cm    RA Area 4C 12.8 cm2    RA Volume 24 ml    AR .0 ms    AR Max Velocity PISA 3.7 m/s    AV Peak Velocity 3.2 m/s    AV Peak Gradient 42 mmHg    AV Mean Gradient 24 mmHg    AV VTI 64.7 cm    AV Mean Velocity 2.2 m/s    AV Area by VTI 1.3 cm2    AV Area by Peak Velocity 1.3 cm2    Aortic Root 2.9 cm    Ascending Aorta 3.3 cm    MR Peak Velocity 5.3 m/s    MR Peak Gradient 112 mmHg    MV Max Velocity 1.4 m/s    MV Peak Gradient 7 mmHg    MV E Wave Deceleration Time 123.0 ms    MV A Velocity 0.23 m/s    MV E Velocity 0.79 m/s    MV Mean Gradient 2 mmHg    MV VTI 33.9 cm    MV Mean Velocity 0.7 m/s    MV Area by VTI 2.5 cm2    PV Max Velocity 0.7 m/s    PV Peak Gradient 2 mmHg    RV Basal Dimension 4.0 cm    RV Free Wall Peak S' 13 cm/s    TAPSE 2.4 1.7 cm    TR Max Velocity 3.13 m/s    TR Peak Gradient 39 mmHg    Fractional Shortening 2D 16 28 - 44 %    LV ESV Index A4C 28 mL/m2    LV EDV Index A4C 66 mL/m2    LV ESV Index A2C 16 mL/m2    LV EDV Index A2C 40 mL/m2    LVIDd Index 2.23 cm/m2    LVIDs Index 1.87 cm/m2    LV RWT Ratio 0.60     LV Mass 2D 285.5 (A) 88 - 224 g    LV Mass 2D Index 147.9 (A) 49 - 115 g/m2    MV E/A 3.43     E/E' Ratio (Averaged) 13.83     E/E' Lateral 7.90     E/E' Septal 19.75     LA Volume Index BP 21 16 - 34 ml/m2    LVOT Stroke Volume Index 43.6 mL/m2    LA Volume Index 2C 23 16 - 34 mL/m2    LA Volume Index 4C 18 16 - 34 mL/m2    LA Size Index 2.64 cm/m2    LA/AO Root Ratio 1.76     RA Volume Index A4C 12 mL/m2    Ao Root Index 1.50 cm/m2    Ascending Aorta Index 1.71 cm/m2    AV Velocity Ratio 0.38     LVOT:AV VTI Index 0.38     HUANG/BSA VTI 0.7 cm2/m2    HUANG/BSA Peak Velocity 0.7 cm2/m2    MV:LVOT VTI Index 1.40    POCT Glucose    Collection Time: 08/22/23 12:47 PM   Result Value Ref Range    POC Glucose 127 (H) 65 -

## 2023-08-23 NOTE — CARE COORDINATION
CM spoke with [facility], [place] [phone], to confirm patient can return, she confirmed patient is accepted and will return to his prior room. Updates sent via [employer]. CM continues to follow and monitor for needs.

## 2023-08-24 ENCOUNTER — APPOINTMENT (OUTPATIENT)
Facility: HOSPITAL | Age: 88
DRG: 240 | End: 2023-08-24
Attending: PODIATRIST
Payer: MEDICARE

## 2023-08-24 LAB
ALBUMIN SERPL-MCNC: 2 G/DL (ref 3.5–5)
ALBUMIN/GLOB SERPL: 0.4 (ref 1.1–2.2)
ALP SERPL-CCNC: 75 U/L (ref 45–117)
ALT SERPL-CCNC: 11 U/L (ref 12–78)
ANION GAP SERPL CALC-SCNC: 5 MMOL/L (ref 5–15)
AST SERPL W P-5'-P-CCNC: 19 U/L (ref 15–37)
BASOPHILS # BLD: 0 K/UL (ref 0–0.1)
BASOPHILS NFR BLD: 0 % (ref 0–1)
BILIRUB SERPL-MCNC: 0.6 MG/DL (ref 0.2–1)
BUN SERPL-MCNC: 18 MG/DL (ref 6–20)
BUN/CREAT SERPL: 15 (ref 12–20)
CA-I BLD-MCNC: 8.2 MG/DL (ref 8.5–10.1)
CHLORIDE SERPL-SCNC: 110 MMOL/L (ref 97–108)
CO2 SERPL-SCNC: 24 MMOL/L (ref 21–32)
CREAT SERPL-MCNC: 1.21 MG/DL (ref 0.7–1.3)
CRP SERPL-MCNC: 6.12 MG/DL (ref 0–0.6)
DIFFERENTIAL METHOD BLD: ABNORMAL
EOSINOPHIL # BLD: 0.3 K/UL (ref 0–0.4)
EOSINOPHIL NFR BLD: 4 % (ref 0–7)
ERYTHROCYTE [DISTWIDTH] IN BLOOD BY AUTOMATED COUNT: 16.1 % (ref 11.5–14.5)
GLOBULIN SER CALC-MCNC: 5.1 G/DL (ref 2–4)
GLUCOSE BLD STRIP.AUTO-MCNC: 111 MG/DL (ref 65–100)
GLUCOSE BLD STRIP.AUTO-MCNC: 117 MG/DL (ref 65–100)
GLUCOSE BLD STRIP.AUTO-MCNC: 133 MG/DL (ref 65–100)
GLUCOSE BLD STRIP.AUTO-MCNC: 194 MG/DL (ref 65–100)
GLUCOSE SERPL-MCNC: 92 MG/DL (ref 65–100)
HCT VFR BLD AUTO: 28.8 % (ref 36.6–50.3)
HGB BLD-MCNC: 8.7 G/DL (ref 12.1–17)
IMM GRANULOCYTES # BLD AUTO: 0.1 K/UL (ref 0–0.04)
IMM GRANULOCYTES NFR BLD AUTO: 1 % (ref 0–0.5)
LYMPHOCYTES # BLD: 1.3 K/UL (ref 0.8–3.5)
LYMPHOCYTES NFR BLD: 14 % (ref 12–49)
MCH RBC QN AUTO: 25.4 PG (ref 26–34)
MCHC RBC AUTO-ENTMCNC: 30.2 G/DL (ref 30–36.5)
MCV RBC AUTO: 84.2 FL (ref 80–99)
MONOCYTES # BLD: 0.7 K/UL (ref 0–1)
MONOCYTES NFR BLD: 7 % (ref 5–13)
NEUTS SEG # BLD: 6.9 K/UL (ref 1.8–8)
NEUTS SEG NFR BLD: 74 % (ref 32–75)
NRBC # BLD: 0 K/UL (ref 0–0.01)
NRBC BLD-RTO: 0 PER 100 WBC
PERFORMED BY:: ABNORMAL
PLATELET # BLD AUTO: 509 K/UL (ref 150–400)
PMV BLD AUTO: 9.3 FL (ref 8.9–12.9)
POTASSIUM SERPL-SCNC: 3.6 MMOL/L (ref 3.5–5.1)
PROCALCITONIN SERPL-MCNC: <0.05 NG/ML
PROT SERPL-MCNC: 7.1 G/DL (ref 6.4–8.2)
RBC # BLD AUTO: 3.42 M/UL (ref 4.1–5.7)
SODIUM SERPL-SCNC: 139 MMOL/L (ref 136–145)
WBC # BLD AUTO: 9.4 K/UL (ref 4.1–11.1)

## 2023-08-24 PROCEDURE — 85025 COMPLETE CBC W/AUTO DIFF WBC: CPT

## 2023-08-24 PROCEDURE — 99232 SBSQ HOSP IP/OBS MODERATE 35: CPT | Performed by: INTERNAL MEDICINE

## 2023-08-24 PROCEDURE — 86140 C-REACTIVE PROTEIN: CPT

## 2023-08-24 PROCEDURE — 02HV33Z INSERTION OF INFUSION DEVICE INTO SUPERIOR VENA CAVA, PERCUTANEOUS APPROACH: ICD-10-PCS | Performed by: STUDENT IN AN ORGANIZED HEALTH CARE EDUCATION/TRAINING PROGRAM

## 2023-08-24 PROCEDURE — 36415 COLL VENOUS BLD VENIPUNCTURE: CPT

## 2023-08-24 PROCEDURE — B5181ZA FLUOROSCOPY OF SUPERIOR VENA CAVA USING LOW OSMOLAR CONTRAST, GUIDANCE: ICD-10-PCS | Performed by: STUDENT IN AN ORGANIZED HEALTH CARE EDUCATION/TRAINING PROGRAM

## 2023-08-24 PROCEDURE — 84145 PROCALCITONIN (PCT): CPT

## 2023-08-24 PROCEDURE — 6360000002 HC RX W HCPCS: Performed by: STUDENT IN AN ORGANIZED HEALTH CARE EDUCATION/TRAINING PROGRAM

## 2023-08-24 PROCEDURE — 6370000000 HC RX 637 (ALT 250 FOR IP): Performed by: PHYSICIAN ASSISTANT

## 2023-08-24 PROCEDURE — 77001 FLUOROGUIDE FOR VEIN DEVICE: CPT

## 2023-08-24 PROCEDURE — 87040 BLOOD CULTURE FOR BACTERIA: CPT

## 2023-08-24 PROCEDURE — 1100000000 HC RM PRIVATE

## 2023-08-24 PROCEDURE — 2580000003 HC RX 258: Performed by: SURGERY

## 2023-08-24 PROCEDURE — 6370000000 HC RX 637 (ALT 250 FOR IP): Performed by: STUDENT IN AN ORGANIZED HEALTH CARE EDUCATION/TRAINING PROGRAM

## 2023-08-24 PROCEDURE — 0Y6H0Z1 DETACHMENT AT RIGHT LOWER LEG, HIGH, OPEN APPROACH: ICD-10-PCS | Performed by: SURGERY

## 2023-08-24 PROCEDURE — C1751 CATH, INF, PER/CENT/MIDLINE: HCPCS

## 2023-08-24 PROCEDURE — 2580000003 HC RX 258: Performed by: INTERNAL MEDICINE

## 2023-08-24 PROCEDURE — 0JH63XZ INSERTION OF TUNNELED VASCULAR ACCESS DEVICE INTO CHEST SUBCUTANEOUS TISSUE AND FASCIA, PERCUTANEOUS APPROACH: ICD-10-PCS | Performed by: STUDENT IN AN ORGANIZED HEALTH CARE EDUCATION/TRAINING PROGRAM

## 2023-08-24 PROCEDURE — 6360000002 HC RX W HCPCS: Performed by: INTERNAL MEDICINE

## 2023-08-24 PROCEDURE — 80053 COMPREHEN METABOLIC PANEL: CPT

## 2023-08-24 PROCEDURE — 2580000003 HC RX 258: Performed by: STUDENT IN AN ORGANIZED HEALTH CARE EDUCATION/TRAINING PROGRAM

## 2023-08-24 PROCEDURE — 82962 GLUCOSE BLOOD TEST: CPT

## 2023-08-24 RX ADMIN — LATANOPROST 1 DROP: 50 SOLUTION OPHTHALMIC at 21:51

## 2023-08-24 RX ADMIN — LOSARTAN POTASSIUM 25 MG: 50 TABLET, FILM COATED ORAL at 09:45

## 2023-08-24 RX ADMIN — VANCOMYCIN HYDROCHLORIDE 1250 MG: 1.25 INJECTION, POWDER, LYOPHILIZED, FOR SOLUTION INTRAVENOUS at 19:42

## 2023-08-24 RX ADMIN — SODIUM CHLORIDE: 9 INJECTION, SOLUTION INTRAVENOUS at 23:59

## 2023-08-24 RX ADMIN — BRIMONIDINE TARTRATE 1 DROP: 2 SOLUTION OPHTHALMIC at 15:20

## 2023-08-24 RX ADMIN — APIXABAN 2.5 MG: 5 TABLET, FILM COATED ORAL at 21:47

## 2023-08-24 RX ADMIN — DOCUSATE SODIUM 100 MG: 100 CAPSULE, LIQUID FILLED ORAL at 21:48

## 2023-08-24 RX ADMIN — DOCUSATE SODIUM 100 MG: 100 CAPSULE, LIQUID FILLED ORAL at 09:45

## 2023-08-24 RX ADMIN — SODIUM CHLORIDE, PRESERVATIVE FREE 10 ML: 5 INJECTION INTRAVENOUS at 21:50

## 2023-08-24 RX ADMIN — PIPERACILLIN AND TAZOBACTAM 3375 MG: 3; .375 INJECTION, POWDER, LYOPHILIZED, FOR SOLUTION INTRAVENOUS at 06:18

## 2023-08-24 RX ADMIN — PIPERACILLIN AND TAZOBACTAM 3375 MG: 3; .375 INJECTION, POWDER, LYOPHILIZED, FOR SOLUTION INTRAVENOUS at 15:19

## 2023-08-24 RX ADMIN — MULTIPLE VITAMINS W/ MINERALS TAB 1 TABLET: TAB at 09:45

## 2023-08-24 RX ADMIN — TIMOLOL MALEATE 1 DROP: 5 SOLUTION OPHTHALMIC at 21:51

## 2023-08-24 RX ADMIN — PANTOPRAZOLE SODIUM 40 MG: 40 TABLET, DELAYED RELEASE ORAL at 06:18

## 2023-08-24 RX ADMIN — GABAPENTIN 100 MG: 100 CAPSULE ORAL at 21:48

## 2023-08-24 RX ADMIN — ATORVASTATIN CALCIUM 20 MG: 20 TABLET, FILM COATED ORAL at 21:48

## 2023-08-24 RX ADMIN — DONEPEZIL HYDROCHLORIDE 5 MG: 5 TABLET, FILM COATED ORAL at 21:48

## 2023-08-24 RX ADMIN — SODIUM CHLORIDE: 9 INJECTION, SOLUTION INTRAVENOUS at 14:24

## 2023-08-24 RX ADMIN — AMLODIPINE BESYLATE 10 MG: 5 TABLET ORAL at 09:46

## 2023-08-24 RX ADMIN — BRIMONIDINE TARTRATE 1 DROP: 2 SOLUTION OPHTHALMIC at 21:52

## 2023-08-24 RX ADMIN — DORZOLAMIDE HYDROCHLORIDE 1 DROP: 20 SOLUTION/ DROPS OPHTHALMIC at 21:52

## 2023-08-24 RX ADMIN — SODIUM CHLORIDE, PRESERVATIVE FREE 10 ML: 5 INJECTION INTRAVENOUS at 09:52

## 2023-08-24 RX ADMIN — Medication 400 MG: at 21:48

## 2023-08-24 RX ADMIN — APIXABAN 2.5 MG: 5 TABLET, FILM COATED ORAL at 09:45

## 2023-08-24 RX ADMIN — HYDROCODONE BITARTRATE AND ACETAMINOPHEN 1 TABLET: 5; 325 TABLET ORAL at 22:02

## 2023-08-24 RX ADMIN — SODIUM CHLORIDE: 9 INJECTION, SOLUTION INTRAVENOUS at 04:02

## 2023-08-24 RX ADMIN — Medication 400 MG: at 09:45

## 2023-08-24 RX ADMIN — ASPIRIN 81 MG: 81 TABLET, COATED ORAL at 09:46

## 2023-08-24 RX ADMIN — GABAPENTIN 100 MG: 100 CAPSULE ORAL at 09:45

## 2023-08-24 RX ADMIN — SODIUM CHLORIDE, PRESERVATIVE FREE 10 ML: 5 INJECTION INTRAVENOUS at 21:49

## 2023-08-24 RX ADMIN — HYDROCODONE BITARTRATE AND ACETAMINOPHEN 1 TABLET: 5; 325 TABLET ORAL at 17:11

## 2023-08-24 ASSESSMENT — PAIN DESCRIPTION - PAIN TYPE: TYPE: SURGICAL PAIN

## 2023-08-24 ASSESSMENT — PAIN SCALES - GENERAL
PAINLEVEL_OUTOF10: 0
PAINLEVEL_OUTOF10: 2
PAINLEVEL_OUTOF10: 4
PAINLEVEL_OUTOF10: 8
PAINLEVEL_OUTOF10: 0
PAINLEVEL_OUTOF10: 6

## 2023-08-24 ASSESSMENT — PAIN DESCRIPTION - ORIENTATION
ORIENTATION: RIGHT
ORIENTATION: RIGHT

## 2023-08-24 ASSESSMENT — PAIN DESCRIPTION - LOCATION
LOCATION: LEG
LOCATION: LEG

## 2023-08-24 NOTE — PROGRESS NOTES
Monocytes Absolute 0.7 0.0 - 1.0 K/UL    Eosinophils Absolute 0.3 0.0 - 0.4 K/UL    Basophils Absolute 0.0 0.0 - 0.1 K/UL    Absolute Immature Granulocyte 0.1 (H) 0.00 - 0.04 K/UL    Differential Type AUTOMATED     Comprehensive Metabolic Panel    Collection Time: 08/24/23  4:26 AM   Result Value Ref Range    Sodium 139 136 - 145 mmol/L    Potassium 3.6 3.5 - 5.1 mmol/L    Chloride 110 (H) 97 - 108 mmol/L    CO2 24 21 - 32 mmol/L    Anion Gap 5 5 - 15 mmol/L    Glucose 92 65 - 100 mg/dL    BUN 18 6 - 20 mg/dL    Creatinine 1.21 0.70 - 1.30 mg/dL    Bun/Cre Ratio 15 12 - 20      Est, Glom Filt Rate 59 (L) >60 ml/min/1.73m2    Calcium 8.2 (L) 8.5 - 10.1 mg/dL    Total Bilirubin 0.6 0.2 - 1.0 mg/dL    AST 19 15 - 37 U/L    ALT 11 (L) 12 - 78 U/L    Alk Phosphatase 75 45 - 117 U/L    Total Protein 7.1 6.4 - 8.2 g/dL    Albumin 2.0 (L) 3.5 - 5.0 g/dL    Globulin 5.1 (H) 2.0 - 4.0 g/dL    Albumin/Globulin Ratio 0.4 (L) 1.1 - 2.2     C-Reactive Protein    Collection Time: 08/24/23  4:26 AM   Result Value Ref Range    CRP 6.12 (H) 0.00 - 0.60 mg/dL       Radiology review: None    MDM Discussion:  Patient with multiple medical comorbidities, each with a high likelihood of morbidity and mortality if left untreated. I have reviewed patient's presenting subjective and objective findings, as well as all laboratory studies, imaging studies, and vital signs today as well as treatment rendered and patient's response to those treatments. In addition, prior medical, surgical and relevant social and family histories were reviewed. I have discussed management plan with patient/family and with nursing staff. Assessment:   1. Diabetic right foot ulcers complicated due to uncontrolled type 2 diabetes/Bacteremia MRSA s/p right BKA #2  2. Anemia/thrombocytopenia  3. Type 2 diabetes  4. Atrial fibrillation, secondary hypercoagulable state  5. CAD  6. Hypertension  7. Hyperlipidemia  8. CVA  9.   GERD  10.  Glaucoma    Plan: 1.  Wound cultures positive for Staphylococcus aureus. ID consulted. Patient went to see podiatry on 8/8/2023 and during debridement had extensive purulent drainage with bone exposure. S/p Right BKA by vascular surgery. ID consulted. On IV Zosyn. Inflammatory markers trending up? Blood culture positive for probably MRSA. Started on IV vancomycin on 8/21/2023 per ID. Monitior renal function and vancomycin toxicity levels. TTE unremarkable. PICC line order. Will require min of 2 weeks of IV antibiotics per ID. Would like repeat negative BC prior to discharge. Repeat pending on 8/23/2023  2. Hemoglobin 8.7. s/p Transfused 1 unit of PRBCs. Platelet count 405. Most likely due to inflammatory response. We will continue to monitor closely. Holding Eliquis for procedure. Transfuse if hemoglobin comes less than 7 or hemodynamically unstable  3. Glucose levels are stable. Accu-Chek high of 184 in the last 24 hours. Patient on insulin sliding scale  4. Heart rate well controlled. Holding Eliquis due to procedure. Will resume when cleared with general surgery   5. Patient on aspirin and statin  6. Blood pressure elevated. Continue to monitor per unit protocol. On Norvasc 10 mg daily. May require further BP meds. Continue to monitior   7. On statin  8. Aspirin and statin  9. Protonix 40 mg daily  10. Continue eyedrops  11. CBC BMP in a.m. Dispo: 24-48 hours. Barriers include negative BC, PICC Line insertion, and ID  clearance. Back to [facility]. MDM  Reviewed: previous chart, nursing note and vitals  Reviewed previous: labs (ECHO)  Interpretation: labs (ECHO)  Consults: ID and Surgery. CODE STATUS Full     VTE prophylaxis: Eliquis   Ulcer prophylaxis: Protonix    Care Plan discussed with: Patient/Family, Nurse, and     Total time spent with patient: 37 minutes.

## 2023-08-24 NOTE — OP NOTE
This is operative report on Enrique Rea, patient's YOB: 1935. Date of surgery: August 22, 2023    Preop diagnosis: Right foot gangrene    Postop diagnosis: Right foot gangrene    Procedure: Right below-knee amputation. Surgeon: Dr. Abby Smith    Anesthesia: General with endotracheal tube. Anesthesiologist: Dr. Gato Logan  EBL: 734 cc  Complications: None. Fluids and the urine output: Please see anesthesia record. Indication for surgery: Mr. Kimmy Londono is currently hospitalized with nonhealing on the right foot with gangrene. Patient has a significant tissue loss with gangrenous tissue on the right foot wound requiring amputation. Patient was offered and discussed about below-knee amputation. We discussed the rationale for the below-knee amputation, surgical risks benefits and complication. Patient was also discussed about postop wound cares and follow-ups and timing of new prosthetic leg as well after amputation. Surgical consent forms obtained. Description of procedure:  Patient was brought to the operating table comfortably supine position. Followed by general anesthesia was initiated. Followed by patient's right leg was prepped usual sterile technique using Betadine solution and draped in usual aseptic fashion. Followed by at this time timeout was called and confirmation was made and procedure commenced. The intended incision site was marked with a marking pen. The anterior aspect of the incision was made 4 fingerbreadths distal to the tibial tuberosity. Incision was continued through the fascia. The anterior compartment muscles were divided using electrosurgical dissection. The tibia and fibula were cleared. Periosteal elevator was used to clear the periosteum from the tibia and tibia was transected with a power reciprocating saw. The fibula was transected 1 cm proximal to the tibial transection site using bone cutter.   The amputation of posterior compartment

## 2023-08-24 NOTE — PROGRESS NOTES
LOOK INTO CERVICAL TRACTION DEVICE ON SOLARBRUSH    Report to Emergency Department if symptoms return or worsen; [facility] Within 1 to 2 days, It is important that you follow up with your primary care provider or specialist if indicated for further evaluation, workup, and treatment as necessary. The exam and treatment you received in Emergency Department was for an urgent problem and NOT INTENDED AS COMPLETE CARE. It is important that you FOLLOW UP with a doctor for ongoing care. If your symptoms become WORSE or you DO NOT IMPROVE and you are unable to reach your health care provider, you should RETURN to the Emergency Department. The Emergency Department provider has provided a PRELIMINARY INTERPRETATION of all your studies. A final interpretation may be done after you are discharged. If a change in your diagnosis or treatment is needed WE WILL CONTACT YOU. It is critical that we have a CURRENT PHONE NUMBER FOR YOU.      Vascular Access Team Consult Note: received consult note for PICC placement by MARIBEL Donohue. Upon review, client had failed bedside PICC placement in the recent past and required PowerLine placement by IR/Angio, removed 07/2023. Of note, client has history of cancer, CVA, CAD, PVD, and pacemaker in 2008 & 2017 per chart review. Per ID progress notes 08/23/2023, client requires 14 days of Vancomycin after first negative blood cultures; blood cultures pending final results 08/23/2023 0627 and re-draw 08/24/2023 0426. Recommendations for IR/Angio consult and centrally-placed central venous catheter or PowerLine for outpatient antibiotic therapy as indicated by providers. MARIBEL Navarro, notified of updates via [employer] with confirmation from MARIBEL Donohue, of message read and received. Client has 2 documented PIV's at this time, working without difficulty. Client ordered IV Zosyn and IV Vancomycin at this time. Please re-enter IP PICC Team Consult for any further vascular access needs. Addendum 08/24/023 1329: Upon review, IR/Angio placed a PowerLine today; see IR/Angio progress notes and [employer] for details.     Cas Rasmussen RN

## 2023-08-24 NOTE — OR NURSING
Tunneled 6Fr Powerline cut at 23cm placed to right chest via right IJ. Line sutured and sterile dressing applied. Dermabond at right IJ access site. Vitals stable.

## 2023-08-24 NOTE — PROGRESS NOTES
Daily wound care instruction provided to nursing staff, Pt can be discharged to rehab and I will see him back to my office in 10 days f/u

## 2023-08-25 VITALS
HEART RATE: 59 BPM | BODY MASS INDEX: 26.46 KG/M2 | RESPIRATION RATE: 18 BRPM | TEMPERATURE: 98.6 F | WEIGHT: 174.6 LBS | OXYGEN SATURATION: 97 % | HEIGHT: 68 IN | DIASTOLIC BLOOD PRESSURE: 70 MMHG | SYSTOLIC BLOOD PRESSURE: 161 MMHG

## 2023-08-25 LAB
ALBUMIN SERPL-MCNC: 2 G/DL (ref 3.5–5)
ALBUMIN/GLOB SERPL: 0.4 (ref 1.1–2.2)
ALP SERPL-CCNC: 68 U/L (ref 45–117)
ALT SERPL-CCNC: 10 U/L (ref 12–78)
ANION GAP SERPL CALC-SCNC: 6 MMOL/L (ref 5–15)
AST SERPL W P-5'-P-CCNC: 21 U/L (ref 15–37)
BASOPHILS # BLD: 0 K/UL (ref 0–0.1)
BASOPHILS NFR BLD: 0 % (ref 0–1)
BILIRUB SERPL-MCNC: 0.4 MG/DL (ref 0.2–1)
BUN SERPL-MCNC: 16 MG/DL (ref 6–20)
BUN/CREAT SERPL: 17 (ref 12–20)
CA-I BLD-MCNC: 8.3 MG/DL (ref 8.5–10.1)
CHLORIDE SERPL-SCNC: 112 MMOL/L (ref 97–108)
CO2 SERPL-SCNC: 24 MMOL/L (ref 21–32)
CREAT SERPL-MCNC: 0.96 MG/DL (ref 0.7–1.3)
CRP SERPL-MCNC: 8.44 MG/DL (ref 0–0.6)
DIFFERENTIAL METHOD BLD: ABNORMAL
EOSINOPHIL # BLD: 0.3 K/UL (ref 0–0.4)
EOSINOPHIL NFR BLD: 4 % (ref 0–7)
ERYTHROCYTE [DISTWIDTH] IN BLOOD BY AUTOMATED COUNT: 16.4 % (ref 11.5–14.5)
GLOBULIN SER CALC-MCNC: 5 G/DL (ref 2–4)
GLUCOSE BLD STRIP.AUTO-MCNC: 120 MG/DL (ref 65–100)
GLUCOSE BLD STRIP.AUTO-MCNC: 121 MG/DL (ref 65–100)
GLUCOSE SERPL-MCNC: 98 MG/DL (ref 65–100)
HCT VFR BLD AUTO: 29.2 % (ref 36.6–50.3)
HGB BLD-MCNC: 8.7 G/DL (ref 12.1–17)
IMM GRANULOCYTES # BLD AUTO: 0.1 K/UL (ref 0–0.04)
IMM GRANULOCYTES NFR BLD AUTO: 1 % (ref 0–0.5)
LYMPHOCYTES # BLD: 1.5 K/UL (ref 0.8–3.5)
LYMPHOCYTES NFR BLD: 20 % (ref 12–49)
MCH RBC QN AUTO: 25.5 PG (ref 26–34)
MCHC RBC AUTO-ENTMCNC: 29.8 G/DL (ref 30–36.5)
MCV RBC AUTO: 85.6 FL (ref 80–99)
MONOCYTES # BLD: 0.5 K/UL (ref 0–1)
MONOCYTES NFR BLD: 7 % (ref 5–13)
NEUTS SEG # BLD: 5 K/UL (ref 1.8–8)
NEUTS SEG NFR BLD: 68 % (ref 32–75)
NRBC # BLD: 0 K/UL (ref 0–0.01)
NRBC BLD-RTO: 0 PER 100 WBC
PERFORMED BY:: ABNORMAL
PERFORMED BY:: ABNORMAL
PLATELET # BLD AUTO: 459 K/UL (ref 150–400)
PMV BLD AUTO: 8.8 FL (ref 8.9–12.9)
POTASSIUM SERPL-SCNC: 3.6 MMOL/L (ref 3.5–5.1)
PROCALCITONIN SERPL-MCNC: <0.05 NG/ML
PROT SERPL-MCNC: 7 G/DL (ref 6.4–8.2)
RBC # BLD AUTO: 3.41 M/UL (ref 4.1–5.7)
SODIUM SERPL-SCNC: 142 MMOL/L (ref 136–145)
WBC # BLD AUTO: 7.4 K/UL (ref 4.1–11.1)

## 2023-08-25 PROCEDURE — 99232 SBSQ HOSP IP/OBS MODERATE 35: CPT | Performed by: INTERNAL MEDICINE

## 2023-08-25 PROCEDURE — 36415 COLL VENOUS BLD VENIPUNCTURE: CPT

## 2023-08-25 PROCEDURE — 6370000000 HC RX 637 (ALT 250 FOR IP): Performed by: STUDENT IN AN ORGANIZED HEALTH CARE EDUCATION/TRAINING PROGRAM

## 2023-08-25 PROCEDURE — 84145 PROCALCITONIN (PCT): CPT

## 2023-08-25 PROCEDURE — 2580000003 HC RX 258: Performed by: STUDENT IN AN ORGANIZED HEALTH CARE EDUCATION/TRAINING PROGRAM

## 2023-08-25 PROCEDURE — 6370000000 HC RX 637 (ALT 250 FOR IP): Performed by: PHYSICIAN ASSISTANT

## 2023-08-25 PROCEDURE — 6360000002 HC RX W HCPCS: Performed by: STUDENT IN AN ORGANIZED HEALTH CARE EDUCATION/TRAINING PROGRAM

## 2023-08-25 PROCEDURE — 86140 C-REACTIVE PROTEIN: CPT

## 2023-08-25 PROCEDURE — 97530 THERAPEUTIC ACTIVITIES: CPT

## 2023-08-25 PROCEDURE — 97162 PT EVAL MOD COMPLEX 30 MIN: CPT

## 2023-08-25 PROCEDURE — 82962 GLUCOSE BLOOD TEST: CPT

## 2023-08-25 PROCEDURE — 6360000002 HC RX W HCPCS: Performed by: INTERNAL MEDICINE

## 2023-08-25 PROCEDURE — 80053 COMPREHEN METABOLIC PANEL: CPT

## 2023-08-25 PROCEDURE — 85025 COMPLETE CBC W/AUTO DIFF WBC: CPT

## 2023-08-25 PROCEDURE — 97165 OT EVAL LOW COMPLEX 30 MIN: CPT

## 2023-08-25 PROCEDURE — 2580000003 HC RX 258: Performed by: INTERNAL MEDICINE

## 2023-08-25 PROCEDURE — 2580000003 HC RX 258: Performed by: SURGERY

## 2023-08-25 RX ORDER — HYDRALAZINE HYDROCHLORIDE 25 MG/1
25 TABLET, FILM COATED ORAL EVERY 8 HOURS SCHEDULED
Status: DISCONTINUED | OUTPATIENT
Start: 2023-08-25 | End: 2023-08-25 | Stop reason: HOSPADM

## 2023-08-25 RX ORDER — HYDRALAZINE HYDROCHLORIDE 25 MG/1
25 TABLET, FILM COATED ORAL EVERY 8 HOURS SCHEDULED
Qty: 90 TABLET | Refills: 3 | Status: SHIPPED | OUTPATIENT
Start: 2023-08-25

## 2023-08-25 RX ADMIN — ASPIRIN 81 MG: 81 TABLET, COATED ORAL at 09:25

## 2023-08-25 RX ADMIN — SODIUM CHLORIDE: 9 INJECTION, SOLUTION INTRAVENOUS at 09:44

## 2023-08-25 RX ADMIN — FERROUS SULFATE TAB 325 MG (65 MG ELEMENTAL FE) 325 MG: 325 (65 FE) TAB at 09:29

## 2023-08-25 RX ADMIN — HYDROCODONE BITARTRATE AND ACETAMINOPHEN 1 TABLET: 5; 325 TABLET ORAL at 09:40

## 2023-08-25 RX ADMIN — BRIMONIDINE TARTRATE 1 DROP: 2 SOLUTION OPHTHALMIC at 14:47

## 2023-08-25 RX ADMIN — GABAPENTIN 100 MG: 100 CAPSULE ORAL at 09:24

## 2023-08-25 RX ADMIN — HYDRALAZINE HYDROCHLORIDE 25 MG: 25 TABLET, FILM COATED ORAL at 09:29

## 2023-08-25 RX ADMIN — TIMOLOL MALEATE 1 DROP: 5 SOLUTION OPHTHALMIC at 09:26

## 2023-08-25 RX ADMIN — PANTOPRAZOLE SODIUM 40 MG: 40 TABLET, DELAYED RELEASE ORAL at 06:20

## 2023-08-25 RX ADMIN — VANCOMYCIN HYDROCHLORIDE 500 MG: 500 INJECTION, POWDER, LYOPHILIZED, FOR SOLUTION INTRAVENOUS at 09:24

## 2023-08-25 RX ADMIN — APIXABAN 2.5 MG: 5 TABLET, FILM COATED ORAL at 09:24

## 2023-08-25 RX ADMIN — MULTIPLE VITAMINS W/ MINERALS TAB 1 TABLET: TAB at 10:10

## 2023-08-25 RX ADMIN — DORZOLAMIDE HYDROCHLORIDE 1 DROP: 20 SOLUTION/ DROPS OPHTHALMIC at 09:26

## 2023-08-25 RX ADMIN — BRIMONIDINE TARTRATE 1 DROP: 2 SOLUTION OPHTHALMIC at 09:25

## 2023-08-25 RX ADMIN — HYDRALAZINE HYDROCHLORIDE 25 MG: 25 TABLET, FILM COATED ORAL at 14:43

## 2023-08-25 RX ADMIN — LOSARTAN POTASSIUM 25 MG: 50 TABLET, FILM COATED ORAL at 09:24

## 2023-08-25 RX ADMIN — SODIUM CHLORIDE, PRESERVATIVE FREE 10 ML: 5 INJECTION INTRAVENOUS at 09:27

## 2023-08-25 RX ADMIN — AMLODIPINE BESYLATE 10 MG: 5 TABLET ORAL at 09:31

## 2023-08-25 RX ADMIN — SODIUM CHLORIDE, PRESERVATIVE FREE 10 ML: 5 INJECTION INTRAVENOUS at 09:25

## 2023-08-25 RX ADMIN — Medication 400 MG: at 09:24

## 2023-08-25 RX ADMIN — PIPERACILLIN AND TAZOBACTAM 3375 MG: 3; .375 INJECTION, POWDER, LYOPHILIZED, FOR SOLUTION INTRAVENOUS at 14:44

## 2023-08-25 RX ADMIN — DOCUSATE SODIUM 100 MG: 100 CAPSULE, LIQUID FILLED ORAL at 09:24

## 2023-08-25 RX ADMIN — PIPERACILLIN AND TAZOBACTAM 3375 MG: 3; .375 INJECTION, POWDER, LYOPHILIZED, FOR SOLUTION INTRAVENOUS at 06:20

## 2023-08-25 RX ADMIN — PIPERACILLIN AND TAZOBACTAM 3375 MG: 3; .375 INJECTION, POWDER, LYOPHILIZED, FOR SOLUTION INTRAVENOUS at 00:14

## 2023-08-25 ASSESSMENT — PAIN SCALES - GENERAL
PAINLEVEL_OUTOF10: 0
PAINLEVEL_OUTOF10: 4

## 2023-08-25 ASSESSMENT — PAIN DESCRIPTION - ORIENTATION: ORIENTATION: RIGHT

## 2023-08-25 ASSESSMENT — PAIN DESCRIPTION - LOCATION: LOCATION: LEG

## 2023-08-25 NOTE — DISCHARGE SUMMARY
Hospitalist Discharge Summary     Patient ID:    Josiah Westfall  [id number]  35 y.o.  1935    Admit date: 8/18/2023    Discharge date : 8/25/2023    Final Diagnoses:   1. Diabetic right foot ulcers complicated due to uncontrolled type 2 diabetes/Bacteremia MRSA s/p right BKA #3  2. Anemia/thrombocytopenia  3. Type 2 diabetes  4. Atrial fibrillation, secondary hypercoagulable state  5. CAD  6. Hypertension  7. Hyperlipidemia  8. CVA  9. GERD  10.  Glaucoma    Reason for Hospitalization: Right foot wound      Hospital Course: Patient is 45-year-old male with past medical history of A-fib with Watchman procedure, CAD with pacemaker, CKD, COPD, brought diabetes, HLD, GERD, CVA and progressed disease present with right foot infection. Patient was scheduled for right foot debridement lateralmost on 8/18/2023 and her procedure there was extensive purulent drainage with bone exposure noted. Recommend patient be admitted to hospital for evaluation of DKA. Patient denies any complaints during admission. Patient initiated on IV Vanco and Zosyn. CBC CMP CRP Pro-Dwayne sed rate, MRSA PCR and blood cultures were ordered. Consult placed to infectious disease, podiatry and vascular surgery CMP unremarkable. CBC revealed normocytic anemia with hemoglobin of 7.7 and thrombocythemia with platelets of 271. Transfused 1 unit of blood. MRSA PCR positive. Dr. Sandeep Mckeon with general surgery evaluated patient and reports after examination it is a non-salvageable right foot. Discussed with patient and patient agreed for scheduled right BKA on 8/22/2023. Dr. Silva Rod evaluate patient and agrees with continued IV antibiotics. Continue vancomycin and Zosyn. ID consulted. Blood culture positive for MRSA. TTE unremarkable. IV vancomycin started on 8/21/2023. Repeat BC on 8/23 and 24/2023 negative. Powerline placed by IR on 8/26/2023.  Spoke with ID and per them since [facility] negative stable for discharge on 136 - 145 mmol/L)  8/19/2023: BUN 21 mg/dL (H; Ref range: 6 - 20 mg/dL); CO2 24 mmol/L (Ref range: 21 - 32 mmol/L); Potassium 3.9 mmol/L (Ref range: 3.5 - 5.1 mmol/L); Sodium 139 mmol/L (Ref range: 136 - 145 mmol/L)  Recent Labs     08/24/23  0426 08/25/23  0554   WBC 9.4 7.4   HGB 8.7* 8.7*   HCT 28.8* 29.2*   * 459*     Recent Labs     08/23/23  0627 08/24/23  0426 08/25/23  0554    139 142   K 4.0 3.6 3.6   * 110* 112*   CO2 24 24 24   BUN 15 18 16     Recent Labs     08/23/23 0627 08/24/23 0426 08/25/23  0554   ALT 11* 11* 10*   GLOB 5.4* 5.1* 5.0*     No results for input(s): INR, APTT in the last 72 hours. Invalid input(s): PTP   No results for input(s): TIBC, FERR in the last 72 hours. Invalid input(s): FE, PSAT   No results for input(s): PH, PCO2, PO2 in the last 72 hours. No results for input(s): CPK, CKMB, TROPONINI in the last 72 hours.   No results found for: GLUCPOC      Total Time: >30 min     Signed:  Aga Rivera PA-C  8/25/2023  9:46 AM

## 2023-08-25 NOTE — PROGRESS NOTES
Report called to Adi Morales LPN at [facility]. Daughter, Yuli Malcolm notified that patient is going to be picked up this afternoon and taken back to [place].

## 2023-08-25 NOTE — PROGRESS NOTES
Hospitalist Progress Note    Subjective:   Daily Progress Note: 8/25/2023 7:36 AM    Hospital Course: Patient is 44-year-old male with past medical history of A-fib with Watchman procedure, CAD with pacemaker, CKD, COPD, brought diabetes, HLD, GERD, CVA and progressed disease present with right foot infection. Patient was scheduled for right foot debridement lateralmost on 8/18/2023 and her procedure there was extensive purulent drainage with bone exposure noted. Recommend patient be admitted to hospital for evaluation of DKA. Patient denies any complaints during admission. Patient initiated on IV Vanco and Zosyn. CBC CMP CRP Pro-Dwayne sed rate, MRSA PCR and blood cultures were ordered. Consult placed to infectious disease, podiatry and vascular surgery CMP unremarkable. CBC revealed normocytic anemia with hemoglobin of 7.7 and thrombocythemia with platelets of 348. Transfused 1 unit of blood. MRSA PCR positive. Dr. Ashley Encarnacion with general surgery evaluated patient and reports after examination it is a non-salvageable right foot. Discussed with patient and patient agreed for scheduled right BKA on 8/22/2023. Dr. Ruel Mcintyre evaluate patient and agrees with continued IV antibiotics. Continue vancomycin and Zosyn. ID consulted. Blood culture positive for MRSA. TTE unremarkable. IV vancomycin started on 8/21/2023. Repeat BC on 8/23/2023 negative. Powerline placed by IR on 8/26/2023      Subjective/Chief Complaint: Patient denies any nausea, vomiting, diarrhea.       Current Facility-Administered Medications   Medication Dose Route Frequency    vancomycin (VANCOCIN) 500 mg in sodium chloride 0.9 % 100 mL IVPB (mini-bag)  500 mg IntraVENous Q12H    Vanc Level on 8/25@ 1800   Other Once    0.9 % sodium chloride infusion   IntraVENous Continuous    0.9 % sodium chloride infusion   IntraVENous PRN    sodium chloride flush 0.9 % injection 5-40 mL  5-40 mL IntraVENous 2 times per day    sodium chloride flush 0.9 % injection unit of PRBCs. Platelet count 259. Most likely due to inflammatory response. We will continue to monitor closely. Holding Eliquis for procedure. Transfuse if hemoglobin comes less than 7 or hemodynamically unstable  3. Glucose levels are stable. Accu-Chek high of 194 in the last 24 hours. Patient on insulin sliding scale  4. Heart rate well controlled. Holding Eliquis due to procedure. Will resume when cleared with general surgery   5. Patient on aspirin and statin  6. Blood pressure elevated. Continue to monitor per unit protocol. On Norvasc 10 mg daily and losartan 25mg daily. . Add hydralzin 25mg TID. Alaniz Corrente Continue to monitior   7. On statin  8. Aspirin and statin  9. Protonix 40 mg daily  10. Continue eyedrops  11. CBC BMP in a.m. Dispo: today or 24 hours. Barriers include ID  clearance. Back to [facility]. MDM  Reviewed: previous chart, nursing note and vitals  Reviewed previous: labs (ECHO)  Interpretation: labs (ECHO)  Consults: ID and Surgery. CODE STATUS Full     VTE prophylaxis: Eliquis   Ulcer prophylaxis: Protonix    Care Plan discussed with: Patient/Family, Nurse, and     Total time spent with patient: 36 minutes.

## 2023-08-25 NOTE — CARE COORDINATION
Patient is clear to d/c from  to [facility], [facility], [address], room [address]. Nurse can call report to [facility], [phone].  contacted patient's daughter, Ayleen Edmond [phone], to notify, she is agreeable to d/c today, IMM delivered. Patient will require transport prior to d/c, nurse notified.

## 2023-08-26 ASSESSMENT — ENCOUNTER SYMPTOMS
ABDOMINAL PAIN: 0
DIARRHEA: 0
VOMITING: 0
SHORTNESS OF BREATH: 0

## 2023-08-27 LAB
BACTERIA SPEC CULT: NORMAL
BACTERIA SPEC CULT: NORMAL
Lab: NORMAL
Lab: NORMAL

## 2023-08-29 LAB
BACTERIA SPEC CULT: NORMAL
Lab: NORMAL

## 2023-08-30 ENCOUNTER — TELEPHONE (OUTPATIENT)
Age: 88
End: 2023-08-30

## 2023-08-30 LAB
BACTERIA SPEC CULT: NORMAL
Lab: NORMAL

## 2023-08-30 NOTE — TELEPHONE ENCOUNTER
Pt was diagnosed with MRSA following blood culture. Please let them know the site of MRSA. Documentation shows MRSA in surgical site and NARES. Does patient need to remain on contact precautions or can he be taken off.

## 2023-09-05 ENCOUNTER — TELEPHONE (OUTPATIENT)
Age: 88
End: 2023-09-05

## 2023-09-05 NOTE — TELEPHONE ENCOUNTER
Nurse wants to get information on where and or how was the patient diagnosed with rangel?     Call back [phone] Yuri Booker Nurse)

## 2023-09-11 ENCOUNTER — OFFICE VISIT (OUTPATIENT)
Age: 88
End: 2023-09-11

## 2023-09-11 VITALS
RESPIRATION RATE: 20 BRPM | DIASTOLIC BLOOD PRESSURE: 74 MMHG | OXYGEN SATURATION: 93 % | TEMPERATURE: 98.8 F | HEART RATE: 60 BPM | BODY MASS INDEX: 26.55 KG/M2 | HEIGHT: 68 IN | SYSTOLIC BLOOD PRESSURE: 136 MMHG

## 2023-09-11 DIAGNOSIS — L08.9 RIGHT FOOT INFECTION: Primary | ICD-10-CM

## 2023-09-11 PROCEDURE — 99024 POSTOP FOLLOW-UP VISIT: CPT | Performed by: SURGERY

## 2023-09-14 NOTE — PROGRESS NOTES
Patient is here today to check below-knee amputation wound examination. Patient doing well. Right BKA wound appears clean. Sutures removed. Patient will be reexamined in 2 weeks to see if he is ready for stump .

## 2023-09-18 ENCOUNTER — OFFICE VISIT (OUTPATIENT)
Age: 88
End: 2023-09-18
Payer: MEDICARE

## 2023-09-18 VITALS
OXYGEN SATURATION: 95 % | HEIGHT: 68 IN | DIASTOLIC BLOOD PRESSURE: 72 MMHG | TEMPERATURE: 98.5 F | WEIGHT: 174 LBS | HEART RATE: 61 BPM | BODY MASS INDEX: 26.37 KG/M2 | SYSTOLIC BLOOD PRESSURE: 123 MMHG | RESPIRATION RATE: 18 BRPM

## 2023-09-18 DIAGNOSIS — B95.62 MRSA BACTEREMIA: ICD-10-CM

## 2023-09-18 DIAGNOSIS — T14.8XXA WOUND INFECTION: Primary | ICD-10-CM

## 2023-09-18 DIAGNOSIS — R78.81 MRSA BACTEREMIA: ICD-10-CM

## 2023-09-18 DIAGNOSIS — L08.9 WOUND INFECTION: ICD-10-CM

## 2023-09-18 DIAGNOSIS — Z22.322 MRSA NASAL COLONIZATION: ICD-10-CM

## 2023-09-18 DIAGNOSIS — T14.8XXA WOUND INFECTION: ICD-10-CM

## 2023-09-18 DIAGNOSIS — L08.9 WOUND INFECTION: Primary | ICD-10-CM

## 2023-09-18 PROCEDURE — 3078F DIAST BP <80 MM HG: CPT | Performed by: INTERNAL MEDICINE

## 2023-09-18 PROCEDURE — G8427 DOCREV CUR MEDS BY ELIG CLIN: HCPCS | Performed by: INTERNAL MEDICINE

## 2023-09-18 PROCEDURE — G8417 CALC BMI ABV UP PARAM F/U: HCPCS | Performed by: INTERNAL MEDICINE

## 2023-09-18 PROCEDURE — 1036F TOBACCO NON-USER: CPT | Performed by: INTERNAL MEDICINE

## 2023-09-18 PROCEDURE — 1123F ACP DISCUSS/DSCN MKR DOCD: CPT | Performed by: INTERNAL MEDICINE

## 2023-09-18 PROCEDURE — 1111F DSCHRG MED/CURRENT MED MERGE: CPT | Performed by: INTERNAL MEDICINE

## 2023-09-18 PROCEDURE — 3074F SYST BP LT 130 MM HG: CPT | Performed by: INTERNAL MEDICINE

## 2023-09-18 PROCEDURE — 99213 OFFICE O/P EST LOW 20 MIN: CPT | Performed by: INTERNAL MEDICINE

## 2023-09-18 NOTE — PROGRESS NOTES
Subjective    Grace Moya is a 82 y.o. male    HPI      Patient followed for right foot infection, status post right BKA. He has low grade temperature with normal WBC and increasing CRP with MRSA bacteremia on Vancomycin. Repeat blood cultures negative so far. TTE negative. Patient resting comfortably with no complaints except that he wants to go to a different SNF. He has Powerline in place       Review of Systems   Constitutional:  Negative for chills and fever. Cardiovascular:  Negative for leg swelling. Skin:  Negative for rash. Past Medical History:   Diagnosis Date    Anemia 4/26/2017    Anxiety     Arrhythmia     A FIB    Arthritis     Atherosclerosis of artery of extremity with rest pain [facility])     Atrial fibrillation ([address]) 7/21/2020    Benign prostatic hyperplasia 4/26/2017    CAD (coronary artery disease)     pacemaker    Cancer ([address]) 2010    GASTRIC    Chronic kidney disease     Chronic obstructive pulmonary disease (HCC)     Depression     Diabetes ([address])     BORDERLINE, NO MEDS. Diverticulosis of colon     Dyslipidemia 4/26/2017    GERD (gastroesophageal reflux disease)     Glaucoma     History of complete heart block     s/p watchman procedure and replaced in 11/2017 by Dr. Emiliano Prakash     History of CVA (cerebrovascular accident) 7/21/2020    Hypercholesteremia     Hypertension     Hypomagnesemia 7/13/2020    Nocturia 4/26/2017    PUD (peptic ulcer disease)     GI BLEEDING    PVD (peripheral vascular disease) ([address])     Rectal hemorrhage 4/26/2017    Strabismus     Stroke ([address]) 2000 APPROX.     SOME VISUAL DEFICIT    Type 2 diabetes mellitus without complications ([address]) 9/91/4450    Venous stasis 4/26/2017        Past Surgical History:   Procedure Laterality Date    CARDIAC CATHETERIZATION      COLONOSCOPY      FOOT DEBRIDEMENT Right 8/18/2023    RIGHT FOOT WOUND DEBRIDEMENT performed by Dada Montgomery DPM at [address] Right 5/17/2023    Open Transmetatarsal Amputation

## 2023-09-20 LAB — MRSA SPEC QL CULT: NEGATIVE

## 2023-09-23 LAB
BACTERIA SPEC AEROBE CULT: ABNORMAL
BACTERIA SPEC ANAEROBE CULT: ABNORMAL

## 2023-09-25 ENCOUNTER — OFFICE VISIT (OUTPATIENT)
Age: 88
End: 2023-09-25

## 2023-09-25 VITALS
WEIGHT: 174 LBS | OXYGEN SATURATION: 94 % | BODY MASS INDEX: 26.37 KG/M2 | HEIGHT: 68 IN | TEMPERATURE: 97.7 F | HEART RATE: 60 BPM | SYSTOLIC BLOOD PRESSURE: 118 MMHG | RESPIRATION RATE: 16 BRPM | DIASTOLIC BLOOD PRESSURE: 64 MMHG

## 2023-09-25 DIAGNOSIS — T14.8XXA SURGICAL WOUND PRESENT: Primary | ICD-10-CM

## 2023-09-25 PROCEDURE — 99024 POSTOP FOLLOW-UP VISIT: CPT | Performed by: SURGERY

## 2023-09-28 NOTE — PROGRESS NOTES
Patient is here today for follow-up wound checkup on BKA. He is doing well. Wounds are covered with a Xeroform gauze and Kerlix and Ace wrap. Patient wounds are clean. He is ready for stump . He will be reassessed 1 month to see if he is ready for prosthetic leg.

## 2023-10-03 ENCOUNTER — TELEPHONE (OUTPATIENT)
Age: 88
End: 2023-10-03

## 2023-10-03 NOTE — TELEPHONE ENCOUNTER
PT would like to know nasal and blood coulters results, also the PT would like to know can IV be removed as well

## 2023-10-09 ENCOUNTER — TELEPHONE (OUTPATIENT)
Age: 88
End: 2023-10-09

## 2023-10-09 NOTE — TELEPHONE ENCOUNTER
[facility] called and said the patient still has his power line and that they are waiting for you to let radiology know that it can be removed. Call back [phone].

## 2023-10-10 DIAGNOSIS — Z45.2 PIC LINE (PERIPHERALLY INSERTED CENTRAL CATHETER) REMOVAL: Primary | ICD-10-CM

## 2023-10-10 NOTE — TELEPHONE ENCOUNTER
Order for Powerline removal sent to [facility] at [facility]. Please contact them to arrange for time to remove powerline.

## 2023-10-13 ENCOUNTER — TELEPHONE (OUTPATIENT)
Age: 88
End: 2023-10-13

## 2023-10-13 NOTE — TELEPHONE ENCOUNTER
Can the power line be discontinued. And if so please call radiologist to give order to move forward.      Please contact Alex Costello (Daughter) [phone] when the order has been sent to radiologist.

## 2023-10-17 NOTE — TELEPHONE ENCOUNTER
Order for Powerline removal sent to [facility] on 10/10/23.   Will place another order today and asked caller to contact IR to set up time for removal.

## 2023-10-19 ENCOUNTER — HOSPITAL ENCOUNTER (INPATIENT)
Facility: HOSPITAL | Age: 88
LOS: 2 days | Discharge: NURSING, REHAB OR LONG-TERM CARE | End: 2023-10-21
Attending: EMERGENCY MEDICINE | Admitting: INTERNAL MEDICINE
Payer: MEDICARE

## 2023-10-19 ENCOUNTER — APPOINTMENT (OUTPATIENT)
Facility: HOSPITAL | Age: 88
End: 2023-10-19
Attending: EMERGENCY MEDICINE
Payer: MEDICARE

## 2023-10-19 DIAGNOSIS — K11.20 SIALOADENITIS: ICD-10-CM

## 2023-10-19 DIAGNOSIS — I50.9 ACUTE ON CHRONIC CONGESTIVE HEART FAILURE, UNSPECIFIED HEART FAILURE TYPE (HCC): Primary | ICD-10-CM

## 2023-10-19 LAB
ANION GAP SERPL CALC-SCNC: 9 MMOL/L (ref 5–15)
BASOPHILS # BLD: 0 K/UL (ref 0–0.1)
BASOPHILS NFR BLD: 0 % (ref 0–1)
BNP SERPL-MCNC: 1841 PG/ML
BUN SERPL-MCNC: 24 MG/DL (ref 6–20)
BUN/CREAT SERPL: 20 (ref 12–20)
CA-I BLD-MCNC: 8.9 MG/DL (ref 8.5–10.1)
CHLORIDE SERPL-SCNC: 104 MMOL/L (ref 97–108)
CO2 SERPL-SCNC: 25 MMOL/L (ref 21–32)
CREAT SERPL-MCNC: 1.23 MG/DL (ref 0.7–1.3)
DIFFERENTIAL METHOD BLD: ABNORMAL
EOSINOPHIL # BLD: 0.3 K/UL (ref 0–0.4)
EOSINOPHIL NFR BLD: 3 % (ref 0–7)
ERYTHROCYTE [DISTWIDTH] IN BLOOD BY AUTOMATED COUNT: 18.3 % (ref 11.5–14.5)
GLUCOSE SERPL-MCNC: 113 MG/DL (ref 65–100)
HCT VFR BLD AUTO: 33 % (ref 36.6–50.3)
HGB BLD-MCNC: 10 G/DL (ref 12.1–17)
IMM GRANULOCYTES # BLD AUTO: 0 K/UL (ref 0–0.04)
IMM GRANULOCYTES NFR BLD AUTO: 0 % (ref 0–0.5)
LYMPHOCYTES # BLD: 1.2 K/UL (ref 0.8–3.5)
LYMPHOCYTES NFR BLD: 12 % (ref 12–49)
MCH RBC QN AUTO: 27.1 PG (ref 26–34)
MCHC RBC AUTO-ENTMCNC: 30.3 G/DL (ref 30–36.5)
MCV RBC AUTO: 89.4 FL (ref 80–99)
MONOCYTES # BLD: 0.7 K/UL (ref 0–1)
MONOCYTES NFR BLD: 7 % (ref 5–13)
NEUTS SEG # BLD: 7.6 K/UL (ref 1.8–8)
NEUTS SEG NFR BLD: 78 % (ref 32–75)
NRBC # BLD: 0 K/UL (ref 0–0.01)
NRBC BLD-RTO: 0 PER 100 WBC
PLATELET # BLD AUTO: 346 K/UL (ref 150–400)
PMV BLD AUTO: 10 FL (ref 8.9–12.9)
POTASSIUM SERPL-SCNC: 4 MMOL/L (ref 3.5–5.1)
RBC # BLD AUTO: 3.69 M/UL (ref 4.1–5.7)
SODIUM SERPL-SCNC: 138 MMOL/L (ref 136–145)
TROPONIN I SERPL HS-MCNC: 26 NG/L (ref 0–76)
TROPONIN I SERPL HS-MCNC: 26 NG/L (ref 0–76)
WBC # BLD AUTO: 9.8 K/UL (ref 4.1–11.1)

## 2023-10-19 PROCEDURE — 70491 CT SOFT TISSUE NECK W/DYE: CPT

## 2023-10-19 PROCEDURE — 83880 ASSAY OF NATRIURETIC PEPTIDE: CPT

## 2023-10-19 PROCEDURE — 36415 COLL VENOUS BLD VENIPUNCTURE: CPT

## 2023-10-19 PROCEDURE — 99285 EMERGENCY DEPT VISIT HI MDM: CPT

## 2023-10-19 PROCEDURE — 6360000002 HC RX W HCPCS: Performed by: INTERNAL MEDICINE

## 2023-10-19 PROCEDURE — 76536 US EXAM OF HEAD AND NECK: CPT

## 2023-10-19 PROCEDURE — 80048 BASIC METABOLIC PNL TOTAL CA: CPT

## 2023-10-19 PROCEDURE — 94640 AIRWAY INHALATION TREATMENT: CPT

## 2023-10-19 PROCEDURE — 85025 COMPLETE CBC W/AUTO DIFF WBC: CPT

## 2023-10-19 PROCEDURE — 6360000004 HC RX CONTRAST MEDICATION: Performed by: EMERGENCY MEDICINE

## 2023-10-19 PROCEDURE — 93005 ELECTROCARDIOGRAM TRACING: CPT | Performed by: EMERGENCY MEDICINE

## 2023-10-19 PROCEDURE — 96365 THER/PROPH/DIAG IV INF INIT: CPT

## 2023-10-19 PROCEDURE — 1100000000 HC RM PRIVATE

## 2023-10-19 PROCEDURE — 84484 ASSAY OF TROPONIN QUANT: CPT

## 2023-10-19 PROCEDURE — 71045 X-RAY EXAM CHEST 1 VIEW: CPT

## 2023-10-19 PROCEDURE — 6370000000 HC RX 637 (ALT 250 FOR IP): Performed by: EMERGENCY MEDICINE

## 2023-10-19 PROCEDURE — 96375 TX/PRO/DX INJ NEW DRUG ADDON: CPT

## 2023-10-19 PROCEDURE — 6360000002 HC RX W HCPCS: Performed by: EMERGENCY MEDICINE

## 2023-10-19 RX ORDER — MORPHINE SULFATE 2 MG/ML
2 INJECTION, SOLUTION INTRAMUSCULAR; INTRAVENOUS EVERY 4 HOURS PRN
Status: DISCONTINUED | OUTPATIENT
Start: 2023-10-19 | End: 2023-10-21 | Stop reason: HOSPADM

## 2023-10-19 RX ORDER — IPRATROPIUM BROMIDE AND ALBUTEROL SULFATE 2.5; .5 MG/3ML; MG/3ML
1 SOLUTION RESPIRATORY (INHALATION)
Status: COMPLETED | OUTPATIENT
Start: 2023-10-19 | End: 2023-10-19

## 2023-10-19 RX ORDER — CLINDAMYCIN PHOSPHATE 600 MG/50ML
600 INJECTION INTRAVENOUS EVERY 6 HOURS
Status: DISCONTINUED | OUTPATIENT
Start: 2023-10-19 | End: 2023-10-21

## 2023-10-19 RX ORDER — FUROSEMIDE 10 MG/ML
20 INJECTION INTRAMUSCULAR; INTRAVENOUS
Status: COMPLETED | OUTPATIENT
Start: 2023-10-19 | End: 2023-10-19

## 2023-10-19 RX ORDER — FUROSEMIDE 10 MG/ML
40 INJECTION INTRAMUSCULAR; INTRAVENOUS 2 TIMES DAILY
Status: DISCONTINUED | OUTPATIENT
Start: 2023-10-19 | End: 2023-10-21

## 2023-10-19 RX ORDER — CLINDAMYCIN PHOSPHATE 600 MG/50ML
600 INJECTION INTRAVENOUS
Status: COMPLETED | OUTPATIENT
Start: 2023-10-19 | End: 2023-10-19

## 2023-10-19 RX ADMIN — FUROSEMIDE 20 MG: 10 INJECTION, SOLUTION INTRAMUSCULAR; INTRAVENOUS at 15:46

## 2023-10-19 RX ADMIN — CLINDAMYCIN PHOSPHATE 600 MG: 600 INJECTION, SOLUTION INTRAVENOUS at 16:04

## 2023-10-19 RX ADMIN — IOPAMIDOL 100 ML: 755 INJECTION, SOLUTION INTRAVENOUS at 15:14

## 2023-10-19 RX ADMIN — CLINDAMYCIN PHOSPHATE 600 MG: 600 INJECTION, SOLUTION INTRAVENOUS at 22:09

## 2023-10-19 RX ADMIN — IPRATROPIUM BROMIDE AND ALBUTEROL SULFATE 1 DOSE: .5; 3 SOLUTION RESPIRATORY (INHALATION) at 13:55

## 2023-10-19 ASSESSMENT — PAIN SCALES - GENERAL
PAINLEVEL_OUTOF10: 0

## 2023-10-19 ASSESSMENT — PAIN - FUNCTIONAL ASSESSMENT: PAIN_FUNCTIONAL_ASSESSMENT: 0-10

## 2023-10-19 ASSESSMENT — PAIN DESCRIPTION - PAIN TYPE: TYPE: ACUTE PAIN

## 2023-10-19 NOTE — ED TRIAGE NOTES
Pt reports right sided neck pain x 1 day. Pt reports difficulty swallowing as well as pain. PT reports he was wheezing last night, denies SOB at this time.

## 2023-10-19 NOTE — ED PROVIDER NOTES
[facility] EMERGENCY DEPT  EMERGENCY DEPARTMENT HISTORY AND PHYSICAL EXAM      Date: 10/19/2023  Patient Name: Carin Montano  MRN: [id number]  [address] 1935  Date of evaluation: 10/19/2023  Provider: Blaire Crawford MD   Note Started: 1:30 PM EDT 10/19/23    HISTORY OF PRESENT ILLNESS     Chief Complaint   Patient presents with    Neck Swelling       History Provided By: Patient    HPI: Carin Montano is a 82 y.o. male staets went to bed and started coughing last night and noted swelling to right side of neck. Pt reports difficulty swallowing as well as pain. PT reports he was wheezing last night and reports increased cough when laying flat. denies SOB at this time    PAST MEDICAL HISTORY   Past Medical History:  Past Medical History:   Diagnosis Date    Anemia 4/26/2017    Anxiety     Arrhythmia     A FIB    Arthritis     Atherosclerosis of artery of extremity with rest pain [facility])     Atrial fibrillation ([address]) 7/21/2020    Benign prostatic hyperplasia 4/26/2017    CAD (coronary artery disease)     pacemaker    Cancer ([address]) 2010    GASTRIC    Chronic kidney disease     Chronic obstructive pulmonary disease (HCC)     Depression     Diabetes ([address])     BORDERLINE, NO MEDS. Diverticulosis of colon     Dyslipidemia 4/26/2017    GERD (gastroesophageal reflux disease)     Glaucoma     History of complete heart block     s/p watchman procedure and replaced in 11/2017 by Dr. Shannan James     History of CVA (cerebrovascular accident) 7/21/2020    Hypercholesteremia     Hypertension     Hypomagnesemia 7/13/2020    Nocturia 4/26/2017    PUD (peptic ulcer disease)     GI BLEEDING    PVD (peripheral vascular disease) ([address])     Rectal hemorrhage 4/26/2017    Strabismus     Stroke ([address]) 2000 APPROX.     SOME VISUAL DEFICIT    Type 2 diabetes mellitus without complications ([address]) 2/11/6010    Venous stasis 4/26/2017       Past Surgical History:  Past Surgical History:   Procedure Laterality Date    CARDIAC CATHETERIZATION Troponin (Final result)   Component (Lab Inquiry)  Collection Time Result Time trophs   10/19/23 13:45:00 10/19/23 14:15:20 26   A HS troponin value ch. .. Final result                           Contains abnormal data Brain Natriuretic Peptide (Final result)   Component (Lab Inquiry)  Collection Time Result Time NT Pro-BNP   10/19/23 13:45:00 10/19/23 14:50:35 1,841       NT-proBNP is highly. .. High          Final result                           Radiologic Studies:  Non-plain film images such as CT, Ultrasound and MRI are read by the radiologist. Plain radiographic images are visualized and preliminarily interpreted by the ED Provider with the following findings: Not Applicable. Interpretation per the Radiologist below, if available at the time of this note:  US HEAD NECK SOFT TISSUE THYROID   Final Result   Prominent RIGHT submandibular gland corresponding to findings seen on CT. No   mass, fluid collection, or adenopathy is demonstrated. Finding along with the   superficial edema may be related to infection. CT SOFT TISSUE NECK W CONTRAST   Final Result   Masslike enlargement of the right submandibular gland which may be   sialoadenitis with no identified abscess, however, further assessment with   ultrasound and/or MRI is recommended. Right facial cellulitis inferior to the   right mandibular body superficial to the platysmas. XR CHEST PORTABLE    (Results Pending)        EMERGENCY DEPARTMENT COURSE and DIFFERENTIAL DIAGNOSIS/MDM   CC/HPI Summary, DDx, ED Course, and Reassessment: Pt arrives to ed with cough and acute swelling right neck tender, swollen submandibular mass. Sialoadenitis v mass v complication with central line. Will obtain imaging. Pt also noted to have moderate volume overload. He is not septic. Is this patient to be included in the SEP-1 Core Measure due to severe sepsis or septic shock?    No   Exclusion criteria - the patient is NOT to be

## 2023-10-20 LAB
EKG ATRIAL RATE: 0 BPM
EKG DIAGNOSIS: NORMAL
EKG P-R INTERVAL: 80 MS
EKG Q-T INTERVAL: 492 MS
EKG QRS DURATION: 131 MS
EKG QTC CALCULATION (BAZETT): 500 MS
EKG R AXIS: 39 DEGREES
EKG T AXIS: 265 DEGREES
EKG VENTRICULAR RATE: 62 BPM

## 2023-10-20 PROCEDURE — 6370000000 HC RX 637 (ALT 250 FOR IP): Performed by: INTERNAL MEDICINE

## 2023-10-20 PROCEDURE — 1100000000 HC RM PRIVATE

## 2023-10-20 PROCEDURE — 6360000002 HC RX W HCPCS: Performed by: INTERNAL MEDICINE

## 2023-10-20 RX ORDER — LOSARTAN POTASSIUM 25 MG/1
25 TABLET ORAL DAILY
Status: DISCONTINUED | OUTPATIENT
Start: 2023-10-20 | End: 2023-10-21

## 2023-10-20 RX ORDER — PANTOPRAZOLE SODIUM 40 MG/1
40 TABLET, DELAYED RELEASE ORAL
Status: DISCONTINUED | OUTPATIENT
Start: 2023-10-21 | End: 2023-10-21 | Stop reason: HOSPADM

## 2023-10-20 RX ORDER — DOCUSATE SODIUM 100 MG/1
100 CAPSULE, LIQUID FILLED ORAL PRN
Status: DISCONTINUED | OUTPATIENT
Start: 2023-10-20 | End: 2023-10-21 | Stop reason: HOSPADM

## 2023-10-20 RX ORDER — HYDRALAZINE HYDROCHLORIDE 25 MG/1
25 TABLET, FILM COATED ORAL EVERY 8 HOURS SCHEDULED
Status: DISCONTINUED | OUTPATIENT
Start: 2023-10-20 | End: 2023-10-21

## 2023-10-20 RX ORDER — ACETAMINOPHEN 325 MG/1
650 TABLET ORAL EVERY 6 HOURS PRN
Status: DISCONTINUED | OUTPATIENT
Start: 2023-10-20 | End: 2023-10-21 | Stop reason: HOSPADM

## 2023-10-20 RX ORDER — DORZOLAMIDE HYDROCHLORIDE AND TIMOLOL MALEATE 20; 5 MG/ML; MG/ML
1 SOLUTION/ DROPS OPHTHALMIC EVERY 12 HOURS SCHEDULED
Status: DISCONTINUED | OUTPATIENT
Start: 2023-10-20 | End: 2023-10-21 | Stop reason: HOSPADM

## 2023-10-20 RX ORDER — ATORVASTATIN CALCIUM 20 MG/1
20 TABLET, FILM COATED ORAL
Status: DISCONTINUED | OUTPATIENT
Start: 2023-10-20 | End: 2023-10-21 | Stop reason: HOSPADM

## 2023-10-20 RX ORDER — LATANOPROST 50 UG/ML
1 SOLUTION/ DROPS OPHTHALMIC NIGHTLY
Status: DISCONTINUED | OUTPATIENT
Start: 2023-10-20 | End: 2023-10-21 | Stop reason: HOSPADM

## 2023-10-20 RX ORDER — ASPIRIN 81 MG/1
81 TABLET ORAL DAILY
Status: DISCONTINUED | OUTPATIENT
Start: 2023-10-20 | End: 2023-10-21 | Stop reason: HOSPADM

## 2023-10-20 RX ORDER — BRIMONIDINE TARTRATE 2 MG/ML
1 SOLUTION/ DROPS OPHTHALMIC 3 TIMES DAILY
Status: DISCONTINUED | OUTPATIENT
Start: 2023-10-20 | End: 2023-10-21 | Stop reason: HOSPADM

## 2023-10-20 RX ORDER — DONEPEZIL HYDROCHLORIDE 5 MG/1
5 TABLET, FILM COATED ORAL NIGHTLY
Status: DISCONTINUED | OUTPATIENT
Start: 2023-10-20 | End: 2023-10-21 | Stop reason: HOSPADM

## 2023-10-20 RX ORDER — FERROUS SULFATE 325(65) MG
325 TABLET ORAL
Status: DISCONTINUED | OUTPATIENT
Start: 2023-10-20 | End: 2023-10-21 | Stop reason: HOSPADM

## 2023-10-20 RX ORDER — AMLODIPINE BESYLATE 10 MG/1
10 TABLET ORAL DAILY
Status: DISCONTINUED | OUTPATIENT
Start: 2023-10-20 | End: 2023-10-21 | Stop reason: HOSPADM

## 2023-10-20 RX ADMIN — FUROSEMIDE 40 MG: 10 INJECTION, SOLUTION INTRAMUSCULAR; INTRAVENOUS at 05:43

## 2023-10-20 RX ADMIN — DORZOLAMIDE HYDROCHLORIDE AND TIMOLOL MALEATE 1 DROP: 20; 5 SOLUTION/ DROPS OPHTHALMIC at 12:31

## 2023-10-20 RX ADMIN — LATANOPROST 1 DROP: 50 SOLUTION/ DROPS OPHTHALMIC at 21:41

## 2023-10-20 RX ADMIN — CLINDAMYCIN PHOSPHATE 600 MG: 600 INJECTION, SOLUTION INTRAVENOUS at 09:47

## 2023-10-20 RX ADMIN — FUROSEMIDE 40 MG: 10 INJECTION, SOLUTION INTRAMUSCULAR; INTRAVENOUS at 18:49

## 2023-10-20 RX ADMIN — HYDRALAZINE HYDROCHLORIDE 25 MG: 25 TABLET, FILM COATED ORAL at 21:42

## 2023-10-20 RX ADMIN — ATORVASTATIN CALCIUM 20 MG: 20 TABLET, FILM COATED ORAL at 21:40

## 2023-10-20 RX ADMIN — CLINDAMYCIN PHOSPHATE 600 MG: 600 INJECTION, SOLUTION INTRAVENOUS at 03:36

## 2023-10-20 RX ADMIN — BRIMONIDINE TARTRATE 1 DROP: 2 SOLUTION/ DROPS OPHTHALMIC at 14:46

## 2023-10-20 RX ADMIN — FERROUS SULFATE TAB 325 MG (65 MG ELEMENTAL FE) 325 MG: 325 (65 FE) TAB at 09:54

## 2023-10-20 RX ADMIN — APIXABAN 2.5 MG: 2.5 TABLET, FILM COATED ORAL at 09:52

## 2023-10-20 RX ADMIN — BRIMONIDINE TARTRATE 1 DROP: 2 SOLUTION/ DROPS OPHTHALMIC at 21:41

## 2023-10-20 RX ADMIN — DONEPEZIL HYDROCHLORIDE 5 MG: 5 TABLET, FILM COATED ORAL at 21:40

## 2023-10-20 RX ADMIN — AMLODIPINE BESYLATE 10 MG: 10 TABLET ORAL at 09:52

## 2023-10-20 RX ADMIN — APIXABAN 2.5 MG: 2.5 TABLET, FILM COATED ORAL at 21:40

## 2023-10-20 RX ADMIN — CLINDAMYCIN PHOSPHATE 600 MG: 600 INJECTION, SOLUTION INTRAVENOUS at 21:42

## 2023-10-20 RX ADMIN — ASPIRIN 81 MG: 81 TABLET, COATED ORAL at 09:52

## 2023-10-20 RX ADMIN — DORZOLAMIDE HYDROCHLORIDE AND TIMOLOL MALEATE 1 DROP: 20; 5 SOLUTION/ DROPS OPHTHALMIC at 21:41

## 2023-10-20 RX ADMIN — CLINDAMYCIN PHOSPHATE 600 MG: 600 INJECTION, SOLUTION INTRAVENOUS at 16:37

## 2023-10-20 RX ADMIN — HYDRALAZINE HYDROCHLORIDE 25 MG: 25 TABLET, FILM COATED ORAL at 14:46

## 2023-10-20 RX ADMIN — LOSARTAN POTASSIUM 25 MG: 25 TABLET, FILM COATED ORAL at 09:52

## 2023-10-20 ASSESSMENT — PAIN SCALES - GENERAL
PAINLEVEL_OUTOF10: 0

## 2023-10-20 NOTE — H&P
PM   Result Value Ref Range    NT Pro-BNP 1,841 (H) <450 pg/mL   EKG 12 Lead    Collection Time: 10/19/23  1:47 PM   Result Value Ref Range    Ventricular Rate 62 BPM    Atrial Rate 0 BPM    P-R Interval 80 ms    QRS Duration 131 ms    Q-T Interval 492 ms    QTc Calculation (Bazett) 500 ms    R Axis 39 degrees    T Axis 265 degrees    Diagnosis       Junctional rhythm  Right bundle branch block  Inferior infarct, age indeterminate  Lateral leads are also involved  Baseline wander in lead(s) I     Troponin    Collection Time: 10/19/23  4:08 PM   Result Value Ref Range    Troponin, High Sensitivity 26 0 - 76 ng/L         Imaging:   US HEAD NECK SOFT TISSUE THYROID   Final Result   Prominent RIGHT submandibular gland corresponding to findings seen on CT. No   mass, fluid collection, or adenopathy is demonstrated. Finding along with the   superficial edema may be related to infection. CT SOFT TISSUE NECK W CONTRAST   Final Result   Masslike enlargement of the right submandibular gland which may be   sialoadenitis with no identified abscess, however, further assessment with   ultrasound and/or MRI is recommended. Right facial cellulitis inferior to the   right mandibular body superficial to the platysmas. XR CHEST PORTABLE    (Results Pending)     2D Echo 8/23    Left Ventricle: Normal left ventricular systolic function with a visually estimated EF of 55 - 60%. Left ventricle size is normal. Increased wall thickness. Findings consistent with concentric hypertrophy. Normal wall motion. Aortic Valve: Mild regurgitation. Moderate stenosis of the aortic valve. AV mean gradient is 24 mmHg. AV peak gradient is 42 mmHg. AV area by continuity VTI is 1.3 cm2. Mitral Valve: Mild to moderate regurgitation. Left Atrium: Left atrium is moderately dilated.     Assessment:     Acute Sialoadenitis  -- No evidence of abscess on CT scan  -- Empiric IV clindamycin  -- Monitor for additional 24 hours    Acute on chronic diastolic heart failure  -- Previous ejection fraction of 55 to 60%  -- Continue IV Lasix 40 mg twice daily  -- Strict input and output  -- Fluid restriction to 1500 mL daily    History of MRSA bacteremia  -- Status post home IV vancomycin  -- Central line remains in place on the right chest.  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 He is full code  Attempted to reach daughter at [phone] to no avail. Patient with multiple comorbidities including peripheral arterial disease, recent MRSA bacteremia, prior CVA, status post Watchman device admitted for right-sided neck pain and possible cellulitis. He will require IV antibiotics with close monitoring of electrolytes.   Anticipate greater than 24 to 48 hours of in-hospital stay        Signed By: Jake Guaman MD     October 20, 2023

## 2023-10-20 NOTE — CARE COORDINATION
Attempted to call NH to inquire about weekend DC back to facility. No answer in admissions, left voicemail.

## 2023-10-20 NOTE — PROGRESS NOTES
Slept @ long intervals. No c/o pain. Inc of urine and stool. Occasional productive cough. Right face/neck and face remain swollen.

## 2023-10-20 NOTE — PROGRESS NOTES
Comprehensive Nutrition Assessment    Type and Reason for Visit:  Initial, Positive Nutrition Screen (MST 3)    Nutrition Recommendations/Plan:   Continue current SBS, cardiac diet; pt prefers puree at this time d/t facial swelling  Food preferences added  Magic Cup 3x/d (870 kcals, 27g pro)     Malnutrition Assessment:  Malnutrition Status:  No malnutrition (10/20/23 1135)    Context:  Acute Illness     Findings of the 6 clinical characteristics of malnutrition:  Energy Intake:  Mild decrease in energy intake (Comment)  Weight Loss:  No significant weight loss     Body Fat Loss:  Unable to assess     Muscle Mass Loss:  Unable to assess    Fluid Accumulation:  No significant fluid accumulation     Strength:  Not Performed    Nutrition Assessment:    Admitted for neck swelling, +sialadenitis. Nutrition assessment for MST 2- unsure wt loss, poor appetite pta. No intakes yet documented in EMR. Pt endorsed poor PO pta d/t dislike of NH food. Stated intakes of B minimal d/t difficulty chew/swallow with facial edema and pain. Will add preferences for puree, magic cup ONS to help fortify limited diet. Labs: BUN 24, GFR 58, H/H 10.0/33.0. Meds: Eliquis, statin, clindamycin, aricept, FeSu, Lasix, PPI. Nutrition Related Findings:    NFPE deferred, attempt at F/U as appropriate. Ambulates with WC, No hx dysphagia however difficulty with C/S d/t facial edema and pain. No N/V, +constipation per pt however now resolved. Last BM pta. +4 facial edema. Wound Type: None         Current Nutrition Intake & Therapies:    Average Meal Intake: Unable to assess  Average Supplements Intake: None Ordered  ADULT DIET; Dysphagia - Soft and Bite Sized;  Low Fat/Low Chol/High Fiber/JUAN C; LIKES: oatmeal, mashed potatoes, mashed sweet potato, applesauce, pudding  ADULT ORAL NUTRITION SUPPLEMENT; Breakfast, Lunch, Dinner; Frozen Oral Supplement    Anthropometric Measures:  Height: 172.7 cm (5' 7.99\")  Ideal Body Weight (IBW): 154 lbs (70

## 2023-10-21 VITALS
HEART RATE: 60 BPM | SYSTOLIC BLOOD PRESSURE: 106 MMHG | OXYGEN SATURATION: 96 % | TEMPERATURE: 97.9 F | DIASTOLIC BLOOD PRESSURE: 55 MMHG | WEIGHT: 151.13 LBS | RESPIRATION RATE: 18 BRPM | BODY MASS INDEX: 22.9 KG/M2 | HEIGHT: 68 IN

## 2023-10-21 LAB
ANION GAP SERPL CALC-SCNC: 10 MMOL/L (ref 5–15)
BASOPHILS # BLD: 0 K/UL (ref 0–0.1)
BASOPHILS NFR BLD: 0 % (ref 0–1)
BUN SERPL-MCNC: 38 MG/DL (ref 6–20)
BUN/CREAT SERPL: 22 (ref 12–20)
CA-I BLD-MCNC: 8.9 MG/DL (ref 8.5–10.1)
CHLORIDE SERPL-SCNC: 102 MMOL/L (ref 97–108)
CO2 SERPL-SCNC: 27 MMOL/L (ref 21–32)
CREAT SERPL-MCNC: 1.73 MG/DL (ref 0.7–1.3)
DIFFERENTIAL METHOD BLD: ABNORMAL
EOSINOPHIL # BLD: 0.2 K/UL (ref 0–0.4)
EOSINOPHIL NFR BLD: 2 % (ref 0–7)
ERYTHROCYTE [DISTWIDTH] IN BLOOD BY AUTOMATED COUNT: 18.4 % (ref 11.5–14.5)
GLUCOSE SERPL-MCNC: 105 MG/DL (ref 65–100)
HCT VFR BLD AUTO: 30.2 % (ref 36.6–50.3)
HGB BLD-MCNC: 9.6 G/DL (ref 12.1–17)
IMM GRANULOCYTES # BLD AUTO: 0 K/UL (ref 0–0.04)
IMM GRANULOCYTES NFR BLD AUTO: 1 % (ref 0–0.5)
LYMPHOCYTES # BLD: 1.2 K/UL (ref 0.8–3.5)
LYMPHOCYTES NFR BLD: 14 % (ref 12–49)
MCH RBC QN AUTO: 27.7 PG (ref 26–34)
MCHC RBC AUTO-ENTMCNC: 31.8 G/DL (ref 30–36.5)
MCV RBC AUTO: 87 FL (ref 80–99)
MONOCYTES # BLD: 0.5 K/UL (ref 0–1)
MONOCYTES NFR BLD: 6 % (ref 5–13)
NEUTS SEG # BLD: 6.7 K/UL (ref 1.8–8)
NEUTS SEG NFR BLD: 77 % (ref 32–75)
NRBC # BLD: 0 K/UL (ref 0–0.01)
NRBC BLD-RTO: 0 PER 100 WBC
PLATELET # BLD AUTO: 312 K/UL (ref 150–400)
PMV BLD AUTO: 10 FL (ref 8.9–12.9)
POTASSIUM SERPL-SCNC: 3.6 MMOL/L (ref 3.5–5.1)
RBC # BLD AUTO: 3.47 M/UL (ref 4.1–5.7)
SODIUM SERPL-SCNC: 139 MMOL/L (ref 136–145)
WBC # BLD AUTO: 8.6 K/UL (ref 4.1–11.1)

## 2023-10-21 PROCEDURE — 85025 COMPLETE CBC W/AUTO DIFF WBC: CPT

## 2023-10-21 PROCEDURE — 6370000000 HC RX 637 (ALT 250 FOR IP): Performed by: INTERNAL MEDICINE

## 2023-10-21 PROCEDURE — 36415 COLL VENOUS BLD VENIPUNCTURE: CPT

## 2023-10-21 PROCEDURE — 6360000002 HC RX W HCPCS: Performed by: INTERNAL MEDICINE

## 2023-10-21 PROCEDURE — 80048 BASIC METABOLIC PNL TOTAL CA: CPT

## 2023-10-21 RX ORDER — AMOXICILLIN AND CLAVULANATE POTASSIUM 875; 125 MG/1; MG/1
1 TABLET, FILM COATED ORAL 2 TIMES DAILY
Qty: 14 TABLET | Refills: 0 | Status: SHIPPED | OUTPATIENT
Start: 2023-10-21 | End: 2023-10-26

## 2023-10-21 RX ORDER — HYDRALAZINE HYDROCHLORIDE 50 MG/1
50 TABLET, FILM COATED ORAL EVERY 8 HOURS SCHEDULED
Qty: 90 TABLET | Refills: 3 | Status: SHIPPED | OUTPATIENT
Start: 2023-10-21

## 2023-10-21 RX ORDER — HYDRALAZINE HYDROCHLORIDE 50 MG/1
50 TABLET, FILM COATED ORAL EVERY 8 HOURS SCHEDULED
Status: DISCONTINUED | OUTPATIENT
Start: 2023-10-21 | End: 2023-10-21 | Stop reason: HOSPADM

## 2023-10-21 RX ADMIN — FERROUS SULFATE TAB 325 MG (65 MG ELEMENTAL FE) 325 MG: 325 (65 FE) TAB at 07:21

## 2023-10-21 RX ADMIN — BRIMONIDINE TARTRATE 1 DROP: 2 SOLUTION/ DROPS OPHTHALMIC at 09:52

## 2023-10-21 RX ADMIN — CLINDAMYCIN PHOSPHATE 600 MG: 600 INJECTION, SOLUTION INTRAVENOUS at 03:43

## 2023-10-21 RX ADMIN — APIXABAN 2.5 MG: 2.5 TABLET, FILM COATED ORAL at 09:52

## 2023-10-21 RX ADMIN — DORZOLAMIDE HYDROCHLORIDE AND TIMOLOL MALEATE 1 DROP: 20; 5 SOLUTION/ DROPS OPHTHALMIC at 09:52

## 2023-10-21 RX ADMIN — AMLODIPINE BESYLATE 10 MG: 10 TABLET ORAL at 09:52

## 2023-10-21 RX ADMIN — HYDRALAZINE HYDROCHLORIDE 25 MG: 25 TABLET, FILM COATED ORAL at 06:05

## 2023-10-21 RX ADMIN — FUROSEMIDE 40 MG: 10 INJECTION, SOLUTION INTRAMUSCULAR; INTRAVENOUS at 06:04

## 2023-10-21 RX ADMIN — ASPIRIN 81 MG: 81 TABLET, COATED ORAL at 09:52

## 2023-10-21 RX ADMIN — PANTOPRAZOLE SODIUM 40 MG: 40 TABLET, DELAYED RELEASE ORAL at 07:21

## 2023-10-21 ASSESSMENT — PAIN SCALES - GENERAL
PAINLEVEL_OUTOF10: 0
PAINLEVEL_OUTOF10: 0

## 2023-10-21 NOTE — PROGRESS NOTES
Bedside shift change report given to tamara mathis(oncoming nurse) by Dave Harmon (offgoing nurse). Report included the following information Nurse Handoff Report.

## 2023-10-21 NOTE — PROGRESS NOTES
Lying in bed awake, no c/o pain. C/o wanting something to eat,  pudding on tray, given to patient and ate 100%. Coughing noted. States decreased swelling. Dressing intact to Right BKA.

## 2023-10-21 NOTE — DISCHARGE SUMMARY
Physician Discharge Summary     Patient ID:    Jae Ingram  [id number]  82 y.o.  1935    Admit date: 10/19/2023    Discharge date : 10/21/2023      Final Diagnoses:   Sialoadenitis [K11.20]  Heart failure ([address]) [I50.9]  Acute on chronic congestive heart failure, unspecified heart failure type ([address]) [I50.9]  Acute on chronic diastolic heart failure  Acute on chronic kidney disease secondary to diuretic  Wheelchair-bound status  Recent treatment for MRSA bacteremia  Right Power chest line in place    Reason for Hospitalization:  Right facial swelling      Hospital Course:   82 y.o. male who presented to the emergency department with reports of right-sided neck swelling and difficulty swallowing which started a day prior to presentation. Denies shortness of breath chest pain or productive sputum. No reported fevers at home. He was evaluated in the ED with a CT scan of the neck and soft tissues in addition to ultrasound. Due to concerns for sialadenitis, patient was started on empiric IV clindamycin. Chest x-ray showed increased vascular congestion with a BNP of greater than 1800. Normal Troponin with nonspecific ST-T wave changes. He was started on IV diuretics and admitted to telemetry floor for further care. Swelling to the right face significantly improved. Clinical improvement with breathing on IV Lasix. Discontinued due to acute kidney injury. Okay for discharge to skilled care facility with close follow-up outpatient. He will need to complete 5-day course of Augmentin for sialodenitis.               Discharge Medications:      Medication List        START taking these medications      amoxicillin-clavulanate 875-125 MG per tablet  Commonly known as: AUGMENTIN  Take 1 tablet by mouth 2 times daily for 5 days            CHANGE how you take these medications      hydrALAZINE 50 MG tablet  Commonly known as: APRESOLINE  Take 1 tablet by mouth every 8 hours  What changed:

## 2023-10-21 NOTE — PROGRESS NOTES
Pt. Daughter anshu call was made aware pt. Will be discharge back to [facility] today.

## 2023-10-21 NOTE — PLAN OF CARE
Problem: Skin/Tissue Integrity  Goal: Absence of new skin breakdown  Description: 1. Monitor for areas of redness and/or skin breakdown  2. Assess vascular access sites hourly  3. Every 4-6 hours minimum:  Change oxygen saturation probe site  4. Every 4-6 hours:  If on nasal continuous positive airway pressure, respiratory therapy assess nares and determine need for appliance change or resting period.   10/21/2023 1023 by Malcolm Alejandra, MARLIN  Outcome: Completed  10/21/2023 0745 by Malcolm Alejandra, LPN  Outcome: Progressing     Problem: ABCDS Injury Assessment  Goal: Absence of physical injury  10/21/2023 1023 by Malcolm Alejandra, LPN  Outcome: Completed  10/21/2023 0745 by Malcolm Alejandra, LPN  Outcome: Progressing     Problem: Safety - Adult  Goal: Free from fall injury  10/21/2023 1023 by Malcolm Alejandra, LPN  Outcome: Completed  10/21/2023 0745 by Malcolm Alejandra, LPN  Outcome: Progressing     Problem: Pain  Goal: Verbalizes/displays adequate comfort level or baseline comfort level  Outcome: Completed     Problem: Chronic Conditions and Co-morbidities  Goal: Patient's chronic conditions and co-morbidity symptoms are monitored and maintained or improved  Outcome: Completed

## 2023-10-27 ENCOUNTER — HOSPITAL ENCOUNTER (OUTPATIENT)
Facility: HOSPITAL | Age: 88
End: 2023-10-27
Attending: INTERNAL MEDICINE
Payer: MEDICARE

## 2023-10-27 DIAGNOSIS — Z45.2 ENCOUNTER FOR INSERTION OF TUNNELED CENTRAL VENOUS CATHETER (CVC) WITH PORT: ICD-10-CM

## 2023-10-27 PROCEDURE — 36589 REMOVAL TUNNELED CV CATH: CPT

## 2023-10-27 NOTE — PROGRESS NOTES
Pt to IR as outpt for removal of tunneled right chest powerline. Site prepped and line removed with no difficulty.  Daughter at bedside, medical transport arrived to transport back to [place]

## 2023-11-13 ENCOUNTER — OFFICE VISIT (OUTPATIENT)
Age: 88
End: 2023-11-13
Payer: MEDICARE

## 2023-11-13 VITALS
OXYGEN SATURATION: 96 % | TEMPERATURE: 97.5 F | RESPIRATION RATE: 16 BRPM | SYSTOLIC BLOOD PRESSURE: 127 MMHG | HEIGHT: 67 IN | BODY MASS INDEX: 23.67 KG/M2 | HEART RATE: 60 BPM | DIASTOLIC BLOOD PRESSURE: 68 MMHG

## 2023-11-13 DIAGNOSIS — S88.111A BELOW-KNEE AMPUTATION OF RIGHT LOWER EXTREMITY (HCC): ICD-10-CM

## 2023-11-13 DIAGNOSIS — I73.9 PVD (PERIPHERAL VASCULAR DISEASE) (HCC): Primary | ICD-10-CM

## 2023-11-13 PROCEDURE — 99213 OFFICE O/P EST LOW 20 MIN: CPT | Performed by: SURGERY

## 2023-11-13 PROCEDURE — 1111F DSCHRG MED/CURRENT MED MERGE: CPT | Performed by: SURGERY

## 2023-11-13 PROCEDURE — 1036F TOBACCO NON-USER: CPT | Performed by: SURGERY

## 2023-11-13 PROCEDURE — G8484 FLU IMMUNIZE NO ADMIN: HCPCS | Performed by: SURGERY

## 2023-11-13 PROCEDURE — G8427 DOCREV CUR MEDS BY ELIG CLIN: HCPCS | Performed by: SURGERY

## 2023-11-13 PROCEDURE — 3078F DIAST BP <80 MM HG: CPT | Performed by: SURGERY

## 2023-11-13 PROCEDURE — 1123F ACP DISCUSS/DSCN MKR DOCD: CPT | Performed by: SURGERY

## 2023-11-13 PROCEDURE — G8420 CALC BMI NORM PARAMETERS: HCPCS | Performed by: SURGERY

## 2023-11-13 PROCEDURE — 3074F SYST BP LT 130 MM HG: CPT | Performed by: SURGERY

## 2023-11-16 NOTE — PROGRESS NOTES
Vascular History and Physical    Patient: Carin Montano  MRN: [id number]    YOB: 1935  Age: 82 y.o. Sex: male     Chief Complaint:  Chief Complaint   Patient presents with    Follow-up     Right leg amputation       History of Present Illness: Carin Montano is a 82 y.o. very pleasant gentleman has a right BKA. Patient is here today wound checkup. Currently wounds are closed. Patient is waiting for right BKA stump . Social History:  Social Connections: Not on file       Past Medical History:  Past Medical History:   Diagnosis Date    Anemia 4/26/2017    Anxiety     Arrhythmia     A FIB    Arthritis     Atherosclerosis of artery of extremity with rest pain [facility])     Atrial fibrillation ([address]) 7/21/2020    Benign prostatic hyperplasia 4/26/2017    CAD (coronary artery disease)     pacemaker    Cancer ([address]) 2010    GASTRIC    Chronic kidney disease     Chronic obstructive pulmonary disease (HCC)     Depression     Diabetes ([address])     BORDERLINE, NO MEDS. Diverticulosis of colon     Dyslipidemia 4/26/2017    GERD (gastroesophageal reflux disease)     Glaucoma     History of complete heart block     s/p watchman procedure and replaced in 11/2017 by Dr. Shannan James     History of CVA (cerebrovascular accident) 7/21/2020    Hypercholesteremia     Hypertension     Hypomagnesemia 7/13/2020    Nocturia 4/26/2017    PUD (peptic ulcer disease)     GI BLEEDING    PVD (peripheral vascular disease) ([address])     Rectal hemorrhage 4/26/2017    Strabismus     Stroke ([address]) 2000 APPROX.     SOME VISUAL DEFICIT    Type 2 diabetes mellitus without complications ([address]) 0/16/3258    Venous stasis 4/26/2017       Surgical History:  Past Surgical History:   Procedure Laterality Date    CARDIAC CATHETERIZATION      COLONOSCOPY      FOOT DEBRIDEMENT Right 8/18/2023    RIGHT FOOT WOUND DEBRIDEMENT performed by Claudio Townsend DPM at [address] Right 5/17/2023    Open Transmetatarsal Amputation Right

## 2024-08-14 ENCOUNTER — TELEPHONE (OUTPATIENT)
Facility: CLINIC | Age: 89
End: 2024-08-14

## 2024-08-14 NOTE — TELEPHONE ENCOUNTER
[facility]. His number is [phone].

## 2024-08-15 ENCOUNTER — TELEPHONE (OUTPATIENT)
Facility: CLINIC | Age: 89
End: 2024-08-15

## 2024-08-15 NOTE — TELEPHONE ENCOUNTER
[facility] pharmacy ( [address] [phone]) called  requesting last office notes and diagnoses code for pt. He would like  that faxed to [phone]. Pt ordered medical equipment

## 2024-11-13 ENCOUNTER — TELEPHONE (OUTPATIENT)
Facility: CLINIC | Age: 89
End: 2024-11-13

## 2025-03-06 ENCOUNTER — TELEPHONE (OUTPATIENT)
Facility: CLINIC | Age: 89
End: 2025-03-06

## 2025-03-06 NOTE — TELEPHONE ENCOUNTER
Juni from [facility] called and stated that he has made several request( calls and fax)  for the pt last office note -explained that he could refax the request to the correct fax number and the request would be reviewed.

## (undated) DEVICE — SOLUTION IRRIG 1000ML H2O STRL BLT

## (undated) DEVICE — DRAPE,REIN 53X77,STERILE: Brand: MEDLINE

## (undated) DEVICE — GRAFT TISS 7.6X15.2CM 116SQ CM THERASKIN XL: Type: IMPLANTABLE DEVICE | Status: NON-FUNCTIONAL

## (undated) DEVICE — Device

## (undated) DEVICE — SUTURE PROL SZ 5-0 L30IN NONABSORBABLE BLU L13MM RB-2 1/2 8710H

## (undated) DEVICE — CATH FOL TY IC BAG 16FR 2000ML -- CONVERT TO ITEM 363158

## (undated) DEVICE — BASIC SINGLE BASIN-LF: Brand: MEDLINE INDUSTRIES, INC.

## (undated) DEVICE — SPONGE GZ W4XL4IN COT 12 PLY TYP VII WVN C FLD DSGN

## (undated) DEVICE — 3M™ STERI-STRIP™ REINFORCED ADHESIVE SKIN CLOSURES, R1547, 1/2 IN X 4 IN (12 MM X 100 MM), 6 STRIPS/ENVELOPE: Brand: 3M™ STERI-STRIP™

## (undated) DEVICE — EXTREMITY - SMH: Brand: MEDLINE INDUSTRIES, INC.

## (undated) DEVICE — INFECTION CONTROL KIT SYS

## (undated) DEVICE — SOLUTION IV 1000ML 0.9% SOD CHL PH 5 INJ USP VIAFLX PLAS

## (undated) DEVICE — REM POLYHESIVE ADULT PATIENT RETURN ELECTRODE: Brand: VALLEYLAB

## (undated) DEVICE — SUT PROL 6-0 24IN BV1 DA BLU --

## (undated) DEVICE — BANDAGE COBAN 4 IN COMPR W4INXL5YD FOAM COHESIVE QUIK STK SELF ADH SFT

## (undated) DEVICE — SPONGE LAP 18X18IN STRL -- 5/PK

## (undated) DEVICE — GLOVE ORANGE PI 7 1/2   MSG9075

## (undated) DEVICE — TRAY PREP DRY W/ PREM GLV 2 APPL 6 SPNG 2 UNDPD 1 OVERWRAP

## (undated) DEVICE — SOUTHSIDE TURNOVER: Brand: MEDLINE INDUSTRIES, INC.

## (undated) DEVICE — GAUZE,SPONGE,4"X4",16PLY,STRL,LF,10/TRAY: Brand: MEDLINE

## (undated) DEVICE — DEVON™ KNEE AND BODY STRAP 60" X 3" (1.5 M X 7.6 CM): Brand: DEVON

## (undated) DEVICE — BANDAGE,GAUZE,BULKEE II,4.5"X4.1YD,STRL: Brand: MEDLINE

## (undated) DEVICE — STOCKINETTE,IMPERVIOUS,12X48,STERILE: Brand: MEDLINE

## (undated) DEVICE — SUTURE ABSORBABLE MONOFILAMENT 3-0 SH 27 IN UD MONOCRYL + MCP316H

## (undated) DEVICE — SOLUTION IV 500ML 0.9% SOD CHL FLX CONT

## (undated) DEVICE — GAUZE SPONGES,12 PLY: Brand: CURITY

## (undated) DEVICE — GARMENT,MEDLINE,DVT,INT,CALF,MED, GEN2: Brand: MEDLINE

## (undated) DEVICE — STRAP,POSITIONING,KNEE/BODY,FOAM,4X60": Brand: MEDLINE

## (undated) DEVICE — DRESSING PETRO W3XL3IN OIL EMUL N ADH GZ KNIT IMPREG CELOS

## (undated) DEVICE — SOLUTION IRRIG 500ML 0.9% SOD CHLO USP POUR PLAS BTL

## (undated) DEVICE — SUTURE PERMAHAND SZ 2-0 L18IN NONABSORBABLE BLK L26MM SH C012D

## (undated) DEVICE — HANDLE LT SNAP ON ULT DURABLE LENS FOR TRUMPF ALC DISPOSABLE

## (undated) DEVICE — BANDAGE COMPR M W6INXL10YD WHT BGE VELC E MTRX HK AND LOOP

## (undated) DEVICE — 1.5 TO 1 DERMACARRIER: Brand: MESHGRAFTTM  II TISSUE EXPANSION SYSTEM

## (undated) DEVICE — ELECTRODE PT RET AD L9FT HI MOIST COND ADH HYDRGEL CORDED

## (undated) DEVICE — SUTURE VCRL SZ 0 L54IN ABSRB VLT LIGAPAK REEL NDL J207G

## (undated) DEVICE — PAD,ABDOMINAL,5"X9",ST,LF,25/BX: Brand: MEDLINE INDUSTRIES, INC.

## (undated) DEVICE — T-DRAPE,EXTREMITY, ULTRAGARD: Brand: MEDLINE

## (undated) DEVICE — 1200 GUARD II KIT W/5MM TUBE W/O VAC TUBE: Brand: GUARDIAN

## (undated) DEVICE — BLADE,CARBON-STEEL,10,STRL,DISPOSABLE,TB: Brand: MEDLINE

## (undated) DEVICE — SOLUTION IV 250ML 0.9% SOD CHL PH 5 INJ USP VIAFLX PLAS

## (undated) DEVICE — TOWEL SURG W17XL27IN STD BLU COT NONFENESTRATED PREWASHED

## (undated) DEVICE — TAPE ADH CLTH SILK H2O REPELLENT CURAD

## (undated) DEVICE — GLOVE ORANGE PI 7   MSG9070

## (undated) DEVICE — STOCKINETTE,DOUBLE PLY,6X48,STERILE: Brand: MEDLINE

## (undated) DEVICE — TOTAL TRAY, DB, 100% SILI FOLEY, 16FR 10: Brand: MEDLINE

## (undated) DEVICE — ELECTRODE NDL 2.8IN COAT VALLEYLAB

## (undated) DEVICE — CURITY NON-ADHERENT STRIPS: Brand: CURITY

## (undated) DEVICE — SOLUTION IRRIG 1000ML STRL H2O USP PLAS POUR BTL

## (undated) DEVICE — BNDG ELAS HK LOOP 4X5YD NS -- MATRIX

## (undated) DEVICE — SUTURE VCRL SZ 2-0 L27IN ABSRB UD L26MM CT-2 1/2 CIR J269H

## (undated) DEVICE — DRESSING PETRO W3XL8IN N ADH OIL EMUL GZ CURAD

## (undated) DEVICE — SPONGE GZ W4XL4IN COT 12 PLY TYP VII WVN C FLD DSGN STERILE

## (undated) DEVICE — GLOVE SURG SZ 8 L12IN FNGR THK79MIL GRN LTX FREE

## (undated) DEVICE — SUTURE VCRL + SZ 0 L27IN ABSRB UD CT-1 L36MM 1/2 CIR TAPR VCP260H

## (undated) DEVICE — PROBE US DEB DISP SONICVAC

## (undated) DEVICE — DRAPE SURG W41XL74IN CLR FULL SZ C ARM 3 ADH POLY STRP E

## (undated) DEVICE — SUT ETHLN 3-0 18IN PS1 BLK --

## (undated) DEVICE — SUTURE VCRL SZ 2-0 L36IN ABSRB UD L40MM CT 1/2 CIR J957H

## (undated) DEVICE — DRAPE,EXTREMITY,89X128,STERILE: Brand: MEDLINE

## (undated) DEVICE — NEEDLE HYPO 25GA L1.5IN BVL ORIENTED ECLIPSE

## (undated) DEVICE — MINOR EXTREMITY PACK: Brand: MEDLINE INDUSTRIES, INC.

## (undated) DEVICE — INTENDED FOR TISSUE SEPARATION, AND OTHER PROCEDURES THAT REQUIRE A SHARP SURGICAL BLADE TO PUNCTURE OR CUT.: Brand: BARD-PARKER SAFETY BLADES SIZE 10, STERILE

## (undated) DEVICE — WAX SURG 2.5GM HEMSTAT BNE BEESWAX PARAFFIN ISO PALMITATE

## (undated) DEVICE — DRESSING,GAUZE,XEROFORM,CURAD,5"X9",ST: Brand: CURAD

## (undated) DEVICE — SET IRRIG TB DISP FOR BONESCALPEL

## (undated) DEVICE — DERMATOME BLADES: Brand: DERMATOME

## (undated) DEVICE — PADDING CAST W6INXL4YD NONSTERILE COT RAYON MICROPLEATED

## (undated) DEVICE — TAPE,ELASTIC,FOAM,CURAD,3"X5.5YD,LF: Brand: CURAD

## (undated) DEVICE — SPONGE GZ 4X4 IN 16-PLY DETECTABLE W/ DMT MSTR TAG

## (undated) DEVICE — DRSG GZ OIL EMUL CURAD 3X3 --

## (undated) DEVICE — HOOK LOCK LATEX FREE ELASTIC BANDAGE 4INX5YD

## (undated) DEVICE — T-DRAPE,EXTREMITY,STERILE: Brand: MEDLINE

## (undated) DEVICE — COVER LT HNDL BLU PLAS

## (undated) DEVICE — 3M™ COBAN™ NL STERILE NON-LATEX SELF-ADHERENT WRAP, 2084S, 4 IN X 5 YD (10 CM X 4,5 M), 18 ROLLS/CASE: Brand: 3M™ COBAN™

## (undated) DEVICE — BLADE,CARBON-STEEL,15,STRL,DISPOSABLE,TB: Brand: MEDLINE

## (undated) DEVICE — GOWN,SIRUS,FABRNF,XL,20/CS: Brand: MEDLINE

## (undated) DEVICE — INTENDED FOR TISSUE SEPARATION, AND OTHER PROCEDURES THAT REQUIRE A SHARP SURGICAL BLADE TO PUNCTURE OR CUT.: Brand: BARD-PARKER ® CARBON RIB-BACK BLADES

## (undated) DEVICE — MINOR BASIN -SMH: Brand: MEDLINE INDUSTRIES, INC.

## (undated) DEVICE — KERLIX BANDAGE ROLL: Brand: KERLIX

## (undated) DEVICE — PREP PAD BNS: Brand: CONVERTORS

## (undated) DEVICE — PREP SKN CHLRAPRP APL 26ML STR --

## (undated) DEVICE — KERLIX STER GAUZ 225INX3YDS/RL

## (undated) DEVICE — MAGNETIC INSTR DRAPE 20X16: Brand: MEDLINE INDUSTRIES, INC.

## (undated) DEVICE — VASHE WOUND SOLUTION IS A SKIN, WOUND, BURN CLEANSING SOLUTION THAT IS FOR USE FOR DEBRIDEMENT, IRRIGATION, PHYSICAL DISRUPTION OF BIOFILM AND REMOVAL OF MICROORGANISMS.  THIS IS THE 475 ML (16 FL OZ) SIZE WITH AN INSTILLATION CAP.: Brand: VASHE WOUND SOLUTION 475 ML (16 FL OZ) INSTILLATION CONTAINER

## (undated) DEVICE — SLIM BODY SKIN STAPLER: Brand: APPOSE ULC

## (undated) DEVICE — MINOR GENERAL PACK: Brand: MEDLINE INDUSTRIES, INC.

## (undated) DEVICE — YANKAUER,BULB TIP,W/O VENT,RIGID,STERILE: Brand: MEDLINE

## (undated) DEVICE — SUT PROL 6-0 18IN BV1 DA BLU --

## (undated) DEVICE — DRESSING GZ W3XL16IN CELOS ACETT OIL EMUL N ADH

## (undated) DEVICE — STRYKER PERFORMANCE SERIES SAGITTAL BLADE: Brand: STRYKER PERFORMANCE SERIES

## (undated) DEVICE — BANDAGE E SELF CLSR 4INX5YD VELC SWIFTWRAP

## (undated) DEVICE — SOLUTION IRRIG 1000ML 0.9% SOD CHL USP POUR PLAS BTL

## (undated) DEVICE — MARKER SURG SKIN GENTIAN VLT REG TIP W/ 6IN RUL

## (undated) DEVICE — SUTURE VCRL + SZ 2-0 L27IN ABSRB WHT SH 1/2 CIR TAPERCUT VCP417H

## (undated) DEVICE — SUTURE ETHLN SZ 2-0 L18IN NONABSORBABLE BLK L26MM FS 3/8 664G

## (undated) DEVICE — SURGIFOAM SPNG SZ 12-7

## (undated) DEVICE — APPLICATOR MEDICATED 26 CC SOLUTION HI LT ORNG CHLORAPREP

## (undated) DEVICE — PENCIL SMK EVAC 10 FT BLADE ELECTRD ROCKER FOR TELSCP

## (undated) DEVICE — SUTURE ABSORBABLE MONOFILAMENT 2-0 SH 27 IN VIO MONOCRYL + MCP317H

## (undated) DEVICE — PAD,ABDOMINAL,8"X7.5",STERILE,LF,1/PK: Brand: MEDLINE

## (undated) DEVICE — BANDAGE ADH STRP 4X30IN DELT REIN

## (undated) DEVICE — SYR 10ML LUER LOK 1/5ML GRAD --

## (undated) DEVICE — WIRE SAW SURG FOR BONE CUT GIGLI 510 MM LEN UNIV

## (undated) DEVICE — Z DISCONTINUED USE 2425483 (LOW STOCK PER MEDLINE) TAPE UMB L18IN DIA1/8IN WHT COT NONABSORBABLE W/O NDL FOR

## (undated) DEVICE — AMC/4 ARTERIAL NEEDLE – 18GA X 2.75” (7CM): Brand: ARTERIAL NEEDLE

## (undated) DEVICE — SOL INJ SOD CL 0.9% 500ML BG --

## (undated) DEVICE — SUTURE PERMAHAND SZ 3-0 L30IN NONABSORBABLE BLK SILK BRAID A304H

## (undated) DEVICE — SMARTGOWN SURGICAL GOWN, LARGE: Brand: CONVERTORS

## (undated) DEVICE — GOWN,SIRUS,NONRNF,SETINSLV,XL,20/CS: Brand: MEDLINE

## (undated) DEVICE — VASCULAR-RICHMOND-LF: Brand: MEDLINE INDUSTRIES, INC.

## (undated) DEVICE — BANDAGE COMPR W4INXL5YD WHT BGE POLY COT M E WRP WV HK AND

## (undated) DEVICE — SOLUTION IV 1000ML 0.9% SOD CHL

## (undated) DEVICE — BANDAGE,GAUZE,CONFORMING,3"X75",STRL,LF: Brand: MEDLINE

## (undated) DEVICE — IMPREGNATED GAUZE DRESSING: Brand: CUTICERIN 7.5X20CM CTN 50

## (undated) DEVICE — STERILE POLYISOPRENE POWDER-FREE SURGICAL GLOVES WITH EMOLLIENT COATING: Brand: PROTEXIS

## (undated) DEVICE — SPONGE LAPAROTOMY W18XL18IN WHITE STRUNG RADIOPAQUE STERILE

## (undated) DEVICE — THE STERILE LIGHT HANDLE COVER IS USED WITH STERIS SURGICAL LIGHTING AND VISUALIZATION SYSTEMS.

## (undated) DEVICE — (D)PREP SKN CHLRAPRP APPL 26ML -- CONVERT TO ITEM 371833

## (undated) DEVICE — WET SKIN PREP TRAY: Brand: MEDLINE INDUSTRIES, INC.

## (undated) DEVICE — PACK,EXTREMITY III: Brand: MEDLINE

## (undated) DEVICE — SUTURE PROL SZ 5-0 L30IN NONABSORBABLE BLU L13MM C-1 3/8 8890H

## (undated) DEVICE — HYPODERMIC SAFETY NEEDLE: Brand: MONOJECT

## (undated) DEVICE — SWAB CULT LIQ STUART AGR AERB MOD IN BRK SGL RAYON TIP PLAS 220099] BECTON DICKINSON MICRO]

## (undated) DEVICE — SOLUTION SURG PREP 26 CC PURPREP

## (undated) DEVICE — TUBING, SUCTION, 3/16" X 10', SCALLOP: Brand: MEDLINE

## (undated) DEVICE — SUTURE PERMAHAND SZ 2-0 L30IN NONABSORBABLE BLK SILK W/O A305H

## (undated) DEVICE — SYRINGE IRRIG 60ML SFT PLIABLE BLB EZ TO GRP 1 HND USE W/

## (undated) DEVICE — COUNTER NDL 40 COUNT HLD 70 FOAM BLK ADH W/ MAG

## (undated) DEVICE — SUTURE MCRYL + SZ 3-0 L27IN ABSRB UD L26MM SH 1/2 CIR MCP416H

## (undated) DEVICE — BAG ISOL TRNSPRT 18X18.5IN LF --

## (undated) DEVICE — SUTURE PERMAHAND SZ 0 L18IN NONABSORBABLE BLK SILK BRAID A186H